# Patient Record
Sex: FEMALE | Race: WHITE | NOT HISPANIC OR LATINO
[De-identification: names, ages, dates, MRNs, and addresses within clinical notes are randomized per-mention and may not be internally consistent; named-entity substitution may affect disease eponyms.]

---

## 2016-09-08 RX ORDER — LEVETIRACETAM 250 MG/1
250 TABLET, FILM COATED ORAL
Qty: 0 | Refills: 0 | DISCHARGE
Start: 2016-09-08

## 2016-09-08 RX ORDER — LEVETIRACETAM 250 MG/1
200 TABLET, FILM COATED ORAL
Qty: 0 | Refills: 0 | COMMUNITY
Start: 2016-09-08

## 2016-09-08 RX ORDER — LEVETIRACETAM 250 MG/1
250 TABLET, FILM COATED ORAL
Qty: 0 | Refills: 0 | COMMUNITY
Start: 2016-09-08

## 2017-02-18 ENCOUNTER — APPOINTMENT (OUTPATIENT)
Dept: INTERNAL MEDICINE | Facility: CLINIC | Age: 82
End: 2017-02-18

## 2017-02-18 ENCOUNTER — NON-APPOINTMENT (OUTPATIENT)
Age: 82
End: 2017-02-18

## 2017-02-18 VITALS
WEIGHT: 146.31 LBS | SYSTOLIC BLOOD PRESSURE: 130 MMHG | RESPIRATION RATE: 14 BRPM | DIASTOLIC BLOOD PRESSURE: 60 MMHG | BODY MASS INDEX: 26.93 KG/M2 | OXYGEN SATURATION: 95 % | HEIGHT: 62 IN | HEART RATE: 65 BPM

## 2017-02-19 LAB
25(OH)D3 SERPL-MCNC: 37.1 NG/ML
ALBUMIN SERPL ELPH-MCNC: 3.8 G/DL
ALP BLD-CCNC: 54 U/L
ALT SERPL-CCNC: 15 U/L
ANION GAP SERPL CALC-SCNC: 14 MMOL/L
AST SERPL-CCNC: 18 U/L
BASOPHILS # BLD AUTO: 0.03 K/UL
BASOPHILS NFR BLD AUTO: 0.7 %
BILIRUB SERPL-MCNC: 0.2 MG/DL
BUN SERPL-MCNC: 17 MG/DL
CALCIUM SERPL-MCNC: 9.2 MG/DL
CHLORIDE SERPL-SCNC: 104 MMOL/L
CHOLEST SERPL-MCNC: 193 MG/DL
CHOLEST/HDLC SERPL: 2.6 RATIO
CK SERPL-CCNC: 225 U/L
CO2 SERPL-SCNC: 25 MMOL/L
CREAT SERPL-MCNC: 0.86 MG/DL
EOSINOPHIL # BLD AUTO: 0.07 K/UL
EOSINOPHIL NFR BLD AUTO: 1.7 %
GLUCOSE SERPL-MCNC: 103 MG/DL
HBA1C MFR BLD HPLC: 6.5 %
HCT VFR BLD CALC: 37.7 %
HDLC SERPL-MCNC: 74 MG/DL
HGB BLD-MCNC: 11.9 G/DL
IMM GRANULOCYTES NFR BLD AUTO: 0.5 %
LDLC SERPL CALC-MCNC: 105 MG/DL
LYMPHOCYTES # BLD AUTO: 1.15 K/UL
LYMPHOCYTES NFR BLD AUTO: 27.6 %
MAN DIFF?: NORMAL
MCHC RBC-ENTMCNC: 28.5 PG
MCHC RBC-ENTMCNC: 31.6 GM/DL
MCV RBC AUTO: 90.4 FL
MONOCYTES # BLD AUTO: 0.38 K/UL
MONOCYTES NFR BLD AUTO: 9.1 %
NEUTROPHILS # BLD AUTO: 2.52 K/UL
NEUTROPHILS NFR BLD AUTO: 60.4 %
PLATELET # BLD AUTO: 226 K/UL
POTASSIUM SERPL-SCNC: 4.2 MMOL/L
PROT SERPL-MCNC: 6.3 G/DL
RBC # BLD: 4.17 M/UL
RBC # FLD: 14.5 %
SODIUM SERPL-SCNC: 143 MMOL/L
TRIGL SERPL-MCNC: 68 MG/DL
TSH SERPL-ACNC: 3.91 UIU/ML
WBC # FLD AUTO: 4.17 K/UL

## 2017-03-01 ENCOUNTER — APPOINTMENT (OUTPATIENT)
Dept: NEUROLOGY | Facility: CLINIC | Age: 82
End: 2017-03-01

## 2017-03-20 ENCOUNTER — MEDICATION RENEWAL (OUTPATIENT)
Age: 82
End: 2017-03-20

## 2017-03-21 ENCOUNTER — MEDICATION RENEWAL (OUTPATIENT)
Age: 82
End: 2017-03-21

## 2017-03-27 ENCOUNTER — RECORD ABSTRACTING (OUTPATIENT)
Age: 82
End: 2017-03-27

## 2017-04-04 ENCOUNTER — APPOINTMENT (OUTPATIENT)
Dept: NEUROLOGY | Facility: CLINIC | Age: 82
End: 2017-04-04

## 2017-04-04 ENCOUNTER — INPATIENT (INPATIENT)
Facility: HOSPITAL | Age: 82
LOS: 1 days | Discharge: HOME | DRG: 100 | End: 2017-04-06
Attending: FAMILY MEDICINE | Admitting: HOSPITALIST
Payer: MEDICARE

## 2017-04-04 VITALS
DIASTOLIC BLOOD PRESSURE: 70 MMHG | HEART RATE: 72 BPM | SYSTOLIC BLOOD PRESSURE: 116 MMHG | WEIGHT: 146 LBS | BODY MASS INDEX: 26.87 KG/M2 | HEIGHT: 62 IN

## 2017-04-04 VITALS
WEIGHT: 146 LBS | HEIGHT: 62 IN | BODY MASS INDEX: 26.87 KG/M2 | DIASTOLIC BLOOD PRESSURE: 70 MMHG | SYSTOLIC BLOOD PRESSURE: 116 MMHG | HEART RATE: 72 BPM

## 2017-04-04 VITALS
HEART RATE: 92 BPM | DIASTOLIC BLOOD PRESSURE: 96 MMHG | SYSTOLIC BLOOD PRESSURE: 189 MMHG | OXYGEN SATURATION: 100 % | HEIGHT: 65.5 IN | TEMPERATURE: 100 F | RESPIRATION RATE: 12 BRPM | WEIGHT: 154.98 LBS

## 2017-04-04 DIAGNOSIS — R53.1 WEAKNESS: ICD-10-CM

## 2017-04-04 DIAGNOSIS — Z86.69 PERSONAL HISTORY OF OTHER DISEASES OF THE NERVOUS SYSTEM AND SENSE ORGANS: Chronic | ICD-10-CM

## 2017-04-04 LAB
ALBUMIN SERPL ELPH-MCNC: 3.2 G/DL — LOW (ref 3.3–5)
ALP SERPL-CCNC: 96 U/L — SIGNIFICANT CHANGE UP (ref 40–120)
ALT FLD-CCNC: 22 U/L — SIGNIFICANT CHANGE UP (ref 12–78)
ANION GAP SERPL CALC-SCNC: 9 MMOL/L — SIGNIFICANT CHANGE UP (ref 5–17)
APPEARANCE UR: CLEAR — SIGNIFICANT CHANGE UP
APTT BLD: 30.1 SEC — SIGNIFICANT CHANGE UP (ref 27.5–37.4)
AST SERPL-CCNC: 27 U/L — SIGNIFICANT CHANGE UP (ref 15–37)
BACTERIA # UR AUTO: ABNORMAL
BASOPHILS # BLD AUTO: 0.1 K/UL — SIGNIFICANT CHANGE UP (ref 0–0.2)
BASOPHILS NFR BLD AUTO: 1.2 % — SIGNIFICANT CHANGE UP (ref 0–2)
BILIRUB SERPL-MCNC: 0.1 MG/DL — LOW (ref 0.2–1.2)
BILIRUB UR-MCNC: NEGATIVE — SIGNIFICANT CHANGE UP
BUN SERPL-MCNC: 20 MG/DL — SIGNIFICANT CHANGE UP (ref 7–23)
CALCIUM SERPL-MCNC: 8.6 MG/DL — SIGNIFICANT CHANGE UP (ref 8.5–10.1)
CHLORIDE SERPL-SCNC: 108 MMOL/L — SIGNIFICANT CHANGE UP (ref 96–108)
CO2 SERPL-SCNC: 26 MMOL/L — SIGNIFICANT CHANGE UP (ref 22–31)
COLOR SPEC: YELLOW — SIGNIFICANT CHANGE UP
CREAT SERPL-MCNC: 0.94 MG/DL — SIGNIFICANT CHANGE UP (ref 0.5–1.3)
DIFF PNL FLD: NEGATIVE — SIGNIFICANT CHANGE UP
EOSINOPHIL # BLD AUTO: 0.1 K/UL — SIGNIFICANT CHANGE UP (ref 0–0.5)
EOSINOPHIL NFR BLD AUTO: 1.9 % — SIGNIFICANT CHANGE UP (ref 0–6)
EPI CELLS # UR: SIGNIFICANT CHANGE UP
GLUCOSE SERPL-MCNC: 148 MG/DL — HIGH (ref 70–99)
GLUCOSE UR QL: 100 MG/DL
HCT VFR BLD CALC: 41.3 % — SIGNIFICANT CHANGE UP (ref 34.5–45)
HGB BLD-MCNC: 13.2 G/DL — SIGNIFICANT CHANGE UP (ref 11.5–15.5)
INR BLD: 0.96 RATIO — SIGNIFICANT CHANGE UP (ref 0.88–1.16)
KETONES UR-MCNC: NEGATIVE — SIGNIFICANT CHANGE UP
LACTATE SERPL-SCNC: 2.2 MMOL/L — HIGH (ref 0.7–2)
LEUKOCYTE ESTERASE UR-ACNC: ABNORMAL
LYMPHOCYTES # BLD AUTO: 1.1 K/UL — SIGNIFICANT CHANGE UP (ref 1–3.3)
LYMPHOCYTES # BLD AUTO: 19.1 % — SIGNIFICANT CHANGE UP (ref 13–44)
MCHC RBC-ENTMCNC: 29.7 PG — SIGNIFICANT CHANGE UP (ref 27–34)
MCHC RBC-ENTMCNC: 31.9 GM/DL — LOW (ref 32–36)
MCV RBC AUTO: 93 FL — SIGNIFICANT CHANGE UP (ref 80–100)
MONOCYTES # BLD AUTO: 0.5 K/UL — SIGNIFICANT CHANGE UP (ref 0–0.9)
MONOCYTES NFR BLD AUTO: 8.4 % — SIGNIFICANT CHANGE UP (ref 1–9)
NEUTROPHILS # BLD AUTO: 4.2 K/UL — SIGNIFICANT CHANGE UP (ref 1.8–7.4)
NEUTROPHILS NFR BLD AUTO: 69.4 % — SIGNIFICANT CHANGE UP (ref 43–77)
NITRITE UR-MCNC: NEGATIVE — SIGNIFICANT CHANGE UP
PH UR: 5 — SIGNIFICANT CHANGE UP (ref 4.8–8)
PLATELET # BLD AUTO: 186 K/UL — SIGNIFICANT CHANGE UP (ref 150–400)
POTASSIUM SERPL-MCNC: 4 MMOL/L — SIGNIFICANT CHANGE UP (ref 3.5–5.3)
POTASSIUM SERPL-SCNC: 4 MMOL/L — SIGNIFICANT CHANGE UP (ref 3.5–5.3)
PROT SERPL-MCNC: 6.9 G/DL — SIGNIFICANT CHANGE UP (ref 6–8.3)
PROT UR-MCNC: 25 MG/DL
PROTHROM AB SERPL-ACNC: 10.5 SEC — SIGNIFICANT CHANGE UP (ref 9.8–12.7)
RBC # BLD: 4.44 M/UL — SIGNIFICANT CHANGE UP (ref 3.8–5.2)
RBC # FLD: 13.9 % — SIGNIFICANT CHANGE UP (ref 10.3–14.5)
RBC CASTS # UR COMP ASSIST: SIGNIFICANT CHANGE UP /HPF (ref 0–4)
SODIUM SERPL-SCNC: 143 MMOL/L — SIGNIFICANT CHANGE UP (ref 135–145)
SP GR SPEC: 1.02 — SIGNIFICANT CHANGE UP (ref 1.01–1.02)
UROBILINOGEN FLD QL: NEGATIVE — SIGNIFICANT CHANGE UP
WBC # BLD: 6 K/UL — SIGNIFICANT CHANGE UP (ref 3.8–10.5)
WBC # FLD AUTO: 6 K/UL — SIGNIFICANT CHANGE UP (ref 3.8–10.5)
WBC UR QL: SIGNIFICANT CHANGE UP

## 2017-04-04 PROCEDURE — 99223 1ST HOSP IP/OBS HIGH 75: CPT | Mod: AI,GC

## 2017-04-04 PROCEDURE — 93010 ELECTROCARDIOGRAM REPORT: CPT

## 2017-04-04 PROCEDURE — 71010: CPT | Mod: 26

## 2017-04-04 PROCEDURE — 99285 EMERGENCY DEPT VISIT HI MDM: CPT

## 2017-04-04 PROCEDURE — 70450 CT HEAD/BRAIN W/O DYE: CPT | Mod: 26

## 2017-04-04 RX ORDER — DEXTROSE 50 % IN WATER 50 %
25 SYRINGE (ML) INTRAVENOUS ONCE
Qty: 0 | Refills: 0 | Status: DISCONTINUED | OUTPATIENT
Start: 2017-04-04 | End: 2017-04-06

## 2017-04-04 RX ORDER — ATORVASTATIN CALCIUM 80 MG/1
20 TABLET, FILM COATED ORAL AT BEDTIME
Qty: 0 | Refills: 0 | Status: DISCONTINUED | OUTPATIENT
Start: 2017-04-04 | End: 2017-04-06

## 2017-04-04 RX ORDER — ACETAMINOPHEN 500 MG
650 TABLET ORAL ONCE
Qty: 0 | Refills: 0 | Status: COMPLETED | OUTPATIENT
Start: 2017-04-04 | End: 2017-04-04

## 2017-04-04 RX ORDER — LEVETIRACETAM 250 MG/1
400 TABLET, FILM COATED ORAL
Qty: 0 | Refills: 0 | COMMUNITY

## 2017-04-04 RX ORDER — LEVETIRACETAM 250 MG/1
250 TABLET, FILM COATED ORAL DAILY
Qty: 0 | Refills: 0 | Status: DISCONTINUED | OUTPATIENT
Start: 2017-04-05 | End: 2017-04-06

## 2017-04-04 RX ORDER — SODIUM CHLORIDE 9 MG/ML
1000 INJECTION, SOLUTION INTRAVENOUS
Qty: 0 | Refills: 0 | Status: DISCONTINUED | OUTPATIENT
Start: 2017-04-04 | End: 2017-04-06

## 2017-04-04 RX ORDER — MULTIVIT-MIN/FERROUS GLUCONATE 9 MG/15 ML
1 LIQUID (ML) ORAL DAILY
Qty: 0 | Refills: 0 | Status: DISCONTINUED | OUTPATIENT
Start: 2017-04-04 | End: 2017-04-06

## 2017-04-04 RX ORDER — INSULIN LISPRO 100/ML
VIAL (ML) SUBCUTANEOUS
Qty: 0 | Refills: 0 | Status: DISCONTINUED | OUTPATIENT
Start: 2017-04-04 | End: 2017-04-06

## 2017-04-04 RX ORDER — DEXTROSE 50 % IN WATER 50 %
12.5 SYRINGE (ML) INTRAVENOUS ONCE
Qty: 0 | Refills: 0 | Status: DISCONTINUED | OUTPATIENT
Start: 2017-04-04 | End: 2017-04-06

## 2017-04-04 RX ORDER — SODIUM CHLORIDE 9 MG/ML
1000 INJECTION INTRAMUSCULAR; INTRAVENOUS; SUBCUTANEOUS
Qty: 0 | Refills: 0 | Status: COMPLETED | OUTPATIENT
Start: 2017-04-04 | End: 2017-04-04

## 2017-04-04 RX ORDER — ONDANSETRON 8 MG/1
4 TABLET, FILM COATED ORAL ONCE
Qty: 0 | Refills: 0 | Status: COMPLETED | OUTPATIENT
Start: 2017-04-04 | End: 2017-04-04

## 2017-04-04 RX ORDER — DEXTROSE 50 % IN WATER 50 %
1 SYRINGE (ML) INTRAVENOUS ONCE
Qty: 0 | Refills: 0 | Status: DISCONTINUED | OUTPATIENT
Start: 2017-04-04 | End: 2017-04-06

## 2017-04-04 RX ORDER — ASPIRIN/CALCIUM CARB/MAGNESIUM 324 MG
325 TABLET ORAL ONCE
Qty: 0 | Refills: 0 | Status: COMPLETED | OUTPATIENT
Start: 2017-04-04 | End: 2017-04-04

## 2017-04-04 RX ORDER — GLUCAGON INJECTION, SOLUTION 0.5 MG/.1ML
1 INJECTION, SOLUTION SUBCUTANEOUS ONCE
Qty: 0 | Refills: 0 | Status: DISCONTINUED | OUTPATIENT
Start: 2017-04-04 | End: 2017-04-06

## 2017-04-04 RX ORDER — LEVETIRACETAM 250 MG/1
500 TABLET, FILM COATED ORAL AT BEDTIME
Qty: 0 | Refills: 0 | Status: DISCONTINUED | OUTPATIENT
Start: 2017-04-04 | End: 2017-04-06

## 2017-04-04 RX ADMIN — SODIUM CHLORIDE 1000 MILLILITER(S): 9 INJECTION INTRAMUSCULAR; INTRAVENOUS; SUBCUTANEOUS at 22:40

## 2017-04-04 RX ADMIN — ONDANSETRON 4 MILLIGRAM(S): 8 TABLET, FILM COATED ORAL at 22:19

## 2017-04-04 RX ADMIN — SODIUM CHLORIDE 1000 MILLILITER(S): 9 INJECTION INTRAMUSCULAR; INTRAVENOUS; SUBCUTANEOUS at 21:35

## 2017-04-04 NOTE — PROVIDER CONTACT NOTE (CRITICAL VALUE NOTIFICATION) - ACTION/TREATMENT ORDERED:
Dr. Almendarez made aware and ordered to repeat Lactate after 2 liter bolus of Normal saline as ordered.

## 2017-04-04 NOTE — H&P ADULT - ASSESSMENT
84 yo female with pmh of hemorrhagic cva, htn, seizire, htn, hld, diabetes, chronic diastolic chf presented to the ED with sons c/o left sided weakness and right hand tremor admitted with left sided weakness r/o cva, arrythmia hr to 40s, elevated lactate 86 yo female with pmh of hemorrhagic cva, htn, seizure, htn, hld, diabetes, chronic diastolic chf presented to the ED with sons c/o left sided weakness and right hand tremor admitted with left sided weakness r/o cva, arrythmia hr to 40s, elevated lactate

## 2017-04-04 NOTE — ED PROVIDER NOTE - MEDICAL DECISION MAKING DETAILS
L sided weakness, slurred speech and ha today - not TPA candidate due to timing > 4.5 hrs. Check labs, CT, Neuro, admit

## 2017-04-04 NOTE — ED PROVIDER NOTE - OBJECTIVE STATEMENT
84 yo F p/w leaning to left side, Some weakness in L arm x past ~ 4 - 5 hrs. No cp/sob/palp. no abd pain. no vomiting or diarrhea. No neck / back pain. No fever/chills. No cp/sob/palp. No numb/ting/focal weak. no recent trauma. Pt co mild gen headache throughout day today. no agg/allev factors. No other inj or co.  Pt with hx sx, has had some similar sx in past related to sz. No known sz activity today.

## 2017-04-04 NOTE — ED PROVIDER NOTE - ENMT, MLM
Airway patent, Nasal mucosa clear. Mouth with normal mucosa. Throat has no vesicles, no oropharyngeal exudates and uvula is midline. MM Moist. Non-toxic, well appearing. Neck supple.

## 2017-04-04 NOTE — H&P ADULT - FAMILY HISTORY
Mother  Still living? Unknown  Family history of basal cell carcinoma, Age at diagnosis: Age Unknown     Father  Still living? Unknown  Family history of heart disease, Age at diagnosis: Age Unknown

## 2017-04-04 NOTE — H&P ADULT - PROBLEM SELECTOR PLAN 4
on home labetolol, held,  ekg nsr, however pt went anjali to 30s-40s for several minutes on tele monitor and then returned to 90s.   cardiology consulted   follow up electrolytes

## 2017-04-04 NOTE — H&P ADULT - ATTENDING COMMENTS
86 yo female with pmh of hemorrhagic cva, htn, seizire, htn, hld, diabetes, chronic diastolic chf admitted with left sided weakness r/o cva, arrythmia hr to 40s, elevated lactate.  Neuro aware of case, will see in the AM.  No current intervention.  No ASA, refusing it. No AC refusing it 2/2 to hx of hemorrhagic stroke.  Monitor BP.

## 2017-04-04 NOTE — ED ADULT NURSE NOTE - OBJECTIVE STATEMENT
Patient came in to ED brought in by EMS c/o left sided weakness today also c/o headache. Patient is awake, oriented x3. Noted with IV cannula g20 at right ACV. Patient states had tylenol taken 2 tabs at home.

## 2017-04-04 NOTE — ED ADULT NURSE NOTE - PMH
Chronic diastolic heart failure    CVA (cerebral infarction)    Diabetes mellitus    Hemorrhagic stroke    HTN (hypertension)    Hypercholesteremia    Seizure

## 2017-04-04 NOTE — ED PROVIDER NOTE - CHPI ED SYMPTOMS NEG
no fever/no dizziness/no loss of consciousness/no nausea/no vomiting/no blurred vision/no change in level of consciousness

## 2017-04-04 NOTE — H&P ADULT - PROBLEM SELECTOR PLAN 7
hold labetolol for bradycardic episode   if BP increase systolic above 190s consider hydralazine   keep BP systolic >140

## 2017-04-04 NOTE — H&P ADULT - PROBLEM SELECTOR PLAN 6
hold asa/plavix dvt prophy due to hx discharge note on 7/15 stated For her CVA MRI showed amyloid angiopathy for cause of bleed. Patient advised to be off statin and aspirin as she has a high risk of rebleeding.  - hold asa/plavix dvt prophy due to hx

## 2017-04-04 NOTE — ED PROVIDER NOTE - CARE PLAN
Principal Discharge DX:	Weakness of left arm  Secondary Diagnosis:	Acute nonintractable headache, unspecified headache type

## 2017-04-04 NOTE — H&P ADULT - PSH
Basal cell cancer  s/p Mohs surgery  History of arthroscopy of knee  Right  History of hysterectomy    History of retinal tear  surgical repair  S/P cataract surgery  bilateral

## 2017-04-04 NOTE — H&P ADULT - PROBLEM SELECTOR PLAN 1
Admit to tele for r/o cva  Ct- negative for acute infarct   follow up MRi/mra head  follow up echo  follow up carotid doppler  follow up lipid panel   continue statin   hold Aspirin and anti- coag secondary to hx of hemorrhagic stroke per pt

## 2017-04-04 NOTE — H&P ADULT - HISTORY OF PRESENT ILLNESS
86 yo female with pmh of hemorrhagic cva, htn, seizire, htn, hld, diabetes, chronic diastolic chf presented to the ED with sons c/o left sided weakness and right hand tremor. Son admits mother was ok on the phone at around 4pm, however when he got home at around 7pm he found mother lethargic laying on the couch. Admits to mother right hand shaking and her c/o right arm pain and tongue heaviness. When son tried to get mother up from the couch he noticed she wasnt able to because she wasnt moving her left sided extremities. Ambulance was then called and pt arrived in the ED, where she refused aspirin due to her hx of hemorrhagic stroke. Pt also admits to a frontal headache rated 8/10,  and non-radiating. Denies blurry vision, dizziness, numbness, tingling , facial droop, slurred speech. Admits to epigastric pain radiating to her throat. Denies chest pain, sob, n/v/d/c.   In the Ed pt had ct- head that was negative for acute infarct or bleed. Lactate 2.2. BP elevated to 189/96. Also found to have periods of sinus anjali on tele monitor HR 40s and then returns back to 90s. 84 yo female with pmh of hemorrhagic cva, htn, seizure, htn, hld, diabetes, chronic diastolic chf presented to the ED with sons c/o left sided weakness and right hand tremor. Son admits mother was ok on the phone at around 4pm, however when he got home at around 7pm he found mother lethargic laying on the couch. Admits to mother right hand shaking and her c/o right arm pain and tongue heaviness. When son tried to get mother up from the couch he noticed she wasnt able to because she wasnt moving her left sided extremities. Ambulance was then called and pt arrived in the ED, where she refused aspirin due to her hx of hemorrhagic stroke. Pt also admits to a frontal headache rated 8/10,  and non-radiating. Denies blurry vision, dizziness, numbness, tingling , facial droop, slurred speech. Admits to epigastric pain radiating to her throat. Denies chest pain, sob, n/v/d/c.   In the Ed pt had ct- head that was negative for acute infarct or bleed. Lactate 2.2. BP elevated to 189/96. Also found to have periods of sinus anjali on tele monitor HR 40s and then returns back to 90s.

## 2017-04-05 ENCOUNTER — TRANSCRIPTION ENCOUNTER (OUTPATIENT)
Age: 82
End: 2017-04-05

## 2017-04-05 DIAGNOSIS — Z29.9 ENCOUNTER FOR PROPHYLACTIC MEASURES, UNSPECIFIED: ICD-10-CM

## 2017-04-05 DIAGNOSIS — E11.9 TYPE 2 DIABETES MELLITUS WITHOUT COMPLICATIONS: ICD-10-CM

## 2017-04-05 DIAGNOSIS — I61.9 NONTRAUMATIC INTRACEREBRAL HEMORRHAGE, UNSPECIFIED: ICD-10-CM

## 2017-04-05 DIAGNOSIS — R79.89 OTHER SPECIFIED ABNORMAL FINDINGS OF BLOOD CHEMISTRY: ICD-10-CM

## 2017-04-05 DIAGNOSIS — R51 HEADACHE: ICD-10-CM

## 2017-04-05 DIAGNOSIS — I10 ESSENTIAL (PRIMARY) HYPERTENSION: ICD-10-CM

## 2017-04-05 DIAGNOSIS — R00.1 BRADYCARDIA, UNSPECIFIED: ICD-10-CM

## 2017-04-05 DIAGNOSIS — E78.00 PURE HYPERCHOLESTEROLEMIA, UNSPECIFIED: ICD-10-CM

## 2017-04-05 DIAGNOSIS — R56.9 UNSPECIFIED CONVULSIONS: ICD-10-CM

## 2017-04-05 DIAGNOSIS — R29.898 OTHER SYMPTOMS AND SIGNS INVOLVING THE MUSCULOSKELETAL SYSTEM: ICD-10-CM

## 2017-04-05 LAB
ANION GAP SERPL CALC-SCNC: 10 MMOL/L — SIGNIFICANT CHANGE UP (ref 5–17)
BASOPHILS # BLD AUTO: 0.1 K/UL — SIGNIFICANT CHANGE UP (ref 0–0.2)
BASOPHILS NFR BLD AUTO: 0.7 % — SIGNIFICANT CHANGE UP (ref 0–2)
BUN SERPL-MCNC: 15 MG/DL — SIGNIFICANT CHANGE UP (ref 7–23)
CALCIUM SERPL-MCNC: 7.6 MG/DL — LOW (ref 8.5–10.1)
CHLORIDE SERPL-SCNC: 105 MMOL/L — SIGNIFICANT CHANGE UP (ref 96–108)
CHOLEST SERPL-MCNC: 167 MG/DL — SIGNIFICANT CHANGE UP (ref 10–199)
CO2 SERPL-SCNC: 26 MMOL/L — SIGNIFICANT CHANGE UP (ref 22–31)
CREAT SERPL-MCNC: 0.78 MG/DL — SIGNIFICANT CHANGE UP (ref 0.5–1.3)
CULTURE RESULTS: SIGNIFICANT CHANGE UP
EOSINOPHIL # BLD AUTO: 0 K/UL — SIGNIFICANT CHANGE UP (ref 0–0.5)
EOSINOPHIL NFR BLD AUTO: 0.2 % — SIGNIFICANT CHANGE UP (ref 0–6)
GLUCOSE SERPL-MCNC: 118 MG/DL — HIGH (ref 70–99)
HBA1C BLD-MCNC: 6.6 % — HIGH (ref 4–5.6)
HCT VFR BLD CALC: 36.3 % — SIGNIFICANT CHANGE UP (ref 34.5–45)
HDLC SERPL-MCNC: 73 MG/DL — SIGNIFICANT CHANGE UP (ref 40–125)
HGB BLD-MCNC: 11.6 G/DL — SIGNIFICANT CHANGE UP (ref 11.5–15.5)
LACTATE SERPL-SCNC: 1.5 MMOL/L — SIGNIFICANT CHANGE UP (ref 0.7–2)
LIPID PNL WITH DIRECT LDL SERPL: 83 MG/DL — SIGNIFICANT CHANGE UP
LYMPHOCYTES # BLD AUTO: 0.9 K/UL — LOW (ref 1–3.3)
LYMPHOCYTES # BLD AUTO: 11.2 % — LOW (ref 13–44)
MAGNESIUM SERPL-MCNC: 1.7 MG/DL — LOW (ref 1.8–2.4)
MCHC RBC-ENTMCNC: 29.6 PG — SIGNIFICANT CHANGE UP (ref 27–34)
MCHC RBC-ENTMCNC: 32 GM/DL — SIGNIFICANT CHANGE UP (ref 32–36)
MCV RBC AUTO: 92.5 FL — SIGNIFICANT CHANGE UP (ref 80–100)
MONOCYTES # BLD AUTO: 0.5 K/UL — SIGNIFICANT CHANGE UP (ref 0–0.9)
MONOCYTES NFR BLD AUTO: 6.7 % — SIGNIFICANT CHANGE UP (ref 1–9)
NEUTROPHILS # BLD AUTO: 6.2 K/UL — SIGNIFICANT CHANGE UP (ref 1.8–7.4)
NEUTROPHILS NFR BLD AUTO: 81.1 % — HIGH (ref 43–77)
PHOSPHATE SERPL-MCNC: 2.8 MG/DL — SIGNIFICANT CHANGE UP (ref 2.5–4.5)
PLATELET # BLD AUTO: 177 K/UL — SIGNIFICANT CHANGE UP (ref 150–400)
POTASSIUM SERPL-MCNC: 3.9 MMOL/L — SIGNIFICANT CHANGE UP (ref 3.5–5.3)
POTASSIUM SERPL-SCNC: 3.9 MMOL/L — SIGNIFICANT CHANGE UP (ref 3.5–5.3)
RBC # BLD: 3.93 M/UL — SIGNIFICANT CHANGE UP (ref 3.8–5.2)
RBC # FLD: 13.7 % — SIGNIFICANT CHANGE UP (ref 10.3–14.5)
SODIUM SERPL-SCNC: 141 MMOL/L — SIGNIFICANT CHANGE UP (ref 135–145)
SPECIMEN SOURCE: SIGNIFICANT CHANGE UP
T3 SERPL-MCNC: 103 NG/DL — SIGNIFICANT CHANGE UP (ref 80–200)
T4 AB SER-ACNC: 7.5 UG/DL — SIGNIFICANT CHANGE UP (ref 4.6–12)
TOTAL CHOLESTEROL/HDL RATIO MEASUREMENT: 2.3 RATIO — LOW (ref 3.3–7.1)
TRIGL SERPL-MCNC: 55 MG/DL — SIGNIFICANT CHANGE UP (ref 10–149)
TSH SERPL-MCNC: 1.17 UIU/ML — SIGNIFICANT CHANGE UP (ref 0.36–3.74)
WBC # BLD: 7.6 K/UL — SIGNIFICANT CHANGE UP (ref 3.8–10.5)
WBC # FLD AUTO: 7.6 K/UL — SIGNIFICANT CHANGE UP (ref 3.8–10.5)

## 2017-04-05 PROCEDURE — 70548 MR ANGIOGRAPHY NECK W/DYE: CPT | Mod: 26

## 2017-04-05 PROCEDURE — 93880 EXTRACRANIAL BILAT STUDY: CPT | Mod: 26

## 2017-04-05 PROCEDURE — 99223 1ST HOSP IP/OBS HIGH 75: CPT

## 2017-04-05 PROCEDURE — 99233 SBSQ HOSP IP/OBS HIGH 50: CPT | Mod: GC

## 2017-04-05 PROCEDURE — 70551 MRI BRAIN STEM W/O DYE: CPT | Mod: 26

## 2017-04-05 PROCEDURE — 93010 ELECTROCARDIOGRAM REPORT: CPT

## 2017-04-05 RX ORDER — ONDANSETRON 8 MG/1
4 TABLET, FILM COATED ORAL ONCE
Qty: 0 | Refills: 0 | Status: COMPLETED | OUTPATIENT
Start: 2017-04-05 | End: 2017-04-05

## 2017-04-05 RX ORDER — MAGNESIUM SULFATE 500 MG/ML
2 VIAL (ML) INJECTION ONCE
Qty: 0 | Refills: 0 | Status: COMPLETED | OUTPATIENT
Start: 2017-04-05 | End: 2017-04-05

## 2017-04-05 RX ORDER — LABETALOL HCL 100 MG
200 TABLET ORAL
Qty: 0 | Refills: 0 | Status: DISCONTINUED | OUTPATIENT
Start: 2017-04-05 | End: 2017-04-06

## 2017-04-05 RX ORDER — ACETAMINOPHEN 500 MG
1000 TABLET ORAL ONCE
Qty: 0 | Refills: 0 | Status: COMPLETED | OUTPATIENT
Start: 2017-04-05 | End: 2017-04-05

## 2017-04-05 RX ADMIN — LEVETIRACETAM 250 MILLIGRAM(S): 250 TABLET, FILM COATED ORAL at 13:07

## 2017-04-05 RX ADMIN — Medication 50 GRAM(S): at 10:30

## 2017-04-05 RX ADMIN — Medication 200 MILLIGRAM(S): at 18:57

## 2017-04-05 RX ADMIN — LEVETIRACETAM 500 MILLIGRAM(S): 250 TABLET, FILM COATED ORAL at 00:14

## 2017-04-05 RX ADMIN — Medication 1000 MILLIGRAM(S): at 17:08

## 2017-04-05 RX ADMIN — LEVETIRACETAM 500 MILLIGRAM(S): 250 TABLET, FILM COATED ORAL at 22:04

## 2017-04-05 RX ADMIN — Medication 1: at 18:05

## 2017-04-05 RX ADMIN — Medication 1 TABLET(S): at 13:07

## 2017-04-05 RX ADMIN — ONDANSETRON 4 MILLIGRAM(S): 8 TABLET, FILM COATED ORAL at 17:09

## 2017-04-05 NOTE — ED ADULT NURSE REASSESSMENT NOTE - COMFORT CARE
side rails up/po fluids offered/meal provided/plan of care explained/warm blanket provided
assisted to bathroom

## 2017-04-05 NOTE — DISCHARGE NOTE ADULT - PLAN OF CARE
resolution - continue keppra  - followup with neurologist within 1-2 weeks - continue crestor 5mg daily - continue labetolol 200mg twice daily - continue metformin f/u with outpt neurology - Please START carbamazepine (tegretol) 200mg TWICE a day  - To slowly stop keppra, please take 250mg TWICE a day for 1 week.   The following week stop morning dose and continue keppra 250mg 1 tab at night  The following week may discontinue keppra completely. Please continue taking tegratol.   - It is important to followup with your PMD, Dr. Pedrito Rosenthal, in one week for BMP to check sodium levels  - Please followup with your outpatient neurologist Dr. Libman in one month - continue crestor 5mg daily  - F.u with PMD for lipid panel - continue labetolol 200mg twice daily  - Please followup with [facility] in [place] within 2-3 weeks for evaluation and outpatient workup. - Resolved, likely due to TIA vs seizure  - Please see Dr. Rosenthal in one week  - Seizure plan below

## 2017-04-05 NOTE — ED ADULT NURSE REASSESSMENT NOTE - NS ED NURSE REASSESS COMMENT FT1
Patient back from Cat scan awaiting for report.
Patient transported to cat scan per stretcher.
BP rechecked @ 184/99, Dr. Etienne made aware, he said will consult his admitting Resident. No further order made.
BP= 185/87, Dr. Etienne made aware, no further order made.
Back from ultrasound awaiting for report. Patient placed on hospital bed. Still awaiting for room availability on Telemetry.
Dr Sierra at bedside evaluating patient, patient reports feeling slightly improved. patient with BM on bed pan, perineal care provided, patient made clean and linen straightened
Transported to ultrasound per stretcher.
care of patient transferred to this nurse, while patient in MRA/MRI. Will assess patient on return to ED
pt reports feeling weak, MD admitting resident made aware.
patient from previous shift with complaints of weakness, unsteady gait, patient to go for MRI

## 2017-04-05 NOTE — PROGRESS NOTE ADULT - SUBJECTIVE AND OBJECTIVE BOX
86 yo female with pmh of hemorrhagic cva, htn, seizure, htn, hld, diabetes, chronic diastolic chf presented to the ED with sons c/o left sided weakness and right hand tremor admitted with r/o CVA      INTERVAL HPI/OVERNIGHT EVENTS: Pt admitted last night. No acute events, remains stable    MEDICATIONS  (STANDING):  atorvastatin 20milliGRAM(s) Oral at bedtime  multivitamin/minerals 1Tablet(s) Oral daily  levETIRAcetam 500milliGRAM(s) Oral at bedtime  levETIRAcetam 250milliGRAM(s) Oral daily  insulin lispro (HumaLOG) corrective regimen sliding scale  SubCutaneous Before meals and at bedtime  dextrose 5%. 1000milliLiter(s) IV Continuous <Continuous>  dextrose 50% Injectable 12.5Gram(s) IV Push once  dextrose 50% Injectable 25Gram(s) IV Push once  dextrose 50% Injectable 25Gram(s) IV Push once  labetalol 200milliGRAM(s) Oral two times a day    MEDICATIONS  (PRN):  dextrose Gel 1Dose(s) Oral once PRN Blood Glucose LESS THAN 70 milliGRAM(s)/deciLiter  glucagon  Injectable 1milliGRAM(s) IntraMuscular once PRN Glucose <70 milliGRAM(s)/deciLiter      Allergies    sulfa drugs (Unknown)    Intolerances        REVIEW OF SYSTEMS:  CONSTITUTIONAL: No fever, No chills,No fatigue,No myalgia,No Body ache  EYES: No eye pain, visual disturbances, or discharge  ENMT:  No ear pain, No nose bleed, No vertigo; No sinus or throat pain  NECK: No pain, No stiffness  RESPIRATORY: No cough, wheezing, No  hemoptysis; No shortness of breath  CARDIOVASCULAR: No chest pain, palpitations, leg swelling  GASTROINTESTINAL: No abdominal or epigastric pain. No nausea, No vomiting; No diarrhea or constipation. [ ] BM  GENITOURINARY: No dysuria, No frequency, No urgency, No hematuria, or incontinence  NEUROLOGICAL: alert and oriented x 3,  No headaches, No dizziness, No numbness,  SKIN:   No itching, burning, rashes, or lesions   MUSCULOSKELETAL: No joint pain or swelling; No muscle pain, No back pain, No extremity pain  PSYCHIATRIC: No depression, anxiety, mood swings, or difficulty sleeping  ROS Unable to obtain due to - [ ] Dementia  [ ] Lethargy  REST OF REVIEW Of SYSTEM - [ ] Normal     Height (cm): 166.4 ( @ 21:07)  Weight (kg): 70.3 ( @ 21:07)  BMI (kg/m2): 25.4 ( @ 21:07)  BSA (m2): 1.79 ( @ 21:07)  Vital Signs Last 24 Hrs  T(C): 36.6, Max: 37.6 ( @ 21:07)  T(F): 97.8, Max: 99.6 ( @ 21:07)  HR: 70 (70 - 95)  BP: 138/76 (137/52 - 189/96)  BP(mean): --  RR: 18 (12 - 18)  SpO2: 99% (98% - 100%)  [ ] room air   [ ] 02    PHYSICAL EXAM:  General: Well developed, well nourished, NAD  HEENT: NCAT, PERRLA, EOMI bl, moist mucous membranes   Neck: Supple, nontender, no mass  Neurology: A&Ox3, nonfocal, CN II-XII grossly intact, sensation intact, no gait abnormalities   Respiratory: CTA B/L, No W/R/R  CV: RRR, +S1/S2, no murmurs, rubs or gallops  Abdominal: Soft, NT, ND +BSx4  Extremities: No C/C/E, + peripheral pulses  MSK: Normal ROM, no joint erythema or warmth, no joint swelling   Skin: warm, dry, normal color, no rash or abnormal lesions    LABS:                        11.6   7.6   )-----------( 177      ( 2017 06:18 )             36.3     2017 06:18    141    |  105    |  15     ----------------------------<  118    3.9     |  26     |  0.78     Ca    7.6        2017 06:18  Phos  2.8       2017 06:18  Mg     1.7       2017 06:18    TPro  6.9    /  Alb  3.2    /  TBili  0.1    /  DBili  x      /  AST  27     /  ALT  22     /  AlkPhos  96     2017 21:28    PT/INR - ( 2017 21:28 )   PT: 10.5 sec;   INR: 0.96 ratio         PTT - ( 2017 21:28 )  PTT:30.1 sec    Urinalysis Basic - ( 2017 21:41 )    Color: Yellow / Appearance: Clear / S.020 / pH: x  Gluc: x / Ketone: Negative  / Bili: Negative / Urobili: Negative   Blood: x / Protein: 25 mg/dL / Nitrite: Negative   Leuk Esterase: Trace / RBC: 0-2 /HPF / WBC 3-5   Sq Epi: x / Non Sq Epi: Few / Bacteria: Few      CAPILLARY BLOOD GLUCOSE  164 (2017 21:07) 86 yo female with pmh of hemorrhagic cva, htn, seizure, htn, hld, diabetes, chronic diastolic chf presented to the ED with sons c/o left sided weakness and right hand tremor admitted with r/o CVA/TIA vs seizure      INTERVAL HPI/OVERNIGHT EVENTS: Pt admitted last night. No acute events, remains stable      MEDICATIONS  (STANDING):  atorvastatin 20milliGRAM(s) Oral at bedtime  multivitamin/minerals 1Tablet(s) Oral daily  levETIRAcetam 500milliGRAM(s) Oral at bedtime  levETIRAcetam 250milliGRAM(s) Oral daily  insulin lispro (HumaLOG) corrective regimen sliding scale  SubCutaneous Before meals and at bedtime  dextrose 5%. 1000milliLiter(s) IV Continuous <Continuous>  dextrose 50% Injectable 12.5Gram(s) IV Push once  dextrose 50% Injectable 25Gram(s) IV Push once  dextrose 50% Injectable 25Gram(s) IV Push once  labetalol 200milliGRAM(s) Oral two times a day    MEDICATIONS  (PRN):  dextrose Gel 1Dose(s) Oral once PRN Blood Glucose LESS THAN 70 milliGRAM(s)/deciLiter  glucagon  Injectable 1milliGRAM(s) IntraMuscular once PRN Glucose <70 milliGRAM(s)/deciLiter      Allergies    sulfa drugs (Unknown)    Intolerances        REVIEW OF SYSTEMS:  CONSTITUTIONAL: + fatigue. No fever, No chills,No myalgia,No Body ache  EYES: No eye pain, visual disturbances, or discharge  ENMT:  No ear pain, No nose bleed, No vertigo; No sinus or throat pain  NECK: No pain, No stiffness  RESPIRATORY: No cough, wheezing, No  hemoptysis; No shortness of breath  CARDIOVASCULAR: No chest pain, palpitations, leg swelling  GASTROINTESTINAL: No abdominal or epigastric pain. No nausea, No vomiting; No diarrhea or constipation. [ ] BM  GENITOURINARY: No dysuria, No frequency, No urgency, No hematuria, or incontinence  NEUROLOGICAL: alert and oriented x 3,  No headaches, No dizziness, No numbness,  SKIN:   No itching, burning, rashes, or lesions   MUSCULOSKELETAL: No joint pain or swelling; No muscle pain, No back pain, No extremity pain  PSYCHIATRIC: No depression, anxiety, mood swings, or difficulty sleeping  ROS Unable to obtain due to - [ ] Dementia  [ ] Lethargy  REST OF REVIEW Of SYSTEM - [ ] Normal     Height (cm): 166.4 ( @ 21:07)  Weight (kg): 70.3 ( @ 21:07)  BMI (kg/m2): 25.4 ( @ 21:07)  BSA (m2): 1.79 ( @ 21:07)  Vital Signs Last 24 Hrs  T(C): 36.6, Max: 37.6 ( @ 21:07)  T(F): 97.8, Max: 99.6 ( @ 21:07)  HR: 70 (70 - 95)  BP: 138/76 (137/52 - 189/96)  BP(mean): --  RR: 18 (12 - 18)  SpO2: 99% (98% - 100%)  [ ] room air   [ ] 02    PHYSICAL EXAM:  General: Well developed, well nourished, NAD  HEENT: NCAT, PERRLA, EOMI bl, moist mucous membranes   Neck: Supple, nontender, no mass  Neurology: A&Ox3, nonfocal, CN II-XII grossly intact, sensation intact, no gait abnormalities   Respiratory: CTA B/L, No W/R/R  CV: RRR, +S1/S2, no murmurs, rubs or gallops  Abdominal: Soft, NT, ND +BSx4  Extremities: No C/C/E, + peripheral pulses  MSK: Normal ROM, no joint erythema or warmth, no joint swelling   Skin: warm, dry, normal color, no rash or abnormal lesions    LABS:                        11.6   7.6   )-----------( 177      ( 2017 06:18 )             36.3     2017 06:18    141    |  105    |  15     ----------------------------<  118    3.9     |  26     |  0.78     Ca    7.6        2017 06:18  Phos  2.8       2017 06:18  Mg     1.7       2017 06:18    TPro  6.9    /  Alb  3.2    /  TBili  0.1    /  DBili  x      /  AST  27     /  ALT  22     /  AlkPhos  96     2017 21:28    PT/INR - ( 2017 21:28 )   PT: 10.5 sec;   INR: 0.96 ratio         PTT - ( 2017 21:28 )  PTT:30.1 sec    Urinalysis Basic - ( 2017 21:41 )    Color: Yellow / Appearance: Clear / S.020 / pH: x  Gluc: x / Ketone: Negative  / Bili: Negative / Urobili: Negative   Blood: x / Protein: 25 mg/dL / Nitrite: Negative   Leuk Esterase: Trace / RBC: 0-2 /HPF / WBC 3-5   Sq Epi: x / Non Sq Epi: Few / Bacteria: Few      CAPILLARY BLOOD GLUCOSE  164 (2017 21:07) 86 yo female with pmh of hemorrhagic cva, htn, seizure, htn, hld, diabetes, chronic diastolic chf presented to the ED with sons c/o left sided weakness and right hand tremor admitted with r/o CVA/TIA vs seizure      INTERVAL HPI/OVERNIGHT EVENTS: Pt admitted last night. No acute events, remains stable      MEDICATIONS  (STANDING):  atorvastatin 20milliGRAM(s) Oral at bedtime  multivitamin/minerals 1Tablet(s) Oral daily  levETIRAcetam 500milliGRAM(s) Oral at bedtime  levETIRAcetam 250milliGRAM(s) Oral daily  insulin lispro (HumaLOG) corrective regimen sliding scale  SubCutaneous Before meals and at bedtime  dextrose 5%. 1000milliLiter(s) IV Continuous <Continuous>  dextrose 50% Injectable 12.5Gram(s) IV Push once  dextrose 50% Injectable 25Gram(s) IV Push once  dextrose 50% Injectable 25Gram(s) IV Push once  labetalol 200milliGRAM(s) Oral two times a day    MEDICATIONS  (PRN):  dextrose Gel 1Dose(s) Oral once PRN Blood Glucose LESS THAN 70 milliGRAM(s)/deciLiter  glucagon  Injectable 1milliGRAM(s) IntraMuscular once PRN Glucose <70 milliGRAM(s)/deciLiter      Allergies    sulfa drugs (Unknown)    Intolerances        REVIEW OF SYSTEMS:  CONSTITUTIONAL: + fatigue. No fever, No chills,No myalgia,No Body ache  EYES: No eye pain, visual disturbances, or discharge  ENMT:  No ear pain, No nose bleed, No vertigo; No sinus or throat pain  NECK: No pain, No stiffness  RESPIRATORY: No cough, wheezing, No  hemoptysis; No shortness of breath  CARDIOVASCULAR: No chest pain, palpitations, leg swelling  GASTROINTESTINAL: No abdominal or epigastric pain. No nausea, No vomiting; No diarrhea or constipation.  GENITOURINARY: No dysuria, No frequency, No urgency, No hematuria, or incontinence  NEUROLOGICAL: + frontal headache. alert and oriented x 3, No dizziness, No numbness,  SKIN:   No itching, burning, rashes, or lesions   MUSCULOSKELETAL: No joint pain or swelling; No muscle pain, No back pain, No extremity pain  PSYCHIATRIC: No depression, anxiety, mood swings, or difficulty sleeping  ROS Unable to obtain due to - [ ] Dementia  [ ] Lethargy  REST OF REVIEW Of SYSTEM - [ ] Normal     Height (cm): 166.4 ( @ 21:07)  Weight (kg): 70.3 ( @ 21:07)  BMI (kg/m2): 25.4 ( @ 21:07)  BSA (m2): 1.79 ( @ 21:07)  Vital Signs Last 24 Hrs  T(C): 36.6, Max: 37.6 ( @ 21:07)  T(F): 97.8, Max: 99.6 ( @ 21:07)  HR: 70 (70 - 95)  BP: 138/76 (137/52 - 189/96)  BP(mean): --  RR: 18 (12 - 18)  SpO2: 99% (98% - 100%)    PHYSICAL EXAM:  General: Well developed, well nourished, NAD  HEENT: NCAT, PERRLA, EOMI bl, moist mucous membranes   Neck: Supple, nontender, no mass  Neurology: A&Ox3, nonfocal, CN II-XII grossly intact, sensation intact, no gait abnormalities   Respiratory: CTA B/L, No W/R/R  CV: RRR, +S1/S2, no murmurs, rubs or gallops  Abdominal: Soft, NT, ND +BSx4  Extremities: No C/C/E, + peripheral pulses  MSK: Normal ROM, no joint erythema or warmth, no joint swelling   Skin: warm, dry, normal color, no rash or abnormal lesions    LABS:                        11.6   7.6   )-----------( 177      ( 2017 06:18 )             36.3     2017 06:18    141    |  105    |  15     ----------------------------<  118    3.9     |  26     |  0.78     Ca    7.6        2017 06:18  Phos  2.8       2017 06:18  Mg     1.7       2017 06:18    TPro  6.9    /  Alb  3.2    /  TBili  0.1    /  DBili  x      /  AST  27     /  ALT  22     /  AlkPhos  96     2017 21:28    PT/INR - ( 2017 21:28 )   PT: 10.5 sec;   INR: 0.96 ratio         PTT - ( 2017 21:28 )  PTT:30.1 sec    Urinalysis Basic - ( 2017 21:41 )    Color: Yellow / Appearance: Clear / S.020 / pH: x  Gluc: x / Ketone: Negative  / Bili: Negative / Urobili: Negative   Blood: x / Protein: 25 mg/dL / Nitrite: Negative   Leuk Esterase: Trace / RBC: 0-2 /HPF / WBC 3-5   Sq Epi: x / Non Sq Epi: Few / Bacteria: Few      CAPILLARY BLOOD GLUCOSE  164 (2017 21:07)

## 2017-04-05 NOTE — CONSULT NOTE ADULT - SUBJECTIVE AND OBJECTIVE BOX
[facility]         Bhavik Son, January Coles, Ankit Snyder        [phone] (office)    CHIEF COMPLAINT: Patient is a 85y old  Female who presents with a chief complaint of left (04 Apr 2017 23:54)      HPI:  84 yo female with pmh of hemorrhagic cva, htn, seizure, htn, hld, diabetes, chronic diastolic chf presented to the ED with sons c/o left sided weakness and right hand tremor. According to the son who is at the bedside, he came to the house, and spoke to his mother who was lying on the couch. She had been experiencing a sense of a rapid heartbeat, which was of uncertain etiology at that time. At that time, she was complaining that her right hand was shaking, and that her tongue was heavy. When he tried to get her off of the couch, she was very weak and unable to stand and ambulate. He called the annulus, and brought her to the hospital.    Here in the emergency department, she was not felt to have an acute CVA. Her palpitations were of uncertain etiology at that time. There has been concern about the possibility of bradycardia on telemetry monitoring. Cardiology consultation is now being obtained to make further recommendations regarding her ongoing evaluation and management.    The patient reports no a slight symptoms that would be suggestive of angina, congestive heart hemodynamically significant arrhythmia. She had a hemorrhagic stroke in the past. She is not known to have any underlying structural heart disease. An echocardiogram was obtained in August of 2016, which was consistent with a normal ejection fraction, and elevated left heart filling pressure at that time.    PAST MEDICAL & SURGICAL HISTORY:  Seizure  Hemorrhagic stroke  CVA (cerebral infarction)  HTN (hypertension)  Hypercholesteremia  Diabetes mellitus  History of retinal tear: surgical repair  Basal cell cancer: s/p Mohs surgery  History of hysterectomy  S/P cataract surgery: bilateral  History of arthroscopy of knee: Right      SOCIAL HISTORY: No active tobacco, alcohol or illicit drug use    FAMILY HISTORY:  Family history of heart disease (Father)  Family history of basal cell carcinoma (Mother)  Family history of cerebrovascular accident (CVA)      Outpatient medications:    MEDICATIONS  (STANDING):  atorvastatin 20milliGRAM(s) Oral at bedtime  multivitamin/minerals 1Tablet(s) Oral daily  levETIRAcetam 500milliGRAM(s) Oral at bedtime  levETIRAcetam 250milliGRAM(s) Oral daily  insulin lispro (HumaLOG) corrective regimen sliding scale  SubCutaneous Before meals and at bedtime  dextrose 5%. 1000milliLiter(s) IV Continuous <Continuous>  dextrose 50% Injectable 12.5Gram(s) IV Push once  dextrose 50% Injectable 25Gram(s) IV Push once  dextrose 50% Injectable 25Gram(s) IV Push once    MEDICATIONS  (PRN):  dextrose Gel 1Dose(s) Oral once PRN Blood Glucose LESS THAN 70 milliGRAM(s)/deciLiter  glucagon  Injectable 1milliGRAM(s) IntraMuscular once PRN Glucose <70 milliGRAM(s)/deciLiter      Allergies    sulfa drugs (Unknown)    Intolerances        REVIEW OF SYSTEMS: Is negative for eye, ENT, GI, , allergic, dermatologic, musculoskeletal and neurologic, except as described above.    VITAL SIGNS:   Vital Signs Last 24 Hrs  T(C): 37.1, Max: 37.6 (04-04 @ 21:07)  T(F): 98.7, Max: 99.6 (04-04 @ 21:07)  HR: 77 (77 - 95)  BP: 137/52 (137/52 - 189/96)  BP(mean): --  RR: 14 (12 - 14)  SpO2: 98% (98% - 100%)    I&O's Summary      PHYSICAL EXAM:    Constitutional: NAD, awake and alert, well-developed  Eyes:  EOMI,  Pupils round, No oral cyanosis, conjunctivae clear, anicteric.  Pulmonary: Non-labored, breath sounds are clear bilaterally, No wheezing, rales or rhonchi  Cardiovascular: PMI not palpable non-displaced, regular S1 and S2, no murmurs, rubs, gallops or clicks  Gastrointestinal: Bowel Sounds present, soft, nontender.   Lymph: No peripheral edema. No cervical lymphadenopathy.  Neurological: Alert, strength and sensitivity are grossly intact  Skin: No obvious lesions/rashes.   Psych:  Mood & affect appropriate .    LABS: All Labs Reviewed:                        11.6   7.6   )-----------( 177      ( 05 Apr 2017 06:18 )             36.3                         13.2   6.0   )-----------( 186      ( 04 Apr 2017 21:28 )             41.3     05 Apr 2017 06:18    141    |  105    |  15     ----------------------------<  118    3.9     |  26     |  0.78   04 Apr 2017 21:28    143    |  108    |  20     ----------------------------<  148    4.0     |  26     |  0.94     Ca    7.6        05 Apr 2017 06:18  Ca    8.6        04 Apr 2017 21:28  Phos  2.8       05 Apr 2017 06:18  Mg     1.7       05 Apr 2017 06:18    TPro  6.9    /  Alb  3.2    /  TBili  0.1    /  DBili  x      /  AST  27     /  ALT  22     /  AlkPhos  96     04 Apr 2017 21:28    PT/INR - ( 04 Apr 2017 21:28 )   PT: 10.5 sec;   INR: 0.96 ratio         PTT - ( 04 Apr 2017 21:28 )  PTT:30.1 sec      Blood Culture:     04-05 @ 06:18  TSH: 1.17      RADIOLOGY:    EKG:Sinus rhythm, no significant abnormalities.    Telemetry monitoring has revealed sinus rhythm with occasional PVCs. There is significant artifact. Computer interpretation of bradycardia is actually a sinus rhythm with low voltage.    Echocardiogram:       EXAM:  ECHO TTE W CON DOPPLER           PROCEDURE DATE:  08/02/2016      INTERPRETATION:  INDICATION: CVA    Blood Pressure 163/78    Height 63     Weight 165       BSA 1.7    Dimensions:    LA 2.8       Normal Values: 2.0 - 4.0 cm    Ao 3.1 Normal Values: 2.0 - 3.8 cm  SEPTUM 1.2       Normal Values: 0.6 - 1.2 cm  PWT 1.1       Normal Values: 0.6 - 1.1 cm  LVIDd 4.2         Normal Values: 3.0 - 5.6 cm  LVIDs 2.9         Normal Values: 1.8 - 4.0 cm    Derived Variables:  LVMI     g/m2  RWT      Fractional Short      Ejection Fraction 65    Doppler Peak v. AoV=   (m/sec)    OBSERVATIONS:    Mitral Valve: normal, trace physiologic MR.  Aortic Valve/Aorta: Calcified trileaflet aortic valve, without stenosis   or insufficiency.  Tricuspid Valve: normal with trace TR.  Pulmonic Valve: normal  Left Atrium: normal  Right Atrium: normal  Left Ventricle: normal LV size and systolic function, estimated LVEF of   65%. There is a prominent basal septum, which is a normal variant.  Right Ventricle: normal size and systolic function.  Pericardium/Pleura: normal, no significant pericardial effusion.  Pulmonary/RV Pressure: estimated PA systolic pressure of 38 mmHg assuming   an RA pressure of 10 mmHg.  LV Diastolic Function: E:E' is consistent with elevated left heart   filling pressure.    Normal left ventricular size and systolic function, estimated LVEF of   65%. There is a prominent basal septum, which is a normal variant. Normal   right ventricular size and systolic function. E:E'is consistent with   elevated left heart filling pressure. Normal biatrial size. The aortic   root is normal in size. The mitral valve is structurally normal, trace   physiologic MR. The aortic valve is calcified without stenosis or   insufficiency. Trace physiologic TR. Estimated PA systolic pressure is   38mm Hg, assuming an RA pressure of 10 mmHg. No significant pericardial   effusion.                   NATAN DELGADO M.D., ATTENDING CARDIOLOGIST  This document has been electronically signed.Aug  3 2016  4:11P

## 2017-04-05 NOTE — PROGRESS NOTE ADULT - ATTENDING COMMENTS
apprec cardio and neuro recs, seizure vs tia likely, f/u mri and eeg, monitor clinical course and neuro checks, if improving likely dc home tomorrow or rehab pending PT eval

## 2017-04-05 NOTE — DISCHARGE NOTE ADULT - CARE PROVIDER_API CALL
Pedrito Rosenthal), Internal Medicine  [address] [place] [address]  Phone: [phone]  Fax: [phone]

## 2017-04-05 NOTE — DISCHARGE NOTE ADULT - CARE PLAN
Principal Discharge DX:	Weakness of left arm  Goal:	resolution  Secondary Diagnosis:	Seizure  Instructions for follow-up, activity and diet:	- continue keppra  - followup with neurologist within 1-2 weeks  Secondary Diagnosis:	Hypercholesteremia  Instructions for follow-up, activity and diet:	- continue crestor 5mg daily  Secondary Diagnosis:	HTN (hypertension)  Instructions for follow-up, activity and diet:	- continue labetolol 200mg twice daily  Secondary Diagnosis:	Diabetes mellitus  Instructions for follow-up, activity and diet:	- continue metformin Principal Discharge DX:	Weakness of left arm  Goal:	resolution  Instructions for follow-up, activity and diet:	f/u with outpt neurology  Secondary Diagnosis:	Seizure  Instructions for follow-up, activity and diet:	- continue keppra  - followup with neurologist within 1-2 weeks  Secondary Diagnosis:	Hypercholesteremia  Instructions for follow-up, activity and diet:	- continue crestor 5mg daily  Secondary Diagnosis:	HTN (hypertension)  Instructions for follow-up, activity and diet:	- continue labetolol 200mg twice daily  Secondary Diagnosis:	Diabetes mellitus  Instructions for follow-up, activity and diet:	- continue metformin Principal Discharge DX:	Weakness of left arm  Goal:	resolution  Instructions for follow-up, activity and diet:	- Resolved, likely due to TIA vs seizure  - Please see Dr. Rosenthal in one week  - Seizure plan below  Secondary Diagnosis:	Seizure  Instructions for follow-up, activity and diet:	- Please START carbamazepine (tegretol) 200mg TWICE a day  - To slowly stop keppra, please take 250mg TWICE a day for 1 week.   The following week stop morning dose and continue keppra 250mg 1 tab at night  The following week may discontinue keppra completely. Please continue taking tegratol.   - It is important to followup with your PMD, Dr. Pedrito Rosenthal, in one week for BMP to check sodium levels  - Please followup with your outpatient neurologist Dr. Libman in one month  Secondary Diagnosis:	Hypercholesteremia  Instructions for follow-up, activity and diet:	- continue crestor 5mg daily  - F.u with PMD for lipid panel  Secondary Diagnosis:	HTN (hypertension)  Instructions for follow-up, activity and diet:	- continue labetolol 200mg twice daily  - Please followup with cardiology consultants in [place] within 2-3 weeks for evaluation and outpatient workup.  Secondary Diagnosis:	Diabetes mellitus  Instructions for follow-up, activity and diet:	- continue metformin Principal Discharge DX:	Weakness of left arm  Goal:	resolution  Instructions for follow-up, activity and diet:	- Resolved, likely due to TIA vs seizure  - Please see Dr. Rosenthal in one week  - Seizure plan below  Secondary Diagnosis:	Seizure  Instructions for follow-up, activity and diet:	- Please START carbamazepine (tegretol) 200mg TWICE a day  - To slowly stop keppra, please take 250mg TWICE a day for 1 week.   The following week stop morning dose and continue keppra 250mg 1 tab at night  The following week may discontinue keppra completely. Please continue taking tegratol.   - It is important to followup with your PMD, Dr. Pedrito Rosenthal, in one week for BMP to check sodium levels  - Please followup with your outpatient neurologist Dr. Libman in one month  Secondary Diagnosis:	Hypercholesteremia  Instructions for follow-up, activity and diet:	- continue crestor 5mg daily  - F.u with PMD for lipid panel  Secondary Diagnosis:	HTN (hypertension)  Instructions for follow-up, activity and diet:	- continue labetolol 200mg twice daily  - Please followup with cardiology consultants in [place] within 2-3 weeks for evaluation and outpatient workup.  Secondary Diagnosis:	Diabetes mellitus  Instructions for follow-up, activity and diet:	- continue metformin

## 2017-04-05 NOTE — PROGRESS NOTE ADULT - PROBLEM SELECTOR PLAN 7
- stable  - continue accuchecks, NADER and hypoglycemic protocol   - f/u Hgb A1c - has hx of hemorrhagic stroke in 2013  - as bleed not due to trauma, unable to give asa and plavix due to bleed risk per neuro  - current CT neg for intracranial hemorrhage and acute pathology

## 2017-04-05 NOTE — PROGRESS NOTE ADULT - ASSESSMENT
86 yo female with pmh of hemorrhagic cva, htn, seizure, htn, hld, diabetes, chronic diastolic chf presented to the ED with sons c/o left sided weakness and right hand tremor admitted with r/o CVA 86 yo female with pmh of hemorrhagic cva, htn, seizure, htn, hld, diabetes, chronic diastolic chf presented to the ED with sons c/o left sided weakness and right hand tremor admitted with r/o CVA vs seizure

## 2017-04-05 NOTE — DISCHARGE NOTE ADULT - MEDICATION SUMMARY - MEDICATIONS TO TAKE
I will START or STAY ON the medications listed below when I get home from the hospital:    levETIRAcetam  -- 250 milligram(s) by mouth twice daily for one week, then change to once daily for the next week , then discontinue   -- Indication: For Seizure    carBAMazepine 200 mg oral tablet  -- 1 tab(s) by mouth 2 times a day  -- Indication: For Seizure    metFORMIN 500 mg oral tablet, extended release  -- 2 tab(s) by mouth once a day (at bedtime)  -- Indication: For Diabetes mellitus    Crestor 5 mg oral tablet  -- 1 tab(s) by mouth once a day (at bedtime)  -- Indication: For Hypercholesteremia    labetalol 200 mg oral tablet  -- 1 tab(s) by mouth 2 times a day  -- Indication: For HTN (hypertension)    CoQ10  -- 100 milligram(s) by mouth once a day  -- Indication: For Supplementation    Centrum Silver oral tablet  -- 1 tab(s) by mouth once a day  -- Indication: For Supplementation

## 2017-04-05 NOTE — DISCHARGE NOTE ADULT - CARE PROVIDERS DIRECT ADDRESSES
[email or web address]

## 2017-04-05 NOTE — PROGRESS NOTE ADULT - PROBLEM SELECTOR PLAN 8
- SCDs, pharm prophylaxis not indicated as hx of hemorrhagic stroke - stable  - continue accuchecks, NADER and hypoglycemic protocol   - f/u Hgb A1c

## 2017-04-05 NOTE — DISCHARGE NOTE ADULT - MEDICATION SUMMARY - MEDICATIONS TO CHANGE
I will SWITCH the dose or number of times a day I take the medications listed below when I get home from the hospital:    levETIRAcetam  -- 500 milligram(s) by mouth once a day (at bedtime)

## 2017-04-05 NOTE — DISCHARGE NOTE ADULT - PATIENT PORTAL LINK FT
“You can access the Follow[employer] Patient Portal, offered by [facility], by registering with the following website: [email or web address]”

## 2017-04-05 NOTE — DISCHARGE NOTE ADULT - ADDITIONAL INSTRUCTIONS
Please followup with PCP within 2-3 days of discharge and followup with neurologist within 1-2 weeks Please followup with PCP within 2-3 days of discharge and followup with neurologist within 1-2 weeks, pt to arrange f/u appt; PMD Dr. Rosenthal made aware in house of discharge plan Arrange f/u appt with PMD in 1 week. Dr. Rosenthal made aware of in house of discharge plan. Please followup with Dr. Libman in 1 month. Please call PMD or return to ED if you experience fever, lightheadedness, dizziness, chest pain, shortness of breath or any other concerning symptoms

## 2017-04-05 NOTE — DISCHARGE NOTE ADULT - HOSPITAL COURSE
86 yo female with pmhx of hemorrhagic cva, htn, seizure, htn, hld, diabetes, chronic diastolic chf presented to the ED with sons c/o left sided weakness and right hand tremor. Son admitted mother was ok on the phone at around 4pm, however when he got home at around 7pm he found mother lethargic laying on the couch. Admitted to mother right hand shaking and her c/o right arm pain and tongue heaviness. When son tried to get mother up from the couch he noticed she wasnt able to because she wasnt moving her left sided extremities. Ambulance was then called and pt arrived in the ED, where she refused aspirin due to her hx of hemorrhagic stroke. Pt also admits to a frontal headache rated 8/10,  and non-radiating. Denies blurry vision, dizziness, numbness, tingling , facial droop, slurred speech. Admits to epigastric pain radiating to her throat. Denies chest pain, sob, n/v/d/c.   In the Ed pt had ct- head that was negative for acute infarct or bleed. Lactate 2.2. BP elevated to 189/96. Also found to have periods of sinus anjali on tele monitor HR 40s and then returns back to 90s. Asa and plavix were held due to history of nontraumatic intracranial hemorrhage.   Patient was admitted to tele for monitoring, r/o CVA/TIA vx seizure where she was evaluated by neurology.  MRI of head showed advanced chronic small vessel ischemic changes but no diffusion restriction to suggest acute infarct. Diffuse cerebral atrophy likely secondary to prior chronic hemorrhagic infarct right posterior temporal region. MRA of head showed no evidence of intracranial aneurysm or hemodynamically significant stenosis. Carotid dopplers performed showed significant for 40-69% stenosis bl. Followup MRA of neck performed showing no hemodynamically significant stenosis at the origin of the internal carotid arteries.with atheromatous irregularity of the proximal cervical internal carotid arteries bilaterally. With acute CVA unlikely, EEG was ordered to workup potential seizure. Results _____________ Patient on keppra at home for seizure hx. Cardiology was consulted to evaluate for episodic bradycardia seen on tele monitor with labile blood pressures. It was noted bradycardia likely artifact and not true bradycardia per cardio. She was continued on labetolol 200mg BID for BP control and remained stable. 86 yo female with pmhx of hemorrhagic cva, htn, seizure, htn, hld, diabetes, chronic diastolic chf presented to the ED with sons c/o left sided weakness and right hand tremor. Son admitted mother was ok on the phone at around 4pm, however when he got home at around 7pm he found mother lethargic laying on the couch. Admitted to mother right hand shaking and her c/o right arm pain and tongue heaviness. When son tried to get mother up from the couch he noticed she wasnt able to because she wasnt moving her left sided extremities. Ambulance was then called and pt arrived in the ED, where she refused aspirin due to her hx of hemorrhagic stroke. Pt also admits to a frontal headache rated 8/10,  and non-radiating. Denies blurry vision, dizziness, numbness, tingling , facial droop, slurred speech. Admits to epigastric pain radiating to her throat. Denies chest pain, sob, n/v/d/c.   In the Ed pt had ct- head that was negative for acute infarct or bleed. Lactate 2.2. BP elevated to 189/96. Also found to have periods of sinus anjali on tele monitor HR 40s and then returns back to 90s. Asa and plavix were held due to history of nontraumatic intracranial hemorrhage.   Patient was admitted to tele for monitoring, r/o CVA/TIA vx seizure where she was evaluated by neurology.  MRI of head showed advanced chronic small vessel ischemic changes but no diffusion restriction to suggest acute infarct. Diffuse cerebral atrophy likely secondary to prior chronic hemorrhagic infarct right posterior temporal region. MRA of head showed no evidence of intracranial aneurysm or hemodynamically significant stenosis. Carotid dopplers performed showed significant for 40-69% stenosis bl. Followup MRA of neck performed showing no hemodynamically significant stenosis at the origin of the internal carotid arteries.with atheromatous irregularity of the proximal cervical internal carotid arteries bilaterally. With acute CVA unlikely, EEG was ordered to workup potential seizure. Results _____________ Patient on keppra at home for seizure hx. Cardiology was consulted to evaluate for episodic bradycardia seen on tele monitor with labile blood pressures. It was noted bradycardia likely artifact and not true bradycardia per cardio. She was continued on labetolol 200mg BID for BP control and remained stable.     Time spent: 60 minutes 86 yo female with pmhx of hemorrhagic cva, htn, seizure, htn, hld, diabetes, chronic diastolic chf presented to the ED with sons c/o left sided weakness and right hand tremor. Son admitted mother was ok on the phone at around 4pm, however when he got home at around 7pm he found mother lethargic laying on the couch. Admitted to mother right hand shaking and her c/o right arm pain and tongue heaviness. When son tried to get mother up from the couch he noticed she wasnt able to because she wasnt moving her left sided extremities. Ambulance was then called and pt arrived in the ED, where she refused aspirin due to her hx of hemorrhagic stroke. Pt also admits to a frontal headache rated 8/10,  and non-radiating. Denies blurry vision, dizziness, numbness, tingling , facial droop, slurred speech. Admits to epigastric pain radiating to her throat. Denies chest pain, sob, n/v/d/c.   In the Ed pt had ct- head that was negative for acute infarct or bleed. Lactate 2.2. BP elevated to 189/96. Also found to have periods of sinus anjali on tele monitor HR 40s and then returns back to 90s. Asa and plavix were held due to history of nontraumatic intracranial hemorrhage.   Patient was admitted to tele for monitoring, r/o CVA/TIA vx seizure where she was evaluated by neurology.  MRI of head showed advanced chronic small vessel ischemic changes but no diffusion restriction to suggest acute infarct. Diffuse cerebral atrophy likely secondary to prior chronic hemorrhagic infarct right posterior temporal region. MRA of head showed no evidence of intracranial aneurysm or hemodynamically significant stenosis. Carotid dopplers performed showed significant for 40-69% stenosis bl. Followup MRA of neck performed showing no hemodynamically significant stenosis at the origin of the internal carotid arteries.with atheromatous irregularity of the proximal cervical internal carotid arteries bilaterally. With acute CVA unlikely, EEG was ordered to workup potential seizure. Dr. Leblanc added tegretol 200mg twice daily and slowly tapered the keppra.  Cardiology was consulted to evaluate for episodic bradycardia seen on tele monitor with labile blood pressures. It was noted bradycardia likely artifact and not true bradycardia per cardio. She was continued on labetolol 200mg BID for BP control and remained stable. TTE performed, results pending. Patient encouraged for outpatient cardiology followup. She was optimized for discharge from medicine, neurology and cardiology with close outpatient followup.     Time spent: 60 minutes

## 2017-04-05 NOTE — PROGRESS NOTE ADULT - PROBLEM SELECTOR PLAN 6
- has hx of hemorrhagic stroke in 2013  - as bleed not due to trauma, unable to give asa and plavix due to bleed risk per neuro  - current CT neg for intracranial hemorrhage and acute pathology - labile BP  - cardio consult appreciated  - will continue labetolol 200 mg BID

## 2017-04-05 NOTE — PROGRESS NOTE ADULT - PROBLEM SELECTOR PLAN 1
- r/o CVA vs TIA. CT head neg for hemorrhage   - f/u MRI/MRA  - carotid dopplers show 50-69% stenosis bl  - f/u lipid panel  - f/u neurology consult (Dr. Leblanc)  - unable to take asa and plavix due to history of intracranial hemorrhage in past - r/o CVA/TIA vs seizure. CT head neg for hemorrhage   - f/u MRI/MRA  - carotid dopplers show 50-69% stenosis bl  - f/u lipid panel  - f/u neurology consult (Dr. Leblanc)  - unable to take asa and plavix due to history of intracranial hemorrhage in past - r/o CVA/TIA vs seizure. CT head neg for hemorrhage   - f/u MRI/MRA  - carotid dopplers show 50-69% stenosis bl  - f/u lipid panel  - f/u neurology consult (Dr. Leblanc)  - unable to take asa and plavix due to history of intracranial hemorrhage in past  - Addendum (14:00) Called by RN to evaluate for sudden onset weakness that occurred after returning from MRI. Pt seen and evaluated. Patient currently feels tired but denies weakness, vision changes. Still c/o of mild headache that has improved since admission. States she had some weakness that resolved after eating lunch. RN states BS at that time was 82. Exam normal with no focal deficits. CN II-XII grossly intact.

## 2017-04-05 NOTE — CONSULT NOTE ADULT - ASSESSMENT
The patient is an 85-year-old woman with a history of a normal ejection fraction, a prior hemorrhagic stroke, diabetes And hypertension.  She presents to the hospital with the sense of palpitations and weakness.    The cardiovascular perspective, she seems without signs or symptoms of acute ischemia or volume overload.      Her blood pressure has been somewhat labile, and labetalol should be resumed at a dose of 200 mg twice daily.    I do not believe that any time the patient was actually bradycardic. Upon review, her telemetry strips reveal artifact and low voltage, and not bradycardia. Telemetry monitoring will be continued both to exclude bradycardia, as well as to further evaluate her palpitations from yesterday.    An echocardiogram was obtained about 6 months ago, and I don't think one needs to be repeated at this time.    There is no evidence for volume overload.    Neurologic evaluation should be performed. MRI of the brain has been performed. Carotid duplex  has been ordered.    Thank you for this referral and we will continue to follow her while she remains hospitalized.

## 2017-04-05 NOTE — CONSULT NOTE ADULT - SUBJECTIVE AND OBJECTIVE BOX
ANALYSIS AND PLAN:  This is an 85-year-old with right hand tremor, pain gen weakness racing heart    1.	? Sz , ? TIA like symptoms secondary to cerebral hypoperfusion ? changes in pulse  2.	For history of underlying epilepsy, I would recommend continue the patient on her Keppra.  3.	For history of intracerebral hemorrhage, the patient was advised never to take any antiplatelets and the patient refusing to take any type of blood thinning medication.   IF MRI of the brain is negative for acute cerebrovascular accident.  For now, we will continue to hold and refrain from any antiplatelets. check MRA neck   4.	I spoke with son, Simone, at bedside.  Upon discharge, they will follow up with their outside neurologist, Dr. Landaverde. left message for him ANALYSIS AND PLAN:  This is an 85-year-old with right hand tremor, pain gen weakness racing heart  [id number]    1.	? Sz , ? TIA like symptoms secondary to cerebral hypoperfusion ? changes in pulse  2.	For history of underlying epilepsy, I would recommend continue the patient on her Keppra.  3.	For history of intracerebral hemorrhage, the patient was advised never to take any antiplatelets and the patient refusing to take any type of blood thinning medication.   IF MRI of the brain is negative for acute cerebrovascular accident.  For now, we will continue to hold and refrain from any antiplatelets. check MRA neck   4.	I spoke with son, Simone, at bedside.  Upon discharge, they will follow up with their outside neurologist, Dr. Landaverde. left message for him

## 2017-04-05 NOTE — DISCHARGE NOTE ADULT - NS AS DC STROKE ED MATERIALS
Advancing age is a risk factor for Strokes/Risk Factors for Stroke/Stroke Education Booklet/Call 911 for Stroke/Prescribed Medications/Need for Followup After Discharge/Stroke Warning Signs and Symptoms Advancing age is a risk factor for Strokes

## 2017-04-06 VITALS
HEART RATE: 75 BPM | SYSTOLIC BLOOD PRESSURE: 159 MMHG | TEMPERATURE: 98 F | RESPIRATION RATE: 16 BRPM | OXYGEN SATURATION: 95 % | DIASTOLIC BLOOD PRESSURE: 76 MMHG

## 2017-04-06 LAB — LEVETIRACETAM SERPL-MCNC: 4.9 MCG/ML — LOW (ref 12–46)

## 2017-04-06 PROCEDURE — 87086 URINE CULTURE/COLONY COUNT: CPT

## 2017-04-06 PROCEDURE — 93306 TTE W/DOPPLER COMPLETE: CPT | Mod: 26

## 2017-04-06 PROCEDURE — 84436 ASSAY OF TOTAL THYROXINE: CPT

## 2017-04-06 PROCEDURE — 96376 TX/PRO/DX INJ SAME DRUG ADON: CPT

## 2017-04-06 PROCEDURE — A9579: CPT

## 2017-04-06 PROCEDURE — 85730 THROMBOPLASTIN TIME PARTIAL: CPT

## 2017-04-06 PROCEDURE — 93005 ELECTROCARDIOGRAM TRACING: CPT

## 2017-04-06 PROCEDURE — 99239 HOSP IP/OBS DSCHRG MGMT >30: CPT

## 2017-04-06 PROCEDURE — 85610 PROTHROMBIN TIME: CPT

## 2017-04-06 PROCEDURE — 96365 THER/PROPH/DIAG IV INF INIT: CPT

## 2017-04-06 PROCEDURE — 81001 URINALYSIS AUTO W/SCOPE: CPT

## 2017-04-06 PROCEDURE — 99232 SBSQ HOSP IP/OBS MODERATE 35: CPT

## 2017-04-06 PROCEDURE — 84443 ASSAY THYROID STIM HORMONE: CPT

## 2017-04-06 PROCEDURE — 84100 ASSAY OF PHOSPHORUS: CPT

## 2017-04-06 PROCEDURE — 80053 COMPREHEN METABOLIC PANEL: CPT

## 2017-04-06 PROCEDURE — 80061 LIPID PANEL: CPT

## 2017-04-06 PROCEDURE — 80177 DRUG SCRN QUAN LEVETIRACETAM: CPT

## 2017-04-06 PROCEDURE — 80048 BASIC METABOLIC PNL TOTAL CA: CPT

## 2017-04-06 PROCEDURE — 99285 EMERGENCY DEPT VISIT HI MDM: CPT | Mod: 25

## 2017-04-06 PROCEDURE — 84480 ASSAY TRIIODOTHYRONINE (T3): CPT

## 2017-04-06 PROCEDURE — 70450 CT HEAD/BRAIN W/O DYE: CPT

## 2017-04-06 PROCEDURE — 93306 TTE W/DOPPLER COMPLETE: CPT

## 2017-04-06 PROCEDURE — 93880 EXTRACRANIAL BILAT STUDY: CPT

## 2017-04-06 PROCEDURE — 70544 MR ANGIOGRAPHY HEAD W/O DYE: CPT

## 2017-04-06 PROCEDURE — 83036 HEMOGLOBIN GLYCOSYLATED A1C: CPT

## 2017-04-06 PROCEDURE — 96372 THER/PROPH/DIAG INJ SC/IM: CPT | Mod: 59

## 2017-04-06 PROCEDURE — 83735 ASSAY OF MAGNESIUM: CPT

## 2017-04-06 PROCEDURE — 70551 MRI BRAIN STEM W/O DYE: CPT

## 2017-04-06 PROCEDURE — 85027 COMPLETE CBC AUTOMATED: CPT

## 2017-04-06 PROCEDURE — 95816 EEG AWAKE AND DROWSY: CPT

## 2017-04-06 PROCEDURE — 71045 X-RAY EXAM CHEST 1 VIEW: CPT

## 2017-04-06 PROCEDURE — 70548 MR ANGIOGRAPHY NECK W/DYE: CPT

## 2017-04-06 PROCEDURE — 83605 ASSAY OF LACTIC ACID: CPT

## 2017-04-06 PROCEDURE — 96375 TX/PRO/DX INJ NEW DRUG ADDON: CPT

## 2017-04-06 PROCEDURE — 87040 BLOOD CULTURE FOR BACTERIA: CPT

## 2017-04-06 PROCEDURE — 97161 PT EVAL LOW COMPLEX 20 MIN: CPT

## 2017-04-06 RX ORDER — CARBAMAZEPINE 200 MG
1 TABLET ORAL
Qty: 60 | Refills: 0 | OUTPATIENT
Start: 2017-04-06 | End: 2017-05-06

## 2017-04-06 RX ORDER — LEVETIRACETAM 250 MG/1
500 TABLET, FILM COATED ORAL
Qty: 0 | Refills: 0 | COMMUNITY

## 2017-04-06 RX ORDER — CARBAMAZEPINE 200 MG
200 TABLET ORAL ONCE
Qty: 0 | Refills: 0 | Status: COMPLETED | OUTPATIENT
Start: 2017-04-06 | End: 2017-04-06

## 2017-04-06 RX ORDER — CARBAMAZEPINE 200 MG
1 TABLET ORAL
Qty: 60 | Refills: 0 | OUTPATIENT
Start: 2017-04-06 | End: 2017-04-07

## 2017-04-06 RX ADMIN — LEVETIRACETAM 250 MILLIGRAM(S): 250 TABLET, FILM COATED ORAL at 12:52

## 2017-04-06 RX ADMIN — Medication 1 TABLET(S): at 12:52

## 2017-04-06 RX ADMIN — Medication 200 MILLIGRAM(S): at 16:34

## 2017-04-06 RX ADMIN — Medication 200 MILLIGRAM(S): at 05:45

## 2017-04-06 NOTE — PHYSICAL THERAPY INITIAL EVALUATION ADULT - PERTINENT HX OF CURRENT PROBLEM, REHAB EVAL
86 y/o female adm 4/4 with LUE weakness. CT head: negative. MRI head: no acute infarct, probable chronic infarct R posterior temporal region. Carotid dopplers: 50-69% stenosis.

## 2017-04-06 NOTE — PROGRESS NOTE ADULT - SUBJECTIVE AND OBJECTIVE BOX
[facility] -- Bhavik Son Grossman, Wachsman, Pannella, Patel      Follow Up:  palpitations    Subjective/Observations: Patient seen and examined. Events noted. No chest pain, dyspnea, palpitations.     PAST MEDICAL & SURGICAL HISTORY:  Chronic diastolic heart failure  Seizure  Hemorrhagic stroke  CVA (cerebral infarction)  HTN (hypertension)  Hypercholesteremia  Diabetes mellitus  History of retinal tear: surgical repair  Basal cell cancer: s/p Mohs surgery  History of hysterectomy  S/P cataract surgery: bilateral  History of arthroscopy of knee: Right      MEDICATIONS  (STANDING):  atorvastatin 20milliGRAM(s) Oral at bedtime  multivitamin/minerals 1Tablet(s) Oral daily  levETIRAcetam 500milliGRAM(s) Oral at bedtime  levETIRAcetam 250milliGRAM(s) Oral daily  insulin lispro (HumaLOG) corrective regimen sliding scale  SubCutaneous Before meals and at bedtime  dextrose 5%. 1000milliLiter(s) IV Continuous <Continuous>  dextrose 50% Injectable 12.5Gram(s) IV Push once  dextrose 50% Injectable 25Gram(s) IV Push once  dextrose 50% Injectable 25Gram(s) IV Push once  labetalol 200milliGRAM(s) Oral two times a day    MEDICATIONS  (PRN):  dextrose Gel 1Dose(s) Oral once PRN Blood Glucose LESS THAN 70 milliGRAM(s)/deciLiter  glucagon  Injectable 1milliGRAM(s) IntraMuscular once PRN Glucose <70 milliGRAM(s)/deciLiter      Allergies    sulfa drugs (Unknown)    Intolerances        REVIEW OF SYSTEMS: All other review of systems is negative unless indicated above    Vital Signs Last 24 Hrs  T(C): 36.7, Max: 37.8 (04-05 @ 23:56)  T(F): 98, Max: 100 (04-05 @ 23:56)  HR: 74 (68 - 86)  BP: 157/72 (124/63 - 163/69)  BP(mean): --  RR: 17 (16 - 18)  SpO2: 96% (94% - 100%)    I&O's Summary    I & Os for current day (as of 06 Apr 2017 14:54)  =============================================  IN: 200 ml / OUT: 700 ml / NET: -500 ml        PHYSICAL EXAM:  TELE: SR 90s  Constitutional: NAD, awake and alert, well-developed  HEENT: Moist Mucous Membranes, Anicteric  Pulmonary: Non-labored, breath sounds are clear bilaterally, No wheezing, rales or rhonchi  Cardiovascular: Regular, S1 and S2, No murmurs, rubs, gallops or clicks  Gastrointestinal: Bowel Sounds present, soft, nontender.   Lymph: No peripheral edema. No lymphadenopathy.  Skin: No visible rashes or ulcers.  Psych:  Mood & affect appropriate    LABS: All Labs Reviewed:                        11.6   7.6   )-----------( 177      ( 05 Apr 2017 06:18 )             36.3                         13.2   6.0   )-----------( 186      ( 04 Apr 2017 21:28 )             41.3     05 Apr 2017 06:18    141    |  105    |  15     ----------------------------<  118    3.9     |  26     |  0.78   04 Apr 2017 21:28    143    |  108    |  20     ----------------------------<  148    4.0     |  26     |  0.94     Ca    7.6        05 Apr 2017 06:18  Ca    8.6        04 Apr 2017 21:28  Phos  2.8       05 Apr 2017 06:18  Mg     1.7       05 Apr 2017 06:18    TPro  6.9    /  Alb  3.2    /  TBili  0.1    /  DBili  x      /  AST  27     /  ALT  22     /  AlkPhos  96     04 Apr 2017 21:28    PT/INR - ( 04 Apr 2017 21:28 EXAM:  ECHO TTE W CON DOPPLER           effusion.  )   PT: 10.5 sec;   INR: 0.96 ratio         PTT - ( 04 Apr 2017 21:28 )  PTT:30.1 sec      Blood Culture: Organism --  Gram Stain Blood -- Gram Stain --  Specimen Source .Urine Clean Catch (Midstream)  Culture-Blood --    Organism --  Gram Stain Blood -- Gram Stain --  Specimen Source .Blood Blood-Peripheral  Culture-Blood --        04-05 @ 06:18  TSH: 1.17      RADIOLOGY/EKG:    echo : PROCEDURE DATE:  08/02/2016Normal left ventricular size and systolic function, estimated LVEF of   65%. There is a prominent basal septum, which is a normal variant. Normal   right ventricular size and systolic function. E:E'is consistent with   elevated left heart filling pressure. Normal biatrial size. The aortic   root is normal in size. The mitral valve is structurally normal, trace   physiologic MR. The aortic valve is calcified without stenosis or   insufficiency. Trace physiologic TR. Estimated PA systolic pressure is   38mm Hg, assuming an RA pressure of 10 mmHg. No significant pericardial

## 2017-04-06 NOTE — PHYSICAL THERAPY INITIAL EVALUATION ADULT - ADDITIONAL COMMENTS
pt lives with her son in a house, no steps. Pt ambulates independently without device (has RW to use as needed) and is independent with ADLs.

## 2017-04-06 NOTE — PROGRESS NOTE ADULT - ASSESSMENT
85-year-old woman with a history as listed above alpitations and weakness.    There have been no tele events. No signs of bradycardia. Her blood pressure is relatively controlled. Continue labetelol as written.   No signs of volume overload or ischemia.   An echocardiogram was obtained about 6 months ago, and I don't think one needs to be repeated at this time.  Followup neuro workup.   There is no evidence for volume overload.  Has moderate carotid disease. medical management w statin. ASA if ok with neuro.   Monitor and replete electrolytes. Keep K>4.0 and Mg>2.0.   All other workup per primary team.  Will followup

## 2017-04-06 NOTE — PROGRESS NOTE ADULT - PROBLEM SELECTOR PROBLEM 2
Acute nonintractable headache, unspecified headache type
Acute nonintractable headache, unspecified headache type

## 2017-04-06 NOTE — PROGRESS NOTE ADULT - PROBLEM SELECTOR PLAN 1
- Acute CVA unlikely based on negative workup. Likely TIA  vs seizure. CT head neg for hemorrhage   - MRI head unremarkable for acute infarct, chronic changes  - MRA head showed chronic hemorrhagi infarct, no acute pathology  - carotid dopplers show 50-69% stenosis bl. Followup MRA neck showed no sig stenosis  - lipid panel unremarkable, well controlled   - Neurology consult appreciated. F/u EEG to workup seizure  - unable to take asa and plavix due to history of intracranial hemorrhage in past

## 2017-04-06 NOTE — PROGRESS NOTE ADULT - PROBLEM SELECTOR PLAN 3
- cont keppra  - f/u EEG
- evaluated by cardiology  - according to strip readings likely artifact and not true bradycardia  - will continue to monitor on tele

## 2017-04-06 NOTE — PROGRESS NOTE ADULT - SUBJECTIVE AND OBJECTIVE BOX
Neurology follow up note    MAJOR [id number]      Interval History:    Patient feels ok no new complaints. seen with son     MEDICATIONS    atorvastatin 20milliGRAM(s) Oral at bedtime  multivitamin/minerals 1Tablet(s) Oral daily  levETIRAcetam 500milliGRAM(s) Oral at bedtime  levETIRAcetam 250milliGRAM(s) Oral daily  insulin lispro (HumaLOG) corrective regimen sliding scale  SubCutaneous Before meals and at bedtime  dextrose 5%. 1000milliLiter(s) IV Continuous <Continuous>  dextrose Gel 1Dose(s) Oral once PRN  dextrose 50% Injectable 12.5Gram(s) IV Push once  dextrose 50% Injectable 25Gram(s) IV Push once  dextrose 50% Injectable 25Gram(s) IV Push once  glucagon  Injectable 1milliGRAM(s) IntraMuscular once PRN  labetalol 200milliGRAM(s) Oral two times a day  carBAMazepine 200milliGRAM(s) Oral once      Allergies    sulfa drugs (Unknown)    Intolerances            Vital Signs Last 24 Hrs  T(C): 36.7, Max: 37.8 (04-05 @ 23:56)  T(F): 98, Max: 100 (04-05 @ 23:56)  HR: 74 (68 - 86)  BP: 157/72 (124/63 - 163/69)  BP(mean): --  RR: 17 (16 - 18)  SpO2: 96% (94% - 100%)      REVIEW OF SYSTEMS:     Constitutional: No fever, chills, fatigue, weakness  Eyes: no eye pain, visual disturbances, or discharge  ENT:  No difficulty hearing, tinnitus, vertigo; No sinus or throat pain  Neck: No pain or stiffness  Respiratory: No cough, dyspnea, wheezing   Cardiovascular: No chest pain, palpitations,   Gastrointestinal: No abdominal or epigastric pain. No nausea, vomiting  No diarrhea or constipation.   Genitourinary: No dysuria, frequency, hematuria or incontinence  Neurological: No headaches, lightheadedness, vertigo, numbness or tremors  Psychiatric: No depression, anxiety, mood swings or difficulty sleeping  Musculoskeletal: No joint pain or swelling; No muscle, back or extremity pain  Skin: No itching, burning, rashes or lesions   Lymph Nodes: No enlarged glands  Endocrine: No heat or cold intolerance; No hair loss   Allergy and Immunologic: No hives or eczema    On Neurological Examination:    Mental Status - Patient is alert, awake, .       Follow simple commands  Follow complex commands  D    Speech -   Fluent                        Cranial Nerves - Pupils 3 mm equal and reactive to light,   extraocular eye movements intact.   smile symmetric  intact bilateral NLF    Motor Exam -   Right upper 5/5  Left upper 4/5  Right lower 5/5  Left lower  4/5      Muscle tone - is normal all over.  No asymmetry is seen.      Sensory    Bilateral intact to light touch      GENERAL Exam: Nontoxic , No Acute Distress   	  HEENT:  normocephalic, atraumatic  		  LUNGS: Clear bilaterally   	  HEART: Normal S1S2   No murmur RRR        	  GI/ ABDOMEN:  Soft  Non tender    EXTREMITIES:   No Edema  No Clubbing  No Cyanosis No Edema    MUSCULOSKELETAL: Normal Range of Motion  	   SKIN: Normal  No Ecchymosis               LABS:  CBC Full  -  ( 2017 06:18 )  WBC Count : 7.6 K/uL  Hemoglobin : 11.6 g/dL  Hematocrit : 36.3 %  Platelet Count - Automated : 177 K/uL  Mean Cell Volume : 92.5 fl  Mean Cell Hemoglobin : 29.6 pg  Mean Cell Hemoglobin Concentration : 32.0 gm/dL  Auto Neutrophil # : 6.2 K/uL  Auto Lymphocyte # : 0.9 K/uL  Auto Monocyte # : 0.5 K/uL  Auto Eosinophil # : 0.0 K/uL  Auto Basophil # : 0.1 K/uL  Auto Neutrophil % : 81.1 %  Auto Lymphocyte % : 11.2 %  Auto Monocyte % : 6.7 %  Auto Eosinophil % : 0.2 %  Auto Basophil % : 0.7 %    Urinalysis Basic - ( 2017 21:41 )    Color: Yellow / Appearance: Clear / S.020 / pH: x  Gluc: x / Ketone: Negative  / Bili: Negative / Urobili: Negative   Blood: x / Protein: 25 mg/dL / Nitrite: Negative   Leuk Esterase: Trace / RBC: 0-2 /HPF / WBC 3-5   Sq Epi: x / Non Sq Epi: Few / Bacteria: Few      04-    141  |  105  |  15  ----------------------------<  118<H>  3.9   |  26  |  0.78    Ca    7.6<L>      2017 06:18  Phos  2.8     04-05  Mg     1.7     04-05    TPro  6.9  /  Alb  3.2<L>  /  TBili  0.1<L>  /  DBili  x   /  AST  27  /  ALT  22  /  AlkPhos  96  -    Hemoglobin A1C:     LIVER FUNCTIONS - ( 2017 21:28 )  Alb: 3.2 g/dL / Pro: 6.9 g/dL / ALK PHOS: 96 U/L / ALT: 22 U/L / AST: 27 U/L / GGT: x           Vitamin B12   PT/INR - ( 2017 21:28 )   PT: 10.5 sec;   INR: 0.96 ratio         PTT - ( 2017 21:28 )  PTT:30.1 sec      RADIOLOGY      ANALYSIS AND PLAN:  This is an 85-year-old with a history of epilepsy, history of intracerebral hemorrhage, episode of racing heart, right arm shaking, right hand pain and possible left arm weakness now resolved.  1.	Clinical impression could be a subclinical seizure.  2.	Recommend telemetry evaluation to evaluate for underlying arrhythmias.  3.	We will plan for an MRI and MRA of the brain was normal   4.	The patient does have a history of spontaneous intracerebral hemorrhage.  The patient at present does not wish to take any blood thinning medication, which is reasonable.  Since there are other possible causes of the patient's presentation with a global type of symptoms causing generalized weakness,  5.	For history of underlying epilepsy, continue the patient on her Keppra.  EEG positive right temp discharges rec tegretol 200 bid with a wean off keppra  250 bid one week then 250 qd once week then stop   6.	For her diabetes, monitor the patient's blood sugars and continue on her diabetic medication.  7.	For history of high cholesterol, continue the patient on her cholesterol medication.  I spoke with son at bedside. Another son name is Quintin who will be the .  His cell phone number is [phone].   explain to the plan     spoke to Dr Youssef outside neurologist he agrees with my plan     Needs to have her NA check in 1 week           32 minutes spent on total encounter; more than 50% of the visit was spent counseling and/or coordinating care by the attending physician.

## 2017-04-06 NOTE — PROGRESS NOTE ADULT - ASSESSMENT
84 yo female with pmh of hemorrhagic cva, htn, seizure, htn, hld, diabetes, chronic diastolic chf presented to the ED with sons c/o left sided weakness and right hand tremor admitted with r/o CVA, TIA vs seizure

## 2017-04-06 NOTE — PROGRESS NOTE ADULT - PROBLEM SELECTOR PLAN 6
- has hx of hemorrhagic stroke in 2013  - as bleed not due to trauma, unable to give asa and plavix due to bleed risk per neuro  - current CT neg for intracranial hemorrhage and acute pathology, however pt not eligible for antiplatelet therapy

## 2017-04-06 NOTE — PROGRESS NOTE ADULT - PROBLEM SELECTOR PLAN 2
- Improving  - continue pain control  - workup plan as above
- continue pain control  - f/u MRI/MRA  - plan as above

## 2017-04-06 NOTE — PROGRESS NOTE ADULT - SUBJECTIVE AND OBJECTIVE BOX
84 yo female with pmh of hemorrhagic cva, htn, seizure, htn, hld, diabetes, chronic diastolic chf presented to the ED with sons c/o left sided weakness and right hand tremor admitted with r/o CVA/TIA vs seizure      INTERVAL HPI/OVERNIGHT EVENTS: Patient remains asymptomatic overnight, no acute events      MEDICATIONS  (STANDING):  atorvastatin 20milliGRAM(s) Oral at bedtime  multivitamin/minerals 1Tablet(s) Oral daily  levETIRAcetam 500milliGRAM(s) Oral at bedtime  levETIRAcetam 250milliGRAM(s) Oral daily  insulin lispro (HumaLOG) corrective regimen sliding scale  SubCutaneous Before meals and at bedtime  dextrose 5%. 1000milliLiter(s) IV Continuous <Continuous>  dextrose 50% Injectable 12.5Gram(s) IV Push once  dextrose 50% Injectable 25Gram(s) IV Push once  dextrose 50% Injectable 25Gram(s) IV Push once  labetalol 200milliGRAM(s) Oral two times a day    MEDICATIONS  (PRN):  dextrose Gel 1Dose(s) Oral once PRN Blood Glucose LESS THAN 70 milliGRAM(s)/deciLiter  glucagon  Injectable 1milliGRAM(s) IntraMuscular once PRN Glucose <70 milliGRAM(s)/deciLiter      Allergies    sulfa drugs (Unknown)    Intolerances        REVIEW OF SYSTEMS:  CONSTITUTIONAL: + fatigue. No fever, No chills,No myalgia,No Body ache  EYES: No eye pain, visual disturbances, or discharge  ENMT:  No ear pain, No nose bleed, No vertigo; No sinus or throat pain  NECK: No pain, No stiffness  RESPIRATORY: No cough, wheezing, No  hemoptysis; No shortness of breath  CARDIOVASCULAR: No chest pain, palpitations, leg swelling  GASTROINTESTINAL: No abdominal or epigastric pain. No nausea, No vomiting; No diarrhea or constipation.  GENITOURINARY: No dysuria, No frequency, No urgency, No hematuria, or incontinence  NEUROLOGICAL: headache improving. alert and oriented x 3, No dizziness, No numbness,  SKIN:   No itching, burning, rashes, or lesions   MUSCULOSKELETAL: No joint pain or swelling; No muscle pain, No back pain, No extremity pain  PSYCHIATRIC: No depression, anxiety, mood swings, or difficulty sleeping    REST OF REVIEW Of SYSTEM - Normal     Height (cm): 166.4 ( @ 21:07)  Weight (kg): 70.3 ( @ 21:07)  BMI (kg/m2): 25.4 ( @ 21:07)  BSA (m2): 1.79 ( @ 21:07)  Vital Signs Last 24 Hrs  T(C): 36.6, Max: 37.6 ( @ 21:07)  T(F): 97.8, Max: 99.6 ( @ 21:07)  HR: 70 (70 - 95)  BP: 138/76 (137/52 - 189/96)  BP(mean): --  RR: 18 (12 - 18)  SpO2: 99% (98% - 100%)  [ ] room air   [ ] 02    PHYSICAL EXAM:  General: Well developed, well nourished, NAD  HEENT: NCAT, PERRLA, EOMI bl, moist mucous membranes   Neck: Supple, nontender, no mass  Neurology: A&Ox3, nonfocal, CN II-XII grossly intact, sensation intact, no gait abnormalities   Respiratory: CTA B/L, No W/R/R  CV: RRR, +S1/S2, no murmurs, rubs or gallops  Abdominal: Soft, NT, ND +BSx4  Extremities: No C/C/E, + peripheral pulses  MSK: Normal ROM, no joint erythema or warmth, no joint swelling   Skin: warm, dry, normal color, no rash or abnormal lesions    LABS:                        11.6   7.6   )-----------( 177      ( 2017 06:18 )             36.3     2017 06:18    141    |  105    |  15     ----------------------------<  118    3.9     |  26     |  0.78     Ca    7.6        2017 06:18  Phos  2.8       2017 06:18  Mg     1.7       2017 06:18    TPro  6.9    /  Alb  3.2    /  TBili  0.1    /  DBili  x      /  AST  27     /  ALT  22     /  AlkPhos  96     2017 21:28    PT/INR - ( 2017 21:28 )   PT: 10.5 sec;   INR: 0.96 ratio         PTT - ( 2017 21:28 )  PTT:30.1 sec    Urinalysis Basic - ( 2017 21:41 )    Color: Yellow / Appearance: Clear / S.020 / pH: x  Gluc: x / Ketone: Negative  / Bili: Negative / Urobili: Negative   Blood: x / Protein: 25 mg/dL / Nitrite: Negative   Leuk Esterase: Trace / RBC: 0-2 /HPF / WBC 3-5   Sq Epi: x / Non Sq Epi: Few / Bacteria: Few      CAPILLARY BLOOD GLUCOSE  164 (2017 21:07)

## 2017-04-10 LAB
CULTURE RESULTS: SIGNIFICANT CHANGE UP
CULTURE RESULTS: SIGNIFICANT CHANGE UP
SPECIMEN SOURCE: SIGNIFICANT CHANGE UP
SPECIMEN SOURCE: SIGNIFICANT CHANGE UP

## 2017-04-11 DIAGNOSIS — H26.9 UNSPECIFIED CATARACT: ICD-10-CM

## 2017-04-11 DIAGNOSIS — I49.5 SICK SINUS SYNDROME: ICD-10-CM

## 2017-04-11 DIAGNOSIS — R47.81 SLURRED SPEECH: ICD-10-CM

## 2017-04-11 DIAGNOSIS — H54.7 UNSPECIFIED VISUAL LOSS: ICD-10-CM

## 2017-04-11 DIAGNOSIS — I61.9 NONTRAUMATIC INTRACEREBRAL HEMORRHAGE, UNSPECIFIED: ICD-10-CM

## 2017-04-11 DIAGNOSIS — G81.94 HEMIPLEGIA, UNSPECIFIED AFFECTING LEFT NONDOMINANT SIDE: ICD-10-CM

## 2017-04-11 DIAGNOSIS — E78.5 HYPERLIPIDEMIA, UNSPECIFIED: ICD-10-CM

## 2017-04-11 DIAGNOSIS — I11.0 HYPERTENSIVE HEART DISEASE WITH HEART FAILURE: ICD-10-CM

## 2017-04-11 DIAGNOSIS — G31.9 DEGENERATIVE DISEASE OF NERVOUS SYSTEM, UNSPECIFIED: ICD-10-CM

## 2017-04-11 DIAGNOSIS — Z88.2 ALLERGY STATUS TO SULFONAMIDES: ICD-10-CM

## 2017-04-11 DIAGNOSIS — R51 HEADACHE: ICD-10-CM

## 2017-04-11 DIAGNOSIS — G40.909 EPILEPSY, UNSPECIFIED, NOT INTRACTABLE, WITHOUT STATUS EPILEPTICUS: ICD-10-CM

## 2017-04-11 DIAGNOSIS — E11.9 TYPE 2 DIABETES MELLITUS WITHOUT COMPLICATIONS: ICD-10-CM

## 2017-04-11 DIAGNOSIS — I50.32 CHRONIC DIASTOLIC (CONGESTIVE) HEART FAILURE: ICD-10-CM

## 2017-04-12 ENCOUNTER — INPATIENT (INPATIENT)
Facility: HOSPITAL | Age: 82
LOS: 12 days | Discharge: HOME | DRG: 949 | End: 2017-04-25
Payer: MEDICARE

## 2017-04-12 DIAGNOSIS — R50.9 FEVER, UNSPECIFIED: ICD-10-CM

## 2017-04-12 DIAGNOSIS — Z51.89 ENCOUNTER FOR OTHER SPECIFIED AFTERCARE: ICD-10-CM

## 2017-04-12 DIAGNOSIS — R53.81 OTHER MALAISE: ICD-10-CM

## 2017-04-12 DIAGNOSIS — E78.00 PURE HYPERCHOLESTEROLEMIA, UNSPECIFIED: ICD-10-CM

## 2017-04-12 DIAGNOSIS — Z79.84 LONG TERM (CURRENT) USE OF ORAL HYPOGLYCEMIC DRUGS: ICD-10-CM

## 2017-04-12 DIAGNOSIS — E11.9 TYPE 2 DIABETES MELLITUS WITHOUT COMPLICATIONS: ICD-10-CM

## 2017-04-12 DIAGNOSIS — Z86.69 PERSONAL HISTORY OF OTHER DISEASES OF THE NERVOUS SYSTEM AND SENSE ORGANS: Chronic | ICD-10-CM

## 2017-04-12 DIAGNOSIS — D69.6 THROMBOCYTOPENIA, UNSPECIFIED: ICD-10-CM

## 2017-04-12 DIAGNOSIS — E78.5 HYPERLIPIDEMIA, UNSPECIFIED: ICD-10-CM

## 2017-04-12 DIAGNOSIS — N39.0 URINARY TRACT INFECTION, SITE NOT SPECIFIED: ICD-10-CM

## 2017-04-12 DIAGNOSIS — I10 ESSENTIAL (PRIMARY) HYPERTENSION: ICD-10-CM

## 2017-04-12 DIAGNOSIS — R35.0 FREQUENCY OF MICTURITION: ICD-10-CM

## 2017-04-12 DIAGNOSIS — Z91.81 HISTORY OF FALLING: ICD-10-CM

## 2017-04-12 DIAGNOSIS — R56.9 UNSPECIFIED CONVULSIONS: ICD-10-CM

## 2017-04-12 DIAGNOSIS — D64.9 ANEMIA, UNSPECIFIED: ICD-10-CM

## 2017-04-12 PROCEDURE — 93010 ELECTROCARDIOGRAM REPORT: CPT

## 2017-04-12 PROCEDURE — 71010: CPT | Mod: 26

## 2017-04-13 PROCEDURE — 99223 1ST HOSP IP/OBS HIGH 75: CPT

## 2017-04-13 PROCEDURE — 93970 EXTREMITY STUDY: CPT | Mod: 26

## 2017-04-14 PROCEDURE — 99232 SBSQ HOSP IP/OBS MODERATE 35: CPT

## 2017-04-14 PROCEDURE — 99233 SBSQ HOSP IP/OBS HIGH 50: CPT

## 2017-04-15 ENCOUNTER — APPOINTMENT (OUTPATIENT)
Dept: INTERNAL MEDICINE | Facility: CLINIC | Age: 82
End: 2017-04-15

## 2017-04-15 PROCEDURE — 99232 SBSQ HOSP IP/OBS MODERATE 35: CPT

## 2017-04-16 PROCEDURE — 99232 SBSQ HOSP IP/OBS MODERATE 35: CPT

## 2017-04-17 PROCEDURE — 99233 SBSQ HOSP IP/OBS HIGH 50: CPT

## 2017-04-17 PROCEDURE — 99232 SBSQ HOSP IP/OBS MODERATE 35: CPT

## 2017-04-18 PROCEDURE — 99232 SBSQ HOSP IP/OBS MODERATE 35: CPT

## 2017-04-18 PROCEDURE — 99233 SBSQ HOSP IP/OBS HIGH 50: CPT

## 2017-04-19 PROCEDURE — 99233 SBSQ HOSP IP/OBS HIGH 50: CPT

## 2017-04-19 PROCEDURE — 99232 SBSQ HOSP IP/OBS MODERATE 35: CPT

## 2017-04-20 PROCEDURE — 99233 SBSQ HOSP IP/OBS HIGH 50: CPT

## 2017-04-20 PROCEDURE — 99232 SBSQ HOSP IP/OBS MODERATE 35: CPT

## 2017-04-21 PROCEDURE — 99232 SBSQ HOSP IP/OBS MODERATE 35: CPT

## 2017-04-22 PROCEDURE — 99232 SBSQ HOSP IP/OBS MODERATE 35: CPT

## 2017-04-23 PROCEDURE — 99233 SBSQ HOSP IP/OBS HIGH 50: CPT

## 2017-04-24 PROCEDURE — 99233 SBSQ HOSP IP/OBS HIGH 50: CPT

## 2017-04-24 PROCEDURE — 93971 EXTREMITY STUDY: CPT | Mod: 26,RT

## 2017-04-24 PROCEDURE — 99232 SBSQ HOSP IP/OBS MODERATE 35: CPT

## 2017-04-25 PROCEDURE — 99232 SBSQ HOSP IP/OBS MODERATE 35: CPT

## 2017-05-04 PROCEDURE — 71045 X-RAY EXAM CHEST 1 VIEW: CPT

## 2017-05-04 PROCEDURE — 97530 THERAPEUTIC ACTIVITIES: CPT

## 2017-05-04 PROCEDURE — 85027 COMPLETE CBC AUTOMATED: CPT

## 2017-05-04 PROCEDURE — 87086 URINE CULTURE/COLONY COUNT: CPT

## 2017-05-04 PROCEDURE — 86022 PLATELET ANTIBODIES: CPT

## 2017-05-04 PROCEDURE — 80156 ASSAY CARBAMAZEPINE TOTAL: CPT

## 2017-05-04 PROCEDURE — 80053 COMPREHEN METABOLIC PANEL: CPT

## 2017-05-04 PROCEDURE — 97110 THERAPEUTIC EXERCISES: CPT

## 2017-05-04 PROCEDURE — 85025 COMPLETE CBC W/AUTO DIFF WBC: CPT

## 2017-05-04 PROCEDURE — 97167 OT EVAL HIGH COMPLEX 60 MIN: CPT

## 2017-05-04 PROCEDURE — 93970 EXTREMITY STUDY: CPT

## 2017-05-04 PROCEDURE — 92523 SPEECH SOUND LANG COMPREHEN: CPT

## 2017-05-04 PROCEDURE — 97116 GAIT TRAINING THERAPY: CPT

## 2017-05-04 PROCEDURE — 87040 BLOOD CULTURE FOR BACTERIA: CPT

## 2017-05-04 PROCEDURE — 93005 ELECTROCARDIOGRAM TRACING: CPT

## 2017-05-04 PROCEDURE — 97163 PT EVAL HIGH COMPLEX 45 MIN: CPT

## 2017-05-04 PROCEDURE — 80048 BASIC METABOLIC PNL TOTAL CA: CPT

## 2017-05-04 PROCEDURE — 93971 EXTREMITY STUDY: CPT

## 2017-05-04 PROCEDURE — 81003 URINALYSIS AUTO W/O SCOPE: CPT

## 2017-05-04 PROCEDURE — 97535 SELF CARE MNGMENT TRAINING: CPT

## 2017-05-04 PROCEDURE — 83036 HEMOGLOBIN GLYCOSYLATED A1C: CPT

## 2017-05-12 ENCOUNTER — APPOINTMENT (OUTPATIENT)
Dept: NEUROLOGY | Facility: CLINIC | Age: 82
End: 2017-05-12

## 2017-05-12 ENCOUNTER — MEDICATION RENEWAL (OUTPATIENT)
Age: 82
End: 2017-05-12

## 2017-05-12 VITALS
SYSTOLIC BLOOD PRESSURE: 149 MMHG | WEIGHT: 146 LBS | HEIGHT: 62 IN | HEART RATE: 71 BPM | BODY MASS INDEX: 26.87 KG/M2 | DIASTOLIC BLOOD PRESSURE: 79 MMHG

## 2017-05-19 ENCOUNTER — APPOINTMENT (OUTPATIENT)
Dept: INTERNAL MEDICINE | Facility: CLINIC | Age: 82
End: 2017-05-19

## 2017-05-19 ENCOUNTER — MEDICATION RENEWAL (OUTPATIENT)
Age: 82
End: 2017-05-19

## 2017-05-19 VITALS
HEIGHT: 62 IN | TEMPERATURE: 97.5 F | SYSTOLIC BLOOD PRESSURE: 140 MMHG | RESPIRATION RATE: 14 BRPM | OXYGEN SATURATION: 95 % | DIASTOLIC BLOOD PRESSURE: 72 MMHG | HEART RATE: 104 BPM

## 2017-05-19 VITALS — HEART RATE: 80 BPM | SYSTOLIC BLOOD PRESSURE: 134 MMHG | DIASTOLIC BLOOD PRESSURE: 70 MMHG

## 2017-05-19 LAB
ALBUMIN SERPL ELPH-MCNC: 3.8 G/DL
ALP BLD-CCNC: 67 U/L
ALT SERPL-CCNC: 10 U/L
ANION GAP SERPL CALC-SCNC: 12 MMOL/L
AST SERPL-CCNC: 15 U/L
BASOPHILS # BLD AUTO: 0.02 K/UL
BASOPHILS NFR BLD AUTO: 0.5 %
BILIRUB SERPL-MCNC: <0.2 MG/DL
BUN SERPL-MCNC: 18 MG/DL
CALCIUM SERPL-MCNC: 9.5 MG/DL
CHLORIDE SERPL-SCNC: 105 MMOL/L
CO2 SERPL-SCNC: 27 MMOL/L
CREAT SERPL-MCNC: 0.88 MG/DL
EOSINOPHIL # BLD AUTO: 0 K/UL
EOSINOPHIL NFR BLD AUTO: 0 %
GLUCOSE SERPL-MCNC: 108 MG/DL
HCT VFR BLD CALC: 37.1 %
HGB BLD-MCNC: 11.7 G/DL
IMM GRANULOCYTES NFR BLD AUTO: 0.2 %
LYMPHOCYTES # BLD AUTO: 1.01 K/UL
LYMPHOCYTES NFR BLD AUTO: 23.3 %
MAN DIFF?: NORMAL
MCHC RBC-ENTMCNC: 28.1 PG
MCHC RBC-ENTMCNC: 31.5 GM/DL
MCV RBC AUTO: 89 FL
MONOCYTES # BLD AUTO: 0.5 K/UL
MONOCYTES NFR BLD AUTO: 11.5 %
NEUTROPHILS # BLD AUTO: 2.79 K/UL
NEUTROPHILS NFR BLD AUTO: 64.5 %
PLATELET # BLD AUTO: 289 K/UL
POTASSIUM SERPL-SCNC: 4.6 MMOL/L
PROT SERPL-MCNC: 6.8 G/DL
RBC # BLD: 4.17 M/UL
RBC # FLD: 14.9 %
SODIUM SERPL-SCNC: 144 MMOL/L
WBC # FLD AUTO: 4.33 K/UL

## 2017-05-22 LAB
25(OH)D3 SERPL-MCNC: 34.4 NG/ML
ALBUMIN SERPL ELPH-MCNC: 3.5 G/DL
ALP BLD-CCNC: 62 U/L
ALT SERPL-CCNC: 11 U/L
ANION GAP SERPL CALC-SCNC: 14 MMOL/L
AST SERPL-CCNC: 15 U/L
BILIRUB SERPL-MCNC: 0.2 MG/DL
BUN SERPL-MCNC: 17 MG/DL
CALCIUM SERPL-MCNC: 9 MG/DL
CARBAMAZEPINE SERPL-MCNC: 5.5 UG/ML
CHLORIDE SERPL-SCNC: 104 MMOL/L
CHOLEST SERPL-MCNC: 224 MG/DL
CHOLEST/HDLC SERPL: 3.1 RATIO
CK SERPL-CCNC: 83 U/L
CO2 SERPL-SCNC: 25 MMOL/L
CREAT SERPL-MCNC: 0.74 MG/DL
GLUCOSE SERPL-MCNC: 90 MG/DL
HBA1C MFR BLD HPLC: 6.2 %
HDLC SERPL-MCNC: 72 MG/DL
LDLC SERPL CALC-MCNC: 133 MG/DL
POTASSIUM SERPL-SCNC: 4.5 MMOL/L
PROT SERPL-MCNC: 6.3 G/DL
SODIUM SERPL-SCNC: 143 MMOL/L
TRIGL SERPL-MCNC: 95 MG/DL

## 2017-06-16 ENCOUNTER — APPOINTMENT (OUTPATIENT)
Dept: NEUROLOGY | Facility: CLINIC | Age: 82
End: 2017-06-16

## 2017-06-16 ENCOUNTER — RECORD ABSTRACTING (OUTPATIENT)
Age: 82
End: 2017-06-16

## 2017-07-15 ENCOUNTER — OTHER (OUTPATIENT)
Age: 82
End: 2017-07-15

## 2017-07-24 LAB
ALBUMIN SERPL ELPH-MCNC: 3.6 G/DL
ALP BLD-CCNC: 59 U/L
ALT SERPL-CCNC: 12 U/L
ANION GAP SERPL CALC-SCNC: 11 MMOL/L
AST SERPL-CCNC: 17 U/L
BILIRUB SERPL-MCNC: <0.2 MG/DL
BUN SERPL-MCNC: 17 MG/DL
CALCIUM SERPL-MCNC: 9.1 MG/DL
CHLORIDE SERPL-SCNC: 104 MMOL/L
CHOLEST SERPL-MCNC: 228 MG/DL
CHOLEST/HDLC SERPL: 3 RATIO
CK SERPL-CCNC: 125 U/L
CO2 SERPL-SCNC: 29 MMOL/L
CREAT SERPL-MCNC: 0.78 MG/DL
GLUCOSE SERPL-MCNC: 111 MG/DL
HBA1C MFR BLD HPLC: 6.3 %
HDLC SERPL-MCNC: 76 MG/DL
LDLC SERPL CALC-MCNC: 133 MG/DL
POTASSIUM SERPL-SCNC: 4.7 MMOL/L
PROT SERPL-MCNC: 6 G/DL
SODIUM SERPL-SCNC: 144 MMOL/L
TRIGL SERPL-MCNC: 96 MG/DL

## 2017-07-25 ENCOUNTER — MEDICATION RENEWAL (OUTPATIENT)
Age: 82
End: 2017-07-25

## 2017-07-27 ENCOUNTER — APPOINTMENT (OUTPATIENT)
Dept: NEUROLOGY | Facility: CLINIC | Age: 82
End: 2017-07-27

## 2017-09-22 ENCOUNTER — APPOINTMENT (OUTPATIENT)
Dept: NEUROLOGY | Facility: CLINIC | Age: 82
End: 2017-09-22

## 2017-10-03 ENCOUNTER — MEDICATION RENEWAL (OUTPATIENT)
Age: 82
End: 2017-10-03

## 2017-10-14 ENCOUNTER — LABORATORY RESULT (OUTPATIENT)
Age: 82
End: 2017-10-14

## 2017-10-14 ENCOUNTER — APPOINTMENT (OUTPATIENT)
Dept: INTERNAL MEDICINE | Facility: CLINIC | Age: 82
End: 2017-10-14
Payer: MEDICARE

## 2017-10-14 VITALS
RESPIRATION RATE: 16 BRPM | TEMPERATURE: 98.3 F | HEART RATE: 71 BPM | DIASTOLIC BLOOD PRESSURE: 70 MMHG | HEIGHT: 62 IN | SYSTOLIC BLOOD PRESSURE: 122 MMHG | OXYGEN SATURATION: 97 %

## 2017-10-14 PROCEDURE — 90686 IIV4 VACC NO PRSV 0.5 ML IM: CPT

## 2017-10-14 PROCEDURE — 36415 COLL VENOUS BLD VENIPUNCTURE: CPT

## 2017-10-14 PROCEDURE — G0008: CPT

## 2017-10-14 PROCEDURE — 99214 OFFICE O/P EST MOD 30 MIN: CPT | Mod: 25

## 2017-10-14 RX ORDER — CEPHALEXIN 500 MG/1
500 CAPSULE ORAL
Qty: 15 | Refills: 0 | Status: COMPLETED | COMMUNITY
Start: 2017-07-18

## 2017-10-15 ENCOUNTER — INPATIENT (INPATIENT)
Facility: HOSPITAL | Age: 82
LOS: 0 days | Discharge: HOME | DRG: 103 | End: 2017-10-16
Attending: INTERNAL MEDICINE | Admitting: INTERNAL MEDICINE
Payer: COMMERCIAL

## 2017-10-15 VITALS
WEIGHT: 160.06 LBS | OXYGEN SATURATION: 95 % | DIASTOLIC BLOOD PRESSURE: 106 MMHG | HEART RATE: 90 BPM | SYSTOLIC BLOOD PRESSURE: 191 MMHG | RESPIRATION RATE: 15 BRPM | TEMPERATURE: 100 F

## 2017-10-15 DIAGNOSIS — R56.9 UNSPECIFIED CONVULSIONS: ICD-10-CM

## 2017-10-15 DIAGNOSIS — I63.9 CEREBRAL INFARCTION, UNSPECIFIED: ICD-10-CM

## 2017-10-15 DIAGNOSIS — E11.9 TYPE 2 DIABETES MELLITUS WITHOUT COMPLICATIONS: ICD-10-CM

## 2017-10-15 DIAGNOSIS — E78.00 PURE HYPERCHOLESTEROLEMIA, UNSPECIFIED: ICD-10-CM

## 2017-10-15 DIAGNOSIS — I61.9 NONTRAUMATIC INTRACEREBRAL HEMORRHAGE, UNSPECIFIED: ICD-10-CM

## 2017-10-15 DIAGNOSIS — Z29.9 ENCOUNTER FOR PROPHYLACTIC MEASURES, UNSPECIFIED: ICD-10-CM

## 2017-10-15 DIAGNOSIS — I10 ESSENTIAL (PRIMARY) HYPERTENSION: ICD-10-CM

## 2017-10-15 DIAGNOSIS — Z86.69 PERSONAL HISTORY OF OTHER DISEASES OF THE NERVOUS SYSTEM AND SENSE ORGANS: Chronic | ICD-10-CM

## 2017-10-15 LAB
ALBUMIN SERPL ELPH-MCNC: 2.9 G/DL — LOW (ref 3.3–5)
ALP SERPL-CCNC: 72 U/L — SIGNIFICANT CHANGE UP (ref 40–120)
ALT FLD-CCNC: 17 U/L — SIGNIFICANT CHANGE UP (ref 12–78)
ANION GAP SERPL CALC-SCNC: 6 MMOL/L — SIGNIFICANT CHANGE UP (ref 5–17)
APPEARANCE UR: CLEAR — SIGNIFICANT CHANGE UP
AST SERPL-CCNC: 14 U/L — LOW (ref 15–37)
BACTERIA # UR AUTO: NEGATIVE — SIGNIFICANT CHANGE UP
BASOPHILS # BLD AUTO: 0.1 K/UL — SIGNIFICANT CHANGE UP (ref 0–0.2)
BASOPHILS NFR BLD AUTO: 1.4 % — SIGNIFICANT CHANGE UP (ref 0–2)
BILIRUB SERPL-MCNC: 0.2 MG/DL — SIGNIFICANT CHANGE UP (ref 0.2–1.2)
BILIRUB UR-MCNC: NEGATIVE — SIGNIFICANT CHANGE UP
BUN SERPL-MCNC: 20 MG/DL — SIGNIFICANT CHANGE UP (ref 7–23)
CALCIUM SERPL-MCNC: 8.1 MG/DL — LOW (ref 8.5–10.1)
CARBAMAZEPINE SERPL-MCNC: 8 UG/ML — SIGNIFICANT CHANGE UP (ref 4–12)
CHLORIDE SERPL-SCNC: 104 MMOL/L — SIGNIFICANT CHANGE UP (ref 96–108)
CO2 SERPL-SCNC: 29 MMOL/L — SIGNIFICANT CHANGE UP (ref 22–31)
COLOR SPEC: YELLOW — SIGNIFICANT CHANGE UP
CREAT SERPL-MCNC: 0.69 MG/DL — SIGNIFICANT CHANGE UP (ref 0.5–1.3)
DIFF PNL FLD: NEGATIVE — SIGNIFICANT CHANGE UP
EOSINOPHIL # BLD AUTO: 0.1 K/UL — SIGNIFICANT CHANGE UP (ref 0–0.5)
EOSINOPHIL NFR BLD AUTO: 1.2 % — SIGNIFICANT CHANGE UP (ref 0–6)
EPI CELLS # UR: SIGNIFICANT CHANGE UP
GLUCOSE SERPL-MCNC: 127 MG/DL — HIGH (ref 70–99)
GLUCOSE UR QL: NEGATIVE — SIGNIFICANT CHANGE UP
HCT VFR BLD CALC: 40.6 % — SIGNIFICANT CHANGE UP (ref 34.5–45)
HGB BLD-MCNC: 12.9 G/DL — SIGNIFICANT CHANGE UP (ref 11.5–15.5)
KETONES UR-MCNC: NEGATIVE — SIGNIFICANT CHANGE UP
LEUKOCYTE ESTERASE UR-ACNC: NEGATIVE — SIGNIFICANT CHANGE UP
LYMPHOCYTES # BLD AUTO: 0.9 K/UL — LOW (ref 1–3.3)
LYMPHOCYTES # BLD AUTO: 15.3 % — SIGNIFICANT CHANGE UP (ref 13–44)
MCHC RBC-ENTMCNC: 30.2 PG — SIGNIFICANT CHANGE UP (ref 27–34)
MCHC RBC-ENTMCNC: 31.7 GM/DL — LOW (ref 32–36)
MCV RBC AUTO: 95.2 FL — SIGNIFICANT CHANGE UP (ref 80–100)
MONOCYTES # BLD AUTO: 0.4 K/UL — SIGNIFICANT CHANGE UP (ref 0–0.9)
MONOCYTES NFR BLD AUTO: 6.2 % — SIGNIFICANT CHANGE UP (ref 1–9)
NEUTROPHILS # BLD AUTO: 4.6 K/UL — SIGNIFICANT CHANGE UP (ref 1.8–7.4)
NEUTROPHILS NFR BLD AUTO: 75.8 % — SIGNIFICANT CHANGE UP (ref 43–77)
NITRITE UR-MCNC: NEGATIVE — SIGNIFICANT CHANGE UP
PH UR: 8 — SIGNIFICANT CHANGE UP (ref 5–8)
PLATELET # BLD AUTO: 197 K/UL — SIGNIFICANT CHANGE UP (ref 150–400)
POTASSIUM SERPL-MCNC: 4.1 MMOL/L — SIGNIFICANT CHANGE UP (ref 3.5–5.3)
POTASSIUM SERPL-SCNC: 4.1 MMOL/L — SIGNIFICANT CHANGE UP (ref 3.5–5.3)
PROT SERPL-MCNC: 6.6 G/DL — SIGNIFICANT CHANGE UP (ref 6–8.3)
PROT UR-MCNC: 25 MG/DL
RBC # BLD: 4.26 M/UL — SIGNIFICANT CHANGE UP (ref 3.8–5.2)
RBC # FLD: 13.3 % — SIGNIFICANT CHANGE UP (ref 10.3–14.5)
RBC CASTS # UR COMP ASSIST: SIGNIFICANT CHANGE UP /HPF (ref 0–4)
SODIUM SERPL-SCNC: 139 MMOL/L — SIGNIFICANT CHANGE UP (ref 135–145)
SP GR SPEC: 1.01 — SIGNIFICANT CHANGE UP (ref 1.01–1.02)
UROBILINOGEN FLD QL: NEGATIVE — SIGNIFICANT CHANGE UP
WBC # BLD: 6 K/UL — SIGNIFICANT CHANGE UP (ref 3.8–10.5)
WBC # FLD AUTO: 6 K/UL — SIGNIFICANT CHANGE UP (ref 3.8–10.5)
WBC UR QL: SIGNIFICANT CHANGE UP

## 2017-10-15 PROCEDURE — 70450 CT HEAD/BRAIN W/O DYE: CPT | Mod: 26

## 2017-10-15 PROCEDURE — 99223 1ST HOSP IP/OBS HIGH 75: CPT | Mod: AI,GC

## 2017-10-15 PROCEDURE — 99291 CRITICAL CARE FIRST HOUR: CPT

## 2017-10-15 PROCEDURE — 71010: CPT | Mod: 26

## 2017-10-15 PROCEDURE — 93010 ELECTROCARDIOGRAM REPORT: CPT

## 2017-10-15 RX ORDER — DEXTROSE 50 % IN WATER 50 %
25 SYRINGE (ML) INTRAVENOUS ONCE
Qty: 0 | Refills: 0 | Status: DISCONTINUED | OUTPATIENT
Start: 2017-10-15 | End: 2017-10-16

## 2017-10-15 RX ORDER — ROSUVASTATIN CALCIUM 5 MG/1
5 TABLET ORAL AT BEDTIME
Qty: 0 | Refills: 0 | Status: DISCONTINUED | OUTPATIENT
Start: 2017-10-15 | End: 2017-10-16

## 2017-10-15 RX ORDER — GLUCAGON INJECTION, SOLUTION 0.5 MG/.1ML
1 INJECTION, SOLUTION SUBCUTANEOUS ONCE
Qty: 0 | Refills: 0 | Status: DISCONTINUED | OUTPATIENT
Start: 2017-10-15 | End: 2017-10-16

## 2017-10-15 RX ORDER — MORPHINE SULFATE 50 MG/1
2 CAPSULE, EXTENDED RELEASE ORAL ONCE
Qty: 0 | Refills: 0 | Status: DISCONTINUED | OUTPATIENT
Start: 2017-10-15 | End: 2017-10-15

## 2017-10-15 RX ORDER — SODIUM CHLORIDE 9 MG/ML
3 INJECTION INTRAMUSCULAR; INTRAVENOUS; SUBCUTANEOUS ONCE
Qty: 0 | Refills: 0 | Status: COMPLETED | OUTPATIENT
Start: 2017-10-15 | End: 2017-10-15

## 2017-10-15 RX ORDER — UBIDECARENONE 100 MG
100 CAPSULE ORAL AT BEDTIME
Qty: 0 | Refills: 0 | Status: DISCONTINUED | OUTPATIENT
Start: 2017-10-15 | End: 2017-10-16

## 2017-10-15 RX ORDER — ATORVASTATIN CALCIUM 80 MG/1
20 TABLET, FILM COATED ORAL AT BEDTIME
Qty: 0 | Refills: 0 | Status: DISCONTINUED | OUTPATIENT
Start: 2017-10-15 | End: 2017-10-15

## 2017-10-15 RX ORDER — ACETAMINOPHEN 500 MG
650 TABLET ORAL ONCE
Qty: 0 | Refills: 0 | Status: COMPLETED | OUTPATIENT
Start: 2017-10-15 | End: 2017-10-15

## 2017-10-15 RX ORDER — LOSARTAN POTASSIUM 100 MG/1
25 TABLET, FILM COATED ORAL DAILY
Qty: 0 | Refills: 0 | Status: DISCONTINUED | OUTPATIENT
Start: 2017-10-15 | End: 2017-10-16

## 2017-10-15 RX ORDER — ONDANSETRON 8 MG/1
4 TABLET, FILM COATED ORAL ONCE
Qty: 0 | Refills: 0 | Status: COMPLETED | OUTPATIENT
Start: 2017-10-15 | End: 2017-10-15

## 2017-10-15 RX ORDER — ATORVASTATIN CALCIUM 80 MG/1
40 TABLET, FILM COATED ORAL AT BEDTIME
Qty: 0 | Refills: 0 | Status: DISCONTINUED | OUTPATIENT
Start: 2017-10-15 | End: 2017-10-15

## 2017-10-15 RX ORDER — LABETALOL HCL 100 MG
10 TABLET ORAL ONCE
Qty: 0 | Refills: 0 | Status: COMPLETED | OUTPATIENT
Start: 2017-10-15 | End: 2017-10-15

## 2017-10-15 RX ORDER — DEXTROSE 50 % IN WATER 50 %
1 SYRINGE (ML) INTRAVENOUS ONCE
Qty: 0 | Refills: 0 | Status: DISCONTINUED | OUTPATIENT
Start: 2017-10-15 | End: 2017-10-16

## 2017-10-15 RX ORDER — CARBAMAZEPINE 200 MG
200 TABLET ORAL
Qty: 0 | Refills: 0 | Status: DISCONTINUED | OUTPATIENT
Start: 2017-10-15 | End: 2017-10-16

## 2017-10-15 RX ORDER — INSULIN LISPRO 100/ML
VIAL (ML) SUBCUTANEOUS
Qty: 0 | Refills: 0 | Status: DISCONTINUED | OUTPATIENT
Start: 2017-10-15 | End: 2017-10-16

## 2017-10-15 RX ORDER — DEXTROSE 50 % IN WATER 50 %
12.5 SYRINGE (ML) INTRAVENOUS ONCE
Qty: 0 | Refills: 0 | Status: DISCONTINUED | OUTPATIENT
Start: 2017-10-15 | End: 2017-10-16

## 2017-10-15 RX ORDER — LABETALOL HCL 100 MG
100 TABLET ORAL
Qty: 0 | Refills: 0 | Status: DISCONTINUED | OUTPATIENT
Start: 2017-10-15 | End: 2017-10-15

## 2017-10-15 RX ORDER — MULTIVIT-MIN/FERROUS GLUCONATE 9 MG/15 ML
1 LIQUID (ML) ORAL DAILY
Qty: 0 | Refills: 0 | Status: DISCONTINUED | OUTPATIENT
Start: 2017-10-15 | End: 2017-10-16

## 2017-10-15 RX ORDER — LABETALOL HCL 100 MG
100 TABLET ORAL ONCE
Qty: 0 | Refills: 0 | Status: COMPLETED | OUTPATIENT
Start: 2017-10-15 | End: 2017-10-15

## 2017-10-15 RX ORDER — LABETALOL HCL 100 MG
100 TABLET ORAL
Qty: 0 | Refills: 0 | Status: DISCONTINUED | OUTPATIENT
Start: 2017-10-15 | End: 2017-10-16

## 2017-10-15 RX ORDER — SODIUM CHLORIDE 9 MG/ML
1000 INJECTION, SOLUTION INTRAVENOUS
Qty: 0 | Refills: 0 | Status: DISCONTINUED | OUTPATIENT
Start: 2017-10-15 | End: 2017-10-16

## 2017-10-15 RX ADMIN — MORPHINE SULFATE 2 MILLIGRAM(S): 50 CAPSULE, EXTENDED RELEASE ORAL at 12:00

## 2017-10-15 RX ADMIN — Medication 1: at 17:40

## 2017-10-15 RX ADMIN — ONDANSETRON 4 MILLIGRAM(S): 8 TABLET, FILM COATED ORAL at 11:16

## 2017-10-15 RX ADMIN — SODIUM CHLORIDE 3 MILLILITER(S): 9 INJECTION INTRAMUSCULAR; INTRAVENOUS; SUBCUTANEOUS at 11:15

## 2017-10-15 RX ADMIN — Medication 200 MILLIGRAM(S): at 21:06

## 2017-10-15 RX ADMIN — Medication 100 MILLIGRAM(S): at 21:06

## 2017-10-15 RX ADMIN — Medication 100 MILLIGRAM(S): at 12:02

## 2017-10-15 RX ADMIN — Medication 650 MILLIGRAM(S): at 13:56

## 2017-10-15 RX ADMIN — Medication 650 MILLIGRAM(S): at 14:50

## 2017-10-15 RX ADMIN — MORPHINE SULFATE 2 MILLIGRAM(S): 50 CAPSULE, EXTENDED RELEASE ORAL at 11:15

## 2017-10-15 RX ADMIN — Medication 10 MILLIGRAM(S): at 11:34

## 2017-10-15 RX ADMIN — ROSUVASTATIN CALCIUM 5 MILLIGRAM(S): 5 TABLET ORAL at 21:06

## 2017-10-15 NOTE — H&P ADULT - PROBLEM SELECTOR PLAN 7
DVT prophylaxis - venodynes  GI prophylaxis - protonix  out of bed to chair with assist  fall precautions DVT prophylaxis - venodynes  out of bed to chair with assist  fall precautions

## 2017-10-15 NOTE — H&P ADULT - PROBLEM SELECTOR PLAN 1
aspirin contraindicated d/t history of brain bleed  f/u MRI  neuro consult (Sanjuana)  Q4 neuro checks  admit to telemetry aspirin contraindicated d/t history of brain bleed  f/u MRI  neuro consult (Dr Wei) called  Q4 neuro checks  admit to telemetry aspirin contraindicated d/t history of brain bleed  f/u MRI  neuro consult (Dr Wei) called  Q4 neuro checks  admit to telemetry  will hold anticoagulation pending cardio and neuro recs aspirin contraindicated d/t history of brain bleed  f/u MRI  neuro consult (Dr Wei) called  Q4 neuro checks  admit to telemetry  will hold anticoagulation pending cardio and neuro recs  no significant change in imaging from previous admission aspirin contraindicated d/t history of brain bleed  f/u MRI  neuro consult (Dr Wei) called  Q4 neuro checks  admit to telemetry  will hold anticoagulation pending cardio and neuro recs  no significant change in imaging from previous admission  f/u MRI

## 2017-10-15 NOTE — H&P ADULT - PROBLEM SELECTOR PLAN 3
labetalol 100mg BID, hold for SBP <140  cardio consult (Dr. Pham) labetalol 100mg BID, hold for SBP <140 as still ruling out CVA  will follow up neuro and cardio recs in regard to permissive HTN  cardio consult (Dr. Pham) labetalol 100mg BID  as per neuro, no need for permissive HTN at this time   cardio consult (Dr. Pham)

## 2017-10-15 NOTE — ED PROVIDER NOTE - PROGRESS NOTE DETAILS
All results were explained to patient and/or family and a copy of all available results given.  dysphagia screen passed.   case vincenzo Covington and Sanjuana

## 2017-10-15 NOTE — H&P ADULT - ASSESSMENT
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 HLD, and DM presenting with headache and weakness.

## 2017-10-15 NOTE — ED ADULT NURSE REASSESSMENT NOTE - NS ED NURSE REASSESS COMMENT FT1
Assumed care of pt at 1120 upon return from CT. Pt A&Ox4, states she has headache.  Pt has left arm drift and inability to follow commands with left arm, left hand squeeze weaker than right.  Pt unable to follow finger with eyes to left side.  Slight left facial droop with more evidence of droop when patient asked to grimace.  Vitals recorded, pt helped onto bedpan.
neuro check remains the same - left arm drift, left hand grasp weakness, left gaze abnormal

## 2017-10-15 NOTE — H&P ADULT - RS GEN PE MLT RESP DETAILS PC
clear to auscultation bilaterally/airway patent/good air movement/no rales/breath sounds equal/respirations non-labored/no rhonchi/no wheezes/normal

## 2017-10-15 NOTE — H&P ADULT - NEGATIVE OPHTHALMOLOGIC SYMPTOMS
no diplopia/no photophobia/no blurred vision R/no lacrimation L/no lacrimation R/no blurred vision L

## 2017-10-15 NOTE — H&P ADULT - PROBLEM SELECTOR PLAN 2
continue carbamazepine  doubt seizure presently as no post ictal state described by family  neuro consult (Sanjuana)  will monitor

## 2017-10-15 NOTE — ED ADULT NURSE NOTE - CHPI ED SYMPTOMS NEG
no confusion/no change in level of consciousness/no loss of consciousness/no nausea/no blurred vision/no numbness/no fever/no dizziness/no vomiting

## 2017-10-15 NOTE — ED PROVIDER NOTE - ENMT, MLM
Airway patent, Nasal mucosa clear. Mouth with normal mucosa. Throat has no vesicles, no oropharyngeal exudates and uvula is midline. none

## 2017-10-15 NOTE — ED PROVIDER NOTE - OBJECTIVE STATEMENT
BRODERICK from her son, Simone, stating that she was not feeling and didn't take her seizure med last night around 8:30 am. pt forgot to take her med.  similar episode this past April and was admitted.   pt is confused c left-sided weakness, difficulty ambulation BRODERICK from her son, Simone, stating that she was not feeling and didn't take her seizure med last night around 8:30 am. pt forgot to take her med.  similar episode this past April and was admitted.   pt is confused c left-sided weakness, difficulty ambulation.  pmd- KATHERYN Rosenthal.  ASA is contraindicated as per her neurologist, Dr. Libman

## 2017-10-15 NOTE — ED ADULT NURSE NOTE - OBJECTIVE STATEMENT
Assessment completed at 1120 upon pt's return from CT.  Pt A&Ox4, to ED via ambulance with c/o possible seizure.  Pt states she has headache.  Pt has left arm drift and is unable to follow fingers with eyes to left side; has hand grasp weaker left than right.  Per son, last known well was last night. 85yo female presents to ED via ambulance alert and oriented X3. as per EMS and charge RN Leona Brooks patient had a seizure at home and son called 911. patient presents with left arm and left leg drift. patient is unable to gaze to the left. decrease hand grasp strength to left upper extremity. as per son at bedside last known normal was last night before patient went to bed. son found patient to be this way this morning. clear speech. negative facial droop. ekg performed by tech and reviewed by Dr Frost. IV #20 placed in LAC. immediate CT at this time with RN and transport.   11:00- report given to Odalis Ramirez RN.   Assessment completed at 1120 upon pt's return from CT.  Pt A&Ox4, to ED via ambulance with c/o possible seizure.  Pt states she has headache.  Pt has left arm drift and is unable to follow fingers with eyes to left side; has hand grasp weaker left than right.  Per son, last known well was last night.

## 2017-10-15 NOTE — H&P ADULT - ATTENDING COMMENTS
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 HLD, and DM presenting with headache and weakness. Neurology evaluation from Dr Wei - appreciated. MRI tomorrow. Monitor BP closely. No ASA since pt had hemorrhage in the past.  DM- Monitor FSG  closely.

## 2017-10-15 NOTE — H&P ADULT - PROBLEM SELECTOR PLAN 5
history of hemorrhagic stroke  anticoagulation contraindicated history of hemorrhagic stroke  family states anticoagulation contraindicated as per pt's neurologist  will follow up anticoagulation recs of cardio and neuro

## 2017-10-15 NOTE — ED PROVIDER NOTE - CRITICAL CARE PROVIDED
additional history taking/consultation with other physicians/direct patient care (not related to procedure)/documentation/consult w/ pt's family directly relating to pts condition

## 2017-10-15 NOTE — H&P ADULT - HISTORY OF PRESENT ILLNESS
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 HLD, and DM presenting with headache and weakness. Pt forgot to take her seizure medication last night as per the son. Last night, she complained of a headache, accompanied by a feeling of tongue heaviness and heaviness in her fingers bilaterally. As per son, she was too weak to walk herself to the bathroom during this time. The feelings of tongue and finger heaviness lasted for about 2 hours before subsiding without any intervention. Pt's son called the PMD who instructed them to come to the ER. She denies any confusion, loss of consciousness, or dysarthria during this episode. She had a similiar presentaion in April 2017, where she was admitted to [facility] and a CVA was ruled out. At present, Pt. complains only of a 8/10 headache. She denies any chest pain, SOB, fevers, dysuria, or numbness/tingling at this time.     In the ED, T(F): 99.8, HR: 82, BP: 141/64, RR: 15, SpO2: 100%. CT head showed Moderate ischemic small vessel white matter disease. Pt. received labetalol 10mg IV x 2.

## 2017-10-15 NOTE — H&P ADULT - NEUROLOGICAL DETAILS
deep reflexes hypoactive/responds to verbal commands/responds to pain/alert and oriented x 3/sensation intact/deep reflexes intact/cranial nerves intact/no spontaneous movement

## 2017-10-15 NOTE — PATIENT PROFILE ADULT. - FALL HARM RISK
"              After Visit Summary   3/15/2017    Nathalie Younger    MRN: [id number]           Patient Information     Date Of Birth          1982        Visit Information        Provider Department      3/15/2017 10:45 AM Gemini Arredondo MD [facility]        Today's Diagnoses     Subclinical hypothyroidism    -  1    Lightheadedness        Paroxysmal supraventricular tachycardia (H)           Follow-ups after your visit        Who to contact     If you have questions or need follow up information about today's clinic visit or your schedule please contact [facility] directly at [phone].  Normal or non-critical lab and imaging results will be communicated to you by Pi[employer], letter or phone within 4 business days after the clinic has received the results. If you do not hear from us within 7 days, please contact the clinic through [employer] or phone. If you have a critical or abnormal lab result, we will notify you by phone as soon as possible.  Submit refill requests through [employer] or call your pharmacy and they will forward the refill request to us. Please allow 3 business days for your refill to be completed.          Additional Information About Your Visit        MyChart Information     [employer] lets you send messages to your doctor, view your test results, renew your prescriptions, schedule appointments and more. To sign up, go to [email or web address] . Click on \"Log in\" on the left side of the screen, which will take you to the Welcome page. Then click on \"Sign up Now\" on the right side of the page.     You will be asked to enter the access code listed below, as well as some personal information. Please follow the directions to create your username and password.     Your access code is: [email or web address]  Expires: 2017 12:53 PM     Your access code will  in 90 days. If you need help or a new code, please call your [facility] or [phone].      " "  Care EveryWhere ID     This is your Care EveryWhere ID. This could be used by other organizations to access your [place] medical records  [id number]        Your Vitals Were     Pulse Temperature Height Pulse Oximetry BMI (Body Mass Index)       80 97.7  F (36.5  C) (Temporal) 5' 3\" (1.6 m) 99% 28.79 kg/m2        Blood Pressure from Last 3 Encounters:   03/15/17 118/64   10/31/16 102/78   06/15/16 116/88    Weight from Last 3 Encounters:   03/15/17 162 lb 8 oz (73.7 kg)   10/31/16 164 lb 12.8 oz (74.8 kg)   07/09/14 143 lb (64.9 kg)              We Performed the Following     TSH with free T4 reflex          Today's Medication Changes          These changes are accurate as of: 3/15/17 12:53 PM.  If you have any questions, ask your nurse or doctor.               These medicines have changed or have updated prescriptions.        Dose/Directions    atenolol 25 MG tablet   Commonly known as:  TENORMIN   This may have changed:    - when to take this  - reasons to take this   Used for:  Lightheadedness, Paroxysmal supraventricular tachycardia (H)   Changed by:  Gemini Arredondo MD        Dose:  25 mg   Take 1 tablet (25 mg) by mouth daily as needed   Quantity:  30 tablet   Refills:  5            Where to get your medicines      These medications were sent to [facility] [address] [place] [address] - [address]  [address] [facility] [address]     Phone:  [phone]     atenolol 25 MG tablet                Primary Care Provider Office Phone # Fax #    Gemini Arredondo [phone]       [facility] [address] [place] [address]        Thank you!     Thank you for choosing [facility]  for your care. Our goal is always to provide you with excellent care. Hearing back from our patients is one way we can continue to improve our services. Please take a few minutes to complete the written survey that you may receive in the mail after your visit " with us. Thank you!             Your Updated Medication List - Protect others around you: Learn how to safely use, store and throw away your medicines at www.disposemymeds.org.          This list is accurate as of: 3/15/17 12:53 PM.  Always use your most recent med list.                   Brand Name Dispense Instructions for use    atenolol 25 MG tablet    TENORMIN    30 tablet    Take 1 tablet (25 mg) by mouth daily as needed       fluticasone 50 MCG/ACT spray    FLONASE    16 g    Spray 1-2 sprays into both nostrils daily       levothyroxine 50 MCG tablet    SYNTHROID/LEVOTHROID    90 tablet    TAKE ONE TABLET BY MOUTH ONCE DAILY       norethindrone-ethinyl estradiol 1-20 MG-MCG per tablet    MICROGESTIN    84 tablet    Take 1 tablet by mouth daily          other/weakness

## 2017-10-16 ENCOUNTER — TRANSCRIPTION ENCOUNTER (OUTPATIENT)
Age: 82
End: 2017-10-16

## 2017-10-16 VITALS
TEMPERATURE: 98 F | DIASTOLIC BLOOD PRESSURE: 71 MMHG | SYSTOLIC BLOOD PRESSURE: 132 MMHG | RESPIRATION RATE: 15 BRPM | HEART RATE: 79 BPM | OXYGEN SATURATION: 97 %

## 2017-10-16 LAB
ALBUMIN SERPL ELPH-MCNC: 3.8 G/DL
ALP BLD-CCNC: 67 U/L
ALT SERPL-CCNC: 12 U/L
ANION GAP SERPL CALC-SCNC: 27 MMOL/L
ANION GAP SERPL CALC-SCNC: 8 MMOL/L — SIGNIFICANT CHANGE UP (ref 5–17)
AST SERPL-CCNC: 16 U/L
BILIRUB SERPL-MCNC: 0.2 MG/DL
BUN SERPL-MCNC: 20 MG/DL
BUN SERPL-MCNC: 21 MG/DL — SIGNIFICANT CHANGE UP (ref 7–23)
CALCIUM SERPL-MCNC: 8.3 MG/DL — LOW (ref 8.5–10.1)
CALCIUM SERPL-MCNC: 9.6 MG/DL
CHLORIDE SERPL-SCNC: 105 MMOL/L — SIGNIFICANT CHANGE UP (ref 96–108)
CHLORIDE SERPL-SCNC: 98 MMOL/L
CHOLEST SERPL-MCNC: 217 MG/DL
CHOLEST/HDLC SERPL: 2.6 RATIO
CK SERPL-CCNC: 100 U/L
CO2 SERPL-SCNC: 23 MMOL/L
CO2 SERPL-SCNC: 29 MMOL/L — SIGNIFICANT CHANGE UP (ref 22–31)
CREAT SERPL-MCNC: 0.7 MG/DL — SIGNIFICANT CHANGE UP (ref 0.5–1.3)
CREAT SERPL-MCNC: 1.14 MG/DL
GLUCOSE SERPL-MCNC: 17 MG/DL
GLUCOSE SERPL-MCNC: 95 MG/DL — SIGNIFICANT CHANGE UP (ref 70–99)
HBA1C BLD-MCNC: 6.6 % — HIGH (ref 4–5.6)
HBA1C MFR BLD HPLC: 6.2 %
HCT VFR BLD CALC: 34.9 % — SIGNIFICANT CHANGE UP (ref 34.5–45)
HCT VFR BLD CALC: NORMAL %
HDLC SERPL-MCNC: 83 MG/DL
HGB BLD-MCNC: 11.2 G/DL — LOW (ref 11.5–15.5)
HGB BLD-MCNC: 12.2 G/DL
INR BLD: 1.07 RATIO — SIGNIFICANT CHANGE UP (ref 0.88–1.16)
LDLC SERPL CALC-MCNC: 115 MG/DL
MAN DIFF?: NORMAL
MCHC RBC-ENTMCNC: 30.7 PG — SIGNIFICANT CHANGE UP (ref 27–34)
MCHC RBC-ENTMCNC: 32.1 GM/DL — SIGNIFICANT CHANGE UP (ref 32–36)
MCHC RBC-ENTMCNC: NORMAL GM/DL
MCHC RBC-ENTMCNC: NORMAL PG
MCV RBC AUTO: 95.8 FL — SIGNIFICANT CHANGE UP (ref 80–100)
MCV RBC AUTO: NORMAL FL
NEUTROPHILS NFR BLD AUTO: NORMAL %
PLATELET # BLD AUTO: 187 K/UL — SIGNIFICANT CHANGE UP (ref 150–400)
PLATELET # BLD AUTO: 243 K/UL
POTASSIUM SERPL-MCNC: 4 MMOL/L — SIGNIFICANT CHANGE UP (ref 3.5–5.3)
POTASSIUM SERPL-SCNC: 4 MMOL/L — SIGNIFICANT CHANGE UP (ref 3.5–5.3)
POTASSIUM SERPL-SCNC: 6.8 MMOL/L
PROT SERPL-MCNC: 6.5 G/DL
PROTHROM AB SERPL-ACNC: 11.7 SEC — SIGNIFICANT CHANGE UP (ref 9.8–12.7)
RBC # BLD: 3.64 M/UL — LOW (ref 3.8–5.2)
RBC # BLD: 4.13 M/UL
RBC # FLD: 13.9 % — SIGNIFICANT CHANGE UP (ref 10.3–14.5)
RBC # FLD: NORMAL %
SODIUM SERPL-SCNC: 142 MMOL/L — SIGNIFICANT CHANGE UP (ref 135–145)
SODIUM SERPL-SCNC: 148 MMOL/L
TRIGL SERPL-MCNC: 95 MG/DL
WBC # BLD: 5.2 K/UL — SIGNIFICANT CHANGE UP (ref 3.8–10.5)
WBC # FLD AUTO: 4.58 K/UL
WBC # FLD AUTO: 5.2 K/UL — SIGNIFICANT CHANGE UP (ref 3.8–10.5)

## 2017-10-16 PROCEDURE — 82962 GLUCOSE BLOOD TEST: CPT

## 2017-10-16 PROCEDURE — 85610 PROTHROMBIN TIME: CPT

## 2017-10-16 PROCEDURE — G8979: CPT | Mod: CI

## 2017-10-16 PROCEDURE — 36415 COLL VENOUS BLD VENIPUNCTURE: CPT

## 2017-10-16 PROCEDURE — 96374 THER/PROPH/DIAG INJ IV PUSH: CPT

## 2017-10-16 PROCEDURE — 81001 URINALYSIS AUTO W/SCOPE: CPT

## 2017-10-16 PROCEDURE — G8978: CPT | Mod: CI

## 2017-10-16 PROCEDURE — 97162 PT EVAL MOD COMPLEX 30 MIN: CPT

## 2017-10-16 PROCEDURE — 70551 MRI BRAIN STEM W/O DYE: CPT

## 2017-10-16 PROCEDURE — 80053 COMPREHEN METABOLIC PANEL: CPT

## 2017-10-16 PROCEDURE — 93005 ELECTROCARDIOGRAM TRACING: CPT

## 2017-10-16 PROCEDURE — 96372 THER/PROPH/DIAG INJ SC/IM: CPT | Mod: 59

## 2017-10-16 PROCEDURE — 80156 ASSAY CARBAMAZEPINE TOTAL: CPT

## 2017-10-16 PROCEDURE — 83036 HEMOGLOBIN GLYCOSYLATED A1C: CPT

## 2017-10-16 PROCEDURE — 99239 HOSP IP/OBS DSCHRG MGMT >30: CPT

## 2017-10-16 PROCEDURE — 80048 BASIC METABOLIC PNL TOTAL CA: CPT

## 2017-10-16 PROCEDURE — 99223 1ST HOSP IP/OBS HIGH 75: CPT

## 2017-10-16 PROCEDURE — 70551 MRI BRAIN STEM W/O DYE: CPT | Mod: 26

## 2017-10-16 PROCEDURE — G8980: CPT | Mod: CI

## 2017-10-16 PROCEDURE — 71045 X-RAY EXAM CHEST 1 VIEW: CPT

## 2017-10-16 PROCEDURE — 85027 COMPLETE CBC AUTOMATED: CPT

## 2017-10-16 PROCEDURE — 96375 TX/PRO/DX INJ NEW DRUG ADDON: CPT

## 2017-10-16 PROCEDURE — 70450 CT HEAD/BRAIN W/O DYE: CPT

## 2017-10-16 PROCEDURE — 99291 CRITICAL CARE FIRST HOUR: CPT | Mod: 25

## 2017-10-16 RX ORDER — ACETAMINOPHEN 500 MG
650 TABLET ORAL ONCE
Qty: 0 | Refills: 0 | Status: COMPLETED | OUTPATIENT
Start: 2017-10-16 | End: 2017-10-16

## 2017-10-16 RX ADMIN — Medication 650 MILLIGRAM(S): at 10:52

## 2017-10-16 RX ADMIN — LOSARTAN POTASSIUM 25 MILLIGRAM(S): 100 TABLET, FILM COATED ORAL at 05:55

## 2017-10-16 RX ADMIN — Medication 1: at 08:12

## 2017-10-16 RX ADMIN — Medication 1 TABLET(S): at 12:43

## 2017-10-16 RX ADMIN — Medication 100 MILLIGRAM(S): at 05:55

## 2017-10-16 RX ADMIN — Medication 200 MILLIGRAM(S): at 09:07

## 2017-10-16 NOTE — DISCHARGE NOTE ADULT - PLAN OF CARE
ruled out follow up with Primary care doctor with in 3-4 days  follow up with neurologist within 1 week continue with home medications

## 2017-10-16 NOTE — DISCHARGE NOTE ADULT - HOSPITAL COURSE
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 HLD, and DM presenting with headache and weakness. Pt forgot to take her seizure medication last night as per the son. Last night, she complained of a headache, accompanied by a feeling of tongue heaviness and heaviness in her fingers bilaterally. As per son, she was too weak to walk herself to the bathroom during this time. The feelings of tongue and finger heaviness lasted for about 2 hours before subsiding without any intervention. Pt's son called the PMD who instructed them to come to the ER. She denies any confusion, loss of consciousness, or dysarthria during this episode. She had a similiar presentaion in April 2017, where she was admitted to [place] and a CVA was ruled out. At present, Pt. complains only of a 8/10 headache. She denies any chest pain, SOB, fevers, dysuria, or numbness/tingling at this time. In the ED, T(F): 99.8, HR: 82, BP: 141/64, RR: 15, SpO2: 100%. CT head showed Moderate ischemic small vessel white matter disease. Pt. received labetalol 10mg IV x 2. Pt was later admitted to [facility] for left sided weakness, and headache and r/o CVA. Pt was also evaluated by neurologist and cardiologist and was closely monitored on tele with Q4 hr neuro check. Pt later had MRI brain which showed______________________________. Pt's hospital stay was also complicated by pt's other comorbidities including seizure, HTN, hypercholesteremia, past history of hemorraghic stroke, and Diabetes. Pt's diabetes was managed by holding home medications as started on ISS and diabetic diet. Pt's other comorbidities was controlled by continuing home medications.     On discharge day, pt is medically stable to be discharge home, The details of pt's vitals and physical exams on discharge day please referral to pt's discharge day progress note. Pt was advised to follow up with her outpt PCP, neurologist, and cardiologist 86 year old female with PMHx of Hemorrhagic stroke (February 2015), seizures (July 2015), HTN. HLD, and DM presenting with headache and weakness. Pt forgot to take her seizure medication last night as per the son. Last night, she complained of a headache, accompanied by a feeling of tongue heaviness and heaviness in her fingers bilaterally. As per son, she was too weak to walk herself to the bathroom during this time. The feelings of tongue and finger heaviness lasted for about 2 hours before subsiding without any intervention. Pt's son called the PMD who instructed them to come to the ER. She denies any confusion, loss of consciousness, or dysarthria during this episode. She had a similiar presentaion in April 2017, where she was admitted to [place] and a CVA was ruled out. At present, Pt. complains only of a 8/10 headache. She denies any chest pain, SOB, fevers, dysuria, or numbness/tingling at this time. In the ED, T(F): 99.8, HR: 82, BP: 141/64, RR: 15, SpO2: 100%. CT head showed Moderate ischemic small vessel white matter disease. Pt. received labetalol 10mg IV x 2. Pt was later admitted to \[facility]\"" Tele for left sided weakness, and headache and r/o CVA. Pt was also evaluated by neurologist and cardiologist and was closely monitored on tele with Q4 hr neuro check. Pt later had MRI brain which showed No acute intracranial pathology.Involutional chronic microangiopathic changes and chronic right posterior parietal infarct.Pt's hospital stay was also complicated by pt's other comorbidities including seizure, HTN, hypercholesteremia, past history of hemorraghic stroke, and Diabetes. Pt's diabetes was managed by holding home medications as started on ISS and diabetic diet. Pt's other comorbidities was controlled by continuing home medications.     On discharge day, pt is medically stable to be discharge home, The details of pt's vitals and physical exams on discharge day please referral to pt's discharge day progress note. Pt was advised to follow up with her outpt PCP, neurologist, and cardiologist 86 year old female with PMHx of Hemorrhagic stroke (February 2015), seizures (July 2015), HTN. HLD, and DM presenting with headache and weakness. Pt forgot to take her seizure medication last night as per the son. Last night, she complained of a headache, accompanied by a feeling of tongue heaviness and heaviness in her fingers bilaterally. As per son, she was too weak to walk herself to the bathroom during this time. The feelings of tongue and finger heaviness lasted for about 2 hours before subsiding without any intervention. Pt's son called the PMD who instructed them to come to the ER. She denies any confusion, loss of consciousness, or dysarthria during this episode. She had a similiar presentaion in April 2017, where she was admitted to [place] and a CVA was ruled out. At present, Pt. complains only of a 8/10 headache. She denies any chest pain, SOB, fevers, dysuria, or numbness/tingling at this time. In the ED, T(F): 99.8, HR: 82, BP: 141/64, RR: 15, SpO2: 100%. CT head showed Moderate ischemic small vessel white matter disease. Pt. received labetalol 10mg IV x 2. Pt was later admitted to [facility] for left sided weakness, and headache and r/o CVA. Pt was also evaluated by neurologist and cardiologist and was closely monitored on tele with Q4 hr neuro check. Pt later had MRI brain which showed No acute intracranial pathology.Involutional chronic microangiopathic changes and chronic right posterior parietal infarct.Pt's hospital stay was also complicated by pt's other comorbidities including seizure, HTN, hypercholesteremia, past history of hemorraghic stroke, and Diabetes. Pt's diabetes was managed by holding home medications as started on ISS and diabetic diet. Pt's other comorbidities was controlled by continuing home medications.     On discharge day, pt was evaluated by Physical therapy and was medically stable to be discharge home with assist, The details of pt's vitals and physical exams on discharge day please referral to pt's discharge day progress note. Pt was advised to follow up with her outpt PCP, neurologist, and cardiologist

## 2017-10-16 NOTE — PROGRESS NOTE ADULT - SUBJECTIVE AND OBJECTIVE BOX
Patient is a 86y old  Female who presents with a chief complaint of couldn't walk, left sided weakness, and headache (15 Oct 2017 17:55)   SOB    INTERVAL HPI/OVERNIGHT EVENTS: Patient seen and examined at bedside. Patient will denies weakness. Patient complains of mild headache from loud noise (construction) outside. Denies blurry vision, chest pain, sob, abdominal pain.     MEDICATIONS  (STANDING):  carBAMazepine 200 milliGRAM(s) Oral two times a day  dextrose 5%. 1000 milliLiter(s) (50 mL/Hr) IV Continuous <Continuous>  dextrose 50% Injectable 12.5 Gram(s) IV Push once  dextrose 50% Injectable 25 Gram(s) IV Push once  dextrose 50% Injectable 25 Gram(s) IV Push once  insulin lispro (HumaLOG) corrective regimen sliding scale   SubCutaneous Before meals and at bedtime  labetalol 100 milliGRAM(s) Oral two times a day  losartan 25 milliGRAM(s) Oral daily  multivitamin/minerals 1 Tablet(s) Oral daily  rosuvastatin 5 milliGRAM(s) Oral at bedtime  ubiquinone 100 milliGRAM(s) Oral at bedtime    MEDICATIONS  (PRN):  dextrose Gel 1 Dose(s) Oral once PRN Blood Glucose LESS THAN 70 milliGRAM(s)/deciliter  glucagon  Injectable 1 milliGRAM(s) IntraMuscular once PRN Glucose LESS THAN 70 milligrams/deciliter    Allergies    sulfa drugs (Unknown)    Intolerances    Lipitor (Other; Muscle Pain)    REVIEW OF SYSTEMS:  CONSTITUTIONAL: No fever, weight loss, or fatigue  EYES: No eye pain, visual disturbances, or discharge  ENMT:  No difficulty hearing, tinnitus, vertigo; No sinus or throat pain  RESPIRATORY: No cough, wheezing, chills or hemoptysis; No shortness of breath  CARDIOVASCULAR: No chest pain, palpitations, dizziness, or leg swelling  GASTROINTESTINAL: No abdominal or epigastric pain. No nausea, vomiting, or hematemesis; No diarrhea or constipation. No melena or hematochezia.  GENITOURINARY: No dysuria, frequency, hematuria, or incontinence  NEUROLOGICAL: + headaches, no memory loss, loss of strength, numbness, or tremors  SKIN: No itching, burning, rashes, or lesions   MUSCULOSKELETAL: No joint pain or swelling; No muscle, back, or extremity pain  PSYCHIATRIC: No depression, anxiety, mood swings, or difficulty sleeping    Vital Signs Last 24 Hrs  T(C): 37.3 (10-16-17 @ 08:06), Max: 37.3 (10-16-17 @ 08:06)  T(F): 99.2 (10-16-17 @ 08:06), Max: 99.2 (10-16-17 @ 08:06)  HR: 76 (10-16-17 @ 08:06) (76 - 78)  BP: 135/71 (10-16-17 @ 08:06) (135/71 - 150/72)  RR: 15 (10-16-17 @ 08:06) (15 - 16)  SpO2: 95% (10-16-17 @ 08:06) (95% - 95%)    PHYSICAL EXAM:  GENERAL: No acute distress, well-groomed, well-developed  HEAD:  Atraumatic, Normocephalic  EYES: EOMI, PERRL, conjunctiva and sclera clear  ENMT: No tonsillar erythema, exudates, or enlargement; Moist mucous membranes, Good dentition, No lesions  NERVOUS SYSTEM:  Alert & Oriented X3, Good concentration; Motor Strength 5/5 B/L upper and lower extremities, CN II-XII grossly intact  CHEST/LUNG: Clear to auscultation bilaterally; No rales, rhonchi, wheezing, or rubs  HEART: Regular rate and rhythm; No murmurs, rubs, or gallops  ABDOMEN: Soft, Nontender, Nondistended; Bowel sounds present, no guarding, no rebound  EXTREMITIES:  2+ Peripheral Pulses, No clubbing, cyanosis, or edema  SKIN: No rashes or lesions    LABS:                        11.2   5.2   )-----------( 187      ( 16 Oct 2017 06:53 )             34.9     16 Oct 2017 06:53    142    |  105    |  21     ----------------------------<  95     4.0     |  29     |  0.70     Ca    8.3        16 Oct 2017 06:53      PT/INR - ( 16 Oct 2017 06:53 )   PT: 11.7 sec;   INR: 1.07 ratio           CAPILLARY BLOOD GLUCOSE  106 (15 Oct 2017 22:37)  164 (15 Oct 2017 17:40)      POCT Blood Glucose.: 167 mg/dL (16 Oct 2017 07:18)  POCT Blood Glucose.: 106 mg/dL (15 Oct 2017 22:35)  POCT Blood Glucose.: 164 mg/dL (15 Oct 2017 17:38)    BLOOD CULTURE    RADIOLOGY & ADDITIONAL TESTS:  CT Head:  No acute intracranial hemorrhage or acute territorial infarct. Moderate ischemic small vessel white matter disease.    Imaging Personally Reviewed:  [ ] YES     Consultant(s) Notes Reviewed:      Care Discussed with Consultants/Other Providers:

## 2017-10-16 NOTE — CONSULT NOTE ADULT - SUBJECTIVE AND OBJECTIVE BOX
[facility] Consultation    CHIEF COMPLAINT: Patient is a 86y old  Female who presents with a chief complaint of couldn't walk, left sided weakness, and headache (15 Oct 2017 17:55)      HPI:  86 year old female with PMHx of Hemorrhagic stroke (February 2015), seizures (July 2015), HTN. HLD, and DM presenting with headache and weakness. Pt forgot to take her seizure medication last night as per the son. Last night, she complained of a headache, accompanied by a feeling of tongue heaviness and heaviness in her fingers bilaterally. As per son, she was too weak to walk herself to the bathroom during this time. The feelings of tongue and finger heaviness lasted for about 2 hours before subsiding without any intervention. Pt's son called the PMD who instructed them to come to the ER. She denies any confusion, loss of consciousness, or dysarthria during this episode. She had a similiar presentaion in April 2017, where she was admitted to [facility] and a CVA was ruled out. At present, Pt. complains only of a 8/10 headache. She denies any chest pain, SOB, fevers, dysuria, or numbness/tingling at this time.     She is currently awake and alert without specific complaints. She reports no chest pain, dyspnea, or palpitations. She reports no significant cardiac issues in the past.      PAST MEDICAL & SURGICAL HISTORY:  Seizure  Hemorrhagic stroke  CVA (cerebral infarction)  HTN (hypertension)  Hypercholesteremia  Diabetes mellitus  History of retinal tear: surgical repair  Basal cell cancer: s/p Mohs surgery  History of hysterectomy  S/P cataract surgery: bilateral  History of arthroscopy of knee: Right      SOCIAL HISTORY: no tob/etoh    FAMILY HISTORY:   Family history of heart disease (Mother)  Family history of basal cell carcinoma (Father)      MEDICATIONS  (STANDING):  carBAMazepine 200 milliGRAM(s) Oral two times a day  dextrose 5%. 1000 milliLiter(s) (50 mL/Hr) IV Continuous <Continuous>  dextrose 50% Injectable 12.5 Gram(s) IV Push once  dextrose 50% Injectable 25 Gram(s) IV Push once  dextrose 50% Injectable 25 Gram(s) IV Push once  insulin lispro (HumaLOG) corrective regimen sliding scale   SubCutaneous Before meals and at bedtime  labetalol 100 milliGRAM(s) Oral two times a day  losartan 25 milliGRAM(s) Oral daily  multivitamin/minerals 1 Tablet(s) Oral daily  rosuvastatin 5 milliGRAM(s) Oral at bedtime  ubiquinone 100 milliGRAM(s) Oral at bedtime    MEDICATIONS  (PRN):  dextrose Gel 1 Dose(s) Oral once PRN Blood Glucose LESS THAN 70 milliGRAM(s)/deciliter  glucagon  Injectable 1 milliGRAM(s) IntraMuscular once PRN Glucose LESS THAN 70 milligrams/deciliter      Allergies    sulfa drugs (Unknown)    Intolerances    Lipitor (Other; Muscle Pain)      REVIEW OF SYSTEMS:    CONSTITUTIONAL: No weakness, fevers or chills  EYES: No visual changes, No diplopia  ENMT: No throat pain , No exudate  NECK: No pain or stiffness  RESPIRATORY: No cough, wheezing, hemoptysis; No shortness of breath  CARDIOVASCULAR: No chest pain or palpitations  GASTROINTESTINAL: No abdominal pain. No nausea, vomiting, or hematemesis; No diarrhea or constipation. No melena or hematochezia.  GENITOURINARY: No dysuria, frequency or hematuria  NEUROLOGICAL: No numbness or weakness  SKIN: No itching or rash  All other review of systems is negative unless indicated above    VITAL SIGNS:   Vital Signs Last 24 Hrs  T(C): 37.3 (16 Oct 2017 08:06), Max: 37.7 (15 Oct 2017 10:27)  T(F): 99.2 (16 Oct 2017 08:06), Max: 99.8 (15 Oct 2017 10:27)  HR: 76 (16 Oct 2017 08:06) (74 - 90)  BP: 135/71 (16 Oct 2017 08:06) (125/70 - 191/106)  BP(mean): --  RR: 15 (16 Oct 2017 08:06) (15 - 18)  SpO2: 95% (16 Oct 2017 08:06) (93% - 100%)    I&O's Summary    15 Oct 2017 07:01  -  16 Oct 2017 07:00  --------------------------------------------------------  IN: 120 mL / OUT: 400 mL / NET: -280 mL        PHYSICAL EXAM:    Constitutional: NAD, awake and alert, well-developed  Eyes:  EOMI,  Pupils round, no lesions  ENMT: no exudate or erythema  Pulmonary: Non-labored, breath sounds are clear bilaterally, No wheezing, rales or rhonchi  Cardiovascular: PMI not palpable non-displaced Regular S1 and S2, no murmurs, rubs, gallops or clicks  Gastrointestinal: Bowel Sounds present, soft, nontender.   Lymph: No peripheral edema. No cervical lymphadenopathy.  Neurological: Alert, no focal deficits  Skin: No rashes. Changes of chronic venous stasis. No cyanosis.  Psych:  Mood & affect appropriate    LABS: All Labs Reviewed:                        12.9   6.0   )-----------( 197      ( 15 Oct 2017 11:00 )             40.6     16 Oct 2017 06:53    142    |  105    |  21     ----------------------------<  95     4.0     |  29     |  0.70   15 Oct 2017 11:00    139    |  104    |  20     ----------------------------<  127    4.1     |  29     |  0.69     Ca    8.3        16 Oct 2017 06:53  Ca    8.1        15 Oct 2017 11:00    TPro  6.6    /  Alb  2.9    /  TBili  0.2    /  DBili  x      /  AST  14     /  ALT  17     /  AlkPhos  72     15 Oct 2017 11:00    PT/INR - ( 16 Oct 2017 06:53 )   PT: 11.7 sec;   INR: 1.07 ratio      < from: 12 Lead ECG (10.15.17 @ 10:51) >    Ventricular Rate 88 BPM    Atrial Rate 88 BPM    P-R Interval 186 ms    QRS Duration 90 ms    Q-T Interval 364 ms    QTC Calculation(Bezet) 440 ms    P Axis 68 degrees    R Axis 21 degrees    T Axis 54 degrees    Diagnosis Line Normal sinus rhythm    Confirmed by JACKELIN JUDGE ([id number]) on 10/15/2017 6:00:34 PM    < end of copied text >  < from: Xray Chest 1 View AP/PA (10.15.17 @ 12:04) >    EXAM:  CHEST 1 VIEW                            PROCEDURE DATE:  10/15/2017          INTERPRETATION:  History: Admission, seizure    Portable AP radiograph of the chest is performed. comparison is made to   4/12/2017.    The cardiomediastinal silhouette is normal. the trachea is midline. There   is no focal infiltrate or pleural effusion. There is osteopenia of the   bony structures.    Impression: No active disease. No change.                INGA BOWIE   This document has been electronicallysigned. Oct 15 2017 12:06PM                < end of copied text >  < from: TTE Echo Doppler w/o Cont (04.06.17 @ 15:24) >     EXAM:  ECHO TTE W/O CON COMP W/DOPPLR         PROCEDURE DATE:  04/06/2017        INTERPRETATION:  Ordering Physician: ANJEL ARREOLA [id number]    Indication: TIA/CVA    Technician: PH    Study Quality: Fair   A complete echocardiographic studywas performed utilizing standard   protocol including spectral and color Doppler in all echocardiographic   windows.    Height: 166 cm  Weight: 70 kg  BSA: 1.7 sq m  Blood Pressure:        MEASUREMENTS  IVS: 0.9  PWT: 0.9  LA: 3.3  AO: 3.0  LVIDd: 4.4  LVIDs: 2.9  LVOT:        LVEF: 65%  RVSP: 44 mmHg  RA Pressure: 8 mmHg  IVC: Normal IVC    FINDINGS  Left Ventricle: Normal sized with satisfactory contractility  Right Ventricle: Normal sized with satisfactory contractility  Left Atrium: Normal size  Right Atrium: Normal size  Mitral Valve: Thickened leaflets with minimal MR  Aortic Valve:Calcified leaflets with satisfactory opening  Tricuspid Valve: Mild TR  Pulmonic Valve: No significant PI  Diastolic Function: E/A=0.8  Pericardium/Pleura: No significant pericardial or pleural effusion      CONCLUSIONS:  Bedside study. Normal chamber sizes with satisfactory contractility of   the left ventricle. Stage 1 diastolic dysfunction. Aortic sclerosis. Mild   TR with mild pulmonary hypertension                 SCHUYLER WILLS M.D., ATTENDING CARDIOLOGIST  This document has been electronically signed. Apr 7 2017  3:10PM                < end of copied text >

## 2017-10-16 NOTE — DISCHARGE NOTE ADULT - PROVIDER TOKENS
TOKEN:'[id number]',TOKEN:'[id number]',FREE:[LAST:[Tanika],FIRST:[Mihai],PHONE:[phone]],FAX:[(   )    -],ADDRESS:[[address] [place] [address]]]

## 2017-10-16 NOTE — DISCHARGE NOTE ADULT - CARE PLAN
Principal Discharge DX:	CVA (cerebral vascular accident)  Goal:	ruled out  Instructions for follow-up, activity and diet:	follow up with Primary care doctor with in 3-4 days  follow up with neurologist within 1 week  Secondary Diagnosis:	Seizure  Instructions for follow-up, activity and diet:	continue with home medications  Secondary Diagnosis:	HTN (hypertension)  Instructions for follow-up, activity and diet:	continue with home medications  Secondary Diagnosis:	Hypercholesteremia  Instructions for follow-up, activity and diet:	continue with home medications  Secondary Diagnosis:	Hemorrhagic stroke  Instructions for follow-up, activity and diet:	follow up with Primary care doctor with in 3-4 days  follow up with neurologist within 1 week  Secondary Diagnosis:	Diabetes mellitus  Instructions for follow-up, activity and diet:	continue with home medications

## 2017-10-16 NOTE — DISCHARGE NOTE ADULT - MEDICATION SUMMARY - MEDICATIONS TO TAKE
I will START or STAY ON the medications listed below when I get home from the hospital:    losartan 25 mg oral tablet  -- 1 tab(s) by mouth once a day  -- Indication: For HTN (hypertension)    carBAMazepine 200 mg oral tablet  -- 1 tab(s) by mouth 2 times a day  -- Indication: For Seizure    metFORMIN 500 mg oral tablet, extended release  -- 2 tab(s) by mouth once a day (at bedtime)  -- Indication: For Diabetes mellitus    Crestor 5 mg oral tablet  -- 1 tab(s) by mouth once a day (at bedtime)  -- Indication: For CVA (cerebral vascular accident)    labetalol 200 mg oral tablet  -- 1 tab(s) by mouth 2 times a day  -- Indication: For HTN (hypertension)    CoQ10  -- 100 milligram(s) by mouth once a day  -- Indication: For Supplement     Centrum Silver oral tablet  -- 1 tab(s) by mouth once a day  -- Indication: For Supplement

## 2017-10-16 NOTE — PROGRESS NOTE ADULT - PROBLEM SELECTOR PLAN 1
aspirin contraindicated d/t history of brain bleed  f/u MRI  - Neuro, Dr. Mayo- cleared for discharge pending MRI and PT eval  - will hold anticoagulation pending cardio and neuro recs  - no significant change in imaging from previous admission  - f/u MRI

## 2017-10-16 NOTE — PHYSICAL THERAPY INITIAL EVALUATION ADULT - ADDITIONAL COMMENTS
pt lives with her son in a house, 1 step to enter, holds on to pole. Pt ambulates independently without device (has RW to use as needed) and is independent with ADLs.

## 2017-10-16 NOTE — DISCHARGE NOTE ADULT - PATIENT PORTAL LINK FT
“You can access the FollowHealth Patient Portal, offered by [facility], by registering with the following website: [email or web address]”

## 2017-10-16 NOTE — PROGRESS NOTE ADULT - PROBLEM SELECTOR PLAN 2
continue carbamazepine  -doubt seizure presently as no post ictal state described by family  - neuro consult (Sanjuana)  - will monitor

## 2017-10-16 NOTE — PROGRESS NOTE ADULT - ASSESSMENT
86y old  Female who presents with a chief complaint of couldn't walk, left sided weakness, and headache. R/o CVA

## 2017-10-16 NOTE — CONSULT NOTE ADULT - ASSESSMENT
Mrs. Easley was admitted for neurologic complaints, possibly having not taken her seizure medication. Blood pressure is elevated, now improved. She has no manifestations of other acute cardiac issues and echocardiography in April of 217 revealed normal left ventricular function with aortic sclerosis there is no evidence for an acute coronary syndrome  Tele reveals all SR    Recommend    Neurology evaluation with Dr. Wei  Continue telemetry  Continue seizure medication  Continue labetalol  Continue losartan  Continue rosuvastatin  Further management can be dependent upon her clinical course

## 2017-10-16 NOTE — PROGRESS NOTE ADULT - SUBJECTIVE AND OBJECTIVE BOX
Neurology follow up note    MAJOR [id number]      Interval History:    Patient feels ok no new complaints.    MEDICATIONS    carBAMazepine 200 milliGRAM(s) Oral two times a day  dextrose 5%. 1000 milliLiter(s) IV Continuous <Continuous>  dextrose 50% Injectable 12.5 Gram(s) IV Push once  dextrose 50% Injectable 25 Gram(s) IV Push once  dextrose 50% Injectable 25 Gram(s) IV Push once  dextrose Gel 1 Dose(s) Oral once PRN  glucagon  Injectable 1 milliGRAM(s) IntraMuscular once PRN  insulin lispro (HumaLOG) corrective regimen sliding scale   SubCutaneous Before meals and at bedtime  labetalol 100 milliGRAM(s) Oral two times a day  losartan 25 milliGRAM(s) Oral daily  multivitamin/minerals 1 Tablet(s) Oral daily  rosuvastatin 5 milliGRAM(s) Oral at bedtime  ubiquinone 100 milliGRAM(s) Oral at bedtime      Allergies    sulfa drugs (Unknown)    Intolerances    Lipitor (Other; Muscle Pain)        Weight (kg): 67.5 (10-15 @ 18:10)    Vital Signs Last 24 Hrs  T(C): 37.3 (16 Oct 2017 08:06), Max: 37.4 (15 Oct 2017 23:59)  T(F): 99.2 (16 Oct 2017 08:06), Max: 99.3 (15 Oct 2017 23:59)  HR: 76 (16 Oct 2017 08:06) (74 - 84)  BP: 135/71 (16 Oct 2017 08:06) (125/70 - 168/79)  BP(mean): --  RR: 15 (16 Oct 2017 08:06) (15 - 18)  SpO2: 95% (16 Oct 2017 08:06) (93% - 100%)      REVIEW OF SYSTEMS:   Constitutional: No fever, chills, fatigue, weakness  Eyes: no eye pain, visual disturbances, or discharge  ENT:  No difficulty hearing, tinnitus, vertigo; No sinus or throat pain  Neck: No pain or stiffness  Respiratory: No cough, dyspnea, wheezing   Cardiovascular: No chest pain, palpitations,   Gastrointestinal: No abdominal or epigastric pain. No nausea, vomiting  No diarrhea or constipation.   Genitourinary: No dysuria, frequency, hematuria or incontinence  Neurological: No headaches, lightheadedness, vertigo, numbness or tremors  Psychiatric: No depression, anxiety, mood swings or difficulty sleeping  Musculoskeletal: No joint pain or swelling; No muscle, back or extremity pain  Skin: No itching, burning, rashes or lesions   Lymph Nodes: No enlarged glands  Endocrine: No heat or cold intolerance; No hair loss   Allergy and Immunologic: No hives or eczema    On Neurological Examination:    Mental Status - Patient is alert, awake, oriented X3.       Follow simple commands  Follow complex commands      Speech -   Fluent                          Cranial Nerves - Pupils 3 mm equal and reactive to light,   extraocular eye movements intact.   smile symmetric  intact bilateral NLF    Motor Exam -   Right upper 5/5  Left upper 5/5  Right lower5 /5  Left lower 5/5      Muscle tone - is normal all over.  No asymmetry is seen.      Sensory    Bilateral intact to light touch      GENERAL Exam: Nontoxic , No Acute Distress   	  HEENT:  normocephalic, atraumatic  		  LUNGS: Clear bilaterally   	  HEART: Normal S1S2   No murmur RRR        	  GI/ ABDOMEN:  Soft  Non tender    EXTREMITIES:   No Edema  No Clubbing  No Cyanosis No Edema    MUSCULOSKELETAL: Normal Range of Motion   	   SKIN: Normal  No Ecchymosis               LABS:  CBC Full  -  ( 16 Oct 2017 06:53 )  WBC Count : 5.2 K/uL  Hemoglobin : 11.2 g/dL  Hematocrit : 34.9 %  Platelet Count - Automated : 187 K/uL  Mean Cell Volume : 95.8 fl  Mean Cell Hemoglobin : 30.7 pg  Mean Cell Hemoglobin Concentration : 32.1 gm/dL  Auto Neutrophil # : x  Auto Lymphocyte # : x  Auto Monocyte # : x  Auto Eosinophil # : x  Auto Basophil # : x  Auto Neutrophil % : x  Auto Lymphocyte % : x  Auto Monocyte % : x  Auto Eosinophil % : x  Auto Basophil % : x    Urinalysis Basic - ( 15 Oct 2017 11:34 )    Color: Yellow / Appearance: Clear / S.010 / pH: x  Gluc: x / Ketone: Negative  / Bili: Negative / Urobili: Negative   Blood: x / Protein: 25 mg/dL / Nitrite: Negative   Leuk Esterase: Negative / RBC: 0-2 /HPF / WBC 0-2   Sq Epi: x / Non Sq Epi: Occasional / Bacteria: Negative      10-16    142  |  105  |  21  ----------------------------<  95  4.0   |  29  |  0.70    Ca    8.3<L>      16 Oct 2017 06:53    TPro  6.6  /  Alb  2.9<L>  /  TBili  0.2  /  DBili  x   /  AST  14<L>  /  ALT  17  /  AlkPhos  72  10-15    Hemoglobin A1C: Hemoglobin A1C, Whole Blood: 6.6 % (10-16 @ 08:43)      LIVER FUNCTIONS - ( 15 Oct 2017 11:00 )  Alb: 2.9 g/dL / Pro: 6.6 g/dL / ALK PHOS: 72 U/L / ALT: 17 U/L / AST: 14 U/L / GGT: x           Vitamin B12   PT/INR - ( 16 Oct 2017 06:53 )   PT: 11.7 sec;   INR: 1.07 ratio               RADIOLOGY    ASSESSMENT AND PLAN    This is an 86-year-old female who presented to emergency room because of headache, paresthesia involving left face and arms associated with increased difficulty in ambulating.  She was hypertensive in the emergency room.  I am not sure about etiology of her presentation, however, her possibilities includes transient ischemic attack, seizures versus accelerated hypertension.  follow up mri      H/o seizures continue AED    Monitor SBP    IF MRI normal cleared only form neurology     Physical therapy evaluation.  OOB to chair/ambulation with assistance only.    30 minutes spent on total encounter; more than 50% of the visit was spent counseling and/or coordinating care by the attending physician.

## 2017-10-16 NOTE — PROGRESS NOTE ADULT - ATTENDING COMMENTS
86y old  Female who presents with a chief complaint of couldn't walk, left sided weakness, and headache. R/o CVA    ·  Problem: CVA (cerebral vascular accident).  Plan: aspirin contraindicated d/t history of brain bleed  f/u MRI negative for acute pathology   -neuro cleared for d/c planning      Seizure.  Plan: continue carbamazepine  -doubt seizure presently as no post ictal state described by family      HTN (hypertension).  Plan: Labetalol 100mg BID  - as per neuro, no need for permissive HTN at this time.      Problem/Plan - 4:  ·  Problem: Hypercholesteremia.  Plan: Continue atorvastatin 40mg QD.      Problem/Plan - 5:  ·  Problem: Hemorrhagic stroke.  Plan: history of hemorrhagic stroke  -family states anticoagulation contraindicated as per pt's neurologist  d/c planning      Problem/Plan - 6:  Problem: Diabetes mellitus. Plan: Hold metformin  low dose sliding scale  accu-checks.

## 2017-10-16 NOTE — PROGRESS NOTE ADULT - PROBLEM SELECTOR PLAN 5
history of hemorrhagic stroke  -family states anticoagulation contraindicated as per pt's neurologist  - will follow up anticoagulation recs of cardio and neuro

## 2017-10-16 NOTE — DISCHARGE NOTE ADULT - CARE PROVIDERS DIRECT ADDRESSES
[email or web address]

## 2017-10-16 NOTE — DISCHARGE NOTE ADULT - CARE PROVIDER_API CALL
Pedrito Rosenthal), Internal Medicine  [address] [place] [address]  Phone: [phone]  Fax: [phone]    Libman, Richard B (MD), Neurology; Vascular Neurology  [address] [address]  Phone: [phone]  Fax: [phone]    Mihai Sagastume  [address] [place] [address]  Phone: [phone]  Fax: (   )    -

## 2017-10-18 DIAGNOSIS — I10 ESSENTIAL (PRIMARY) HYPERTENSION: ICD-10-CM

## 2017-10-18 DIAGNOSIS — G40.909 EPILEPSY, UNSPECIFIED, NOT INTRACTABLE, WITHOUT STATUS EPILEPTICUS: ICD-10-CM

## 2017-10-18 DIAGNOSIS — R51 HEADACHE: ICD-10-CM

## 2017-10-18 DIAGNOSIS — E11.9 TYPE 2 DIABETES MELLITUS WITHOUT COMPLICATIONS: ICD-10-CM

## 2017-10-18 DIAGNOSIS — E78.5 HYPERLIPIDEMIA, UNSPECIFIED: ICD-10-CM

## 2017-10-18 DIAGNOSIS — Z86.73 PERSONAL HISTORY OF TRANSIENT ISCHEMIC ATTACK (TIA), AND CEREBRAL INFARCTION WITHOUT RESIDUAL DEFICITS: ICD-10-CM

## 2017-10-27 ENCOUNTER — APPOINTMENT (OUTPATIENT)
Dept: NEUROLOGY | Facility: CLINIC | Age: 82
End: 2017-10-27
Payer: MEDICARE

## 2017-10-27 VITALS
WEIGHT: 145 LBS | DIASTOLIC BLOOD PRESSURE: 79 MMHG | HEIGHT: 62 IN | SYSTOLIC BLOOD PRESSURE: 180 MMHG | HEART RATE: 72 BPM | BODY MASS INDEX: 26.68 KG/M2

## 2017-10-27 VITALS — SYSTOLIC BLOOD PRESSURE: 175 MMHG | DIASTOLIC BLOOD PRESSURE: 81 MMHG | HEART RATE: 69 BPM

## 2017-10-27 PROCEDURE — 99214 OFFICE O/P EST MOD 30 MIN: CPT

## 2017-10-27 RX ORDER — CARBAMAZEPINE 200 MG/1
200 TABLET ORAL
Qty: 60 | Refills: 5 | Status: DISCONTINUED | COMMUNITY
End: 2017-10-27

## 2017-10-27 RX ORDER — LAMOTRIGINE 25 MG/1
25 TABLET ORAL
Qty: 360 | Refills: 2 | Status: DISCONTINUED | COMMUNITY
Start: 2017-05-12 | End: 2017-10-27

## 2017-10-27 RX ORDER — LAMOTRIGINE 25 MG/1
25 TABLET ORAL
Qty: 70 | Refills: 0 | Status: DISCONTINUED | COMMUNITY
Start: 2017-05-12 | End: 2017-10-27

## 2017-12-13 ENCOUNTER — APPOINTMENT (OUTPATIENT)
Dept: NEUROLOGY | Facility: CLINIC | Age: 82
End: 2017-12-13
Payer: MEDICARE

## 2017-12-13 VITALS
DIASTOLIC BLOOD PRESSURE: 75 MMHG | HEIGHT: 62 IN | BODY MASS INDEX: 26.13 KG/M2 | WEIGHT: 142 LBS | HEART RATE: 65 BPM | SYSTOLIC BLOOD PRESSURE: 160 MMHG

## 2017-12-13 VITALS
DIASTOLIC BLOOD PRESSURE: 76 MMHG | BODY MASS INDEX: 26.13 KG/M2 | SYSTOLIC BLOOD PRESSURE: 158 MMHG | HEART RATE: 65 BPM | HEIGHT: 62 IN | WEIGHT: 142 LBS

## 2017-12-13 PROCEDURE — 99215 OFFICE O/P EST HI 40 MIN: CPT

## 2017-12-13 RX ORDER — CARBAMAZEPINE 200 MG/1
200 TABLET ORAL
Refills: 0 | Status: DISCONTINUED | COMMUNITY
End: 2017-12-13

## 2017-12-18 ENCOUNTER — APPOINTMENT (OUTPATIENT)
Dept: NEUROLOGY | Facility: CLINIC | Age: 82
End: 2017-12-18
Payer: MEDICARE

## 2017-12-18 VITALS
HEIGHT: 62 IN | BODY MASS INDEX: 26.13 KG/M2 | SYSTOLIC BLOOD PRESSURE: 146 MMHG | WEIGHT: 142 LBS | HEART RATE: 74 BPM | DIASTOLIC BLOOD PRESSURE: 74 MMHG

## 2017-12-18 PROCEDURE — 99214 OFFICE O/P EST MOD 30 MIN: CPT

## 2017-12-18 RX ORDER — CARBAMAZEPINE 100 MG/1
100 TABLET, CHEWABLE ORAL
Refills: 0 | Status: DISCONTINUED | COMMUNITY
Start: 2017-12-13 | End: 2017-12-18

## 2017-12-18 RX ORDER — CARBAMAZEPINE 200 MG/1
200 TABLET ORAL
Refills: 0 | Status: DISCONTINUED | COMMUNITY
Start: 2017-12-13 | End: 2017-12-18

## 2018-01-02 ENCOUNTER — MEDICATION RENEWAL (OUTPATIENT)
Age: 83
End: 2018-01-02

## 2018-01-05 ENCOUNTER — RX RENEWAL (OUTPATIENT)
Age: 83
End: 2018-01-05

## 2018-01-22 ENCOUNTER — APPOINTMENT (OUTPATIENT)
Dept: INTERNAL MEDICINE | Facility: CLINIC | Age: 83
End: 2018-01-22
Payer: MEDICARE

## 2018-01-22 VITALS
HEIGHT: 62 IN | HEART RATE: 98 BPM | BODY MASS INDEX: 25.76 KG/M2 | DIASTOLIC BLOOD PRESSURE: 80 MMHG | WEIGHT: 140 LBS | OXYGEN SATURATION: 96 % | SYSTOLIC BLOOD PRESSURE: 126 MMHG | TEMPERATURE: 100.3 F | RESPIRATION RATE: 14 BRPM

## 2018-01-22 DIAGNOSIS — Z87.09 PERSONAL HISTORY OF OTHER DISEASES OF THE RESPIRATORY SYSTEM: ICD-10-CM

## 2018-01-22 LAB
FLUAV SPEC QL CULT: NORMAL
FLUBV AG SPEC QL IA: NORMAL

## 2018-01-22 PROCEDURE — 87804 INFLUENZA ASSAY W/OPTIC: CPT | Mod: QW

## 2018-01-22 PROCEDURE — 99214 OFFICE O/P EST MOD 30 MIN: CPT | Mod: 25

## 2018-01-22 RX ORDER — CARVEDILOL 12.5 MG/1
12.5 TABLET, FILM COATED ORAL
Refills: 0 | Status: ACTIVE | COMMUNITY
Start: 2018-01-12

## 2018-01-22 RX ORDER — LOSARTAN POTASSIUM 25 MG/1
25 TABLET, FILM COATED ORAL
Qty: 90 | Refills: 0 | Status: DISCONTINUED | COMMUNITY
Start: 2017-06-23 | End: 2018-01-22

## 2018-01-24 ENCOUNTER — CLINICAL ADVICE (OUTPATIENT)
Age: 83
End: 2018-01-24

## 2018-01-24 ENCOUNTER — MEDICATION RENEWAL (OUTPATIENT)
Age: 83
End: 2018-01-24

## 2018-02-05 ENCOUNTER — APPOINTMENT (OUTPATIENT)
Dept: NEUROLOGY | Facility: CLINIC | Age: 83
End: 2018-02-05

## 2018-02-10 ENCOUNTER — RX RENEWAL (OUTPATIENT)
Age: 83
End: 2018-02-10

## 2018-02-16 ENCOUNTER — RX RENEWAL (OUTPATIENT)
Age: 83
End: 2018-02-16

## 2018-02-23 ENCOUNTER — APPOINTMENT (OUTPATIENT)
Dept: NEUROLOGY | Facility: CLINIC | Age: 83
End: 2018-02-23
Payer: MEDICARE

## 2018-02-23 VITALS
SYSTOLIC BLOOD PRESSURE: 173 MMHG | WEIGHT: 140 LBS | HEART RATE: 66 BPM | HEIGHT: 62 IN | DIASTOLIC BLOOD PRESSURE: 94 MMHG | BODY MASS INDEX: 25.76 KG/M2

## 2018-02-23 DIAGNOSIS — S22.22XS: ICD-10-CM

## 2018-02-23 DIAGNOSIS — Z86.79 PERSONAL HISTORY OF OTHER DISEASES OF THE CIRCULATORY SYSTEM: ICD-10-CM

## 2018-02-23 DIAGNOSIS — Z87.09 PERSONAL HISTORY OF OTHER DISEASES OF THE RESPIRATORY SYSTEM: ICD-10-CM

## 2018-02-23 DIAGNOSIS — L03.115 CELLULITIS OF RIGHT LOWER LIMB: ICD-10-CM

## 2018-02-23 DIAGNOSIS — Z86.69 PERSONAL HISTORY OF OTHER DISEASES OF THE NERVOUS SYSTEM AND SENSE ORGANS: ICD-10-CM

## 2018-02-23 PROCEDURE — 99214 OFFICE O/P EST MOD 30 MIN: CPT

## 2018-02-23 RX ORDER — DOXYCYCLINE 100 MG/1
100 TABLET, FILM COATED ORAL
Qty: 14 | Refills: 0 | Status: COMPLETED | COMMUNITY
Start: 2018-01-22 | End: 2018-02-23

## 2018-02-23 RX ORDER — OSELTAMIVIR PHOSPHATE 75 MG/1
75 CAPSULE ORAL TWICE DAILY
Qty: 10 | Refills: 0 | Status: COMPLETED | COMMUNITY
Start: 2018-01-22 | End: 2018-02-23

## 2018-02-23 RX ORDER — CEFUROXIME AXETIL 250 MG/1
250 TABLET ORAL
Qty: 14 | Refills: 0 | Status: COMPLETED | COMMUNITY
Start: 2018-01-24 | End: 2018-02-23

## 2018-04-03 ENCOUNTER — OTHER (OUTPATIENT)
Age: 83
End: 2018-04-03

## 2018-04-03 ENCOUNTER — APPOINTMENT (OUTPATIENT)
Dept: NEUROLOGY | Facility: CLINIC | Age: 83
End: 2018-04-03

## 2018-04-05 ENCOUNTER — RX RENEWAL (OUTPATIENT)
Age: 83
End: 2018-04-05

## 2018-04-14 ENCOUNTER — RX RENEWAL (OUTPATIENT)
Age: 83
End: 2018-04-14

## 2018-05-19 ENCOUNTER — APPOINTMENT (OUTPATIENT)
Dept: INTERNAL MEDICINE | Facility: CLINIC | Age: 83
End: 2018-05-19
Payer: MEDICARE

## 2018-05-19 ENCOUNTER — LABORATORY RESULT (OUTPATIENT)
Age: 83
End: 2018-05-19

## 2018-05-19 VITALS
WEIGHT: 140 LBS | RESPIRATION RATE: 14 BRPM | HEART RATE: 76 BPM | BODY MASS INDEX: 25.76 KG/M2 | DIASTOLIC BLOOD PRESSURE: 76 MMHG | HEIGHT: 62 IN | TEMPERATURE: 98.6 F | OXYGEN SATURATION: 96 % | SYSTOLIC BLOOD PRESSURE: 130 MMHG

## 2018-05-19 DIAGNOSIS — J30.1 ALLERGIC RHINITIS DUE TO POLLEN: ICD-10-CM

## 2018-05-19 PROCEDURE — 99214 OFFICE O/P EST MOD 30 MIN: CPT

## 2018-05-19 NOTE — PHYSICAL EXAM

## 2018-05-19 NOTE — HISTORY OF PRESENT ILLNESS
[FreeTextEntry8] : Pt c/o hoarseness for 1-2 days. Had runny nose which is improving. Occasional sneezing. \par Pt fasting for labwork.

## 2018-05-21 LAB
25(OH)D3 SERPL-MCNC: 30.3 NG/ML
ALBUMIN SERPL ELPH-MCNC: 3.8 G/DL
ALP BLD-CCNC: 68 U/L
ALT SERPL-CCNC: 13 U/L
ANION GAP SERPL CALC-SCNC: 13 MMOL/L
APPEARANCE: CLEAR
AST SERPL-CCNC: 15 U/L
BASOPHILS # BLD AUTO: 0.05 K/UL
BASOPHILS NFR BLD AUTO: 1.2 %
BILIRUB SERPL-MCNC: <0.2 MG/DL
BILIRUBIN URINE: NEGATIVE
BLOOD URINE: NEGATIVE
BUN SERPL-MCNC: 13 MG/DL
CALCIUM SERPL-MCNC: 9 MG/DL
CHLORIDE SERPL-SCNC: 104 MMOL/L
CHOLEST SERPL-MCNC: 226 MG/DL
CHOLEST/HDLC SERPL: 3.1 RATIO
CO2 SERPL-SCNC: 27 MMOL/L
COLOR: YELLOW
CREAT SERPL-MCNC: 0.75 MG/DL
EOSINOPHIL # BLD AUTO: 0.15 K/UL
EOSINOPHIL NFR BLD AUTO: 3.7 %
ESTIMATED AVERAGE GLUCOSE: 137 MG/DL
FOLATE SERPL-MCNC: >20 NG/ML
GLUCOSE QUALITATIVE U: NEGATIVE MG/DL
GLUCOSE SERPL-MCNC: 119 MG/DL
HBA1C MFR BLD HPLC: 6.4 %
HCT VFR BLD CALC: 39.7 %
HDLC SERPL-MCNC: 72 MG/DL
HGB BLD-MCNC: 12.9 G/DL
IMM GRANULOCYTES NFR BLD AUTO: 0.2 %
KETONES URINE: NEGATIVE
LDLC SERPL CALC-MCNC: 128 MG/DL
LEUKOCYTE ESTERASE URINE: NEGATIVE
LYMPHOCYTES # BLD AUTO: 0.81 K/UL
LYMPHOCYTES NFR BLD AUTO: 20.2 %
MAN DIFF?: NORMAL
MCHC RBC-ENTMCNC: 29.9 PG
MCHC RBC-ENTMCNC: 32.5 GM/DL
MCV RBC AUTO: 92.1 FL
MONOCYTES # BLD AUTO: 0.38 K/UL
MONOCYTES NFR BLD AUTO: 9.5 %
NEUTROPHILS # BLD AUTO: 2.61 K/UL
NEUTROPHILS NFR BLD AUTO: 65.2 %
NITRITE URINE: NEGATIVE
PH URINE: 6.5
PLATELET # BLD AUTO: 245 K/UL
POTASSIUM SERPL-SCNC: 4.6 MMOL/L
PROT SERPL-MCNC: 6.5 G/DL
PROTEIN URINE: ABNORMAL MG/DL
RBC # BLD: 4.31 M/UL
RBC # FLD: 14.3 %
SODIUM SERPL-SCNC: 144 MMOL/L
SPECIFIC GRAVITY URINE: 1.02
TRIGL SERPL-MCNC: 130 MG/DL
TSH SERPL-ACNC: 4.06 UIU/ML
UROBILINOGEN URINE: NEGATIVE MG/DL
VIT B12 SERPL-MCNC: 554 PG/ML
WBC # FLD AUTO: 4.01 K/UL

## 2018-05-31 ENCOUNTER — OTHER (OUTPATIENT)
Age: 83
End: 2018-05-31

## 2018-05-31 DIAGNOSIS — R92.8 OTHER ABNORMAL AND INCONCLUSIVE FINDINGS ON DIAGNOSTIC IMAGING OF BREAST: ICD-10-CM

## 2018-06-22 ENCOUNTER — APPOINTMENT (OUTPATIENT)
Dept: NEUROLOGY | Facility: CLINIC | Age: 83
End: 2018-06-22
Payer: MEDICARE

## 2018-06-22 VITALS
SYSTOLIC BLOOD PRESSURE: 146 MMHG | HEIGHT: 62 IN | DIASTOLIC BLOOD PRESSURE: 77 MMHG | WEIGHT: 141 LBS | BODY MASS INDEX: 25.95 KG/M2 | HEART RATE: 63 BPM

## 2018-06-22 PROCEDURE — 99214 OFFICE O/P EST MOD 30 MIN: CPT

## 2018-06-22 RX ORDER — CARBAMAZEPINE 100 MG/1
100 TABLET, EXTENDED RELEASE ORAL
Qty: 60 | Refills: 11 | Status: DISCONTINUED | COMMUNITY
Start: 2018-04-05 | End: 2018-06-22

## 2018-06-22 RX ORDER — FLUTICASONE PROPIONATE 50 UG/1
50 SPRAY, METERED NASAL DAILY
Qty: 1 | Refills: 3 | Status: COMPLETED | COMMUNITY
Start: 2018-05-19 | End: 2018-06-22

## 2018-06-22 RX ORDER — TOBRAMYCIN AND DEXAMETHASONE 3; 1 MG/ML; MG/ML
0.3-0.1 SUSPENSION/ DROPS OPHTHALMIC
Qty: 5 | Refills: 0 | Status: COMPLETED | COMMUNITY
Start: 2018-02-26

## 2018-06-22 RX ORDER — DOXYCYCLINE 100 MG/1
100 CAPSULE ORAL
Qty: 14 | Refills: 0 | Status: COMPLETED | COMMUNITY
Start: 2018-01-22

## 2018-06-22 RX ORDER — OLMESARTAN MEDOXOMIL 20 MG/1
20 TABLET, FILM COATED ORAL
Refills: 0 | Status: COMPLETED | COMMUNITY
Start: 2018-01-12 | End: 2018-06-22

## 2018-06-22 RX ORDER — CARBAMAZEPINE 100 MG/1
100 TABLET, EXTENDED RELEASE ORAL
Qty: 30 | Refills: 0 | Status: DISCONTINUED | COMMUNITY
Start: 2018-01-20 | End: 2018-06-22

## 2018-07-03 ENCOUNTER — EMERGENCY (EMERGENCY)
Facility: HOSPITAL | Age: 83
LOS: 1 days | Discharge: HOME | End: 2018-07-03
Attending: EMERGENCY MEDICINE
Payer: MEDICARE

## 2018-07-03 VITALS
RESPIRATION RATE: 16 BRPM | TEMPERATURE: 98 F | SYSTOLIC BLOOD PRESSURE: 180 MMHG | OXYGEN SATURATION: 96 % | HEART RATE: 68 BPM | WEIGHT: 141.1 LBS | HEIGHT: 65 IN | DIASTOLIC BLOOD PRESSURE: 86 MMHG

## 2018-07-03 VITALS
OXYGEN SATURATION: 99 % | RESPIRATION RATE: 14 BRPM | SYSTOLIC BLOOD PRESSURE: 166 MMHG | TEMPERATURE: 98 F | HEART RATE: 68 BPM | DIASTOLIC BLOOD PRESSURE: 88 MMHG

## 2018-07-03 DIAGNOSIS — Z86.69 PERSONAL HISTORY OF OTHER DISEASES OF THE NERVOUS SYSTEM AND SENSE ORGANS: Chronic | ICD-10-CM

## 2018-07-03 PROCEDURE — 70450 CT HEAD/BRAIN W/O DYE: CPT

## 2018-07-03 PROCEDURE — 72125 CT NECK SPINE W/O DYE: CPT | Mod: 26

## 2018-07-03 PROCEDURE — 71250 CT THORAX DX C-: CPT

## 2018-07-03 PROCEDURE — 71250 CT THORAX DX C-: CPT | Mod: 26

## 2018-07-03 PROCEDURE — 70486 CT MAXILLOFACIAL W/O DYE: CPT | Mod: 26

## 2018-07-03 PROCEDURE — 70486 CT MAXILLOFACIAL W/O DYE: CPT

## 2018-07-03 PROCEDURE — 70450 CT HEAD/BRAIN W/O DYE: CPT | Mod: 26

## 2018-07-03 PROCEDURE — 99284 EMERGENCY DEPT VISIT MOD MDM: CPT | Mod: 25

## 2018-07-03 PROCEDURE — 72125 CT NECK SPINE W/O DYE: CPT

## 2018-07-03 PROCEDURE — 99284 EMERGENCY DEPT VISIT MOD MDM: CPT

## 2018-07-03 RX ORDER — LIDOCAINE 4 G/100G
1 CREAM TOPICAL ONCE
Qty: 0 | Refills: 0 | Status: COMPLETED | OUTPATIENT
Start: 2018-07-03 | End: 2018-07-03

## 2018-07-03 RX ORDER — TRAMADOL HYDROCHLORIDE 50 MG/1
1 TABLET ORAL
Qty: 12 | Refills: 0 | OUTPATIENT
Start: 2018-07-03 | End: 2018-07-05

## 2018-07-03 RX ORDER — ACETAMINOPHEN 500 MG
650 TABLET ORAL ONCE
Qty: 0 | Refills: 0 | Status: COMPLETED | OUTPATIENT
Start: 2018-07-03 | End: 2018-07-03

## 2018-07-03 RX ADMIN — Medication 650 MILLIGRAM(S): at 14:13

## 2018-07-03 RX ADMIN — LIDOCAINE 1 PATCH: 4 CREAM TOPICAL at 14:14

## 2018-07-03 NOTE — ED PROVIDER NOTE - CHPI ED SYMPTOMS NEG
no loss of consciousness/no seizure/no vomiting/no nausea/no weakness/no change in level of consciousness/no dizziness/no confusion/no blurred vision

## 2018-07-03 NOTE — ED ADULT TRIAGE NOTE - CHIEF COMPLAINT QUOTE
Pt had mechanical fall, hit face and chipped her front teeth, denies loc, does not take blood thinning medications

## 2018-07-03 NOTE — ED PROVIDER NOTE - ATTENDING CONTRIBUTION TO CARE
87 y female presents s/p slip and fall today in her kitchen , states she fell forward, hit her face on tile floor,  sustained two broken front teeth, denies loc  vss, neuro intact, fractured front two incisors, lungs cta, ttp to chest wall,  imaging shows multiple rib fx, pt comfortable, offered admission but declines, pt to f/u w/ pcp for further eval and mgmt

## 2018-07-03 NOTE — ED PROVIDER NOTE - CARE PLAN
Principal Discharge DX:	Rib fractures  Secondary Diagnosis:	Injury of head, initial encounter  Secondary Diagnosis:	Whiplash injury to neck, initial encounter

## 2018-07-03 NOTE — ED ADULT NURSE NOTE - CHPI ED SYMPTOMS NEG
no confusion/no seizure/no loss of consciousness/no dizziness/no nausea/no weakness/no vomiting/no blurred vision/no change in level of consciousness/no syncope

## 2018-07-03 NOTE — ED PROVIDER NOTE - PROGRESS NOTE DETAILS
results discussed, right anterior rib fx's 3-5th ribs, pain medication, incentive spirometer, recommended otc lidocaine patches as directed, refused admission,  son at bedside, advised, if any concerns, return to ed, follow up with pmd , given information for thoracic follow up

## 2018-07-03 NOTE — ED ADULT NURSE NOTE - OBJECTIVE STATEMENT
pt tripped and fell at home in the kitchen stuck front of face on the kitchen floor.  pt states she had a lot of bvlood in her mouth and is not able to determine if she has any misaligned teeth because it hurts her saw to much to bite down.  pt's upper middle teeth are broken/chipped no active bleeditng at this tome pt also c/o headache denies any LOC

## 2018-07-03 NOTE — ED ADULT NURSE NOTE - PMH
CVA (cerebral infarction)    Diabetes mellitus    Hemorrhagic stroke    HTN (hypertension)    Hypercholesteremia    Seizure

## 2018-07-03 NOTE — ED PROVIDER NOTE - MEDICAL DECISION MAKING DETAILS
mechanical fall, head injury, tooth fracture, neck pain, right anterior rib pain, ct scans, pain med, found to have rib fractures, refused admission, rx pain medication, follow up with pmd, and thoracic doctor

## 2018-07-03 NOTE — ED PROVIDER NOTE - OBJECTIVE STATEMENT
87 y female presents s/p slip and fall today in her kitchen , states she fell forward, hit her face on tile floor,  sustained two broken front teeth, denies loc,  states she went to the dentist today to address her teeth, dentist suggested that she come to ed for evaluation,  son at bedside states patient has hx of cva, and bleed 3 years ago.  patient is not on anticoagulants.  states she has neck pain, cathi placed in ED,  has right lateral jaw pain, and right rib pain with palpation,  denies chest pain, no sob, no abdominal pain, no back pain, was able to amubulate after fall.  PMD Dr Pedrito gonzales 87 y female presents s/p slip and fall today in her kitchen , states she fell forward, hit her face on tile floor,  sustained two broken front teeth, denies loc,  states she went to the dentist today to address her teeth, dentist suggested that she come to ed for evaluation,  son at bedside states patient has hx of cva, and bleed 3 years ago.  patient is not on anticoagulants.  states she has neck pain, cathi placed in ED,  has right lateral jaw pain, and right rib pain with palpation,  denies chest pain, no sob, no abdominal pain, no back pain, was able to amubulate after fall.  PMD Dr Pedrito gonzales  PMH: CVA (cerebral infarction)    Diabetes mellitus    Hemorrhagic stroke    HTN (hypertension)    Hypercholesteremia    Seizure

## 2018-07-10 ENCOUNTER — RX RENEWAL (OUTPATIENT)
Age: 83
End: 2018-07-10

## 2018-07-11 NOTE — ED ADULT NURSE NOTE - NURSING MUSC JOINTS
Patient was seen today. Patient states he went to get the ear drop, he was laying on his side, got up and his ear was bleeding.  He doesn't know if the drops did this this or the procedure or if this is normal.    Please call patient back.   no pain, swelling or deformity of joints

## 2018-09-06 ENCOUNTER — RX RENEWAL (OUTPATIENT)
Age: 83
End: 2018-09-06

## 2018-09-13 ENCOUNTER — RX RENEWAL (OUTPATIENT)
Age: 83
End: 2018-09-13

## 2018-09-22 ENCOUNTER — EMERGENCY (EMERGENCY)
Facility: HOSPITAL | Age: 83
LOS: 1 days | Discharge: HOME | End: 2018-09-22
Attending: EMERGENCY MEDICINE
Payer: MEDICARE

## 2018-09-22 VITALS — DIASTOLIC BLOOD PRESSURE: 72 MMHG | TEMPERATURE: 98 F | SYSTOLIC BLOOD PRESSURE: 168 MMHG | HEART RATE: 82 BPM

## 2018-09-22 VITALS
OXYGEN SATURATION: 90 % | HEART RATE: 86 BPM | DIASTOLIC BLOOD PRESSURE: 94 MMHG | TEMPERATURE: 99 F | HEIGHT: 64 IN | RESPIRATION RATE: 16 BRPM | SYSTOLIC BLOOD PRESSURE: 189 MMHG | WEIGHT: 139.99 LBS

## 2018-09-22 DIAGNOSIS — Z86.69 PERSONAL HISTORY OF OTHER DISEASES OF THE NERVOUS SYSTEM AND SENSE ORGANS: Chronic | ICD-10-CM

## 2018-09-22 LAB
APPEARANCE UR: CLEAR — SIGNIFICANT CHANGE UP
APTT BLD: 28.4 SEC — SIGNIFICANT CHANGE UP (ref 27.5–37.4)
BACTERIA # UR AUTO: ABNORMAL
BASOPHILS # BLD AUTO: 0.04 K/UL — SIGNIFICANT CHANGE UP (ref 0–0.2)
BASOPHILS NFR BLD AUTO: 0.9 % — SIGNIFICANT CHANGE UP (ref 0–2)
BILIRUB UR-MCNC: NEGATIVE — SIGNIFICANT CHANGE UP
CARBAMAZEPINE SERPL-MCNC: 8.2 UG/ML — SIGNIFICANT CHANGE UP (ref 4–12)
CK MB BLD-MCNC: 2.1 % — SIGNIFICANT CHANGE UP (ref 0–3.5)
CK MB CFR SERPL CALC: 1.4 NG/ML — SIGNIFICANT CHANGE UP (ref 0–3.6)
CK SERPL-CCNC: 67 U/L — SIGNIFICANT CHANGE UP (ref 26–192)
COLOR SPEC: YELLOW — SIGNIFICANT CHANGE UP
DIFF PNL FLD: NEGATIVE — SIGNIFICANT CHANGE UP
EOSINOPHIL # BLD AUTO: 0.06 K/UL — SIGNIFICANT CHANGE UP (ref 0–0.5)
EOSINOPHIL NFR BLD AUTO: 1.4 % — SIGNIFICANT CHANGE UP (ref 0–6)
EPI CELLS # UR: SIGNIFICANT CHANGE UP
GLUCOSE UR QL: 100 MG/DL
HCT VFR BLD CALC: 37.6 % — SIGNIFICANT CHANGE UP (ref 34.5–45)
HGB BLD-MCNC: 12.3 G/DL — SIGNIFICANT CHANGE UP (ref 11.5–15.5)
IMM GRANULOCYTES NFR BLD AUTO: 0.7 % — SIGNIFICANT CHANGE UP (ref 0–1.5)
INR BLD: 1.02 RATIO — SIGNIFICANT CHANGE UP (ref 0.88–1.16)
KETONES UR-MCNC: NEGATIVE — SIGNIFICANT CHANGE UP
LACTATE SERPL-SCNC: 1.9 MMOL/L — SIGNIFICANT CHANGE UP (ref 0.7–2)
LEUKOCYTE ESTERASE UR-ACNC: NEGATIVE — SIGNIFICANT CHANGE UP
LYMPHOCYTES # BLD AUTO: 0.78 K/UL — LOW (ref 1–3.3)
LYMPHOCYTES # BLD AUTO: 17.8 % — SIGNIFICANT CHANGE UP (ref 13–44)
MCHC RBC-ENTMCNC: 29.6 PG — SIGNIFICANT CHANGE UP (ref 27–34)
MCHC RBC-ENTMCNC: 32.7 GM/DL — SIGNIFICANT CHANGE UP (ref 32–36)
MCV RBC AUTO: 90.4 FL — SIGNIFICANT CHANGE UP (ref 80–100)
MONOCYTES # BLD AUTO: 0.3 K/UL — SIGNIFICANT CHANGE UP (ref 0–0.9)
MONOCYTES NFR BLD AUTO: 6.9 % — SIGNIFICANT CHANGE UP (ref 2–14)
NEUTROPHILS # BLD AUTO: 3.16 K/UL — SIGNIFICANT CHANGE UP (ref 1.8–7.4)
NEUTROPHILS NFR BLD AUTO: 72.3 % — SIGNIFICANT CHANGE UP (ref 43–77)
NITRITE UR-MCNC: NEGATIVE — SIGNIFICANT CHANGE UP
NRBC # BLD: 0 /100 WBCS — SIGNIFICANT CHANGE UP (ref 0–0)
PH UR: 7 — SIGNIFICANT CHANGE UP (ref 5–8)
PLATELET # BLD AUTO: 174 K/UL — SIGNIFICANT CHANGE UP (ref 150–400)
PROT UR-MCNC: 150 MG/DL
PROTHROM AB SERPL-ACNC: 11.1 SEC — SIGNIFICANT CHANGE UP (ref 9.8–12.7)
RBC # BLD: 4.16 M/UL — SIGNIFICANT CHANGE UP (ref 3.8–5.2)
RBC # FLD: 14.1 % — SIGNIFICANT CHANGE UP (ref 10.3–14.5)
RBC CASTS # UR COMP ASSIST: SIGNIFICANT CHANGE UP /HPF (ref 0–4)
SP GR SPEC: 1.01 — SIGNIFICANT CHANGE UP (ref 1.01–1.02)
TROPONIN I SERPL-MCNC: <.015 NG/ML — SIGNIFICANT CHANGE UP (ref 0.01–0.04)
UROBILINOGEN FLD QL: NEGATIVE — SIGNIFICANT CHANGE UP
WBC # BLD: 4.37 K/UL — SIGNIFICANT CHANGE UP (ref 3.8–10.5)
WBC # FLD AUTO: 4.37 K/UL — SIGNIFICANT CHANGE UP (ref 3.8–10.5)
WBC UR QL: SIGNIFICANT CHANGE UP

## 2018-09-22 PROCEDURE — 70450 CT HEAD/BRAIN W/O DYE: CPT | Mod: 26

## 2018-09-22 PROCEDURE — 83605 ASSAY OF LACTIC ACID: CPT

## 2018-09-22 PROCEDURE — 82553 CREATINE MB FRACTION: CPT

## 2018-09-22 PROCEDURE — 99285 EMERGENCY DEPT VISIT HI MDM: CPT

## 2018-09-22 PROCEDURE — 99284 EMERGENCY DEPT VISIT MOD MDM: CPT | Mod: 25

## 2018-09-22 PROCEDURE — 81001 URINALYSIS AUTO W/SCOPE: CPT

## 2018-09-22 PROCEDURE — 82550 ASSAY OF CK (CPK): CPT

## 2018-09-22 PROCEDURE — 80307 DRUG TEST PRSMV CHEM ANLYZR: CPT

## 2018-09-22 PROCEDURE — 85610 PROTHROMBIN TIME: CPT

## 2018-09-22 PROCEDURE — 70551 MRI BRAIN STEM W/O DYE: CPT

## 2018-09-22 PROCEDURE — 72125 CT NECK SPINE W/O DYE: CPT

## 2018-09-22 PROCEDURE — 70450 CT HEAD/BRAIN W/O DYE: CPT

## 2018-09-22 PROCEDURE — 70551 MRI BRAIN STEM W/O DYE: CPT | Mod: 26

## 2018-09-22 PROCEDURE — 71045 X-RAY EXAM CHEST 1 VIEW: CPT | Mod: 26

## 2018-09-22 PROCEDURE — 85027 COMPLETE CBC AUTOMATED: CPT

## 2018-09-22 PROCEDURE — 36415 COLL VENOUS BLD VENIPUNCTURE: CPT

## 2018-09-22 PROCEDURE — 87086 URINE CULTURE/COLONY COUNT: CPT

## 2018-09-22 PROCEDURE — 80156 ASSAY CARBAMAZEPINE TOTAL: CPT

## 2018-09-22 PROCEDURE — 71045 X-RAY EXAM CHEST 1 VIEW: CPT

## 2018-09-22 PROCEDURE — 80053 COMPREHEN METABOLIC PANEL: CPT

## 2018-09-22 PROCEDURE — 72125 CT NECK SPINE W/O DYE: CPT | Mod: 26

## 2018-09-22 PROCEDURE — 84484 ASSAY OF TROPONIN QUANT: CPT

## 2018-09-22 PROCEDURE — 85730 THROMBOPLASTIN TIME PARTIAL: CPT

## 2018-09-22 RX ORDER — LOSARTAN POTASSIUM 100 MG/1
1 TABLET, FILM COATED ORAL
Qty: 0 | Refills: 0 | COMMUNITY

## 2018-09-22 NOTE — ED ADULT TRIAGE NOTE - CHIEF COMPLAINT QUOTE
unwitnessed seizure, andn then got up and fell- unwitnessed fall ,c/o right shoulder and head pain. fsbs- 153

## 2018-09-22 NOTE — ED PROVIDER NOTE - OBJECTIVE STATEMENT
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  he found her on floor next to bed. on her side  pt co pain to back of head unk seizure vs syncope unk incontinence (pt found here to have been incontinent of urine but she was unaware)  PMD dr alonso gonzales, neuro dr Gonsalez at [facility]. 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  he found her on floor next to bed. on her side  pt co pain to back of head unk seizure vs syncope unk incontinence (pt found here to have been incontinent of urine but she was unaware)  PMD dr alonso gonzales, neuro dr Gonsalez at [facility]. this is her third "seizure" on carbamazepine  unk what time she collapsed.

## 2018-09-22 NOTE — ED PROVIDER NOTE - NEUROLOGICAL, MLM
Alert and oriented, no focal deficits, no motor or sensory deficits does not look to left? ignoring left side. Alert and oriented,  no motor or sensory deficits does not look to left? ignoring left side. and r eye does not cross midline but left eye does. reports able to see fully.

## 2018-09-22 NOTE — ED PROVIDER NOTE - CONSTITUTIONAL, MLM
normal... Well appearing, well nourished, awake, alert, oriented to person, place, time/situation and in no apparent distress. has flat affect (post ictal?) poor eye contact

## 2018-09-22 NOTE — ED PROVIDER NOTE - MUSCULOSKELETAL, MLM
Spine appears normal, range of motion is not limited, no muscle or joint tenderness there is mild left arm motor weakness, and did not know that I was holding her hand until I told her then she grabbed my hand fully and was ble to squeeze and move lue.

## 2018-09-22 NOTE — ED PROVIDER NOTE - ENMT, MLM
Airway patent, Nasal mucosa clear. Mouth with normal mucosa. Throat has no vesicles, no oropharyngeal exudates and uvula is midline. there is a small area of contusion left temporal area of head

## 2018-09-22 NOTE — ED ADULT NURSE NOTE - OBJECTIVE STATEMENT
patient comes to ED via ambulance with son in attendance states he heard his mom calling and found her on the floor in her bedroom pt has hx of hemorrhagic stroke 3 years ago which left her with seizures pt presents to ED incontinent of urine awake alert slow to respond states she had a seizure states before seizure her "tongue felt heavy" pt has left sided weakness moves all extremities well

## 2018-09-23 LAB
CULTURE RESULTS: SIGNIFICANT CHANGE UP
SPECIMEN SOURCE: SIGNIFICANT CHANGE UP

## 2018-10-13 ENCOUNTER — APPOINTMENT (OUTPATIENT)
Dept: INTERNAL MEDICINE | Facility: CLINIC | Age: 83
End: 2018-10-13
Payer: MEDICARE

## 2018-10-13 VITALS
TEMPERATURE: 97.9 F | RESPIRATION RATE: 14 BRPM | BODY MASS INDEX: 24.8 KG/M2 | SYSTOLIC BLOOD PRESSURE: 130 MMHG | DIASTOLIC BLOOD PRESSURE: 70 MMHG | HEIGHT: 63 IN | OXYGEN SATURATION: 92 % | WEIGHT: 140 LBS | HEART RATE: 60 BPM

## 2018-10-13 DIAGNOSIS — R26.89 OTHER ABNORMALITIES OF GAIT AND MOBILITY: ICD-10-CM

## 2018-10-13 PROCEDURE — 36415 COLL VENOUS BLD VENIPUNCTURE: CPT

## 2018-10-13 PROCEDURE — G0439: CPT

## 2018-10-13 PROCEDURE — G0008: CPT

## 2018-10-13 PROCEDURE — 90686 IIV4 VACC NO PRSV 0.5 ML IM: CPT

## 2018-10-13 RX ORDER — AMOXICILLIN 875 MG/1
875 TABLET, FILM COATED ORAL
Qty: 14 | Refills: 0 | Status: DISCONTINUED | COMMUNITY
Start: 2018-07-13

## 2018-10-13 RX ORDER — TRAMADOL HYDROCHLORIDE 50 MG/1
50 TABLET, COATED ORAL
Qty: 12 | Refills: 0 | Status: DISCONTINUED | COMMUNITY
Start: 2018-07-03

## 2018-10-13 RX ORDER — VALSARTAN 80 MG/1
80 TABLET, COATED ORAL
Qty: 90 | Refills: 0 | Status: DISCONTINUED | COMMUNITY
Start: 2018-04-09 | End: 2018-10-13

## 2018-10-13 NOTE — PHYSICAL EXAM
[No Acute Distress] : no acute distress [Well Nourished] : well nourished [Well Developed] : well developed [Well-Appearing] : well-appearing [Normal Voice/Communication] : normal voice/communication [Normal Sclera/Conjunctiva] : normal sclera/conjunctiva [PERRL] : pupils equal round and reactive to light [EOMI] : extraocular movements intact [Normal Outer Ear/Nose] : the outer ears and nose were normal in appearance [Normal Oropharynx] : the oropharynx was normal [Normal TMs] : both tympanic membranes were normal [No JVD] : no jugular venous distention [Supple] : supple [No Lymphadenopathy] : no lymphadenopathy [Thyroid Normal, No Nodules] : the thyroid was normal and there were no nodules present [No Respiratory Distress] : no respiratory distress  [Clear to Auscultation] : lungs were clear to auscultation bilaterally [No Accessory Muscle Use] : no accessory muscle use [Normal Rate] : normal rate  [Regular Rhythm] : with a regular rhythm [Normal S1, S2] : normal S1 and S2 [No Murmur] : no murmur heard [No Carotid Bruits] : no carotid bruits [No Abdominal Bruit] : a ~M bruit was not heard ~T in the abdomen [No Varicosities] : no varicosities [Pedal Pulses Present] : the pedal pulses are present [No Edema] : there was no peripheral edema [No Extremity Clubbing/Cyanosis] : no extremity clubbing/cyanosis [No Palpable Aorta] : no palpable aorta [Soft] : abdomen soft [Non Tender] : non-tender [Non-distended] : non-distended [No Masses] : no abdominal mass palpated [No HSM] : no HSM [Normal Bowel Sounds] : normal bowel sounds [Normal Posterior Cervical Nodes] : no posterior cervical lymphadenopathy [Normal Anterior Cervical Nodes] : no anterior cervical lymphadenopathy [No CVA Tenderness] : no CVA  tenderness [No Spinal Tenderness] : no spinal tenderness [No Joint Swelling] : no joint swelling [Grossly Normal Strength/Tone] : grossly normal strength/tone [No Rash] : no rash [Normal Gait] : normal gait [Coordination Grossly Intact] : coordination grossly intact [No Focal Deficits] : no focal deficits [Deep Tendon Reflexes (DTR)] : deep tendon reflexes were 2+ and symmetric [Normal Affect] : the affect was normal [Normal Insight/Judgement] : insight and judgment were intact

## 2018-10-13 NOTE — HEALTH RISK ASSESSMENT
[Good] : ~his/her~  mood as  good [No falls in past year] : Patient reported no falls in the past year [0] : 2) Feeling down, depressed, or hopeless: Not at all (0) [With Family] : lives with family [] :  [Feels Safe at Home] : Feels safe at home [Independent] : managing finances [Some assistance needed] : preparing meals [Full assistance needed] : using transportation [Smoke Detector] : smoke detector [Carbon Monoxide Detector] : carbon monoxide detector [Seat Belt] :  uses seat belt [] : No [Reports changes in hearing] : Reports no changes in hearing [Reports changes in vision] : Reports no changes in vision [Guns at Home] : no guns at home

## 2018-10-14 ENCOUNTER — MEDICATION RENEWAL (OUTPATIENT)
Age: 83
End: 2018-10-14

## 2018-10-14 LAB
25(OH)D3 SERPL-MCNC: 30 NG/ML
ALBUMIN SERPL ELPH-MCNC: 3.8 G/DL
ALP BLD-CCNC: 69 U/L
ALT SERPL-CCNC: 14 U/L
ANION GAP SERPL CALC-SCNC: 14 MMOL/L
APPEARANCE: CLEAR
AST SERPL-CCNC: 19 U/L
BASOPHILS # BLD AUTO: 0.05 K/UL
BASOPHILS NFR BLD AUTO: 1.4 %
BILIRUB SERPL-MCNC: 0.2 MG/DL
BILIRUBIN URINE: NEGATIVE
BLOOD URINE: NEGATIVE
BUN SERPL-MCNC: 14 MG/DL
CALCIUM SERPL-MCNC: 9.2 MG/DL
CHLORIDE SERPL-SCNC: 103 MMOL/L
CHOLEST SERPL-MCNC: 224 MG/DL
CHOLEST/HDLC SERPL: 3 RATIO
CK SERPL-CCNC: 61 U/L
CO2 SERPL-SCNC: 27 MMOL/L
COLOR: YELLOW
CREAT SERPL-MCNC: 0.79 MG/DL
EOSINOPHIL # BLD AUTO: 0.06 K/UL
EOSINOPHIL NFR BLD AUTO: 1.6 %
GLUCOSE QUALITATIVE U: NEGATIVE MG/DL
GLUCOSE SERPL-MCNC: 78 MG/DL
HBA1C MFR BLD HPLC: 6.4 %
HCT VFR BLD CALC: 37.4 %
HDLC SERPL-MCNC: 74 MG/DL
HGB BLD-MCNC: 12.6 G/DL
IMM GRANULOCYTES NFR BLD AUTO: 0.5 %
KETONES URINE: NEGATIVE
LDLC SERPL CALC-MCNC: 133 MG/DL
LEUKOCYTE ESTERASE URINE: NEGATIVE
LYMPHOCYTES # BLD AUTO: 0.96 K/UL
LYMPHOCYTES NFR BLD AUTO: 25.9 %
MAN DIFF?: NORMAL
MCHC RBC-ENTMCNC: 30.5 PG
MCHC RBC-ENTMCNC: 33.7 GM/DL
MCV RBC AUTO: 90.6 FL
MONOCYTES # BLD AUTO: 0.44 K/UL
MONOCYTES NFR BLD AUTO: 11.9 %
NEUTROPHILS # BLD AUTO: 2.17 K/UL
NEUTROPHILS NFR BLD AUTO: 58.7 %
NITRITE URINE: NEGATIVE
PH URINE: 6.5
PLATELET # BLD AUTO: 206 K/UL
POTASSIUM SERPL-SCNC: 4.5 MMOL/L
PROT SERPL-MCNC: 6.7 G/DL
PROTEIN URINE: NEGATIVE MG/DL
RBC # BLD: 4.13 M/UL
RBC # FLD: 14.6 %
SODIUM SERPL-SCNC: 144 MMOL/L
SPECIFIC GRAVITY URINE: 1.01
TRIGL SERPL-MCNC: 83 MG/DL
TSH SERPL-ACNC: 4.45 UIU/ML
UROBILINOGEN URINE: NEGATIVE MG/DL
WBC # FLD AUTO: 3.7 K/UL

## 2018-10-22 ENCOUNTER — APPOINTMENT (OUTPATIENT)
Dept: NEUROLOGY | Facility: CLINIC | Age: 83
End: 2018-10-22
Payer: MEDICARE

## 2018-10-22 VITALS
SYSTOLIC BLOOD PRESSURE: 176 MMHG | HEIGHT: 63 IN | HEART RATE: 71 BPM | WEIGHT: 140 LBS | DIASTOLIC BLOOD PRESSURE: 83 MMHG | BODY MASS INDEX: 24.8 KG/M2

## 2018-10-22 PROCEDURE — 99214 OFFICE O/P EST MOD 30 MIN: CPT

## 2018-11-06 ENCOUNTER — APPOINTMENT (OUTPATIENT)
Dept: NEUROLOGY | Facility: CLINIC | Age: 83
End: 2018-11-06
Payer: MEDICARE

## 2018-11-06 PROCEDURE — 95819 EEG AWAKE AND ASLEEP: CPT

## 2018-11-07 PROCEDURE — 95953: CPT

## 2018-11-12 ENCOUNTER — OTHER (OUTPATIENT)
Age: 83
End: 2018-11-12

## 2018-11-14 ENCOUNTER — OTHER (OUTPATIENT)
Age: 83
End: 2018-11-14

## 2018-12-17 ENCOUNTER — RX RENEWAL (OUTPATIENT)
Age: 83
End: 2018-12-17

## 2018-12-18 ENCOUNTER — APPOINTMENT (OUTPATIENT)
Dept: NEUROLOGY | Facility: CLINIC | Age: 83
End: 2018-12-18
Payer: MEDICARE

## 2018-12-18 VITALS
HEART RATE: 70 BPM | WEIGHT: 140 LBS | HEIGHT: 63 IN | DIASTOLIC BLOOD PRESSURE: 72 MMHG | SYSTOLIC BLOOD PRESSURE: 170 MMHG | BODY MASS INDEX: 24.8 KG/M2

## 2018-12-18 PROCEDURE — 99214 OFFICE O/P EST MOD 30 MIN: CPT

## 2019-01-06 LAB
ALBUMIN SERPL ELPH-MCNC: 3.1 G/DL
ALP BLD-CCNC: 59 U/L
ALT SERPL-CCNC: 12 U/L
ANION GAP SERPL CALC-SCNC: 9 MMOL/L
AST SERPL-CCNC: 16 U/L
BILIRUB SERPL-MCNC: 0.2 MG/DL
BUN SERPL-MCNC: 16 MG/DL
CALCIUM SERPL-MCNC: 8.4 MG/DL
CHLORIDE SERPL-SCNC: 106 MMOL/L
CHOLEST SERPL-MCNC: 197 MG/DL
CHOLEST/HDLC SERPL: 2.9 RATIO
CK SERPL-CCNC: 71 U/L
CO2 SERPL-SCNC: 28 MMOL/L
CREAT SERPL-MCNC: 0.77 MG/DL
ESTIMATED AVERAGE GLUCOSE: 134 MG/DL
GLUCOSE SERPL-MCNC: 107 MG/DL
HBA1C MFR BLD HPLC: 6.3 %
HDLC SERPL-MCNC: 67 MG/DL
LDLC SERPL CALC-MCNC: 112 MG/DL
POTASSIUM SERPL-SCNC: 4 MMOL/L
PROT SERPL-MCNC: 6.1 G/DL
SODIUM SERPL-SCNC: 143 MMOL/L
TRIGL SERPL-MCNC: 89 MG/DL

## 2019-01-29 ENCOUNTER — MEDICATION RENEWAL (OUTPATIENT)
Age: 84
End: 2019-01-29

## 2019-01-30 ENCOUNTER — MEDICATION RENEWAL (OUTPATIENT)
Age: 84
End: 2019-01-30

## 2019-02-18 ENCOUNTER — RX RENEWAL (OUTPATIENT)
Age: 84
End: 2019-02-18

## 2019-03-02 ENCOUNTER — INPATIENT (INPATIENT)
Facility: HOSPITAL | Age: 84
LOS: 2 days | Discharge: NURSING, REHAB OR LONG-TERM CARE | DRG: 57 | End: 2019-03-05
Attending: FAMILY MEDICINE | Admitting: FAMILY MEDICINE
Payer: MEDICARE

## 2019-03-02 VITALS
HEART RATE: 83 BPM | WEIGHT: 143.96 LBS | SYSTOLIC BLOOD PRESSURE: 188 MMHG | OXYGEN SATURATION: 100 % | TEMPERATURE: 99 F | DIASTOLIC BLOOD PRESSURE: 127 MMHG

## 2019-03-02 DIAGNOSIS — I50.30 UNSPECIFIED DIASTOLIC (CONGESTIVE) HEART FAILURE: ICD-10-CM

## 2019-03-02 DIAGNOSIS — G45.9 TRANSIENT CEREBRAL ISCHEMIC ATTACK, UNSPECIFIED: ICD-10-CM

## 2019-03-02 DIAGNOSIS — R56.9 UNSPECIFIED CONVULSIONS: ICD-10-CM

## 2019-03-02 DIAGNOSIS — Z29.9 ENCOUNTER FOR PROPHYLACTIC MEASURES, UNSPECIFIED: ICD-10-CM

## 2019-03-02 DIAGNOSIS — E78.00 PURE HYPERCHOLESTEROLEMIA, UNSPECIFIED: ICD-10-CM

## 2019-03-02 DIAGNOSIS — I10 ESSENTIAL (PRIMARY) HYPERTENSION: ICD-10-CM

## 2019-03-02 DIAGNOSIS — Z86.69 PERSONAL HISTORY OF OTHER DISEASES OF THE NERVOUS SYSTEM AND SENSE ORGANS: Chronic | ICD-10-CM

## 2019-03-02 DIAGNOSIS — E11.9 TYPE 2 DIABETES MELLITUS WITHOUT COMPLICATIONS: ICD-10-CM

## 2019-03-02 DIAGNOSIS — I16.0 HYPERTENSIVE URGENCY: ICD-10-CM

## 2019-03-02 LAB
ALBUMIN SERPL ELPH-MCNC: 3.2 G/DL — LOW (ref 3.3–5)
ALP SERPL-CCNC: 78 U/L — SIGNIFICANT CHANGE UP (ref 40–120)
ALT FLD-CCNC: 19 U/L — SIGNIFICANT CHANGE UP (ref 12–78)
ANION GAP SERPL CALC-SCNC: 5 MMOL/L — SIGNIFICANT CHANGE UP (ref 5–17)
APPEARANCE UR: CLEAR — SIGNIFICANT CHANGE UP
APTT BLD: 30.7 SEC — SIGNIFICANT CHANGE UP (ref 27.5–36.3)
AST SERPL-CCNC: 17 U/L — SIGNIFICANT CHANGE UP (ref 15–37)
BACTERIA # UR AUTO: NEGATIVE — SIGNIFICANT CHANGE UP
BASOPHILS # BLD AUTO: 0.06 K/UL — SIGNIFICANT CHANGE UP (ref 0–0.2)
BASOPHILS NFR BLD AUTO: 1.2 % — SIGNIFICANT CHANGE UP (ref 0–2)
BILIRUB SERPL-MCNC: 0.3 MG/DL — SIGNIFICANT CHANGE UP (ref 0.2–1.2)
BILIRUB UR-MCNC: NEGATIVE — SIGNIFICANT CHANGE UP
BUN SERPL-MCNC: 14 MG/DL — SIGNIFICANT CHANGE UP (ref 7–23)
CALCIUM SERPL-MCNC: 8.3 MG/DL — LOW (ref 8.5–10.1)
CARBAMAZEPINE SERPL-MCNC: 6.2 UG/ML — SIGNIFICANT CHANGE UP (ref 4–12)
CARBAMAZEPINE SERPL-MCNC: 8.2 UG/ML — SIGNIFICANT CHANGE UP (ref 4–12)
CHLORIDE SERPL-SCNC: 104 MMOL/L — SIGNIFICANT CHANGE UP (ref 96–108)
CK MB CFR SERPL CALC: 1.6 NG/ML — SIGNIFICANT CHANGE UP (ref 0–3.6)
CO2 SERPL-SCNC: 31 MMOL/L — SIGNIFICANT CHANGE UP (ref 22–31)
COLOR SPEC: YELLOW — SIGNIFICANT CHANGE UP
CREAT SERPL-MCNC: 0.69 MG/DL — SIGNIFICANT CHANGE UP (ref 0.5–1.3)
DIFF PNL FLD: NEGATIVE — SIGNIFICANT CHANGE UP
EOSINOPHIL # BLD AUTO: 0.06 K/UL — SIGNIFICANT CHANGE UP (ref 0–0.5)
EOSINOPHIL NFR BLD AUTO: 1.2 % — SIGNIFICANT CHANGE UP (ref 0–6)
EPI CELLS # UR: NEGATIVE — SIGNIFICANT CHANGE UP
GLUCOSE SERPL-MCNC: 126 MG/DL — HIGH (ref 70–99)
GLUCOSE UR QL: 50 MG/DL
HCT VFR BLD CALC: 40.5 % — SIGNIFICANT CHANGE UP (ref 34.5–45)
HGB BLD-MCNC: 13 G/DL — SIGNIFICANT CHANGE UP (ref 11.5–15.5)
IMM GRANULOCYTES NFR BLD AUTO: 0.8 % — SIGNIFICANT CHANGE UP (ref 0–1.5)
INR BLD: 1.05 RATIO — SIGNIFICANT CHANGE UP (ref 0.88–1.16)
KETONES UR-MCNC: NEGATIVE — SIGNIFICANT CHANGE UP
LEUKOCYTE ESTERASE UR-ACNC: NEGATIVE — SIGNIFICANT CHANGE UP
LYMPHOCYTES # BLD AUTO: 0.85 K/UL — LOW (ref 1–3.3)
LYMPHOCYTES # BLD AUTO: 16.4 % — SIGNIFICANT CHANGE UP (ref 13–44)
MCHC RBC-ENTMCNC: 29.3 PG — SIGNIFICANT CHANGE UP (ref 27–34)
MCHC RBC-ENTMCNC: 32.1 GM/DL — SIGNIFICANT CHANGE UP (ref 32–36)
MCV RBC AUTO: 91.4 FL — SIGNIFICANT CHANGE UP (ref 80–100)
MONOCYTES # BLD AUTO: 0.35 K/UL — SIGNIFICANT CHANGE UP (ref 0–0.9)
MONOCYTES NFR BLD AUTO: 6.8 % — SIGNIFICANT CHANGE UP (ref 2–14)
NEUTROPHILS # BLD AUTO: 3.81 K/UL — SIGNIFICANT CHANGE UP (ref 1.8–7.4)
NEUTROPHILS NFR BLD AUTO: 73.6 % — SIGNIFICANT CHANGE UP (ref 43–77)
NITRITE UR-MCNC: NEGATIVE — SIGNIFICANT CHANGE UP
NRBC # BLD: 0 /100 WBCS — SIGNIFICANT CHANGE UP (ref 0–0)
NT-PROBNP SERPL-SCNC: 232 PG/ML — SIGNIFICANT CHANGE UP (ref 0–450)
PH UR: 8 — SIGNIFICANT CHANGE UP (ref 5–8)
PLATELET # BLD AUTO: 195 K/UL — SIGNIFICANT CHANGE UP (ref 150–400)
POTASSIUM SERPL-MCNC: 4.1 MMOL/L — SIGNIFICANT CHANGE UP (ref 3.5–5.3)
POTASSIUM SERPL-SCNC: 4.1 MMOL/L — SIGNIFICANT CHANGE UP (ref 3.5–5.3)
PROT SERPL-MCNC: 6.8 G/DL — SIGNIFICANT CHANGE UP (ref 6–8.3)
PROT UR-MCNC: 75 MG/DL
PROTHROM AB SERPL-ACNC: 11.9 SEC — SIGNIFICANT CHANGE UP (ref 10–12.9)
RBC # BLD: 4.43 M/UL — SIGNIFICANT CHANGE UP (ref 3.8–5.2)
RBC # FLD: 13.2 % — SIGNIFICANT CHANGE UP (ref 10.3–14.5)
RBC CASTS # UR COMP ASSIST: SIGNIFICANT CHANGE UP /HPF (ref 0–4)
SODIUM SERPL-SCNC: 140 MMOL/L — SIGNIFICANT CHANGE UP (ref 135–145)
SP GR SPEC: 1.01 — SIGNIFICANT CHANGE UP (ref 1.01–1.02)
TROPONIN I SERPL-MCNC: <.015 NG/ML — SIGNIFICANT CHANGE UP (ref 0.01–0.04)
UROBILINOGEN FLD QL: NEGATIVE — SIGNIFICANT CHANGE UP
WBC # BLD: 5.17 K/UL — SIGNIFICANT CHANGE UP (ref 3.8–10.5)
WBC # FLD AUTO: 5.17 K/UL — SIGNIFICANT CHANGE UP (ref 3.8–10.5)
WBC UR QL: SIGNIFICANT CHANGE UP

## 2019-03-02 PROCEDURE — 99285 EMERGENCY DEPT VISIT HI MDM: CPT

## 2019-03-02 PROCEDURE — 99223 1ST HOSP IP/OBS HIGH 75: CPT | Mod: GC,AI

## 2019-03-02 PROCEDURE — 70496 CT ANGIOGRAPHY HEAD: CPT | Mod: 26

## 2019-03-02 PROCEDURE — 70450 CT HEAD/BRAIN W/O DYE: CPT | Mod: 26,59

## 2019-03-02 PROCEDURE — 70498 CT ANGIOGRAPHY NECK: CPT | Mod: 26

## 2019-03-02 PROCEDURE — 71045 X-RAY EXAM CHEST 1 VIEW: CPT | Mod: 26

## 2019-03-02 PROCEDURE — 93010 ELECTROCARDIOGRAM REPORT: CPT

## 2019-03-02 RX ORDER — ACETAMINOPHEN 500 MG
650 TABLET ORAL ONCE
Qty: 0 | Refills: 0 | Status: COMPLETED | OUTPATIENT
Start: 2019-03-02 | End: 2019-03-02

## 2019-03-02 RX ORDER — INSULIN LISPRO 100/ML
VIAL (ML) SUBCUTANEOUS
Qty: 0 | Refills: 0 | Status: DISCONTINUED | OUTPATIENT
Start: 2019-03-02 | End: 2019-03-05

## 2019-03-02 RX ORDER — ENOXAPARIN SODIUM 100 MG/ML
40 INJECTION SUBCUTANEOUS DAILY
Qty: 0 | Refills: 0 | Status: DISCONTINUED | OUTPATIENT
Start: 2019-03-02 | End: 2019-03-05

## 2019-03-02 RX ORDER — CARBAMAZEPINE 200 MG
200 TABLET ORAL
Qty: 0 | Refills: 0 | Status: DISCONTINUED | OUTPATIENT
Start: 2019-03-02 | End: 2019-03-02

## 2019-03-02 RX ORDER — LOSARTAN POTASSIUM 100 MG/1
50 TABLET, FILM COATED ORAL DAILY
Qty: 0 | Refills: 0 | Status: DISCONTINUED | OUTPATIENT
Start: 2019-03-02 | End: 2019-03-02

## 2019-03-02 RX ORDER — ATORVASTATIN CALCIUM 80 MG/1
40 TABLET, FILM COATED ORAL AT BEDTIME
Qty: 0 | Refills: 0 | Status: DISCONTINUED | OUTPATIENT
Start: 2019-03-02 | End: 2019-03-02

## 2019-03-02 RX ORDER — ROSUVASTATIN CALCIUM 5 MG/1
1 TABLET ORAL
Qty: 0 | Refills: 0 | COMMUNITY

## 2019-03-02 RX ORDER — CARVEDILOL PHOSPHATE 80 MG/1
12.5 CAPSULE, EXTENDED RELEASE ORAL EVERY 12 HOURS
Qty: 0 | Refills: 0 | Status: DISCONTINUED | OUTPATIENT
Start: 2019-03-02 | End: 2019-03-05

## 2019-03-02 RX ORDER — DEXTROSE 50 % IN WATER 50 %
15 SYRINGE (ML) INTRAVENOUS ONCE
Qty: 0 | Refills: 0 | Status: DISCONTINUED | OUTPATIENT
Start: 2019-03-02 | End: 2019-03-05

## 2019-03-02 RX ORDER — IRBESARTAN 75 MG/1
1 TABLET ORAL
Qty: 0 | Refills: 0 | COMMUNITY

## 2019-03-02 RX ORDER — GLUCAGON INJECTION, SOLUTION 0.5 MG/.1ML
1 INJECTION, SOLUTION SUBCUTANEOUS ONCE
Qty: 0 | Refills: 0 | Status: DISCONTINUED | OUTPATIENT
Start: 2019-03-02 | End: 2019-03-05

## 2019-03-02 RX ORDER — DEXTROSE 50 % IN WATER 50 %
25 SYRINGE (ML) INTRAVENOUS ONCE
Qty: 0 | Refills: 0 | Status: DISCONTINUED | OUTPATIENT
Start: 2019-03-02 | End: 2019-03-05

## 2019-03-02 RX ORDER — CARVEDILOL PHOSPHATE 80 MG/1
12.5 CAPSULE, EXTENDED RELEASE ORAL EVERY 12 HOURS
Qty: 0 | Refills: 0 | Status: DISCONTINUED | OUTPATIENT
Start: 2019-03-02 | End: 2019-03-02

## 2019-03-02 RX ORDER — CARBAMAZEPINE 200 MG
100 TABLET ORAL
Qty: 0 | Refills: 0 | Status: DISCONTINUED | OUTPATIENT
Start: 2019-03-02 | End: 2019-03-05

## 2019-03-02 RX ORDER — LOSARTAN POTASSIUM 100 MG/1
50 TABLET, FILM COATED ORAL DAILY
Qty: 0 | Refills: 0 | Status: DISCONTINUED | OUTPATIENT
Start: 2019-03-02 | End: 2019-03-03

## 2019-03-02 RX ORDER — CARBAMAZEPINE 200 MG
100 TABLET ORAL
Qty: 0 | Refills: 0 | Status: DISCONTINUED | OUTPATIENT
Start: 2019-03-02 | End: 2019-03-02

## 2019-03-02 RX ORDER — CARVEDILOL PHOSPHATE 80 MG/1
12.5 CAPSULE, EXTENDED RELEASE ORAL ONCE
Qty: 0 | Refills: 0 | Status: COMPLETED | OUTPATIENT
Start: 2019-03-02 | End: 2019-03-02

## 2019-03-02 RX ORDER — ROSUVASTATIN CALCIUM 5 MG/1
10 TABLET ORAL AT BEDTIME
Qty: 0 | Refills: 0 | Status: DISCONTINUED | OUTPATIENT
Start: 2019-03-02 | End: 2019-03-05

## 2019-03-02 RX ORDER — ONDANSETRON 8 MG/1
4 TABLET, FILM COATED ORAL ONCE
Qty: 0 | Refills: 0 | Status: COMPLETED | OUTPATIENT
Start: 2019-03-02 | End: 2019-03-02

## 2019-03-02 RX ORDER — CARBAMAZEPINE 200 MG
1 TABLET ORAL
Qty: 0 | Refills: 0 | COMMUNITY

## 2019-03-02 RX ORDER — DEXTROSE 50 % IN WATER 50 %
12.5 SYRINGE (ML) INTRAVENOUS ONCE
Qty: 0 | Refills: 0 | Status: DISCONTINUED | OUTPATIENT
Start: 2019-03-02 | End: 2019-03-05

## 2019-03-02 RX ORDER — CARBAMAZEPINE 200 MG
200 TABLET ORAL
Qty: 0 | Refills: 0 | Status: DISCONTINUED | OUTPATIENT
Start: 2019-03-02 | End: 2019-03-04

## 2019-03-02 RX ORDER — SODIUM CHLORIDE 9 MG/ML
1000 INJECTION, SOLUTION INTRAVENOUS
Qty: 0 | Refills: 0 | Status: DISCONTINUED | OUTPATIENT
Start: 2019-03-02 | End: 2019-03-05

## 2019-03-02 RX ADMIN — ROSUVASTATIN CALCIUM 10 MILLIGRAM(S): 5 TABLET ORAL at 21:13

## 2019-03-02 RX ADMIN — Medication 100 MILLIGRAM(S): at 22:35

## 2019-03-02 RX ADMIN — Medication 100 MILLIGRAM(S): at 17:19

## 2019-03-02 RX ADMIN — CARVEDILOL PHOSPHATE 12.5 MILLIGRAM(S): 80 CAPSULE, EXTENDED RELEASE ORAL at 17:19

## 2019-03-02 RX ADMIN — Medication 650 MILLIGRAM(S): at 12:08

## 2019-03-02 RX ADMIN — ONDANSETRON 4 MILLIGRAM(S): 8 TABLET, FILM COATED ORAL at 14:00

## 2019-03-02 RX ADMIN — Medication 200 MILLIGRAM(S): at 22:35

## 2019-03-02 RX ADMIN — Medication 650 MILLIGRAM(S): at 13:05

## 2019-03-02 NOTE — H&P ADULT - ASSESSMENT
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 HLD, and DM presenting with headache, nausea and left sided weakness. Patient with hx of seizure disorder, reports similar presentation of previous seizures. BP found to be elevated 188/127 on admission, imaging of head negative for CVA/ICH. Admit to telemetry, r/o CVA vs hypertensive urgency

## 2019-03-02 NOTE — H&P ADULT - PROBLEM SELECTOR PLAN 8
IMPROVE VTE Individual Risk Assessment          RISK                                                          Points  [  ] Previous VTE                                                3  [  ] Thrombophilia                                            2  [  ] Lower limb paralysis                                  2        (unable to hold up >15 seconds)    [  ] Current Cancer                                            2         (within 6 months)  [  ] Immobilization > 24 hrs                             1  [  ] ICU/CCU stay > 24 hours                           1  [ x ] Age > 60                                                        1    IMPROVE VTE Score: 1  VTE ppx with lovenox

## 2019-03-02 NOTE — H&P ADULT - PROBLEM SELECTOR PLAN 7
Chronic, stable  HbA1c Jan 2019 - 6.3, check HbA1c  Hypoglycemia protocol, accuchecks, low dose sliding scale coverage type2 Chronic, stable   HbA1c Jan 2019 - 6.3, check HbA1c  Hypoglycemia protocol, accuchecks, low dose sliding scale coverage

## 2019-03-02 NOTE — H&P ADULT - NSHPREVIEWOFSYSTEMS_GEN_ALL_CORE
REVIEW OF SYSTEMS  Constitutional: Fatigue; No fever, chills   Neuro: No numbness, paresthesias, vision changes Admits to headache  Resp:  No Cough, dyspnea on exertion   CVS: No chest pain, palpitations  GI: No abdominal pain, vomiting, diarrhea   : No dysuria, frequency, incontinence  Skin: No itching, burning, rashes  Msk: No joint pain, admits to weakness (transient)

## 2019-03-02 NOTE — H&P ADULT - PROBLEM SELECTOR PLAN 4
Chronic, elevated at present  Presents with elevated pressure, headache, and nausea, improved s/p losartan, coreg, zofran in ED  Maintain -170 per neuro recs until MRI can be performed  Routine hemodynamic monitoring Chronic, elevated at present  Presents with elevated pressure, headache, and nausea, improved s/p losartan, coreg, zofran in ED  BP OK to control per neuro recs  Routine hemodynamic monitoring

## 2019-03-02 NOTE — H&P ADULT - ATTENDING COMMENTS
pt seen and examine see above plan-86 year old female with PMHx of Hemorrhagic stroke (February 2015), seizures (July 2015), HTN. HLD, and DM  type2 presenting with headache, nausea and left sided weakness. Patient with hx of seizure disorder, reports similar presentation of previous seizures. BP found to be elevated 188/127 on admission, imaging of head negative for CVA/ICH. Admit to telemetry, r/o tia  vs hypertensive urgency - started  statin , neuro check q6 hr , dvt prophy Lovenox , mri of brain  , [facility] , ok to continue bp meds as per parameter , pt evaluation . pt seen and examine see above plan-86 year old female with PMHx of Hemorrhagic stroke (February 2015), seizures (July 2015), HTN. HLD, and DM  type2 presenting with headache, nausea and left sided weakness. Patient with hx of seizure disorder, reports similar presentation of previous seizures. BP found to be elevated 188/127 on admission, imaging of head negative for CVA/ICH. Admit to telemetry, r/o tia  vs hypertensive urgency  vs seizure - started  statin , neuro check q6 hr , dvt prophy Lovenox , mri of brain  , neuro [facility] , ok to continue bp meds as per parameter , pt evaluation .

## 2019-03-02 NOTE — H&P ADULT - PROBLEM SELECTOR PLAN 2
Longstanding hx of HTN  -Presents on admission with elevated /127, associated posterior headache, nausea.   -S/p carvedilol 12.5mg, losartan 50mg, PO, zofran 4mg IV in ED  -Continue routine hemodynamic monitoring  -Maintain -170 per neuro recs vs permissive htn sec  to tia   -Presents on admission with elevated /127, associated posterior headache, nausea.   -S/p carvedilol 12.5mg, losartan 50mg, PO, zofran 4mg IV in ED  -Continue routine hemodynamic monitoring  -Maintain -170 per neuro recs vs permissive htn sec  to tia   -Presents on admission with elevated /127, associated posterior headache, nausea.   -S/p carvedilol 12.5mg, losartan 50mg, PO, zofran 4mg IV in ED  -Continue home medications carvedilol and irbestartan  -Continue routine hemodynamic monitoring

## 2019-03-02 NOTE — H&P ADULT - PROBLEM SELECTOR PLAN 1
Patient presenting with left sided weakness, posterior headache, and nausea, similar presentation to previous seizure per patient.  - Hx of hemorrhagic stroke (2015)  - CT angio head/CT stroke protocol negative for acute process  - Weakness resolves at the time of evaluation, 5/5 strength in 4 of 4 extremities.   - Neuro (Dr Leblanc) aware, to see patient. Rec maintain -170 until MRI head performed.  -PT evaluation pending, f/u pending Patient presenting with left sided weakness, posterior headache, and nausea, similar presentation to previous seizure per patient.  - Hx of hemorrhagic stroke (2015)  - CT angio head/CT stroke protocol negative for acute process  - Weakness resolves at the time of evaluation, 5/5 strength in 4 of 4 extremities.   - Neuro (Dr Leblanc) aware, to see patient. Plan for MRI brain monday 3/5. Low suspicion of TIA, more likely seizure activity, ASA 81 not needed at present.  -PT  and S/S evaluation pending, f/u pending

## 2019-03-02 NOTE — H&P ADULT - PROBLEM SELECTOR PLAN 3
Hx of seizure disorder since hemorrhagic stroke (2015 july)  Home medications - carbamazepine ER 100mg TID, and 200mg ER qhs  Will continue home medication  Neuro (Dr Leblanc) aware, will see patient

## 2019-03-02 NOTE — ED PROVIDER NOTE - CARE PLAN
Principal Discharge DX:	HTN (hypertension) with goal to be determined  Secondary Diagnosis:	Weakness of left arm  Secondary Diagnosis:	Weakness of left lower extremity

## 2019-03-02 NOTE — H&P ADULT - NSHPSOCIALHISTORY_GEN_ALL_CORE
Tobacco: Denies ever smoking  EtOH: Denies  Recreational drugs: Denies  Lives: with son  Ambulates: independently, without assistive devices  ADLs: performs independently   Vaccinations: Received influenza vaccine (2018-19 season)

## 2019-03-02 NOTE — ED ADULT NURSE NOTE - OBJECTIVE STATEMENT
Presents to ER S/P seizure. Hx of seizures. Son gave a dose of Carbamazepine ER at home. Pt is A.Ox4 here in ER, c/o headache.

## 2019-03-02 NOTE — ED ADULT NURSE NOTE - NSIMPLEMENTINTERV_GEN_ALL_ED
Implemented All Fall with Harm Risk Interventions:  [place] to call system. Call bell, personal items and telephone within reach. Instruct patient to call for assistance. Room bathroom lighting operational. Non-slip footwear when patient is off stretcher. Physically safe environment: no spills, clutter or unnecessary equipment. Stretcher in lowest position, wheels locked, appropriate side rails in place. Provide visual cue, wrist band, yellow gown, etc. Monitor gait and stability. Monitor for mental status changes and reorient to person, place, and time. Review medications for side effects contributing to fall risk. Reinforce activity limits and safety measures with patient and family. Provide visual clues: red socks.

## 2019-03-02 NOTE — H&P ADULT - PROBLEM SELECTOR PLAN 5
Chronic, stable  Continue home rosuvastatin, family aware to bring from medication  Check AM lipid profile

## 2019-03-02 NOTE — ED ADULT NURSE NOTE - FAMILY HISTORY
Father  Still living? Unknown  Family history of basal cell carcinoma, Age at diagnosis: Age Unknown     Mother  Still living? Unknown  Family history of heart disease, Age at diagnosis: Age Unknown

## 2019-03-02 NOTE — H&P ADULT - HISTORY OF PRESENT ILLNESS
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 HLD, and DM bib EMS for evaluation of left sided UE/LE weakness. Per son, he saw her well when he left for work at 4:30am this morning, but did not  his phone call at 9:30am to remind her to take her seizure medication. He became concerned and asked his brother to check on their mother, who found her sitting on the toilet, unable to get up due to left sided weakness. She also complained of "heaviness" in her tongue, nausea, and a posterior headache, which are symptoms she associates with her previous seizures. Patient reports having been admitted to hospital for similar presentation in the past. Denies any chest pain, SOB, v/d, fever, chills.    ED vitals T 98.9F, HR 83 /127 SpO2 100% room air, RR 16  Labs showed CBC and CMP WNL, carbamazepine level therapeutic, UA with 75 rpotein and 50 glucose. CXR showing hyperaerated lungs with chronic changes, CT angio head and neck with IV contrast showing atheromatous changes but no hemodynamically significant stenosis, aneurysm or AV malformation. CT head stroke protocol shows atrophy and chronic small vessel disease.  EKG:  NSR at 91bpm     patient seen and evaluated at the beside, seen resting comfortably, NAD. Reports continued mild headache at the time of exam, but denies any chest pain, SOB, v/d, vision changes, or weakness.

## 2019-03-02 NOTE — ED PROVIDER NOTE - OBJECTIVE STATEMENT
87 female presents to ER by ambulance with report of weakness. Son at the bedside states he left this morning 4:30am to go to work, states patient was well, ambulated to the bathroom, then son called her at 9am and noted that something was wrong, patient not answering the phone, states found the patient sitting on toilet with left sided weakness, unable to get up, no signs of trauma, patient was not confused. Patient c/o headache and left arm numbness.

## 2019-03-02 NOTE — H&P ADULT - PROBLEM SELECTOR PLAN 6
Chronic, longstanding  Last known Echo (4/2017) showing EF 65% with grade I diastolic dysfunction, aortic sclerosis, TR, Mild Pulm HTN.   Will check echo

## 2019-03-03 LAB
ANION GAP SERPL CALC-SCNC: 3 MMOL/L — LOW (ref 5–17)
BASOPHILS # BLD AUTO: 0.05 K/UL — SIGNIFICANT CHANGE UP (ref 0–0.2)
BASOPHILS NFR BLD AUTO: 1.2 % — SIGNIFICANT CHANGE UP (ref 0–2)
BUN SERPL-MCNC: 14 MG/DL — SIGNIFICANT CHANGE UP (ref 7–23)
CALCIUM SERPL-MCNC: 8.1 MG/DL — LOW (ref 8.5–10.1)
CHLORIDE SERPL-SCNC: 105 MMOL/L — SIGNIFICANT CHANGE UP (ref 96–108)
CHOLEST SERPL-MCNC: 178 MG/DL — SIGNIFICANT CHANGE UP (ref 10–199)
CO2 SERPL-SCNC: 34 MMOL/L — HIGH (ref 22–31)
CREAT SERPL-MCNC: 0.78 MG/DL — SIGNIFICANT CHANGE UP (ref 0.5–1.3)
EOSINOPHIL # BLD AUTO: 0.05 K/UL — SIGNIFICANT CHANGE UP (ref 0–0.5)
EOSINOPHIL NFR BLD AUTO: 1.2 % — SIGNIFICANT CHANGE UP (ref 0–6)
GLUCOSE SERPL-MCNC: 98 MG/DL — SIGNIFICANT CHANGE UP (ref 70–99)
HCT VFR BLD CALC: 35.3 % — SIGNIFICANT CHANGE UP (ref 34.5–45)
HDLC SERPL-MCNC: 54 MG/DL — SIGNIFICANT CHANGE UP
HGB BLD-MCNC: 11.6 G/DL — SIGNIFICANT CHANGE UP (ref 11.5–15.5)
IMM GRANULOCYTES NFR BLD AUTO: 0.5 % — SIGNIFICANT CHANGE UP (ref 0–1.5)
LIPID PNL WITH DIRECT LDL SERPL: 102 MG/DL — HIGH
LYMPHOCYTES # BLD AUTO: 1.09 K/UL — SIGNIFICANT CHANGE UP (ref 1–3.3)
LYMPHOCYTES # BLD AUTO: 25.1 % — SIGNIFICANT CHANGE UP (ref 13–44)
MCHC RBC-ENTMCNC: 29.8 PG — SIGNIFICANT CHANGE UP (ref 27–34)
MCHC RBC-ENTMCNC: 32.9 GM/DL — SIGNIFICANT CHANGE UP (ref 32–36)
MCV RBC AUTO: 90.7 FL — SIGNIFICANT CHANGE UP (ref 80–100)
MONOCYTES # BLD AUTO: 0.48 K/UL — SIGNIFICANT CHANGE UP (ref 0–0.9)
MONOCYTES NFR BLD AUTO: 11.1 % — SIGNIFICANT CHANGE UP (ref 2–14)
NEUTROPHILS # BLD AUTO: 2.65 K/UL — SIGNIFICANT CHANGE UP (ref 1.8–7.4)
NEUTROPHILS NFR BLD AUTO: 60.9 % — SIGNIFICANT CHANGE UP (ref 43–77)
NRBC # BLD: 0 /100 WBCS — SIGNIFICANT CHANGE UP (ref 0–0)
PLATELET # BLD AUTO: 176 K/UL — SIGNIFICANT CHANGE UP (ref 150–400)
POTASSIUM SERPL-MCNC: 3.9 MMOL/L — SIGNIFICANT CHANGE UP (ref 3.5–5.3)
POTASSIUM SERPL-SCNC: 3.9 MMOL/L — SIGNIFICANT CHANGE UP (ref 3.5–5.3)
RBC # BLD: 3.89 M/UL — SIGNIFICANT CHANGE UP (ref 3.8–5.2)
RBC # FLD: 13.5 % — SIGNIFICANT CHANGE UP (ref 10.3–14.5)
SODIUM SERPL-SCNC: 142 MMOL/L — SIGNIFICANT CHANGE UP (ref 135–145)
TOTAL CHOLESTEROL/HDL RATIO MEASUREMENT: 3.3 RATIO — SIGNIFICANT CHANGE UP (ref 3.3–7.1)
TRIGL SERPL-MCNC: 108 MG/DL — SIGNIFICANT CHANGE UP (ref 10–149)
WBC # BLD: 4.34 K/UL — SIGNIFICANT CHANGE UP (ref 3.8–10.5)
WBC # FLD AUTO: 4.34 K/UL — SIGNIFICANT CHANGE UP (ref 3.8–10.5)

## 2019-03-03 PROCEDURE — 93306 TTE W/DOPPLER COMPLETE: CPT | Mod: 26

## 2019-03-03 PROCEDURE — 99233 SBSQ HOSP IP/OBS HIGH 50: CPT

## 2019-03-03 RX ORDER — IRBESARTAN 75 MG/1
150 TABLET ORAL AT BEDTIME
Qty: 0 | Refills: 0 | Status: DISCONTINUED | OUTPATIENT
Start: 2019-03-03 | End: 2019-03-05

## 2019-03-03 RX ADMIN — IRBESARTAN 150 MILLIGRAM(S): 75 TABLET ORAL at 21:46

## 2019-03-03 RX ADMIN — Medication 200 MILLIGRAM(S): at 22:30

## 2019-03-03 RX ADMIN — ENOXAPARIN SODIUM 40 MILLIGRAM(S): 100 INJECTION SUBCUTANEOUS at 12:18

## 2019-03-03 RX ADMIN — CARVEDILOL PHOSPHATE 12.5 MILLIGRAM(S): 80 CAPSULE, EXTENDED RELEASE ORAL at 18:27

## 2019-03-03 RX ADMIN — ROSUVASTATIN CALCIUM 10 MILLIGRAM(S): 5 TABLET ORAL at 21:46

## 2019-03-03 RX ADMIN — CARVEDILOL PHOSPHATE 12.5 MILLIGRAM(S): 80 CAPSULE, EXTENDED RELEASE ORAL at 09:13

## 2019-03-03 RX ADMIN — Medication 100 MILLIGRAM(S): at 16:06

## 2019-03-03 RX ADMIN — Medication 100 MILLIGRAM(S): at 22:30

## 2019-03-03 RX ADMIN — Medication 100 MILLIGRAM(S): at 09:13

## 2019-03-03 RX ADMIN — Medication 1: at 12:17

## 2019-03-03 NOTE — PROGRESS NOTE ADULT - SUBJECTIVE AND OBJECTIVE BOX
Neurology follow up note    MAJOR [id number]      Interval History:    Patient feels ok no new complaints.    MEDICATIONS    carBAMazepine ER Capsule 100 milliGRAM(s) Oral <User Schedule>  carBAMazepine XR Tablet 200 milliGRAM(s) Oral <User Schedule>  carvedilol 12.5 milliGRAM(s) Oral every 12 hours  dextrose 40% Gel 15 Gram(s) Oral once PRN  dextrose 5%. 1000 milliLiter(s) IV Continuous <Continuous>  dextrose 50% Injectable 12.5 Gram(s) IV Push once  dextrose 50% Injectable 25 Gram(s) IV Push once  dextrose 50% Injectable 25 Gram(s) IV Push once  enoxaparin Injectable 40 milliGRAM(s) SubCutaneous daily  glucagon  Injectable 1 milliGRAM(s) IntraMuscular once PRN  insulin lispro (HumaLOG) corrective regimen sliding scale   SubCutaneous three times a day before meals  losartan 50 milliGRAM(s) Oral daily  rosuvastatin 10 milliGRAM(s) Oral at bedtime      Allergies    aspirin (Unknown)  sulfa drugs (Unknown)    Intolerances    Lipitor (Other; Muscle Pain)          Vital Signs Last 24 Hrs  T(C): 37.3 (03 Mar 2019 11:26), Max: 37.3 (03 Mar 2019 11:26)  T(F): 99.1 (03 Mar 2019 11:26), Max: 99.1 (03 Mar 2019 11:26)  HR: 74 (03 Mar 2019 11:26) (66 - 77)  BP: 103/64 (03 Mar 2019 11:26) (103/64 - 158/74)  BP(mean): --  RR: 16 (03 Mar 2019 11:26) (15 - 17)  SpO2: 93% (03 Mar 2019 11:26) (93% - 99%)      REVIEW OF SYSTEMS:     Constitutional: No fever, chills, fatigue, weakness  Eyes: no eye pain, visual disturbances, or discharge  ENT:  No difficulty hearing, tinnitus, vertigo; No sinus or throat pain  Neck: No pain or stiffness  Respiratory: No cough, dyspnea, wheezing   Cardiovascular: No chest pain, palpitations,   Gastrointestinal: No abdominal or epigastric pain. No nausea, vomiting  No diarrhea or constipation.   Genitourinary: No dysuria, frequency, hematuria or incontinence  Neurological: No headaches, lightheadedness, vertigo, numbness or tremors  Psychiatric: No depression, anxiety, mood swings or difficulty sleeping  Musculoskeletal: No joint pain or swelling; No muscle, back or extremity pain  Skin: No itching, burning, rashes or lesions   Lymph Nodes: No enlarged glands  Endocrine: No heat or cold intolerance; No hair loss   Allergy and Immunologic: No hives or eczema    On Neurological Examination:      The patient is awake and alert.    Extraocular movements were intact.    Pupils were equal, round, and reactive bilaterally, 3 mm to 2 mm.    The patient appeared to have poor vision out of both left and right eyes, had difficulty naming number of fingers.    The patient had subtle decrease of the left nasolabial fold.    Motor -Right upper is 4+/5, left upper was 4/5.  Bilateral lower extremities were 4-/5, would say age-appropriate.   Sensory:  Bilateral upper and lower intact to light touch.    Follow simple commands    GENERAL Exam: Nontoxic , No Acute Distress   	  HEENT:  normocephalic, atraumatic  		  LUNGS: Clear bilaterally    	  HEART: Normal S1S2   No murmur RRR        	  GI/ ABDOMEN:  Soft  Non tender    EXTREMITIES:   No Edema  No Clubbing  No Cyanosis     MUSCULOSKELETAL: decreased Range of Motion all 4 extremities   	   SKIN: Normal  No Ecchymosis               LABS:  CBC Full  -  ( 03 Mar 2019 07:00 )  WBC Count : 4.34 K/uL  Hemoglobin : 11.6 g/dL  Hematocrit : 35.3 %  Platelet Count - Automated : 176 K/uL  Mean Cell Volume : 90.7 fl  Mean Cell Hemoglobin : 29.8 pg  Mean Cell Hemoglobin Concentration : 32.9 gm/dL  Auto Neutrophil # : 2.65 K/uL  Auto Lymphocyte # : 1.09 K/uL  Auto Monocyte # : 0.48 K/uL  Auto Eosinophil # : 0.05 K/uL  Auto Basophil # : 0.05 K/uL  Auto Neutrophil % : 60.9 %  Auto Lymphocyte % : 25.1 %  Auto Monocyte % : 11.1 %  Auto Eosinophil % : 1.2 %  Auto Basophil % : 1.2 %    Urinalysis Basic - ( 02 Mar 2019 11:41 )    Color: Yellow / Appearance: Clear / S.010 / pH: x  Gluc: x / Ketone: Negative  / Bili: Negative / Urobili: Negative   Blood: x / Protein: 75 mg/dL / Nitrite: Negative   Leuk Esterase: Negative / RBC: 0-2 /HPF / WBC 0-2   Sq Epi: x / Non Sq Epi: Negative / Bacteria: Negative          142  |  105  |  14  ----------------------------<  98  3.9   |  34<H>  |  0.78    Ca    8.1<L>      03 Mar 2019 07:00    TPro  6.8  /  Alb  3.2<L>  /  TBili  0.3  /  DBili  x   /  AST  17  /  ALT  19  /  AlkPhos  78      Hemoglobin A1C: Hemoglobin A1C, Whole Blood: 6.5 % ( @ 19:05)      LIVER FUNCTIONS - ( 02 Mar 2019 11:41 )  Alb: 3.2 g/dL / Pro: 6.8 g/dL / ALK PHOS: 78 U/L / ALT: 19 U/L / AST: 17 U/L / GGT: x           Vitamin B12   PT/INR - ( 02 Mar 2019 11:41 )   PT: 11.9 sec;   INR: 1.05 ratio         PTT - ( 02 Mar 2019 11:41 )  PTT:30.7 sec      RADIOLOGY    ANALYSIS AND PLAN:  This is an 87-year-old with an episode of being found on the ground and history of intercerebral hemorrhage and seizures.  1.	For episode of being found on the ground, questionable this could have been a seizure event with Mckinley's paralysis versus possibly a TIA, rule out cerebrovascular accident.  2.	We will plan for an MRI imaging of the brain.  I would recommend to rule out any new cerebrovascular accident.  3.	For history of seizures, which could be the possibility, the patient's carbamazepine levels now appear to be within the therapeutic range at 8.2.  We do have room to adjust this.  The patient does feel way too sleepy with 200 in the morning, so the patient takes the first dose at 9 a.m. and her night time dose at 11, it is all interspersed 100 mg around 4 p.m.  4.	Spoke with two sons and the patient in great detail, they understand the plan with adjustment of her carbamazepine.  They will follow with her outside neurologist.  If MRI is normal, we will plan on discharging the patient.  5.	[phone] cell hime [phone] spoke to son shannon  6.	patient refuses EEG   7.	if MRI normal will plan on dc     Physical therapy evaluation as tolerated  OOB to chair/ambulation with assistance only if possible.    Greater than 45 minutes spent in direct patient care reviewing  the notes, lab data/ imaging , discussion with multidisciplinary team.

## 2019-03-03 NOTE — PROGRESS NOTE ADULT - PROBLEM SELECTOR PLAN 2
continue carbamazepine  -doubt seizure presently as no post ictal state described by family  - neuro consult (Dr Parr)  - will monitor

## 2019-03-03 NOTE — PHYSICAL THERAPY INITIAL EVALUATION ADULT - ADDITIONAL COMMENTS
Pt states she lives with son in [facility] with 2 steps to enter with side post, was not using AD for ambulation although has RW, has walk in shower with shower chair and high rised toilet seat. Pt states she was independent with transfers and ambulation, is at home alone majority of day while son is at work.

## 2019-03-03 NOTE — PROGRESS NOTE ADULT - PROBLEM SELECTOR PLAN 1
aspirin contraindicated d/t history of brain bleed  f/u MRI  - Neuro recs apprec  - will hold anticoagulation pending cardio and neuro recs   no significant change in imaging from previous admission  - f/u MRI, echo

## 2019-03-04 ENCOUNTER — TRANSCRIPTION ENCOUNTER (OUTPATIENT)
Age: 84
End: 2019-03-04

## 2019-03-04 DIAGNOSIS — R29.898 OTHER SYMPTOMS AND SIGNS INVOLVING THE MUSCULOSKELETAL SYSTEM: ICD-10-CM

## 2019-03-04 DIAGNOSIS — I10 ESSENTIAL (PRIMARY) HYPERTENSION: ICD-10-CM

## 2019-03-04 LAB
ANION GAP SERPL CALC-SCNC: 8 MMOL/L — SIGNIFICANT CHANGE UP (ref 5–17)
BUN SERPL-MCNC: 17 MG/DL — SIGNIFICANT CHANGE UP (ref 7–23)
CALCIUM SERPL-MCNC: 8.1 MG/DL — LOW (ref 8.5–10.1)
CHLORIDE SERPL-SCNC: 109 MMOL/L — HIGH (ref 96–108)
CO2 SERPL-SCNC: 24 MMOL/L — SIGNIFICANT CHANGE UP (ref 22–31)
CREAT SERPL-MCNC: 0.71 MG/DL — SIGNIFICANT CHANGE UP (ref 0.5–1.3)
GLUCOSE SERPL-MCNC: 85 MG/DL — SIGNIFICANT CHANGE UP (ref 70–99)
POTASSIUM SERPL-MCNC: 3.9 MMOL/L — SIGNIFICANT CHANGE UP (ref 3.5–5.3)
POTASSIUM SERPL-SCNC: 3.9 MMOL/L — SIGNIFICANT CHANGE UP (ref 3.5–5.3)
SODIUM SERPL-SCNC: 141 MMOL/L — SIGNIFICANT CHANGE UP (ref 135–145)

## 2019-03-04 PROCEDURE — 70551 MRI BRAIN STEM W/O DYE: CPT | Mod: 26

## 2019-03-04 PROCEDURE — 99233 SBSQ HOSP IP/OBS HIGH 50: CPT | Mod: GC

## 2019-03-04 RX ORDER — IRBESARTAN 75 MG/1
1 TABLET ORAL
Qty: 0 | Refills: 0 | DISCHARGE
Start: 2019-03-04

## 2019-03-04 RX ORDER — CARBAMAZEPINE 200 MG
300 TABLET ORAL
Qty: 0 | Refills: 0 | Status: DISCONTINUED | OUTPATIENT
Start: 2019-03-04 | End: 2019-03-04

## 2019-03-04 RX ORDER — CARBAMAZEPINE 200 MG
100 TABLET ORAL
Qty: 0 | Refills: 0 | Status: DISCONTINUED | OUTPATIENT
Start: 2019-03-04 | End: 2019-03-04

## 2019-03-04 RX ORDER — ROSUVASTATIN CALCIUM 5 MG/1
1 TABLET ORAL
Qty: 0 | Refills: 0 | COMMUNITY

## 2019-03-04 RX ORDER — CARBAMAZEPINE 200 MG
1 TABLET ORAL
Qty: 0 | Refills: 0 | COMMUNITY

## 2019-03-04 RX ORDER — CARBAMAZEPINE 200 MG
3 TABLET ORAL
Qty: 0 | Refills: 0 | COMMUNITY
Start: 2019-03-04

## 2019-03-04 RX ORDER — CARBAMAZEPINE 200 MG
3 TABLET ORAL
Qty: 90 | Refills: 0 | OUTPATIENT
Start: 2019-03-04 | End: 2019-04-02

## 2019-03-04 RX ORDER — ENOXAPARIN SODIUM 100 MG/ML
40 INJECTION SUBCUTANEOUS
Qty: 0 | Refills: 0 | DISCHARGE
Start: 2019-03-04

## 2019-03-04 RX ORDER — CARVEDILOL PHOSPHATE 80 MG/1
1 CAPSULE, EXTENDED RELEASE ORAL
Qty: 0 | Refills: 0 | COMMUNITY

## 2019-03-04 RX ORDER — CARBAMAZEPINE 200 MG
1 TABLET ORAL
Qty: 0 | Refills: 0 | COMMUNITY
Start: 2019-03-04

## 2019-03-04 RX ORDER — UBIDECARENONE 100 MG
1 CAPSULE ORAL
Qty: 0 | Refills: 0 | COMMUNITY

## 2019-03-04 RX ORDER — METFORMIN HYDROCHLORIDE 850 MG/1
2 TABLET ORAL
Qty: 0 | Refills: 0 | COMMUNITY

## 2019-03-04 RX ORDER — CARVEDILOL PHOSPHATE 80 MG/1
1 CAPSULE, EXTENDED RELEASE ORAL
Qty: 0 | Refills: 0 | DISCHARGE
Start: 2019-03-04

## 2019-03-04 RX ORDER — CARBAMAZEPINE 200 MG
300 TABLET ORAL
Qty: 0 | Refills: 0 | Status: DISCONTINUED | OUTPATIENT
Start: 2019-03-04 | End: 2019-03-05

## 2019-03-04 RX ORDER — IRBESARTAN 75 MG/1
1 TABLET ORAL
Qty: 0 | Refills: 0 | COMMUNITY

## 2019-03-04 RX ADMIN — Medication 100 MILLIGRAM(S): at 16:06

## 2019-03-04 RX ADMIN — ROSUVASTATIN CALCIUM 10 MILLIGRAM(S): 5 TABLET ORAL at 22:26

## 2019-03-04 RX ADMIN — CARVEDILOL PHOSPHATE 12.5 MILLIGRAM(S): 80 CAPSULE, EXTENDED RELEASE ORAL at 08:33

## 2019-03-04 RX ADMIN — Medication 300 MILLIGRAM(S): at 22:25

## 2019-03-04 RX ADMIN — CARVEDILOL PHOSPHATE 12.5 MILLIGRAM(S): 80 CAPSULE, EXTENDED RELEASE ORAL at 20:59

## 2019-03-04 RX ADMIN — Medication 1 DROP(S): at 20:59

## 2019-03-04 RX ADMIN — ENOXAPARIN SODIUM 40 MILLIGRAM(S): 100 INJECTION SUBCUTANEOUS at 11:59

## 2019-03-04 RX ADMIN — Medication 100 MILLIGRAM(S): at 22:25

## 2019-03-04 RX ADMIN — IRBESARTAN 150 MILLIGRAM(S): 75 TABLET ORAL at 22:25

## 2019-03-04 RX ADMIN — Medication 100 MILLIGRAM(S): at 09:02

## 2019-03-04 NOTE — DISCHARGE NOTE PROVIDER - HOSPITAL COURSE
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 HLD, and DM type 2 admitted with L UE/LE weakness, rule out CVA vs seizure with elevated blood pressure.  Neurology (Dr. Leblanc), Speech and Swallow and Physical therapy consulted. CTA Head and Neck negative for acute stroke, showing atrophy and chronic small vessel disease, atheromatous changes, negative for significant stenosis, aneurysm or AV malformation.    CXR with hyperaerated lungs, no acute findings.     Echo showing normal LV function and grade 1 diastolic dysfunction.     Carbamazepine level therapeutic, MRI brain......    Patient declined EEG, to follow-up outpatient    Blood pressure controlled.         Speech and Swallow deemed no deficits     Physical therapy recommended subacute rehab, patient discharged to [facility] with outpatient follow-up.        Patient seen and examined on day of discharge, stable for discharge to subacute rehab.        Vital Signs Last 24 Hrs    T(C): 36.7 (04 Mar 2019 08:00), Max: 37.4 (03 Mar 2019 15:23)    T(F): 98 (04 Mar 2019 08:00), Max: 99.3 (03 Mar 2019 15:23)    HR: 80 (04 Mar 2019 08:21) (67 - 80)    BP: 149/74 (04 Mar 2019 08:21) (103/64 - 180/76)    RR: 16 (04 Mar 2019 08:00) (16 - 18)    SpO2: 98% (04 Mar 2019 08:00) (93% - 98%) 86F PMHx Hemorrhagic stroke (February 2015), seizures (July 2015), HTN. HLD, and DM type 2 admitted with L UE/LE weakness, rule out CVA vs seizure with elevated blood pressure.  Neurology (Dr. Leblanc), Speech and Swallow and Physical therapy consulted. CTA Head and Neck negative for acute stroke, showing atrophy and chronic small vessel disease, atheromatous changes, negative for significant stenosis, aneurysm or AV malformation.    CXR with hyperaerated lungs, no acute findings.     Echo showing normal LV function and grade 1 diastolic dysfunction.     Carbamazepine level therapeutic, MRI brain negative for acute infarct, showing severe periventricular chronic microvascular ischemic changes.         Patient declined EEG, to follow-up outpatient    Blood pressure controlled.         Speech and Swallow deemed no deficits     Physical therapy recommended subacute rehab, patient discharged to [facility] with outpatient follow-up.        Patient seen and examined on day of discharge, stable for discharge to subacute rehab. 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 HLD, and DM type 2 admitted with L UE/LE weakness, rule out CVA vs seizure with elevated blood pressure.  Neurology (Dr. Leblanc), Speech and Swallow and Physical therapy consulted. CTA Head and Neck negative for acute stroke, showing atrophy and chronic small vessel disease, atheromatous changes, negative for significant stenosis, aneurysm or AV malformation.    CXR with hyperaerated lungs, no acute findings.     Echo showing normal LV function and grade 1 diastolic dysfunction.     Carbamazepine level therapeutic, MRI brain negative for acute infarct, showing severe periventricular chronic microvascular ischemic changes.         Patient declined EEG, to follow-up outpatient    Blood pressure controlled.         Speech and Swallow deemed no deficits     Physical therapy recommended subacute rehab, patient discharged to [facility] with outpatient follow-up.        Patient seen and examined on day of discharge, stable for discharge to subacute rehab.        Vital Signs Last 24 Hrs    T(C): 36.7 (05 Mar 2019 07:24), Max: 36.8 (04 Mar 2019 20:07)    T(F): 98.1 (05 Mar 2019 07:24), Max: 98.2 (04 Mar 2019 20:07)    HR: 64 (05 Mar 2019 07:24) (62 - 77)    BP: 118/69 (05 Mar 2019 07:24) (118/69 - 166/97)    RR: 17 (05 Mar 2019 07:24) (16 - 17)    SpO2: 96% (05 Mar 2019 07:24) (96% - 99%)        Physical Exam:    General: Well developed, well nourished, No Acute Distress    HEENT: Normocephallic Atraumatic, EOMI bl, moist mucous membranes     Neurology: AA&Ox3    Respiratory: Clear To Auscultation B/L, No Wheezes, rhonchi or rales    CV: Regular Rate and Rhythm, +S1/S2, no murmurs, rubs or gallops    Abdominal: Soft, Non-Tender, Non-Distended +Bowel Soundsx4    Extremities: No Clubbing, cyanosis or edema, + peripheral pulses    Skin: warm, dry, normal color, no rash or abnormal lesions 86F PMHx Hemorrhagic stroke (February 2015), seizures (July 2015), HTN. HLD, and DM type 2 admitted with L UE/LE weakness, rule out CVA vs seizure with elevated blood pressure.  Neurology (Dr. Leblanc), Speech and Swallow and Physical therapy consulted. CTA Head and Neck negative for acute stroke, showing atrophy and chronic small vessel disease, atheromatous changes, negative for significant stenosis, aneurysm or AV malformation.    CXR with hyperaerated lungs, no acute findings.     Echo showing normal LV function and grade 1 diastolic dysfunction.     Carbamazepine level therapeutic, MRI brain negative for acute infarct, showing severe periventricular chronic microvascular ischemic changes.         Patient declined EEG, to follow-up outpatient    Blood pressure controlled.         Speech and Swallow deemed no deficits     Physical therapy recommended subacute rehab, patient discharged to [facility] with outpatient follow-up.        Patient seen and examined on day of discharge, stable for discharge to subacute rehab.        Vital Signs Last 24 Hrs    T(C): 36.7 (05 Mar 2019 07:24), Max: 36.8 (04 Mar 2019 20:07)    T(F): 98.1 (05 Mar 2019 07:24), Max: 98.2 (04 Mar 2019 20:07)    HR: 64 (05 Mar 2019 07:24) (62 - 77)    BP: 118/69 (05 Mar 2019 07:24) (118/69 - 166/97)    RR: 17 (05 Mar 2019 07:24) (16 - 17)    SpO2: 96% (05 Mar 2019 07:24) (96% - 99%)        Physical Exam:    General: Well developed, well nourished, No Acute Distress    HEENT: Normocephallic Atraumatic, EOMI bl, moist mucous membranes     Neurology: AA&Ox3    Respiratory: Clear To Auscultation B/L, No Wheezes, rhonchi or rales    CV: Regular Rate and Rhythm, +S1/S2, no murmurs, rubs or gallops    Abdominal: Soft, Non-Tender, Non-Distended +Bowel Soundsx4    Extremities: No Clubbing, cyanosis or edema, + peripheral pulses    Skin: warm, dry, normal color, no rash or abnormal lesions        Time spent: 70 minutes

## 2019-03-04 NOTE — PROGRESS NOTE ADULT - PROBLEM SELECTOR PLAN 1
aspirin contraindicated d/t history of brain bleed  - f/u MRI, echo  -Neuro Dr. Parr following   -d/c planning pending MRI results aspirin contraindicated d/t history of brain bleed  - f/u MRI, echo  -Neuro Dr. Parr following   -history of hemorrhagic stroke  -anticoagulation/antiplatelet contraindicated as per pt's neurologist  -d/c planning pending MRI results

## 2019-03-04 NOTE — PROGRESS NOTE ADULT - ATTENDING COMMENTS
86y old  Female who presents with a chief complaint of couldn't walk, left sided weakness, and headache.a/w r/o CVA Plan: MR head wnl, apprec neuro recs, dc for tomorrow to rehab as per PT recs, monitor clinical course, likely seizure final dc diagnosis

## 2019-03-04 NOTE — PROGRESS NOTE ADULT - PROBLEM SELECTOR PLAN 5
history of hemorrhagic stroke  -family states anticoagulation contraindicated as per pt's neurologist history of hemorrhagic stroke  -anticoagulation/antiplatelet contraindicated as per pt's neurologist

## 2019-03-04 NOTE — PROGRESS NOTE ADULT - SUBJECTIVE AND OBJECTIVE BOX
Patient is a 87y old  Female who presents with a chief complaint of left sided weakness (04 Mar 2019 10:55)      ----  INTERVAL HPI/OVERNIGHT EVENTS: Pt seen and evaluated at the bedside. No acute overnight events occurred. Patient expresses desire to go home/to rehab.     ----  PAST MEDICAL & SURGICAL HISTORY:  Seizure  Hemorrhagic stroke  CVA (cerebral infarction)  HTN (hypertension)  Hypercholesteremia  Diabetes mellitus  History of retinal tear: surgical repair  Basal cell cancer: s/p Mohs surgery  History of hysterectomy  S/P cataract surgery: bilateral  History of arthroscopy of knee: Right      FAMILY HISTORY:  Family history of heart disease (Mother)  Family history of basal cell carcinoma (Father)      ----  MEDICATIONS  (STANDING):  carBAMazepine ER Capsule 100 milliGRAM(s) Oral <User Schedule>  carvedilol 12.5 milliGRAM(s) Oral every 12 hours  dextrose 5%. 1000 milliLiter(s) (50 mL/Hr) IV Continuous <Continuous>  dextrose 50% Injectable 12.5 Gram(s) IV Push once  dextrose 50% Injectable 25 Gram(s) IV Push once  dextrose 50% Injectable 25 Gram(s) IV Push once  enoxaparin Injectable 40 milliGRAM(s) SubCutaneous daily  insulin lispro (HumaLOG) corrective regimen sliding scale   SubCutaneous three times a day before meals  irbesartan 150 milliGRAM(s) Oral at bedtime  rosuvastatin 10 milliGRAM(s) Oral at bedtime    MEDICATIONS  (PRN):  dextrose 40% Gel 15 Gram(s) Oral once PRN Blood Glucose LESS THAN 70 milliGRAM(s)/deciLiter  glucagon  Injectable 1 milliGRAM(s) IntraMuscular once PRN Glucose <70 milliGRAM(s)/deciLiter      ----  REVIEW OF SYSTEMS:  CONSTITUTIONAL: denies fever, chills, fatigue, weakness  HEENT: denies blurred vision, sore throat  CARDIOVASCULAR: denies chest pain, chest pressure, palpitations  RESPIRATORY: denies shortness of breath, sputum production  GASTROINTESTINAL: denies nausea, vomiting, diarrhea, abdominal pain  NEUROLOGICAL: denies numbness, headache, focal weakness  MUSCULOSKELETAL: denies new joint pain, muscle aches  HEMATOLOGIC: denies gross bleeding, bruising  LYMPHATICS: denies enlarged lymph nodes, extremity swelling    ----  PHYSICAL EXAM:  GENERAL: patient appears well, no acute distress, appropriate, pleasant  EYES: sclera clear, no exudates  ENMT: oropharynx clear without erythema, no exudates, moist mucous membranes  LUNGS: good air entry bilaterally, clear to auscultation, symmetric breath sounds, no wheezing or rhonchi appreciated  HEART: soft S1/S2, regular rate and rhythm, no murmurs noted, no lower extremity edema  GASTROINTESTINAL: abdomen is soft, nontender, nondistended, normoactive bowel sounds, no palpable masses  MUSCULOSKELETAL: no clubbing or cyanosis, no obvious deformity  NEUROLOGIC: awake, alert, oriented x3, good muscle tone in 4 extremities  PSYCHIATRIC: mood is good, affect is congruent, linear and logical thought process    T(C): 36.7 (03-04-19 @ 08:00), Max: 37.4 (03-03-19 @ 15:23)  HR: 80 (03-04-19 @ 08:21) (67 - 80)  BP: 149/74 (03-04-19 @ 08:21) (103/64 - 180/76)  RR: 16 (03-04-19 @ 08:00) (16 - 18)  SpO2: 98% (03-04-19 @ 08:00) (93% - 98%)  Wt(kg): --    ----  I&O's Summary    03 Mar 2019 07:01  -  04 Mar 2019 07:00  --------------------------------------------------------  IN: 220 mL / OUT: 0 mL / NET: 220 mL        LABS:                        12.9   5.31  )-----------( 196      ( 04 Mar 2019 08:51 )             39.7     03-04    141  |  109<H>  |  17  ----------------------------<  85  3.9   |  24  |  0.71    Ca    8.1<L>      04 Mar 2019 07:43          CAPILLARY BLOOD GLUCOSE      POCT Blood Glucose.: 91 mg/dL (04 Mar 2019 08:16)  POCT Blood Glucose.: 126 mg/dL (03 Mar 2019 21:51)  POCT Blood Glucose.: 112 mg/dL (03 Mar 2019 16:05)  POCT Blood Glucose.: 151 mg/dL (03 Mar 2019 12:04)                ----  Personally reviewed:  Vital sign trends: x] yes    [  ] no     [  ] n/a  Laboratory results: [x  ] yes    [  ] no     [  ] n/a  Radiology results: [ x ] yes    [  ] no     [  ] n/a  Culture results: [ x ] yes    [  ] no     [  ] n/a  Consultant recommendations: [ x ] yes    [  ] no     [  ] n/a

## 2019-03-04 NOTE — PROGRESS NOTE ADULT - SUBJECTIVE AND OBJECTIVE BOX
Neurology follow up note    MAJOR [id number]      Interval History:    Patient feels ok no new complaints.    MEDICATIONS    carBAMazepine ER Capsule 100 milliGRAM(s) Oral <User Schedule>  carBAMazepine XR Tablet 200 milliGRAM(s) Oral <User Schedule>  carvedilol 12.5 milliGRAM(s) Oral every 12 hours  dextrose 40% Gel 15 Gram(s) Oral once PRN  dextrose 5%. 1000 milliLiter(s) IV Continuous <Continuous>  dextrose 50% Injectable 12.5 Gram(s) IV Push once  dextrose 50% Injectable 25 Gram(s) IV Push once  dextrose 50% Injectable 25 Gram(s) IV Push once  enoxaparin Injectable 40 milliGRAM(s) SubCutaneous daily  glucagon  Injectable 1 milliGRAM(s) IntraMuscular once PRN  insulin lispro (HumaLOG) corrective regimen sliding scale   SubCutaneous three times a day before meals  irbesartan 150 milliGRAM(s) Oral at bedtime  rosuvastatin 10 milliGRAM(s) Oral at bedtime      Allergies    aspirin (Unknown)  sulfa drugs (Unknown)    Intolerances    Lipitor (Other; Muscle Pain)          Vital Signs Last 24 Hrs  T(C): 36.7 (04 Mar 2019 08:00), Max: 37.4 (03 Mar 2019 15:23)  T(F): 98 (04 Mar 2019 08:00), Max: 99.3 (03 Mar 2019 15:23)  HR: 80 (04 Mar 2019 08:21) (67 - 80)  BP: 149/74 (04 Mar 2019 08:21) (103/64 - 180/76)  BP(mean): --  RR: 16 (04 Mar 2019 08:00) (16 - 18)  SpO2: 98% (04 Mar 2019 08:00) (93% - 98%)    REVIEW OF SYSTEMS:     Constitutional: No fever, chills, fatigue, weakness  Eyes: no eye pain, visual disturbances, or discharge  ENT:  No difficulty hearing, tinnitus, vertigo; No sinus or throat pain  Neck: No pain or stiffness  Respiratory: No cough, dyspnea, wheezing   Cardiovascular: No chest pain, palpitations,   Gastrointestinal: No abdominal or epigastric pain. No nausea, vomiting  No diarrhea or constipation.   Genitourinary: No dysuria, frequency, hematuria or incontinence  Neurological: No headaches, lightheadedness, vertigo, numbness or tremors  Psychiatric: No depression, anxiety, mood swings or difficulty sleeping  Musculoskeletal: No joint pain or swelling; No muscle, back or extremity pain  Skin: No itching, burning, rashes or lesions   Lymph Nodes: No enlarged glands  Endocrine: No heat or cold intolerance; No hair loss   Allergy and Immunologic: No hives or eczema    On Neurological Examination:      The patient is awake and alert.    Extraocular movements were intact.    Pupils were equal, round, and reactive bilaterally, 3 mm to 2 mm.    The patient appeared to have poor vision out of both left and right eyes, had difficulty naming number of fingers.    The patient had subtle decrease of the left nasolabial fold.    Motor -Right upper is 4+/5, left upper was 4/5.  Bilateral lower extremities were 4-/5, would say age-appropriate.   Sensory:  Bilateral upper and lower intact to light touch.    Follow simple commands    GENERAL Exam: Nontoxic , No Acute Distress   	  HEENT:  normocephalic, atraumatic  		  LUNGS: Clear bilaterally    	  HEART: Normal S1S2   No murmur RRR        	  GI/ ABDOMEN:  Soft  Non tender    EXTREMITIES:   No Edema  No Clubbing  No Cyanosis     MUSCULOSKELETAL: decreased Range of Motion all 4 extremities   	   SKIN: Normal  No Ecchymosis               LABS:  CBC Full  -  ( 04 Mar 2019 08:51 )  WBC Count : 5.31 K/uL  Hemoglobin : 12.9 g/dL  Hematocrit : 39.7 %  Platelet Count - Automated : 196 K/uL  Mean Cell Volume : 90.6 fl  Mean Cell Hemoglobin : 29.5 pg  Mean Cell Hemoglobin Concentration : 32.5 gm/dL  Auto Neutrophil # : x  Auto Lymphocyte # : x  Auto Monocyte # : x  Auto Eosinophil # : x  Auto Basophil # : x  Auto Neutrophil % : x  Auto Lymphocyte % : x  Auto Monocyte % : x  Auto Eosinophil % : x  Auto Basophil % : x    Urinalysis Basic - ( 02 Mar 2019 11:41 )    Color: Yellow / Appearance: Clear / S.010 / pH: x  Gluc: x / Ketone: Negative  / Bili: Negative / Urobili: Negative   Blood: x / Protein: 75 mg/dL / Nitrite: Negative   Leuk Esterase: Negative / RBC: 0-2 /HPF / WBC 0-2   Sq Epi: x / Non Sq Epi: Negative / Bacteria: Negative          141  |  109<H>  |  17  ----------------------------<  85  3.9   |  24  |  0.71    Ca    8.1<L>      04 Mar 2019 07:43    TPro  6.8  /  Alb  3.2<L>  /  TBili  0.3  /  DBili  x   /  AST  17  /  ALT  19  /  AlkPhos  78  03-    Hemoglobin A1C:   Lipid Panel  @ 11:18  Total Cholesterol, Serum 178    Triglycerides 108    LIVER FUNCTIONS - ( 02 Mar 2019 11:41 )  Alb: 3.2 g/dL / Pro: 6.8 g/dL / ALK PHOS: 78 U/L / ALT: 19 U/L / AST: 17 U/L / GGT: x           Vitamin B12   PT/INR - ( 02 Mar 2019 11:41 )   PT: 11.9 sec;   INR: 1.05 ratio         PTT - ( 02 Mar 2019 11:41 )  PTT:30.7 sec      RADIOLOGY    ANALYSIS AND PLAN:  This is an 87-year-old with an episode of being found on the ground and history of intercerebral hemorrhage and seizures.  1.	For episode of being found on the ground, questionable this could have been a seizure event with Mckinley's paralysis versus possibly a TIA, rule out cerebrovascular accident.  2.	We will plan for an MRI imaging of the brain.  I would recommend to rule out any new cerebrovascular accident.  3.	For history of seizures, which could be the possibility, the patient's carbamazepine levels now appear to be within the therapeutic range at 8.2.  We do have room to adjust this.  The patient does feel way too sleepy with 200 in the morning, so the patient takes the first dose at 9 a.m. and her night time dose at 11, it is all interspersed 100 mg around 4 p.m.  4.	Spoke with two sons and the patient in great detail, they understand the plan with adjustment of her carbamazepine.  They will follow with her outside neurologist.    5.	[phone] [phone] spoke to son shannon I will contact the patient's outside neurologist, Dr. Gonsalez.  Telephone number is [phone].  6.	patient refuses EEG   7.	if MRI normal will plan on dc     Physical therapy evaluation as tolerated  OOB to chair/ambulation with assistance only if possible.    Greater than 40 minutes spent in direct patient care reviewing  the notes, lab data/ imaging , discussion with multidisciplinary team. Neurology follow up note    MAJOR [id number]      Interval History:    Patient feels ok no new complaints.    MEDICATIONS    carBAMazepine ER Capsule 100 milliGRAM(s) Oral <User Schedule>  carBAMazepine XR Tablet 200 milliGRAM(s) Oral <User Schedule>  carvedilol 12.5 milliGRAM(s) Oral every 12 hours  dextrose 40% Gel 15 Gram(s) Oral once PRN  dextrose 5%. 1000 milliLiter(s) IV Continuous <Continuous>  dextrose 50% Injectable 12.5 Gram(s) IV Push once  dextrose 50% Injectable 25 Gram(s) IV Push once  dextrose 50% Injectable 25 Gram(s) IV Push once  enoxaparin Injectable 40 milliGRAM(s) SubCutaneous daily  glucagon  Injectable 1 milliGRAM(s) IntraMuscular once PRN  insulin lispro (HumaLOG) corrective regimen sliding scale   SubCutaneous three times a day before meals  irbesartan 150 milliGRAM(s) Oral at bedtime  rosuvastatin 10 milliGRAM(s) Oral at bedtime      Allergies    aspirin (Unknown)  sulfa drugs (Unknown)    Intolerances    Lipitor (Other; Muscle Pain)          Vital Signs Last 24 Hrs  T(C): 36.7 (04 Mar 2019 08:00), Max: 37.4 (03 Mar 2019 15:23)  T(F): 98 (04 Mar 2019 08:00), Max: 99.3 (03 Mar 2019 15:23)  HR: 80 (04 Mar 2019 08:21) (67 - 80)  BP: 149/74 (04 Mar 2019 08:21) (103/64 - 180/76)  BP(mean): --  RR: 16 (04 Mar 2019 08:00) (16 - 18)  SpO2: 98% (04 Mar 2019 08:00) (93% - 98%)    REVIEW OF SYSTEMS:     Constitutional: No fever, chills, fatigue, weakness  Eyes: no eye pain, visual disturbances, or discharge  ENT:  No difficulty hearing, tinnitus, vertigo; No sinus or throat pain  Neck: No pain or stiffness  Respiratory: No cough, dyspnea, wheezing   Cardiovascular: No chest pain, palpitations,   Gastrointestinal: No abdominal or epigastric pain. No nausea, vomiting  No diarrhea or constipation.   Genitourinary: No dysuria, frequency, hematuria or incontinence  Neurological: No headaches, lightheadedness, vertigo, numbness or tremors  Psychiatric: No depression, anxiety, mood swings or difficulty sleeping  Musculoskeletal: No joint pain or swelling; No muscle, back or extremity pain  Skin: No itching, burning, rashes or lesions   Lymph Nodes: No enlarged glands  Endocrine: No heat or cold intolerance; No hair loss   Allergy and Immunologic: No hives or eczema    On Neurological Examination:      The patient is awake and alert.    Extraocular movements were intact.    Pupils were equal, round, and reactive bilaterally, 3 mm to 2 mm.    The patient appeared to have poor vision out of both left and right eyes, had difficulty naming number of fingers.    The patient had subtle decrease of the left nasolabial fold.    Motor -Right upper is 4+/5, left upper was 4/5.  Bilateral lower extremities were 4-/5, would say age-appropriate.   Sensory:  Bilateral upper and lower intact to light touch.    Follow simple commands    GENERAL Exam: Nontoxic , No Acute Distress   	  HEENT:  normocephalic, atraumatic  		  LUNGS: Clear bilaterally    	  HEART: Normal S1S2   No murmur RRR        	  GI/ ABDOMEN:  Soft  Non tender    EXTREMITIES:   No Edema  No Clubbing  No Cyanosis     MUSCULOSKELETAL: decreased Range of Motion all 4 extremities   	   SKIN: Normal  No Ecchymosis               LABS:  CBC Full  -  ( 04 Mar 2019 08:51 )  WBC Count : 5.31 K/uL  Hemoglobin : 12.9 g/dL  Hematocrit : 39.7 %  Platelet Count - Automated : 196 K/uL  Mean Cell Volume : 90.6 fl  Mean Cell Hemoglobin : 29.5 pg  Mean Cell Hemoglobin Concentration : 32.5 gm/dL  Auto Neutrophil # : x  Auto Lymphocyte # : x  Auto Monocyte # : x  Auto Eosinophil # : x  Auto Basophil # : x  Auto Neutrophil % : x  Auto Lymphocyte % : x  Auto Monocyte % : x  Auto Eosinophil % : x  Auto Basophil % : x    Urinalysis Basic - ( 02 Mar 2019 11:41 )    Color: Yellow / Appearance: Clear / S.010 / pH: x  Gluc: x / Ketone: Negative  / Bili: Negative / Urobili: Negative   Blood: x / Protein: 75 mg/dL / Nitrite: Negative   Leuk Esterase: Negative / RBC: 0-2 /HPF / WBC 0-2   Sq Epi: x / Non Sq Epi: Negative / Bacteria: Negative          141  |  109<H>  |  17  ----------------------------<  85  3.9   |  24  |  0.71    Ca    8.1<L>      04 Mar 2019 07:43    TPro  6.8  /  Alb  3.2<L>  /  TBili  0.3  /  DBili  x   /  AST  17  /  ALT  19  /  AlkPhos  78  03-    Hemoglobin A1C:   Lipid Panel  @ 11:18  Total Cholesterol, Serum 178    Triglycerides 108    LIVER FUNCTIONS - ( 02 Mar 2019 11:41 )  Alb: 3.2 g/dL / Pro: 6.8 g/dL / ALK PHOS: 78 U/L / ALT: 19 U/L / AST: 17 U/L / GGT: x           Vitamin B12   PT/INR - ( 02 Mar 2019 11:41 )   PT: 11.9 sec;   INR: 1.05 ratio         PTT - ( 02 Mar 2019 11:41 )  PTT:30.7 sec      RADIOLOGY    ANALYSIS AND PLAN:  This is an 87-year-old with an episode of being found on the ground and history of intercerebral hemorrhage and seizures.  1.	For episode of being found on the ground, questionable this could have been a seizure event with Mckinley's paralysis versus possibly a TIA, rule out cerebrovascular accident.  2.	We will plan for an MRI imaging of the brain.  I would recommend to rule out any new cerebrovascular accident.  3.	For history of seizures, which could be the possibility, the patient's carbamazepine levels now appear to be within the therapeutic range at 8.2.  We do have room to adjust this.  The patient does feel way too sleepy with 200 in the morning, so the patient takes the first dose at 9 a.m. and her night time dose at 11, it is all interspersed 100 mg around 4 p.m.  4.	Spoke with two sons and the patient in great detail, they understand the plan with adjustment of her carbamazepine.  They will follow with her outside neurologist.    5.	[phone] cell him [phone] spoke to son shannon 3/4/19 I will contact the patient's outside neurologist, Dr. Gonsalez.  Telephone number is [phone]. left message for him today 3/4/19  6.	patient refuses EEG   7.	if MRI normal will plan on dc     Physical therapy evaluation as tolerated  OOB to chair/ambulation with assistance only if possible.    Greater than 40 minutes spent in direct patient care reviewing  the notes, lab data/ imaging , discussion with multidisciplinary team. Neurology follow up note    MAJOR [id number]      Interval History:    Patient feels ok no new complaints.    MEDICATIONS    carBAMazepine ER Capsule 100 milliGRAM(s) Oral <User Schedule>  carBAMazepine XR Tablet 200 milliGRAM(s) Oral <User Schedule>  carvedilol 12.5 milliGRAM(s) Oral every 12 hours  dextrose 40% Gel 15 Gram(s) Oral once PRN  dextrose 5%. 1000 milliLiter(s) IV Continuous <Continuous>  dextrose 50% Injectable 12.5 Gram(s) IV Push once  dextrose 50% Injectable 25 Gram(s) IV Push once  dextrose 50% Injectable 25 Gram(s) IV Push once  enoxaparin Injectable 40 milliGRAM(s) SubCutaneous daily  glucagon  Injectable 1 milliGRAM(s) IntraMuscular once PRN  insulin lispro (HumaLOG) corrective regimen sliding scale   SubCutaneous three times a day before meals  irbesartan 150 milliGRAM(s) Oral at bedtime  rosuvastatin 10 milliGRAM(s) Oral at bedtime      Allergies    aspirin (Unknown)  sulfa drugs (Unknown)    Intolerances    Lipitor (Other; Muscle Pain)          Vital Signs Last 24 Hrs  T(C): 36.7 (04 Mar 2019 08:00), Max: 37.4 (03 Mar 2019 15:23)  T(F): 98 (04 Mar 2019 08:00), Max: 99.3 (03 Mar 2019 15:23)  HR: 80 (04 Mar 2019 08:21) (67 - 80)  BP: 149/74 (04 Mar 2019 08:21) (103/64 - 180/76)  BP(mean): --  RR: 16 (04 Mar 2019 08:00) (16 - 18)  SpO2: 98% (04 Mar 2019 08:00) (93% - 98%)    REVIEW OF SYSTEMS:     Constitutional: No fever, chills, fatigue, weakness  Eyes: no eye pain, visual disturbances, or discharge  ENT:  No difficulty hearing, tinnitus, vertigo; No sinus or throat pain  Neck: No pain or stiffness  Respiratory: No cough, dyspnea, wheezing   Cardiovascular: No chest pain, palpitations,   Gastrointestinal: No abdominal or epigastric pain. No nausea, vomiting  No diarrhea or constipation.   Genitourinary: No dysuria, frequency, hematuria or incontinence  Neurological: No headaches, lightheadedness, vertigo, numbness or tremors  Psychiatric: No depression, anxiety, mood swings or difficulty sleeping  Musculoskeletal: No joint pain or swelling; No muscle, back or extremity pain  Skin: No itching, burning, rashes or lesions   Lymph Nodes: No enlarged glands  Endocrine: No heat or cold intolerance; No hair loss   Allergy and Immunologic: No hives or eczema    On Neurological Examination:      The patient is awake and alert.    Extraocular movements were intact.    Pupils were equal, round, and reactive bilaterally, 3 mm to 2 mm.    The patient appeared to have poor vision out of both left and right eyes, had difficulty naming number of fingers.    The patient had subtle decrease of the left nasolabial fold.    Motor -Right upper is 4+/5, left upper was 4/5.  Bilateral lower extremities were 4-/5, would say age-appropriate.   Sensory:  Bilateral upper and lower intact to light touch.    Follow simple commands    GENERAL Exam: Nontoxic , No Acute Distress   	  HEENT:  normocephalic, atraumatic  		  LUNGS: Clear bilaterally    	  HEART: Normal S1S2   No murmur RRR        	  GI/ ABDOMEN:  Soft  Non tender    EXTREMITIES:   No Edema  No Clubbing  No Cyanosis     MUSCULOSKELETAL: decreased Range of Motion all 4 extremities   	   SKIN: Normal  No Ecchymosis               LABS:  CBC Full  -  ( 04 Mar 2019 08:51 )  WBC Count : 5.31 K/uL  Hemoglobin : 12.9 g/dL  Hematocrit : 39.7 %  Platelet Count - Automated : 196 K/uL  Mean Cell Volume : 90.6 fl  Mean Cell Hemoglobin : 29.5 pg  Mean Cell Hemoglobin Concentration : 32.5 gm/dL  Auto Neutrophil # : x  Auto Lymphocyte # : x  Auto Monocyte # : x  Auto Eosinophil # : x  Auto Basophil # : x  Auto Neutrophil % : x  Auto Lymphocyte % : x  Auto Monocyte % : x  Auto Eosinophil % : x  Auto Basophil % : x    Urinalysis Basic - ( 02 Mar 2019 11:41 )    Color: Yellow / Appearance: Clear / S.010 / pH: x  Gluc: x / Ketone: Negative  / Bili: Negative / Urobili: Negative   Blood: x / Protein: 75 mg/dL / Nitrite: Negative   Leuk Esterase: Negative / RBC: 0-2 /HPF / WBC 0-2   Sq Epi: x / Non Sq Epi: Negative / Bacteria: Negative          141  |  109<H>  |  17  ----------------------------<  85  3.9   |  24  |  0.71    Ca    8.1<L>      04 Mar 2019 07:43    TPro  6.8  /  Alb  3.2<L>  /  TBili  0.3  /  DBili  x   /  AST  17  /  ALT  19  /  AlkPhos  78  03-02    Hemoglobin A1C:   Lipid Panel  @ 11:18  Total Cholesterol, Serum 178    Triglycerides 108    LIVER FUNCTIONS - ( 02 Mar 2019 11:41 )  Alb: 3.2 g/dL / Pro: 6.8 g/dL / ALK PHOS: 78 U/L / ALT: 19 U/L / AST: 17 U/L / GGT: x           Vitamin B12   PT/INR - ( 02 Mar 2019 11:41 )   PT: 11.9 sec;   INR: 1.05 ratio         PTT - ( 02 Mar 2019 11:41 )  PTT:30.7 sec      RADIOLOGY    ANALYSIS AND PLAN:  This is an 87-year-old with an episode of being found on the ground and history of intercerebral hemorrhage and seizures.  1.	For episode of being found on the ground, questionable this could have been a seizure event with Mckinley's paralysis versus possibly a TIA, rule out cerebrovascular accident.  2.	We will plan for an MRI imaging of the brain.  I would recommend to rule out any new cerebrovascular accident.  3.	For history of seizures, which could be the possibility, the patient's carbamazepine levels now appear to be within the therapeutic range at 8.2.  We do have room to adjust this.  The patient does feel way too sleepy with 200 in the morning, so the patient takes the first dose at 9 a.m. and her night time dose at 11, it is all interspersed 100 mg around 4 p.m will increase nighttime dose to 400.  4.	Spoke with two sons and the patient in great detail, they understand the plan with adjustment of her carbamazepine.  They will follow with her outside neurologist.    5.	[phone] cell him [phone] spoke to son shannon 3/4/19 I will contact the patient's outside neurologist, Dr. Gonsalez.  Telephone number is [phone].him today 3/4/19  6.	patient refuses EEG   7.	if MRI normal will plan on dc     Physical therapy evaluation as tolerated  OOB to chair/ambulation with assistance only if possible.    Greater than 40 minutes spent in direct patient care reviewing  the notes, lab data/ imaging , discussion with multidisciplinary team.

## 2019-03-04 NOTE — PROGRESS NOTE ADULT - ASSESSMENT
86y old  Female who presents with a chief complaint of couldn't walk, left sided weakness, and headache. R/o CVA 86y old  Female who presents with a chief complaint of couldn't walk, left sided weakness, and headache.a/w r/o CVA

## 2019-03-04 NOTE — SWALLOW BEDSIDE ASSESSMENT ADULT - COMMENTS
86 year old c left sided UE/LE weakness with PMHx of Hemorrhagic stroke (February 2015), seizures (July 2015), HTN. HLD, and DM bib EMS for evaluation of left sided UE/LE weakness.   Pt A and 0 3, speech clear, communicates wants/ needs. Pt c adequate phases of swallowing c no overt si/sx of aspiration. results discussed c RN and pt

## 2019-03-04 NOTE — DISCHARGE NOTE PROVIDER - NSDCCPCAREPLAN_GEN_ALL_CORE_FT
PRINCIPAL DISCHARGE DIAGNOSIS  Problem: Weakness of left lower extremity  Assessment and Plan of Treatment:       SECONDARY DISCHARGE DIAGNOSES  Problem: Diastolic heart failure  Assessment and Plan of Treatment: Continue Carvedilol    Problem: Hypercholesteremia  Assessment and Plan of Treatment: Continue Crestor    Problem: HTN (hypertension)  Assessment and Plan of Treatment: Continue Carvedilol and Irbesartan    Problem: Seizure  Assessment and Plan of Treatment: Continue Carbamazepime.  It is recommended you have an EEG, follow-up with your Neurologist upon discharge.    Problem: Diabetes mellitus  Assessment and Plan of Treatment: Your HbA1c was 6.5, continue your metformin and a low carb diet.

## 2019-03-04 NOTE — DISCHARGE NOTE PROVIDER - CARE PROVIDER_API CALL
Bonifacio Gonsalez (MD)  Clinical Neurophysiology; EEGEpilepsy; Neurology  [address]  [place] [address]  Phone: [phone]  Fax: [phone]  Follow Up Time:

## 2019-03-05 ENCOUNTER — TRANSCRIPTION ENCOUNTER (OUTPATIENT)
Age: 84
End: 2019-03-05

## 2019-03-05 VITALS
RESPIRATION RATE: 18 BRPM | DIASTOLIC BLOOD PRESSURE: 81 MMHG | OXYGEN SATURATION: 98 % | HEART RATE: 66 BPM | TEMPERATURE: 99 F | SYSTOLIC BLOOD PRESSURE: 139 MMHG

## 2019-03-05 PROCEDURE — 85730 THROMBOPLASTIN TIME PARTIAL: CPT

## 2019-03-05 PROCEDURE — 71045 X-RAY EXAM CHEST 1 VIEW: CPT

## 2019-03-05 PROCEDURE — 83880 ASSAY OF NATRIURETIC PEPTIDE: CPT

## 2019-03-05 PROCEDURE — 82553 CREATINE MB FRACTION: CPT

## 2019-03-05 PROCEDURE — 70498 CT ANGIOGRAPHY NECK: CPT

## 2019-03-05 PROCEDURE — 99285 EMERGENCY DEPT VISIT HI MDM: CPT | Mod: 25

## 2019-03-05 PROCEDURE — 36415 COLL VENOUS BLD VENIPUNCTURE: CPT

## 2019-03-05 PROCEDURE — 93306 TTE W/DOPPLER COMPLETE: CPT

## 2019-03-05 PROCEDURE — 80156 ASSAY CARBAMAZEPINE TOTAL: CPT

## 2019-03-05 PROCEDURE — 97530 THERAPEUTIC ACTIVITIES: CPT

## 2019-03-05 PROCEDURE — 81001 URINALYSIS AUTO W/SCOPE: CPT

## 2019-03-05 PROCEDURE — 97162 PT EVAL MOD COMPLEX 30 MIN: CPT

## 2019-03-05 PROCEDURE — 80053 COMPREHEN METABOLIC PANEL: CPT

## 2019-03-05 PROCEDURE — 83036 HEMOGLOBIN GLYCOSYLATED A1C: CPT

## 2019-03-05 PROCEDURE — 82962 GLUCOSE BLOOD TEST: CPT

## 2019-03-05 PROCEDURE — 70551 MRI BRAIN STEM W/O DYE: CPT

## 2019-03-05 PROCEDURE — 70450 CT HEAD/BRAIN W/O DYE: CPT

## 2019-03-05 PROCEDURE — 93005 ELECTROCARDIOGRAM TRACING: CPT

## 2019-03-05 PROCEDURE — 70496 CT ANGIOGRAPHY HEAD: CPT

## 2019-03-05 PROCEDURE — 80048 BASIC METABOLIC PNL TOTAL CA: CPT

## 2019-03-05 PROCEDURE — 80061 LIPID PANEL: CPT

## 2019-03-05 PROCEDURE — 85610 PROTHROMBIN TIME: CPT

## 2019-03-05 PROCEDURE — 84484 ASSAY OF TROPONIN QUANT: CPT

## 2019-03-05 PROCEDURE — 97116 GAIT TRAINING THERAPY: CPT

## 2019-03-05 PROCEDURE — 99239 HOSP IP/OBS DSCHRG MGMT >30: CPT

## 2019-03-05 PROCEDURE — 85027 COMPLETE CBC AUTOMATED: CPT

## 2019-03-05 RX ORDER — CARBAMAZEPINE 200 MG
1 TABLET ORAL
Qty: 0 | Refills: 0 | DISCHARGE
Start: 2019-03-05

## 2019-03-05 RX ADMIN — Medication 100 MILLIGRAM(S): at 09:18

## 2019-03-05 RX ADMIN — CARVEDILOL PHOSPHATE 12.5 MILLIGRAM(S): 80 CAPSULE, EXTENDED RELEASE ORAL at 05:40

## 2019-03-05 RX ADMIN — Medication 1 DROP(S): at 09:19

## 2019-03-05 RX ADMIN — ENOXAPARIN SODIUM 40 MILLIGRAM(S): 100 INJECTION SUBCUTANEOUS at 11:47

## 2019-03-05 NOTE — DISCHARGE NOTE NURSING/CASE MANAGEMENT/SOCIAL WORK - NSDCPEPT PROEDHF_GEN_ALL_CORE
Low salt diet/Monitor weight daily/Report signs and symptoms to primary care provider/Call primary care provider for follow up after discharge/Activities as tolerated

## 2019-03-05 NOTE — DISCHARGE NOTE NURSING/CASE MANAGEMENT/SOCIAL WORK - NSDCDPATPORTLINK_GEN_ALL_CORE
You can access the [facility], offered by [facility], by registering with the following website: [email or web address]

## 2019-03-05 NOTE — PROGRESS NOTE ADULT - SUBJECTIVE AND OBJECTIVE BOX
Neurology follow up note    MAJOR [id number]      Interval History:    Patient feels ok no new complaints.    MEDICATIONS    artificial tears (preservative free) Ophthalmic Solution 1 Drop(s) Both EYES two times a day PRN  carBAMazepine ER Capsule 300 milliGRAM(s) Oral <User Schedule>  carBAMazepine ER Capsule 100 milliGRAM(s) Oral <User Schedule>  carvedilol 12.5 milliGRAM(s) Oral every 12 hours  dextrose 40% Gel 15 Gram(s) Oral once PRN  dextrose 5%. 1000 milliLiter(s) IV Continuous <Continuous>  dextrose 50% Injectable 12.5 Gram(s) IV Push once  dextrose 50% Injectable 25 Gram(s) IV Push once  dextrose 50% Injectable 25 Gram(s) IV Push once  enoxaparin Injectable 40 milliGRAM(s) SubCutaneous daily  glucagon  Injectable 1 milliGRAM(s) IntraMuscular once PRN  insulin lispro (HumaLOG) corrective regimen sliding scale   SubCutaneous three times a day before meals  irbesartan 150 milliGRAM(s) Oral at bedtime  rosuvastatin 10 milliGRAM(s) Oral at bedtime      Allergies    aspirin (Unknown)  sulfa drugs (Unknown)    Intolerances    Lipitor (Other; Muscle Pain)          Vital Signs Last 24 Hrs  T(C): 36.7 (05 Mar 2019 07:24), Max: 36.8 (04 Mar 2019 20:07)  T(F): 98.1 (05 Mar 2019 07:24), Max: 98.2 (04 Mar 2019 20:07)  HR: 64 (05 Mar 2019 07:24) (62 - 77)  BP: 118/69 (05 Mar 2019 07:24) (118/69 - 166/97)  BP(mean): --  RR: 17 (05 Mar 2019 07:24) (16 - 17)  SpO2: 96% (05 Mar 2019 07:24) (96% - 99%)           REVIEW OF SYSTEMS:     Constitutional: No fever, chills, fatigue, weakness  Eyes: no eye pain, visual disturbances, or discharge  ENT:  No difficulty hearing, tinnitus, vertigo; No sinus or throat pain  Neck: No pain or stiffness  Respiratory: No cough, dyspnea, wheezing   Cardiovascular: No chest pain, palpitations,   Gastrointestinal: No abdominal or epigastric pain. No nausea, vomiting  No diarrhea or constipation.   Genitourinary: No dysuria, frequency, hematuria or incontinence  Neurological: No headaches, lightheadedness, vertigo, numbness or tremors  Psychiatric: No depression, anxiety, mood swings or difficulty sleeping  Musculoskeletal: No joint pain or swelling; No muscle, back or extremity pain  Skin: No itching, burning, rashes or lesions   Lymph Nodes: No enlarged glands  Endocrine: No heat or cold intolerance; No hair loss   Allergy and Immunologic: No hives or eczema    On Neurological Examination:      The patient is awake and alert.    Extraocular movements were intact.    Pupils were equal, round, and reactive bilaterally, 3 mm to 2 mm.    The patient appeared to have poor vision out of both left and right eyes, had difficulty naming number of fingers.    The patient had subtle decrease of the left nasolabial fold.    Motor -Right upper is 4+/5, left upper was 4/5.  Bilateral lower extremities were 4-/5, would say age-appropriate.   Sensory:  Bilateral upper and lower intact to light touch.    Follow simple commands    GENERAL Exam: Nontoxic , No Acute Distress   	  HEENT:  normocephalic, atraumatic  		  LUNGS: Clear bilaterally    	  HEART: Normal S1S2   No murmur RRR        	  GI/ ABDOMEN:  Soft  Non tender    EXTREMITIES:   No Edema  No Clubbing  No Cyanosis     MUSCULOSKELETAL: decreased Range of Motion all 4 extremities   	   SKIN: Normal  No Ecchymosis          LABS:  CBC Full  -  ( 04 Mar 2019 08:51 )  WBC Count : 5.31 K/uL  Hemoglobin : 12.9 g/dL  Hematocrit : 39.7 %  Platelet Count - Automated : 196 K/uL  Mean Cell Volume : 90.6 fl  Mean Cell Hemoglobin : 29.5 pg  Mean Cell Hemoglobin Concentration : 32.5 gm/dL  Auto Neutrophil # : x  Auto Lymphocyte # : x  Auto Monocyte # : x  Auto Eosinophil # : x  Auto Basophil # : x  Auto Neutrophil % : x  Auto Lymphocyte % : x  Auto Monocyte % : x  Auto Eosinophil % : x  Auto Basophil % : x      03-04    141  |  109<H>  |  17  ----------------------------<  85  3.9   |  24  |  0.71    Ca    8.1<L>      04 Mar 2019 07:43      Hemoglobin A1C:       Vitamin B12         RADIOLOGY      ANALYSIS AND PLAN:  This is an 87-year-old with an episode of being found on the ground and history of intercerebral hemorrhage and seizures.  1.	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	We will plan for an MRI imaging of the brain was negative for new cerebrovascular accident.  3.	For history of seizures, which could be the possibility, the patient's carbamazepine levels now appear to be within the therapeutic range at 8.2.  We do have room to adjust this.  The patient does feel way too sleepy with 200 in the morning, so the patient takes the first dose at 9 a.m. and her night time dose at 11, it is all interspersed 100 mg around 4 p.m will increase nighttime dose to 400.  4.	Spoke with two sons and the patient in great detail, they understand the plan with adjustment of her carbamazepine.  They will follow with her outside neurologist.    5.	[phone] cell him [phone] spoke to sharonda ortega 3/5/19 I will contact the patient's outside neurologist, Dr. Gonsalez.  Telephone number is [phone].him today 3/4/19  6.	patient refuses EEG     Neurologic standpoint only cleared for discharge planning     Physical therapy evaluation as tolerated  OOB to chair/ambulation with assistance only if possible.    Greater than 40 minutes spent in direct patient care reviewing  the notes, lab data/ imaging , discussion with multidisciplinary team.

## 2019-04-16 ENCOUNTER — APPOINTMENT (OUTPATIENT)
Dept: NEUROLOGY | Facility: CLINIC | Age: 84
End: 2019-04-16
Payer: MEDICARE

## 2019-04-16 VITALS
HEART RATE: 66 BPM | SYSTOLIC BLOOD PRESSURE: 150 MMHG | HEIGHT: 63 IN | WEIGHT: 140 LBS | BODY MASS INDEX: 24.8 KG/M2 | DIASTOLIC BLOOD PRESSURE: 80 MMHG

## 2019-04-16 PROCEDURE — 99214 OFFICE O/P EST MOD 30 MIN: CPT

## 2019-04-17 NOTE — PHYSICAL EXAM
[FreeTextEntry1] : alert and oriented x 3, speech fluent, names easily, follows requests, good recall for recent and remote events.\par EOM VFF, full without sustained nystagmus, PERRL, face symmetrical, no dysarthria\par Motor - full strength in all extremities. normal rapid-alternating movements.\par Sensory - intact LT bilaterally\par Coord - no tremor, ataxia\par Gait - stands without difficulty, normal gait.\par

## 2019-04-17 NOTE — ASSESSMENT
[FreeTextEntry1] : Ms Easley is an 87 year old doing well currently, but had breakthrough seizures last month. Now tolerating higher dose of carbamazepine.\par \par Plan:\par -Continue current AED regimen for now CBZ /100/200\par -obtain CBZ, Na+ levels\par - reviewed seizure triggers with patient and son\par - proscribed driving [place] per [place] law\par - follow up in 4-6  months\par \par

## 2019-04-17 NOTE — DATA REVIEWED
[de-identified] : 4/5/2017 - IMPRESSION:\par Advanced  chronic  small  vessel  ischemic  changes.  No  diffusion  restriction  to \par suggest  acute  infarct.  Diffuse  cerebral  atrophy.  Probable  prior  chronic\par hemorrhagic  infarct  right  posterior  temporal  region. [de-identified] : 9/6/2016 - 48h ambulatory EEG\par EEG Summary/Classification: Abnormal prolonged video EEG due to:\par 1) Intermittent right temporal maximal rhythmic slowing\par 2) Occasional right temporal sharp wave discharges\par \par EEG Impression/Clinical Correlate:\par -Findings indicate potential for seizures originating from the right temporal area. \par -Right temporal maximal hemispheric cerebral dysfunction. \par -No seizures were captured\par  [de-identified] : CT Head - July 2015 - There  is  acute  hemorrhage  in  the  right  periatrial  region,  with  two  hematomas\par measuring  2.2  x  1  x  1.6  cm  in  conglomerate.  There  is  mild  vasogenic  edema\par and  mass  effect  on  the  adjacent  occipital  horn  of  the  right  lateral\par ventricle  without  evidence  of  a  shift.  There  is  no  large  acute  cortical\par infarct.  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  There  are\par mild  patchy  subcortical  and  periventricular  white  matter  lucencies  likely\par representing  microvascular  ischemic  disease.\par \par The  mastoid  air  cells  and  visualized  paranasal  sinuses  are  well  aerated.\par \par IMPRESSION:  Acute  hemorrhage  in  the  right  periatrial  region  with  mild\par surrounding  vasogenic  edema.  There  is  mild  mass  effect  on  the  adjacent\par occipital  horn  of  the  right  lateral  ventricle  without  shift.    The  findings\par were  discussed  with  Dr. Landa  at  5:25  am  on  7/6/15  with  RBV.    A  follow  up\par contrast  enhanced  MRI  after  resolution  of  acute  hemorrhage  may  be  performed\par to  evaluate  for  an  underlying  lesion.\par Mild  white  matter  disease.\par

## 2019-04-17 NOTE — HISTORY OF PRESENT ILLNESS
[FreeTextEntry1] : PCP: Dr. Pedrito Rosenthal;  [address] [place] [address] [place] [address];  [phone]\par \par *** 04/16/2019  ***\par Patient stated in March 2 she had and episode described as electrical current through chest ,left hand felt funny and her mouth was twitching and tongue was heavy. Her son found her on the bathroom floor with the phone in her hand. She was trying to call someone for help. Unclear how long the episode lasted.\par She was evaluated at [facility] . BP very elevated. CBZ increased to 100/100/200 and she was transferred to [facility] until 3/21 for monitoring of blood pressure and physical therapy strengthening. \par She is otherwise doing well. Her mood is good and she has no further complaints of fatigue.\par \par Current AED regimen:\par /100/200\par \par ***12/18/18***\par patients describes no reported seizures since last visit\par She complains of  mild fatigue and unsteady gait at times\par \par Carbamazepine is taken 9am  100mg , 4pm 100mg and 11pm  300mg\par \par Patient describes aura of tongue feeling heavy in past but none since last office visit\par \par *** 10/22/2018 ***\par Ms. PEREZ was found by her son in am 9/22 on floor of her bedroom. She called out to him. EMT found her to have L weakness, L visual field cut. She was taken to [place] ED, and seen by neurology there who thought she had seizure and postictal Todds. Ms. PEREZ improved and returned to baseline.  CBZ level was therapeutic, but dose was increased slightly from 100/300 to 100/100/300, and Ms. PEREZ was discharged home. \par \par *** 06/22/2018 ***\par Ms. PEREZ is complaining of feeling very tired, sleepy.  She had one fall when she bent over and lost her balance, required 4 stiches to forehead. She had one episode of L hand tremulousness but could control the hand, no impairment of awareness, no visual distortion.\par \par *** 02/23/2018 *** \par Pt doing relatively well, still feels tired, but functioning OK.  Feels a little unsteady, but has not fallen and is better than before. \par \par *** 12/18/2017 ***\par No interval seizure or symptoms. However, pt c/o problems with balance for more than a month - does not recall if she had similar problems last summer. Otherwise not complaining of side effects. No complaint of fatigue. \par \par *** 10/27/2017 ***\par Ms Perez had an episode on 10/15/17 where her left hand became "heavy". She was evaluated in [place] ED, and had no recurrent stroke (Prior R parietal hemorrhage). Then on 10/17/17, patient describes that the television picture was distorted.  She went her ophthalmologist who recommended an MRA (presumably for a TIA? - n.b. pt has had extensive vascular imaging in spring 2017). She continues to take carbamazepine 200 q12, and seemed unaware of plan to start lamotrigine, son unaware.  She does complain of increased sleepiness, but does not seem to be major complaint, only endorsed after I inquired. No other problems. Last sodium was normal. \par \par Patient denies missing a dose of carbamazepine, but she does not use a pill box and has a complex (self-imposed) schedule for her medications. \par \par *** 5/12/2017 ***\par Ms. 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 She is a poor historian, and most of the history is taken from the notes. Later that year, she was found to have an active ambulatory EEG with sharp waves noted in the right frontal region. She was treated with levetiracetam, but complained of extreme fatigue interfering with her quality of life.in February 2017, she had an episode of left hand shaking lasting approximately 30 seconds, and resulting in her fall backwards and a head laceration. The event was felt to be a seizure. Her anticonvulsant was changed to carbamazepine, currently 200 mg twice a day, but she continues to complain of fatigue.

## 2019-04-27 ENCOUNTER — RX RENEWAL (OUTPATIENT)
Age: 84
End: 2019-04-27

## 2019-05-02 NOTE — PROGRESS NOTE ADULT - SUBJECTIVE AND OBJECTIVE BOX
Patient is a 87y old  Female who presents with a chief complaint of left sided weakness (02 Mar 2019 13:49)      INTERVAL HPI: Pt seen and examined. States she feels better, thinks she may have had a seizure, would like to go home soon. Denies any acute complaints at this time.     OVERNIGHT EVENTS: none noted  T(F): 98.1 (19 @ 07:13), Max: 98.9 (19 @ 10:33)  HR: 68 (19 @ 07:13) (66 - 83)  BP: 132/78 (19 @ 07:13) (124/73 - 188/127)  RR: 16 (19 @ 07:13) (15 - 17)  SpO2: 97% (19 @ 07:13) (95% - 100%)  I&O's Summary    02 Mar 2019 07:01  -  03 Mar 2019 07:00  --------------------------------------------------------  IN: 240 mL / OUT: 200 mL / NET: 40 mL        REVIEW OF SYSTEMS:  CONSTITUTIONAL: No fever, weight loss, or fatigue  RESPIRATORY: No cough, wheezing, chills or hemoptysis; No shortness of breath  CARDIOVASCULAR: No chest pain, palpitations, dizziness, or leg swelling  GASTROINTESTINAL: No abdominal or epigastric pain. No nausea, vomiting, or hematemesis; No diarrhea or constipation. No melena or hematochezia.  GENITOURINARY: No dysuria, frequency, hematuria, or incontinence  NEUROLOGICAL: No headaches, memory loss, loss of strength, numbness, or tremors  SKIN: No itching, burning, rashes, or lesions   MUSCULOSKELETAL: No joint pain or swelling; No muscle, back, or extremity pain  PSYCHIATRIC: No depression, anxiety, mood swings, or difficulty sleeping      PHYSICAL EXAM:  GENERAL: NAD, well-groomed, well-developed, elder  NERVOUS SYSTEM:  Alert & Oriented X3, Good concentration; Motor Strength 5/5 B/L upper and lower extremities  CHEST/LUNG: Clear to percussion bilaterally; No rales, rhonchi, wheezing, or rubs  HEART: Regular rate and rhythm; No murmurs, rubs, or gallops  ABDOMEN: Soft, Nontender, Nondistended; Bowel sounds present  EXTREMITIES:  2+ Peripheral Pulses, No clubbing, cyanosis, or edema  SKIN: No rashes or lesions    LABS:                        11.6   4.34  )-----------( 176      ( 03 Mar 2019 07:00 )             35.3     03    142  |  105  |  14  ----------------------------<  98  3.9   |  34<H>  |  0.78    Ca    8.1<L>      03 Mar 2019 07:00    TPro  6.8  /  Alb  3.2<L>  /  TBili  0.3  /  DBili  x   /  AST  17  /  ALT  19  /  AlkPhos  78  03-02    PT/INR - ( 02 Mar 2019 11:41 )   PT: 11.9 sec;   INR: 1.05 ratio         PTT - ( 02 Mar 2019 11:41 )  PTT:30.7 sec  Urinalysis Basic - ( 02 Mar 2019 11:41 )    Color: Yellow / Appearance: Clear / S.010 / pH: x  Gluc: x / Ketone: Negative  / Bili: Negative / Urobili: Negative   Blood: x / Protein: 75 mg/dL / Nitrite: Negative   Leuk Esterase: Negative / RBC: 0-2 /HPF / WBC 0-2   Sq Epi: x / Non Sq Epi: Negative / Bacteria: Negative      CAPILLARY BLOOD GLUCOSE      POCT Blood Glucose.: 107 mg/dL (03 Mar 2019 08:11)  POCT Blood Glucose.: 117 mg/dL (02 Mar 2019 21:26)  POCT Blood Glucose.: 106 mg/dL (02 Mar 2019 16:57)              MEDICATIONS  (STANDING):  carBAMazepine ER Capsule 100 milliGRAM(s) Oral <User Schedule>  carBAMazepine XR Tablet 200 milliGRAM(s) Oral <User Schedule>  carvedilol 12.5 milliGRAM(s) Oral every 12 hours  dextrose 5%. 1000 milliLiter(s) (50 mL/Hr) IV Continuous <Continuous>  dextrose 50% Injectable 12.5 Gram(s) IV Push once  dextrose 50% Injectable 25 Gram(s) IV Push once  dextrose 50% Injectable 25 Gram(s) IV Push once  enoxaparin Injectable 40 milliGRAM(s) SubCutaneous daily  insulin lispro (HumaLOG) corrective regimen sliding scale   SubCutaneous three times a day before meals  losartan 50 milliGRAM(s) Oral daily  rosuvastatin 10 milliGRAM(s) Oral at bedtime    MEDICATIONS  (PRN):  dextrose 40% Gel 15 Gram(s) Oral once PRN Blood Glucose LESS THAN 70 milliGRAM(s)/deciLiter  glucagon  Injectable 1 milliGRAM(s) IntraMuscular once PRN Glucose <70 milliGRAM(s)/deciLiter No - the patient is unable to be screened due to medical condition

## 2019-05-22 NOTE — ED PROVIDER NOTE - PROGRESS NOTE DETAILS
8
spoke with neurology Dr. Parr, agree with no TPA, recommend admit for further work up, will consult

## 2019-05-29 ENCOUNTER — RX RENEWAL (OUTPATIENT)
Age: 84
End: 2019-05-29

## 2019-07-16 ENCOUNTER — MEDICATION RENEWAL (OUTPATIENT)
Age: 84
End: 2019-07-16

## 2019-07-19 ENCOUNTER — RX RENEWAL (OUTPATIENT)
Age: 84
End: 2019-07-19

## 2019-09-12 ENCOUNTER — INPATIENT (INPATIENT)
Facility: HOSPITAL | Age: 84
LOS: 0 days | Discharge: HOME | DRG: 101 | End: 2019-09-13
Attending: HOSPITALIST | Admitting: HOSPITALIST
Payer: COMMERCIAL

## 2019-09-12 VITALS
HEIGHT: 63 IN | HEART RATE: 90 BPM | RESPIRATION RATE: 16 BRPM | DIASTOLIC BLOOD PRESSURE: 113 MMHG | SYSTOLIC BLOOD PRESSURE: 232 MMHG | OXYGEN SATURATION: 95 % | WEIGHT: 149.91 LBS

## 2019-09-12 DIAGNOSIS — I10 ESSENTIAL (PRIMARY) HYPERTENSION: ICD-10-CM

## 2019-09-12 DIAGNOSIS — E78.00 PURE HYPERCHOLESTEROLEMIA, UNSPECIFIED: ICD-10-CM

## 2019-09-12 DIAGNOSIS — R29.90 UNSPECIFIED SYMPTOMS AND SIGNS INVOLVING THE NERVOUS SYSTEM: ICD-10-CM

## 2019-09-12 DIAGNOSIS — Z86.69 PERSONAL HISTORY OF OTHER DISEASES OF THE NERVOUS SYSTEM AND SENSE ORGANS: Chronic | ICD-10-CM

## 2019-09-12 DIAGNOSIS — R56.9 UNSPECIFIED CONVULSIONS: ICD-10-CM

## 2019-09-12 DIAGNOSIS — E11.9 TYPE 2 DIABETES MELLITUS WITHOUT COMPLICATIONS: ICD-10-CM

## 2019-09-12 DIAGNOSIS — Z29.9 ENCOUNTER FOR PROPHYLACTIC MEASURES, UNSPECIFIED: ICD-10-CM

## 2019-09-12 DIAGNOSIS — I61.9 NONTRAUMATIC INTRACEREBRAL HEMORRHAGE, UNSPECIFIED: ICD-10-CM

## 2019-09-12 LAB
ALBUMIN SERPL ELPH-MCNC: 3.3 G/DL — SIGNIFICANT CHANGE UP (ref 3.3–5)
ALP SERPL-CCNC: 72 U/L — SIGNIFICANT CHANGE UP (ref 40–120)
ALT FLD-CCNC: 23 U/L — SIGNIFICANT CHANGE UP (ref 12–78)
ANION GAP SERPL CALC-SCNC: 4 MMOL/L — LOW (ref 5–17)
APPEARANCE UR: CLEAR — SIGNIFICANT CHANGE UP
APTT BLD: 29.6 SEC — SIGNIFICANT CHANGE UP (ref 28.5–37)
AST SERPL-CCNC: 18 U/L — SIGNIFICANT CHANGE UP (ref 15–37)
BILIRUB SERPL-MCNC: 0.5 MG/DL — SIGNIFICANT CHANGE UP (ref 0.2–1.2)
BILIRUB UR-MCNC: NEGATIVE — SIGNIFICANT CHANGE UP
BLD GP AB SCN SERPL QL: SIGNIFICANT CHANGE UP
BUN SERPL-MCNC: 19 MG/DL — SIGNIFICANT CHANGE UP (ref 7–23)
CALCIUM SERPL-MCNC: 8.5 MG/DL — SIGNIFICANT CHANGE UP (ref 8.5–10.1)
CARBAMAZEPINE SERPL-MCNC: 9.5 UG/ML — SIGNIFICANT CHANGE UP (ref 4–12)
CHLORIDE SERPL-SCNC: 106 MMOL/L — SIGNIFICANT CHANGE UP (ref 96–108)
CHOLEST SERPL-MCNC: 231 MG/DL — HIGH (ref 10–199)
CO2 SERPL-SCNC: 32 MMOL/L — HIGH (ref 22–31)
COLOR SPEC: YELLOW — SIGNIFICANT CHANGE UP
CREAT SERPL-MCNC: 0.72 MG/DL — SIGNIFICANT CHANGE UP (ref 0.5–1.3)
DIFF PNL FLD: NEGATIVE — SIGNIFICANT CHANGE UP
GLUCOSE SERPL-MCNC: 120 MG/DL — HIGH (ref 70–99)
GLUCOSE UR QL: NEGATIVE — SIGNIFICANT CHANGE UP
HCT VFR BLD CALC: 41.3 % — SIGNIFICANT CHANGE UP (ref 34.5–45)
HDLC SERPL-MCNC: 75 MG/DL — SIGNIFICANT CHANGE UP
HGB BLD-MCNC: 13.2 G/DL — SIGNIFICANT CHANGE UP (ref 11.5–15.5)
INR BLD: 0.94 RATIO — SIGNIFICANT CHANGE UP (ref 0.88–1.16)
KETONES UR-MCNC: NEGATIVE — SIGNIFICANT CHANGE UP
LEUKOCYTE ESTERASE UR-ACNC: NEGATIVE — SIGNIFICANT CHANGE UP
LIPID PNL WITH DIRECT LDL SERPL: 133 MG/DL — HIGH
MCHC RBC-ENTMCNC: 29.5 PG — SIGNIFICANT CHANGE UP (ref 27–34)
MCHC RBC-ENTMCNC: 32 GM/DL — SIGNIFICANT CHANGE UP (ref 32–36)
MCV RBC AUTO: 92.2 FL — SIGNIFICANT CHANGE UP (ref 80–100)
NITRITE UR-MCNC: NEGATIVE — SIGNIFICANT CHANGE UP
NRBC # BLD: 0 /100 WBCS — SIGNIFICANT CHANGE UP (ref 0–0)
PH UR: 8 — SIGNIFICANT CHANGE UP (ref 5–8)
PLATELET # BLD AUTO: 210 K/UL — SIGNIFICANT CHANGE UP (ref 150–400)
POTASSIUM SERPL-MCNC: 3.8 MMOL/L — SIGNIFICANT CHANGE UP (ref 3.5–5.3)
POTASSIUM SERPL-SCNC: 3.8 MMOL/L — SIGNIFICANT CHANGE UP (ref 3.5–5.3)
PROT SERPL-MCNC: 7.2 G/DL — SIGNIFICANT CHANGE UP (ref 6–8.3)
PROT UR-MCNC: 75 MG/DL
PROTHROM AB SERPL-ACNC: 10.7 SEC — SIGNIFICANT CHANGE UP (ref 10–12.9)
RBC # BLD: 4.48 M/UL — SIGNIFICANT CHANGE UP (ref 3.8–5.2)
RBC # FLD: 13.8 % — SIGNIFICANT CHANGE UP (ref 10.3–14.5)
SODIUM SERPL-SCNC: 142 MMOL/L — SIGNIFICANT CHANGE UP (ref 135–145)
SP GR SPEC: 1.01 — SIGNIFICANT CHANGE UP (ref 1.01–1.02)
TOTAL CHOLESTEROL/HDL RATIO MEASUREMENT: 3.1 RATIO — LOW (ref 3.3–7.1)
TRIGL SERPL-MCNC: 113 MG/DL — SIGNIFICANT CHANGE UP (ref 10–149)
UROBILINOGEN FLD QL: NEGATIVE — SIGNIFICANT CHANGE UP
WBC # BLD: 4.85 K/UL — SIGNIFICANT CHANGE UP (ref 3.8–10.5)
WBC # FLD AUTO: 4.85 K/UL — SIGNIFICANT CHANGE UP (ref 3.8–10.5)

## 2019-09-12 PROCEDURE — 99223 1ST HOSP IP/OBS HIGH 75: CPT | Mod: AI

## 2019-09-12 PROCEDURE — 71045 X-RAY EXAM CHEST 1 VIEW: CPT | Mod: 26

## 2019-09-12 PROCEDURE — 70551 MRI BRAIN STEM W/O DYE: CPT | Mod: 26

## 2019-09-12 PROCEDURE — 70450 CT HEAD/BRAIN W/O DYE: CPT | Mod: 26

## 2019-09-12 PROCEDURE — 93010 ELECTROCARDIOGRAM REPORT: CPT

## 2019-09-12 PROCEDURE — 99285 EMERGENCY DEPT VISIT HI MDM: CPT

## 2019-09-12 PROCEDURE — 70544 MR ANGIOGRAPHY HEAD W/O DYE: CPT | Mod: 26,59

## 2019-09-12 RX ORDER — DEXTROSE 50 % IN WATER 50 %
12.5 SYRINGE (ML) INTRAVENOUS ONCE
Refills: 0 | Status: DISCONTINUED | OUTPATIENT
Start: 2019-09-12 | End: 2019-09-13

## 2019-09-12 RX ORDER — SODIUM CHLORIDE 9 MG/ML
1000 INJECTION, SOLUTION INTRAVENOUS
Refills: 0 | Status: DISCONTINUED | OUTPATIENT
Start: 2019-09-12 | End: 2019-09-13

## 2019-09-12 RX ORDER — ACETAMINOPHEN 500 MG
650 TABLET ORAL EVERY 6 HOURS
Refills: 0 | Status: DISCONTINUED | OUTPATIENT
Start: 2019-09-12 | End: 2019-09-13

## 2019-09-12 RX ORDER — ONDANSETRON 8 MG/1
4 TABLET, FILM COATED ORAL ONCE
Refills: 0 | Status: COMPLETED | OUTPATIENT
Start: 2019-09-12 | End: 2019-09-12

## 2019-09-12 RX ORDER — LABETALOL HCL 100 MG
10 TABLET ORAL ONCE
Refills: 0 | Status: COMPLETED | OUTPATIENT
Start: 2019-09-12 | End: 2019-09-12

## 2019-09-12 RX ORDER — SODIUM CHLORIDE 9 MG/ML
3 INJECTION INTRAMUSCULAR; INTRAVENOUS; SUBCUTANEOUS ONCE
Refills: 0 | Status: COMPLETED | OUTPATIENT
Start: 2019-09-12 | End: 2019-09-12

## 2019-09-12 RX ORDER — DEXTROSE 50 % IN WATER 50 %
25 SYRINGE (ML) INTRAVENOUS ONCE
Refills: 0 | Status: DISCONTINUED | OUTPATIENT
Start: 2019-09-12 | End: 2019-09-13

## 2019-09-12 RX ORDER — CARBAMAZEPINE 200 MG
200 TABLET ORAL AT BEDTIME
Refills: 0 | Status: DISCONTINUED | OUTPATIENT
Start: 2019-09-12 | End: 2019-09-13

## 2019-09-12 RX ORDER — DEXTROSE 50 % IN WATER 50 %
15 SYRINGE (ML) INTRAVENOUS ONCE
Refills: 0 | Status: DISCONTINUED | OUTPATIENT
Start: 2019-09-12 | End: 2019-09-13

## 2019-09-12 RX ORDER — GLUCAGON INJECTION, SOLUTION 0.5 MG/.1ML
1 INJECTION, SOLUTION SUBCUTANEOUS ONCE
Refills: 0 | Status: DISCONTINUED | OUTPATIENT
Start: 2019-09-12 | End: 2019-09-13

## 2019-09-12 RX ORDER — HYDRALAZINE HCL 50 MG
10 TABLET ORAL EVERY 6 HOURS
Refills: 0 | Status: DISCONTINUED | OUTPATIENT
Start: 2019-09-12 | End: 2019-09-13

## 2019-09-12 RX ORDER — ATORVASTATIN CALCIUM 80 MG/1
40 TABLET, FILM COATED ORAL AT BEDTIME
Refills: 0 | Status: DISCONTINUED | OUTPATIENT
Start: 2019-09-12 | End: 2019-09-12

## 2019-09-12 RX ORDER — CARBAMAZEPINE 200 MG
100 TABLET ORAL
Refills: 0 | Status: DISCONTINUED | OUTPATIENT
Start: 2019-09-12 | End: 2019-09-13

## 2019-09-12 RX ORDER — INSULIN LISPRO 100/ML
VIAL (ML) SUBCUTANEOUS
Refills: 0 | Status: DISCONTINUED | OUTPATIENT
Start: 2019-09-12 | End: 2019-09-13

## 2019-09-12 RX ORDER — ENOXAPARIN SODIUM 100 MG/ML
40 INJECTION SUBCUTANEOUS DAILY
Refills: 0 | Status: DISCONTINUED | OUTPATIENT
Start: 2019-09-12 | End: 2019-09-13

## 2019-09-12 RX ORDER — ROSUVASTATIN CALCIUM 5 MG/1
10 TABLET ORAL AT BEDTIME
Refills: 0 | Status: DISCONTINUED | OUTPATIENT
Start: 2019-09-12 | End: 2019-09-13

## 2019-09-12 RX ADMIN — Medication 200 MILLIGRAM(S): at 22:46

## 2019-09-12 RX ADMIN — ONDANSETRON 4 MILLIGRAM(S): 8 TABLET, FILM COATED ORAL at 08:10

## 2019-09-12 RX ADMIN — Medication 650 MILLIGRAM(S): at 13:33

## 2019-09-12 RX ADMIN — Medication 100 MILLIGRAM(S): at 20:52

## 2019-09-12 RX ADMIN — SODIUM CHLORIDE 3 MILLILITER(S): 9 INJECTION INTRAMUSCULAR; INTRAVENOUS; SUBCUTANEOUS at 07:34

## 2019-09-12 RX ADMIN — Medication 10 MILLIGRAM(S): at 07:36

## 2019-09-12 RX ADMIN — ONDANSETRON 4 MILLIGRAM(S): 8 TABLET, FILM COATED ORAL at 07:36

## 2019-09-12 RX ADMIN — ENOXAPARIN SODIUM 40 MILLIGRAM(S): 100 INJECTION SUBCUTANEOUS at 12:30

## 2019-09-12 RX ADMIN — ROSUVASTATIN CALCIUM 10 MILLIGRAM(S): 5 TABLET ORAL at 20:52

## 2019-09-12 RX ADMIN — Medication 650 MILLIGRAM(S): at 14:34

## 2019-09-12 RX ADMIN — Medication 100 MILLIGRAM(S): at 16:00

## 2019-09-12 NOTE — SWALLOW BEDSIDE ASSESSMENT ADULT - SWALLOW EVAL: RECOMMENDED FEEDING/EATING TECHNIQUES
allow for swallow between intakes/crush medication (when feasible)/position upright (90 degrees)/small sips/bites/maintain upright posture during/after eating for 30 mins/oral hygiene

## 2019-09-12 NOTE — ED PROVIDER NOTE - OBJECTIVE STATEMENT
Pt is a 89 yo female who presents to the ED with a cc of left sided weakness.  PMHx of hemorrhagic CVA 2015, DM, HTN, HLD, h/o seizures s/p hemorrhagic CVA.  Pt reports that she awoke in her normal state of health today and she took her normal am medications.  She then reports that shortly after waking up she felt a heaviness in her left arm and a strange sensation in her tongue.  She further reports that she had generalized body tremors.  She then called her son but cannot recall what time this was.  He reports that it was prior to 6 am.  He helped her to the restroom and reports that he noted left sided weakness.  He then states that EMS was called and she was taken to the ED for further work up.  Per family s/p seizures pt generally has left sided weakness.  Was last admitted to the hospital this past March with similar compliant and was eventually diagnosed with Todds paralysis.  On exam pt is awake at bedside, mild confusion to present events although alert to person, place, and year.  Reports HA, and nausea.  Denies CP, SOB, abd pain.    Hemorrhagic stroke    HTN (hypertension)    Hypercholesteremia    Seizure

## 2019-09-12 NOTE — SWALLOW BEDSIDE ASSESSMENT ADULT - COMMENTS
The patient was seen this afternoon for an initial assessment of swallow function, at which time she was alert and cooperative. Patient's two sons present at bedside throughout this evaluation. Patient is denying any speech/language/swallowing difficulties at this time. Per charting, the patient is an "89 yo female who presents to the ED with a cc of left sided weakness.  PMHx of hemorrhagic CVA 2015, DM, HTN, HLD, h/o seizures s/p hemorrhagic CVA.  Pt reports that she awoke in her normal state of health today and she took her normal am medications.  She then reports that shortly after waking up she felt a heaviness in her left arm and a strange sensation in her tongue.  She further reports that she had generalized body tremors.  She then called her son but cannot recall what time this was.  He reports that it was prior to 6 am.  He helped her to the restroom and reports that he noted left sided weakness.  He then states that EMS was called and she was taken to the ED for further work up.  Per family s/p seizures pt generally has left sided weakness.  Was last admitted to the hospital this past March with similar compliant and was eventually diagnosed with Todds paralysis." CXR revealed, "No acute findings." Recent CT of the brain revealed, "No acute intracranial bleeding, mass effect, or shift. Volume loss and advanced chronic microvascular ischemic changes." Discussed results and recommendations from this evaluation with the patient, patient's sons, RN, and call out to MD.

## 2019-09-12 NOTE — H&P ADULT - PROBLEM SELECTOR PLAN 1
Admit to Tele  CT Head is negative  [facility]  [facility]w Neuro Dr. Sutton will order MRI/ MRA of the brain.  Continue resuvastatin  Not on aspirin secondary to h/o hemorrahgic CVA   TTE  PT evaluation  Speech and swallow evaluation  Check TSH, HbA1C, Lipid Panel.  Neuro to see patietn Admit to Tele  CT Head is negative  Neuro checks. NIHSS  D/w Neuro Dr. Sutton will order MRI/ MRA of the brain.  Continue resuvastatin  Not on aspirin secondary to h/o hemorrahgic CVA   TTE  PT evaluation  Speech and swallow evaluation  Check TSH, HbA1C, Lipid Panel.  Neuro to see patient

## 2019-09-12 NOTE — ED ADULT NURSE NOTE - NSIMPLEMENTINTERV_GEN_ALL_ED
Implemented All Fall with Harm Risk Interventions:  Mora to call system. Call bell, personal items and telephone within reach. Instruct patient to call for assistance. Room bathroom lighting operational. Non-slip footwear when patient is off stretcher. Physically safe environment: no spills, clutter or unnecessary equipment. Stretcher in lowest position, wheels locked, appropriate side rails in place. Provide visual cue, wrist band, yellow gown, etc. Monitor gait and stability. Monitor for mental status changes and reorient to person, place, and time. Review medications for side effects contributing to fall risk. Reinforce activity limits and safety measures with patient and family. Provide visual clues: red socks.

## 2019-09-12 NOTE — SWALLOW BEDSIDE ASSESSMENT ADULT - ASR SWALLOW ASPIRATION MONITOR
gurgly voice/change of breathing pattern/position upright (90Y)/upper respiratory infection/oral hygiene/fever/pneumonia/throat clearing/cough

## 2019-09-12 NOTE — ED ADULT NURSE NOTE - OBJECTIVE STATEMENT
87 y/o F patient PMH head bleed in 2015 presents to ED from home via EMS c/o weakness. As per patient she woke up this morning feeling numbness in left hand and fingers. Patient reports she asked her son for help to ambulate to bathroom and began to feel "heaviness" in left arm. Patient's son reports patient normally is ambulatory without assistance and was weak when ambulating this morning. Patient A&Ox4. lungs CTA. abdomen soft, non tender. Patient denies SOB, chest pain, abdominal pain, vomiting, bowel/bladder changes, fevers. Safety and comfort measures provided and maintained. 2 RNs bedside. Patient placed on cardiac monitor. 89 y/o F patient PMH head bleed in 2015 presents to ED from home via EMS c/o weakness. As per patient she woke up this morning feeling numbness in left hand and fingers. Patient reports she asked her son for help to ambulate to bathroom and began to feel "heaviness" in left arm. Patient's son reports patient normally is ambulatory without assistance and was weak when ambulating this morning. Patient A&Ox4. lungs CTA. abdomen soft, non tender. skin warm and intact. Patient denies SOB, chest pain, abdominal pain, vomiting, bowel/bladder changes, fevers. Safety and comfort measures provided and maintained. 2 RNs bedside. Patient placed on cardiac monitor.

## 2019-09-12 NOTE — ED ADULT NURSE REASSESSMENT NOTE - NS ED NURSE REASSESS COMMENT FT1
Upon vital sign reassessment, pt reports she feels like she is having a seizure. Pt reports "feeling tremoring inside." Pt reports she feels tremoring in her chest. Pt breathing easy, unlabored, no signs of distress, able to talk to this RN, no body movement noted. MD Cavazos made aware. Will continue to monitor.

## 2019-09-12 NOTE — ED PROVIDER NOTE - CARE PLAN
Principal Discharge DX:	Stroke-like symptom  Secondary Diagnosis:	Diabetes mellitus  Secondary Diagnosis:	Seizure

## 2019-09-12 NOTE — H&P ADULT - ATTENDING COMMENTS
Plan d/w Neuro. Also d/w Both [facility] ar bedside. Plan d/w Neuro. Also d/w Both sons at bedside.

## 2019-09-12 NOTE — ED ADULT TRIAGE NOTE - CHIEF COMPLAINT QUOTE
patient states' woke up from sleep feeling fine, themn felt fingers get heavy, and tongue felt funny, called son, and son helped her to the bathroom, patient normally able to walk on her own. c/o nausea, headache

## 2019-09-12 NOTE — H&P ADULT - HISTORY OF PRESENT ILLNESS
87 yo female who presents to the ED with a cc of left sided weakness.  PMHx of hemorrhagic CVA 2015, DM, HTN, HLD, h/o seizures s/p hemorrhagic CVA.  Pt reports that she awoke in her normal state of health today and she took her normal am medications.  She then reports that shortly after waking up she felt a heaviness in her left arm and a strange sensation in her tongue.  She further reports that she had generalized body tremors.  She then called her son but cannot recall what time this was.  He reports that it was prior to 6 am.  He helped her to the restroom and reports that he noted left sided weakness.  He then states that EMS was called and she was taken to the ED for further work up.  Per family s/p seizures pt generally has left sided weakness.  Was last admitted to the hospital this past March with similar compliant and was eventually diagnosed with Todds paralysis.  On exam pt is awake at bedside, mild confusion to present events although alert to person, place, and year.  Reports HA, and nausea.  Denies CP, SOB, abd pain.  Per son she similar episodes in  the past and has been admitted to the hospital for that.

## 2019-09-12 NOTE — H&P ADULT - ASSESSMENT
89 yo female who presents to the ED with a cc of left sided weakness.  PMHx of hemorrhagic CVA 2015, DM, HTN, HLD, h/o seizures s/p hemorrhagic CVA.  Pt reports that she awoke in her normal state of health today and she took her normal am medications.  She then reports that shortly after waking up she felt a heaviness in her left arm and a strange sensation in her tongue and  generalized body tremors admitted for r/o CVA and suspected breakthrough seizure episode.

## 2019-09-12 NOTE — ED PROVIDER NOTE - CLINICAL SUMMARY MEDICAL DECISION MAKING FREE TEXT BOX
Pt is a 87 yo female who presents to the ED with a cc of left sided weakness.  PMHx of hemorrhagic CVA 2015, DM, HTN, HLD, h/o seizures s/p hemorrhagic CVA. Pt is a 87 yo female who presents to the ED with a cc of left sided weakness.  PMHx of hemorrhagic CVA 2015, DM, HTN, HLD, h/o seizures s/p hemorrhagic CVA.  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  Will control BP.  Pt is not a TPA candidate

## 2019-09-12 NOTE — H&P ADULT - NSICDXPASTMEDICALHX_GEN_ALL_CORE_FT
PAST MEDICAL HISTORY:  CVA (cerebral infarction)     Diabetes mellitus     Hemorrhagic stroke     HTN (hypertension)     Hypercholesteremia     Seizure

## 2019-09-12 NOTE — H&P ADULT - PROBLEM SELECTOR PLAN 4
D/w Neuro  Allow Permissive hypertension  for now  IV Hydralazine 10mg Q6 hours PRN for SBP >190  Hold oral meds for now

## 2019-09-12 NOTE — H&P ADULT - PROBLEM SELECTOR PLAN 2
Continue Carbamazepine home dose and frequency  Seizure precautions  Neur Consult Continue Carbamazepine home dose and frequency  Carbamazepine Level   Seizure precautions  Neur Consult

## 2019-09-12 NOTE — H&P ADULT - NSHPPHYSICALEXAM_GEN_ALL_CORE
GEN: NAD, Awake, Alert  HEENT: PERRLA, EOMI  Heart: S1S2+, Regular  Lungs: Clear bilat, No wheezing  Abdomen: Soft, +BS  Neuro: Awake, Alert, 3/5 left UE and LLE, 5/5 RUE/ RLE, Sensations intact  Extremities: + 2+ Pulses, No edema  Skin: Warm, dry

## 2019-09-12 NOTE — H&P ADULT - PROBLEM SELECTOR PLAN 7
IMPROVE VTE Individual Risk Assessment          RISK                                                          Points  [  ] Previous VTE                                                3  [  ] Thrombophilia                                             2  [  ] Lower limb paralysis                                   2        (unable to hold up >15 seconds)    [  ] Current Cancer                                             2         (within 6 months)  [  ] Immobilization > 24 hrs                              1  [  ] ICU/CCU stay > 24 hours                             1  [X  ] Age > 60                                                         1    IMPROVE VTE Score: 1    SCD.

## 2019-09-12 NOTE — SWALLOW BEDSIDE ASSESSMENT ADULT - SWALLOW EVAL: DIAGNOSIS
1. The patient demonstrated functional oral management of puree and thin liquid textures marked by adequate bolus collection, transfer, and posterior transport. 2. The patient demonstrated a mild oral dysphagia for solids marked by delayed bolus collection, transfer, and posterior transport. 3. The patient demonstrated a mild pharyngeal dysphagia for puree, solid, and thin liquid textures marked by a delayed pharygneal swallow trigger with reduced hyolaryngeal elevation upon digital palpation w/o evidence of airway penetration.

## 2019-09-12 NOTE — ED PROVIDER NOTE - PROGRESS NOTE DETAILS
pt now regaining some movement in left arm, reports HA improving.  Likely related to Todds paralysis.  Spoke with neurology, will admit.  Family refusing ASA at this time.  BP controlled after 1 dose of medication

## 2019-09-12 NOTE — ED ADULT NURSE REASSESSMENT NOTE - NS ED NURSE REASSESS COMMENT FT1
Assumed care from previous RN. Pt back from CT scan, placed back on monitor. Pt's oxygen saturation noted to be 89% on RA, placed on 2L NC, improved to 97%. Pt reports that she asked her son to walk her to the bathroom between 5:30 and 6 this morning and she "did not feel right." Pt reports "feeling tremors inside." Pt has hx of seizures, sons do not report seeing movement. On assessment, pt able to move right arm, unable to lift left arm. Pt reports sensation is intact, when asked to lift leg that is being touched, pt lifts opposite leg. Pt able to lift both legs but fall back to the bed.

## 2019-09-12 NOTE — PATIENT PROFILE ADULT - NSPROPOAPRESSUREINJURY_GEN_A_NUR
Spine appears normal, lateral rt chest wall ttp w/o gross deformity. rt shoulder ttp with mild swelling, proximal RUE ttp, pelvis is NT, stable. DNVI RUE. Cap refill less than 2s. Good AROM b/l hips and knees. no

## 2019-09-13 ENCOUNTER — TRANSCRIPTION ENCOUNTER (OUTPATIENT)
Age: 84
End: 2019-09-13

## 2019-09-13 VITALS
DIASTOLIC BLOOD PRESSURE: 85 MMHG | OXYGEN SATURATION: 96 % | RESPIRATION RATE: 19 BRPM | HEART RATE: 70 BPM | SYSTOLIC BLOOD PRESSURE: 147 MMHG | TEMPERATURE: 98 F

## 2019-09-13 LAB
ANION GAP SERPL CALC-SCNC: 8 MMOL/L — SIGNIFICANT CHANGE UP (ref 5–17)
BUN SERPL-MCNC: 18 MG/DL — SIGNIFICANT CHANGE UP (ref 7–23)
CALCIUM SERPL-MCNC: 8 MG/DL — LOW (ref 8.5–10.1)
CHLORIDE SERPL-SCNC: 107 MMOL/L — SIGNIFICANT CHANGE UP (ref 96–108)
CO2 SERPL-SCNC: 29 MMOL/L — SIGNIFICANT CHANGE UP (ref 22–31)
CREAT SERPL-MCNC: 0.83 MG/DL — SIGNIFICANT CHANGE UP (ref 0.5–1.3)
CULTURE RESULTS: SIGNIFICANT CHANGE UP
GLUCOSE SERPL-MCNC: 96 MG/DL — SIGNIFICANT CHANGE UP (ref 70–99)
HBA1C BLD-MCNC: 6.3 % — HIGH (ref 4–5.6)
HCT VFR BLD CALC: 35.2 % — SIGNIFICANT CHANGE UP (ref 34.5–45)
HGB BLD-MCNC: 11.4 G/DL — LOW (ref 11.5–15.5)
MCHC RBC-ENTMCNC: 29.6 PG — SIGNIFICANT CHANGE UP (ref 27–34)
MCHC RBC-ENTMCNC: 32.4 GM/DL — SIGNIFICANT CHANGE UP (ref 32–36)
MCV RBC AUTO: 91.4 FL — SIGNIFICANT CHANGE UP (ref 80–100)
NRBC # BLD: 0 /100 WBCS — SIGNIFICANT CHANGE UP (ref 0–0)
PLATELET # BLD AUTO: 172 K/UL — SIGNIFICANT CHANGE UP (ref 150–400)
POTASSIUM SERPL-MCNC: 3.9 MMOL/L — SIGNIFICANT CHANGE UP (ref 3.5–5.3)
POTASSIUM SERPL-SCNC: 3.9 MMOL/L — SIGNIFICANT CHANGE UP (ref 3.5–5.3)
RBC # BLD: 3.85 M/UL — SIGNIFICANT CHANGE UP (ref 3.8–5.2)
RBC # FLD: 13.8 % — SIGNIFICANT CHANGE UP (ref 10.3–14.5)
SODIUM SERPL-SCNC: 144 MMOL/L — SIGNIFICANT CHANGE UP (ref 135–145)
SPECIMEN SOURCE: SIGNIFICANT CHANGE UP
TSH SERPL-MCNC: 1.62 UIU/ML — SIGNIFICANT CHANGE UP (ref 0.36–3.74)
WBC # BLD: 4.6 K/UL — SIGNIFICANT CHANGE UP (ref 3.8–10.5)
WBC # FLD AUTO: 4.6 K/UL — SIGNIFICANT CHANGE UP (ref 3.8–10.5)

## 2019-09-13 PROCEDURE — 99239 HOSP IP/OBS DSCHRG MGMT >30: CPT

## 2019-09-13 RX ORDER — CARBAMAZEPINE 200 MG
1 TABLET ORAL
Qty: 0 | Refills: 0 | DISCHARGE

## 2019-09-13 RX ORDER — CARBAMAZEPINE 200 MG
300 TABLET ORAL AT BEDTIME
Refills: 0 | Status: DISCONTINUED | OUTPATIENT
Start: 2019-09-13 | End: 2019-09-13

## 2019-09-13 RX ORDER — CARBAMAZEPINE 200 MG
1 TABLET ORAL
Qty: 30 | Refills: 0
Start: 2019-09-13 | End: 2019-10-12

## 2019-09-13 RX ORDER — AMLODIPINE BESYLATE 2.5 MG/1
5 TABLET ORAL DAILY
Refills: 0 | Status: DISCONTINUED | OUTPATIENT
Start: 2019-09-13 | End: 2019-09-13

## 2019-09-13 RX ORDER — LOSARTAN POTASSIUM 100 MG/1
50 TABLET, FILM COATED ORAL DAILY
Refills: 0 | Status: DISCONTINUED | OUTPATIENT
Start: 2019-09-13 | End: 2019-09-13

## 2019-09-13 RX ORDER — CARBAMAZEPINE 200 MG
1 TABLET ORAL
Qty: 0 | Refills: 0 | DISCHARGE
Start: 2019-09-13

## 2019-09-13 RX ORDER — CARVEDILOL PHOSPHATE 80 MG/1
12.5 CAPSULE, EXTENDED RELEASE ORAL EVERY 12 HOURS
Refills: 0 | Status: DISCONTINUED | OUTPATIENT
Start: 2019-09-13 | End: 2019-09-13

## 2019-09-13 RX ADMIN — ENOXAPARIN SODIUM 40 MILLIGRAM(S): 100 INJECTION SUBCUTANEOUS at 11:09

## 2019-09-13 RX ADMIN — Medication 100 MILLIGRAM(S): at 11:09

## 2019-09-13 NOTE — PROGRESS NOTE ADULT - SUBJECTIVE AND OBJECTIVE BOX
Neurology follow up note    MAJOR [id number]      Interval History:    Patient feels ok no new complaints.    MEDICATIONS    acetaminophen   Tablet .. 650 milliGRAM(s) Oral every 6 hours PRN  amLODIPine   Tablet 5 milliGRAM(s) Oral daily  carBAMazepine ER Capsule 100 milliGRAM(s) Oral <User Schedule>  carBAMazepine ER Capsule 300 milliGRAM(s) Oral at bedtime  carvedilol 12.5 milliGRAM(s) Oral every 12 hours  dextrose 40% Gel 15 Gram(s) Oral once PRN  dextrose 5%. 1000 milliLiter(s) IV Continuous <Continuous>  dextrose 50% Injectable 12.5 Gram(s) IV Push once  dextrose 50% Injectable 25 Gram(s) IV Push once  dextrose 50% Injectable 25 Gram(s) IV Push once  enoxaparin Injectable 40 milliGRAM(s) SubCutaneous daily  glucagon  Injectable 1 milliGRAM(s) IntraMuscular once PRN  hydrALAZINE Injectable 10 milliGRAM(s) IV Push every 6 hours PRN  insulin lispro (HumaLOG) corrective regimen sliding scale   SubCutaneous three times a day before meals  losartan 50 milliGRAM(s) Oral daily  rosuvastatin 10 milliGRAM(s) Oral at bedtime      Allergies    aspirin (Unknown)  sulfa drugs (Unknown)    Intolerances    Lipitor (Other; Muscle Pain)          Vital Signs Last 24 Hrs  T(C): 36.7 (13 Sep 2019 07:48), Max: 37.1 (13 Sep 2019 05:06)  T(F): 98 (13 Sep 2019 07:48), Max: 98.7 (13 Sep 2019 05:06)  HR: 82 (13 Sep 2019 10:58) (56 - 82)  BP: 103/67 (13 Sep 2019 07:48) (103/67 - 168/82)  BP(mean): --  RR: 18 (13 Sep 2019 07:48) (17 - 18)  SpO2: 95% (13 Sep 2019 10:58) (92% - 100%)      REVIEW OF SYSTEMS:     Constitutional: No fever, chills, fatigue, weakness  Eyes: no eye pain, visual disturbances, or discharge  ENT:  No difficulty hearing, tinnitus, vertigo; No sinus or throat pain  Neck: No pain or stiffness  Respiratory: No cough, dyspnea, wheezing   Cardiovascular: No chest pain, palpitations,   Gastrointestinal: No abdominal or epigastric pain. No nausea, vomiting  No diarrhea or constipation.   Genitourinary: No dysuria, frequency, hematuria or incontinence  Neurological: No headaches, lightheadedness, vertigo, numbness or tremors  Psychiatric: No depression, anxiety, mood swings or difficulty sleeping  Musculoskeletal: No joint pain or swelling; No muscle, back or extremity pain  Skin: No itching, burning, rashes or lesions   Lymph Nodes: No enlarged glands  Endocrine: No heat or cold intolerance; No hair loss   Allergy and Immunologic: No hives or eczema    On Neurological Examination:      The patient is awake and alert.    Extraocular movements were intact.    Pupils were equal, round, and reactive bilaterally, 3 mm to 2 mm.    The patient appeared to have poor vision out of both left and right eyes, had difficulty naming number of fingers.    The patient had subtle decrease of the left nasolabial fold.    Motor -Right upper is 4+/5, left upper was 4/5.  Bilateral lower extremities were 4-/5, would say age-appropriate.   Sensory:  Bilateral upper and lower intact to light touch.    Follow simple commands    GENERAL Exam: Nontoxic , No Acute Distress   	  HEENT:  normocephalic, atraumatic  		  LUNGS: Clear bilaterally    	  HEART: Normal S1S2   No murmur RRR        	  GI/ ABDOMEN:  Soft  Non tender    EXTREMITIES:   No Edema  No Clubbing  No Cyanosis     MUSCULOSKELETAL: decreased Range of Motion all 4 extremities   	   SKIN: Normal  No Ecchymosis                  LABS:  CBC Full  -  ( 13 Sep 2019 07:20 )  WBC Count : 4.60 K/uL  RBC Count : 3.85 M/uL  Hemoglobin : 11.4 g/dL  Hematocrit : 35.2 %  Platelet Count - Automated : 172 K/uL  Mean Cell Volume : 91.4 fl  Mean Cell Hemoglobin : 29.6 pg  Mean Cell Hemoglobin Concentration : 32.4 gm/dL  Auto Neutrophil # : x  Auto Lymphocyte # : x  Auto Monocyte # : x  Auto Eosinophil # : x  Auto Basophil # : x  Auto Neutrophil % : x  Auto Lymphocyte % : x  Auto Monocyte % : x  Auto Eosinophil % : x  Auto Basophil % : x    Urinalysis Basic - ( 12 Sep 2019 08:48 )    Color: Yellow / Appearance: Clear / S.010 / pH: x  Gluc: x / Ketone: Negative  / Bili: Negative / Urobili: Negative   Blood: x / Protein: 75 mg/dL / Nitrite: Negative   Leuk Esterase: Negative / RBC: Negative /HPF / WBC 3-5   Sq Epi: x / Non Sq Epi: Occasional / Bacteria: Few      09-13    144  |  107  |  18  ----------------------------<  96  3.9   |  29  |  0.83    Ca    8.0<L>      13 Sep 2019 07:20    TPro  7.2  /  Alb  3.3  /  TBili  0.5  /  DBili  x   /  AST  18  /  ALT  23  /  AlkPhos  72      Hemoglobin A1C: Hemoglobin A1C, Whole Blood: 6.3 % ( @ 10:39)    Lipid Panel  @ 15:59  Total Cholesterol, Serum 231    Triglycerides 113    LIVER FUNCTIONS - ( 12 Sep 2019 07:14 )  Alb: 3.3 g/dL / Pro: 7.2 g/dL / ALK PHOS: 72 U/L / ALT: 23 U/L / AST: 18 U/L / GGT: x           Vitamin B12   PT/INR - ( 12 Sep 2019 07:14 )   PT: 10.7 sec;   INR: 0.94 ratio         PTT - ( 12 Sep 2019 07:14 )  PTT:29.6 sec      RADIOLOGY    ANALYSIS AND PLAN:  This is an 87-year-old with an episode of being found on the ground and history of intercerebral hemorrhage and seizures.  1.	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	We will plan for an MRI imaging of the brain was negative for new cerebrovascular accident.  3.	For history of seizures, which could be the possibility, the patient's carbamazepine levels now appear to be within the therapeutic range at 8.2.  We do have room to adjust this.  The patient does feel way too sleepy with 200 in the morning, so will increase nighttime dose to 400. ------100 100 400   4.	Spoke with two sons and the patient in great detail, they understand the plan with adjustment of her carbamazepine.  They will follow with her outside neurologist.    5.	[phone] cell him [phone] spoke to sharonda ortega 19 I will contact the patient's outside neurologist, Dr. Gonsalez.  Telephone number is [phone].him today 19  6.	patient refuses EEG     Neurologic standpoint only cleared for discharge planning     Physical therapy evaluation as tolerated  OOB to chair/ambulation with assistance only if possible.    Greater than 40 minutes spent in direct patient care reviewing  the notes, lab data/ imaging , discussion with multidisciplinary team.

## 2019-09-13 NOTE — PHYSICAL THERAPY INITIAL EVALUATION ADULT - ORIENTATION, REHAB EVAL
oriented to person, place, time and situation however some slight forgetfulness & delayed processing time noted/oriented to person, place, time and situation

## 2019-09-13 NOTE — PHYSICAL THERAPY INITIAL EVALUATION ADULT - GENERAL OBSERVATIONS, REHAB EVAL
Pt rec'd in bathroom /c Nsg assistant, standing & eager t be seen by PT due to wanting to go home. Pt was agreeable /c PT evaluation. Pt rec'd in bathroom /c Nsg assistant, standing & eager to be seen by PT due to wanting to go home. Pt was agreeable /c PT evaluation.

## 2019-09-13 NOTE — PHYSICAL THERAPY INITIAL EVALUATION ADULT - MANUAL MUSCLE TESTING RESULTS, REHAB EVAL
4-/5 throughout grossly 5/5 throughout grossly except left elbow flex 4/5 and  strength 4-/5./grossly assessed due to

## 2019-09-13 NOTE — DISCHARGE NOTE PROVIDER - PROVIDER TOKENS
PROVIDER:[TOKEN:[[id number]]] PROVIDER:[TOKEN:[[id number]]],PROVIDER:[TOKEN:[[id number]]]

## 2019-09-13 NOTE — PHYSICAL THERAPY INITIAL EVALUATION ADULT - DID THE PATIENT HAVE SURGERY?
MRI Angio: (-) stenosis; MRI head: (-) acute infarct; CT brain: (-) acute intracranial bleeding; Chest X-ray: (-) acute findings;/n/a

## 2019-09-13 NOTE — PHYSICAL THERAPY INITIAL EVALUATION ADULT - IMPAIRMENTS FOUND, PT EVAL
muscle strength/gait, locomotion, and balance/aerobic capacity/endurance/poor safety awareness aerobic capacity/endurance/arousal, attention, and cognition/gait, locomotion, and balance/muscle strength/poor safety awareness/posture

## 2019-09-13 NOTE — PHYSICAL THERAPY INITIAL EVALUATION ADULT - ADDITIONAL COMMENTS
Pt states she lives with son in [facility] with 2 steps to enter with side post, was not using AD for ambulation although has RW, has walk in shower with shower chair and high rised toilet seat. Pt states she was independent with transfers and ambulation, is at home alone majority of day while son is at work. Pt states she lives with son in a private home with 1-2 steps to enter with side post, was not using AD for ambulation although has RW, has walk in shower with shower chair and high raised toilet seat & grab bars. Pt does not negotiate stairs indoors. Pt states she was independent with transfers and ambulation. Pt son takes her food shopping and mostly orders food in.

## 2019-09-13 NOTE — DISCHARGE NOTE PROVIDER - CARE PROVIDERS DIRECT ADDRESSES
,[email or web address]

## 2019-09-13 NOTE — PHYSICAL THERAPY INITIAL EVALUATION ADULT - DIAGNOSIS, PT EVAL
Generalized weakness left sided weakness to LUE (elbow flexion, hand ), altered gait and balance due to seizure

## 2019-09-13 NOTE — PHYSICAL THERAPY INITIAL EVALUATION ADULT - CRITERIA FOR SKILLED THERAPEUTIC INTERVENTIONS
rehab potential/functional limitations in following categories/predicted duration of therapy intervention/anticipated discharge recommendation/impairments found/risk reduction/prevention/therapy frequency

## 2019-09-13 NOTE — PHYSICAL THERAPY INITIAL EVALUATION ADULT - GAIT DEVIATIONS NOTED, PT EVAL
decreased rajeev/decreased stride length/decreased weight-shifting ability/decreased step length decreased weight-shifting ability/decreased velocity of limb motion/decreased step length/decreased stride length/decreased rajeev/decreased trunk rotation, decreased arm swing, shuffles, NBOS

## 2019-09-13 NOTE — DISCHARGE NOTE PROVIDER - CARE PROVIDER_API CALL
Progress Notes by Lisbeth Reagan MD at 08/29/17 06:01 PM     Author:  Lisbeth Reagan MD Service:  (none) Author Type:  Physician     Filed:  09/01/17 06:15 PM Encounter Date:  8/29/2017 Status:  Signed     :  Lisbeth Reagan MD (Physician)              12+ Year Well Child Visit      Roomed by: Lisa Vargas MA 6:01 PM     Accompanied by:[EB1.1T] Mother Aliyah [phone][EB1.1M] Ok to leave a detailed message    SPORTS HISTORY:  On going medical problems?    Patient Active Problem List    Diagnosis    • Routine infant or child health check   • Ovarian cyst   • Baker's cyst of knee   • Mild intermittent allergic asthma without complication   • Allergic rhinoconjunctivitis   • Angioedema   • Acne vulgaris     Significant illnesses in the past?[EB1.1T] No[EB1.1M]  Any serious injuries related to sports?[EB1.1T]   None[EB1.1M]  Any known deformities (scoliosis, heart, absent paired organ)?[EB1.1T]   No[EB1.1M]  Family Hx of diabetes, bleeding, heart disease before age 50, etc.)?[EB1.1T]  No[EB1.1M]  Any fainting or dizziness while exercising?[EB1.1T]  No[EB1.1M]  Any loss of consciousness, concussion or head injury?[EB1.1T]  No[EB1.1M]    TB History[EB1.1T]  No[EB1.1M] -- Is there a family history of TB?[EB1.1T]   No[EB1.1M] -- Has the child been exposed to anyone who was in a correctional facility or works in one in the past 5 years?[EB1.1T]    No[EB1.1M] -- Is the child an immigrant or adopted from an endemic country - ([place], [place] or [place][EB1.1T]    No[EB1.1M] -- Is there a travel history to an endemic country ([place], [place], [place] or [place] with exposure to the local population?[EB1.1T]  No[EB1.1M] -- Is there a patient history of HIV or family member with HIV?[EB1.1T]  No[EB1.1M] -- Has the child been exposed to a  resident of a nursing home, institutionalized adolescent, foster home or homeless shelter?   TB skin test indicated:[EB1.1T] Not at  this time[IH1.1M]    Diabetic Screening[EB1.1T]  No[EB1.1M] -- Diabetes screening: family history[EB1.1T]  [EB1.1M] -- Ethnic background[EB1.1T]  No[EB1.1M] -- Signs of insulin resistance (hypertension, dyslipidemia, polycystic ovarian syndrome).    SOCIAL DEVELOPMENT:  Home:[EB1.1T] No problems[EB1.1M]  Safety concerns (home, relationships):[EB1.1T] No[IH1.2M]  School: presently is[EB1.1T] in the 10th grade[IH1.2M].  School Progress:[EB1.1T] average grades[IH1.2M]  Activities:[EB1.1T] active and sports[IH1.2M]  Habits:   Smoking:[EB1.1T] denies[IH1.2M]  Alcohol:[EB1.1T] denies[IH1.2M]  Drugs:[EB1.1T] denies[IH1.2M]  Sexuality:[EB1.1T] not dating, not had sex yet[IH1.2M]    SUBJECTIVE:[EB1.1T]   Khushi Lopez is a 15 year old female[IH1.3T] here for well child visit and sports PE form[IH1.3M]  Present health:[EB1.1T] Good[IH1.3M]  Hearing & vision:[EB1.1T] No Problems[IH1.3M]  Dental:[EB1.1T] No Problems[IH1.3M]    Diet:[EB1.1T] appropriate diet[IH1.3M]  Elimination:   BM s:[EB1.1T] No problems[IH1.3M]  Urine:[EB1.1T] No problems[IH1.3M]  Sleep:[EB1.1T] No problems[IH1.3M]  Menstrual hx:[EB1.1T] normal  Menarche at age[IH1.3M]  Anemia Screening - cbc ordered?[EB1.1T] Not indicated at this time[IH1.3M]  Mental Health:[EB1.1T] No problems[IH1.3M]    PHQ-2 Depression Screening  Over the past 2 weeks, has patient felt down, depressed or hopeless?[EB1.1T] No[EB1.1M]  Over the past 2 weeks, has patient felt little interest or pleasure in doing things?[EB1.1T] NO[EB1.1M]    On the basis of the above screen, the following is initiated:[EB1.1T]  Normal Screening will recheck the patient at the next well visit or sooner if indicated.[IH1.2T]    ROS  All other systems reviewed were negative     Allergies:  Review of patient's allergies indicates no known allergies.     Current Outpatient Prescriptions     Medication  Sig   • albuterol (PROAIR HFA) 108 (90 BASE) MCG/ACT inhaler Inhale 1-2 Puffs by mouth every 4 (four)  hours as needed for Wheezing.   • clindamycin-benzoyl peroxide (BENZACLIN) gel Apply to affected skin twice daily as needed.   • montelukast (SINGULAIR) 5 MG chewable tablet Take 1 Tab by mouth nightly.   • albuterol (PROAIR HFA) 108 (90 BASE) MCG/ACT inhaler Inhale 2 Puffs by mouth every 4 (four) hours as needed for Wheezing.   • albuterol (PROVENTIL) (2.5 MG/3ML) 0.083% nebulizer solution Use 3 mL via nebulizer every 4 (four) hours as needed for Wheezing.   • Loratadine (CLARITIN OR) Take  by mouth.       Past Medical history:[EB1.1T] No change in past medical history[IH1.2M]     Family history:[EB1.1T] No change in family history[IH1.2M]    Social history:[EB1.1T] Attends school[IH1.2M]     OBJECTIVE:   Physical exam[EB1.1T]  /72  Pulse 84  Temp 96.6 °F (35.9 °C) (Temporal)   Resp 16  Ht 5' 4.5\" (1.638 m)  Wt 148 lb (67.1 kg)  BMI 25.01 kg/m2[EB1.2T]      GENERAL: Evaluation of appearance.  Findings:[EB1.1T] Normal[IH1.2T]    HEAD & SCALP: Evaluation for lesions, swelling, tenderness and abnormalities.  Findings:[EB1.1T] Normal[IH1.2T]    EYES: Evaluation for redness, swelling, drainage and abnormalities.  Findings:[EB1.1T] Both eyes normal[IH1.2T]    EARS: Evaluation of  external ears, canals, and tm's  Findings:[EB1.1T] Normal[IH1.2T]    NOSE: Evaluation for swelling and drainage  Findings:[EB1.1T] Normal[IH1.2T]    MOUTH: Evaluation of tongue and mucosal membranes  Findings:[EB1.1T] Normal[IH1.2T]    THROAT: Evaluation for redness and lesions  Findings:[EB1.1T] Normal[IH1.2T]    NECK: Evaluation for masses, swelling and soreness  Findings:[EB1.1T] Normal[IH1.2T]    CHEST: Evaluation - auscultation and observation  Findings:[EB1.1T] Normal[IH1.2T].[EB1.1T] CTAB[IH1.2M]     CARDIO: Evaluation by auscultation   Findings:[EB1.1T] Normal[IH1.2T], RRR, no murmurs heard[IH1.2M]    ABDOMEN: Evaluation for bowel sounds, organomegaly, masses and tenderness  Findings:[EB1.1T] Normal - soft, no tenderness, no  masses[IH1.2T]    MUS-SKEL: Evaluation for swelling, edema, range of motion or other abnormalities.  Findings:[EB1.1T] Normal[IH1.2T].      SKIN: Evaluation for rashes, acne and lesions  Findings:[EB1.1T] Normal[IH1.2T]    NEURO: Evaluation by observation.  Findings:[EB1.1T] Normal[IH1.2T]       ASSESSMENT:[EB1.1T]   Khushi Lopez is a 15 year old female[IH1.3T] here for well child visit and sports PE form[IH1.3M]  Normal Health Maintenance Visit[IH1.2T]    PLAN:  Medication changes:[EB1.1T] No[IH1.2M]  Immunizations given today?[EB1.1T] Yes.  Vaccine counseling including benefits, risks and adverse reactions were provided by myself during the visit.  HPV given[IH1.2M]  See Orders  Discussed anticipatory guidance for screen time activity   Discussed the following  Topics:[EB1.1T] healthy eating habits, physical activity,[IH1.2T] Diet: fast food, fluoride, Discipline: chores, limits, Prevention: drugs, alcohol, smoking, Safety: auto safety, seat belt, bike helmet, firearms, sunscreen, School: study time, limit TV, Sexual activity, Sleep: regular bed time, Social: peer pressure, dating and Sports form signed and returned to parent/patient.[IH1.2M]    Nutrition Assessment and Counseling  Diet is[EB1.1T] appropriate for age[IH1.2T].  Discussed healthy eating habits:[EB1.1T] Yes[IH1.2T]    Physical Activity Assessment and Counseling  Patient[EB1.1T] participates in physical activity[IH1.2T].  Discussed physical activity:[EB1.1T] Yes[IH1.2T]    Screen Time Assessment and Counseling  Number of hours of screen time per day:[EB1.1T] 2-4 hours[IH1.2M].  Discussed anticipatory guidance for screen time activity:[EB1.1T] Yes[IH1.2T]    Blood Pressure  Blood pressures is[EB1.1T] appropriate for age[IH1.2T].       Next suggested Well Child Checkup in 1 year  Call if questions or problems.  School form signed and returned to patient.[EB1.1T]    Electronically Signed by:    Lsibeth Reagan MD , 9/1/2017[IH1.4T]        Revision  History        User Key Date/Time User Provider Type Action    > IH1.4 09/01/17 06:15 PM Lisbeth Reagan MD Physician Sign     IH1.2 09/01/17 06:13 PM Lisbeth Reagan MD Physician      IH1.1 08/29/17 06:19 PM Lisbeth Reagan MD Physician      IH1.3 08/29/17 06:09 PM Lisbeth Reagan MD Physician      EB1.2 08/29/17 06:04 PM Lisa Vargas MA Medical Assistant Sign at close encounter     EB1.1 08/29/17 06:01 PM Lisa Vargas MA Medical Assistant     M - Manual, T - Template             Pedrito Rosenthal)  Internal Medicine  [address] [place] [address]  Phone: [phone]  Fax: [phone]  Follow Up Time: Pedrito Rosenthal)  Internal Medicine  [address] [place] [address]  Phone: [phone]  Fax: [phone]  Follow Up Time:     Danial Leblanc)  Neurology   [address]  [place] [address]  Phone: [phone]  Follow Up Time:

## 2019-09-13 NOTE — PHYSICAL THERAPY INITIAL EVALUATION ADULT - TRANSFER TRAINING, PT EVAL
All functional transfers with CG/CS in 3-5 sessions. All functional transfers (sit<->stand) with independence in 3 sessions

## 2019-09-13 NOTE — DISCHARGE NOTE PROVIDER - NSDCFUADDINST_GEN_ALL_CORE_FT
Follow up with your PMD and Neurologist in 1 week.    No driving or operation of heavy machinery.  No swimming unattended.  Avoid elevated heights.

## 2019-09-13 NOTE — DISCHARGE NOTE PROVIDER - NSDCFUSCHEDAPPT_GEN_ALL_CORE_FT
MAJOR PEREZ ; 10/16/2019 ; [facility] [address] [facility] MAJOR PEREZ ; 10/16/2019 ; [facility] [address] [facility] MAJOR PEREZ ; 10/16/2019 ; [facility] [address] [facility] MAJOR PEREZ ; 10/16/2019 ; [facility] [address] [facility] MAJOR PEREZ ; 10/16/2019 ; [facility] MAJOR PEREZ ; 10/16/2019 ; [facility] [address] [facility] MAJOR PEREZ ; 10/16/2019 ; [facility] [address] [facility] MAJOR PEREZ ; 10/16/2019 ; [facility] [address] [facility] MAJOR PEREZ ; 10/16/2019 ; [facility]

## 2019-09-13 NOTE — PHYSICAL THERAPY INITIAL EVALUATION ADULT - ANKLE STRATEGY ASSESSMENT, REHAB EVAL
Patient educated on benefits of exercises, diet, skin inspection, lifestyle modifications, over-all health and wellness to better manage diabetes /c fair to good understanding

## 2019-09-13 NOTE — PHYSICAL THERAPY INITIAL EVALUATION ADULT - PLANNED THERAPY INTERVENTIONS, PT EVAL
stair training/balance training/bed mobility training/gait training/transfer training gait training/balance training/bed mobility training/strengthening/transfer training

## 2019-09-13 NOTE — DISCHARGE NOTE PROVIDER - HOSPITAL COURSE
FROM ADMISSION H+P:     HPI:    87 yo female who presents to the ED with a cc of left sided weakness.  PMHx of hemorrhagic CVA 2015, DM, HTN, HLD, h/o seizures s/p hemorrhagic CVA.  Pt reports that she awoke in her normal state of health today and she took her normal am medications.  She then reports that shortly after waking up she felt a heaviness in her left arm and a strange sensation in her tongue.  She further reports that she had generalized body tremors.  She then called her son but cannot recall what time this was.  He reports that it was prior to 6 am.  He helped her to the restroom and reports that he noted left sided weakness.  He then states that EMS was called and she was taken to the ED for further work up.  Per family s/p seizures pt generally has left sided weakness.  Was last admitted to the hospital this past March with similar compliant and was eventually diagnosed with Todds paralysis.  On exam pt is awake at bedside, mild confusion to present events although alert to person, place, and year.  Reports HA, and nausea.  Denies CP, SOB, abd pain.  Per son she similar episodes in  the past and has been admitted to the hospital for that. (12 Sep 2019 09:54)            ---    HOSPITAL COURSE: 87 yo female who presents to the ED with a cc of left sided weakness.  PMHx of hemorrhagic CVA 2015, DM, HTN, HLD, h/o seizures s/p hemorrhagic CVA.  Pt reports that she awoke in her normal state of health today and she took her normal am medications.  She then reports that shortly after waking up she felt a heaviness in her left arm and a strange sensation in her tongue and  generalized body tremors admitted for r/o CVA and suspected breakthrough seizure episode. Patient was evaluated by neurology and her nightly tegratol dose was increased to 400mg as it is believed her symptoms were secondary to seizure. CVA was ruled out as CT head and MRI were negative for acute infarcts. No events were noted on telemetry monitoring.         ---    CONSULTANTS:         ---    TIME SPENT:    The total amount of time spent reviewing the hospital notes, laboratory values, imaging findings, assessing/counseling the patient, discussing with consultant physicians, social work, nursing staff took -- minutes        ---    FINAL DISCHARGE DIAGNOSIS LIST:    Please see last daily progress note for final discharge diagnoses        ---    Primary care provider was made aware of plan for discharge:      [  ] NO     [  ] YES FROM ADMISSION H+P:     HPI:    87 yo female who presents to the ED with a cc of left sided weakness.  PMHx of hemorrhagic CVA 2015, DM, HTN, HLD, h/o seizures s/p hemorrhagic CVA.  Pt reports that she awoke in her normal state of health today and she took her normal am medications.  She then reports that shortly after waking up she felt a heaviness in her left arm and a strange sensation in her tongue.  She further reports that she had generalized body tremors.  She then called her son but cannot recall what time this was.  He reports that it was prior to 6 am.  He helped her to the restroom and reports that he noted left sided weakness.  He then states that EMS was called and she was taken to the ED for further work up.  Per family s/p seizures pt generally has left sided weakness.  Was last admitted to the hospital this past March with similar compliant and was eventually diagnosed with Todds paralysis.  On exam pt is awake at bedside, mild confusion to present events although alert to person, place, and year.  Reports HA, and nausea.  Denies CP, SOB, abd pain.  Per son she similar episodes in  the past and has been admitted to the hospital for that. (12 Sep 2019 09:54)            ---    HOSPITAL COURSE: 87 yo female who presents to the ED with a cc of left sided weakness.  PMHx of hemorrhagic CVA 2015, DM, HTN, HLD, h/o seizures s/p hemorrhagic CVA.  Pt reports that she awoke in her normal state of health today and she took her normal am medications.  She then reports that shortly after waking up she felt a heaviness in her left arm and a strange sensation in her tongue and  generalized body tremors admitted for r/o CVA and suspected breakthrough seizure episode. Patient was evaluated by neurology and her nightly tegratol dose was increased to 400mg as it is believed her symptoms were secondary to seizure. CVA was ruled out as CT head and MRI were negative for acute infarcts. No events were noted on telemetry monitoring. Patient was seen by speech and swallow and passed her swallow evaluation, she was placed back on a DASH/TLC consistent carb diet. Patient was evaluated by PT who recommeded she be discharged with outpatient PT. Patient was cleared by neurology. She was seen and evaluated at day of discharge and was eager to go home. Patient is medically stable for discharge.         ---    CONSULTANTS:     Neuro: Dr. Leblanc         ---    TIME SPENT:    The total amount of time spent reviewing the hospital notes, laboratory values, imaging findings, assessing/counseling the patient, discussing with consultant physicians, social work, nursing staff took -- minutes        ---    FINAL DISCHARGE DIAGNOSIS LIST:    Please see last daily progress note for final discharge diagnoses        ---    Primary care provider was made aware of plan for discharge:      [  ] NO     [  ] YES FROM ADMISSION H+P:     HPI:    89 yo female who presents to the ED with a cc of left sided weakness.  PMHx of hemorrhagic CVA 2015, DM, HTN, HLD, h/o seizures s/p hemorrhagic CVA.  Pt reports that she awoke in her normal state of health today and she took her normal am medications.  She then reports that shortly after waking up she felt a heaviness in her left arm and a strange sensation in her tongue.  She further reports that she had generalized body tremors.  She then called her son but cannot recall what time this was.  He reports that it was prior to 6 am.  He helped her to the restroom and reports that he noted left sided weakness.  He then states that EMS was called and she was taken to the ED for further work up.  Per family s/p seizures pt generally has left sided weakness.  Was last admitted to the hospital this past March with similar compliant and was eventually diagnosed with Todds paralysis.  On exam pt is awake at bedside, mild confusion to present events although alert to person, place, and year.  Reports HA, and nausea.  Denies CP, SOB, abd pain.  Per son she similar episodes in  the past and has been admitted to the hospital for that. (12 Sep 2019 09:54)            ---    HOSPITAL COURSE: 89 yo female who presents to the ED with a cc of left sided weakness.  PMHx of hemorrhagic CVA 2015, DM, HTN, HLD, h/o seizures s/p hemorrhagic CVA.  Pt reports that she awoke in her normal state of health today and she took her normal am medications.  She then reports that shortly after waking up she felt a heaviness in her left arm and a strange sensation in her tongue and  generalized body tremors admitted for r/o CVA and suspected breakthrough seizure episode. Patient was evaluated by neurology and her nightly tegratol dose was increased to 400mg as it is believed her symptoms were secondary to seizure. CVA was ruled out as CT head and MRI were negative for acute infarcts. No events were noted on telemetry monitoring. Patient was seen by speech and swallow and passed her swallow evaluation, she was placed back on a DASH/TLC consistent carb diet. Patient was evaluated by PT who recommeded she be discharged with outpatient PT. Patient was cleared by neurology. She was seen and evaluated at day of discharge and was eager to go home. Patient is medically stable for discharge.         CITLALI): 36.7 (09-13-19 @ 07:48), Max: 37.1 (09-13-19 @ 05:06)    HR: 82 (09-13-19 @ 10:58) (56 - 82)    BP: 103/67 (09-13-19 @ 07:48) (103/67 - 168/82)    RR: 18 (09-13-19 @ 07:48) (17 - 18)    SpO2: 95% (09-13-19 @ 10:58) (92% - 100%)        GENERAL: patient appears well, no acute distress, appropriate, pleasant    EYES: sclera clear, no exudates    ENMT: oropharynx clear without erythema, no exudates, moist mucous membranes    NECK: supple, soft, no thyromegaly noted    LUNGS: good air entry bilaterally, clear to auscultation, symmetric breath sounds, no wheezing or rhonchi appreciated    HEART: soft S1/S2, regular rate and rhythm, no murmurs noted, no lower extremity edema    GASTROINTESTINAL: abdomen is soft, nontender, nondistended, normoactive bowel sounds, no palpable masses    INTEGUMENT: good skin turgor, no lesions noted    MUSCULOSKELETAL: no clubbing or cyanosis, no obvious deformity    NEUROLOGIC: awake, alert, oriented x3, good muscle tone in 4 extremities, no obvious sensory deficits    PSYCHIATRIC: mood is good, affect is congruent, linear and logical thought process    HEME/LYMPH: no palpable supraclavicular nodules, no obvious ecchymosis or petechiae         ---    CONSULTANTS:     Neuro: Dr. Leblanc         ---    TIME SPENT:    The total amount of time spent reviewing the hospital notes, laboratory values, imaging findings, assessing/counseling the patient, discussing with consultant physicians, social work, nursing staff took -- minutes        ---    FINAL DISCHARGE DIAGNOSIS LIST:    Please see last daily progress note for final discharge diagnoses        ---    Primary care provider was made aware of plan for discharge:      [  ] NO     [  ] YES FROM ADMISSION H+P:     HPI:    89 yo female who presents to the ED with a cc of left sided weakness.  PMHx of hemorrhagic CVA 2015, DM, HTN, HLD, h/o seizures s/p hemorrhagic CVA.  Pt reports that she awoke in her normal state of health today and she took her normal am medications.  She then reports that shortly after waking up she felt a heaviness in her left arm and a strange sensation in her tongue.  She further reports that she had generalized body tremors.  She then called her son but cannot recall what time this was.  He reports that it was prior to 6 am.  He helped her to the restroom and reports that he noted left sided weakness.  He then states that EMS was called and she was taken to the ED for further work up.  Per family s/p seizures pt generally has left sided weakness.  Was last admitted to the hospital this past March with similar compliant and was eventually diagnosed with Todds paralysis.  On exam pt is awake at bedside, mild confusion to present events although alert to person, place, and year.  Reports HA, and nausea.  Denies CP, SOB, abd pain.  Per son she similar episodes in  the past and has been admitted to the hospital for that. (12 Sep 2019 09:54)            ---    HOSPITAL COURSE: 89 yo female who presents to the ED with a cc of left sided weakness.  PMHx of hemorrhagic CVA 2015, DM, HTN, HLD, h/o seizures s/p hemorrhagic CVA.  Pt reports that she awoke in her normal state of health today and she took her normal am medications.  She then reports that shortly after waking up she felt a heaviness in her left arm and a strange sensation in her tongue and  generalized body tremors admitted for r/o CVA and suspected breakthrough seizure episode. Patient was evaluated by neurology and her nightly tegratol dose was increased to 400mg as it is believed her symptoms were secondary to seizure. CVA was ruled out as CT head and MRI were negative for acute infarcts. No events were noted on telemetry monitoring. Patient was seen by speech and swallow and passed her swallow evaluation, she was placed back on a DASH/TLC consistent carb diet. Patient was evaluated by PT who recommeded she be discharged with outpatient PT. Patient was cleared by neurology. She was seen and evaluated at day of discharge and was eager to go home. Patient is medically stable for discharge.         CITLALI): 36.7 (09-13-19 @ 07:48), Max: 37.1 (09-13-19 @ 05:06)    HR: 82 (09-13-19 @ 10:58) (56 - 82)    BP: 103/67 (09-13-19 @ 07:48) (103/67 - 168/82)    RR: 18 (09-13-19 @ 07:48) (17 - 18)    SpO2: 95% (09-13-19 @ 10:58) (92% - 100%)        GENERAL: patient appears well, no acute distress, appropriate, pleasant    EYES: PERRL, no exudates    ENMT: oropharynx clear without erythema, no exudates, moist mucous membranes    NECK: supple, soft    LUNGS: good air entry bilaterally, clear to auscultation, symmetric breath sounds, no wheezing or rhonchi appreciated    HEART: +S1/S2, regular rate and rhythm, no murmurs noted, no lower extremity edema    GASTROINTESTINAL: abdomen is soft, nontender, nondistended, normoactive bowel sounds, no palpable masses    INTEGUMENT: good skin turgor, no lesions noted    MUSCULOSKELETAL: no clubbing, no obvious deformity    NEUROLOGIC: awake, alert, oriented x3. CNs II-XII grossly intact. 4+/5 strength LUE. 5/5 strength LLE, RUE, RLE. No obvious sensory deficits    PSYCHIATRIC: mood is good, affect is congruent, linear and logical thought process    HEME/LYMPH: no obvious ecchymosis or petechiae         ---    CONSULTANTS:     Neuro: Dr. Leblanc         ---    TIME SPENT:    The total amount of time spent reviewing the hospital notes, laboratory values, imaging findings, assessing/counseling the patient, discussing with consultant physicians, social work, nursing staff took -- minutes        ---    FINAL DISCHARGE DIAGNOSIS LIST:    Please see last daily progress note for final discharge diagnoses        ---    Primary care provider was made aware of plan for discharge:      [  ] NO     [  ] YES FROM ADMISSION H+P:     HPI:    89 yo female who presents to the ED with a cc of left sided weakness.  PMHx of hemorrhagic CVA 2015, DM, HTN, HLD, h/o seizures s/p hemorrhagic CVA.  Pt reports that she awoke in her normal state of health today and she took her normal am medications.  She then reports that shortly after waking up she felt a heaviness in her left arm and a strange sensation in her tongue.  She further reports that she had generalized body tremors.  She then called her son but cannot recall what time this was.  He reports that it was prior to 6 am.  He helped her to the restroom and reports that he noted left sided weakness.  He then states that EMS was called and she was taken to the ED for further work up.  Per family s/p seizures pt generally has left sided weakness.  Was last admitted to the hospital this past March with similar compliant and was eventually diagnosed with Todds paralysis.  On exam pt is awake at bedside, mild confusion to present events although alert to person, place, and year.  Reports HA, and nausea.  Denies CP, SOB, abd pain.  Per son she similar episodes in  the past and has been admitted to the hospital for that. (12 Sep 2019 09:54)            ---    HOSPITAL COURSE: 89 yo female who presents to the ED with a cc of left sided weakness.  PMHx of hemorrhagic CVA 2015, DM, HTN, HLD, h/o seizures s/p hemorrhagic CVA.  Pt reports that she awoke in her normal state of health today and she took her normal am medications.  She then reports that shortly after waking up she felt a heaviness in her left arm and a strange sensation in her tongue and  generalized body tremors admitted for r/o CVA and suspected breakthrough seizure episode. Patient was evaluated by neurology and her nightly tegratol dose was increased to 400mg as it is believed her symptoms were secondary to seizure. CVA was ruled out as CT head and MRI were negative for acute infarcts. No events were noted on telemetry monitoring. Patient was seen by speech and swallow and passed her swallow evaluation, she was placed back on a DASH/TLC consistent carb diet. Patient was evaluated by PT who recommeded she be discharged with outpatient PT. Patient was cleared by neurology. She was seen and evaluated at day of discharge and was eager to go home. Patient is medically stable for discharge.         CITLALI): 36.7 (09-13-19 @ 07:48), Max: 37.1 (09-13-19 @ 05:06)    HR: 82 (09-13-19 @ 10:58) (56 - 82)    BP: 103/67 (09-13-19 @ 07:48) (103/67 - 168/82)    RR: 18 (09-13-19 @ 07:48) (17 - 18)    SpO2: 95% (09-13-19 @ 10:58) (92% - 100%)        GENERAL: patient appears well, no acute distress, appropriate, pleasant    EYES: PERRL, no exudates    ENMT: oropharynx clear without erythema, no exudates, moist mucous membranes    NECK: supple, soft    LUNGS: good air entry bilaterally, clear to auscultation, symmetric breath sounds, no wheezing or rhonchi appreciated    HEART: +S1/S2, regular rate and rhythm, no murmurs noted, no lower extremity edema    GASTROINTESTINAL: abdomen is soft, nontender, nondistended, normoactive bowel sounds, no palpable masses    INTEGUMENT: good skin turgor, no lesions noted    MUSCULOSKELETAL: no clubbing, no obvious deformity    NEUROLOGIC: awake, alert, oriented x3. CNs II-XII grossly intact. 4+/5 strength LUE. 5/5 strength LLE, RUE, RLE. No obvious sensory deficits    PSYCHIATRIC: mood is good, affect is congruent, linear and logical thought process    HEME/LYMPH: no obvious ecchymosis or petechiae         ---    CONSULTANTS:     Neuro: Dr. Leblanc FROM ADMISSION H+P:     HPI:    87 yo female who presents to the ED with a cc of left sided weakness.  PMHx of hemorrhagic CVA 2015, DM, HTN, HLD, h/o seizures s/p hemorrhagic CVA.  Pt reports that she awoke in her normal state of health today and she took her normal am medications.  She then reports that shortly after waking up she felt a heaviness in her left arm and a strange sensation in her tongue.  She further reports that she had generalized body tremors.  She then called her son but cannot recall what time this was.  He reports that it was prior to 6 am.  He helped her to the restroom and reports that he noted left sided weakness.  He then states that EMS was called and she was taken to the ED for further work up.  Per family s/p seizures pt generally has left sided weakness.  Was last admitted to the hospital this past March with similar compliant and was eventually diagnosed with Todds paralysis.  On exam pt is awake at bedside, mild confusion to present events although alert to person, place, and year.  Reports HA, and nausea.  Denies CP, SOB, abd pain.  Per son she similar episodes in  the past and has been admitted to the hospital for that. (12 Sep 2019 09:54)            ---    HOSPITAL COURSE: 87 yo female who presents to the ED with a cc of left sided weakness.  PMHx of hemorrhagic CVA 2015, DM, HTN, HLD, h/o seizures s/p hemorrhagic CVA.  Pt reports that she awoke in her normal state of health today and she took her normal am medications.  She then reports that shortly after waking up she felt a heaviness in her left arm and a strange sensation in her tongue and  generalized body tremors admitted for r/o CVA and suspected breakthrough seizure episode. Patient was evaluated by neurology and her nightly tegratol dose was increased to 400mg as it is believed her symptoms were secondary to seizure. CVA was ruled out as CT head and MRI were negative for acute infarcts. No events were noted on telemetry monitoring. Patient was seen by speech and swallow and passed her swallow evaluation, she was placed back on a DASH/TLC consistent carb diet. Patient was evaluated by PT who recommeded she be discharged with outpatient PT. Patient was cleared by neurology. She was seen and evaluated at day of discharge and was eager to go home. Patient is medically stable for discharge.         CITLALI): 36.7 (09-13-19 @ 07:48), Max: 37.1 (09-13-19 @ 05:06)    HR: 82 (09-13-19 @ 10:58) (56 - 82)    BP: 103/67 (09-13-19 @ 07:48) (103/67 - 168/82)    RR: 18 (09-13-19 @ 07:48) (17 - 18)    SpO2: 95% (09-13-19 @ 10:58) (92% - 100%)        GENERAL: patient appears well, no acute distress, appropriate, pleasant    EYES: PERRL, no exudates    ENMT: oropharynx clear without erythema, no exudates, moist mucous membranes    NECK: supple, soft    LUNGS: good air entry bilaterally, clear to auscultation, symmetric breath sounds, no wheezing or rhonchi appreciated    HEART: +S1/S2, regular rate and rhythm, no murmurs noted, no lower extremity edema    GASTROINTESTINAL: abdomen is soft, nontender, nondistended, normoactive bowel sounds, no palpable masses    INTEGUMENT: good skin turgor, no lesions noted    MUSCULOSKELETAL: no clubbing, no obvious deformity    NEUROLOGIC: awake, alert, oriented x3. CNs II-XII grossly intact. 4+/5 strength LUE. 5/5 strength LLE, RUE, RLE. No obvious sensory deficits    PSYCHIATRIC: mood is good, affect is congruent, linear and logical thought process    HEME/LYMPH: no obvious ecchymosis or petechiae         ---    CONSULTANTS:     Neuro: Dr. Leblanc         Completion of discharge in 40 minutes

## 2019-09-13 NOTE — PHYSICAL THERAPY INITIAL EVALUATION ADULT - BALANCE TRAINING, PT EVAL
Standing balance F/F+ in 3-5 sessions. Improve static and dynamic balance /c or /s device to Good in 7 sessions

## 2019-09-13 NOTE — DISCHARGE NOTE PROVIDER - NSDCCPCAREPLAN_GEN_ALL_CORE_FT
PRINCIPAL DISCHARGE DIAGNOSIS  Diagnosis: Seizure  Assessment and Plan of Treatment:       SECONDARY DISCHARGE DIAGNOSES  Diagnosis: Diabetes mellitus  Assessment and Plan of Treatment:     Diagnosis: Hypercholesteremia  Assessment and Plan of Treatment:     Diagnosis: HTN (hypertension)  Assessment and Plan of Treatment: PRINCIPAL DISCHARGE DIAGNOSIS  Diagnosis: Seizure  Assessment and Plan of Treatment:       SECONDARY DISCHARGE DIAGNOSES  Diagnosis: HTN (hypertension)  Assessment and Plan of Treatment:     Diagnosis: Hypercholesteremia  Assessment and Plan of Treatment:     Diagnosis: Diabetes mellitus  Assessment and Plan of Treatment: PRINCIPAL DISCHARGE DIAGNOSIS  Diagnosis: Seizure  Assessment and Plan of Treatment: You were evaluated in the hospital for a possible seizure vs. stroke.   You had a CT head, MRI head, MRA, which showed no evidence of acute stroke.   Your Carbamazepine levels were checked during this stay and were in target range.  Please CONTINUE home Carbamazepine 100 mg three times per day.  Please STOP Carbamazepine 200 mg at bedtime.  Please START Carbamazepine 400 mg at bedtime.  Please follow up with your neurologist as an outpatient. Please seek medical care if you note any new symptoms of weakness or symptoms concerning for a stroke.      SECONDARY DISCHARGE DIAGNOSES  Diagnosis: HTN (hypertension)  Assessment and Plan of Treatment: You had an elevated blood pressure in the ER of 232/113. You were treated with Labetalol 10 mg IV at that time.   Please CONTINUE your home meds of Irbesartan 150 mg twice a day, Carvedilol 12.5 mg twice a day, Amlodipine 5 mg once a day  Please follow up with your PCP Dr Rosenthal    Diagnosis: Hypercholesteremia  Assessment and Plan of Treatment: Your cholesterol was checked during this stay. Your total cholesterol is 231, your LDL is 133.   Please CONTINUE taking your home meds of Rosuvastatin 10 mg once a day.    Diagnosis: Diabetes mellitus  Assessment and Plan of Treatment: Your blood sugars were well controlled during this admission.  Please CONTINUE taking your home meds of Metformin 500 mg two tablets once a day. PRINCIPAL DISCHARGE DIAGNOSIS  Diagnosis: Seizure  Assessment and Plan of Treatment: You were evaluated in the hospital for a possible seizure vs. stroke.   You had a CT head, MRI head, MRA, which showed no evidence of acute stroke. Stroke was ruled out as a diagnosis.   Please CONTINUE home Carbamazepine 100 mg three times per day.  During your hospital stay, you were seen by your neurologist, who adjusted your bedtime carbamazepine dose. Please adhere to the following changes:   Please STOP Carbamazepine 200 mg at bedtime.  Please START Carbamazepine 400 mg at bedtime.  Please follow up with your neurologist as an outpatient within one to two weeks of dishcarge from the hospital.      SECONDARY DISCHARGE DIAGNOSES  Diagnosis: HTN (hypertension)  Assessment and Plan of Treatment: You had an elevated blood pressure in the ER of 232/113. You were treated with Labetalol 10 mg IV at that time.   Please CONTINUE your home meds of Irbesartan 150 mg twice a day, Carvedilol 12.5 mg twice a day, Amlodipine 5 mg once a day  Please follow up with your PCP Dr Rosenthal within two weeks of discharge for further medical management.    Diagnosis: Hypercholesteremia  Assessment and Plan of Treatment: Your cholesterol was checked during this stay. Your total cholesterol is 231, your LDL is 133.   Please CONTINUE taking your home meds of Rosuvastatin 10 mg once a day.  Please follow up with your PCP outpatient one to two weeks from date of dishcarge.    Diagnosis: Diabetes mellitus  Assessment and Plan of Treatment: Your blood sugars were well controlled during this admission.  Please CONTINUE taking your home meds of Metformin 500 mg two tablets once a day. PRINCIPAL DISCHARGE DIAGNOSIS  Diagnosis: Seizure  Assessment and Plan of Treatment: You were evaluated in the hospital for a possible seizure vs. stroke.   You had a CT head, MRI head, MRA, which showed no evidence of acute stroke. Stroke was ruled out as a diagnosis.   Please CONTINUE home Carbamazepine 100 mg three times per day.  During your hospital stay, you were seen by your neurologist, who adjusted your bedtime carbamazepine dose. Please adhere to the following changes:   Please STOP Carbamazepine 200 mg at bedtime.  Please START Carbamazepine 300 mg at bedtime. With your normal 100mg dose.   Please follow up with your neurologist as an outpatient within one to two weeks of dishcarge from the hospital.      SECONDARY DISCHARGE DIAGNOSES  Diagnosis: Diabetes mellitus  Assessment and Plan of Treatment: Your blood sugars were well controlled during this admission.  Please CONTINUE taking your home meds of Metformin 500 mg two tablets once a day.    Diagnosis: Hypercholesteremia  Assessment and Plan of Treatment: Your cholesterol was checked during this stay. Your total cholesterol is 231, your LDL is 133.   Please CONTINUE taking your home meds of Rosuvastatin 10 mg once a day.  Please follow up with your PCP outpatient one to two weeks from date of dishcarge.    Diagnosis: HTN (hypertension)  Assessment and Plan of Treatment: You had an elevated blood pressure in the ER of 232/113. You were treated with Labetalol 10 mg IV at that time.   Please CONTINUE your home meds of Irbesartan 150 mg twice a day, Carvedilol 12.5 mg twice a day, Amlodipine 5 mg once a day  Please follow up with your PCP Dr Rosenthal within two weeks of discharge for further medical management. PRINCIPAL DISCHARGE DIAGNOSIS  Diagnosis: Seizure  Assessment and Plan of Treatment: You were evaluated in the hospital for a possible seizure vs. stroke.   You had a CT head, MRI head, MRA, which showed no evidence of acute stroke. Stroke was ruled out as a diagnosis.   Please follow up with your neurologist as an outpatient within one to two weeks of discharge from the hospital.      SECONDARY DISCHARGE DIAGNOSES  Diagnosis: HTN (hypertension)  Assessment and Plan of Treatment: You had an elevated blood pressure in the ER of 232/113. You were treated with Labetalol 10 mg IV at that time.   Please CONTINUE your home meds of Irbesartan 150 mg twice a day, Carvedilol 12.5 mg twice a day, Amlodipine 5 mg once a day  Please follow up with your PCP Dr Rosenthal within two weeks of discharge for further medical management.    Diagnosis: Hypercholesteremia  Assessment and Plan of Treatment: Your cholesterol was checked during this stay. Your total cholesterol is 231, your LDL is 133.   Please CONTINUE taking your home meds of Rosuvastatin 10 mg once a day.  Please follow up with your PCP outpatient one to two weeks from date of dishcarge.    Diagnosis: Diabetes mellitus  Assessment and Plan of Treatment: Your blood sugars were well controlled during this admission.  Please CONTINUE taking your home meds of Metformin 500 mg two tablets once a day.

## 2019-09-13 NOTE — PHYSICAL THERAPY INITIAL EVALUATION ADULT - RANGE OF MOTION EXAMINATION, REHAB EVAL
bilateral lower extremity ROM was WFL (within functional limits)/bilateral upper extremity ROM was WFL (within functional limits) bilateral upper extremity ROM was WFL (within functional limits)/bilateral lower extremity ROM was WFL (within functional limits)/deficits as listed below/arthritic changes noted to hands and feet, hammer toes noted. Kyphotic posture, right shoulder higher than left

## 2019-09-13 NOTE — PHYSICAL THERAPY INITIAL EVALUATION ADULT - GAIT TRAINING, PT EVAL
Ambulate 100ft with RW and supervision in 3-5 sessions. Ambulate >300ft with RW and independence in 5 sessions.

## 2019-09-13 NOTE — PHYSICAL THERAPY INITIAL EVALUATION ADULT - IMPAIRMENTS CONTRIBUTING TO GAIT DEVIATIONS, PT EVAL
decreased strength/impaired balance impaired postural control/impaired balance/decreased strength/decreased flexibility/impaired coordination/narrow base of support

## 2019-09-13 NOTE — PHYSICAL THERAPY INITIAL EVALUATION ADULT - PERTINENT HX OF CURRENT PROBLEM, REHAB EVAL
As per H&P:" As per H&P:"87 yo female who presents to the ED with a cc of left sided weakness.  PMHx of hemorrhagic CVA 2015, DM, HTN, HLD, h/o seizures s/p hemorrhagic CVA.  Pt reports that she awoke in her normal state of health today and she took her normal am medications.  She then reports that shortly after waking up she felt a heaviness in her left arm and a strange sensation in her tongue.  She further reports that she had generalized body tremors"

## 2019-09-14 ENCOUNTER — APPOINTMENT (OUTPATIENT)
Dept: INTERNAL MEDICINE | Facility: CLINIC | Age: 84
End: 2019-09-14
Payer: MEDICARE

## 2019-09-14 ENCOUNTER — APPOINTMENT (OUTPATIENT)
Dept: INTERNAL MEDICINE | Facility: CLINIC | Age: 84
End: 2019-09-14

## 2019-09-14 VITALS
WEIGHT: 141 LBS | OXYGEN SATURATION: 90 % | TEMPERATURE: 97.8 F | SYSTOLIC BLOOD PRESSURE: 112 MMHG | BODY MASS INDEX: 24.98 KG/M2 | HEIGHT: 63 IN | DIASTOLIC BLOOD PRESSURE: 70 MMHG | HEART RATE: 49 BPM

## 2019-09-14 DIAGNOSIS — Z00.00 ENCOUNTER FOR GENERAL ADULT MEDICAL EXAMINATION W/OUT ABNORMAL FINDINGS: ICD-10-CM

## 2019-09-14 PROCEDURE — 36415 COLL VENOUS BLD VENIPUNCTURE: CPT

## 2019-09-14 PROCEDURE — 99496 TRANSJ CARE MGMT HIGH F2F 7D: CPT | Mod: 25

## 2019-09-14 RX ORDER — ROSUVASTATIN CALCIUM 5 MG/1
5 TABLET, FILM COATED ORAL
Qty: 90 | Refills: 0 | Status: DISCONTINUED | COMMUNITY
Start: 2019-08-30

## 2019-09-14 NOTE — HISTORY OF PRESENT ILLNESS
[Post-hospitalization from ___ Hospital] : Post-hospitalization from [unfilled] Hospital [Admitted on: ___] : The patient was admitted on [unfilled] [Discharged on ___] : discharged on [unfilled] [Patient Contacted By: ____] : and contacted by [unfilled] [FreeTextEntry2] : Hospitalized for Seizure medication adjusted \par has diabetes and elevated cholesterol

## 2019-09-14 NOTE — PHYSICAL EXAM
[No Acute Distress] : no acute distress [Well Nourished] : well nourished [Well Developed] : well developed [Well-Appearing] : well-appearing [Normal Sclera/Conjunctiva] : normal sclera/conjunctiva [Normal Voice/Communication] : normal voice/communication [PERRL] : pupils equal round and reactive to light [EOMI] : extraocular movements intact [Normal Oropharynx] : the oropharynx was normal [Normal Outer Ear/Nose] : the outer ears and nose were normal in appearance [No JVD] : no jugular venous distention [No Lymphadenopathy] : no lymphadenopathy [Thyroid Normal, No Nodules] : the thyroid was normal and there were no nodules present [Supple] : supple [No Respiratory Distress] : no respiratory distress  [No Accessory Muscle Use] : no accessory muscle use [Clear to Auscultation] : lungs were clear to auscultation bilaterally [Normal Rate] : normal rate  [Regular Rhythm] : with a regular rhythm [Normal S1, S2] : normal S1 and S2 [No Murmur] : no murmur heard [No Carotid Bruits] : no carotid bruits [No Abdominal Bruit] : a ~M bruit was not heard ~T in the abdomen [Pedal Pulses Present] : the pedal pulses are present [No Varicosities] : no varicosities [No Edema] : there was no peripheral edema [No Extremity Clubbing/Cyanosis] : no extremity clubbing/cyanosis [Soft] : abdomen soft [No Palpable Aorta] : no palpable aorta [Non-distended] : non-distended [Non Tender] : non-tender [No Masses] : no abdominal mass palpated [No HSM] : no HSM [Normal Bowel Sounds] : normal bowel sounds [Normal Supraclavicular Nodes] : no supraclavicular lymphadenopathy [Normal Posterior Cervical Nodes] : no posterior cervical lymphadenopathy [Normal Anterior Cervical Nodes] : no anterior cervical lymphadenopathy [No CVA Tenderness] : no CVA  tenderness [No Joint Swelling] : no joint swelling [No Spinal Tenderness] : no spinal tenderness [Grossly Normal Strength/Tone] : grossly normal strength/tone [No Rash] : no rash [Coordination Grossly Intact] : coordination grossly intact [No Focal Deficits] : no focal deficits [Speech Grossly Normal] : speech grossly normal [Deep Tendon Reflexes (DTR)] : deep tendon reflexes were 2+ and symmetric [Normal Affect] : the affect was normal [Memory Grossly Normal] : memory grossly normal [Alert and Oriented x3] : oriented to person, place, and time [Normal Mood] : the mood was normal [Normal Insight/Judgement] : insight and judgment were intact [de-identified] : unsteady gait

## 2019-09-16 LAB
ALBUMIN SERPL ELPH-MCNC: 4 G/DL
ALP BLD-CCNC: 64 U/L
ALT SERPL-CCNC: 12 U/L
ANION GAP SERPL CALC-SCNC: 11 MMOL/L
AST SERPL-CCNC: 15 U/L
BILIRUB SERPL-MCNC: 0.2 MG/DL
BUN SERPL-MCNC: 20 MG/DL
CALCIUM SERPL-MCNC: 9.4 MG/DL
CHLORIDE SERPL-SCNC: 104 MMOL/L
CHOLEST SERPL-MCNC: 227 MG/DL
CHOLEST/HDLC SERPL: 3.2 RATIO
CK SERPL-CCNC: 79 U/L
CO2 SERPL-SCNC: 30 MMOL/L
CREAT SERPL-MCNC: 0.79 MG/DL
ESTIMATED AVERAGE GLUCOSE: 134 MG/DL
GLUCOSE SERPL-MCNC: 105 MG/DL
HBA1C MFR BLD HPLC: 6.3 %
HDLC SERPL-MCNC: 72 MG/DL
LDLC SERPL CALC-MCNC: 135 MG/DL
POTASSIUM SERPL-SCNC: 4.5 MMOL/L
PROT SERPL-MCNC: 6.2 G/DL
SODIUM SERPL-SCNC: 145 MMOL/L
TRIGL SERPL-MCNC: 99 MG/DL

## 2019-10-01 PROCEDURE — 99285 EMERGENCY DEPT VISIT HI MDM: CPT | Mod: 25

## 2019-10-01 PROCEDURE — 86901 BLOOD TYPING SEROLOGIC RH(D): CPT

## 2019-10-01 PROCEDURE — 80061 LIPID PANEL: CPT

## 2019-10-01 PROCEDURE — 96376 TX/PRO/DX INJ SAME DRUG ADON: CPT

## 2019-10-01 PROCEDURE — 93005 ELECTROCARDIOGRAM TRACING: CPT

## 2019-10-01 PROCEDURE — 96374 THER/PROPH/DIAG INJ IV PUSH: CPT

## 2019-10-01 PROCEDURE — 85730 THROMBOPLASTIN TIME PARTIAL: CPT

## 2019-10-01 PROCEDURE — 86900 BLOOD TYPING SEROLOGIC ABO: CPT

## 2019-10-01 PROCEDURE — 96375 TX/PRO/DX INJ NEW DRUG ADDON: CPT

## 2019-10-01 PROCEDURE — 80156 ASSAY CARBAMAZEPINE TOTAL: CPT

## 2019-10-01 PROCEDURE — 70551 MRI BRAIN STEM W/O DYE: CPT

## 2019-10-01 PROCEDURE — 97162 PT EVAL MOD COMPLEX 30 MIN: CPT

## 2019-10-01 PROCEDURE — 71045 X-RAY EXAM CHEST 1 VIEW: CPT

## 2019-10-01 PROCEDURE — 36415 COLL VENOUS BLD VENIPUNCTURE: CPT

## 2019-10-01 PROCEDURE — 83036 HEMOGLOBIN GLYCOSYLATED A1C: CPT

## 2019-10-01 PROCEDURE — 70450 CT HEAD/BRAIN W/O DYE: CPT

## 2019-10-01 PROCEDURE — 80053 COMPREHEN METABOLIC PANEL: CPT

## 2019-10-01 PROCEDURE — 85027 COMPLETE CBC AUTOMATED: CPT

## 2019-10-01 PROCEDURE — 81001 URINALYSIS AUTO W/SCOPE: CPT

## 2019-10-01 PROCEDURE — 70544 MR ANGIOGRAPHY HEAD W/O DYE: CPT

## 2019-10-01 PROCEDURE — 87086 URINE CULTURE/COLONY COUNT: CPT

## 2019-10-01 PROCEDURE — 85610 PROTHROMBIN TIME: CPT

## 2019-10-01 PROCEDURE — 84443 ASSAY THYROID STIM HORMONE: CPT

## 2019-10-01 PROCEDURE — 86850 RBC ANTIBODY SCREEN: CPT

## 2019-10-01 PROCEDURE — 80048 BASIC METABOLIC PNL TOTAL CA: CPT

## 2019-10-01 PROCEDURE — 82962 GLUCOSE BLOOD TEST: CPT

## 2019-10-16 ENCOUNTER — APPOINTMENT (OUTPATIENT)
Dept: NEUROLOGY | Facility: CLINIC | Age: 84
End: 2019-10-16
Payer: MEDICARE

## 2019-10-16 VITALS
HEIGHT: 65 IN | HEART RATE: 71 BPM | DIASTOLIC BLOOD PRESSURE: 76 MMHG | BODY MASS INDEX: 23.32 KG/M2 | WEIGHT: 140 LBS | SYSTOLIC BLOOD PRESSURE: 170 MMHG

## 2019-10-16 DIAGNOSIS — Z87.898 PERSONAL HISTORY OF OTHER SPECIFIED CONDITIONS: ICD-10-CM

## 2019-10-16 PROCEDURE — 99214 OFFICE O/P EST MOD 30 MIN: CPT

## 2019-10-16 RX ORDER — CARBAMAZEPINE 300 MG/1
300 CAPSULE, EXTENDED RELEASE ORAL
Qty: 30 | Refills: 0 | Status: DISCONTINUED | COMMUNITY
Start: 2019-09-13 | End: 2019-10-16

## 2019-10-16 RX ORDER — CARBAMAZEPINE 400 MG/1
400 TABLET, EXTENDED RELEASE ORAL
Qty: 30 | Refills: 0 | Status: DISCONTINUED | COMMUNITY
Start: 2019-09-13 | End: 2019-10-16

## 2019-10-16 NOTE — HISTORY OF PRESENT ILLNESS
[FreeTextEntry1] : PCP: Dr. Pedrito Rosenthal;  [address] [place] [address];  [phone]\par *** 10/16/2019  ***\par Ms. MAJOR PEREZ returns for scheduled follow-up appointment. Ms. PEREZ reports that in the interval since her last visit, she is doing well. No seizure since last visit. No new problems. No GI upset or double vision. Now taking carbamazepine  am, 100 noon, 100 4p, 400 bedtime. \par \par *** 04/16/2019  ***\par Patient stated in March 2 she had and episode described as electrical current through chest ,left hand felt funny and her mouth was twitching and tongue was heavy. Her son found her on the bathroom floor with the phone in her hand. She was trying to call someone for help. Unclear how long the episode lasted.\par She was evaluated at [facility] . BP very elevated. CBZ increased to 100/100/200 and [facility] monitoring of blood pressure and physical therapy strengthening. \par She is otherwise doing well. Her mood is good and she has no further complaints of fatigue.\par \par Current AED regimen:\par /100/200\par \par ***12/18/18***\par patients describes no reported seizures since last visit\par She complains of  mild fatigue and unsteady gait at times\par \par Carbamazepine is taken 9am  100mg , 4pm 100mg and 11pm  300mg\par \par Patient describes aura of tongue feeling heavy in past but none since last office visit\par \par *** 10/22/2018 ***\par Ms. PEREZ was found by her son in am 9/22 on floor of her bedroom. She called out to him. EMT found her to have L weakness, L visual field cut. She was taken to [place] ED, and seen by neurology there who thought she had seizure and postictal T[place]. Ms. PEREZ improved and returned to baseline.  CBZ level was therapeutic, but dose was increased slightly from 100/300 to 100/100/300, and Ms. PEREZ was discharged home. \par \par *** 06/22/2018 ***\par Ms. PEREZ is complaining of feeling very tired, sleepy.  She had one fall when she bent over and lost her balance, required 4 stiches to forehead. She had one episode of L hand tremulousness but could control the hand, no impairment of awareness, no visual distortion.\par \par *** 02/23/2018 *** \par Pt doing relatively well, still feels tired, but functioning OK.  Feels a little unsteady, but has not fallen and is better than before. \par \par *** 12/18/2017 ***\par No interval seizure or symptoms. However, pt c/o problems with balance for more than a month - does not recall if she had similar problems last summer. Otherwise not complaining of side effects. No complaint of fatigue. \par \par *** 10/27/2017 ***\par Ms Perez had an episode on 10/15/17 where her left hand became "heavy". She was evaluated in [place] ED, and had no recurrent stroke (Prior R parietal hemorrhage). Then on 10/17/17, patient describes that the television picture was distorted.  She went her ophthalmologist who recommended an MRA (presumably for a TIA? - n.b. pt has had extensive vascular imaging in spring 2017). She continues to take carbamazepine 200 q12, and seemed unaware of plan to start lamotrigine, son unaware.  She does complain of increased sleepiness, but does not seem to be major complaint, only endorsed after I inquired. No other problems. Last sodium was normal. \par \par Patient denies missing a dose of carbamazepine, but she does not use a pill box and has a complex (self-imposed) schedule for her medications. \par \par *** 5/12/2017 ***\par Ms. 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 She is a poor historian, and most of the history is taken from the notes. Later that year, she was found to have an active ambulatory EEG with sharp waves noted in the right frontal region. She was treated with levetiracetam, but complained of extreme fatigue interfering with her quality of life.in February 2017, she had an episode of left hand shaking lasting approximately 30 seconds, and resulting in her fall backwards and a head laceration. The event was felt to be a seizure. Her anticonvulsant was changed to carbamazepine, currently 200 mg twice a day, but she continues to complain of fatigue.

## 2019-10-16 NOTE — ASSESSMENT
[FreeTextEntry1] : Looks well currently, but had breakthrough seizures. Now tolerating higher dose of carbamazepine.  Appeasrs to have self-increased carbamazepine ER to 100/100/100/400, but she is tolerating it OK.  \par \par Plan:\par -continue current  carbamazepine regimen.\par -check labs carbamazepine, complete blood counts, chemistry and liver function tests \par - proscribed driving as per [place] law\par - follow up in 3 months\par \par Visit duration was 25 min or longer and more than 1/2 the time was spent face-to-face  reviewing the cause of seizures, educating the patient or family to recognize seizures, and discussing possible side effects of seizure medications, as well as discussing seizure safety, and ways of reducing seizure risk.\par \par

## 2019-11-06 ENCOUNTER — RX RENEWAL (OUTPATIENT)
Age: 84
End: 2019-11-06

## 2019-11-09 ENCOUNTER — APPOINTMENT (OUTPATIENT)
Dept: INTERNAL MEDICINE | Facility: CLINIC | Age: 84
End: 2019-11-09
Payer: MEDICARE

## 2019-11-09 VITALS
DIASTOLIC BLOOD PRESSURE: 80 MMHG | TEMPERATURE: 97.8 F | WEIGHT: 145 LBS | HEART RATE: 88 BPM | RESPIRATION RATE: 14 BRPM | SYSTOLIC BLOOD PRESSURE: 134 MMHG | BODY MASS INDEX: 24.13 KG/M2 | OXYGEN SATURATION: 93 %

## 2019-11-09 PROCEDURE — G0008: CPT

## 2019-11-09 PROCEDURE — 90653 IIV ADJUVANT VACCINE IM: CPT

## 2019-11-09 PROCEDURE — 99214 OFFICE O/P EST MOD 30 MIN: CPT | Mod: 25

## 2019-11-09 PROCEDURE — 36415 COLL VENOUS BLD VENIPUNCTURE: CPT

## 2019-11-09 NOTE — PHYSICAL EXAM
[Well Developed] : well developed [No Acute Distress] : no acute distress [Well Nourished] : well nourished [Normal Sclera/Conjunctiva] : normal sclera/conjunctiva [Well-Appearing] : well-appearing [EOMI] : extraocular movements intact [Normal Outer Ear/Nose] : the outer ears and nose were normal in appearance [PERRL] : pupils equal round and reactive to light [Normal Oropharynx] : the oropharynx was normal [No JVD] : no jugular venous distention [Thyroid Normal, No Nodules] : the thyroid was normal and there were no nodules present [No Lymphadenopathy] : no lymphadenopathy [Supple] : supple [No Respiratory Distress] : no respiratory distress  [Clear to Auscultation] : lungs were clear to auscultation bilaterally [No Accessory Muscle Use] : no accessory muscle use [Normal Rate] : normal rate  [Regular Rhythm] : with a regular rhythm [No Murmur] : no murmur heard [No Carotid Bruits] : no carotid bruits [Normal S1, S2] : normal S1 and S2 [No Abdominal Bruit] : a ~M bruit was not heard ~T in the abdomen [No Varicosities] : no varicosities [Pedal Pulses Present] : the pedal pulses are present [No Edema] : there was no peripheral edema [No Palpable Aorta] : no palpable aorta [Soft] : abdomen soft [No Extremity Clubbing/Cyanosis] : no extremity clubbing/cyanosis [Non-distended] : non-distended [No Masses] : no abdominal mass palpated [Non Tender] : non-tender [Normal Bowel Sounds] : normal bowel sounds [Normal Supraclavicular Nodes] : no supraclavicular lymphadenopathy [No HSM] : no HSM [Normal Posterior Cervical Nodes] : no posterior cervical lymphadenopathy [Normal Anterior Cervical Nodes] : no anterior cervical lymphadenopathy [No CVA Tenderness] : no CVA  tenderness [No Spinal Tenderness] : no spinal tenderness [No Rash] : no rash [Grossly Normal Strength/Tone] : grossly normal strength/tone [No Joint Swelling] : no joint swelling [No Focal Deficits] : no focal deficits [Coordination Grossly Intact] : coordination grossly intact [Speech Grossly Normal] : speech grossly normal [Normal Gait] : normal gait [Deep Tendon Reflexes (DTR)] : deep tendon reflexes were 2+ and symmetric [Memory Grossly Normal] : memory grossly normal [Alert and Oriented x3] : oriented to person, place, and time [Normal Affect] : the affect was normal [Normal Insight/Judgement] : insight and judgment were intact [Normal Mood] : the mood was normal

## 2019-11-12 LAB
25(OH)D3 SERPL-MCNC: 38.8 NG/ML
ALBUMIN SERPL ELPH-MCNC: 3.7 G/DL
ALP BLD-CCNC: 68 U/L
ALT SERPL-CCNC: 13 U/L
ANION GAP SERPL CALC-SCNC: 11 MMOL/L
AST SERPL-CCNC: 15 U/L
BASOPHILS # BLD AUTO: 0.07 K/UL
BASOPHILS NFR BLD AUTO: 1.8 %
BILIRUB SERPL-MCNC: <0.2 MG/DL
BUN SERPL-MCNC: 19 MG/DL
CALCIUM SERPL-MCNC: 8.7 MG/DL
CHLORIDE SERPL-SCNC: 106 MMOL/L
CHOLEST SERPL-MCNC: 211 MG/DL
CHOLEST/HDLC SERPL: 2.8 RATIO
CK SERPL-CCNC: 71 U/L
CO2 SERPL-SCNC: 27 MMOL/L
CREAT SERPL-MCNC: 0.8 MG/DL
EOSINOPHIL # BLD AUTO: 0.1 K/UL
EOSINOPHIL NFR BLD AUTO: 2.5 %
ESTIMATED AVERAGE GLUCOSE: 140 MG/DL
GLUCOSE SERPL-MCNC: 111 MG/DL
HBA1C MFR BLD HPLC: 6.5 %
HCT VFR BLD CALC: 37.7 %
HDLC SERPL-MCNC: 76 MG/DL
HGB BLD-MCNC: 11.8 G/DL
IMM GRANULOCYTES NFR BLD AUTO: 0.5 %
LDLC SERPL CALC-MCNC: 119 MG/DL
LYMPHOCYTES # BLD AUTO: 0.84 K/UL
LYMPHOCYTES NFR BLD AUTO: 21.4 %
MAN DIFF?: NORMAL
MCHC RBC-ENTMCNC: 29.6 PG
MCHC RBC-ENTMCNC: 31.3 GM/DL
MCV RBC AUTO: 94.7 FL
MONOCYTES # BLD AUTO: 0.41 K/UL
MONOCYTES NFR BLD AUTO: 10.4 %
NEUTROPHILS # BLD AUTO: 2.49 K/UL
NEUTROPHILS NFR BLD AUTO: 63.4 %
PLATELET # BLD AUTO: 206 K/UL
POTASSIUM SERPL-SCNC: 4.4 MMOL/L
PROT SERPL-MCNC: 6 G/DL
RBC # BLD: 3.98 M/UL
RBC # FLD: 13.8 %
SODIUM SERPL-SCNC: 144 MMOL/L
TRIGL SERPL-MCNC: 80 MG/DL
TSH SERPL-ACNC: 4.52 UIU/ML
WBC # FLD AUTO: 3.93 K/UL

## 2019-11-22 ENCOUNTER — APPOINTMENT (OUTPATIENT)
Dept: INTERNAL MEDICINE | Facility: CLINIC | Age: 84
End: 2019-11-22
Payer: MEDICARE

## 2019-11-22 VITALS
OXYGEN SATURATION: 88 % | TEMPERATURE: 98.1 F | HEART RATE: 80 BPM | BODY MASS INDEX: 24.16 KG/M2 | RESPIRATION RATE: 14 BRPM | DIASTOLIC BLOOD PRESSURE: 74 MMHG | HEIGHT: 65 IN | WEIGHT: 145 LBS | SYSTOLIC BLOOD PRESSURE: 128 MMHG

## 2019-11-22 DIAGNOSIS — Z87.09 PERSONAL HISTORY OF OTHER DISEASES OF THE RESPIRATORY SYSTEM: ICD-10-CM

## 2019-11-22 DIAGNOSIS — H10.30 UNSPECIFIED ACUTE CONJUNCTIVITIS, UNSPECIFIED EYE: ICD-10-CM

## 2019-11-22 LAB
FLUAV SPEC QL CULT: NORMAL
FLUBV AG SPEC QL IA: NORMAL

## 2019-11-22 PROCEDURE — 99214 OFFICE O/P EST MOD 30 MIN: CPT

## 2019-11-22 PROCEDURE — 87804 INFLUENZA ASSAY W/OPTIC: CPT | Mod: QW

## 2019-11-22 NOTE — HISTORY OF PRESENT ILLNESS
[Moderate] : moderate [___ Weeks ago] :  [unfilled] weeks ago [Episodic] : episodic  [Congestion] : congestion [Cough] : cough [Chills] : chills [Shortness Of Breath] : shortness of breath [Worsening] : worsening [Sore Throat] : no sore throat [Wheezing] : no wheezing [Anorexia] : no anorexia [Headache] : no headache [Fever] : no fever

## 2019-11-22 NOTE — PHYSICAL EXAM
[No Acute Distress] : no acute distress [Well Nourished] : well nourished [Well Developed] : well developed [Well-Appearing] : well-appearing [PERRL] : pupils equal round and reactive to light [EOMI] : extraocular movements intact [Normal Outer Ear/Nose] : the outer ears and nose were normal in appearance [Normal Oropharynx] : the oropharynx was normal [No JVD] : no jugular venous distention [No Lymphadenopathy] : no lymphadenopathy [Supple] : supple [Thyroid Normal, No Nodules] : the thyroid was normal and there were no nodules present [No Respiratory Distress] : no respiratory distress  [No Accessory Muscle Use] : no accessory muscle use [Clear to Auscultation] : lungs were clear to auscultation bilaterally [Normal Rate] : normal rate  [Regular Rhythm] : with a regular rhythm [Normal S1, S2] : normal S1 and S2 [No Murmur] : no murmur heard [No Carotid Bruits] : no carotid bruits [No Abdominal Bruit] : a ~M bruit was not heard ~T in the abdomen [No Varicosities] : no varicosities [Pedal Pulses Present] : the pedal pulses are present [No Edema] : there was no peripheral edema [No Palpable Aorta] : no palpable aorta [No Extremity Clubbing/Cyanosis] : no extremity clubbing/cyanosis [Soft] : abdomen soft [Non Tender] : non-tender [Non-distended] : non-distended [No Masses] : no abdominal mass palpated [No HSM] : no HSM [Normal Bowel Sounds] : normal bowel sounds [Normal Posterior Cervical Nodes] : no posterior cervical lymphadenopathy [Normal Anterior Cervical Nodes] : no anterior cervical lymphadenopathy [No CVA Tenderness] : no CVA  tenderness [No Spinal Tenderness] : no spinal tenderness [No Joint Swelling] : no joint swelling [Grossly Normal Strength/Tone] : grossly normal strength/tone [No Rash] : no rash [Coordination Grossly Intact] : coordination grossly intact [No Focal Deficits] : no focal deficits [Normal Gait] : normal gait [Deep Tendon Reflexes (DTR)] : deep tendon reflexes were 2+ and symmetric [Normal Affect] : the affect was normal [Normal Insight/Judgement] : insight and judgment were intact [de-identified] : redness

## 2019-12-19 ENCOUNTER — RX RENEWAL (OUTPATIENT)
Age: 84
End: 2019-12-19

## 2019-12-27 ENCOUNTER — RX RENEWAL (OUTPATIENT)
Age: 84
End: 2019-12-27

## 2019-12-27 RX ORDER — BLOOD-GLUCOSE METER
W/DEVICE EACH MISCELLANEOUS
Qty: 1 | Refills: 0 | Status: ACTIVE | COMMUNITY
Start: 2019-11-22 | End: 1900-01-01

## 2020-01-13 ENCOUNTER — RX RENEWAL (OUTPATIENT)
Age: 85
End: 2020-01-13

## 2020-01-22 ENCOUNTER — APPOINTMENT (OUTPATIENT)
Dept: NEUROLOGY | Facility: CLINIC | Age: 85
End: 2020-01-22
Payer: MEDICARE

## 2020-01-22 VITALS
HEIGHT: 65 IN | BODY MASS INDEX: 24.16 KG/M2 | WEIGHT: 145 LBS | HEART RATE: 71 BPM | SYSTOLIC BLOOD PRESSURE: 165 MMHG | DIASTOLIC BLOOD PRESSURE: 76 MMHG

## 2020-01-22 PROCEDURE — 99213 OFFICE O/P EST LOW 20 MIN: CPT

## 2020-01-22 NOTE — ASSESSMENT
[FreeTextEntry1] : Looks well currently, but had breakthrough seizures at lower doses of CBZ. Now somewhat tolerating higher dose of carbamazepine, but having some possible dose related side effects.\par Plan:\par - continue current AED regimen for now CBZ /100/300 (may consider switch to Lamictal ER in future to help with fatigue)\par - check carbamazepine level, CBC, Ch22\par - follow up in 3 months

## 2020-01-22 NOTE — HISTORY OF PRESENT ILLNESS
[FreeTextEntry1] : PCP: Dr. Pedrito Rosenthal;  [address] [place] [address] [place] [address];  [phone]\par \par *** 01/22/2020  ***\par Ms. MAJOR PEREZ returns for scheduled follow-up appointment. Ms. PEREZ reports that in the interval since her last visit, she is doing well. No interval seizures. Ms. PEREZ endorses that the carbamazepine has her feeling very tired and she gets a late start in the morning, awakening at 9a to take AM dose and going back to sleep, then usually getting up at 11a.  Ms. PEREZ also endorses that she feels unsteady in the morning. \par At lower doses of carbamazepine, Ms. PEREZ has had breakthrough seizures.\par \par *** 10/16/2019  ***\par Ms. MAJOR PEREZ returns for scheduled follow-up appointment. Ms. PEREZ reports that in the interval since her last visit, she is doing well. No seizure since last visit. No new problems. No GI upset or double vision. Now taking carbamazepine  am, 100 noon, 100 4p, 400 bedtime. \par \par *** 04/16/2019  ***\par Patient stated in March 2 she had and episode described as electrical current through chest ,left hand felt funny and her mouth was twitching and tongue was heavy. Her son found her on the bathroom floor with the phone in her hand. She was trying to call someone for help. Unclear how long the episode lasted.\par She was evaluated at [facility] . BP very elevated. CBZ increased to 100/100/200 and [facility] monitoring of blood pressure and physical therapy strengthening. \par She is otherwise doing well. Her mood is good and she has no further complaints of fatigue.\par \par Current AED regimen:\par /100/200\par \par ***12/18/18***\par patients describes no reported seizures since last visit\par She complains of  mild fatigue and unsteady gait at times\par \par Carbamazepine is taken 9am  100mg , 4pm 100mg and 11pm  300mg\par \par Patient describes aura of tongue feeling heavy in past but none since last office visit\par \par *** 10/22/2018 ***\par Ms. PEREZ was found by her son in am 9/22 on floor of her bedroom. She called out to him. EMT found her to have L weakness, L visual field cut. She was taken to [place] ED, and seen by neurology there who thought she had seizure and postictal Todds. Ms. PEREZ improved and returned to baseline.  CBZ level was therapeutic, but dose was increased slightly from 100/300 to 100/100/300, and Ms. PEREZ was discharged home. \par \par *** 06/22/2018 ***\par Ms. PEREZ is complaining of feeling very tired, sleepy.  She had one fall when she bent over and lost her balance, required 4 stiches to forehead. She had one episode of L hand tremulousness but could control the hand, no impairment of awareness, no visual distortion.\par \par *** 02/23/2018 *** \par Pt doing relatively well, still feels tired, but functioning OK.  Feels a little unsteady, but has not fallen and is better than before. \par \par *** 12/18/2017 ***\par No interval seizure or symptoms. However, pt c/o problems with balance for more than a month - does not recall if she had similar problems last summer. Otherwise not complaining of side effects. No complaint of fatigue. \par \par *** 10/27/2017 ***\par Ms Perez had an episode on 10/15/17 where her left hand became "heavy". She was evaluated in [place] ED, and had no recurrent stroke (Prior R parietal hemorrhage). Then on 10/17/17, patient describes that the television picture was distorted.  She went her ophthalmologist who recommended an MRA (presumably for a TIA? - n.b. pt has had extensive vascular imaging in spring 2017). She continues to take carbamazepine 200 q12, and seemed unaware of plan to start lamotrigine, son unaware.  She does complain of increased sleepiness, but does not seem to be major complaint, only endorsed after I inquired. No other problems. Last sodium was normal. \par \par Patient denies missing a dose of carbamazepine, but she does not use a pill box and has a complex (self-imposed) schedule for her medications. \par \par *** 5/12/2017 ***\par Ms. 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 She is a poor historian, and most of the history is taken from the notes. Later that year, she was found to have an active ambulatory EEG with sharp waves noted in the right frontal region. She was treated with levetiracetam, but complained of extreme fatigue interfering with her quality of life.in February 2017, she had an episode of left hand shaking lasting approximately 30 seconds, and resulting in her fall backwards and a head laceration. The event was felt to be a seizure. Her anticonvulsant was changed to carbamazepine, currently 200 mg twice a day, but she continues to complain of fatigue.

## 2020-02-13 ENCOUNTER — APPOINTMENT (OUTPATIENT)
Dept: INTERNAL MEDICINE | Facility: CLINIC | Age: 85
End: 2020-02-13
Payer: MEDICARE

## 2020-02-13 VITALS
SYSTOLIC BLOOD PRESSURE: 112 MMHG | HEART RATE: 69 BPM | TEMPERATURE: 97.9 F | OXYGEN SATURATION: 93 % | RESPIRATION RATE: 14 BRPM | DIASTOLIC BLOOD PRESSURE: 76 MMHG

## 2020-02-13 DIAGNOSIS — Z87.440 PERSONAL HISTORY OF URINARY (TRACT) INFECTIONS: ICD-10-CM

## 2020-02-13 PROCEDURE — 99214 OFFICE O/P EST MOD 30 MIN: CPT

## 2020-02-13 NOTE — PHYSICAL EXAM
[No Acute Distress] : no acute distress [Well Nourished] : well nourished [Well Developed] : well developed [Well-Appearing] : well-appearing [Normal Sclera/Conjunctiva] : normal sclera/conjunctiva [PERRL] : pupils equal round and reactive to light [EOMI] : extraocular movements intact [Normal Outer Ear/Nose] : the outer ears and nose were normal in appearance [Normal Oropharynx] : the oropharynx was normal [No JVD] : no jugular venous distention [No Lymphadenopathy] : no lymphadenopathy [Supple] : supple [Thyroid Normal, No Nodules] : the thyroid was normal and there were no nodules present [No Respiratory Distress] : no respiratory distress  [No Accessory Muscle Use] : no accessory muscle use [Normal Rate] : normal rate  [Clear to Auscultation] : lungs were clear to auscultation bilaterally [Regular Rhythm] : with a regular rhythm [Normal S1, S2] : normal S1 and S2 [No Murmur] : no murmur heard [No Carotid Bruits] : no carotid bruits [No Abdominal Bruit] : a ~M bruit was not heard ~T in the abdomen [No Varicosities] : no varicosities [Pedal Pulses Present] : the pedal pulses are present [No Edema] : there was no peripheral edema [No Palpable Aorta] : no palpable aorta [No Extremity Clubbing/Cyanosis] : no extremity clubbing/cyanosis [Soft] : abdomen soft [Non Tender] : non-tender [Non-distended] : non-distended [No Masses] : no abdominal mass palpated [No HSM] : no HSM [Normal Bowel Sounds] : normal bowel sounds [Normal Anterior Cervical Nodes] : no anterior cervical lymphadenopathy [Normal Posterior Cervical Nodes] : no posterior cervical lymphadenopathy [No CVA Tenderness] : no CVA  tenderness [No Spinal Tenderness] : no spinal tenderness [No Joint Swelling] : no joint swelling [Grossly Normal Strength/Tone] : grossly normal strength/tone [No Rash] : no rash [Coordination Grossly Intact] : coordination grossly intact [No Focal Deficits] : no focal deficits [Normal Gait] : normal gait [Deep Tendon Reflexes (DTR)] : deep tendon reflexes were 2+ and symmetric [Normal Affect] : the affect was normal [Normal Insight/Judgement] : insight and judgment were intact

## 2020-02-17 LAB — BACTERIA UR CULT: NORMAL

## 2020-03-23 ENCOUNTER — RESULT REVIEW (OUTPATIENT)
Age: 85
End: 2020-03-23

## 2020-04-20 ENCOUNTER — RX RENEWAL (OUTPATIENT)
Age: 85
End: 2020-04-20

## 2020-04-27 ENCOUNTER — APPOINTMENT (OUTPATIENT)
Dept: NEUROLOGY | Facility: CLINIC | Age: 85
End: 2020-04-27

## 2020-07-16 NOTE — PHYSICAL THERAPY INITIAL EVALUATION ADULT - DISCHARGE DISPOSITION, PT EVAL
no skilled PT needs/home Topical Clindamycin Counseling: Patient counseled that this medication may cause skin irritation or allergic reactions.  In the event of skin irritation, the patient was advised to reduce the amount of the drug applied or use it less frequently.   The patient verbalized understanding of the proper use and possible adverse effects of clindamycin.  All of the patient's questions and concerns were addressed.

## 2020-08-06 ENCOUNTER — EMERGENCY (EMERGENCY)
Facility: HOSPITAL | Age: 85
LOS: 1 days | Discharge: HOME | End: 2020-08-06
Attending: INTERNAL MEDICINE
Payer: MEDICARE

## 2020-08-06 VITALS
DIASTOLIC BLOOD PRESSURE: 105 MMHG | OXYGEN SATURATION: 99 % | HEIGHT: 64 IN | SYSTOLIC BLOOD PRESSURE: 172 MMHG | WEIGHT: 160.06 LBS | RESPIRATION RATE: 22 BRPM | HEART RATE: 101 BPM | TEMPERATURE: 99 F

## 2020-08-06 DIAGNOSIS — Z86.69 PERSONAL HISTORY OF OTHER DISEASES OF THE NERVOUS SYSTEM AND SENSE ORGANS: Chronic | ICD-10-CM

## 2020-08-06 LAB
ALBUMIN SERPL ELPH-MCNC: 3 G/DL — LOW (ref 3.3–5)
ALP SERPL-CCNC: 88 U/L — SIGNIFICANT CHANGE UP (ref 40–120)
ALT FLD-CCNC: 20 U/L — SIGNIFICANT CHANGE UP (ref 12–78)
ANION GAP SERPL CALC-SCNC: 5 MMOL/L — SIGNIFICANT CHANGE UP (ref 5–17)
APTT BLD: 28.1 SEC — SIGNIFICANT CHANGE UP (ref 27.5–35.5)
AST SERPL-CCNC: 15 U/L — SIGNIFICANT CHANGE UP (ref 15–37)
BASOPHILS # BLD AUTO: 0.06 K/UL — SIGNIFICANT CHANGE UP (ref 0–0.2)
BASOPHILS NFR BLD AUTO: 0.9 % — SIGNIFICANT CHANGE UP (ref 0–2)
BILIRUB SERPL-MCNC: 0.1 MG/DL — LOW (ref 0.2–1.2)
BUN SERPL-MCNC: 16 MG/DL — SIGNIFICANT CHANGE UP (ref 7–23)
CALCIUM SERPL-MCNC: 8.2 MG/DL — LOW (ref 8.5–10.1)
CARBAMAZEPINE SERPL-MCNC: 7.7 UG/ML — SIGNIFICANT CHANGE UP (ref 4–12)
CHLORIDE SERPL-SCNC: 105 MMOL/L — SIGNIFICANT CHANGE UP (ref 96–108)
CO2 SERPL-SCNC: 30 MMOL/L — SIGNIFICANT CHANGE UP (ref 22–31)
CREAT SERPL-MCNC: 0.81 MG/DL — SIGNIFICANT CHANGE UP (ref 0.5–1.3)
EOSINOPHIL # BLD AUTO: 0.06 K/UL — SIGNIFICANT CHANGE UP (ref 0–0.5)
EOSINOPHIL NFR BLD AUTO: 0.9 % — SIGNIFICANT CHANGE UP (ref 0–6)
GLUCOSE SERPL-MCNC: 183 MG/DL — HIGH (ref 70–99)
HCT VFR BLD CALC: 37.9 % — SIGNIFICANT CHANGE UP (ref 34.5–45)
HGB BLD-MCNC: 12.2 G/DL — SIGNIFICANT CHANGE UP (ref 11.5–15.5)
IMM GRANULOCYTES NFR BLD AUTO: 0.6 % — SIGNIFICANT CHANGE UP (ref 0–1.5)
INR BLD: 1.01 RATIO — SIGNIFICANT CHANGE UP (ref 0.88–1.16)
LACTATE SERPL-SCNC: 1.4 MMOL/L — SIGNIFICANT CHANGE UP (ref 0.7–2)
LYMPHOCYTES # BLD AUTO: 0.8 K/UL — LOW (ref 1–3.3)
LYMPHOCYTES # BLD AUTO: 11.5 % — LOW (ref 13–44)
MCHC RBC-ENTMCNC: 29.1 PG — SIGNIFICANT CHANGE UP (ref 27–34)
MCHC RBC-ENTMCNC: 32.2 GM/DL — SIGNIFICANT CHANGE UP (ref 32–36)
MCV RBC AUTO: 90.5 FL — SIGNIFICANT CHANGE UP (ref 80–100)
MONOCYTES # BLD AUTO: 0.31 K/UL — SIGNIFICANT CHANGE UP (ref 0–0.9)
MONOCYTES NFR BLD AUTO: 4.4 % — SIGNIFICANT CHANGE UP (ref 2–14)
NEUTROPHILS # BLD AUTO: 5.7 K/UL — SIGNIFICANT CHANGE UP (ref 1.8–7.4)
NEUTROPHILS NFR BLD AUTO: 81.7 % — HIGH (ref 43–77)
NRBC # BLD: 0 /100 WBCS — SIGNIFICANT CHANGE UP (ref 0–0)
PLATELET # BLD AUTO: 178 K/UL — SIGNIFICANT CHANGE UP (ref 150–400)
POTASSIUM SERPL-MCNC: 4.1 MMOL/L — SIGNIFICANT CHANGE UP (ref 3.5–5.3)
POTASSIUM SERPL-SCNC: 4.1 MMOL/L — SIGNIFICANT CHANGE UP (ref 3.5–5.3)
PROT SERPL-MCNC: 6.9 G/DL — SIGNIFICANT CHANGE UP (ref 6–8.3)
PROTHROM AB SERPL-ACNC: 11.8 SEC — SIGNIFICANT CHANGE UP (ref 10.6–13.6)
RBC # BLD: 4.19 M/UL — SIGNIFICANT CHANGE UP (ref 3.8–5.2)
RBC # FLD: 13.3 % — SIGNIFICANT CHANGE UP (ref 10.3–14.5)
SODIUM SERPL-SCNC: 140 MMOL/L — SIGNIFICANT CHANGE UP (ref 135–145)
TROPONIN I SERPL-MCNC: <.015 NG/ML — SIGNIFICANT CHANGE UP (ref 0.01–0.04)
WBC # BLD: 6.97 K/UL — SIGNIFICANT CHANGE UP (ref 3.8–10.5)
WBC # FLD AUTO: 6.97 K/UL — SIGNIFICANT CHANGE UP (ref 3.8–10.5)

## 2020-08-06 PROCEDURE — 71045 X-RAY EXAM CHEST 1 VIEW: CPT | Mod: 26

## 2020-08-06 PROCEDURE — 93010 ELECTROCARDIOGRAM REPORT: CPT

## 2020-08-06 PROCEDURE — 99285 EMERGENCY DEPT VISIT HI MDM: CPT

## 2020-08-06 RX ORDER — CARBAMAZEPINE 200 MG
400 TABLET ORAL ONCE
Refills: 0 | Status: COMPLETED | OUTPATIENT
Start: 2020-08-06 | End: 2020-08-06

## 2020-08-06 RX ORDER — LOSARTAN POTASSIUM 100 MG/1
50 TABLET, FILM COATED ORAL DAILY
Refills: 0 | Status: DISCONTINUED | OUTPATIENT
Start: 2020-08-06 | End: 2020-08-07

## 2020-08-06 RX ORDER — CARBAMAZEPINE 200 MG
200 TABLET ORAL ONCE
Refills: 0 | Status: DISCONTINUED | OUTPATIENT
Start: 2020-08-06 | End: 2020-08-06

## 2020-08-06 RX ORDER — SODIUM CHLORIDE 9 MG/ML
1000 INJECTION INTRAMUSCULAR; INTRAVENOUS; SUBCUTANEOUS ONCE
Refills: 0 | Status: COMPLETED | OUTPATIENT
Start: 2020-08-06 | End: 2020-08-06

## 2020-08-06 RX ADMIN — SODIUM CHLORIDE 1000 MILLILITER(S): 9 INJECTION INTRAMUSCULAR; INTRAVENOUS; SUBCUTANEOUS at 23:00

## 2020-08-06 NOTE — ED ADULT TRIAGE NOTE - CHIEF COMPLAINT QUOTE
family found patient on the bed, unable to walk, was dead weight, patient brought to the famil;y and called ems, patient feeling nauseous, and abdominal pain. [id number]- consistently moving left thumb which is new as per family

## 2020-08-06 NOTE — ED PROVIDER NOTE - OBJECTIVE STATEMENT
family found patient on the bed, unable to walk, was dead weight, patient brought to the famil;y and called ems, patient feeling nauseous, and abdominal pain. [id number]- consistently moving left thumb which is new as per family 89 y/o female h/o seizure, HTN C/C family found patient on the bed, not responding, her son notes that her left thumb was twitching, in the ED she seemed to be back at her baseline mental status and the patient  indicated that she missed her evening dose of tegretol and Losartan. 89 y/o female h/o seizure, HTN C/C family found patient on the bed, not responding, her son notes that her body was stiff and her left thumb was twitching, in the ED she seemed to be back at her baseline mental status and the patient  indicated that she missed her evening dose of tegretol and Losartan.

## 2020-08-06 NOTE — ED ADULT NURSE NOTE - OBJECTIVE STATEMENT
Patient brought in by ambulance accompanied by son who reports he saw her Mom on bed with body stiff but able to talk, and noticed on arrival to ED with left hand shaking. In the ED patient able to move all extremities.  Patient is AO x 3. IV cannula inserted and blood drawn and sent to laboratory.

## 2020-08-06 NOTE — ED PROVIDER NOTE - PROGRESS NOTE DETAILS
The patients son request that his mother be seen by the neurologist in the morning, he plans on returning at 9am seen by neuro, ok for dc

## 2020-08-06 NOTE — ED PROVIDER NOTE - PATIENT PORTAL LINK FT
You can access the FollowMyHealth Patient Portal offered by [facility] by registering at the following website: [email or web address]. By joining [facility]’s FollowMyHealth portal, you will also be able to view your health information using other applications (apps) compatible with our system.

## 2020-08-06 NOTE — ED PROVIDER NOTE - CARE PROVIDER_API CALL
Rae Wei  NEUROLOGY  [address] [place] [address]  Phone: [phone]  Fax: [phone]  Follow Up Time: 1-3 Days

## 2020-08-06 NOTE — ED PROVIDER NOTE - CLINICAL SUMMARY MEDICAL DECISION MAKING FREE TEXT BOX
the patient experienced a break through seizure. will order   labs CT Tegretol level,  Tegretol evening dose and neuro consult

## 2020-08-07 VITALS
RESPIRATION RATE: 17 BRPM | SYSTOLIC BLOOD PRESSURE: 172 MMHG | DIASTOLIC BLOOD PRESSURE: 80 MMHG | HEART RATE: 76 BPM | OXYGEN SATURATION: 98 %

## 2020-08-07 LAB
APPEARANCE UR: CLEAR — SIGNIFICANT CHANGE UP
BACTERIA # UR AUTO: ABNORMAL
BILIRUB UR-MCNC: NEGATIVE — SIGNIFICANT CHANGE UP
COLOR SPEC: YELLOW — SIGNIFICANT CHANGE UP
COMMENT - URINE: SIGNIFICANT CHANGE UP
DIFF PNL FLD: ABNORMAL
EPI CELLS # UR: SIGNIFICANT CHANGE UP
GLUCOSE UR QL: 250
KETONES UR-MCNC: NEGATIVE — SIGNIFICANT CHANGE UP
LEUKOCYTE ESTERASE UR-ACNC: ABNORMAL
NITRITE UR-MCNC: NEGATIVE — SIGNIFICANT CHANGE UP
NT-PROBNP SERPL-SCNC: 145 PG/ML — SIGNIFICANT CHANGE UP (ref 0–450)
PH UR: 6 — SIGNIFICANT CHANGE UP (ref 5–8)
PROT UR-MCNC: 30 MG/DL
RBC CASTS # UR COMP ASSIST: ABNORMAL /HPF (ref 0–4)
SP GR SPEC: 1.02 — SIGNIFICANT CHANGE UP (ref 1.01–1.02)
UROBILINOGEN FLD QL: NEGATIVE — SIGNIFICANT CHANGE UP
WBC UR QL: SIGNIFICANT CHANGE UP

## 2020-08-07 PROCEDURE — 85610 PROTHROMBIN TIME: CPT

## 2020-08-07 PROCEDURE — 80156 ASSAY CARBAMAZEPINE TOTAL: CPT

## 2020-08-07 PROCEDURE — 71045 X-RAY EXAM CHEST 1 VIEW: CPT

## 2020-08-07 PROCEDURE — 96360 HYDRATION IV INFUSION INIT: CPT

## 2020-08-07 PROCEDURE — 87086 URINE CULTURE/COLONY COUNT: CPT

## 2020-08-07 PROCEDURE — 83605 ASSAY OF LACTIC ACID: CPT

## 2020-08-07 PROCEDURE — 87150 DNA/RNA AMPLIFIED PROBE: CPT

## 2020-08-07 PROCEDURE — 36415 COLL VENOUS BLD VENIPUNCTURE: CPT

## 2020-08-07 PROCEDURE — 70450 CT HEAD/BRAIN W/O DYE: CPT | Mod: 26

## 2020-08-07 PROCEDURE — 87040 BLOOD CULTURE FOR BACTERIA: CPT

## 2020-08-07 PROCEDURE — 81001 URINALYSIS AUTO W/SCOPE: CPT

## 2020-08-07 PROCEDURE — 84484 ASSAY OF TROPONIN QUANT: CPT

## 2020-08-07 PROCEDURE — 99284 EMERGENCY DEPT VISIT MOD MDM: CPT | Mod: 25

## 2020-08-07 PROCEDURE — 85027 COMPLETE CBC AUTOMATED: CPT

## 2020-08-07 PROCEDURE — 80053 COMPREHEN METABOLIC PANEL: CPT

## 2020-08-07 PROCEDURE — 93005 ELECTROCARDIOGRAM TRACING: CPT

## 2020-08-07 PROCEDURE — 83880 ASSAY OF NATRIURETIC PEPTIDE: CPT

## 2020-08-07 PROCEDURE — 70450 CT HEAD/BRAIN W/O DYE: CPT

## 2020-08-07 PROCEDURE — 85730 THROMBOPLASTIN TIME PARTIAL: CPT

## 2020-08-07 RX ORDER — METFORMIN HYDROCHLORIDE 850 MG/1
2 TABLET ORAL
Qty: 0 | Refills: 0 | DISCHARGE

## 2020-08-07 RX ORDER — LOSARTAN POTASSIUM 100 MG/1
50 TABLET, FILM COATED ORAL DAILY
Refills: 0 | Status: DISCONTINUED | OUTPATIENT
Start: 2020-08-07 | End: 2020-08-10

## 2020-08-07 RX ORDER — CARBAMAZEPINE 200 MG
100 TABLET ORAL ONCE
Refills: 0 | Status: COMPLETED | OUTPATIENT
Start: 2020-08-07 | End: 2020-08-07

## 2020-08-07 RX ORDER — ACETAMINOPHEN 500 MG
650 TABLET ORAL ONCE
Refills: 0 | Status: COMPLETED | OUTPATIENT
Start: 2020-08-07 | End: 2020-08-07

## 2020-08-07 RX ADMIN — SODIUM CHLORIDE 1000 MILLILITER(S): 9 INJECTION INTRAMUSCULAR; INTRAVENOUS; SUBCUTANEOUS at 00:04

## 2020-08-07 RX ADMIN — LOSARTAN POTASSIUM 50 MILLIGRAM(S): 100 TABLET, FILM COATED ORAL at 06:40

## 2020-08-07 RX ADMIN — Medication 650 MILLIGRAM(S): at 01:06

## 2020-08-07 RX ADMIN — Medication 650 MILLIGRAM(S): at 02:06

## 2020-08-07 RX ADMIN — Medication 400 MILLIGRAM(S): at 00:15

## 2020-08-07 RX ADMIN — Medication 100 MILLIGRAM(S): at 06:40

## 2020-08-08 LAB
-  COAGULASE NEGATIVE STAPHYLOCOCCUS: SIGNIFICANT CHANGE UP
CULTURE RESULTS: SIGNIFICANT CHANGE UP
CULTURE RESULTS: SIGNIFICANT CHANGE UP
GRAM STN FLD: SIGNIFICANT CHANGE UP
METHOD TYPE: SIGNIFICANT CHANGE UP
ORGANISM # SPEC MICROSCOPIC CNT: SIGNIFICANT CHANGE UP
ORGANISM # SPEC MICROSCOPIC CNT: SIGNIFICANT CHANGE UP
SPECIMEN SOURCE: SIGNIFICANT CHANGE UP

## 2020-08-12 LAB
CULTURE RESULTS: SIGNIFICANT CHANGE UP
SPECIMEN SOURCE: SIGNIFICANT CHANGE UP

## 2020-08-13 NOTE — ED POST DISCHARGE NOTE - DETAILS
no answer from patient. son reports pt wont answer phone numbers she does not know. spoke to pt sharonda zarate advised on contaminiated urine and blood cx. reports pt improved no fevers no seizures. has apt for follow up. advised to get repeat urine cx.

## 2020-08-17 ENCOUNTER — LABORATORY RESULT (OUTPATIENT)
Age: 85
End: 2020-08-17

## 2020-08-17 ENCOUNTER — APPOINTMENT (OUTPATIENT)
Dept: INTERNAL MEDICINE | Facility: CLINIC | Age: 85
End: 2020-08-17
Payer: MEDICARE

## 2020-08-17 VITALS
SYSTOLIC BLOOD PRESSURE: 120 MMHG | TEMPERATURE: 97.5 F | DIASTOLIC BLOOD PRESSURE: 80 MMHG | OXYGEN SATURATION: 95 % | HEIGHT: 65 IN | RESPIRATION RATE: 14 BRPM | HEART RATE: 83 BPM

## 2020-08-17 PROCEDURE — 99495 TRANSJ CARE MGMT MOD F2F 14D: CPT

## 2020-08-17 NOTE — HISTORY OF PRESENT ILLNESS
[Admitted on: ___] : The patient was admitted on [unfilled] [Post-hospitalization from ___ Hospital] : Post-hospitalization from [unfilled] Hospital [Discharge Summary] : discharge summary [Discharged on ___] : discharged on [unfilled] [Radiology Findings] : radiology findings [Discharge Med List] : discharge medication list [Pertinent Labs] : pertinent labs [Patient Contacted By: ____] : and contacted by [unfilled] [Med Reconciliation] : medication reconciliation has been completed [FreeTextEntry2] : Pt was admitted 8/6/20 after a witnessed seizure. She had some left sided shaking and weakness.\par MRI and CT were negative for CVA\par Pt had a urine culture showing contamination with 3 types of organisms. Pt had blood culture showing contamination with Coag negative Staph.\par Pt was deemed stable for discharge on 8/7/20.\par She was asked to follow up with Neurology. Her dose of anti-seizure medication was not changed.

## 2020-08-17 NOTE — PHYSICAL EXAM
[No Acute Distress] : no acute distress [Well Nourished] : well nourished [Well Developed] : well developed [Well-Appearing] : well-appearing [Normal Sclera/Conjunctiva] : normal sclera/conjunctiva [EOMI] : extraocular movements intact [PERRL] : pupils equal round and reactive to light [Normal Outer Ear/Nose] : the outer ears and nose were normal in appearance [Normal Oropharynx] : the oropharynx was normal [No JVD] : no jugular venous distention [No Lymphadenopathy] : no lymphadenopathy [Supple] : supple [Thyroid Normal, No Nodules] : the thyroid was normal and there were no nodules present [No Respiratory Distress] : no respiratory distress  [No Accessory Muscle Use] : no accessory muscle use [Clear to Auscultation] : lungs were clear to auscultation bilaterally [Normal Rate] : normal rate  [Regular Rhythm] : with a regular rhythm [Normal S1, S2] : normal S1 and S2 [No Murmur] : no murmur heard [No Carotid Bruits] : no carotid bruits [No Varicosities] : no varicosities [No Abdominal Bruit] : a ~M bruit was not heard ~T in the abdomen [No Edema] : there was no peripheral edema [Pedal Pulses Present] : the pedal pulses are present [No Palpable Aorta] : no palpable aorta [No Extremity Clubbing/Cyanosis] : no extremity clubbing/cyanosis [Soft] : abdomen soft [Non Tender] : non-tender [Non-distended] : non-distended [No HSM] : no HSM [No Masses] : no abdominal mass palpated [Normal Bowel Sounds] : normal bowel sounds [Normal Posterior Cervical Nodes] : no posterior cervical lymphadenopathy [Normal Anterior Cervical Nodes] : no anterior cervical lymphadenopathy [No CVA Tenderness] : no CVA  tenderness [No Spinal Tenderness] : no spinal tenderness [No Joint Swelling] : no joint swelling [Grossly Normal Strength/Tone] : grossly normal strength/tone [No Rash] : no rash [Coordination Grossly Intact] : coordination grossly intact [No Focal Deficits] : no focal deficits [Normal Gait] : normal gait [Normal Affect] : the affect was normal [Normal Insight/Judgement] : insight and judgment were intact [22757 - Moderate Complexity requires multiple possible diagnoses and/or the management options, moderate complexity of the medical data (tests, etc.) to be reviewed, and moderate risk of significant complications, morbidity, and/or mortality as well as co] : Moderate Complexity

## 2020-08-20 LAB
25(OH)D3 SERPL-MCNC: 26.4 NG/ML
ALBUMIN SERPL ELPH-MCNC: 3.8 G/DL
ALP BLD-CCNC: 74 U/L
ALT SERPL-CCNC: 11 U/L
ANION GAP SERPL CALC-SCNC: 11 MMOL/L
APPEARANCE: CLEAR
AST SERPL-CCNC: 15 U/L
BACTERIA UR CULT: NORMAL
BASOPHILS # BLD AUTO: 0.07 K/UL
BASOPHILS NFR BLD AUTO: 1.5 %
BILIRUB SERPL-MCNC: 0.2 MG/DL
BILIRUBIN URINE: NEGATIVE
BLOOD URINE: NEGATIVE
BUN SERPL-MCNC: 18 MG/DL
CALCIUM SERPL-MCNC: 9 MG/DL
CHLORIDE SERPL-SCNC: 103 MMOL/L
CHOLEST SERPL-MCNC: 203 MG/DL
CHOLEST/HDLC SERPL: 3.3 RATIO
CO2 SERPL-SCNC: 27 MMOL/L
COLOR: YELLOW
CREAT SERPL-MCNC: 0.84 MG/DL
EOSINOPHIL # BLD AUTO: 0.07 K/UL
EOSINOPHIL NFR BLD AUTO: 1.5 %
ESTIMATED AVERAGE GLUCOSE: 146 MG/DL
FOLATE SERPL-MCNC: 14.1 NG/ML
GLUCOSE QUALITATIVE U: NEGATIVE
GLUCOSE SERPL-MCNC: 107 MG/DL
HBA1C MFR BLD HPLC: 6.7 %
HCT VFR BLD CALC: 39 %
HDLC SERPL-MCNC: 61 MG/DL
HGB BLD-MCNC: 12.1 G/DL
IMM GRANULOCYTES NFR BLD AUTO: 0.6 %
KETONES URINE: NORMAL
LDLC SERPL CALC-MCNC: 116 MG/DL
LEUKOCYTE ESTERASE URINE: ABNORMAL
LYMPHOCYTES # BLD AUTO: 0.75 K/UL
LYMPHOCYTES NFR BLD AUTO: 15.9 %
MAN DIFF?: NORMAL
MCHC RBC-ENTMCNC: 29.4 PG
MCHC RBC-ENTMCNC: 31 GM/DL
MCV RBC AUTO: 94.9 FL
MONOCYTES # BLD AUTO: 0.5 K/UL
MONOCYTES NFR BLD AUTO: 10.6 %
NEUTROPHILS # BLD AUTO: 3.31 K/UL
NEUTROPHILS NFR BLD AUTO: 69.9 %
NITRITE URINE: NEGATIVE
PH URINE: 5.5
PLATELET # BLD AUTO: 224 K/UL
POTASSIUM SERPL-SCNC: 4.5 MMOL/L
PROT SERPL-MCNC: 5.9 G/DL
PROTEIN URINE: NORMAL
RBC # BLD: 4.11 M/UL
RBC # FLD: 13.4 %
SODIUM SERPL-SCNC: 141 MMOL/L
SPECIFIC GRAVITY URINE: 1.03
TRIGL SERPL-MCNC: 132 MG/DL
TSH SERPL-ACNC: 3.62 UIU/ML
UROBILINOGEN URINE: NORMAL
VIT B12 SERPL-MCNC: 455 PG/ML
WBC # FLD AUTO: 4.73 K/UL

## 2020-08-21 ENCOUNTER — APPOINTMENT (OUTPATIENT)
Dept: NEUROLOGY | Facility: CLINIC | Age: 85
End: 2020-08-21
Payer: MEDICARE

## 2020-08-21 VITALS
DIASTOLIC BLOOD PRESSURE: 80 MMHG | WEIGHT: 150 LBS | SYSTOLIC BLOOD PRESSURE: 145 MMHG | BODY MASS INDEX: 24.99 KG/M2 | HEIGHT: 65 IN | HEART RATE: 83 BPM

## 2020-08-21 DIAGNOSIS — R56.9 UNSPECIFIED CONVULSIONS: ICD-10-CM

## 2020-08-21 DIAGNOSIS — Z87.891 PERSONAL HISTORY OF NICOTINE DEPENDENCE: ICD-10-CM

## 2020-08-21 PROCEDURE — 99214 OFFICE O/P EST MOD 30 MIN: CPT

## 2020-08-23 PROBLEM — Z87.891 FORMER SMOKER: Status: ACTIVE | Noted: 2017-12-18

## 2020-08-23 NOTE — ASSESSMENT
[FreeTextEntry1] : Looks well currently, but had breakthrough focal seizure, likely as a result of lowering her dose on her own. Now tolerating higher dose of carbamazepine by splitting the dose to 4 times a day.\par Plan:\par -continue current AED regimen for now CBZ /100/100/200 if fatigue compaints increase, may switch to Lamictal ER in future. However, I'm concerned that we invite new problems by changing regimen. \par - continue current correct dose of CBZ\par - follow up in 3 months\par \par Visit duration was 25 min or longer and more than 1/2 the time was spent face-to-face  reviewing the cause of seizures, educating the patient or family to recognize seizures, and discussing possible side effects of seizure medications, as well as discussing seizure safety, and ways of reducing seizure risk.\par \par

## 2020-08-23 NOTE — HISTORY OF PRESENT ILLNESS
[FreeTextEntry1] : PCP: Dr. Pedrito Rosenthal;  [address] [place] [address] [place] [address];  [phone]\par \par Ms. PEREZ had partial seizure on Aug 6.  She felt numbness and swelling in L hand and had twiching in L hand, event ended without generalization. Son reports that hand jerking recurred in hospital. Ms. PEREZ endorses that she had reduced evening dose of carbamazepine to 100mg at bedtime.  \par \par Carbamazepine  / 100 / 100 / 200\par \par *** 01/22/2020  ***\par Ms. MAJOR PEREZ returns for scheduled follow-up appointment. Ms. PEREZ reports that in the interval since her last visit, she is doing well. No interval seizures. Ms. PEREZ endorses that the carbamazepine has her feeling very tired and she gets a late start in the morning, awakening at 9a to take AM dose and going back to sleep, then usually getting up at 11a.  Ms. PEREZ also endorses that she feels unsteady in the morning. \par At lower doses of carbamazepine, Ms. PEREZ has had breakthrough seizures.\par \par *** 10/16/2019  ***\par Ms. MAJOR PEREZ returns for scheduled follow-up appointment. Ms. PEREZ reports that in the interval since her last visit, she is doing well. No seizure since last visit. No new problems. No GI upset or double vision. Now taking carbamazepine  am, 100 noon, 100 4p, 400 bedtime. \par \par *** 04/16/2019  ***\par Patient stated in March 2 she had and episode described as electrical current through chest ,left hand felt funny and her mouth was twitching and tongue was heavy. Her son found her on the bathroom floor with the phone in her hand. She was trying to call someone for help. Unclear how long the episode lasted.\par She was evaluated at [facility] . BP very elevated. CBZ increased to 100/100/200 and [facility] of blood pressure and physical therapy strengthening. \par She is otherwise doing well. Her mood is good and she has no further complaints of fatigue.\par \par Current AED regimen:\par /100/200\par \par ***12/18/18***\par patients describes no reported seizures since last visit\par She complains of  mild fatigue and unsteady gait at times\par \par Carbamazepine is taken 9am  100mg , 4pm 100mg and 11pm  300mg\par \par Patient describes aura of tongue feeling heavy in past but none since last office visit\par \par *** 10/22/2018 ***\par Ms. PEREZ was found by her son in am 9/22 on floor of her bedroom. She called out to him. EMT found her to have L weakness, L visual field cut. She was taken to [place] ED, and seen by neurology there who thought she had seizure and postictal Todds. Ms. PEREZ improved and returned to baseline.  CBZ level was therapeutic, but dose was increased slightly from 100/300 to 100/100/300, and Ms. PEREZ was discharged home. \par \par *** 06/22/2018 ***\par Ms. PEREZ is complaining of feeling very tired, sleepy.  She had one fall when she bent over and lost her balance, required 4 stiches to forehead. She had one episode of L hand tremulousness but could control the hand, no impairment of awareness, no visual distortion.\par \par *** 02/23/2018 *** \par Pt doing relatively well, still feels tired, but functioning OK.  Feels a little unsteady, but has not fallen and is better than before. \par \par *** 12/18/2017 ***\par No interval seizure or symptoms. However, pt c/o problems with balance for more than a month - does not recall if she had similar problems last summer. Otherwise not complaining of side effects. No complaint of fatigue. \par \par *** 10/27/2017 ***\par Ms Perez had an episode on 10/15/17 where her left hand became "heavy". She was evaluated in [place] ED, and had no recurrent stroke (Prior R parietal hemorrhage). Then on 10/17/17, patient describes that the television picture was distorted.  She went her ophthalmologist who recommended an MRA (presumably for a TIA? - n.b. pt has had extensive vascular imaging in spring 2017). She continues to take carbamazepine 200 q12, and seemed unaware of plan to start lamotrigine, son unaware.  She does complain of increased sleepiness, but does not seem to be major complaint, only endorsed after I inquired. No other problems. Last sodium was normal. \par \par Patient denies missing a dose of carbamazepine, but she does not use a pill box and has a complex (self-imposed) schedule for her medications. \par \par *** 5/12/2017 ***\par Ms. 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 She is a poor historian, and most of the history is taken from the notes. Later that year, she was found to have an active ambulatory EEG with sharp waves noted in the right frontal region. She was treated with levetiracetam, but complained of extreme fatigue interfering with her quality of life.in February 2017, she had an episode of left hand shaking lasting approximately 30 seconds, and resulting in her fall backwards and a head laceration. The event was felt to be a seizure. Her anticonvulsant was changed to carbamazepine, currently 200 mg twice a day, but she continues to complain of fatigue.

## 2020-09-16 ENCOUNTER — RX RENEWAL (OUTPATIENT)
Age: 85
End: 2020-09-16

## 2020-10-16 ENCOUNTER — RX RENEWAL (OUTPATIENT)
Age: 85
End: 2020-10-16

## 2020-10-21 ENCOUNTER — RX RENEWAL (OUTPATIENT)
Age: 85
End: 2020-10-21

## 2020-11-20 ENCOUNTER — APPOINTMENT (OUTPATIENT)
Dept: NEUROLOGY | Facility: CLINIC | Age: 85
End: 2020-11-20
Payer: MEDICARE

## 2020-11-20 VITALS
BODY MASS INDEX: 24.99 KG/M2 | WEIGHT: 150 LBS | HEART RATE: 72 BPM | DIASTOLIC BLOOD PRESSURE: 83 MMHG | HEIGHT: 65 IN | SYSTOLIC BLOOD PRESSURE: 160 MMHG

## 2020-11-20 VITALS — TEMPERATURE: 97.7 F

## 2020-11-20 PROCEDURE — 99214 OFFICE O/P EST MOD 30 MIN: CPT

## 2020-11-20 NOTE — ASSESSMENT
[FreeTextEntry1] : Looks well currently, no interval breakthrough seizures. Now tolerating higher dose of carbamazepine. Endorses some tiredness. \par \par Plan:\par -continue current AED regimen CBZ /100/300 - if we are unable to keep her seizure-free and side-effect free, I may switch to Lamictal ER in future to help with fatigue. \par - proscribed driving \par - follow up in 6 months\par \par Visit duration was 25 min or longer and more than 1/2 the time was spent face-to-face  reviewing the cause of seizures, educating the patient or family to recognize seizures, and discussing possible side effects of seizure medications, as well as discussing seizure safety, and ways of reducing seizure risk.\par \par

## 2020-11-20 NOTE — HISTORY OF PRESENT ILLNESS
[FreeTextEntry1] : PCP: Dr. Pedrito Rosenthal;  [address] [place] [address];  [phone]\par \par *** 11/20/2020  ***\par Ms. PEREZ is doing relatively well. She endorses that she is compliant with carbamazepine and that she has had no seizures since last visit.  She is staying home as a precaution against COVID.  She has multiple somatic complaints, however, she remains independent and able to perform her ADLs.\par \par *** 08/21/2020 ***\par Ms. PEREZ had partial seizure on Aug 6.  She felt numbness and swelling in L hand and had twiching in L hand, event ended without generalization. Son reports that hand jerking recurred in hospital. Ms. PEREZ endorses that she had reduced evening dose of carbamazepine to 100mg at bedtime.  \par \par Carbamazepine  / 100 / 100 / 200\par \par *** 01/22/2020  ***\par Ms. MAJOR PEREZ returns for scheduled follow-up appointment. Ms. PEREZ reports that in the interval since her last visit, she is doing well. No interval seizures. Ms. PEREZ endorses that the carbamazepine has her feeling very tired and she gets a late start in the morning, awakening at 9a to take AM dose and going back to sleep, then usually getting up at 11a.  Ms. PEREZ also endorses that she feels unsteady in the morning. \par At lower doses of carbamazepine, Ms. PEREZ has had breakthrough seizures.\par \par *** 10/16/2019  ***\par Ms. MAJOR PEREZ returns for scheduled follow-up appointment. Ms. PEREZ reports that in the interval since her last visit, she is doing well. No seizure since last visit. No new problems. No GI upset or double vision. Now taking carbamazepine  am, 100 noon, 100 4p, 400 bedtime. \par \par *** 04/16/2019  ***\par Patient stated in March 2 she had and episode described as electrical current through chest ,left hand felt funny and her mouth was twitching and tongue was heavy. Her son found her on the bathroom floor with the phone in her hand. She was trying to call someone for help. Unclear how long the episode lasted.\par She was evaluated at [facility] . BP very elevated. CBZ increased to 100/100/200 and [facility] of blood pressure and physical therapy strengthening. \par She is otherwise doing well. Her mood is good and she has no further complaints of fatigue.\par \par Current AED regimen:\par /100/200\par \par ***12/18/18***\par patients describes no reported seizures since last visit\par She complains of  mild fatigue and unsteady gait at times\par \par Carbamazepine is taken 9am  100mg , 4pm 100mg and 11pm  300mg\par \par Patient describes aura of tongue feeling heavy in past but none since last office visit\par \par *** 10/22/2018 ***\par Ms. PEREZ was found by her son in am 9/22 on floor of her bedroom. She called out to him. EMT found her to have L weakness, L visual field cut. She was taken to [place] ED, and seen by neurology there who thought she had seizure and postictal Todds. Ms. PEREZ improved and returned to baseline.  CBZ level was therapeutic, but dose was increased slightly from 100/300 to 100/100/300, and Ms. PEREZ was discharged home. \par \par *** 06/22/2018 ***\par Ms. PEREZ is complaining of feeling very tired, sleepy.  She had one fall when she bent over and lost her balance, required 4 stiches to forehead. She had one episode of L hand tremulousness but could control the hand, no impairment of awareness, no visual distortion.\par \par *** 02/23/2018 *** \par Pt doing relatively well, still feels tired, but functioning OK.  Feels a little unsteady, but has not fallen and is better than before. \par \par *** 12/18/2017 ***\par No interval seizure or symptoms. However, pt c/o problems with balance for more than a month - does not recall if she had similar problems last summer. Otherwise not complaining of side effects. No complaint of fatigue. \par \par *** 10/27/2017 ***\par Ms Perez had an episode on 10/15/17 where her left hand became "heavy". She was evaluated in [place] ED, and had no recurrent stroke (Prior R parietal hemorrhage). Then on 10/17/17, patient describes that the television picture was distorted.  She went her ophthalmologist who recommended an MRA (presumably for a TIA? - n.b. pt has had extensive vascular imaging in spring 2017). She continues to take carbamazepine 200 q12, and seemed unaware of plan to start lamotrigine, son unaware.  She does complain of increased sleepiness, but does not seem to be major complaint, only endorsed after I inquired. No other problems. Last sodium was normal. \par \par Patient denies missing a dose of carbamazepine, but she does not use a pill box and has a complex (self-imposed) schedule for her medications. \par \par *** 5/12/2017 ***\par Ms. 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 She is a poor historian, and most of the history is taken from the notes. Later that year, she was found to have an active ambulatory EEG with sharp waves noted in the right frontal region. She was treated with levetiracetam, but complained of extreme fatigue interfering with her quality of life.in February 2017, she had an episode of left hand shaking lasting approximately 30 seconds, and resulting in her fall backwards and a head laceration. The event was felt to be a seizure. Her anticonvulsant was changed to carbamazepine, currently 200 mg twice a day, but she continues to complain of fatigue.

## 2020-12-16 PROBLEM — Z87.09 HISTORY OF INFLUENZA: Status: RESOLVED | Noted: 2018-01-22 | Resolved: 2020-12-16

## 2020-12-23 PROBLEM — Z87.440 HISTORY OF URINARY TRACT INFECTION: Status: RESOLVED | Noted: 2020-02-13 | Resolved: 2020-12-23

## 2020-12-29 ENCOUNTER — RX RENEWAL (OUTPATIENT)
Age: 85
End: 2020-12-29

## 2021-02-26 ENCOUNTER — RX RENEWAL (OUTPATIENT)
Age: 86
End: 2021-02-26

## 2021-03-16 NOTE — ED PROVIDER NOTE - CONSTITUTIONAL NEGATIVE STATEMENT, MLM
Clinic Care Coordination Contact  [facility]/Voicemail       Clinical Data: Care Coordinator Outreach  Outreach attempted x 1.  Left message on patient's voicemail with call back information and requested return call.  Plan:  Care Coordinator will try to reach patient again in 1-2 business days.       no fever and no chills.

## 2021-03-17 ENCOUNTER — APPOINTMENT (OUTPATIENT)
Dept: NEUROLOGY | Facility: CLINIC | Age: 86
End: 2021-03-17
Payer: MEDICARE

## 2021-03-17 VITALS — DIASTOLIC BLOOD PRESSURE: 74 MMHG | SYSTOLIC BLOOD PRESSURE: 138 MMHG | TEMPERATURE: 96.8 F | HEART RATE: 70 BPM

## 2021-03-17 VITALS — TEMPERATURE: 96.8 F

## 2021-03-17 PROCEDURE — 99214 OFFICE O/P EST MOD 30 MIN: CPT

## 2021-03-17 NOTE — HISTORY OF PRESENT ILLNESS
[FreeTextEntry1] : PCP: Dr. Pedrito Rosenthal;  [address] [place] [address]  [place] [address];  [phone]\par \par *** 03/17/2021  ***\par Ms. Perez is doing relatively well.  She has not had a seizure in the interval since her last appointment.  She is taking carbamazepine  in the morning, 100 in the afternoon, and 200 at bedtime.  She has received her first COVID-19 vaccination, and will get the second injection in a couple of weeks.  She complains of some intermittent word finding problems that are new, and had not previously bothered her.  She cannot describe them in detail, but expresses concern that they may represent TIAs or strokes.  In the past, she had an intracerebral hemorrhage.\par \par \par *** 11/20/2020  ***\par Ms. PEREZ is doing relatively well. She endorses that she is compliant with carbamazepine and that she has had no seizures since last visit.  She is staying home as a precaution against COVID.  She has multiple somatic complaints, however, she remains independent and able to perform her ADLs.\par \par *** 08/21/2020 ***\par Ms. PEREZ had partial seizure on Aug 6.  She felt numbness and swelling in L hand and had twiching in L hand, event ended without generalization. Son reports that hand jerking recurred in hospital. Ms. PEREZ endorses that she had reduced evening dose of carbamazepine to 100mg at bedtime.  \par \par Carbamazepine  / 100 / 100 / 200\par \par *** 01/22/2020  ***\par Ms. MAJOR PEREZ returns for scheduled follow-up appointment. Ms. PEREZ reports that in the interval since her last visit, she is doing well. No interval seizures. Ms. PEREZ endorses that the carbamazepine has her feeling very tired and she gets a late start in the morning, awakening at 9a to take AM dose and going back to sleep, then usually getting up at 11a.  Ms. PEREZ also endorses that she feels unsteady in the morning. \par At lower doses of carbamazepine, Ms. PEREZ has had breakthrough seizures.\par \par *** 10/16/2019  ***\par Ms. MAJOR PEREZ returns for scheduled follow-up appointment. Ms. PEREZ reports that in the interval since her last visit, she is doing well. No seizure since last visit. No new problems. No GI upset or double vision. Now taking carbamazepine  am, 100 noon, 100 4p, 400 bedtime. \par \par *** 04/16/2019  ***\par Patient stated in March 2 she had and episode described as electrical current through chest ,left hand felt funny and her mouth was twitching and tongue was heavy. Her son found her on the bathroom floor with the phone in her hand. She was trying to call someone for help. Unclear how long the episode lasted.\par She was evaluated at [facility] . BP very elevated. CBZ increased to 100/100/200 and [facility] of blood pressure and physical therapy strengthening. \par She is otherwise doing well. Her mood is good and she has no further complaints of fatigue.\par \par Current AED regimen:\par /100/200\par \par ***12/18/18***\par patients describes no reported seizures since last visit\par She complains of  mild fatigue and unsteady gait at times\par \par Carbamazepine is taken 9am  100mg , 4pm 100mg and 11pm  300mg\par \par Patient describes aura of tongue feeling heavy in past but none since last office visit\par \par *** 10/22/2018 ***\par Ms. PEREZ was found by her son in am 9/22 on floor of her bedroom. She called out to him. EMT found her to have L weakness, L visual field cut. She was taken to [place] ED, and seen by neurology there who thought she had seizure and postictal Todds. Ms. PEREZ improved and returned to baseline.  CBZ level was therapeutic, but dose was increased slightly from 100/300 to 100/100/300, and Ms. PEREZ was discharged home. \par \par *** 06/22/2018 ***\par Ms. PEREZ is complaining of feeling very tired, sleepy.  She had one fall when she bent over and lost her balance, required 4 stiches to forehead. She had one episode of L hand tremulousness but could control the hand, no impairment of awareness, no visual distortion.\par \par *** 02/23/2018 *** \par Pt doing relatively well, still feels tired, but functioning OK.  Feels a little unsteady, but has not fallen and is better than before. \par \par *** 12/18/2017 ***\par No interval seizure or symptoms. However, pt c/o problems with balance for more than a month - does not recall if she had similar problems last summer. Otherwise not complaining of side effects. No complaint of fatigue. \par \par *** 10/27/2017 ***\par Ms Perez had an episode on 10/15/17 where her left hand became "heavy". She was evaluated in [place] ED, and had no recurrent stroke (Prior R parietal hemorrhage). Then on 10/17/17, patient describes that the television picture was distorted.  She went her ophthalmologist who recommended an MRA (presumably for a TIA? - n.b. pt has had extensive vascular imaging in spring 2017). She continues to take carbamazepine 200 q12, and seemed unaware of plan to start lamotrigine, son unaware.  She does complain of increased sleepiness, but does not seem to be major complaint, only endorsed after I inquired. No other problems. Last sodium was normal. \par \par Patient denies missing a dose of carbamazepine, but she does not use a pill box and has a complex (self-imposed) schedule for her medications. \par \par *** 5/12/2017 ***\par Ms. 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 She is a poor historian, and most of the history is taken from the notes. Later that year, she was found to have an active ambulatory EEG with sharp waves noted in the right frontal region. She was treated with levetiracetam, but complained of extreme fatigue interfering with her quality of life.in February 2017, she had an episode of left hand shaking lasting approximately 30 seconds, and resulting in her fall backwards and a head laceration. The event was felt to be a seizure. Her anticonvulsant was changed to carbamazepine, currently 200 mg twice a day, but she continues to complain of fatigue.

## 2021-03-17 NOTE — ASSESSMENT
[FreeTextEntry1] : Looks well currently, and tolerating carbamazepine.  She may be taking lower dose, 100/100/200. Ms. Easley had breakthrough seizures last fall at the lower dose.  I will check levels, and adjust dose as needed.  Given history of stroke, and new complaint of difficulty speaking, I recommended MRI imaging of the brain, and MRA of the arteries of neck and head.\par \par Plan:\par 1.  MRI brain, MRA head and neck\par 2.  Continue prescribed AED regimen for now CBZ /100/300 (may switch to Lamictal ER in future to help with fatigue)\par 3.  Check CBZ level, sodium\par 4. Follow up in 1 month\par \par I have spent 30 minutes or longer reviewing patient data or discussing with the patient  the cause of seizures or seizure-like events and comorbid conditions, assessing the risk of recurrence, educating the patient or family to recognize seizures, and discussing possible treatment options for seizures and comorbid conditions and possible side effects of medications. I also discussed seizure safety, and ways of reducing seizure risk. Greater than 50% of the encounter time was spent on counseling and coordination of care for reviewing records in [employer], discussion with patient regarding plan.\par \par

## 2021-04-02 ENCOUNTER — RX RENEWAL (OUTPATIENT)
Age: 86
End: 2021-04-02

## 2021-04-08 ENCOUNTER — RX RENEWAL (OUTPATIENT)
Age: 86
End: 2021-04-08

## 2021-04-12 ENCOUNTER — RX RENEWAL (OUTPATIENT)
Age: 86
End: 2021-04-12

## 2021-04-20 ENCOUNTER — RX RENEWAL (OUTPATIENT)
Age: 86
End: 2021-04-20

## 2021-04-30 ENCOUNTER — APPOINTMENT (OUTPATIENT)
Dept: NEUROLOGY | Facility: CLINIC | Age: 86
End: 2021-04-30
Payer: MEDICARE

## 2021-04-30 VITALS — SYSTOLIC BLOOD PRESSURE: 150 MMHG | HEART RATE: 70 BPM | HEIGHT: 65 IN | DIASTOLIC BLOOD PRESSURE: 75 MMHG

## 2021-04-30 VITALS — TEMPERATURE: 97.8 F

## 2021-04-30 DIAGNOSIS — G45.9 TRANSIENT CEREBRAL ISCHEMIC ATTACK, UNSPECIFIED: ICD-10-CM

## 2021-04-30 PROCEDURE — 99213 OFFICE O/P EST LOW 20 MIN: CPT

## 2021-04-30 NOTE — ASSESSMENT
[FreeTextEntry1] : Looks well currently, and tolerating carbamazepine.  She may be taking lower dose, 100/100/200. Ms. Easley had breakthrough seizures last fall at the lower dose.  I will check levels, and adjust dose as needed.  Given history of stroke, and new complaint of difficulty speaking, MRI/A reassuring in that no areas of stenosis and no recent strokes seen.  There is evidence of chronic hemorrhage. \par \par Plan:\par 1. Continue prescribed AED regimen for now CBZ /100/300 (may switch to Lamictal ER in future to help with fatigue)\par 2.  Check CBZ level, sodium - f/u labs from [facility]\par 3. Follow up in Sept 2021\par \par

## 2021-04-30 NOTE — HISTORY OF PRESENT ILLNESS
[FreeTextEntry1] : PCP: Dr. Pedrito Rosenthal;  [address] [place] [address];  [phone]\par \par *** 04/30/2021  ***\par Ms. PEREZ returns for f/u to review results of MRI and labs.  MRI of head, MRA head and neck reports and images reviewed by me.  There is evidence of old chronic strokes on MRI but no new acute or subacute strokes. No significant areas of stenosis noted on MRA neck or head.  Ms. PEREZ has not had recurrent seizures.\par labs drawn at [facility] - results not immediately available.\par No new problems. \par \par *** 03/17/2021  ***\par Ms. Perez is doing relatively well.  She has not had a seizure in the interval since her last appointment.  She is taking carbamazepine  in the morning, 100 in the afternoon, and 200 at bedtime.  She has received her first COVID-19 vaccination, and will get the second injection in a couple of weeks.  She complains of some intermittent word finding problems that are new, and had not previously bothered her.  She cannot describe them in detail, but expresses concern that they may represent TIAs or strokes.  In the past, she had an intracerebral hemorrhage.\par \par *** 11/20/2020  ***\par Ms. PEREZ is doing relatively well. She endorses that she is compliant with carbamazepine and that she has had no seizures since last visit.  She is staying home as a precaution against COVID.  She has multiple somatic complaints, however, she remains independent and able to perform her ADLs.\par \par *** 08/21/2020 ***\par Ms. PEREZ had partial seizure on Aug 6.  She felt numbness and swelling in L hand and had twiching in L hand, event ended without generalization. Son reports that hand jerking recurred in hospital. Ms. PEREZ endorses that she had reduced evening dose of carbamazepine to 100mg at bedtime.  \par \par Carbamazepine  / 100 / 100 / 200\par \par *** 01/22/2020  ***\par Ms. MAJOR PEREZ returns for scheduled follow-up appointment. Ms. PEREZ reports that in the interval since her last visit, she is doing well. No interval seizures. Ms. PEREZ endorses that the carbamazepine has her feeling very tired and she gets a late start in the morning, awakening at 9a to take AM dose and going back to sleep, then usually getting up at 11a.  Ms. PEREZ also endorses that she feels unsteady in the morning. \par At lower doses of carbamazepine, Ms. PEREZ has had breakthrough seizures.\par \par *** 10/16/2019  ***\par Ms. MAJOR PEREZ returns for scheduled follow-up appointment. Ms. PEERZ reports that in the interval since her last visit, she is doing well. No seizure since last visit. No new problems. No GI upset or double vision. Now taking carbamazepine  am, 100 noon, 100 4p, 400 bedtime. \par \par *** 04/16/2019  ***\par Patient stated in March 2 she had and episode described as electrical current through chest ,left hand felt funny and her mouth was twitching and tongue was heavy. Her son found her on the bathroom floor with the phone in her hand. She was trying to call someone for help. Unclear how long the episode lasted.\par She was evaluated at [facility] . BP very elevated. CBZ increased to 100/100/200 and [facility] monitoring of blood pressure and physical therapy strengthening. \par She is otherwise doing well. Her mood is good and she has no further complaints of fatigue.\par \par Current AED regimen:\par /100/200\par \par ***12/18/18***\par patients describes no reported seizures since last visit\par She complains of  mild fatigue and unsteady gait at times\par \par Carbamazepine is taken 9am  100mg , 4pm 100mg and 11pm  300mg\par \par Patient describes aura of tongue feeling heavy in past but none since last office visit\par \par *** 10/22/2018 ***\par Ms. PEREZ was found by her son in am 9/22 on floor of her bedroom. She called out to him. EMT found her to have L weakness, L visual field cut. She was taken to [place] ED, and seen by neurology there who thought she had seizure and postictal Todds. Ms. PEREZ improved and returned to baseline.  CBZ level was therapeutic, but dose was increased slightly from 100/300 to 100/100/300, and Ms. PEREZ was discharged home. \par \par *** 06/22/2018 ***\par Ms. PEREZ is complaining of feeling very tired, sleepy.  She had one fall when she bent over and lost her balance, required 4 stiches to forehead. She had one episode of L hand tremulousness but could control the hand, no impairment of awareness, no visual distortion.\par \par *** 02/23/2018 *** \par Pt doing relatively well, still feels tired, but functioning OK.  Feels a little unsteady, but has not fallen and is better than before. \par \par *** 12/18/2017 ***\par No interval seizure or symptoms. However, pt c/o problems with balance for more than a month - does not recall if she had similar problems last summer. Otherwise not complaining of side effects. No complaint of fatigue. \par \par *** 10/27/2017 ***\par Ms Perez had an episode on 10/15/17 where her left hand became "heavy". She was evaluated in [place] ED, and had no recurrent stroke (Prior R parietal hemorrhage). Then on 10/17/17, patient describes that the television picture was distorted.  She went her ophthalmologist who recommended an MRA (presumably for a TIA? - n.b. pt has had extensive vascular imaging in spring 2017). She continues to take carbamazepine 200 q12, and seemed unaware of plan to start lamotrigine, son unaware.  She does complain of increased sleepiness, but does not seem to be major complaint, only endorsed after I inquired. No other problems. Last sodium was normal. \par \par Patient denies missing a dose of carbamazepine, but she does not use a pill box and has a complex (self-imposed) schedule for her medications. \par \par *** 5/12/2017 ***\par Ms. 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 She is a poor historian, and most of the history is taken from the notes. Later that year, she was found to have an active ambulatory EEG with sharp waves noted in the right frontal region. She was treated with levetiracetam, but complained of extreme fatigue interfering with her quality of life.in February 2017, she had an episode of left hand shaking lasting approximately 30 seconds, and resulting in her fall backwards and a head laceration. The event was felt to be a seizure. Her anticonvulsant was changed to carbamazepine, currently 200 mg twice a day, but she continues to complain of fatigue.

## 2021-05-05 ENCOUNTER — RX RENEWAL (OUTPATIENT)
Age: 86
End: 2021-05-05

## 2021-05-31 ENCOUNTER — RX RENEWAL (OUTPATIENT)
Age: 86
End: 2021-05-31

## 2021-07-07 ENCOUNTER — APPOINTMENT (OUTPATIENT)
Dept: INTERNAL MEDICINE | Facility: CLINIC | Age: 86
End: 2021-07-07
Payer: MEDICARE

## 2021-07-07 VITALS
OXYGEN SATURATION: 90 % | BODY MASS INDEX: 24.99 KG/M2 | SYSTOLIC BLOOD PRESSURE: 118 MMHG | TEMPERATURE: 97.2 F | RESPIRATION RATE: 14 BRPM | WEIGHT: 150 LBS | DIASTOLIC BLOOD PRESSURE: 74 MMHG | HEIGHT: 65 IN | HEART RATE: 42 BPM

## 2021-07-07 PROCEDURE — 99214 OFFICE O/P EST MOD 30 MIN: CPT

## 2021-07-07 RX ORDER — DOXYCYCLINE 100 MG/1
100 TABLET, FILM COATED ORAL
Qty: 14 | Refills: 0 | Status: DISCONTINUED | COMMUNITY
Start: 2019-11-22 | End: 2021-07-07

## 2021-07-07 NOTE — HISTORY OF PRESENT ILLNESS
[FreeTextEntry1] : follow up [de-identified] : MAJOR PEREZ is a 89 year old F who presents today for follow up for HTN, DM, and Cholesterol. Patient has no new complaints\par

## 2021-07-07 NOTE — END OF VISIT
[FreeTextEntry3] : "I, Christi Cain, personally scribed the services dictated to me by Dr. Pedrito Rosenthal MD in this documentation on 07/07/2021 "\par \par "I Dr. Pedrito Rosenthal MD, personally performed the services described in this documentation on 07/07/2021 for the patient as scribed by Christi Cain in my presence. I have reviewed and verified that all the information is accurate and true."\par \par

## 2021-07-07 NOTE — HEALTH RISK ASSESSMENT
[No] : In the past 12 months have you used drugs other than those required for medical reasons? No [No falls in past year] : Patient reported no falls in the past year [0] : 2) Feeling down, depressed, or hopeless: Not at all (0) [PHQ-2 Negative - No further assessment needed] : PHQ-2 Negative - No further assessment needed [] : No [[id number]] : 0

## 2021-07-10 LAB
25(OH)D3 SERPL-MCNC: 35.2 NG/ML
ALBUMIN SERPL ELPH-MCNC: 3.9 G/DL
ALP BLD-CCNC: 66 U/L
ALT SERPL-CCNC: 13 U/L
ANION GAP SERPL CALC-SCNC: 17 MMOL/L
AST SERPL-CCNC: 16 U/L
BASOPHILS # BLD AUTO: 0.06 K/UL
BASOPHILS NFR BLD AUTO: 1.3 %
BILIRUB SERPL-MCNC: <0.2 MG/DL
BUN SERPL-MCNC: 13 MG/DL
CALCIUM SERPL-MCNC: 9.2 MG/DL
CHLORIDE SERPL-SCNC: 102 MMOL/L
CHOLEST SERPL-MCNC: 218 MG/DL
CK SERPL-CCNC: 49 U/L
CO2 SERPL-SCNC: 25 MMOL/L
CREAT SERPL-MCNC: 0.85 MG/DL
EOSINOPHIL # BLD AUTO: 0.09 K/UL
EOSINOPHIL NFR BLD AUTO: 1.9 %
ESTIMATED AVERAGE GLUCOSE: 134 MG/DL
GLUCOSE SERPL-MCNC: 102 MG/DL
HBA1C MFR BLD HPLC: 6.3 %
HCT VFR BLD CALC: 37.3 %
HDLC SERPL-MCNC: 74 MG/DL
HGB BLD-MCNC: 12.1 G/DL
IMM GRANULOCYTES NFR BLD AUTO: 0.9 %
LDLC SERPL CALC-MCNC: 127 MG/DL
LYMPHOCYTES # BLD AUTO: 0.89 K/UL
LYMPHOCYTES NFR BLD AUTO: 18.9 %
MAN DIFF?: NORMAL
MCHC RBC-ENTMCNC: 30.4 PG
MCHC RBC-ENTMCNC: 32.4 GM/DL
MCV RBC AUTO: 93.7 FL
MONOCYTES # BLD AUTO: 0.51 K/UL
MONOCYTES NFR BLD AUTO: 10.9 %
NEUTROPHILS # BLD AUTO: 3.11 K/UL
NEUTROPHILS NFR BLD AUTO: 66.1 %
NONHDLC SERPL-MCNC: 144 MG/DL
PLATELET # BLD AUTO: 209 K/UL
POTASSIUM SERPL-SCNC: 4.4 MMOL/L
PROT SERPL-MCNC: 6.2 G/DL
RBC # BLD: 3.98 M/UL
RBC # FLD: 13.7 %
SODIUM SERPL-SCNC: 144 MMOL/L
TRIGL SERPL-MCNC: 87 MG/DL
TSH SERPL-ACNC: 4.73 UIU/ML
WBC # FLD AUTO: 4.7 K/UL

## 2021-08-11 ENCOUNTER — APPOINTMENT (OUTPATIENT)
Dept: INTERNAL MEDICINE | Facility: CLINIC | Age: 86
End: 2021-08-11
Payer: MEDICARE

## 2021-08-11 VITALS
HEIGHT: 65 IN | SYSTOLIC BLOOD PRESSURE: 116 MMHG | RESPIRATION RATE: 14 BRPM | WEIGHT: 150 LBS | BODY MASS INDEX: 24.99 KG/M2 | DIASTOLIC BLOOD PRESSURE: 80 MMHG | HEART RATE: 70 BPM | TEMPERATURE: 97.8 F | OXYGEN SATURATION: 98 %

## 2021-08-11 PROCEDURE — 99214 OFFICE O/P EST MOD 30 MIN: CPT

## 2021-08-11 NOTE — END OF VISIT
[FreeTextEntry3] : "I, Christi Cain, personally scribed the services dictated to me by Dr. Pedrito Rosenthal MD in this documentation on 08/11/2021 "\par \par "I Dr. Pedrito Rosenthal MD, personally performed the services described in this documentation on 08/11/2021 for the patient as scribed by Christi Cain in my presence. I have reviewed and verified that all the information is accurate and true."\par \par

## 2021-08-11 NOTE — HISTORY OF PRESENT ILLNESS
[FreeTextEntry1] : Follow up [de-identified] : MAJOR PEREZ is a 89 year old F who presents today for follow up for HTN and thyroid. Patient has no new complaints\par \par

## 2021-08-11 NOTE — HEALTH RISK ASSESSMENT
[No] : In the past 12 months have you used drugs other than those required for medical reasons? No [No falls in past year] : Patient reported no falls in the past year [0] : 2) Feeling down, depressed, or hopeless: Not at all (0) [PHQ-2 Negative - No further assessment needed] : PHQ-2 Negative - No further assessment needed [] : No [[id number]] : 0

## 2021-08-13 LAB
ALBUMIN SERPL ELPH-MCNC: 3.7 G/DL
ALP BLD-CCNC: 65 U/L
ALT SERPL-CCNC: 12 U/L
ANION GAP SERPL CALC-SCNC: 9 MMOL/L
AST SERPL-CCNC: 14 U/L
BILIRUB SERPL-MCNC: 0.2 MG/DL
BUN SERPL-MCNC: 14 MG/DL
CALCIUM SERPL-MCNC: 8.9 MG/DL
CHLORIDE SERPL-SCNC: 104 MMOL/L
CO2 SERPL-SCNC: 29 MMOL/L
CREAT SERPL-MCNC: 0.75 MG/DL
ESTIMATED AVERAGE GLUCOSE: 140 MG/DL
GLUCOSE SERPL-MCNC: 107 MG/DL
HBA1C MFR BLD HPLC: 6.5 %
POTASSIUM SERPL-SCNC: 4.3 MMOL/L
PROT SERPL-MCNC: 6 G/DL
SODIUM SERPL-SCNC: 142 MMOL/L
TSH SERPL-ACNC: 4.34 UIU/ML

## 2021-08-14 NOTE — PATIENT PROFILE ADULT. - PAIN SCALE PREFERRED, PROFILE
Resting quietly at present, On her cell phone texting using both hands freely and without difficulty  Ambulated to BR without any difficulty        Shola Araiza RN  08/13/21 [id number]
none

## 2021-09-22 ENCOUNTER — APPOINTMENT (OUTPATIENT)
Dept: NEUROLOGY | Facility: CLINIC | Age: 86
End: 2021-09-22
Payer: MEDICARE

## 2021-09-22 VITALS
BODY MASS INDEX: 23.32 KG/M2 | WEIGHT: 140 LBS | HEIGHT: 65 IN | DIASTOLIC BLOOD PRESSURE: 74 MMHG | SYSTOLIC BLOOD PRESSURE: 150 MMHG | HEART RATE: 66 BPM

## 2021-09-22 PROCEDURE — 99214 OFFICE O/P EST MOD 30 MIN: CPT

## 2021-09-22 RX ORDER — CEFUROXIME AXETIL 250 MG/1
250 TABLET ORAL
Qty: 14 | Refills: 0 | Status: DISCONTINUED | COMMUNITY
Start: 2020-02-13 | End: 2021-09-22

## 2021-09-22 NOTE — ASSESSMENT
[FreeTextEntry1] : Looks well currently, and tolerating carbamazepine.  She may be taking lower dose, 100/100/100/400. Ms. Easley had breakthrough seizures last fall at the lower dose.  \par \par Plan:\par 1.  Continue prescribed AED regimen for now CBZ /100/100/400 (may switch to Lamictal ER in future to help with fatigue).  No ataxia on exam\par 2.  Labs show mild elevation hemoglobin A1c–patient is to follow-up with PCP, carbamazepine level 7.6\par 3.  Follow up in 6 months\par

## 2021-09-22 NOTE — PHYSICAL EXAM
[FreeTextEntry1] : Alert and oriented x 3, speech fluent, names easily, follows requests, good recall for recent and remote events.\par EOM full without sustained nystagmus, PERRL, face symmetrical, no dysarthria\par Motor - symmetric strength. normal rapid-alternating movements.\par Sensory - intact LT bilaterally\par Coord - no tremor, no ataxia\par Gait - stands with minimal difficulty, gait is narrow based, but she takes small steps, and posture is somewhat stooped.

## 2021-09-22 NOTE — HISTORY OF PRESENT ILLNESS
[FreeTextEntry1] : PCP: Dr. Pedrito Rosenthal;  [address] [place] [address]  [place] [address];  [phone]\par \par *** 09/22/2021  ***\par  Ms. PEREZ returns for scheduled follow-up.  She reports no interval seizures.  She complains chronically of poor balance that she attributes to carbamazepine.  However, she ambulates without a cane at home and outside.  Carbamazepine level at last visit was 7.6.  She takes her largest dose–400 mg–at bedtime, and then 100 mg spaced throughout the day.  She denies having worse balance in the morning.\par \par *** 04/30/2021  ***\par Ms. PEREZ returns for f/u to review results of MRI and labs.  MRI of head, MRA head and neck reports and images reviewed by me.  There is evidence of old chronic strokes on MRI but no new acute or subacute strokes. No significant areas of stenosis noted on MRA neck or head.  Ms. PEREZ has not had recurrent seizures.\par labs drawn at [facility] - results not immediately available.\par No new problems. \par \par *** 03/17/2021  ***\par Ms. Perez is doing relatively well.  She has not had a seizure in the interval since her last appointment.  She is taking carbamazepine  in the morning, 100 in the afternoon, and 200 at bedtime.  She has received her first COVID-19 vaccination, and will get the second injection in a couple of weeks.  She complains of some intermittent word finding problems that are new, and had not previously bothered her.  She cannot describe them in detail, but expresses concern that they may represent TIAs or strokes.  In the past, she had an intracerebral hemorrhage.\par \par *** 11/20/2020  ***\par Ms. PEREZ is doing relatively well. She endorses that she is compliant with carbamazepine and that she has had no seizures since last visit.  She is staying home as a precaution against COVID.  She has multiple somatic complaints, however, she remains independent and able to perform her ADLs.\par \par *** 08/21/2020 ***\par Ms. PEREZ had partial seizure on Aug 6.  She felt numbness and swelling in L hand and had twiching in L hand, event ended without generalization. Son reports that hand jerking recurred in hospital. Ms. PEREZ endorses that she had reduced evening dose of carbamazepine to 100mg at bedtime.  \par \par Carbamazepine  / 100 / 100 / 200\par \par *** 01/22/2020  ***\par Ms. MAJOR PEREZ returns for scheduled follow-up appointment. Ms. PEREZ reports that in the interval since her last visit, she is doing well. No interval seizures. Ms. PEREZ endorses that the carbamazepine has her feeling very tired and she gets a late start in the morning, awakening at 9a to take AM dose and going back to sleep, then usually getting up at 11a.  Ms. PEREZ also endorses that she feels unsteady in the morning. \par At lower doses of carbamazepine, Ms. PEREZ has had breakthrough seizures.\par \par *** 10/16/2019  ***\par Ms. MAJOR PEREZ returns for scheduled follow-up appointment. Ms. PEREZ reports that in the interval since her last visit, she is doing well. No seizure since last visit. No new problems. No GI upset or double vision. Now taking carbamazepine  am, 100 noon, 100 4p, 400 bedtime. \par \par *** 04/16/2019  ***\par Patient stated in March 2 she had and episode described as electrical current through chest ,left hand felt funny and her mouth was twitching and tongue was heavy. Her son found her on the bathroom floor with the phone in her hand. She was trying to call someone for help. Unclear how long the episode lasted.\par She was evaluated at [facility] . BP very elevated. CBZ increased to 100/100/200 and [facility] monitoring of blood pressure and physical therapy strengthening. \par She is otherwise doing well. Her mood is good and she has no further complaints of fatigue.\par \par Current AED regimen:\par /100/200\par \par ***12/18/18***\par patients describes no reported seizures since last visit\par She complains of  mild fatigue and unsteady gait at times\par \par Carbamazepine is taken 9am  100mg , 4pm 100mg and 11pm  300mg\par \par Patient describes aura of tongue feeling heavy in past but none since last office visit\par \par *** 10/22/2018 ***\par Ms. PEREZ was found by her son in am 9/22 on floor of her bedroom. She called out to him. EMT found her to have L weakness, L visual field cut. She was taken to [facility], and seen by neurology there who thought she had seizure and postictal Todds. Ms. PEREZ improved and returned to baseline.  CBZ level was therapeutic, but dose was increased slightly from 100/300 to 100/100/300, and Ms. PEREZ was discharged home. \par \par *** 06/22/2018 ***\par Ms. PEREZ is complaining of feeling very tired, sleepy.  She had one fall when she bent over and lost her balance, required 4 stiches to forehead. She had one episode of L hand tremulousness but could control the hand, no impairment of awareness, no visual distortion.\par \par *** 02/23/2018 *** \par Pt doing relatively well, still feels tired, but functioning OK.  Feels a little unsteady, but has not fallen and is better than before. \par \par *** 12/18/2017 ***\par No interval seizure or symptoms. However, pt c/o problems with balance for more than a month - does not recall if she had similar problems last summer. Otherwise not complaining of side effects. No complaint of fatigue. \par \par *** 10/27/2017 ***\par Ms Perez had an episode on 10/15/17 where her left hand became "heavy". She was evaluated in [facility], and had no recurrent stroke (Prior R parietal hemorrhage). Then on 10/17/17, patient describes that the television picture was distorted.  She went her ophthalmologist who recommended an MRA (presumably for a TIA? - n.b. pt has had extensive vascular imaging in spring 2017). She continues to take carbamazepine 200 q12, and seemed unaware of plan to start lamotrigine, son unaware.  She does complain of increased sleepiness, but does not seem to be major complaint, only endorsed after I inquired. No other problems. Last sodium was normal. \par \par Patient denies missing a dose of carbamazepine, but she does not use a pill box and has a complex (self-imposed) schedule for her medications. \par \par *** 5/12/2017 ***\par Ms. 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 She is a poor historian, and most of the history is taken from the notes. Later that year, she was found to have an active ambulatory EEG with sharp waves noted in the right frontal region. She was treated with levetiracetam, but complained of extreme fatigue interfering with her quality of life.in February 2017, she had an episode of left hand shaking lasting approximately 30 seconds, and resulting in her fall backwards and a head laceration. The event was felt to be a seizure. Her anticonvulsant was changed to carbamazepine, currently 200 mg twice a day, but she continues to complain of fatigue.

## 2021-09-24 ENCOUNTER — RX RENEWAL (OUTPATIENT)
Age: 86
End: 2021-09-24

## 2021-09-28 ENCOUNTER — RX RENEWAL (OUTPATIENT)
Age: 86
End: 2021-09-28

## 2021-10-05 NOTE — ED PROVIDER NOTE - MEDICAL DECISION MAKING DETAILS
Outreach attempt was made to schedule a Medicare Wellness Visit. This was the first attempt. Contact was not made, left message.   ro ich ro cva ro acute path

## 2021-10-26 ENCOUNTER — RX RENEWAL (OUTPATIENT)
Age: 86
End: 2021-10-26

## 2021-11-10 ENCOUNTER — APPOINTMENT (OUTPATIENT)
Dept: INTERNAL MEDICINE | Facility: CLINIC | Age: 86
End: 2021-11-10
Payer: MEDICARE

## 2021-11-10 VITALS
WEIGHT: 140 LBS | RESPIRATION RATE: 14 BRPM | SYSTOLIC BLOOD PRESSURE: 136 MMHG | HEART RATE: 66 BPM | BODY MASS INDEX: 23.32 KG/M2 | OXYGEN SATURATION: 95 % | HEIGHT: 65 IN | TEMPERATURE: 97.5 F | DIASTOLIC BLOOD PRESSURE: 64 MMHG

## 2021-11-10 PROCEDURE — 99214 OFFICE O/P EST MOD 30 MIN: CPT | Mod: 25

## 2021-11-10 NOTE — HEALTH RISK ASSESSMENT
[No] : In the past 12 months have you used drugs other than those required for medical reasons? No [No falls in past year] : Patient reported no falls in the past year [0] : 2) Feeling down, depressed, or hopeless: Not at all (0) [PHQ-2 Negative - No further assessment needed] : PHQ-2 Negative - No further assessment needed [] : No [[id number]] : 0

## 2021-11-10 NOTE — END OF VISIT
[FreeTextEntry3] : "I, Ayanna Castro, personally scribed the services dictated to me by Dr. Pedrito Rosenthal MD in this documentation on 11/10/2021 "\par \par "I Dr. Pedrito Rosenthal MD, personally performed the services described in this documentation on 11/10/2021 for the patient as scribed by Ayanna Castro in my presence. I have reviewed and verified that all the information is accurate and true."\par \par

## 2021-11-10 NOTE — HISTORY OF PRESENT ILLNESS
[FreeTextEntry1] : follow up [de-identified] : MAJOR PEREZ is a 89 year old F who presents today for follow up HTN, Diabetes and Cholesterol. Patient has no new complaints\par

## 2021-11-11 DIAGNOSIS — E55.9 VITAMIN D DEFICIENCY, UNSPECIFIED: ICD-10-CM

## 2021-11-11 LAB
25(OH)D3 SERPL-MCNC: 21.2 NG/ML
ALBUMIN SERPL ELPH-MCNC: 3.7 G/DL
ALP BLD-CCNC: 71 U/L
ALT SERPL-CCNC: 8 U/L
ANION GAP SERPL CALC-SCNC: 12 MMOL/L
AST SERPL-CCNC: 13 U/L
BASOPHILS # BLD AUTO: 0.07 K/UL
BASOPHILS NFR BLD AUTO: 1.5 %
BILIRUB SERPL-MCNC: <0.2 MG/DL
BUN SERPL-MCNC: 15 MG/DL
CALCIUM SERPL-MCNC: 9.1 MG/DL
CHLORIDE SERPL-SCNC: 104 MMOL/L
CHOLEST SERPL-MCNC: 228 MG/DL
CK SERPL-CCNC: 54 U/L
CO2 SERPL-SCNC: 27 MMOL/L
CREAT SERPL-MCNC: 0.8 MG/DL
EOSINOPHIL # BLD AUTO: 0.07 K/UL
EOSINOPHIL NFR BLD AUTO: 1.5 %
ESTIMATED AVERAGE GLUCOSE: 134 MG/DL
GLUCOSE SERPL-MCNC: 110 MG/DL
HBA1C MFR BLD HPLC: 6.3 %
HCT VFR BLD CALC: 39.9 %
HDLC SERPL-MCNC: 72 MG/DL
HGB BLD-MCNC: 12.7 G/DL
IMM GRANULOCYTES NFR BLD AUTO: 0.6 %
LDLC SERPL CALC-MCNC: 127 MG/DL
LYMPHOCYTES # BLD AUTO: 0.78 K/UL
LYMPHOCYTES NFR BLD AUTO: 16.5 %
MAN DIFF?: NORMAL
MCHC RBC-ENTMCNC: 30 PG
MCHC RBC-ENTMCNC: 31.8 GM/DL
MCV RBC AUTO: 94.3 FL
MONOCYTES # BLD AUTO: 0.38 K/UL
MONOCYTES NFR BLD AUTO: 8 %
NEUTROPHILS # BLD AUTO: 3.4 K/UL
NEUTROPHILS NFR BLD AUTO: 71.9 %
NONHDLC SERPL-MCNC: 156 MG/DL
PLATELET # BLD AUTO: 196 K/UL
POTASSIUM SERPL-SCNC: 4.1 MMOL/L
PROT SERPL-MCNC: 6 G/DL
RBC # BLD: 4.23 M/UL
RBC # FLD: 13.3 %
SODIUM SERPL-SCNC: 143 MMOL/L
TRIGL SERPL-MCNC: 148 MG/DL
TSH SERPL-ACNC: 3.53 UIU/ML
WBC # FLD AUTO: 4.73 K/UL

## 2021-11-11 RX ORDER — MULTIVIT-MIN/FOLIC/VIT K/LYCOP 400-300MCG
25 MCG TABLET ORAL
Qty: 100 | Refills: 1 | Status: ACTIVE | COMMUNITY

## 2021-11-24 NOTE — DISCHARGE NOTE NURSING/CASE MANAGEMENT/SOCIAL WORK - PATIENT PORTAL LINK FT
[FreeTextEntry1] : She denies any cardiac complaints specifically lightheadedness or dizziness.  She is now on sertraline and the dose was recently increased to 50 mg daily.  Family did not notice any significant difference.\par \par Blood pressure in the arms and legs seems to be discordant.  It is significantly lower in the arms. You can access the FollowMyHealth Patient Portal offered by [facility] by registering at the following website: [email or web address]. By joining [facility]’s FollowMyHealth portal, you will also be able to view your health information using other applications (apps) compatible with our system.

## 2021-12-21 ENCOUNTER — INPATIENT (INPATIENT)
Facility: HOSPITAL | Age: 86
LOS: 9 days | Discharge: NURSING, REHAB OR LONG-TERM CARE | DRG: 177 | End: 2021-12-31
Attending: HOSPITALIST | Admitting: INTERNAL MEDICINE
Payer: MEDICARE

## 2021-12-21 VITALS
OXYGEN SATURATION: 98 % | HEART RATE: 78 BPM | RESPIRATION RATE: 16 BRPM | DIASTOLIC BLOOD PRESSURE: 94 MMHG | SYSTOLIC BLOOD PRESSURE: 154 MMHG | HEIGHT: 64 IN | TEMPERATURE: 97 F

## 2021-12-21 DIAGNOSIS — E78.5 HYPERLIPIDEMIA, UNSPECIFIED: ICD-10-CM

## 2021-12-21 DIAGNOSIS — U07.1 COVID-19: ICD-10-CM

## 2021-12-21 DIAGNOSIS — I63.9 CEREBRAL INFARCTION, UNSPECIFIED: ICD-10-CM

## 2021-12-21 DIAGNOSIS — I21.4 NON-ST ELEVATION (NSTEMI) MYOCARDIAL INFARCTION: ICD-10-CM

## 2021-12-21 DIAGNOSIS — R56.9 UNSPECIFIED CONVULSIONS: ICD-10-CM

## 2021-12-21 DIAGNOSIS — Z29.9 ENCOUNTER FOR PROPHYLACTIC MEASURES, UNSPECIFIED: ICD-10-CM

## 2021-12-21 DIAGNOSIS — I10 ESSENTIAL (PRIMARY) HYPERTENSION: ICD-10-CM

## 2021-12-21 DIAGNOSIS — E11.9 TYPE 2 DIABETES MELLITUS WITHOUT COMPLICATIONS: ICD-10-CM

## 2021-12-21 DIAGNOSIS — Z86.69 PERSONAL HISTORY OF OTHER DISEASES OF THE NERVOUS SYSTEM AND SENSE ORGANS: Chronic | ICD-10-CM

## 2021-12-21 LAB
ALBUMIN SERPL ELPH-MCNC: 2.7 G/DL — LOW (ref 3.3–5)
ALP SERPL-CCNC: 70 U/L — SIGNIFICANT CHANGE UP (ref 40–120)
ALT FLD-CCNC: 22 U/L — SIGNIFICANT CHANGE UP (ref 12–78)
ANION GAP SERPL CALC-SCNC: 5 MMOL/L — SIGNIFICANT CHANGE UP (ref 5–17)
APTT BLD: 25.1 SEC — LOW (ref 27.5–35.5)
AST SERPL-CCNC: 29 U/L — SIGNIFICANT CHANGE UP (ref 15–37)
BASOPHILS # BLD AUTO: 0.05 K/UL — SIGNIFICANT CHANGE UP (ref 0–0.2)
BASOPHILS NFR BLD AUTO: 0.6 % — SIGNIFICANT CHANGE UP (ref 0–2)
BILIRUB SERPL-MCNC: 0.2 MG/DL — SIGNIFICANT CHANGE UP (ref 0.2–1.2)
BUN SERPL-MCNC: 17 MG/DL — SIGNIFICANT CHANGE UP (ref 7–23)
CALCIUM SERPL-MCNC: 8.1 MG/DL — LOW (ref 8.5–10.1)
CARBAMAZEPINE SERPL-MCNC: 9.1 UG/ML — SIGNIFICANT CHANGE UP (ref 4–12)
CHLORIDE SERPL-SCNC: 103 MMOL/L — SIGNIFICANT CHANGE UP (ref 96–108)
CO2 SERPL-SCNC: 30 MMOL/L — SIGNIFICANT CHANGE UP (ref 22–31)
CREAT SERPL-MCNC: 0.77 MG/DL — SIGNIFICANT CHANGE UP (ref 0.5–1.3)
EOSINOPHIL # BLD AUTO: 0.03 K/UL — SIGNIFICANT CHANGE UP (ref 0–0.5)
EOSINOPHIL NFR BLD AUTO: 0.3 % — SIGNIFICANT CHANGE UP (ref 0–6)
FLUAV AG NPH QL: SIGNIFICANT CHANGE UP
FLUBV AG NPH QL: SIGNIFICANT CHANGE UP
GLUCOSE SERPL-MCNC: 172 MG/DL — HIGH (ref 70–99)
HCT VFR BLD CALC: 35.5 % — SIGNIFICANT CHANGE UP (ref 34.5–45)
HGB BLD-MCNC: 12.1 G/DL — SIGNIFICANT CHANGE UP (ref 11.5–15.5)
IMM GRANULOCYTES NFR BLD AUTO: 1.3 % — SIGNIFICANT CHANGE UP (ref 0–1.5)
INR BLD: 1.08 RATIO — SIGNIFICANT CHANGE UP (ref 0.88–1.16)
LYMPHOCYTES # BLD AUTO: 0.7 K/UL — LOW (ref 1–3.3)
LYMPHOCYTES # BLD AUTO: 8 % — LOW (ref 13–44)
MCHC RBC-ENTMCNC: 30.7 PG — SIGNIFICANT CHANGE UP (ref 27–34)
MCHC RBC-ENTMCNC: 34.1 GM/DL — SIGNIFICANT CHANGE UP (ref 32–36)
MCV RBC AUTO: 90.1 FL — SIGNIFICANT CHANGE UP (ref 80–100)
MONOCYTES # BLD AUTO: 0.69 K/UL — SIGNIFICANT CHANGE UP (ref 0–0.9)
MONOCYTES NFR BLD AUTO: 7.9 % — SIGNIFICANT CHANGE UP (ref 2–14)
NEUTROPHILS # BLD AUTO: 7.17 K/UL — SIGNIFICANT CHANGE UP (ref 1.8–7.4)
NEUTROPHILS NFR BLD AUTO: 81.9 % — HIGH (ref 43–77)
NRBC # BLD: 0 /100 WBCS — SIGNIFICANT CHANGE UP (ref 0–0)
PLATELET # BLD AUTO: 174 K/UL — SIGNIFICANT CHANGE UP (ref 150–400)
POTASSIUM SERPL-MCNC: 3.9 MMOL/L — SIGNIFICANT CHANGE UP (ref 3.5–5.3)
POTASSIUM SERPL-SCNC: 3.9 MMOL/L — SIGNIFICANT CHANGE UP (ref 3.5–5.3)
PROT SERPL-MCNC: 6.6 G/DL — SIGNIFICANT CHANGE UP (ref 6–8.3)
PROTHROM AB SERPL-ACNC: 12.6 SEC — SIGNIFICANT CHANGE UP (ref 10.6–13.6)
RBC # BLD: 3.94 M/UL — SIGNIFICANT CHANGE UP (ref 3.8–5.2)
RBC # FLD: 13.6 % — SIGNIFICANT CHANGE UP (ref 10.3–14.5)
RSV RNA NPH QL NAA+NON-PROBE: SIGNIFICANT CHANGE UP
SARS-COV-2 RNA SPEC QL NAA+PROBE: DETECTED
SODIUM SERPL-SCNC: 138 MMOL/L — SIGNIFICANT CHANGE UP (ref 135–145)
TROPONIN I, HIGH SENSITIVITY RESULT: 457.9 NG/L — HIGH
WBC # BLD: 8.75 K/UL — SIGNIFICANT CHANGE UP (ref 3.8–10.5)
WBC # FLD AUTO: 8.75 K/UL — SIGNIFICANT CHANGE UP (ref 3.8–10.5)

## 2021-12-21 PROCEDURE — 70496 CT ANGIOGRAPHY HEAD: CPT | Mod: 26,MA

## 2021-12-21 PROCEDURE — 72125 CT NECK SPINE W/O DYE: CPT | Mod: 26,MA

## 2021-12-21 PROCEDURE — 0042T: CPT

## 2021-12-21 PROCEDURE — 99285 EMERGENCY DEPT VISIT HI MDM: CPT | Mod: CS

## 2021-12-21 PROCEDURE — 71045 X-RAY EXAM CHEST 1 VIEW: CPT | Mod: 26

## 2021-12-21 PROCEDURE — 99223 1ST HOSP IP/OBS HIGH 75: CPT | Mod: GC

## 2021-12-21 PROCEDURE — 70498 CT ANGIOGRAPHY NECK: CPT | Mod: 26,MA

## 2021-12-21 PROCEDURE — 93010 ELECTROCARDIOGRAM REPORT: CPT

## 2021-12-21 RX ORDER — LANOLIN ALCOHOL/MO/W.PET/CERES
3 CREAM (GRAM) TOPICAL AT BEDTIME
Refills: 0 | Status: DISCONTINUED | OUTPATIENT
Start: 2021-12-21 | End: 2021-12-31

## 2021-12-21 RX ORDER — DEXTROSE 50 % IN WATER 50 %
25 SYRINGE (ML) INTRAVENOUS ONCE
Refills: 0 | Status: DISCONTINUED | OUTPATIENT
Start: 2021-12-21 | End: 2021-12-31

## 2021-12-21 RX ORDER — REMDESIVIR 5 MG/ML
100 INJECTION INTRAVENOUS EVERY 24 HOURS
Refills: 0 | Status: COMPLETED | OUTPATIENT
Start: 2021-12-22 | End: 2021-12-25

## 2021-12-21 RX ORDER — ENOXAPARIN SODIUM 100 MG/ML
40 INJECTION SUBCUTANEOUS DAILY
Refills: 0 | Status: DISCONTINUED | OUTPATIENT
Start: 2021-12-22 | End: 2021-12-21

## 2021-12-21 RX ORDER — SODIUM CHLORIDE 9 MG/ML
1000 INJECTION, SOLUTION INTRAVENOUS
Refills: 0 | Status: DISCONTINUED | OUTPATIENT
Start: 2021-12-21 | End: 2021-12-31

## 2021-12-21 RX ORDER — DEXTROSE 50 % IN WATER 50 %
15 SYRINGE (ML) INTRAVENOUS ONCE
Refills: 0 | Status: DISCONTINUED | OUTPATIENT
Start: 2021-12-21 | End: 2021-12-31

## 2021-12-21 RX ORDER — DEXAMETHASONE 0.5 MG/5ML
6 ELIXIR ORAL DAILY
Refills: 0 | Status: DISCONTINUED | OUTPATIENT
Start: 2021-12-21 | End: 2021-12-22

## 2021-12-21 RX ORDER — INSULIN LISPRO 100/ML
VIAL (ML) SUBCUTANEOUS AT BEDTIME
Refills: 0 | Status: DISCONTINUED | OUTPATIENT
Start: 2021-12-21 | End: 2021-12-31

## 2021-12-21 RX ORDER — MULTIVIT-MIN/FERROUS GLUCONATE 9 MG/15 ML
1 LIQUID (ML) ORAL DAILY
Refills: 0 | Status: DISCONTINUED | OUTPATIENT
Start: 2021-12-21 | End: 2021-12-31

## 2021-12-21 RX ORDER — GLUCAGON INJECTION, SOLUTION 0.5 MG/.1ML
1 INJECTION, SOLUTION SUBCUTANEOUS ONCE
Refills: 0 | Status: DISCONTINUED | OUTPATIENT
Start: 2021-12-21 | End: 2021-12-31

## 2021-12-21 RX ORDER — CARBAMAZEPINE 200 MG
400 TABLET ORAL AT BEDTIME
Refills: 0 | Status: DISCONTINUED | OUTPATIENT
Start: 2021-12-21 | End: 2021-12-31

## 2021-12-21 RX ORDER — ENOXAPARIN SODIUM 100 MG/ML
70 INJECTION SUBCUTANEOUS ONCE
Refills: 0 | Status: COMPLETED | OUTPATIENT
Start: 2021-12-21 | End: 2021-12-21

## 2021-12-21 RX ORDER — CARBAMAZEPINE 200 MG
100 TABLET ORAL THREE TIMES A DAY
Refills: 0 | Status: DISCONTINUED | OUTPATIENT
Start: 2021-12-21 | End: 2021-12-21

## 2021-12-21 RX ORDER — REMDESIVIR 5 MG/ML
200 INJECTION INTRAVENOUS EVERY 24 HOURS
Refills: 0 | Status: COMPLETED | OUTPATIENT
Start: 2021-12-21 | End: 2021-12-21

## 2021-12-21 RX ORDER — REMDESIVIR 5 MG/ML
INJECTION INTRAVENOUS
Refills: 0 | Status: COMPLETED | OUTPATIENT
Start: 2021-12-21 | End: 2021-12-25

## 2021-12-21 RX ORDER — ACETAMINOPHEN 500 MG
650 TABLET ORAL EVERY 6 HOURS
Refills: 0 | Status: DISCONTINUED | OUTPATIENT
Start: 2021-12-21 | End: 2021-12-25

## 2021-12-21 RX ORDER — DEXTROSE 50 % IN WATER 50 %
12.5 SYRINGE (ML) INTRAVENOUS ONCE
Refills: 0 | Status: DISCONTINUED | OUTPATIENT
Start: 2021-12-21 | End: 2021-12-31

## 2021-12-21 RX ORDER — ONDANSETRON 8 MG/1
4 TABLET, FILM COATED ORAL EVERY 8 HOURS
Refills: 0 | Status: DISCONTINUED | OUTPATIENT
Start: 2021-12-21 | End: 2021-12-31

## 2021-12-21 RX ORDER — INSULIN LISPRO 100/ML
VIAL (ML) SUBCUTANEOUS
Refills: 0 | Status: DISCONTINUED | OUTPATIENT
Start: 2021-12-21 | End: 2021-12-31

## 2021-12-21 RX ORDER — CARBAMAZEPINE 200 MG
100 TABLET ORAL
Refills: 0 | Status: DISCONTINUED | OUTPATIENT
Start: 2021-12-21 | End: 2021-12-31

## 2021-12-21 RX ADMIN — Medication 650 MILLIGRAM(S): at 20:05

## 2021-12-21 RX ADMIN — Medication 650 MILLIGRAM(S): at 20:35

## 2021-12-21 RX ADMIN — REMDESIVIR 200 MILLIGRAM(S): 5 INJECTION INTRAVENOUS at 21:58

## 2021-12-21 RX ADMIN — ENOXAPARIN SODIUM 70 MILLIGRAM(S): 100 INJECTION SUBCUTANEOUS at 21:58

## 2021-12-21 RX ADMIN — Medication 400 MILLIGRAM(S): at 21:57

## 2021-12-21 NOTE — ED PROVIDER NOTE - OBJECTIVE STATEMENT
pt bib ems for left sided weakness. pt relates this afternoon at some point she felt her usual aura that she gets prior to seizures, she then woke up on floor on left side with left arm weakness. pt last seen well by family just before 1500. family couldn't reach pt on phone, so came to house, found her on floor with left sided weakness. pt denies ha, cp, sob, abd pain, arm or leg pain, neck or back pain.  pmd - gonzales  neuro - inna ([facility]) pt bib ems for left sided weakness. pt relates this afternoon at some point she felt her usual aura that she gets prior to seizures, she then woke up on floor on left side with left arm weakness. pt last seen well by family at approx 1500. family couldn't reach pt on phone, so came to house, found her on floor with left sided weakness. pt denies ha, cp, sob, abd pain, arm or leg pain, neck or back pain.  pmd - gonzales  neuro - inna ([facility])

## 2021-12-21 NOTE — ED PROVIDER NOTE - LAB INTERPRETATION
Flu With COVID-19 By MAXIMILIAN (12.21.21 @ 17:50)   SARS-CoV-2 Result: Detected: This Respiratory Panel uses polymerase chain reaction (PCR) to detect for   influenza A; influenza B; respiratory syncytial virus; and SARS-CoV-2.   Influenza A Result: NotDetec   Influenza B Result: NotDetec   Resp Syn Virus Result: NotDetec

## 2021-12-21 NOTE — H&P ADULT - HISTORY OF PRESENT ILLNESS
Patient is a 89 yo female with PMH hemorrhagic CVA (2015), DM, HTN, HLD, seizures presents to ED with left sided weakness. Pt states     in ED:  VS: /94, HR 78, RR 16, 97F, SpO2 98% on RA   Labs significant for: trops 457.9. COVID PCR pos.   CTA head: Brain CT: No acute hemorrhage, mass effect or extra-axial collections.  Neck CTA: No hemodynamically significant stenosis.  Brain CT: No large vessel occlusion or stenosis.  Perfusion maps. No evidence for core infarct or area of brain at risk.  EKG: sinus tachy, ST wave depressions    Patient is a 89 yo female with PMH hemorrhagic CVA (2015), DM, HTN, HLD, seizures presents to ED with left sided weakness. Pt states   Son Simone Easley reports he left the house for a while to run errands. Son called pt at 4pm and he received the answering machine. About 10 minutes later, same thing. So he rushed to the home and found pt on the floor on her left side laying down. Son assumed pt has seizure or stroke. Reports left side was weak, drooling but able to talk. Pt wasn't confused, able to hold urine.     in ED:  VS: /94, HR 78, RR 16, 97F, SpO2 98% on RA   Labs significant for: trops 457.9. COVID PCR pos.   CTA head: Brain CT: No acute hemorrhage, mass effect or extra-axial collections.  Neck CTA: No hemodynamically significant stenosis.  Brain CT: No large vessel occlusion or stenosis.  Perfusion maps. No evidence for core infarct or area of brain at risk.  EKG: sinus tachy, ST wave depressions    Patient is a 89 yo female with PMH hemorrhagic CVA (2015), DM, HTN, HLD, seizures presents to ED with left sided weakness. Son Simone Easley reports he left the house for a while to run errands. Son called pt at 4pm and he received the answering machine. About 10 minutes later, son tried again and when there was still no answer he assumed something was wrong. He rushed home to find pt was on the floor on her left side laying down. Son assumed pt has seizure or stroke. Reports left side was weakness, drooling but able to talk. Pt wasn't confused, no incontinence. Son called EMS for assistance and pt was brought to ED. Pt only complaining of back pain at this time.      in ED:  VS: /94, HR 78, RR 16, 97F, SpO2 98% on RA   Labs significant for: trops 457.9. COVID PCR pos.   CTA head: Brain CT: No acute hemorrhage, mass effect or extra-axial collections.  Neck CTA: No hemodynamically significant stenosis.  Brain CT: No large vessel occlusion or stenosis.  Perfusion maps. No evidence for core infarct or area of brain at risk.  EKG: sinus tachy, ST wave depressions    Patient is a 89 yo female with PMH hemorrhagic CVA (2015), Type 2 DM (not on insulin), HTN, HLD, seizures, hard of hearing presents to ED with left sided weakness. Son Simone Easley reports he left the house for a while to run errands. Son called pt at 4pm and he received the answering machine. About 10 minutes later, son tried again and when there was still no answer he assumed something was wrong. He rushed home to find pt was on the floor on her left side laying down. Son assumed pt has seizure or stroke. Reports left side was weakness, drooling but able to talk. Pt wasn't confused, no incontinence. Son called EMS for assistance and pt was brought to ED. Pt only complaining of back pain at this time.      in ED:  VS: /94, HR 78, RR 16, 97F, SpO2 98% on RA   Labs significant for: trops 457.9. COVID PCR pos.   CTA head: Brain CT: No acute hemorrhage, mass effect or extra-axial collections.  Neck CTA: No hemodynamically significant stenosis.  Brain CT: No large vessel occlusion or stenosis.  Perfusion maps. No evidence for core infarct or area of brain at risk.  EKG: sinus tachy, ST wave depressions    Patient is a 89 yo female with PMH hemorrhagic CVA (2015), Type 2 DM (not on insulin), HTN, HLD, seizures, hard of hearing presents to ED with left sided weakness. Son Simone Easley reports he left the house for a while to run errands. Son called pt at 4pm and he received the answering machine. About 10 minutes later, son tried again and when there was still no answer he assumed something was wrong. He rushed home to find pt was on the floor on her left side laying down. Son assumed pt has seizure or stroke. Reports left side was weakness, drooling but able to talk. Pt wasn't confused, no incontinence. Son called EMS for assistance and pt was brought to ED. Pt only complaining of back pain at this time.      in ED:  VS: /94, HR 78, RR 16, 97F, SpO2 98% on RA   Labs significant for: trops 457.9. COVID PCR pos.   CTA head: Brain CT: No acute hemorrhage, mass effect or extra-axial collections.  Neck CTA: No hemodynamically significant stenosis.  Brain CT: No large vessel occlusion or stenosis.  Perfusion maps. No evidence for core infarct or area of brain at risk.  EKG: sinus tachy, ST wave depressions laterally

## 2021-12-21 NOTE — H&P ADULT - PROBLEM SELECTOR PLAN 2
New onset, abnormal EKG with T wave depressions and sinus tachy.   - Trops 457  - Will give stat 1mg/kg of lovenox. No anemia, no JENNIFER.   - Trend trops x3 total   - Cardiology consulted, Huy group f/u recs New onset, abnormal EKG with T wave depressions and sinus tachy.   - Trops 457  - No active chest pain  - Will give stat 1mg/kg of lovenox. No anemia, no JENNIFER.   - Trend trops x3 total   - No aspirin given allergy   - Cardiology consulted, Huy group f/u recs New onset, abnormal EKG with T wave depressions and sinus tachy.   - Trops 457  - No active chest pain  - No aspirin given allergy   - Will give stat 1mg/kg of lovenox. No anemia, no JENNIFER.   - Trend trops x3 total   - TTE ordered   - Cardiology consulted, Huy group f/u recs

## 2021-12-21 NOTE — H&P ADULT - PROBLEM SELECTOR PLAN 3
Acute hypoxic respiratory failure 2/2 confirmed COVID-19 infection  - Patient vaccinated with:    - Supp O2 prn maintain O2 sats >88%. Prone PRN  - Decadron 6mg IV x 1 given, continue for 9 additional days.  - Will start remdesivir, GFR >30 and ALT not > than 390, symp <14 days  - Monitor volume status closely, avoid aggressive hydration  - Tylenol prn myalgias, fever  - CBC with diff and CMP QD. Ferritin, crp, d-dimer, procal at admission then will repeat If clinically worsening every 48-72 hours.  - Will give stat 1mg/kg of lovenox for NSTEMI   - Initiate VTE ppx given no absolute contraindication with Lovenox 40mg qd Acute hypoxic respiratory failure 2/2 confirmed COVID-19 infection  - Patient vaccinated with: pfizer x2, moderna booster  - Supp O2 prn maintain O2 sats >88%. Prone PRN  - Decadron 6mg IV x 1 given, continue for 9 additional days.  - Will start remdesivir, GFR >30 and ALT not > than 390, symp <14 days  - Monitor volume status closely, avoid aggressive hydration  - Tylenol prn myalgias, fever  - CBC with diff and CMP QD. Ferritin, crp, d-dimer, procal at admission then will repeat If clinically worsening every 48-72 hours.  - Will give stat 1mg/kg of lovenox for NSTEMI   - Initiate VTE ppx given no absolute contraindication with Lovenox 40mg qd Acute hypoxic respiratory failure 2/2 confirmed COVID-19 infection  - Patient vaccinated with: pfizer x2, moderna booster  - Supp O2 prn maintain O2 sats >88%. Prone PRN  - Will start Decadron 6mg IV x10 days  - Will start remdesivir, GFR >30 and ALT not > than 390, symp <14 days  - Monitor volume status closely, avoid aggressive hydration  - Tylenol prn myalgias, fever  - CBC with diff and CMP QD. Ferritin, crp, d-dimer, procal at admission then will repeat If clinically worsening every 48-72 hours.  - Will give stat 1mg/kg of lovenox for NSTEMI   - Initiate VTE ppx given no absolute contraindication with Lovenox 40mg qd Acute hypoxic respiratory failure 2/2 confirmed COVID-19 infection  - Patient vaccinated with: pfizer x2, moderna booster  - Supp O2 prn maintain O2 sats >88%. Prone PRN  - Will start Decadron 6mg IV x10 days  - Will start remdesivir, GFR >30 and ALT not > than 390, symp <14 days  - Monitor volume status closely, avoid aggressive hydration  - Tylenol prn myalgias, fever  - CBC with diff and CMP QD. Ferritin, crp, d-dimer, procal at admission then will repeat If clinically worsening every 48-72 hours.  - Will give stat 1mg/kg of lovenox for NSTEMI   - Initiate VTE ppx given no absolute contraindication with Lovenox 40mg qd  - ID Dr Barajas consulted, f/u recs Acute hypoxic respiratory failure 2/2 confirmed COVID-19 infection  - Patient vaccinated with: pfizer x2, moderna booster  - Supp O2 prn maintain O2 sats >88%. Prone PRN  - Will start Decadron 6mg IV x10 days  - Will start remdesivir, GFR >30 and ALT not > than 390, symp <14 days  - Monitor volume status closely, avoid aggressive hydration  - Tylenol prn myalgias, fever  - CBC with diff and CMP QD. Ferritin, crp, d-dimer, procal at admission then will repeat If clinically worsening every 48-72 hours.  - Will give stat 1mg/kg of lovenox for NSTEMI   - Initiate VTE ppx tomorrow pending further cardio recs regarding NSTEMI  - ID Dr Barajas consulted, f/u recs

## 2021-12-21 NOTE — ED ADULT NURSE NOTE - ED STAT RN HANDOFF DETAILS
Received the patient in the Er. Patient is alert and oriented. Cardiac monitoring continuing. Resting comfortably in the stretcher. morning care gvmicaela.

## 2021-12-21 NOTE — ED PROVIDER NOTE - PROGRESS NOTE DETAILS
d/w heriberto (neuro) requests admit pt, will see in am. d/w marlin (cards) will see pt in am d/w richmond (hospitalist) will admit pt

## 2021-12-21 NOTE — H&P ADULT - NSHPREVIEWOFSYSTEMS_GEN_ALL_CORE
General: no fever, chills, weight gain or weight loss, changes in appetite  HEENT: no nasal congestion, cough, rhinorrhea, sore throat, headache, changes in vision  Cardio: no palpitations, pallor, chest pain or discomfort  Pulm: no shortness of breath  GI: no vomiting, diarrhea, abdominal pain, constipation   /Renal: no dysuria, foul smelling urine, increased frequency, flank pain  MSK: no extremity pain, no edema, joint pain or swelling, gait changes  Endo: no temperature intolerance  Heme: no bruising or abnormal bleeding  Skin: no rash General: no fever, chills, weight gain or weight loss, changes in appetite  HEENT: no nasal congestion, cough, rhinorrhea, sore throat, headache, changes in vision  Cardio: no palpitations, pallor, chest pain or discomfort  Pulm: no shortness of breath  GI: no vomiting, diarrhea, abdominal pain, constipation   /Renal: no dysuria, foul smelling urine, increased frequency, flank pain  MSK: no extremity pain, no edema, joint pain or swelling, gait changes. + back pain   Endo: no temperature intolerance  Heme: no bruising or abnormal bleeding  Skin: no rash General: no fever, chills, weight gain or weight loss, changes in appetite  HEENT: no nasal congestion, cough, rhinorrhea, sore throat, headache, changes in vision  Cardio: no palpitations, pallor, chest pain or discomfort  Pulm: no shortness of breath or cough  GI: no vomiting, diarrhea, abdominal pain, constipation, melena, hematochezia  /Renal: no dysuria, foul smelling urine, increased frequency, flank pain  MSK: no extremity pain, no edema, joint pain or swelling, gait changes. + back pain   Endo: no temperature intolerance  Heme: no bruising or abnormal bleeding  Skin: no rash

## 2021-12-21 NOTE — H&P ADULT - PROBLEM SELECTOR PLAN 4
Chronic, insulin dependent, gluc 172 on admission  - Consistent carb diet   - Start low dose correctional insulin scale    - F/u HgbA1c  - Goal blood glucose in hospital setting 100-180, adjust insulin regimen PRN, can consider endo consult in uncontrolled   - Hypoglycemia protocol in place, monitor for signs of hypoglycemia Chronic, insulin dependent, gluc 172 on admission  - Strict NPO pending official speech and swallow. Consider restarting DASH, consistent carb diet when appropriate   - Start low dose correctional insulin scale    - F/u HgbA1c  - Goal blood glucose in hospital setting 100-180, adjust insulin regimen PRN, can consider endo consult in uncontrolled   - Hypoglycemia protocol in place, monitor for signs of hypoglycemia

## 2021-12-21 NOTE — H&P ADULT - PROBLEM SELECTOR PLAN 8
DVT ppx: stat 1mg/kg lovenox for NSTEMI     IMPROVE VTE Individual Risk Assessment  RISK                 Points  [  ] Previous VTE        3  [  ] Thrombophilia       2  [  ] Lower limb paralysis    2 (unable to hold up >15 seconds)  [  ] Current Cancer    2 (within 6 months)  [  ] Immobilization > 24 hrs     1  [  ] ICU/CCU stay > 24 hours      1  [ x ] Age > 60     1  IMPROVE VTE Score: 1 DVT ppx: stat 1mg/kg lovenox for NSTEMI   lovenox 40mg qd     IMPROVE VTE Individual Risk Assessment  RISK                 Points  [  ] Previous VTE        3  [  ] Thrombophilia       2  [  ] Lower limb paralysis    2 (unable to hold up >15 seconds)  [  ] Current Cancer    2 (within 6 months)  [  ] Immobilization > 24 hrs     1  [  ] ICU/CCU stay > 24 hours      1  [ x ] Age > 60     1  IMPROVE VTE Score: 1 DVT ppx: stat 1mg/kg lovenox for NSTEMI   will hold further chemical dvt ppx pending cardio recs. SCDs for now     IMPROVE VTE Individual Risk Assessment  RISK                 Points  [  ] Previous VTE        3  [  ] Thrombophilia       2  [  ] Lower limb paralysis    2 (unable to hold up >15 seconds)  [  ] Current Cancer    2 (within 6 months)  [  ] Immobilization > 24 hrs     1  [  ] ICU/CCU stay > 24 hours      1  [ x ] Age > 60     1  IMPROVE VTE Score: 1

## 2021-12-21 NOTE — ED PROVIDER NOTE - CLINICAL SUMMARY MEDICAL DECISION MAKING FREE TEXT BOX
pt bib ems for left sided weakness, last known well 1500, pt relates had aura like prior to her seizures prior to waking on floor with weakness - ekg/xr/ct/labs/neuro

## 2021-12-21 NOTE — H&P ADULT - PROBLEM SELECTOR PLAN 6
Chronic, stable  - Pt has intolerance to therapeutic interchange of home rosuvastatin. Will hold statin for now

## 2021-12-21 NOTE — H&P ADULT - NSHPSOCIALHISTORY_GEN_ALL_CORE
Tobacco: Denies  EtOH: Denies   Recreational drug use: Denies  Lives with:   Ambulates: unassisted   ADLs: able to cook, clean, and shop independently  Occupation: retired  Flu vaccine Tobacco: Denies  EtOH: Denies   Recreational drug use: Denies  Lives with: son Simone  Ambulates: unassisted   Can cook and bath herself   Occupation: retired  Flu vaccine  Pfizer x2, Moderna booster

## 2021-12-21 NOTE — H&P ADULT - PROBLEM SELECTOR PLAN 5
Chronic,  on admission  - Hold home BP meds to allow for permissive HTN first 48 hrs  - Consider restarting when cleared by neuro  - DASH diet  - Monitor routine hemodynamics Chronic,  on admission  - Hold home BP meds to allow for permissive HTN first 48 hrs  - Consider restarting when cleared by neuro  - Strict NPO pending official speech and swallow. Consider restarting DASH, consistent carb diet when appropriate   - Monitor routine hemodynamics

## 2021-12-21 NOTE — ED PROVIDER NOTE - EMPLOYMENT
Detail Level: Detailed Quality 110: Preventive Care And Screening: Influenza Immunization: Influenza Immunization Administered during Influenza season N/A

## 2021-12-21 NOTE — H&P ADULT - ASSESSMENT
VS: /94, HR 78, RR 16, 97F, SpO2 98% on RA   Labs significant for: trops 457.9. COVID PCR pos.   CTA head: Brain CT: No acute hemorrhage, mass effect or extra-axial collections.  Neck CTA: No hemodynamically significant stenosis.  Brain CT: No large vessel occlusion or stenosis.  Perfusion maps. No evidence for core infarct or area of brain at risk.  EKG: sinus tachy, ST wave depressions  Patient is a 89 yo female with PMH hemorrhagic CVA (2015), DM, HTN, HLD, seizures presents to ED with left sided weakness. Admitted for CVA, NSTEMI, COVID.     Patient is a 89 yo female with PMH hemorrhagic CVA (2015), Type 2 DM (not on insulin), HTN, HLD, seizures, hard of hearing presents to ED with left sided weakness. Admitted for CVA, NSTEMI, COVID.

## 2021-12-21 NOTE — ED ADULT NURSE NOTE - OBJECTIVE STATEMENT
Patient A/Ox4. Son at bedside. Patient states that she fell at home after having a seizure. Said she had an ora. Is on Carbemazepine. Denies missing any doses. Patient moving all extremities but appears to have ?left sided weakness. Does not always follow commands but has clear speech and passes dysphagia screen. Telemetry monitor on. Call light in reach. IV, labs and EKG done. O2 sat 95% on room air, improved to 97% on 4L NC. 18G IV to the right AC placed by EMS. Patient to be taken to CT STAT. Hematoma noted to left eyebrow. Patient A/Ox4. Son at bedside. Patient states that she fell at home after having a seizure. Said she had an ora. Is on Carbemazepine. Denies missing any doses. Patient moving all extremities but appears to have ?left sided weakness. Does not always follow commands but has clear speech and passes dysphagia screen. Telemetry monitor on. Call light in reach. IV, labs and EKG done. O2 sat 95% on room air, improved to 97% on 4L NC. 18G IV to the right AC placed by EMS. Patient to be taken to CT STAT. Hematoma noted to left eyebrow. Seizure pads placed. Primafit placed.

## 2021-12-21 NOTE — H&P ADULT - PROBLEM SELECTOR PLAN 7
Chronic  - Continue home carbamazepine Chronic  - Continue home carbamazepine  - Levels are wnl Chronic  - Seizure precautions   - Continue home carbamazepine  - Levels are wnl on admission Chronic  - Seizure precautions   - Continue home carbamazepine  -carbamazepine Levels are wnl on admission

## 2021-12-21 NOTE — H&P ADULT - PROBLEM SELECTOR PLAN 1
Admit to telemetry  - CT head:  - Bedside dysphagia screen - will advance diet and request formal speech /swallow eval for tomorrow  - Allow permissive HTN for first 48 hours, will not treat unless SBP>180 or DBP>110  - F/u MRI brain, TTE, Carotid dopplers  - F/u A1c, lipid panel, TSH, B12 , folate  - Aspiration precautions  - NIHSS  - Neuro checks Q4  - High dose statin. Consider starting AC and high dose asa pending neuro recs   - PT/OT eval  - Neuro consulted Dr. Leblanc, f/u recs  - Cardio consulted [facility], f/u recs Admit to telemetry  - CT head: Brain CT: No acute hemorrhage, mass effect or extra-axial collections.  Neck CTA: No hemodynamically significant stenosis.  Brain CT: No large vessel occlusion or stenosis.  Perfusion maps. No evidence for core infarct or area of brain at risk.  - Bedside dysphagia screen - will advance diet and request formal speech /swallow eval for tomorrow  - Allow permissive HTN for first 48 hours, will not treat unless SBP>180 or DBP>110  - F/u MRI brain, TTE, Carotid dopplers  - F/u A1c, lipid panel, TSH, B12 , folate  - Aspiration precautions  - NIHSS  - Neuro checks Q4  - High dose statin. Consider starting AC and high dose asa pending neuro recs   - PT/OT eval  - Neuro consulted Dr. Wei, f/u recs  - Cardio consulted [facility], f/u recs Admit to telemetry  - CT head: Brain CT: No acute hemorrhage, mass effect or extra-axial collections.  Neck CTA: No hemodynamically significant stenosis.  Brain CT: No large vessel occlusion or stenosis.  Perfusion maps. No evidence for core infarct or area of brain at risk.  - Bedside dysphagia screen ordered   - Allow permissive HTN for first 48 hours, will not treat unless SBP>180 or DBP>110  - F/u TTE, Carotid dopplers  - Will defer to neuro for MRI recommendations   - F/u A1c, lipid panel, TSH, B12 , folate  - Aspiration precautions  - NIHSS  - Neuro checks Q4  - Pt has intolerance to high dose statin  - PT/OT eval  - Neuro consulted Dr. Wei, f/u recs  - Cardio consulted [facility], f/u recs Found on the floor of her home, ?CVA vs ?seizure.   - Admit with remote tele given pt COVID positive   - CT head: Brain CT: No acute hemorrhage, mass effect or extra-axial collections.  Neck CTA: No hemodynamically significant stenosis.  Brain CT: No large vessel occlusion or stenosis.  Perfusion maps. No evidence for core infarct or area of brain at risk.  - Bedside dysphagia screen ordered   - Allow permissive HTN for first 48 hours, will not treat unless SBP>180 or DBP>110  - F/u TTE, Carotid dopplers  - Will defer to neuro for MRI recommendations   - F/u A1c, lipid panel, TSH, B12 , folate  - Aspiration precautions  - NIHSS  - Neuro checks Q4  - Pt has intolerance to high dose statin  - PT/OT eval  - Neuro consulted Dr. Wei, f/u recs  - Cardio consulted [facility], f/u recs Found on the floor of her home, ?CVA vs ?seizure. Left upper extremity   - Admit with remote tele given pt COVID positive   - CT head: Brain CT: No acute hemorrhage, mass effect or extra-axial collections.  Neck CTA: No hemodynamically significant stenosis.  Brain CT: No large vessel occlusion or stenosis.  Perfusion maps. No evidence for core infarct or area of brain at risk.  - Bedside dysphagia screen ordered   - Allow permissive HTN for first 48 hours, will not treat unless SBP>180 or DBP>110  - F/u TTE, Carotid dopplers  - MRI ordered   - F/u A1c, lipid panel, TSH, B12 , folate  - Aspiration precautions  - No aspirin given allergy   - NIHSS  - Neuro checks Q4  - Pt has intolerance to high dose statin  - PT/OT eval  - Neuro consulted Dr. Wei, f/u recs  - Cardio consulted [facility], f/u recs Found on the floor of her home, ?CVA vs ?seizure. Left upper extremity   - Admit with remote tele given pt COVID positive   - CT head: Brain CT: No acute hemorrhage, mass effect or extra-axial collections.  Neck CTA: No hemodynamically significant stenosis.  Brain CT: No large vessel occlusion or stenosis.  Perfusion maps. No evidence for core infarct or area of brain at risk.  - Bedside dysphagia screen ordered, strict NPO pending official speech and swallow. Consider restarting DASH, consistent carb diet when appropriate   - Allow permissive HTN for first 48 hours, will not treat unless SBP>180 or DBP>110  - F/u TTE, Carotid dopplers  - MRI ordered   - F/u A1c, lipid panel, TSH, B12 , folate  - Aspiration precautions  - No aspirin given allergy   - NIHSS  - Neuro checks Q4  - Pt has intolerance to high dose statin  - PT/OT eval  - Neuro consulted Dr. Wei, f/u recs  - Cardio consulted [facility], f/u recs Found on the floor of her home, ?CVA vs ?seizure. Left upper extremity weakness noted on exam  - Admit with remote tele given pt COVID positive   - CT head: Brain CT: No acute hemorrhage, mass effect or extra-axial collections.  Neck CTA: No hemodynamically significant stenosis.  Brain CT: No large vessel occlusion or stenosis.  Perfusion maps. No evidence for core infarct or area of brain at risk.  - Bedside dysphagia screen ordered, strict NPO pending official speech and swallow. Consider restarting DASH, consistent carb diet when appropriate   - Allow permissive HTN for first 48 hours, will not treat unless SBP>180 or DBP>110  - F/u TTE, Carotid dopplers  - MRI ordered   - F/u A1c, lipid panel, TSH, B12 , folate  - Aspiration precautions  - No aspirin given allergy   - NIHSS  - Neuro checks Q4  - Pt has intolerance to high dose statin  - PT/OT eval  - Neuro consulted Dr. Wei, f/u recs  - Cardio consulted [facility], f/u recs

## 2021-12-21 NOTE — H&P ADULT - NSHPOUTPATIENTPROVIDERS_GEN_ALL_CORE
PCP: Dr. Yoni Rosenthal  Neuro: Dr. Bonifacio Gonsalez, Dr. Freddy Reid PCP: Dr. Yoni Rosenthal  Neuro: Dr. Freddy Reid  Seizure specialist: Dr. Bonifacio Gonsalez  Cardiologist: Dr. Sagastume PCP: Dr. Yoni Rosenthal  Neuro: Dr. Griffith   Seizure specialist: Dr. Bonifacio Gonsalez  Cardiologist: Dr. Sagastume PCP: Dr. Pedrito Rosenthal  Neuro: Dr. Griffith   Seizure specialist: Dr. Bonifacio Gonsalez  Cardiologist: Dr. Sagastume

## 2021-12-21 NOTE — H&P ADULT - ATTENDING COMMENTS
Patient is a 89 yo female with PMH hemorrhagic CVA (2015), Type 2 DM (not on insulin), HTN, HLD, seizures, hard of hearing presents to ED with left sided weakness. Admitted for CVA, NSTEMI, COVID.      HPI as above.     Patient had an episode where she was found by her son on the ground with some confusion and LUE weakness, patient denies any associated bowel or bladder incontinence. 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 During my exam patient is A and O x3, but cannot recall events or why she was on the ground.     T(C): 36.1 (12-21-21 @ 17:23), Max: 36.1 (12-21-21 @ 17:23)  HR: 101 (12-21-21 @ 18:31) (78 - 106)  BP: 143/95 (12-21-21 @ 18:30) (143/95 - 154/94)  RR: 18 (12-21-21 @ 18:31) (16 - 18)  SpO2: 97% (12-21-21 @ 18:31) (86% - 98%)  Wt(kg): --    Physical Exam:   GENERAL: well-groomed, well-developed, NAD  HEENT: head NC/AT; EOM intact, PERRLA, conjunctiva & sclera clear; hearing grossly intact, moist mucous membranes  NECK: supple, no JVD  RESPIRATORY: CTA B/L, no wheezing, rales, rhonchi or rubs  CARDIOVASCULAR: S1&S2, RRR, no murmurs or gallops  ABDOMEN: soft, non-tender, non-distended, + Bowel sounds x4 quadrants, no guarding, rebound or rigidity  MUSCULOSKELETAL:  no clubbing, cyanosis or edema of all 4 extremities  LYMPH: no cervical lymphadenopathy  VASCULAR: Radial pulses 2+ bilaterally, no varicose veins   SKIN: warm and dry, color normal  NEUROLOGIC: AA&O X3, CN2-12 intact w/ no focal deficits, no sensory loss, motor Strength 5/5 in LE b/l & 4/5 in LUE and 5/5 in RUE. Speech fluent  Psych: Normal mood and affect, normal behavior    Plan:     LUE Weakness: patient reports chronic LUE weakness  -CVA pathway, NPO, TTE, dysphagia screening, neurologic checks.   -Dr Wei consulted by ED  -CT scans reviewed.     Acute hypoxic respiratory failure due to COVID-19: patient with hypoxia requiring O2 use.   -she is vaccinated.   -start on decadron and remdesivir  -ID consulted    NSTEMI: likely due to demand ischemia in setting of COVID-19 infection.   -trend troponin, f/u tte, remote tele  -patient without active chest pain or SOB  -ekg with sinus tachy and with ST depression laterally, no ST elevations noted.   -[facility] consulted.   -will give one dose of lovenox weight based 1mg/kg and await further cardiology rec's.     DVT ppx: lovenox dosing for one dose, repeat dose pending cardio recommendations.     remainder as above

## 2021-12-21 NOTE — ED PROVIDER NOTE - NSICDXPASTSURGICALHX_GEN_ALL_CORE_FT
PAST SURGICAL HISTORY:  Basal cell cancer s/p Mohs surgery    History of arthroscopy of knee Right    History of hysterectomy     History of retinal tear surgical repair    S/P cataract surgery bilateral

## 2021-12-21 NOTE — H&P ADULT - NSHPPHYSICALEXAM_GEN_ALL_CORE
T(C): 36.1 (12-21-21 @ 17:23), Max: 36.1 (12-21-21 @ 17:23)  HR: 101 (12-21-21 @ 18:31) (78 - 106)  BP: 143/95 (12-21-21 @ 18:30) (143/95 - 154/94)  RR: 18 (12-21-21 @ 18:31) (16 - 18)  SpO2: 97% (12-21-21 @ 18:31) (86% - 98%)    GENERAL: patient appears well, no acute distress, conversant  EYES: anicteric sclerae, no exudates  ENMT: oropharynx clear without erythema, no exudates, moist mucous membranes  LUNGS: clear to auscultation bilaterally, no rales or wheezing  HEART: S1/S2, regular rate and rhythm, no murmurs noted, no lower extremity edema  GASTROINTESTINAL: abdomen is soft, nontender, nondistended, normoactive bowel sounds, no palpable masses  INTEGUMENT: good skin turgor, warm skin, appears well perfused  MUSCULOSKELETAL: no clubbing or cyanosis, no obvious deformity  NEUROLOGIC: awake, alert, oriented x3, weak left upper extremity, good muscle tone in 3 extremities, no obvious sensory deficits  PSYCHIATRIC: mood is good, affect is congruent, linear and logical thought process  HEME/LYMPH: no obvious ecchymosis or petechiae

## 2021-12-21 NOTE — ED PROVIDER NOTE - CARE PLAN
Principal Discharge DX:	Seizure  Secondary Diagnosis:	Weakness of left side of body  Secondary Diagnosis:	2019 novel coronavirus disease (COVID-19)   1

## 2021-12-22 LAB
A1C WITH ESTIMATED AVERAGE GLUCOSE RESULT: 6.5 % — HIGH (ref 4–5.6)
ALBUMIN SERPL ELPH-MCNC: 2.9 G/DL — LOW (ref 3.3–5)
ALP SERPL-CCNC: 73 U/L — SIGNIFICANT CHANGE UP (ref 40–120)
ALT FLD-CCNC: 25 U/L — SIGNIFICANT CHANGE UP (ref 12–78)
ANION GAP SERPL CALC-SCNC: 8 MMOL/L — SIGNIFICANT CHANGE UP (ref 5–17)
AST SERPL-CCNC: 33 U/L — SIGNIFICANT CHANGE UP (ref 15–37)
BASOPHILS # BLD AUTO: 0.04 K/UL — SIGNIFICANT CHANGE UP (ref 0–0.2)
BASOPHILS NFR BLD AUTO: 0.6 % — SIGNIFICANT CHANGE UP (ref 0–2)
BILIRUB SERPL-MCNC: 0.3 MG/DL — SIGNIFICANT CHANGE UP (ref 0.2–1.2)
BUN SERPL-MCNC: 15 MG/DL — SIGNIFICANT CHANGE UP (ref 7–23)
CALCIUM SERPL-MCNC: 8.6 MG/DL — SIGNIFICANT CHANGE UP (ref 8.5–10.1)
CHLORIDE SERPL-SCNC: 101 MMOL/L — SIGNIFICANT CHANGE UP (ref 96–108)
CHOLEST SERPL-MCNC: 220 MG/DL — HIGH
CO2 SERPL-SCNC: 30 MMOL/L — SIGNIFICANT CHANGE UP (ref 22–31)
CREAT SERPL-MCNC: 0.8 MG/DL — SIGNIFICANT CHANGE UP (ref 0.5–1.3)
D DIMER BLD IA.RAPID-MCNC: 1580 NG/ML DDU — HIGH
EOSINOPHIL # BLD AUTO: 0.15 K/UL — SIGNIFICANT CHANGE UP (ref 0–0.5)
EOSINOPHIL NFR BLD AUTO: 2.4 % — SIGNIFICANT CHANGE UP (ref 0–6)
ESTIMATED AVERAGE GLUCOSE: 140 MG/DL — HIGH (ref 68–114)
FERRITIN SERPL-MCNC: 87 NG/ML — SIGNIFICANT CHANGE UP (ref 15–150)
FOLATE SERPL-MCNC: >20 NG/ML — SIGNIFICANT CHANGE UP
GLUCOSE SERPL-MCNC: 145 MG/DL — HIGH (ref 70–99)
HCT VFR BLD CALC: 40.1 % — SIGNIFICANT CHANGE UP (ref 34.5–45)
HDLC SERPL-MCNC: 76 MG/DL — SIGNIFICANT CHANGE UP
HGB BLD-MCNC: 13.5 G/DL — SIGNIFICANT CHANGE UP (ref 11.5–15.5)
IMM GRANULOCYTES NFR BLD AUTO: 0.8 % — SIGNIFICANT CHANGE UP (ref 0–1.5)
LIPID PNL WITH DIRECT LDL SERPL: 129 MG/DL — HIGH
LYMPHOCYTES # BLD AUTO: 0.73 K/UL — LOW (ref 1–3.3)
LYMPHOCYTES # BLD AUTO: 11.6 % — LOW (ref 13–44)
MAGNESIUM SERPL-MCNC: 1.7 MG/DL — SIGNIFICANT CHANGE UP (ref 1.6–2.6)
MCHC RBC-ENTMCNC: 30.1 PG — SIGNIFICANT CHANGE UP (ref 27–34)
MCHC RBC-ENTMCNC: 33.7 GM/DL — SIGNIFICANT CHANGE UP (ref 32–36)
MCV RBC AUTO: 89.3 FL — SIGNIFICANT CHANGE UP (ref 80–100)
MONOCYTES # BLD AUTO: 0.75 K/UL — SIGNIFICANT CHANGE UP (ref 0–0.9)
MONOCYTES NFR BLD AUTO: 11.9 % — SIGNIFICANT CHANGE UP (ref 2–14)
NEUTROPHILS # BLD AUTO: 4.57 K/UL — SIGNIFICANT CHANGE UP (ref 1.8–7.4)
NEUTROPHILS NFR BLD AUTO: 72.7 % — SIGNIFICANT CHANGE UP (ref 43–77)
NON HDL CHOLESTEROL: 144 MG/DL — HIGH
NRBC # BLD: 0 /100 WBCS — SIGNIFICANT CHANGE UP (ref 0–0)
PHOSPHATE SERPL-MCNC: 3.3 MG/DL — SIGNIFICANT CHANGE UP (ref 2.5–4.5)
PLATELET # BLD AUTO: 168 K/UL — SIGNIFICANT CHANGE UP (ref 150–400)
POTASSIUM SERPL-MCNC: 3.7 MMOL/L — SIGNIFICANT CHANGE UP (ref 3.5–5.3)
POTASSIUM SERPL-SCNC: 3.7 MMOL/L — SIGNIFICANT CHANGE UP (ref 3.5–5.3)
PROCALCITONIN SERPL-MCNC: 0.18 NG/ML — HIGH (ref 0–0.04)
PROT SERPL-MCNC: 7.2 G/DL — SIGNIFICANT CHANGE UP (ref 6–8.3)
RBC # BLD: 4.49 M/UL — SIGNIFICANT CHANGE UP (ref 3.8–5.2)
RBC # FLD: 13.6 % — SIGNIFICANT CHANGE UP (ref 10.3–14.5)
SODIUM SERPL-SCNC: 139 MMOL/L — SIGNIFICANT CHANGE UP (ref 135–145)
TRIGL SERPL-MCNC: 74 MG/DL — SIGNIFICANT CHANGE UP
TROPONIN I, HIGH SENSITIVITY RESULT: 2157.8 NG/L — HIGH
TROPONIN I, HIGH SENSITIVITY RESULT: 2910.6 NG/L — HIGH
VIT B12 SERPL-MCNC: 545 PG/ML — SIGNIFICANT CHANGE UP (ref 232–1245)
WBC # BLD: 6.29 K/UL — SIGNIFICANT CHANGE UP (ref 3.8–10.5)
WBC # FLD AUTO: 6.29 K/UL — SIGNIFICANT CHANGE UP (ref 3.8–10.5)

## 2021-12-22 PROCEDURE — 99223 1ST HOSP IP/OBS HIGH 75: CPT

## 2021-12-22 PROCEDURE — 99233 SBSQ HOSP IP/OBS HIGH 50: CPT | Mod: GC

## 2021-12-22 PROCEDURE — 93010 ELECTROCARDIOGRAM REPORT: CPT

## 2021-12-22 PROCEDURE — 70551 MRI BRAIN STEM W/O DYE: CPT | Mod: 26

## 2021-12-22 RX ORDER — DEXAMETHASONE 0.5 MG/5ML
6 ELIXIR ORAL DAILY
Refills: 0 | Status: COMPLETED | OUTPATIENT
Start: 2021-12-22 | End: 2021-12-31

## 2021-12-22 RX ORDER — BENZOCAINE AND MENTHOL 5; 1 G/100ML; G/100ML
1 LIQUID ORAL EVERY 6 HOURS
Refills: 0 | Status: DISCONTINUED | OUTPATIENT
Start: 2021-12-22 | End: 2021-12-31

## 2021-12-22 RX ORDER — PANTOPRAZOLE SODIUM 20 MG/1
40 TABLET, DELAYED RELEASE ORAL DAILY
Refills: 0 | Status: DISCONTINUED | OUTPATIENT
Start: 2021-12-22 | End: 2021-12-31

## 2021-12-22 RX ORDER — ENOXAPARIN SODIUM 100 MG/ML
70 INJECTION SUBCUTANEOUS ONCE
Refills: 0 | Status: COMPLETED | OUTPATIENT
Start: 2021-12-22 | End: 2021-12-22

## 2021-12-22 RX ADMIN — Medication 650 MILLIGRAM(S): at 23:00

## 2021-12-22 RX ADMIN — Medication 1: at 16:34

## 2021-12-22 RX ADMIN — Medication 100 MILLIGRAM(S): at 05:20

## 2021-12-22 RX ADMIN — Medication 6 MILLIGRAM(S): at 12:54

## 2021-12-22 RX ADMIN — ENOXAPARIN SODIUM 70 MILLIGRAM(S): 100 INJECTION SUBCUTANEOUS at 10:15

## 2021-12-22 RX ADMIN — Medication 1 TABLET(S): at 12:54

## 2021-12-22 RX ADMIN — Medication 100 MILLIGRAM(S): at 17:21

## 2021-12-22 RX ADMIN — Medication 650 MILLIGRAM(S): at 22:34

## 2021-12-22 RX ADMIN — PANTOPRAZOLE SODIUM 40 MILLIGRAM(S): 20 TABLET, DELAYED RELEASE ORAL at 10:00

## 2021-12-22 RX ADMIN — BENZOCAINE AND MENTHOL 1 LOZENGE: 5; 1 LIQUID ORAL at 04:32

## 2021-12-22 RX ADMIN — Medication 100 MILLIGRAM(S): at 13:16

## 2021-12-22 RX ADMIN — Medication 650 MILLIGRAM(S): at 04:31

## 2021-12-22 RX ADMIN — Medication 6 MILLIGRAM(S): at 05:20

## 2021-12-22 RX ADMIN — REMDESIVIR 500 MILLIGRAM(S): 5 INJECTION INTRAVENOUS at 19:38

## 2021-12-22 RX ADMIN — Medication 2: at 12:52

## 2021-12-22 RX ADMIN — Medication 400 MILLIGRAM(S): at 21:03

## 2021-12-22 NOTE — PROGRESS NOTE ADULT - PROBLEM SELECTOR PLAN 4
- chronic, 12/22/21 hbA1c 6.5%  - continue low dose correctional insulin scale    - Goal blood glucose in hospital setting 100-180, adjust insulin regimen PRN, can consider endo consult if uncontrolled   - Hypoglycemia protocol in place, monitor for signs of hypoglycemia

## 2021-12-22 NOTE — PROGRESS NOTE ADULT - PROBLEM SELECTOR PLAN 7
Chronic  - Seizure precautions   - Continue home carbamazepine  - Carbamazepine level 9.1 (WNL) on admission  - Ativan prn seizure

## 2021-12-22 NOTE — PROGRESS NOTE ADULT - PROBLEM SELECTOR PLAN 5
Chronic,  on admission  - HOLD home BP meds to allow for permissive HTN first 48 hrs  - Consider restarting after 48 hours and after discussing w neurology  - Monitor routine hemodynamics Chronic,  on admission  - HOLD home BP meds to allow for permissive HTN first 48 hrs especially if CVA confirmed on MRI  - Monitor routine hemodynamics

## 2021-12-22 NOTE — PROGRESS NOTE ADULT - SUBJECTIVE AND OBJECTIVE BOX
Patient is a 89y old  Female who presents with a chief complaint of L sided weaknesss (22 Dec 2021 12:13)      Subjective:  INTERVAL HPI/OVERNIGHT EVENTS: Patient seen and examined at bedside. No overnight events occurred. Patient endorsing a sore throat that has been ongoing few days but worsened over night. Denies fevers, chills, headache, lightheadedness, chest pain, dyspnea, abdominal pain, n/v/d/c.    MEDICATIONS  (STANDING):  carBAMazepine ER Capsule 400 milliGRAM(s) Oral at bedtime  carBAMazepine ER Tablet 100 milliGRAM(s) Oral <User Schedule>  dexAMETHasone     Tablet 6 milliGRAM(s) Oral daily  dextrose 40% Gel 15 Gram(s) Oral once  dextrose 5%. 1000 milliLiter(s) (50 mL/Hr) IV Continuous <Continuous>  dextrose 5%. 1000 milliLiter(s) (100 mL/Hr) IV Continuous <Continuous>  dextrose 50% Injectable 25 Gram(s) IV Push once  dextrose 50% Injectable 12.5 Gram(s) IV Push once  dextrose 50% Injectable 25 Gram(s) IV Push once  glucagon  Injectable 1 milliGRAM(s) IntraMuscular once  insulin lispro (ADMELOG) corrective regimen sliding scale   SubCutaneous three times a day before meals  insulin lispro (ADMELOG) corrective regimen sliding scale   SubCutaneous at bedtime  multivitamin/minerals 1 Tablet(s) Oral daily  pantoprazole  Injectable 40 milliGRAM(s) IV Push daily  remdesivir  IVPB   IV Intermittent   remdesivir  IVPB 100 milliGRAM(s) IV Intermittent every 24 hours    MEDICATIONS  (PRN):  acetaminophen     Tablet .. 650 milliGRAM(s) Oral every 6 hours PRN Temp greater or equal to 38C (100.4F), Mild Pain (1 - 3)  aluminum hydroxide/magnesium hydroxide/simethicone Suspension 30 milliLiter(s) Oral every 4 hours PRN Dyspepsia  benzocaine 15 mG/menthol 3.6 mG (Sugar-Free) Lozenge 1 Lozenge Oral every 6 hours PRN Sore Throat  guaiFENesin Oral Liquid (Sugar-Free) 100 milliGRAM(s) Oral every 6 hours PRN Cough  melatonin 3 milliGRAM(s) Oral at bedtime PRN Insomnia  ondansetron Injectable 4 milliGRAM(s) IV Push every 8 hours PRN Nausea and/or Vomiting      Allergies    aspirin (Unknown)  sulfa drugs (Unknown)    Intolerances    Lipitor (Other; Muscle Pain)      REVIEW OF SYSTEMS:  CONSTITUTIONAL: No fever or chills  HEENT:  +Sore throat, No headache  RESPIRATORY: No cough, wheezing, or shortness of breath  CARDIOVASCULAR: No chest pain, palpitations  GASTROINTESTINAL: No abd pain, nausea, vomiting, or diarrhea  GENITOURINARY: No dysuria, frequency, or hematuria  NEUROLOGICAL: no focal weakness or dizziness  MUSCULOSKELETAL: no myalgias     Objective:  Vital Signs Last 24 Hrs  T(C): 36.8 (22 Dec 2021 07:40), Max: 37.4 (22 Dec 2021 05:40)  T(F): 98.2 (22 Dec 2021 07:40), Max: 99.3 (22 Dec 2021 05:40)  HR: 74 (22 Dec 2021 12:00) (74 - 106)  BP: 134/58 (22 Dec 2021 12:00) (130/60 - 188/101)  BP(mean): --  RR: 16 (22 Dec 2021 12:00) (16 - 20)  SpO2: 96% (22 Dec 2021 12:00) (86% - 100%)    GENERAL: NAD, lying in bed comfortably  HEAD:  Atraumatic, Normocephalic  EYES: EOMI, PERRLA, conjunctiva and sclera clear  ENT: Moist mucous membranes  NECK: Supple, No JVD  CHEST/LUNG: Clear to auscultation bilaterally; No rales, rhonchi, wheezing, or rubs. Unlabored respirations  HEART: Regular rate and rhythm; No murmurs, rubs, or gallops  ABDOMEN: Bowel sounds present; Soft, Nontender, Nondistended. No hepatomegaly  EXTREMITIES:  2+ Peripheral Pulses, brisk capillary refill. No clubbing, cyanosis, or edema  NERVOUS SYSTEM:  Alert & Oriented X3, speech clear. Normal muscle strength 5/5 UE and LE b/l. Sensation grossly intact throughout.   MSK: FROM all 4 extremities, full and equal strength  SKIN: No rashes or lesions    LABS:                        13.5   6.29  )-----------( 168      ( 22 Dec 2021 04:56 )             40.1     CBC Full  -  ( 22 Dec 2021 04:56 )  WBC Count : 6.29 K/uL  Hemoglobin : 13.5 g/dL  Hematocrit : 40.1 %  Platelet Count - Automated : 168 K/uL  Mean Cell Volume : 89.3 fl  Mean Cell Hemoglobin : 30.1 pg  Mean Cell Hemoglobin Concentration : 33.7 gm/dL  Auto Neutrophil # : 4.57 K/uL  Auto Lymphocyte # : 0.73 K/uL  Auto Monocyte # : 0.75 K/uL  Auto Eosinophil # : 0.15 K/uL  Auto Basophil # : 0.04 K/uL  Auto Neutrophil % : 72.7 %  Auto Lymphocyte % : 11.6 %  Auto Monocyte % : 11.9 %  Auto Eosinophil % : 2.4 %  Auto Basophil % : 0.6 %    22 Dec 2021 04:56    139    |  101    |  15     ----------------------------<  145    3.7     |  30     |  0.80     Ca    8.6        22 Dec 2021 04:56  Phos  3.3       22 Dec 2021 04:56  Mg     1.7       22 Dec 2021 04:56    TPro  7.2    /  Alb  2.9    /  TBili  0.3    /  DBili  <0.1   /  AST  33     /  ALT  25     /  AlkPhos  73     22 Dec 2021 04:56    PT/INR - ( 21 Dec 2021 17:50 )   PT: 12.6 sec;   INR: 1.08 ratio         PTT - ( 21 Dec 2021 17:50 )  PTT:25.1 sec    CAPILLARY BLOOD GLUCOSE      POCT Blood Glucose.: 208 mg/dL (22 Dec 2021 12:30)  POCT Blood Glucose.: 139 mg/dL (22 Dec 2021 09:58)  POCT Blood Glucose.: 151 mg/dL (21 Dec 2021 21:51)          RADIOLOGY & ADDITIONAL TESTS:    Personally reviewed.     Consultant(s) Notes Reviewed:  [x] YES  [ ] NO     Patient is a 89y old  Female who presents with a chief complaint of L sided weakness.      Subjective:  INTERVAL HPI/OVERNIGHT EVENTS: Patient seen and examined at bedside. No overnight events occurred. Patient endorsing a sore throat that has been ongoing few days but worsened overnight. Denies fevers, chills, headache, lightheadedness, chest pain, dyspnea, abdominal pain, n/v/d/c.    MEDICATIONS  (STANDING):  carBAMazepine ER Capsule 400 milliGRAM(s) Oral at bedtime  carBAMazepine ER Tablet 100 milliGRAM(s) Oral <User Schedule>  dexAMETHasone     Tablet 6 milliGRAM(s) Oral daily  dextrose 40% Gel 15 Gram(s) Oral once  dextrose 5%. 1000 milliLiter(s) (50 mL/Hr) IV Continuous <Continuous>  dextrose 5%. 1000 milliLiter(s) (100 mL/Hr) IV Continuous <Continuous>  dextrose 50% Injectable 25 Gram(s) IV Push once  dextrose 50% Injectable 12.5 Gram(s) IV Push once  dextrose 50% Injectable 25 Gram(s) IV Push once  glucagon  Injectable 1 milliGRAM(s) IntraMuscular once  insulin lispro (ADMELOG) corrective regimen sliding scale   SubCutaneous three times a day before meals  insulin lispro (ADMELOG) corrective regimen sliding scale   SubCutaneous at bedtime  multivitamin/minerals 1 Tablet(s) Oral daily  pantoprazole  Injectable 40 milliGRAM(s) IV Push daily  remdesivir  IVPB   IV Intermittent   remdesivir  IVPB 100 milliGRAM(s) IV Intermittent every 24 hours    MEDICATIONS  (PRN):  acetaminophen     Tablet .. 650 milliGRAM(s) Oral every 6 hours PRN Temp greater or equal to 38C (100.4F), Mild Pain (1 - 3)  aluminum hydroxide/magnesium hydroxide/simethicone Suspension 30 milliLiter(s) Oral every 4 hours PRN Dyspepsia  benzocaine 15 mG/menthol 3.6 mG (Sugar-Free) Lozenge 1 Lozenge Oral every 6 hours PRN Sore Throat  guaiFENesin Oral Liquid (Sugar-Free) 100 milliGRAM(s) Oral every 6 hours PRN Cough  melatonin 3 milliGRAM(s) Oral at bedtime PRN Insomnia  ondansetron Injectable 4 milliGRAM(s) IV Push every 8 hours PRN Nausea and/or Vomiting      Allergies    aspirin (Unknown)  sulfa drugs (Unknown)    Intolerances    Lipitor (Other; Muscle Pain)      REVIEW OF SYSTEMS:  CONSTITUTIONAL: No fever or chills  HEENT:  +Sore throat, No headache  RESPIRATORY: No cough, wheezing, or shortness of breath  CARDIOVASCULAR: No chest pain, palpitations  GASTROINTESTINAL: No abd pain, nausea, vomiting, or diarrhea  GENITOURINARY: No dysuria, frequency, or hematuria  NEUROLOGICAL: +left-sided weakness, no dizziness  MUSCULOSKELETAL: no myalgias     Objective:  Vital Signs Last 24 Hrs  T(C): 36.8 (22 Dec 2021 07:40), Max: 37.4 (22 Dec 2021 05:40)  T(F): 98.2 (22 Dec 2021 07:40), Max: 99.3 (22 Dec 2021 05:40)  HR: 74 (22 Dec 2021 12:00) (74 - 106)  BP: 134/58 (22 Dec 2021 12:00) (130/60 - 188/101)  BP(mean): --  RR: 16 (22 Dec 2021 12:00) (16 - 20)  SpO2: 96% (22 Dec 2021 12:00) (86% - 100%)    GENERAL: NAD, lying in bed comfortably  EYES: EOMI, PERRLA, conjunctiva and sclera clear  ENT: Moist mucous membranes  CHEST/LUNG: coarse breath sounds in bases  HEART: Regular rate and rhythm; S1, S2  ABDOMEN: Bowel sounds present; Soft, Nontender, Nondistended   EXTREMITIES:  no clubbing, cyanosis, or edema  NERVOUS SYSTEM:  answering questions and following commands appropriately. Sensation grossly intact throughout. 4/5 strength in L UE, 5/5 in R UE    LABS:                        13.5   6.29  )-----------( 168      ( 22 Dec 2021 04:56 )             40.1     CBC Full  -  ( 22 Dec 2021 04:56 )  WBC Count : 6.29 K/uL  Hemoglobin : 13.5 g/dL  Hematocrit : 40.1 %  Platelet Count - Automated : 168 K/uL  Mean Cell Volume : 89.3 fl  Mean Cell Hemoglobin : 30.1 pg  Mean Cell Hemoglobin Concentration : 33.7 gm/dL  Auto Neutrophil # : 4.57 K/uL  Auto Lymphocyte # : 0.73 K/uL  Auto Monocyte # : 0.75 K/uL  Auto Eosinophil # : 0.15 K/uL  Auto Basophil # : 0.04 K/uL  Auto Neutrophil % : 72.7 %  Auto Lymphocyte % : 11.6 %  Auto Monocyte % : 11.9 %  Auto Eosinophil % : 2.4 %  Auto Basophil % : 0.6 %    22 Dec 2021 04:56    139    |  101    |  15     ----------------------------<  145    3.7     |  30     |  0.80     Ca    8.6        22 Dec 2021 04:56  Phos  3.3       22 Dec 2021 04:56  Mg     1.7       22 Dec 2021 04:56    TPro  7.2    /  Alb  2.9    /  TBili  0.3    /  DBili  <0.1   /  AST  33     /  ALT  25     /  AlkPhos  73     22 Dec 2021 04:56    PT/INR - ( 21 Dec 2021 17:50 )   PT: 12.6 sec;   INR: 1.08 ratio         PTT - ( 21 Dec 2021 17:50 )  PTT:25.1 sec    CAPILLARY BLOOD GLUCOSE      POCT Blood Glucose.: 208 mg/dL (22 Dec 2021 12:30)  POCT Blood Glucose.: 139 mg/dL (22 Dec 2021 09:58)  POCT Blood Glucose.: 151 mg/dL (21 Dec 2021 21:51)          RADIOLOGY & ADDITIONAL TESTS:    Personally reviewed.     Consultant(s) Notes Reviewed:  [x] YES  [ ] NO

## 2021-12-22 NOTE — PROGRESS NOTE ADULT - PROBLEM SELECTOR PLAN 8
SCDs, hold pharmacological ppx for now    IMPROVE VTE Individual Risk Assessment  RISK                 Points  [  ] Previous VTE        3  [  ] Thrombophilia       2  [  ] Lower limb paralysis    2 (unable to hold up >15 seconds)  [  ] Current Cancer    2 (within 6 months)  [  ] Immobilization > 24 hrs     1  [  ] ICU/CCU stay > 24 hours      1  [ x ] Age > 60     1  IMPROVE VTE Score: 1 SCDs, received lovenox 70mg today

## 2021-12-22 NOTE — PROGRESS NOTE ADULT - PROBLEM SELECTOR PLAN 6
Chronic, stable  - Pt has intolerance to therapeutic interchange of home rosuvastatin. Will hold statin for now  - lipid panel 12/22/21 cholesterol 220, non HDL cholesterol 144, , TG and HDL WNL Chronic   - Pt has intolerance to therapeutic interchange of home rosuvastatin. Will hold statin for now  - lipid panel 12/22/21 cholesterol 220, non HDL cholesterol 144, , TG and HDL WNL

## 2021-12-22 NOTE — PROGRESS NOTE ADULT - PROBLEM SELECTOR PLAN 2
New onset, abnormal EKG with T wave depressions and sinus tachy.   - Trops 457 -> 2910 -> 2100, trended to peak  - HOLD aspirin given allergy  - s/p Lovenox 70mg SQ x 2  - F/u TTE  - Cardio following [facility] group - abnormal EKG with non-specific TWI  - Trops 457 -> 2910 -> 2100, trended to peak  - HOLD aspirin given allergy  - s/p Lovenox 70mg SQ x 2  - not consistent with plaque-rupture MI, so will not continue full A/C at this time  - likely demand ischemia in setting of seizure / resp failure due to COVID  - F/u TTE  - Cardio following [facility] group, recs appreciated

## 2021-12-22 NOTE — PROGRESS NOTE ADULT - PROBLEM SELECTOR PLAN 3
Acute hypoxic respiratory failure 2/2 confirmed COVID-19 infection  - Patient vaccinated with: pfizer x2, moderna booster  - Supp O2 prn maintain O2 sats >88%. Prone PRN  - Continue Decadron 6mg IV for 10 days, last day 12/30/21  - Continue remdesivir for 5 days, last day 12/25/21  - Monitor volume status closely, avoid aggressive hydration  - Tylenol prn myalgias, fever  - F/u ferritin, crp, d-dimer, procal STAT now then will repeat If clinically worsening every 48-72 hours.  - ID Dr Barajas, following Acute hypoxic respiratory failure 2/2 confirmed COVID-19 infection  - Patient vaccinated with: pfizer x2, moderna booster  - Supp O2 prn maintain O2 sats >88%. Prone PRN  - Continue Decadron 6mg IV for 10 days, last day 12/30/21 + protonix 40mg IV qd as ppx in setting of steroid use  - Continue remdesivir for 5 days, last day 12/25/21  - Monitor volume status closely, avoid aggressive hydration  - Tylenol prn myalgias, fever  - F/u ferritin, crp, d-dimer, procal STAT now then will repeat If clinically worsening every 48-72 hours.  - ID Dr Barajas, following - Acute hypoxic respiratory failure 2/2 confirmed COVID-19 infection  - Patient vaccinated with: pfizer x2, moderna booster  - Supp O2 prn maintain O2 sats >88%. Prone PRN  - Continue Decadron 6mg daily for 10 days, last day 12/30/21 + protonix 40mg qd as ppx in setting of steroid use  - Continue remdesivir for 5 days, last day 12/25/21  - Monitor volume status closely, avoid aggressive hydration  - Tylenol prn myalgias, fever  - F/u ferritin, crp, d-dimer, procal STAT now then will repeat If clinically worsening every 48-72 hours.  - ID Dr Barajas, recs appreciated

## 2021-12-22 NOTE — PATIENT PROFILE ADULT - FALL HARM RISK - RISK INTERVENTIONS

## 2021-12-22 NOTE — PROGRESS NOTE ADULT - ASSESSMENT
Patient is a 89 yo female with PMH hemorrhagic CVA (2015), Type 2 DM (not on insulin), HTN, HLD, seizures, hard of hearing presents to ED with left sided weakness. Admitted for CVA, NSTEMI, COVID.     Patient is a 89 yo female with PMH of hemorrhagic CVA (2015), Type 2 DM (not on insulin), HTN, HLD, seizures, hard of hearing presents to ED with left sided weakness, admitted for left-sided weakness, troponin elevation and acute hypoxic respiratory failure due to COVID-19 PNA.

## 2021-12-22 NOTE — PROGRESS NOTE ADULT - PROBLEM SELECTOR PLAN 1
- likely seizure versus CVA  - CTA head, neck, CT c spine- all negative for acute processes  - will allow permissive HTN for 48 hours, treating only for BP>180/110  - S&S performed- will advance diet to soft bite size w thin liquid  - F/u TTE and MRI head  - B12 545, folate >20  - Aspiration precautions  - HOLD aspirin given allergy. HOLD high dose statin as pt intolerant  - Neuro checks Q4  - PT/OT eval  - Neuro following Dr. Wei  - Cardio following Huy ferro - likely seizure versus CVA  - CTA head, neck, CT c spine- all negative for acute processes  - will allow permissive HTN for 48 hours until able to r/out stroke, treating only for BP>180/110  - S&S performed- will advance diet to soft bite size w thin liquid  - F/u TTE and MRI head  - B12 545, folate >20  - Aspiration precautions  - HOLD aspirin given allergy. HOLD high dose statin as pt intolerant  - Neuro checks Q4h  - carbamazepine level within therapeutic limits  - PT/OT eval  - Neuro following Dr. Wei, recs appreciated

## 2021-12-22 NOTE — PROGRESS NOTE ADULT - SUBJECTIVE AND OBJECTIVE BOX
neuro cons dict  seen for unresponsiveness with left sided weakness-  consistent with seizure; new cva unlikely  covid-19 postive  hx of hemorrhagic cva  continue tegretol  ativan prn for sz  would follow up

## 2021-12-22 NOTE — SWALLOW BEDSIDE ASSESSMENT ADULT - ORAL PHASE
Within functional limits Decreased anterior-posterior movement of the bolus/Delayed oral transit time/Lingual stasis Decreased anterior-posterior movement of the bolus/Delayed oral transit time

## 2021-12-22 NOTE — CONSULT NOTE ADULT - ATTENDING COMMENTS
Chart Reviewed    Patient seen and examined    Agree with plan as outlined above    89 year old female with PMH hemorrhagic CVA (2015), Type 2 DM (not on insulin), HTN, HLD, seizure disorder, hearing impairment presents to ED s/p unwitnessed episode resulting in fall onto L side and L sided weakness. Patient incidentally found to be COVID+ with no sick contacts or URI symptoms prior to admission.   - Patient is not complaining of any cardiac symptoms at this time.  - Nonspecific elevation of trops, trend CE   - EKG shows nonspecific T-wave changes, non diagnostic  - No meaningful evidence of atherosclerotic plaque rupture, volume overload, dysrhythmia   - F/u Echo  - COVID treatment per primary team  - Other cardiovascular workup will depend on clinical course.  - All other workup per primary team.

## 2021-12-23 DIAGNOSIS — R53.1 WEAKNESS: ICD-10-CM

## 2021-12-23 LAB
ALBUMIN SERPL ELPH-MCNC: 2.7 G/DL — LOW (ref 3.3–5)
ALBUMIN SERPL ELPH-MCNC: 2.8 G/DL — LOW (ref 3.3–5)
ALP SERPL-CCNC: 61 U/L — SIGNIFICANT CHANGE UP (ref 40–120)
ALP SERPL-CCNC: 62 U/L — SIGNIFICANT CHANGE UP (ref 40–120)
ALT FLD-CCNC: 23 U/L — SIGNIFICANT CHANGE UP (ref 12–78)
ALT FLD-CCNC: 24 U/L — SIGNIFICANT CHANGE UP (ref 12–78)
ANION GAP SERPL CALC-SCNC: 6 MMOL/L — SIGNIFICANT CHANGE UP (ref 5–17)
APPEARANCE UR: ABNORMAL
AST SERPL-CCNC: 37 U/L — SIGNIFICANT CHANGE UP (ref 15–37)
AST SERPL-CCNC: 38 U/L — HIGH (ref 15–37)
BASOPHILS # BLD AUTO: 0.02 K/UL — SIGNIFICANT CHANGE UP (ref 0–0.2)
BASOPHILS NFR BLD AUTO: 0.2 % — SIGNIFICANT CHANGE UP (ref 0–2)
BILIRUB DIRECT SERPL-MCNC: <0.1 MG/DL — SIGNIFICANT CHANGE UP (ref 0–0.3)
BILIRUB INDIRECT FLD-MCNC: >0.3 MG/DL — SIGNIFICANT CHANGE UP (ref 0.2–1)
BILIRUB SERPL-MCNC: 0.3 MG/DL — SIGNIFICANT CHANGE UP (ref 0.2–1.2)
BILIRUB SERPL-MCNC: 0.4 MG/DL — SIGNIFICANT CHANGE UP (ref 0.2–1.2)
BILIRUB UR-MCNC: NEGATIVE — SIGNIFICANT CHANGE UP
BUN SERPL-MCNC: 24 MG/DL — HIGH (ref 7–23)
CALCIUM SERPL-MCNC: 8.3 MG/DL — LOW (ref 8.5–10.1)
CHLORIDE SERPL-SCNC: 101 MMOL/L — SIGNIFICANT CHANGE UP (ref 96–108)
CO2 SERPL-SCNC: 31 MMOL/L — SIGNIFICANT CHANGE UP (ref 22–31)
COLOR SPEC: YELLOW — SIGNIFICANT CHANGE UP
CREAT SERPL-MCNC: 0.78 MG/DL — SIGNIFICANT CHANGE UP (ref 0.5–1.3)
CRP SERPL-MCNC: 102 MG/L — HIGH
DIFF PNL FLD: ABNORMAL
EOSINOPHIL # BLD AUTO: 0 K/UL — SIGNIFICANT CHANGE UP (ref 0–0.5)
EOSINOPHIL NFR BLD AUTO: 0 % — SIGNIFICANT CHANGE UP (ref 0–6)
GLUCOSE SERPL-MCNC: 137 MG/DL — HIGH (ref 70–99)
GLUCOSE UR QL: 50 MG/DL
HCT VFR BLD CALC: 35.1 % — SIGNIFICANT CHANGE UP (ref 34.5–45)
HGB BLD-MCNC: 11.4 G/DL — LOW (ref 11.5–15.5)
IMM GRANULOCYTES NFR BLD AUTO: 0.4 % — SIGNIFICANT CHANGE UP (ref 0–1.5)
KETONES UR-MCNC: NEGATIVE — SIGNIFICANT CHANGE UP
LEUKOCYTE ESTERASE UR-ACNC: NEGATIVE — SIGNIFICANT CHANGE UP
LYMPHOCYTES # BLD AUTO: 1.08 K/UL — SIGNIFICANT CHANGE UP (ref 1–3.3)
LYMPHOCYTES # BLD AUTO: 11.8 % — LOW (ref 13–44)
MCHC RBC-ENTMCNC: 29.4 PG — SIGNIFICANT CHANGE UP (ref 27–34)
MCHC RBC-ENTMCNC: 32.5 GM/DL — SIGNIFICANT CHANGE UP (ref 32–36)
MCV RBC AUTO: 90.5 FL — SIGNIFICANT CHANGE UP (ref 80–100)
MONOCYTES # BLD AUTO: 1.26 K/UL — HIGH (ref 0–0.9)
MONOCYTES NFR BLD AUTO: 13.8 % — SIGNIFICANT CHANGE UP (ref 2–14)
NEUTROPHILS # BLD AUTO: 6.75 K/UL — SIGNIFICANT CHANGE UP (ref 1.8–7.4)
NEUTROPHILS NFR BLD AUTO: 73.8 % — SIGNIFICANT CHANGE UP (ref 43–77)
NITRITE UR-MCNC: NEGATIVE — SIGNIFICANT CHANGE UP
NRBC # BLD: 0 /100 WBCS — SIGNIFICANT CHANGE UP (ref 0–0)
PH UR: 5 — SIGNIFICANT CHANGE UP (ref 5–8)
PLATELET # BLD AUTO: 167 K/UL — SIGNIFICANT CHANGE UP (ref 150–400)
POTASSIUM SERPL-MCNC: 3.3 MMOL/L — LOW (ref 3.5–5.3)
POTASSIUM SERPL-SCNC: 3.3 MMOL/L — LOW (ref 3.5–5.3)
PROT SERPL-MCNC: 6.8 G/DL — SIGNIFICANT CHANGE UP (ref 6–8.3)
PROT SERPL-MCNC: 6.9 G/DL — SIGNIFICANT CHANGE UP (ref 6–8.3)
PROT UR-MCNC: 30 MG/DL
RBC # BLD: 3.88 M/UL — SIGNIFICANT CHANGE UP (ref 3.8–5.2)
RBC # FLD: 13.6 % — SIGNIFICANT CHANGE UP (ref 10.3–14.5)
SODIUM SERPL-SCNC: 138 MMOL/L — SIGNIFICANT CHANGE UP (ref 135–145)
SP GR SPEC: 1.02 — SIGNIFICANT CHANGE UP (ref 1.01–1.02)
UROBILINOGEN FLD QL: NEGATIVE — SIGNIFICANT CHANGE UP
WBC # BLD: 9.15 K/UL — SIGNIFICANT CHANGE UP (ref 3.8–10.5)
WBC # FLD AUTO: 9.15 K/UL — SIGNIFICANT CHANGE UP (ref 3.8–10.5)

## 2021-12-23 PROCEDURE — 99233 SBSQ HOSP IP/OBS HIGH 50: CPT | Mod: GC

## 2021-12-23 PROCEDURE — 93306 TTE W/DOPPLER COMPLETE: CPT | Mod: 26

## 2021-12-23 PROCEDURE — 99232 SBSQ HOSP IP/OBS MODERATE 35: CPT

## 2021-12-23 PROCEDURE — 71045 X-RAY EXAM CHEST 1 VIEW: CPT | Mod: 26

## 2021-12-23 PROCEDURE — 93970 EXTREMITY STUDY: CPT | Mod: 26

## 2021-12-23 RX ORDER — CARVEDILOL PHOSPHATE 80 MG/1
12.5 CAPSULE, EXTENDED RELEASE ORAL EVERY 12 HOURS
Refills: 0 | Status: DISCONTINUED | OUTPATIENT
Start: 2021-12-23 | End: 2021-12-31

## 2021-12-23 RX ORDER — NYSTATIN CREAM 100000 [USP'U]/G
1 CREAM TOPICAL
Refills: 0 | Status: DISCONTINUED | OUTPATIENT
Start: 2021-12-23 | End: 2021-12-31

## 2021-12-23 RX ORDER — LOSARTAN POTASSIUM 100 MG/1
50 TABLET, FILM COATED ORAL DAILY
Refills: 0 | Status: DISCONTINUED | OUTPATIENT
Start: 2021-12-23 | End: 2021-12-31

## 2021-12-23 RX ORDER — CARVEDILOL PHOSPHATE 80 MG/1
12.5 CAPSULE, EXTENDED RELEASE ORAL EVERY 12 HOURS
Refills: 0 | Status: DISCONTINUED | OUTPATIENT
Start: 2021-12-23 | End: 2021-12-23

## 2021-12-23 RX ORDER — POTASSIUM CHLORIDE 20 MEQ
40 PACKET (EA) ORAL ONCE
Refills: 0 | Status: COMPLETED | OUTPATIENT
Start: 2021-12-23 | End: 2021-12-23

## 2021-12-23 RX ORDER — ENOXAPARIN SODIUM 100 MG/ML
40 INJECTION SUBCUTANEOUS DAILY
Refills: 0 | Status: DISCONTINUED | OUTPATIENT
Start: 2021-12-23 | End: 2021-12-23

## 2021-12-23 RX ORDER — ENOXAPARIN SODIUM 100 MG/ML
40 INJECTION SUBCUTANEOUS DAILY
Refills: 0 | Status: DISCONTINUED | OUTPATIENT
Start: 2021-12-23 | End: 2021-12-31

## 2021-12-23 RX ADMIN — Medication 400 MILLIGRAM(S): at 21:55

## 2021-12-23 RX ADMIN — LOSARTAN POTASSIUM 50 MILLIGRAM(S): 100 TABLET, FILM COATED ORAL at 13:43

## 2021-12-23 RX ADMIN — Medication 100 MILLIGRAM(S): at 05:11

## 2021-12-23 RX ADMIN — ENOXAPARIN SODIUM 40 MILLIGRAM(S): 100 INJECTION SUBCUTANEOUS at 13:43

## 2021-12-23 RX ADMIN — Medication 100 MILLIGRAM(S): at 18:30

## 2021-12-23 RX ADMIN — Medication 40 MILLIEQUIVALENT(S): at 13:42

## 2021-12-23 RX ADMIN — NYSTATIN CREAM 1 APPLICATION(S): 100000 CREAM TOPICAL at 18:30

## 2021-12-23 RX ADMIN — Medication 1 TABLET(S): at 13:43

## 2021-12-23 RX ADMIN — CARVEDILOL PHOSPHATE 12.5 MILLIGRAM(S): 80 CAPSULE, EXTENDED RELEASE ORAL at 18:30

## 2021-12-23 RX ADMIN — Medication 6 MILLIGRAM(S): at 05:11

## 2021-12-23 RX ADMIN — REMDESIVIR 500 MILLIGRAM(S): 5 INJECTION INTRAVENOUS at 20:18

## 2021-12-23 RX ADMIN — PANTOPRAZOLE SODIUM 40 MILLIGRAM(S): 20 TABLET, DELAYED RELEASE ORAL at 13:43

## 2021-12-23 RX ADMIN — Medication 100 MILLIGRAM(S): at 13:42

## 2021-12-23 RX ADMIN — Medication 1: at 11:55

## 2021-12-23 NOTE — PROGRESS NOTE ADULT - ASSESSMENT
89 yo female with PMH hemorrhagic CVA (2015), Type 2 DM (not on insulin), HTN, HLD, seizures, hard of hearing presents to ED with left sided weakness. Pt reports she had a seizure. Reports left side weakness, drooling but able to talk. Pt wasn't confused, no incontinence.  COVID PCR pos. pt vaccinated with booster    RECOMMENDATIONS  12/21 86% on RA, req NC-4L, Remdesivir started with plan for 5 days so last day 12/25, Dexamethasone started with plan for 10 days so last day 12/30,  12/22-NC-4L, NLR 4.57/0.73=6.3, coordinate with neuro regarding VTE recommendations, continue oxygen, with triple vax optimistic regarding outcome but pt does have risk factors such as age, DM, HTN, change steroids to PO  12/23 -continues on NC, continue remdesivir, steroids, supportive care    We will follow along in the care of this patient. Please call us at [phone] or text me directly on my cell# at [phone] with any concerns.

## 2021-12-23 NOTE — PROGRESS NOTE ADULT - SUBJECTIVE AND OBJECTIVE BOX
show Patient is a 89y old  Female who presents with a chief complaint of L sided weaknesss (22 Dec 2021 12:13)      Subjective:  INTERVAL HPI/OVERNIGHT EVENTS: Patient seen and examined at bedside. Patient was febrile overnight to 101.2F, was given tylenol for fever. Stat blood cultures could not be obtained d/t patient refusal. Blood cultures sent early this AM around 5pm. The patient is feeling well this morning, is sleepy. Patient has no complaints at this time. Denies dysuria, fevers, chills, headache, lightheadedness, chest pain, dyspnea, abdominal pain, n/v/d/c.    MEDICATIONS  (STANDING):  carBAMazepine ER Capsule 400 milliGRAM(s) Oral at bedtime  carBAMazepine ER Tablet 100 milliGRAM(s) Oral <User Schedule>  carvedilol 12.5 milliGRAM(s) Oral every 12 hours  dexAMETHasone     Tablet 6 milliGRAM(s) Oral daily  dextrose 40% Gel 15 Gram(s) Oral once  dextrose 5%. 1000 milliLiter(s) (50 mL/Hr) IV Continuous <Continuous>  dextrose 5%. 1000 milliLiter(s) (100 mL/Hr) IV Continuous <Continuous>  dextrose 50% Injectable 25 Gram(s) IV Push once  dextrose 50% Injectable 12.5 Gram(s) IV Push once  dextrose 50% Injectable 25 Gram(s) IV Push once  enoxaparin Injectable 40 milliGRAM(s) SubCutaneous daily  glucagon  Injectable 1 milliGRAM(s) IntraMuscular once  insulin lispro (ADMELOG) corrective regimen sliding scale   SubCutaneous three times a day before meals  insulin lispro (ADMELOG) corrective regimen sliding scale   SubCutaneous at bedtime  losartan 50 milliGRAM(s) Oral daily  multivitamin/minerals 1 Tablet(s) Oral daily  nystatin Powder 1 Application(s) Topical two times a day  pantoprazole  Injectable 40 milliGRAM(s) IV Push daily  potassium chloride   Powder 40 milliEquivalent(s) Oral once  remdesivir  IVPB   IV Intermittent   remdesivir  IVPB 100 milliGRAM(s) IV Intermittent every 24 hours    MEDICATIONS  (PRN):  acetaminophen     Tablet .. 650 milliGRAM(s) Oral every 6 hours PRN Temp greater or equal to 38C (100.4F), Mild Pain (1 - 3)  aluminum hydroxide/magnesium hydroxide/simethicone Suspension 30 milliLiter(s) Oral every 4 hours PRN Dyspepsia  benzocaine 15 mG/menthol 3.6 mG (Sugar-Free) Lozenge 1 Lozenge Oral every 6 hours PRN Sore Throat  guaiFENesin Oral Liquid (Sugar-Free) 100 milliGRAM(s) Oral every 6 hours PRN Cough  melatonin 3 milliGRAM(s) Oral at bedtime PRN Insomnia  ondansetron Injectable 4 milliGRAM(s) IV Push every 8 hours PRN Nausea and/or Vomiting      Allergies    aspirin (Unknown)  sulfa drugs (Unknown)    Intolerances    Lipitor (Other; Muscle Pain)      REVIEW OF SYSTEMS:  CONSTITUTIONAL: No fever or chills  HEENT:  No headache, no sore throat  RESPIRATORY: No cough, wheezing, or shortness of breath  CARDIOVASCULAR: No chest pain, palpitations  GASTROINTESTINAL: No abd pain, nausea, vomiting, or diarrhea  GENITOURINARY: No dysuria, frequency, or hematuria  NEUROLOGICAL: no focal weakness or dizziness  MUSCULOSKELETAL: no myalgias     Objective:  Vital Signs Last 24 Hrs  T(C): 36.9 (23 Dec 2021 05:25), Max: 38.4 (22 Dec 2021 22:26)  T(F): 98.5 (23 Dec 2021 05:25), Max: 101.2 (22 Dec 2021 22:26)  HR: 82 (23 Dec 2021 05:25) (74 - 94)  BP: 151/83 (23 Dec 2021 05:25) (130/60 - 158/79)  BP(mean): --  RR: 18 (23 Dec 2021 05:25) (16 - 18)  SpO2: 98% (23 Dec 2021 05:25) (94% - 100%)    GENERAL: NAD, lying in bed comfortably  EYES: EOMI, PERRLA, conjunctiva and sclera clear  ENT: Moist mucous membranes  CHEST/LUNG: coarse breath sounds in bases  HEART: Regular rate and rhythm; S1, S2  ABDOMEN: Bowel sounds present; Soft, Nontender, Nondistended   EXTREMITIES:  no clubbing, cyanosis, or edema  NERVOUS SYSTEM:  answering questions and following commands appropriately. Sensation grossly intact throughout. 4/5 strength in L UE, 5/5 in R UE    LABS:                        11.4   9.15  )-----------( 167      ( 23 Dec 2021 06:54 )             35.1     CBC Full  -  ( 23 Dec 2021 06:54 )  WBC Count : 9.15 K/uL  Hemoglobin : 11.4 g/dL  Hematocrit : 35.1 %  Platelet Count - Automated : 167 K/uL  Mean Cell Volume : 90.5 fl  Mean Cell Hemoglobin : 29.4 pg  Mean Cell Hemoglobin Concentration : 32.5 gm/dL  Auto Neutrophil # : 6.75 K/uL  Auto Lymphocyte # : 1.08 K/uL  Auto Monocyte # : 1.26 K/uL  Auto Eosinophil # : 0.00 K/uL  Auto Basophil # : 0.02 K/uL  Auto Neutrophil % : 73.8 %  Auto Lymphocyte % : 11.8 %  Auto Monocyte % : 13.8 %  Auto Eosinophil % : 0.0 %  Auto Basophil % : 0.2 %    23 Dec 2021 06:54    138    |  101    |  24     ----------------------------<  137    3.3     |  31     |  0.78     Ca    8.3        23 Dec 2021 06:54    TPro  6.8    /  Alb  2.8    /  TBili  0.3    /  DBili  <0.1   /  AST  38     /  ALT  23     /  AlkPhos  61     23 Dec 2021 06:54    PT/INR - ( 21 Dec 2021 17:50 )   PT: 12.6 sec;   INR: 1.08 ratio         PTT - ( 21 Dec 2021 17:50 )  PTT:25.1 sec  Urinalysis Basic - ( 23 Dec 2021 01:30 )    Color: Yellow / Appearance: Slightly Turbid / S.025 / pH: x  Gluc: x / Ketone: Negative  / Bili: Negative / Urobili: Negative   Blood: x / Protein: 30 mg/dL / Nitrite: Negative   Leuk Esterase: Negative / RBC: 3-5 /HPF / WBC 0-2   Sq Epi: x / Non Sq Epi: Occasional / Bacteria: Moderate      CAPILLARY BLOOD GLUCOSE      POCT Blood Glucose.: 134 mg/dL (23 Dec 2021 07:40)  POCT Blood Glucose.: 168 mg/dL (22 Dec 2021 21:34)  POCT Blood Glucose.: 171 mg/dL (22 Dec 2021 16:16)  POCT Blood Glucose.: 208 mg/dL (22 Dec 2021 12:30)          RADIOLOGY & ADDITIONAL TESTS:    Personally reviewed.     Consultant(s) Notes Reviewed:  [x] YES  [ ] NO     Patient is a 89y old  Female who presents with a chief complaint of L sided weakness.      Subjective:  INTERVAL HPI/OVERNIGHT EVENTS: Patient seen and examined at bedside. Patient was febrile overnight to 101.2F, was given tylenol for fever. Stat blood cultures could not be obtained d/t patient refusal. Blood cultures sent early this AM around 5pm. The patient is feeling well this morning, aside from feeling sleepy. Patient has no complaints at this time. Denies dysuria, fevers, chills, headache, lightheadedness, chest pain, dyspnea, abdominal pain, n/v/d/c.    MEDICATIONS  (STANDING):  carBAMazepine ER Capsule 400 milliGRAM(s) Oral at bedtime  carBAMazepine ER Tablet 100 milliGRAM(s) Oral <User Schedule>  carvedilol 12.5 milliGRAM(s) Oral every 12 hours  dexAMETHasone     Tablet 6 milliGRAM(s) Oral daily  dextrose 40% Gel 15 Gram(s) Oral once  dextrose 5%. 1000 milliLiter(s) (50 mL/Hr) IV Continuous <Continuous>  dextrose 5%. 1000 milliLiter(s) (100 mL/Hr) IV Continuous <Continuous>  dextrose 50% Injectable 25 Gram(s) IV Push once  dextrose 50% Injectable 12.5 Gram(s) IV Push once  dextrose 50% Injectable 25 Gram(s) IV Push once  enoxaparin Injectable 40 milliGRAM(s) SubCutaneous daily  glucagon  Injectable 1 milliGRAM(s) IntraMuscular once  insulin lispro (ADMELOG) corrective regimen sliding scale   SubCutaneous three times a day before meals  insulin lispro (ADMELOG) corrective regimen sliding scale   SubCutaneous at bedtime  losartan 50 milliGRAM(s) Oral daily  multivitamin/minerals 1 Tablet(s) Oral daily  nystatin Powder 1 Application(s) Topical two times a day  pantoprazole  Injectable 40 milliGRAM(s) IV Push daily  potassium chloride   Powder 40 milliEquivalent(s) Oral once  remdesivir  IVPB   IV Intermittent   remdesivir  IVPB 100 milliGRAM(s) IV Intermittent every 24 hours    MEDICATIONS  (PRN):  acetaminophen     Tablet .. 650 milliGRAM(s) Oral every 6 hours PRN Temp greater or equal to 38C (100.4F), Mild Pain (1 - 3)  aluminum hydroxide/magnesium hydroxide/simethicone Suspension 30 milliLiter(s) Oral every 4 hours PRN Dyspepsia  benzocaine 15 mG/menthol 3.6 mG (Sugar-Free) Lozenge 1 Lozenge Oral every 6 hours PRN Sore Throat  guaiFENesin Oral Liquid (Sugar-Free) 100 milliGRAM(s) Oral every 6 hours PRN Cough  melatonin 3 milliGRAM(s) Oral at bedtime PRN Insomnia  ondansetron Injectable 4 milliGRAM(s) IV Push every 8 hours PRN Nausea and/or Vomiting      Allergies    aspirin (Unknown)  sulfa drugs (Unknown)    Intolerances    Lipitor (Other; Muscle Pain)      REVIEW OF SYSTEMS:  CONSTITUTIONAL: No fever or chills  HEENT:  No headache, no sore throat  RESPIRATORY: No cough, wheezing, or shortness of breath  CARDIOVASCULAR: No chest pain, palpitations  GASTROINTESTINAL: No abd pain, nausea, vomiting, or diarrhea  GENITOURINARY: No dysuria, frequency, or hematuria  NEUROLOGICAL: no focal weakness or dizziness  MUSCULOSKELETAL: no myalgias     Objective:  Vital Signs Last 24 Hrs  T(C): 36.9 (23 Dec 2021 05:25), Max: 38.4 (22 Dec 2021 22:26)  T(F): 98.5 (23 Dec 2021 05:25), Max: 101.2 (22 Dec 2021 22:26)  HR: 82 (23 Dec 2021 05:25) (74 - 94)  BP: 151/83 (23 Dec 2021 05:25) (130/60 - 158/79)  BP(mean): --  RR: 18 (23 Dec 2021 05:25) (16 - 18)  SpO2: 98% (23 Dec 2021 05:25) (94% - 100%)    GENERAL: NAD, lying in bed comfortably  EYES: EOMI, PERRLA, conjunctiva and sclera clear  ENT: Moist mucous membranes  CHEST/LUNG: coarse breath sounds in bases  HEART: Regular rate and rhythm; S1, S2  ABDOMEN: Bowel sounds present; Soft, Nontender, Nondistended   EXTREMITIES:  no clubbing, cyanosis, or edema  NERVOUS SYSTEM:  answering questions and following commands appropriately. Sensation grossly intact throughout. 4/5 strength in L UE, 5/5 in R UE    LABS:                        11.4   9.15  )-----------( 167      ( 23 Dec 2021 06:54 )             35.1     CBC Full  -  ( 23 Dec 2021 06:54 )  WBC Count : 9.15 K/uL  Hemoglobin : 11.4 g/dL  Hematocrit : 35.1 %  Platelet Count - Automated : 167 K/uL  Mean Cell Volume : 90.5 fl  Mean Cell Hemoglobin : 29.4 pg  Mean Cell Hemoglobin Concentration : 32.5 gm/dL  Auto Neutrophil # : 6.75 K/uL  Auto Lymphocyte # : 1.08 K/uL  Auto Monocyte # : 1.26 K/uL  Auto Eosinophil # : 0.00 K/uL  Auto Basophil # : 0.02 K/uL  Auto Neutrophil % : 73.8 %  Auto Lymphocyte % : 11.8 %  Auto Monocyte % : 13.8 %  Auto Eosinophil % : 0.0 %  Auto Basophil % : 0.2 %    23 Dec 2021 06:54    138    |  101    |  24     ----------------------------<  137    3.3     |  31     |  0.78     Ca    8.3        23 Dec 2021 06:54    TPro  6.8    /  Alb  2.8    /  TBili  0.3    /  DBili  <0.1   /  AST  38     /  ALT  23     /  AlkPhos  61     23 Dec 2021 06:54    PT/INR - ( 21 Dec 2021 17:50 )   PT: 12.6 sec;   INR: 1.08 ratio         PTT - ( 21 Dec 2021 17:50 )  PTT:25.1 sec  Urinalysis Basic - ( 23 Dec 2021 01:30 )    Color: Yellow / Appearance: Slightly Turbid / S.025 / pH: x  Gluc: x / Ketone: Negative  / Bili: Negative / Urobili: Negative   Blood: x / Protein: 30 mg/dL / Nitrite: Negative   Leuk Esterase: Negative / RBC: 3-5 /HPF / WBC 0-2   Sq Epi: x / Non Sq Epi: Occasional / Bacteria: Moderate      CAPILLARY BLOOD GLUCOSE      POCT Blood Glucose.: 134 mg/dL (23 Dec 2021 07:40)  POCT Blood Glucose.: 168 mg/dL (22 Dec 2021 21:34)  POCT Blood Glucose.: 171 mg/dL (22 Dec 2021 16:16)  POCT Blood Glucose.: 208 mg/dL (22 Dec 2021 12:30)          RADIOLOGY & ADDITIONAL TESTS:    Personally reviewed.     Consultant(s) Notes Reviewed:  [x] YES  [ ] NO

## 2021-12-23 NOTE — PROGRESS NOTE ADULT - SUBJECTIVE AND OBJECTIVE BOX
[facility] -- Bhavik Son Grossman, [place], Ankit Snyder Savella, Goodger: Office # [phone]    Follow Up:  Elevated troponin     Subjective/Observations: Patient seen and examined, awake, alert, resting comfortably in bed, denies chest pain, dyspnea, palpitations or dizziness, orthopnea and PND. Tolerating O2 via NC room air.    REVIEW OF SYSTEMS: All review of systems is negative for eye, ENT, GI, , allergic, dermatologic, musculoskeletal and neurologic except as described above    PAST MEDICAL & SURGICAL HISTORY:  Diabetes mellitus    Hypercholesteremia    HTN (hypertension)    CVA (cerebral infarction)    Hemorrhagic stroke    Seizure    History of arthroscopy of knee  Right    S/P cataract surgery  bilateral    History of hysterectomy    Basal cell cancer  s/p Mohs surgery    History of retinal tear  surgical repair        MEDICATIONS  (STANDING):  carBAMazepine ER Capsule 400 milliGRAM(s) Oral at bedtime  carBAMazepine ER Tablet 100 milliGRAM(s) Oral <User Schedule>  carvedilol 12.5 milliGRAM(s) Oral every 12 hours  dexAMETHasone     Tablet 6 milliGRAM(s) Oral daily  dextrose 40% Gel 15 Gram(s) Oral once  dextrose 5%. 1000 milliLiter(s) (50 mL/Hr) IV Continuous <Continuous>  dextrose 5%. 1000 milliLiter(s) (100 mL/Hr) IV Continuous <Continuous>  dextrose 50% Injectable 25 Gram(s) IV Push once  dextrose 50% Injectable 12.5 Gram(s) IV Push once  dextrose 50% Injectable 25 Gram(s) IV Push once  enoxaparin Injectable 40 milliGRAM(s) SubCutaneous daily  glucagon  Injectable 1 milliGRAM(s) IntraMuscular once  insulin lispro (ADMELOG) corrective regimen sliding scale   SubCutaneous three times a day before meals  insulin lispro (ADMELOG) corrective regimen sliding scale   SubCutaneous at bedtime  losartan 50 milliGRAM(s) Oral daily  multivitamin/minerals 1 Tablet(s) Oral daily  nystatin Powder 1 Application(s) Topical two times a day  pantoprazole  Injectable 40 milliGRAM(s) IV Push daily  potassium chloride   Powder 40 milliEquivalent(s) Oral once  remdesivir  IVPB   IV Intermittent   remdesivir  IVPB 100 milliGRAM(s) IV Intermittent every 24 hours    MEDICATIONS  (PRN):  acetaminophen     Tablet .. 650 milliGRAM(s) Oral every 6 hours PRN Temp greater or equal to 38C (100.4F), Mild Pain (1 - 3)  aluminum hydroxide/magnesium hydroxide/simethicone Suspension 30 milliLiter(s) Oral every 4 hours PRN Dyspepsia  benzocaine 15 mG/menthol 3.6 mG (Sugar-Free) Lozenge 1 Lozenge Oral every 6 hours PRN Sore Throat  guaiFENesin Oral Liquid (Sugar-Free) 100 milliGRAM(s) Oral every 6 hours PRN Cough  melatonin 3 milliGRAM(s) Oral at bedtime PRN Insomnia  ondansetron Injectable 4 milliGRAM(s) IV Push every 8 hours PRN Nausea and/or Vomiting    Allergies    aspirin (Unknown)  sulfa drugs (Unknown)    Intolerances    Lipitor (Other; Muscle Pain)    Vital Signs Last 24 Hrs  T(C): 37.7 (23 Dec 2021 11:55), Max: 38.4 (22 Dec 2021 22:26)  T(F): 99.9 (23 Dec 2021 11:55), Max: 101.2 (22 Dec 2021 22:26)  HR: 81 (23 Dec 2021 11:55) (74 - 94)  BP: 152/84 (23 Dec 2021 11:55) (130/60 - 158/79)  BP(mean): --  RR: 18 (23 Dec 2021 11:55) (16 - 18)  SpO2: 94% (23 Dec 2021 11:55) (94% - 100%)  I&O's Summary    22 Dec 2021 07:01  -  23 Dec 2021 07:00  --------------------------------------------------------  IN: 0 mL / OUT: 300 mL / NET: -300 mL          TELE: SR 70's   PHYSICAL EXAM:  Appearance: NAD, no distress, alert,  HEENT: Moist Mucous Membranes, Anicteric  Cardiovascular: Regular rate and rhythm, Normal S1 S2, No JVD, No murmurs, No rubs, gallops or clicks  Respiratory: Non-labored, diminished on auscultation,  No rhonchi, No wheezing.   Gastrointestinal:  Soft, Non-tender, + BS  Neurologic: Non-focal  Skin: Warm and dry, No visible rashes or ulcers, No ecchymosis, No cyanosis  Musculoskeletal: No clubbing, No cyanosis, No joint swelling/tenderness  Psychiatry: Mood & affect appropriate  Lymph: No peripheral edema.     LABS: All Labs Reviewed:                        11.4   9.15  )-----------( 167      ( 23 Dec 2021 06:54 )             35.1                         13.5   6.29  )-----------( 168      ( 22 Dec 2021 04:56 )             40.1                         12.1   8.75  )-----------( 174      ( 21 Dec 2021 17:50 )             35.5     23 Dec 2021 06:54    138    |  101    |  24     ----------------------------<  137    3.3     |  31     |  0.78   22 Dec 2021 04:56    139    |  101    |  15     ----------------------------<  145    3.7     |  30     |  0.80   21 Dec 2021 17:50    138    |  103    |  17     ----------------------------<  172    3.9     |  30     |  0.77     Ca    8.3        23 Dec 2021 06:54  Ca    8.6        22 Dec 2021 04:56  Ca    8.1        21 Dec 2021 17:50  Phos  3.3       22 Dec 2021 04:56  Mg     1.7       22 Dec 2021 04:56    TPro  6.8    /  Alb  2.8    /  TBili  0.3    /  DBili  <0.1   /  AST  38     /  ALT  23     /  AlkPhos  61     23 Dec 2021 06:54  TPro  7.2    /  Alb  2.9    /  TBili  0.3    /  DBili  <0.1   /  AST  33     /  ALT  25     /  AlkPhos  73     22 Dec 2021 04:56  TPro  6.6    /  Alb  2.7    /  TBili  0.2    /  DBili  <0.1   /  AST  29     /  ALT  22     /  AlkPhos  70     21 Dec 2021 17:50    PT/INR - ( 21 Dec 2021 17:50 )   PT: 12.6 sec;   INR: 1.08 ratio         PTT - ( 21 Dec 2021 17:50 )  PTT:25.1 sec  Troponin I, High Sensitivity Result: 2157.8 ng/L (12-22-21 @ 09:53)  Troponin I, High Sensitivity Result: 2910.6 ng/L (12-22-21 @ 04:56)  Creatine Kinase, Serum: 205 U/L (12-22-21 @ 04:56)  Troponin I, High Sensitivity Result: 457.9 ng/L (12-21-21 @ 17:50)      D-Dimer Assay, Quantitative: 1580 ng/mL DDU (12-22-21 @ 17:03)        Cholesterol, Serum: 220 mg/dL (12-22-21 @ 09:32)  HDL Cholesterol, Serum: 76 mg/dL (12-22-21 @ 09:32)  Triglycerides, Serum: 74 mg/dL (12-22-21 @ 09:32)      12 Lead ECG:   Ventricular Rate 80 BPM    Atrial Rate 80 BPM    P-R Interval 138 ms    QRS Duration 90 ms    Q-T Interval 400 ms    QTC Calculation(Bazett) 461 ms    P Axis 60 degrees    R Axis -1 degrees    T Axis 20 degrees    Diagnosis Line Normal sinus rhythm  Inferior infarct , age undetermined  Possible Anterior infarct , age undetermined  Abnormal ECG  When compared with ECG of 21-DEC-2021 17:53, (Unconfirmed)  No significant change was found  Confirmed by Sanket LYNCH, Mihai ([id number]) on 12/22/2021 11:21:17AM (12-22-21 @ 05:48)    < from: Xray Chest 1 View- PORTABLE-Urgent (12.21.21 @ 19:08) >    ACC: [id number] EXAM:  XR CHEST PORTABLE URGENT 1V                          PROCEDURE DATE:  12/21/2021          INTERPRETATION:  AP semierect chest on December 21, 2021 at 6:52 PM. This   is a stroke code.    Heart magnified by technique. Lungs remain clear.    Chest is similar to August 6, 2020.    IMPRESSION: No acute finding or change.    --- End of Report ---            HANNAH MARISCAL MD; Attending Radiologist  This document has been electronically signed. Dec 22 2021  9:48AM    < end of copied text >  < from: TTE Echo Doppler w/o Cont (03.03.19 @ 11:34) >     EXAM:  ECHO TTE WO CON COMP W DOPPLR         PROCEDURE DATE:  03/03/2019        INTERPRETATION:  Ordering Physician: ANJEL ARREOLA [id number]    Indication: CHF  Technologist Initials: AS  Study Quality: Technically difficult   A complete echocardiographic study was performed utilizing standard   protocol including spectral and color Doppler in all echocardiographic   windows.    Height: 162 cm  Weight: 65 kg  BSA: 1.69 sq m  Blood Pressure: 146/84    MEASUREMENTS  IVS: 1.3cm  PWT: 1.1cm  LA: 2.3cm  AO: 3.4cm  LVIDd: 3.8cm  LVIDs: 2.9cm    LVEF: 65%  RVSP: 39mmHg    FINDINGS  Left Ventricle: The endocardium is not well visualized. Grossly, normal   left ventricular systolic function.  Aortic Valve: Calcified trileaflet aortic valve with normal opening. No   significant aortic insufficiency.  Mitral Valve: Mitral annular calcification and calcified mitral valve   leaflet. Mild mitral insufficiency.  Tricuspid Valve: Normal tricuspid valve. Mild tricuspid insufficiency.  Pulmonic Valve: Not well visualized  Left Atrium: Normal  Right Ventricle: Normal right ventricular size and systolic function.  Right Atrium: Normal  Diastolic Function: Grade 1 diastolic dysfunction.  Pericardium/Pleura: Normal pericardium with no pericardial effusion.      CONCLUSIONS:  Technically difficult and limited study.  1. The endocardium is not well visualized. Grossly, normal left   ventricular systolic function.  2. Calcified trileaflet aortic valve with normal systolic opening. No   significantaortic insufficiency.  3. Mitral annular calcification and calcified mitral valve leaflets with   mild mitral insufficiency.  4. Normal left atrium.  5. Normal right ventricular size and systolic function.  6. Grade 1 diastolic dysfunction.                    NICOLE POTTS M.D., ATTENDING CARDIOLOGIST  This document has been electronically signed. Mar  3 2019  1:28PM                < end of copied text >

## 2021-12-23 NOTE — PROGRESS NOTE ADULT - ASSESSMENT
Patient is a 89 yo female with PMH of hemorrhagic CVA (2015), Type 2 DM (not on insulin), HTN, HLD, seizures, hard of hearing presents to ED with left sided weakness, admitted for left-sided weakness, troponin elevation and acute hypoxic respiratory failure due to COVID-19 PNA.

## 2021-12-23 NOTE — PROGRESS NOTE ADULT - PROBLEM SELECTOR PLAN 5
Chronic,  on admission  - HOLD home BP meds to allow for permissive HTN first 48 hrs especially if CVA confirmed on MRI  - Monitor routine hemodynamics Chronic,  on admission  - will restart home coreg 12.5mg po bid, losartan 50mg qd  - Monitor routine hemodynamics

## 2021-12-23 NOTE — PROGRESS NOTE ADULT - ASSESSMENT
89 year old female with PMH hemorrhagic CVA (2015), Type 2 DM (not on insulin), HTN, HLD, seizure disorder, hearing impairment presents to ED s/p unwitnessed episode resulting in fall onto L side and L sided weakness. Patient incidentally found to be COVID+ with no sick contacts or URI symptoms prior to admission. Cardiology consulted for elevated troponin.     Cardiac summary  TTE: (3/3/2019): normal LVSF, calcified trileaflet AV with normal systolic opening    HTN/ HLD/ elevated trop   - high sensitivity Trop I: elevated, 457-> 2910->2157, continue to trend for now   - Patient is not complaining of any cardiac symptoms at this time.  - EKG shows nonspecific T-wave changes, non diagnostic  - No meaningful evidence of atherosclerotic plaque rupture, volume overload, dysrhythmia   - f/u TTE  - COVID treatment per primary team    - Monitor and replete Lytes. Keep K > 4 and Mg > 2  - All other medical needs as per primary team.  - Other cardiovascular workup will depend on clinical course.  - Will continue to follow.    [facility]  Nurse Practitioner - Cardiology   Spectra #[id number]/ [phone]   89 year old female with PMH hemorrhagic CVA (2015), Type 2 DM (not on insulin), HTN, HLD, seizure disorder, hearing impairment presents to ED s/p unwitnessed episode resulting in fall onto L side and L sided weakness. Patient incidentally found to be COVID+ with no sick contacts or URI symptoms prior to admission. Cardiology consulted for elevated troponin.     Cardiac summary  TTE: (3/3/2019): normal LVSF, calcified trileaflet AV with normal systolic opening    HTN/ HLD/ elevated trop   - high sensitivity Trop I: elevated, 457-> 2910->2157, and has now peaked  - Patient is not complaining of any cardiac symptoms at this time.  - EKG shows nonspecific T-wave changes, non diagnostic  - No meaningful evidence of atherosclerotic plaque rupture, volume overload, dysrhythmia   - f/u TTE  - COVID treatment per primary team    - Monitor and replete Lytes. Keep K > 4 and Mg > 2  - All other medical needs as per primary team.  - Other cardiovascular workup will depend on clinical course.  - Will continue to follow.    [facility] Tony [facility]  Nurse Practitioner - Cardiology   Spectra #[id number]/ [phone]

## 2021-12-23 NOTE — PROGRESS NOTE ADULT - SUBJECTIVE AND OBJECTIVE BOX
Neurology Follow up note    MAJOR [id number]    HPI:  Patient is a 89 yo female with PMH hemorrhagic CVA (2015), Type 2 DM (not on insulin), HTN, HLD, seizures, hard of hearing presents to ED with left sided weakness. Son Simone Perez reports he left the house for a while to run errands. Son called pt at 4pm and he received the answering machine. About 10 minutes later, son tried again and when there was still no answer he assumed something was wrong. He rushed home to find pt was on the floor on her left side laying down. Son assumed pt has seizure or stroke. Reports left side was weakness, drooling but able to talk. Pt wasn't confused, no incontinence. Son called EMS for assistance and pt was brought to ED. Pt only complaining of back pain at this time.      in ED:  VS: /94, HR 78, RR 16, 97F, SpO2 98% on RA   Labs significant for: trops 457.9. COVID PCR pos.   CTA head: Brain CT: No acute hemorrhage, mass effect or extra-axial collections.  Neck CTA: No hemodynamically significant stenosis.  Brain CT: No large vessel occlusion or stenosis.  Perfusion maps. No evidence for core infarct or area of brain at risk.  EKG: sinus tachy, ST wave depressions laterally   (21 Dec 2021 19:43)      Interval History -c/o left sided chest pain worse with breathing    Patient is seen, chart was reviewed and case was discussed with the treatment team.  Pt is not in any distress.   Lying on bed comfortably.   No events reported overnight.       Vital Signs Last 24 Hrs  T(C): 37.7 (23 Dec 2021 11:55), Max: 38.4 (22 Dec 2021 22:26)  T(F): 99.9 (23 Dec 2021 11:55), Max: 101.2 (22 Dec 2021 22:26)  HR: 81 (23 Dec 2021 11:55) (74 - 94)  BP: 152/84 (23 Dec 2021 11:55) (130/60 - 158/79)  BP(mean): --  RR: 18 (23 Dec 2021 11:55) (16 - 18)  SpO2: 94% (23 Dec 2021 11:55) (94% - 100%)        REVIEW OF SYSTEMS:    Constitutional: No fever, w  Eyes: No eye pain, visual disturbances, or discharge  ENT:  No difficulty hearing, tinnitus, vertigo; No sinus or throat pain  Neck: No pain or stiffness  Respiratory: No wheezing, chills or hemoptysis  Cardiovascular: No , palpitations, shortness of breath, dizzines  Gastrointestinal: No abdominal or epigastric pain. No nausea, vomiting or hematemesis;  Genitourinary: No dysuria, frequency, hematuria or incontinence  Neurological: No headaches,, loss of strength, numbness   Psychiatric: No d mood swings or difficulty sleepin  Skin: No itching, burning, rashes or lesions   Lymph Nodes: No enlarged glands  Endocrine: No heat or cold intolerance; No hair loss,   Allergy and Immunologic: No hives or eczema    On Neurological Examination:    Mental Status - Pt is alert, awake, oriented X3.. Follows commands well and able to answer questions appropriately.Mood and affect  normal    Speech -  Normal.     Cranial Nerves - Pupils 3 mm equal and reactive to light, extraocular eye movements intact. Pt has no visual field deficit.  Pt has no  facial asymmetry. Facial sensation is intact.Tongue - is in midline.    Muscle tone - is normal    Motor Exam - left hemiparesis old    Sensory Exam -. Pt withdraws all extremities equally on stimulation. No asymmetry seen. No complaints of tingling, numbness.      coordination:    Finger to nose: normal on right      Deep tendon Reflexes - 2 plus all over.    Neck Supple -  Yes.     MEDICATIONS    acetaminophen     Tablet .. 650 milliGRAM(s) Oral every 6 hours PRN  aluminum hydroxide/magnesium hydroxide/simethicone Suspension 30 milliLiter(s) Oral every 4 hours PRN  benzocaine 15 mG/menthol 3.6 mG (Sugar-Free) Lozenge 1 Lozenge Oral every 6 hours PRN  carBAMazepine ER Capsule 400 milliGRAM(s) Oral at bedtime  carBAMazepine ER Tablet 100 milliGRAM(s) Oral <User Schedule>  carvedilol 12.5 milliGRAM(s) Oral every 12 hours  dexAMETHasone     Tablet 6 milliGRAM(s) Oral daily  dextrose 40% Gel 15 Gram(s) Oral once  dextrose 5%. 1000 milliLiter(s) IV Continuous <Continuous>  dextrose 5%. 1000 milliLiter(s) IV Continuous <Continuous>  dextrose 50% Injectable 25 Gram(s) IV Push once  dextrose 50% Injectable 12.5 Gram(s) IV Push once  dextrose 50% Injectable 25 Gram(s) IV Push once  enoxaparin Injectable 40 milliGRAM(s) SubCutaneous daily  glucagon  Injectable 1 milliGRAM(s) IntraMuscular once  guaiFENesin Oral Liquid (Sugar-Free) 100 milliGRAM(s) Oral every 6 hours PRN  insulin lispro (ADMELOG) corrective regimen sliding scale   SubCutaneous three times a day before meals  insulin lispro (ADMELOG) corrective regimen sliding scale   SubCutaneous at bedtime  losartan 50 milliGRAM(s) Oral daily  melatonin 3 milliGRAM(s) Oral at bedtime PRN  multivitamin/minerals 1 Tablet(s) Oral daily  nystatin Powder 1 Application(s) Topical two times a day  ondansetron Injectable 4 milliGRAM(s) IV Push every 8 hours PRN  pantoprazole  Injectable 40 milliGRAM(s) IV Push daily  remdesivir  IVPB   IV Intermittent   remdesivir  IVPB 100 milliGRAM(s) IV Intermittent every 24 hours      Allergies    aspirin (Unknown)  sulfa drugs (Unknown)    Intolerances    Lipitor (Other; Muscle Pain)      LABS:  CBC Full  -  ( 23 Dec 2021 06:54 )  WBC Count : 9.15 K/uL  RBC Count : 3.88 M/uL  Hemoglobin : 11.4 g/dL  Hematocrit : 35.1 %  Platelet Count - Automated : 167 K/uL  Mean Cell Volume : 90.5 fl  Mean Cell Hemoglobin : 29.4 pg  Mean Cell Hemoglobin Concentration : 32.5 gm/dL  Auto Neutrophil # : 6.75 K/uL  %    Urinalysis Basic - ( 23 Dec 2021 01:30 )    Color: Yellow / Appearance: Slightly Turbid / S.025 / pH: x  Gluc: x / Ketone: Negative  / Bili: Negative / Urobili: Negative   Blood: x / Protein: 30 mg/dL / Nitrite: Negative   Leuk Esterase: Negative / RBC: 3-5 /HPF / WBC 0-2   Sq Epi: x / Non Sq Epi: Occasional / Bacteria: Moderate          138  |  101  |  24<H>  ----------------------------<  137<H>  3.3<L>   |  31  |  0.78    Ca    8.3<L>      23 Dec 2021 06:54  Phos  3.3     12-  Mg     1.7     -    TPro  6.8  /  Alb  2.8<L>  /  TBili  0.3  /  DBili  <0.1  /  AST  38<H>  /  ALT  23  /  AlkPhos  61  12-23    Hemoglobin A1C:     Vitamin B12     RADIOLOGY    ASSESSMENT AND PLAN:      found on floor/ams/left sided weakness-  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  OOB to chair/ambulation with assistance only.  Pain is accessed and addressed.  Would continue to follow.

## 2021-12-23 NOTE — PHARMACOTHERAPY INTERVENTION NOTE - COMMENTS
Patient is a 89 year old female with COVID-19. Patient has a creatinine clearance is >15ml/min with a d-dimer level of 1580. As per [facility] guidelines, patients with Dimers >2x ULN and CrCl >15 should be ordered for full dose Lovenox at 1mg/kg BID [70mg BID] for DVT prophylaxis. Discussed therapy with primary team, given patient’s history of hemorrhagic CVA, it was decided to proceed with the prophylactic dose of Lovenox 40mg Daily.

## 2021-12-23 NOTE — PROGRESS NOTE ADULT - PROBLEM SELECTOR PLAN 2
- abnormal EKG with non-specific TWI  - Trops 457 -> 2910 -> 2100, trended to peak  - HOLD aspirin given allergy  - s/p Lovenox 70mg SQ x 2  - in setting of __  - not consistent with plaque-rupture MI, so will not continue full A/C at this time  - likely demand ischemia in setting of seizure / resp failure due to COVID  - F/u TTE  - Cardio following [facility] group, recs appreciated - likely demand ischemia in setting of seizure / resp failure due to COVID- F/u TTE  - abnormal EKG with non-specific TWI  - Trops 457 -> 2910 -> 2100, trended to peak  - HOLD aspirin given allergy  - s/p Lovenox 70mg SQ x 2  - given hx of hemorrhagic CVA, will start lovenox 40mg qd  - Cardio following [facility] group, recs appreciated

## 2021-12-23 NOTE — PROGRESS NOTE ADULT - SUBJECTIVE AND OBJECTIVE BOX
[facility] DIVISION of INFECTIOUS DISEASE  Amanuel Barajas MD PhD, Anni Sutton MD, Ruby Nunez MD, Claudette Pham MD, Petros Martin MD  and providing coverage with Marilyn Singleton MD and Rocco Lake MD  Providing Infectious Disease Consultations at [facility], [facility]      Office# [phone] to schedule follow up appointments  Answering Service for urgent calls or New Consults [phone]  Cell# to text for urgent issues Amanuel Barajas [phone]     Infectious diseases progress note:    MAJOR PEREZ is a 89y y. o. Female patient    COVID Patient    Allergies    aspirin (Unknown)  sulfa drugs (Unknown)    Intolerances    Lipitor (Other; Muscle Pain)      ANTIBIOTICS/RELEVANT:  antimicrobials  remdesivir  IVPB   IV Intermittent   remdesivir  IVPB 100 milliGRAM(s) IV Intermittent every 24 hours    immunologic:    OTHER:  acetaminophen     Tablet .. 650 milliGRAM(s) Oral every 6 hours PRN  aluminum hydroxide/magnesium hydroxide/simethicone Suspension 30 milliLiter(s) Oral every 4 hours PRN  benzocaine 15 mG/menthol 3.6 mG (Sugar-Free) Lozenge 1 Lozenge Oral every 6 hours PRN  carBAMazepine ER Capsule 400 milliGRAM(s) Oral at bedtime  carBAMazepine ER Tablet 100 milliGRAM(s) Oral <User Schedule>  carvedilol 12.5 milliGRAM(s) Oral every 12 hours  dexAMETHasone     Tablet 6 milliGRAM(s) Oral daily  dextrose 40% Gel 15 Gram(s) Oral once  dextrose 5%. 1000 milliLiter(s) IV Continuous <Continuous>  dextrose 5%. 1000 milliLiter(s) IV Continuous <Continuous>  dextrose 50% Injectable 25 Gram(s) IV Push once  dextrose 50% Injectable 12.5 Gram(s) IV Push once  dextrose 50% Injectable 25 Gram(s) IV Push once  enoxaparin Injectable 40 milliGRAM(s) SubCutaneous daily  glucagon  Injectable 1 milliGRAM(s) IntraMuscular once  guaiFENesin Oral Liquid (Sugar-Free) 100 milliGRAM(s) Oral every 6 hours PRN  insulin lispro (ADMELOG) corrective regimen sliding scale   SubCutaneous three times a day before meals  insulin lispro (ADMELOG) corrective regimen sliding scale   SubCutaneous at bedtime  losartan 50 milliGRAM(s) Oral daily  melatonin 3 milliGRAM(s) Oral at bedtime PRN  multivitamin/minerals 1 Tablet(s) Oral daily  nystatin Powder 1 Application(s) Topical two times a day  ondansetron Injectable 4 milliGRAM(s) IV Push every 8 hours PRN  pantoprazole  Injectable 40 milliGRAM(s) IV Push daily  potassium chloride   Powder 40 milliEquivalent(s) Oral once      Objective:  Vital Signs Last 24 Hrs  T(C): 37.7 (23 Dec 2021 11:55), Max: 38.4 (22 Dec 2021 22:26)  T(F): 99.9 (23 Dec 2021 11:55), Max: 101.2 (22 Dec 2021 22:26)  HR: 81 (23 Dec 2021 11:55) (74 - 94)  BP: 152/84 (23 Dec 2021 11:55) (130/60 - 158/79)  BP(mean): --  RR: 18 (23 Dec 2021 11:55) (16 - 18)  SpO2: 94% (23 Dec 2021 11:55) (94% - 100%)    T(C): 37.7 (21 @ 11:55), Max: 38.4 (21 @ 22:26)  T(C): 37.7 (21 @ 11:55), Max: 38.4 (21 @ 22:26)  T(C): 37.7 (21 @ 11:55), Max: 38.4 (21 @ 22:26)    PHYSICAL EXAM:  HEENT: NC atraumatic  Neck: supple  Respiratory: no accessory muscle use, breathing comfortably  Cardiovascular: distant  Gastrointestinal: normal appearing, nondistended  Extremities: no clubbing, no cyanosis,        LABS:                          11.4   9.15  )-----------( 167      ( 23 Dec 2021 06:54 )             35.1       WBC  9.15  @ 06:54  6.29  @ 04:56  8.75  @ 17:50          138  |  101  |  24<H>  ----------------------------<  137<H>  3.3<L>   |  31  |  0.78    Ca    8.3<L>      23 Dec 2021 06:54  Phos  3.3       Mg     1.7         TPro  6.8  /  Alb  2.8<L>  /  TBili  0.3  /  DBili  <0.1  /  AST  38<H>  /  ALT  23  /  AlkPhos  61        Creatinine, Serum: 0.78 mg/dL (21 @ 06:54)  Creatinine, Serum: 0.80 mg/dL (21 @ 04:56)  Creatinine, Serum: 0.77 mg/dL (21 @ 17:50)      PT/INR - ( 21 Dec 2021 17:50 )   PT: 12.6 sec;   INR: 1.08 ratio         PTT - ( 21 Dec 2021 17:50 )  PTT:25.1 sec  Urinalysis Basic - ( 23 Dec 2021 01:30 )    Color: Yellow / Appearance: Slightly Turbid / S.025 / pH: x  Gluc: x / Ketone: Negative  / Bili: Negative / Urobili: Negative   Blood: x / Protein: 30 mg/dL / Nitrite: Negative   Leuk Esterase: Negative / RBC: 3-5 /HPF / WBC 0-2   Sq Epi: x / Non Sq Epi: Occasional / Bacteria: Moderate            COVID RISK SCORE  Auto Neutrophil #: 6.75 K/uL (21 @ 06:54)  Auto Lymphocyte #: 1.08 K/uL (21 @ 06:54)  Auto Neutrophil #: 4.57 K/uL (21 @ 04:56)  Auto Lymphocyte #: 0.73 K/uL (21 @ 04:56)  Auto Neutrophil #: 7.17 K/uL (21 @ 17:50)  Auto Lymphocyte #: 0.70 K/uL (21 @ 17:50)      Auto Eosinophil #: 0.00 K/uL (21 @ 06:54)  Auto Eosinophil #: 0.15 K/uL (21 @ 04:56)  Auto Eosinophil #: 0.03 K/uL (21 @ 17:50)        Procalcitonin, Serum: 0.18 ng/mL (21 @ 17:03)      Creatine Kinase, Serum: 205 U/L (21 @ 04:56)        Ferritin, Serum: 87 ng/mL (21 @ 22:49)        Activated Partial Thromboplastin Time: 25.1 sec (21 @ 17:50)  INR: 1.08 ratio (21 @ 17:50)    D-Dimer Assay, Quantitative: 1580 ng/mL DDU (21 @ 17:03)        MICROBIOLOGY:                  RADIOLOGY & ADDITIONAL STUDIES:

## 2021-12-23 NOTE — PROGRESS NOTE ADULT - PROBLEM SELECTOR PLAN 3
- Acute hypoxic respiratory failure 2/2 confirmed COVID-19 infection  - Patient vaccinated with: pfizer x2, moderna booster  - Supp O2 prn maintain O2 sats >88%. Prone PRN  - Continue Decadron 6mg daily for 10 days, last day 12/30/21 + protonix 40mg qd as ppx in setting of steroid use  - Continue remdesivir for 5 days, last day 12/25/21  - Monitor volume status closely, avoid aggressive hydration  - Tylenol prn myalgias, fever  - F/u ferritin, crp, d-dimer, procal STAT now then will repeat If clinically worsening every 48-72 hours.  - ID Dr Barajas, recs appreciated - Acute hypoxic respiratory failure 2/2 confirmed COVID-19 infection  - Patient vaccinated with: pfizer x2, moderna booster  - Supp O2 prn maintain O2 sats >88%. Prone PRN  - Continue Decadron 6mg daily for 10 days, last day 12/30/21 + protonix 40mg qd as ppx in setting of steroid use  - Continue remdesivir for 5 days, last day 12/25/21  - Monitor volume status closely, avoid aggressive hydration  - Tylenol prn myalgias, fever  - ferritin 87, crp 102, d-dimer 1580, procal 0.18, will repeat if clinically worsening every 48-72 hours  - febrile overnight- UA unimpressive for UTI, will f/u blood cx, continue to monitor for fevers, leukocytosis  - ID Dr Barajas, recs appreciated

## 2021-12-23 NOTE — PROGRESS NOTE ADULT - PROBLEM SELECTOR PLAN 7
Chronic  - Seizure precautions   - Continue home carbamazepine  - Carbamazepine level 9.1 (WNL) on admission  - Ativan prn seizure Chronic  - Seizure precautions   - Continue home carbamazepine  - Carbamazepine level 9.1 (WNL) on admission  - Ativan prn seizure  - will repeat carbamazepine level in AM prior to 6a dose

## 2021-12-24 DIAGNOSIS — M25.512 PAIN IN LEFT SHOULDER: ICD-10-CM

## 2021-12-24 DIAGNOSIS — M67.912 UNSPECIFIED DISORDER OF SYNOVIUM AND TENDON, LEFT SHOULDER: ICD-10-CM

## 2021-12-24 LAB
ALBUMIN SERPL ELPH-MCNC: 2.2 G/DL — LOW (ref 3.3–5)
ALBUMIN SERPL ELPH-MCNC: 2.3 G/DL — LOW (ref 3.3–5)
ALP SERPL-CCNC: 49 U/L — SIGNIFICANT CHANGE UP (ref 40–120)
ALP SERPL-CCNC: 49 U/L — SIGNIFICANT CHANGE UP (ref 40–120)
ALT FLD-CCNC: 19 U/L — SIGNIFICANT CHANGE UP (ref 12–78)
ALT FLD-CCNC: 19 U/L — SIGNIFICANT CHANGE UP (ref 12–78)
ANION GAP SERPL CALC-SCNC: 7 MMOL/L — SIGNIFICANT CHANGE UP (ref 5–17)
AST SERPL-CCNC: 22 U/L — SIGNIFICANT CHANGE UP (ref 15–37)
AST SERPL-CCNC: 22 U/L — SIGNIFICANT CHANGE UP (ref 15–37)
BASOPHILS # BLD AUTO: 0.03 K/UL — SIGNIFICANT CHANGE UP (ref 0–0.2)
BASOPHILS NFR BLD AUTO: 0.5 % — SIGNIFICANT CHANGE UP (ref 0–2)
BILIRUB DIRECT SERPL-MCNC: <0.1 MG/DL — SIGNIFICANT CHANGE UP (ref 0–0.3)
BILIRUB INDIRECT FLD-MCNC: >0.2 MG/DL — SIGNIFICANT CHANGE UP (ref 0.2–1)
BILIRUB SERPL-MCNC: 0.2 MG/DL — SIGNIFICANT CHANGE UP (ref 0.2–1.2)
BILIRUB SERPL-MCNC: 0.3 MG/DL — SIGNIFICANT CHANGE UP (ref 0.2–1.2)
BUN SERPL-MCNC: 29 MG/DL — HIGH (ref 7–23)
CALCIUM SERPL-MCNC: 7.8 MG/DL — LOW (ref 8.5–10.1)
CHLORIDE SERPL-SCNC: 105 MMOL/L — SIGNIFICANT CHANGE UP (ref 96–108)
CO2 SERPL-SCNC: 30 MMOL/L — SIGNIFICANT CHANGE UP (ref 22–31)
CREAT SERPL-MCNC: 0.85 MG/DL — SIGNIFICANT CHANGE UP (ref 0.5–1.3)
EOSINOPHIL # BLD AUTO: 0.05 K/UL — SIGNIFICANT CHANGE UP (ref 0–0.5)
EOSINOPHIL NFR BLD AUTO: 0.9 % — SIGNIFICANT CHANGE UP (ref 0–6)
GLUCOSE SERPL-MCNC: 108 MG/DL — HIGH (ref 70–99)
HCT VFR BLD CALC: 32.3 % — LOW (ref 34.5–45)
HGB BLD-MCNC: 10.7 G/DL — LOW (ref 11.5–15.5)
IMM GRANULOCYTES NFR BLD AUTO: 0.7 % — SIGNIFICANT CHANGE UP (ref 0–1.5)
LYMPHOCYTES # BLD AUTO: 1.04 K/UL — SIGNIFICANT CHANGE UP (ref 1–3.3)
LYMPHOCYTES # BLD AUTO: 18.6 % — SIGNIFICANT CHANGE UP (ref 13–44)
MAGNESIUM SERPL-MCNC: 2.6 MG/DL — SIGNIFICANT CHANGE UP (ref 1.6–2.6)
MCHC RBC-ENTMCNC: 30.1 PG — SIGNIFICANT CHANGE UP (ref 27–34)
MCHC RBC-ENTMCNC: 33.1 GM/DL — SIGNIFICANT CHANGE UP (ref 32–36)
MCV RBC AUTO: 91 FL — SIGNIFICANT CHANGE UP (ref 80–100)
MONOCYTES # BLD AUTO: 0.79 K/UL — SIGNIFICANT CHANGE UP (ref 0–0.9)
MONOCYTES NFR BLD AUTO: 14.2 % — HIGH (ref 2–14)
NEUTROPHILS # BLD AUTO: 3.63 K/UL — SIGNIFICANT CHANGE UP (ref 1.8–7.4)
NEUTROPHILS NFR BLD AUTO: 65.1 % — SIGNIFICANT CHANGE UP (ref 43–77)
NRBC # BLD: 0 /100 WBCS — SIGNIFICANT CHANGE UP (ref 0–0)
PHOSPHATE SERPL-MCNC: 2.4 MG/DL — LOW (ref 2.5–4.5)
PLATELET # BLD AUTO: 137 K/UL — LOW (ref 150–400)
POTASSIUM SERPL-MCNC: 3.6 MMOL/L — SIGNIFICANT CHANGE UP (ref 3.5–5.3)
POTASSIUM SERPL-SCNC: 3.6 MMOL/L — SIGNIFICANT CHANGE UP (ref 3.5–5.3)
PROT SERPL-MCNC: 5.6 G/DL — LOW (ref 6–8.3)
PROT SERPL-MCNC: 5.6 G/DL — LOW (ref 6–8.3)
RBC # BLD: 3.55 M/UL — LOW (ref 3.8–5.2)
RBC # FLD: 13.9 % — SIGNIFICANT CHANGE UP (ref 10.3–14.5)
SODIUM SERPL-SCNC: 142 MMOL/L — SIGNIFICANT CHANGE UP (ref 135–145)
WBC # BLD: 5.58 K/UL — SIGNIFICANT CHANGE UP (ref 3.8–10.5)
WBC # FLD AUTO: 5.58 K/UL — SIGNIFICANT CHANGE UP (ref 3.8–10.5)

## 2021-12-24 PROCEDURE — 73020 X-RAY EXAM OF SHOULDER: CPT | Mod: 26,LT

## 2021-12-24 PROCEDURE — 99232 SBSQ HOSP IP/OBS MODERATE 35: CPT

## 2021-12-24 PROCEDURE — 99232 SBSQ HOSP IP/OBS MODERATE 35: CPT | Mod: GC

## 2021-12-24 RX ADMIN — Medication 1 TABLET(S): at 11:48

## 2021-12-24 RX ADMIN — Medication 400 MILLIGRAM(S): at 22:17

## 2021-12-24 RX ADMIN — NYSTATIN CREAM 1 APPLICATION(S): 100000 CREAM TOPICAL at 06:10

## 2021-12-24 RX ADMIN — NYSTATIN CREAM 1 APPLICATION(S): 100000 CREAM TOPICAL at 17:26

## 2021-12-24 RX ADMIN — Medication 100 MILLIGRAM(S): at 11:48

## 2021-12-24 RX ADMIN — Medication 650 MILLIGRAM(S): at 22:45

## 2021-12-24 RX ADMIN — LOSARTAN POTASSIUM 50 MILLIGRAM(S): 100 TABLET, FILM COATED ORAL at 06:08

## 2021-12-24 RX ADMIN — REMDESIVIR 500 MILLIGRAM(S): 5 INJECTION INTRAVENOUS at 20:55

## 2021-12-24 RX ADMIN — CARVEDILOL PHOSPHATE 12.5 MILLIGRAM(S): 80 CAPSULE, EXTENDED RELEASE ORAL at 17:27

## 2021-12-24 RX ADMIN — Medication 6 MILLIGRAM(S): at 06:08

## 2021-12-24 RX ADMIN — Medication 100 MILLIGRAM(S): at 06:13

## 2021-12-24 RX ADMIN — Medication 650 MILLIGRAM(S): at 22:17

## 2021-12-24 RX ADMIN — PANTOPRAZOLE SODIUM 40 MILLIGRAM(S): 20 TABLET, DELAYED RELEASE ORAL at 11:49

## 2021-12-24 RX ADMIN — Medication 1: at 11:46

## 2021-12-24 RX ADMIN — CARVEDILOL PHOSPHATE 12.5 MILLIGRAM(S): 80 CAPSULE, EXTENDED RELEASE ORAL at 06:08

## 2021-12-24 RX ADMIN — Medication 100 MILLIGRAM(S): at 17:27

## 2021-12-24 RX ADMIN — ENOXAPARIN SODIUM 40 MILLIGRAM(S): 100 INJECTION SUBCUTANEOUS at 11:48

## 2021-12-24 NOTE — PROGRESS NOTE ADULT - PROBLEM SELECTOR PLAN 7
Chronic  - Seizure precautions   - Continue home carbamazepine  - Carbamazepine level 9.1 (WNL) on admission  - Ativan prn seizure  - will repeat carbamazepine level in AM prior to 6a dose

## 2021-12-24 NOTE — PROGRESS NOTE ADULT - PROBLEM SELECTOR PLAN 2
- likely demand ischemia in setting of seizure / resp failure due to COVID- F/u TTE  - abnormal EKG with non-specific TWI  - Trops 457 -> 2910 -> 2100, trended to peak  - HOLD aspirin given allergy  - s/p Lovenox 70mg SQ x 2  - given hx of hemorrhagic CVA, will start lovenox 40mg qd  - Cardio following [facility] group, recs appreciated

## 2021-12-24 NOTE — PROGRESS NOTE ADULT - SUBJECTIVE AND OBJECTIVE BOX
Patient is a 89y old  Female who presents with a chief complaint of L sided weaknesss (23 Dec 2021 13:58)        HPI:  Patient is a 89 yo female with PMH hemorrhagic CVA (), Type 2 DM (not on insulin), HTN, HLD, seizures, hard of hearing presents to ED with left sided weakness. Son Simone Easley reports he left the house for a while to run errands. Son called pt at 4pm and he received the answering machine. About 10 minutes later, son tried again and when there was still no answer he assumed something was wrong. He rushed home to find pt was on the floor on her left side laying down. Son assumed pt has seizure or stroke. Reports left side was weakness, drooling but able to talk. Pt wasn't confused, no incontinence. Son called EMS for assistance and pt was brought to ED. Pt only complaining of back pain at this time.      in ED:  VS: /94, HR 78, RR 16, 97F, SpO2 98% on RA   Labs significant for: trops 457.9. COVID PCR pos.   CTA head: Brain CT: No acute hemorrhage, mass effect or extra-axial collections.  Neck CTA: No hemodynamically significant stenosis.  Brain CT: No large vessel occlusion or stenosis.  Perfusion maps. No evidence for core infarct or area of brain at risk.  EKG: sinus tachy, ST wave depressions laterally   (21 Dec 2021 19:43)      SUBJECTIVE & OBJECTIVE: Pt seen and examined at bedside. complainnig of left shoulder pain     PHYSICAL EXAM:  T(C): 36.8 (21 @ 06:08), Max: 37.7 (21 @ 11:55)  HR: 72 (21 @ 06:08) (72 - 81)  BP: 132/77 (21 @ 06:08) (130/69 - 152/84)  RR: 18 (21 @ 06:08) (18 - 18)  SpO2: 99% (21 @ 06:08) (94% - 99%)  Wt(kg): --   GENERAL: left shoulder pain   NECK: Supple, No JVD  NERVOUS SYSTEM:  Alert & Oriented X3,   CHEST/LUNG: Clear to auscultation bilaterally; No rales, rhonchi, wheezing, or rubs  HEART: Regular rate and rhythm; No murmurs, rubs, or gallops  ABDOMEN: Soft, Nontender, Nondistended; Bowel sounds present  EXTREMITIES:  2+ Peripheral Pulses, No clubbing, cyanosis, or edema        MEDICATIONS  (STANDING):  carBAMazepine ER Capsule 400 milliGRAM(s) Oral at bedtime  carBAMazepine ER Tablet 100 milliGRAM(s) Oral <User Schedule>  carvedilol 12.5 milliGRAM(s) Oral every 12 hours  dexAMETHasone     Tablet 6 milliGRAM(s) Oral daily  dextrose 40% Gel 15 Gram(s) Oral once  dextrose 5%. 1000 milliLiter(s) (50 mL/Hr) IV Continuous <Continuous>  dextrose 5%. 1000 milliLiter(s) (100 mL/Hr) IV Continuous <Continuous>  dextrose 50% Injectable 25 Gram(s) IV Push once  dextrose 50% Injectable 12.5 Gram(s) IV Push once  dextrose 50% Injectable 25 Gram(s) IV Push once  enoxaparin Injectable 40 milliGRAM(s) SubCutaneous daily  glucagon  Injectable 1 milliGRAM(s) IntraMuscular once  insulin lispro (ADMELOG) corrective regimen sliding scale   SubCutaneous three times a day before meals  insulin lispro (ADMELOG) corrective regimen sliding scale   SubCutaneous at bedtime  losartan 50 milliGRAM(s) Oral daily  multivitamin/minerals 1 Tablet(s) Oral daily  nystatin Powder 1 Application(s) Topical two times a day  pantoprazole  Injectable 40 milliGRAM(s) IV Push daily  remdesivir  IVPB   IV Intermittent   remdesivir  IVPB 100 milliGRAM(s) IV Intermittent every 24 hours    MEDICATIONS  (PRN):  acetaminophen     Tablet .. 650 milliGRAM(s) Oral every 6 hours PRN Temp greater or equal to 38C (100.4F), Mild Pain (1 - 3)  aluminum hydroxide/magnesium hydroxide/simethicone Suspension 30 milliLiter(s) Oral every 4 hours PRN Dyspepsia  benzocaine 15 mG/menthol 3.6 mG (Sugar-Free) Lozenge 1 Lozenge Oral every 6 hours PRN Sore Throat  guaiFENesin Oral Liquid (Sugar-Free) 100 milliGRAM(s) Oral every 6 hours PRN Cough  melatonin 3 milliGRAM(s) Oral at bedtime PRN Insomnia  ondansetron Injectable 4 milliGRAM(s) IV Push every 8 hours PRN Nausea and/or Vomiting      LABS:                        10.7   5.58  )-----------( 137      ( 24 Dec 2021 07:58 )             32.3     -    142  |  105  |  29<H>  ----------------------------<  108<H>  3.6   |  30  |  0.85    Ca    7.8<L>      24 Dec 2021 07:58  Phos  2.4       Mg     2.6     24    TPro  5.6<L>  /  Alb  2.2<L>  /  TBili  0.2  /  DBili  <0.1  /  AST  22  /  ALT  19  /  AlkPhos  49  24      Urinalysis Basic - ( 23 Dec 2021 01:30 )    Color: Yellow / Appearance: Slightly Turbid / S.025 / pH: x  Gluc: x / Ketone: Negative  / Bili: Negative / Urobili: Negative   Blood: x / Protein: 30 mg/dL / Nitrite: Negative   Leuk Esterase: Negative / RBC: 3-5 /HPF / WBC 0-2   Sq Epi: x / Non Sq Epi: Occasional / Bacteria: Moderate      Magnesium, Serum: 2.6 mg/dL ( @ 07:58)    CAPILLARY BLOOD GLUCOSE      POCT Blood Glucose.: 132 mg/dL (24 Dec 2021 07:36)  POCT Blood Glucose.: 133 mg/dL (23 Dec 2021 21:44)  POCT Blood Glucose.: 146 mg/dL (23 Dec 2021 16:58)  POCT Blood Glucose.: 153 mg/dL (23 Dec 2021 11:21)      CAPILLARY BLOOD GLUCOSE      POCT Blood Glucose.: 132 mg/dL (24 Dec 2021 07:36)  POCT Blood Glucose.: 133 mg/dL (23 Dec 2021 21:44)  POCT Blood Glucose.: 146 mg/dL (23 Dec 2021 16:58)  POCT Blood Glucose.: 153 mg/dL (23 Dec 2021 11:21)    CAPILLARY BLOOD GLUCOSE      POCT Blood Glucose.: 132 mg/dL (24 Dec 2021 07:36)            RECENT CULTURES:      RADIOLOGY & ADDITIONAL TESTS:                        DVT/GI ppx  Discussed with pt @ bedside

## 2021-12-24 NOTE — PROGRESS NOTE ADULT - PROBLEM SELECTOR PLAN 5
Chronic,  on admission  - will restart home coreg 12.5mg po bid, losartan 50mg qd  - Monitor routine hemodynamics

## 2021-12-24 NOTE — PROGRESS NOTE ADULT - PROBLEM SELECTOR PLAN 1
- likely due to seizure  - CTA head, neck, CT c spine- all negative for acute processes  complaining of left shoulder pain, not able to alleviate, pressure and trigger point on subclavicular  will order shoulder xray   - MRI head negative for acute process, possible normal pressure hydrocephalus w/ ventricular dilation  - given 48 hrs s/p symptom onset and in setting of CT, MRI head scans (-) for acute stroke, will restart home coreg 12.5mg po bid, losartan 50mg qd. will hold off on norvasc until tomorrow  - continue diet, soft bite size w thin liquid  - F/u TTE    - Aspiration precautions  - HOLD aspirin given allergy. HOLD high dose statin as pt intolerant  - Neuro checks Q4h  - carbamazepine level within therapeutic limits, will recheck at 5a prior to AM dose  - PT/OT eval  - Neuro following Dr. Wei, recs appreciated

## 2021-12-24 NOTE — PROGRESS NOTE ADULT - SUBJECTIVE AND OBJECTIVE BOX
Neurology Follow up note    MAJOR [id number]    HPI:  Patient is a 89 yo female with PMH hemorrhagic CVA (2015), Type 2 DM (not on insulin), HTN, HLD, seizures, hard of hearing presents to ED with left sided weakness. Son Simone Perez reports he left the house for a while to run errands. Son called pt at 4pm and he received the answering machine. About 10 minutes later, son tried again and when there was still no answer he assumed something was wrong. He rushed home to find pt was on the floor on her left side laying down. Son assumed pt has seizure or stroke. Reports left side was weakness, drooling but able to talk. Pt wasn't confused, no incontinence. Son called EMS for assistance and pt was brought to ED. Pt only complaining of back pain at this time.      in ED:  VS: /94, HR 78, RR 16, 97F, SpO2 98% on RA   Labs significant for: trops 457.9. COVID PCR pos.   CTA head: Brain CT: No acute hemorrhage, mass effect or extra-axial collections.  Neck CTA: No hemodynamically significant stenosis.  Brain CT: No large vessel occlusion or stenosis.  Perfusion maps. No evidence for core infarct or area of brain at risk.  EKG: sinus tachy, ST wave depressions laterally   (21 Dec 2021 19:43)      Interval History -no sz reported    Patient is seen, chart was reviewed and case was discussed with the treatment team.  Pt is not in any distress.   Lying on bed comfortably.   No events reported overnight.       Vital Signs Last 24 Hrs  T(C): 36.8 (24 Dec 2021 06:08), Max: 37.7 (23 Dec 2021 11:55)  T(F): 98.2 (24 Dec 2021 06:08), Max: 99.9 (23 Dec 2021 11:55)  HR: 72 (24 Dec 2021 06:08) (72 - 81)  BP: 132/77 (24 Dec 2021 06:08) (130/69 - 152/84)  BP(mean): --  RR: 18 (24 Dec 2021 06:08) (18 - 18)  SpO2: 99% (24 Dec 2021 06:08) (94% - 99%)        REVIEW OF SYSTEMS:    Constitutional: No fever,   Eyes: No eye pain, visual disturbances, or discharge  ENT:  No difficulty hearing, tinnitus, vertigo; No sinus or throat pain  Neck: No pain or stiffness  Respiratory: No wheezing, chills or hemoptysis  Cardiovascular: No , palpitations, shortness of breath, dizzines  Gastrointestinal: No abdominal or epigastric pain. No nausea, vomiting or hematemesis;  Genitourinary: No dysuria, frequency, hematuria or incontinence  Neurological: No headaches,, loss of strength, numbness   Psychiatric: No d mood swings or difficulty sleepin  Skin: No itching, burning, rashes or lesions   Lymph Nodes: No enlarged glands  Endocrine: No heat or cold intolerance; No hair loss,   Allergy and Immunologic: No hives or eczema    On Neurological Examination:    Mental Status - Pt is alert, awake, oriented X3.. Follows commands well and able to answer questions appropriately.Mood and affect  normal    Speech -  Normal.     Cranial Nerves - Pupils 3 mm equal and reactive to light, extraocular eye movements intact. Pt has no visual field deficit.  Pt has no  facial asymmetry. Facial sensation is intact.Tongue - is in midline.    Muscle tone - is normal    Motor Exam - left hemiparesis old    Sensory Exam -. Pt withdraws all extremities equally on stimulation. No asymmetry seen. No complaints of tingling, numbness.      coordination:    Finger to nose: normal on right      Deep tendon Reflexes - 2 plus all over.    Neck Supple -  Yes.     MEDICATIONS    acetaminophen     Tablet .. 650 milliGRAM(s) Oral every 6 hours PRN  aluminum hydroxide/magnesium hydroxide/simethicone Suspension 30 milliLiter(s) Oral every 4 hours PRN  benzocaine 15 mG/menthol 3.6 mG (Sugar-Free) Lozenge 1 Lozenge Oral every 6 hours PRN  carBAMazepine ER Capsule 400 milliGRAM(s) Oral at bedtime  carBAMazepine ER Tablet 100 milliGRAM(s) Oral <User Schedule>  carvedilol 12.5 milliGRAM(s) Oral every 12 hours  dexAMETHasone     Tablet 6 milliGRAM(s) Oral daily  dextrose 40% Gel 15 Gram(s) Oral once  dextrose 5%. 1000 milliLiter(s) IV Continuous <Continuous>  dextrose 5%. 1000 milliLiter(s) IV Continuous <Continuous>  dextrose 50% Injectable 25 Gram(s) IV Push once  dextrose 50% Injectable 12.5 Gram(s) IV Push once  dextrose 50% Injectable 25 Gram(s) IV Push once  enoxaparin Injectable 40 milliGRAM(s) SubCutaneous daily  glucagon  Injectable 1 milliGRAM(s) IntraMuscular once  guaiFENesin Oral Liquid (Sugar-Free) 100 milliGRAM(s) Oral every 6 hours PRN  insulin lispro (ADMELOG) corrective regimen sliding scale   SubCutaneous three times a day before meals  insulin lispro (ADMELOG) corrective regimen sliding scale   SubCutaneous at bedtime  losartan 50 milliGRAM(s) Oral daily  melatonin 3 milliGRAM(s) Oral at bedtime PRN  multivitamin/minerals 1 Tablet(s) Oral daily  nystatin Powder 1 Application(s) Topical two times a day  ondansetron Injectable 4 milliGRAM(s) IV Push every 8 hours PRN  pantoprazole  Injectable 40 milliGRAM(s) IV Push daily  remdesivir  IVPB   IV Intermittent   remdesivir  IVPB 100 milliGRAM(s) IV Intermittent every 24 hours      Allergies    aspirin (Unknown)  sulfa drugs (Unknown)    Intolerances    Lipitor (Other; Muscle Pain)                            10.7   5.58  )-----------( 137      ( 24 Dec 2021 07:58 )             32.3   12-24    142  |  105  |  29<H>  ----------------------------<  108<H>  3.6   |  30  |  0.85    Ca    7.8<L>      24 Dec 2021 07:58  Phos  2.4     12-24  Mg     2.6     12-24    TPro  5.6<L>  /  Alb  2.2<L>  /  TBili  0.2  /  DBili  <0.1  /  AST  22  /  ALT  19  /  AlkPhos  49  12-24      Hemoglobin A1C:     Vitamin B12     RADIOLOGY    ASSESSMENT AND PLAN:      found on floor/ams/left sided weakness-  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  OOB to chair/ambulation with assistance only.  Pain is accessed and addressed.  Would continue to follow.

## 2021-12-24 NOTE — PROGRESS NOTE ADULT - SUBJECTIVE AND OBJECTIVE BOX
[facility] -- Bhavik Son, January Coles, Ankit Snyder Savella, [place]  Office # [phone]    Follow Up:  Elevated Troponin, s/p Fall 2/2 seizure?    Subjective/Observations: Asleep but easily awoken.  NC in use, c/o dry cough with pleuritic chest pain.  Expressed desire to go home.    REVIEW OF SYSTEMS: All other review of systems is negative unless indicated above  PAST MEDICAL & SURGICAL HISTORY:  Diabetes mellitus    Hypercholesteremia    HTN (hypertension)    CVA (cerebral infarction)    Hemorrhagic stroke    Seizure    History of arthroscopy of knee  Right    S/P cataract surgery  bilateral    History of hysterectomy    Basal cell cancer  s/p Mohs surgery    History of retinal tear  surgical repair    MEDICATIONS  (STANDING):  carBAMazepine ER Capsule 400 milliGRAM(s) Oral at bedtime  carBAMazepine ER Tablet 100 milliGRAM(s) Oral <User Schedule>  carvedilol 12.5 milliGRAM(s) Oral every 12 hours  dexAMETHasone     Tablet 6 milliGRAM(s) Oral daily  dextrose 40% Gel 15 Gram(s) Oral once  dextrose 5%. 1000 milliLiter(s) (50 mL/Hr) IV Continuous <Continuous>  dextrose 5%. 1000 milliLiter(s) (100 mL/Hr) IV Continuous <Continuous>  dextrose 50% Injectable 25 Gram(s) IV Push once  dextrose 50% Injectable 12.5 Gram(s) IV Push once  dextrose 50% Injectable 25 Gram(s) IV Push once  enoxaparin Injectable 40 milliGRAM(s) SubCutaneous daily  glucagon  Injectable 1 milliGRAM(s) IntraMuscular once  insulin lispro (ADMELOG) corrective regimen sliding scale   SubCutaneous three times a day before meals  insulin lispro (ADMELOG) corrective regimen sliding scale   SubCutaneous at bedtime  losartan 50 milliGRAM(s) Oral daily  multivitamin/minerals 1 Tablet(s) Oral daily  nystatin Powder 1 Application(s) Topical two times a day  pantoprazole  Injectable 40 milliGRAM(s) IV Push daily  remdesivir  IVPB   IV Intermittent   remdesivir  IVPB 100 milliGRAM(s) IV Intermittent every 24 hours    MEDICATIONS  (PRN):  acetaminophen     Tablet .. 650 milliGRAM(s) Oral every 6 hours PRN Temp greater or equal to 38C (100.4F), Mild Pain (1 - 3)  aluminum hydroxide/magnesium hydroxide/simethicone Suspension 30 milliLiter(s) Oral every 4 hours PRN Dyspepsia  benzocaine 15 mG/menthol 3.6 mG (Sugar-Free) Lozenge 1 Lozenge Oral every 6 hours PRN Sore Throat  guaiFENesin Oral Liquid (Sugar-Free) 100 milliGRAM(s) Oral every 6 hours PRN Cough  melatonin 3 milliGRAM(s) Oral at bedtime PRN Insomnia  ondansetron Injectable 4 milliGRAM(s) IV Push every 8 hours PRN Nausea and/or Vomiting    Allergies    aspirin (Unknown)  sulfa drugs (Unknown)    Intolerances    Lipitor (Other; Muscle Pain)    Vital Signs Last 24 Hrs  T(C): 36.8 (24 Dec 2021 12:46), Max: 37.7 (23 Dec 2021 20:44)  T(F): 98.3 (24 Dec 2021 12:46), Max: 99.9 (23 Dec 2021 20:44)  HR: 81 (24 Dec 2021 12:46) (71 - 81)  BP: 121/68 (24 Dec 2021 12:46) (119/65 - 132/77)  BP(mean): --  RR: 18 (24 Dec 2021 12:46) (18 - 18)  SpO2: 97% (24 Dec 2021 12:46) (97% - 99%)  I&O's Summary    23 Dec 2021 07:01  -  24 Dec 2021 07:00  --------------------------------------------------------  IN: 0 mL / OUT: 200 mL / NET: -200 mL    24 Dec 2021 07:01  -  24 Dec 2021 15:21  --------------------------------------------------------  IN: 240 mL / OUT: 300 mL / NET: -60 mL     PHYSICAL EXAM:  TELE: NSR  Constitutional: NAD, awake and alert, obese  HEENT: Moist Mucous Membranes, Anicteric  Pulmonary: Non-labored, breath sounds are diminished bilaterally, No wheezing, rales or rhonchi  Cardiovascular: Regular, S1 and S2, No murmurs, rubs, gallops or clicks  Gastrointestinal: Bowel Sounds present, soft, nontender.   Lymph: No peripheral edema. No lymphadenopathy.  Skin: No visible rashes or ulcers.  Psych:  Mood & affect: Flat  LABS: All Labs Reviewed:                        10.7   5.58  )-----------( 137      ( 24 Dec 2021 07:58 )             32.3                         11.4   9.15  )-----------( 167      ( 23 Dec 2021 06:54 )             35.1                         13.5   6.29  )-----------( 168      ( 22 Dec 2021 04:56 )             40.1     24 Dec 2021 07:58    142    |  105    |  29     ----------------------------<  108    3.6     |  30     |  0.85   23 Dec 2021 06:54    138    |  101    |  24     ----------------------------<  137    3.3     |  31     |  0.78   22 Dec 2021 04:56    139    |  101    |  15     ----------------------------<  145    3.7     |  30     |  0.80     Ca    7.8        24 Dec 2021 07:58  Ca    8.3        23 Dec 2021 06:54  Ca    8.6        22 Dec 2021 04:56  Phos  2.4       24 Dec 2021 07:58  Phos  3.3       22 Dec 2021 04:56  Mg     2.6       24 Dec 2021 07:58  Mg     1.7       22 Dec 2021 04:56    TPro  5.6    /  Alb  2.2    /  TBili  0.2    /  DBili  <0.1   /  AST  22     /  ALT  19     /  AlkPhos  49     24 Dec 2021 07:58  TPro  6.8    /  Alb  2.8    /  TBili  0.3    /  DBili  <0.1   /  AST  38     /  ALT  23     /  AlkPhos  61     23 Dec 2021 06:54  TPro  7.2    /  Alb  2.9    /  TBili  0.3    /  DBili  <0.1   /  AST  33     /  ALT  25     /  AlkPhos  73     22 Dec 2021 04:56       EXAM:  ECHO TTE WO CON COMP W DOPP         PROCEDURE DATE:  12/23/2021        INTERPRETATION:  INDICATION: Abnormal EKG    Blood Pressure 152/84    Height 165     Weight 68       BSA 1.75    Dimensions:  LA 2.3       Normal Values: 2.0 - 4.0 cm  Ao 3.3        Normal Values: 2.0 - 3.8 cm  SEPTUM 1.1       Normal Values: 0.6 - 1.2 cm  PWT 1.1       Normal Values: 0.6 - 1.1 cm  LVIDd 3.8         Normal Values: 3.0 - 5.6 cm  LVIDs 2.2         Normal Values: 1.8 - 4.0 cm    Derived Variables:  LVMIg/m2  RWT  Fractional Short  Ejection Fraction    Doppler Peak v. AoV= (m/sec)    OBSERVATIONS:    Mitral Valve: normal, trace physiologic MR.  Aortic Valve/Aorta: Calcified trileaflet aortic valve, without stenosis.   Mild AI.  Tricuspid Valve: normal with trace TR.  Pulmonic Valve: normal  Left Atrium: normal  Right Atrium: normal  Left Ventricle: normal LV size was likely with preserved systolic   function, estimated LVEF of 65%. The distal anterior wall and apex are   not well seen in all views, and appear hypokinetic in some views.  Right Ventricle: normal size and systolic function.  Pericardium/Pleura: no significant pleural effusion, no significant   pericardial effusion.  Pulmonary/RV Pressure: estimated PA systolic pressure of 34mmHg assuming   an RA pressure of 10 mmHg.  LV Diastolic Function: Mild diastolic dysfunction.    Impression: Normal left ventricular size and systolic function, estimated   LVEF of 65%. The distal anterior wall and apex are not well seen in all   views, and appear hypokinetic in some views. Normal right ventricular size   and systolic function. Mild diastolic dysfunction.  Normal biatrial size.   The aortic root is normal in size. The mitral valve is structurally   normal, trace physiologic MR. The aortic valve is trileaflet without   stenosis. Mild AI. Trace physiologic TR. Estimated PA systolic pressure   is 34 mm Hg. No significant pericardial effusion.    --- End of Report ---      NATAN DELGADO MD; Attending Cardiologist  This document has been electronically signed. Dec 24 2021  8:15AM    ACC: [id number] EXAM:  XR SHOULDER TRNS SCAPULA 1V LT                        ACC: [id number] EXAM:  XR CHEST PORTABLE ROUTINE 1V                          PROCEDURE DATE:  12/23/2021      INTERPRETATION:  Chest and left shoulder. Patient had a fall.Patient had   a seizure and also has bilateral rib pain and possible Covid.    Heart normal for projection.    The lung fields and pleural surfaces are unremarkable.    No visible fracture.    Chest is similar to December 21.    Left shoulder. Single view shows normal position and no fracture.    IMPRESSION: No acute finding.    --- End of Report ---    HANNAH MARISCAL MD; Attending Radiologist  This document has been electronically signed. Dec 24 2021 11:09AM       Ventricular Rate 80 BPM    Atrial Rate 80 BPM    P-R Interval 138 ms    QRS Duration 90 ms    Q-T Interval 400 ms    QTC Calculation(Bazett) 461 ms    P Axis 60 degrees    R Axis -1 degrees    T Axis 20 degrees    Diagnosis Line Normal sinus rhythm  Inferior infarct , age undetermined  Possible Anterior infarct , age undetermined  Abnormal ECG  When compared with ECG of 21-DEC-2021 17:53, (Unconfirmed)  No significant change was found  Confirmed by Sanket LYNCH, Mihai ([id number]) on 12/22/2021 11:21:17AM

## 2021-12-24 NOTE — PHYSICAL THERAPY INITIAL EVALUATION ADULT - PERTINENT HX OF CURRENT PROBLEM, REHAB EVAL
89 yo F presents to ED with left sided weakness. Pt was found by son on the floor. Brain CT: No acute hemorrhage, mass effect or extra-axial collections. Consistent with recurrence of seizure. MRI head scans (-) for acute stroke, L Shoulder x-ray: No acute finding.

## 2021-12-24 NOTE — PROGRESS NOTE ADULT - SUBJECTIVE AND OBJECTIVE BOX
[facility] DIVISION of INFECTIOUS DISEASE  Amanuel Barajas MD PhD, Anni Sutton MD, Ruby Nunez MD, Claudette Pham MD, Petros Martin MD  and providing coverage with Marilyn Singleton MD and Rocco Lake MD  Providing Infectious Disease Consultations at [facility], [facility]      Office# [phone] to schedule follow up appointments  Answering Service for urgent calls or New Consults [phone]  Cell# to text for urgent issues Amanuel Barajas [phone]     Infectious diseases progress note:    MAJOR PEREZ is a 89y y. o. Female patient    COVID Patient    Allergies    aspirin (Unknown)  sulfa drugs (Unknown)    Intolerances    Lipitor (Other; Muscle Pain)      ANTIBIOTICS/RELEVANT:  antimicrobials  remdesivir  IVPB   IV Intermittent   remdesivir  IVPB 100 milliGRAM(s) IV Intermittent every 24 hours    immunologic:    OTHER:  acetaminophen     Tablet .. 650 milliGRAM(s) Oral every 6 hours PRN  aluminum hydroxide/magnesium hydroxide/simethicone Suspension 30 milliLiter(s) Oral every 4 hours PRN  benzocaine 15 mG/menthol 3.6 mG (Sugar-Free) Lozenge 1 Lozenge Oral every 6 hours PRN  carBAMazepine ER Capsule 400 milliGRAM(s) Oral at bedtime  carBAMazepine ER Tablet 100 milliGRAM(s) Oral <User Schedule>  carvedilol 12.5 milliGRAM(s) Oral every 12 hours  dexAMETHasone     Tablet 6 milliGRAM(s) Oral daily  dextrose 40% Gel 15 Gram(s) Oral once  dextrose 5%. 1000 milliLiter(s) IV Continuous <Continuous>  dextrose 5%. 1000 milliLiter(s) IV Continuous <Continuous>  dextrose 50% Injectable 25 Gram(s) IV Push once  dextrose 50% Injectable 12.5 Gram(s) IV Push once  dextrose 50% Injectable 25 Gram(s) IV Push once  enoxaparin Injectable 40 milliGRAM(s) SubCutaneous daily  glucagon  Injectable 1 milliGRAM(s) IntraMuscular once  guaiFENesin Oral Liquid (Sugar-Free) 100 milliGRAM(s) Oral every 6 hours PRN  insulin lispro (ADMELOG) corrective regimen sliding scale   SubCutaneous three times a day before meals  insulin lispro (ADMELOG) corrective regimen sliding scale   SubCutaneous at bedtime  losartan 50 milliGRAM(s) Oral daily  melatonin 3 milliGRAM(s) Oral at bedtime PRN  multivitamin/minerals 1 Tablet(s) Oral daily  nystatin Powder 1 Application(s) Topical two times a day  ondansetron Injectable 4 milliGRAM(s) IV Push every 8 hours PRN  pantoprazole  Injectable 40 milliGRAM(s) IV Push daily      Objective:  Vital Signs Last 24 Hrs  T(C): 36.8 (24 Dec 2021 06:08), Max: 37.7 (23 Dec 2021 20:44)  T(F): 98.2 (24 Dec 2021 06:08), Max: 99.9 (23 Dec 2021 20:44)  HR: 71 (24 Dec 2021 11:31) (71 - 75)  BP: 119/65 (24 Dec 2021 11:31) (119/65 - 132/77)  BP(mean): --  RR: 18 (24 Dec 2021 06:08) (18 - 18)  SpO2: 98% (24 Dec 2021 11:31) (97% - 99%)    T(C): 36.8 (21 @ 06:08), Max: 38.4 (21 @ 22:26)  T(C): 36.8 (21 @ 06:08), Max: 38.4 (21 @ 22:26)  T(C): 36.8 (21 @ 06:08), Max: 38.4 (21 @ 22:26)    PHYSICAL EXAM:  HEENT: NC atraumatic  Neck: supple  Respiratory: no accessory muscle use, breathing comfortably  Cardiovascular: distant  Gastrointestinal: normal appearing, nondistended  Extremities: no clubbing, no cyanosis,        LABS:                          10.7   5.58  )-----------( 137      ( 24 Dec 2021 07:58 )             32.3       WBC  5.58  @ 07:58  9.15  @ 06:54  6.29  @ 04:56  8.75  @ 17:50          142  |  105  |  29<H>  ----------------------------<  108<H>  3.6   |  30  |  0.85    Ca    7.8<L>      24 Dec 2021 07:58  Phos  2.4       Mg     2.6     -    TPro  5.6<L>  /  Alb  2.2<L>  /  TBili  0.2  /  DBili  <0.1  /  AST  22  /  ALT  19  /  AlkPhos  49        Creatinine, Serum: 0.85 mg/dL (21 @ 07:58)  Creatinine, Serum: 0.78 mg/dL (21 @ 06:54)  Creatinine, Serum: 0.80 mg/dL (21 @ 04:56)  Creatinine, Serum: 0.77 mg/dL (21 @ 17:50)        Urinalysis Basic - ( 23 Dec 2021 01:30 )    Color: Yellow / Appearance: Slightly Turbid / S.025 / pH: x  Gluc: x / Ketone: Negative  / Bili: Negative / Urobili: Negative   Blood: x / Protein: 30 mg/dL / Nitrite: Negative   Leuk Esterase: Negative / RBC: 3-5 /HPF / WBC 0-2   Sq Epi: x / Non Sq Epi: Occasional / Bacteria: Moderate            COVID RISK SCORE  Auto Neutrophil #: 3.63 K/uL (21 @ 07:58)  Auto Lymphocyte #: 1.04 K/uL (21 @ 07:58)  Auto Neutrophil #: 6.75 K/uL (21 @ 06:54)  Auto Lymphocyte #: 1.08 K/uL (21 @ 06:54)  Auto Neutrophil #: 4.57 K/uL (21 @ 04:56)  Auto Lymphocyte #: 0.73 K/uL (21 @ 04:56)  Auto Neutrophil #: 7.17 K/uL (21 @ 17:50)  Auto Lymphocyte #: 0.70 K/uL (21 @ 17:50)      Auto Eosinophil #: 0.05 K/uL (21 @ 07:58)  Auto Eosinophil #: 0.00 K/uL (21 @ 06:54)  Auto Eosinophil #: 0.15 K/uL (21 @ 04:56)  Auto Eosinophil #: 0.03 K/uL (21 @ 17:50)        Procalcitonin, Serum: 0.18 ng/mL (21 @ 17:03)      Creatine Kinase, Serum: 205 U/L (21 @ 04:56)        Ferritin, Serum: 87 ng/mL (21 @ 22:49)        Activated Partial Thromboplastin Time: 25.1 sec (21 @ 17:50)  INR: 1.08 ratio (21 @ 17:50)    D-Dimer Assay, Quantitative: 1580 ng/mL DDU (21 @ 17:03)        MICROBIOLOGY:                  RADIOLOGY & ADDITIONAL STUDIES:

## 2021-12-24 NOTE — CONSULT NOTE ADULT - ASSESSMENT
[facility]  Chart Reviewed-Full Consult to follow for any immediate concerns please fell free to contact us directly at  [phone] and have us paged or text my cell # [phone]  Amanuel Barajas MD PhD  
89 year old female with PMH hemorrhagic CVA (2015), Type 2 DM (not on insulin), HTN, HLD, seizure disorder, hearing impairment presents to ED s/p unwitnessed episode resulting in fall onto L side and L sided weakness. Patient incidentally found to be COVID+ with no sick contacts or URI symptoms prior to admission.   - Patient is not complaining of any cardiac symptoms at this time.  - Nonspecific elevation of trops, trend CE   - EKG shows nonspecific T-wave changes, non diagnostic  - No meaningful evidence of atherosclerotic plaque rupture, volume overload, dysrhythmia   - F/u Echo  - COVID treatment per primary team  - Other cardiovascular workup will depend on clinical course.  - All other workup per primary team.            
89 yo female with PMH hemorrhagic CVA (2015), Type 2 DM (not on insulin), HTN, HLD, seizures, hard of hearing presents to ED with left sided weakness. Pt reports she had a seizure. Reports left side weakness, drooling but able to talk. Pt wasn't confused, no incontinence.  COVID PCR pos. pt vaccinated with booster    RECOMMENDATIONS  12/21 86% on RA, req NC-4L, Remdesivir started with plan for 5 days so last day 12/25, Dexamethasone started with plan for 10 days so last day 12/30,  12/22-NC-4L, NLR 4.57/0.73=6.3, coordinate with neuro regarding VTE recommendations, continue oxygen, with triple vax optimistic regarding outcome but pt does have risk factors such as age, DM, HTN, change steroids to PO    COVID-19-high risk for decompensation EARLY INFLAMMATORY PHASE (7-14 days post symptom onset),    REMDESIVIR-5 day course consider within 10 days of symptom onset, sats<94% on RA and prior to need for high flow oxygen or mech ventilation, Criteria for use include:  • ALT and AST < 10X ULN   STEROIDs recommended AFTER 1st week of symptom onset for any patients that are hypoxic  and  with dexamethasone 6mg  Q-day x 10 days (equivalent to solumedrol 32mg IV or Prednisone 40mg)-higher doses to be considered in more severe disease,   ANTICOAGULATION- most current data and guidelines suggesting prophylactic dosing w LMWH 40mg SQ Qday or higher if adjusted based on BMI but full dose to be considered in patients with increased risk for thromboembolic complications if bleeding risks are considered acceptable or prophylactic if elevated bleeding risks -heparin for hemodialysis patients, current SIA guidelines suggest to only consider discharge on oral anticoagulation with rivaroxaban (Xarelto) 10mg PO QD or Eliquis (apixaban) 2.5mg PO BID if meeting criteria for use based on non-COVID issues   TOCILIZUMAB may be considered for patients during the early inflammatory phase (day7-14-and less than 48hrs at ICU level care) progressing due to COVID w increasing oxygen support after start of steroids, would not use without steroids or past the early inflammatory period (use less than 14 days since symptom onset or initial +PCR), caution regarding use with active infection, other immunosuppressants, GI perforation, ANC<1000, Plts<50k, AST/ALT>5xULN, dosing <40kg-8mg/kg, [id number]-543qw, >75-20ue-431bd, >90kg-800mg, in studies if fails to respond can give second dose within next 12-24 hours  CONVALESCENT PLASMA-no clear benefit in COVID-19 despite extensive investigations  MONOCLONAL ANTIBODY THERAPY- critical role if give within first 7-10 days post symptom onset prior to onset of early inflammatory phase, mortality benefit in RECOVERY Trial only in hospitalized patients still serology negative but may be harmful if given late in hospitalized patients-EUA limits in hospital use  -daily, BMP, CBC w diff to follow NLR and in some contexts daily or periodic D-dimer levels, -other labs to risk stratify or with any decompensation  Procalcitonin,  Ferritin,  CRP, ESR, PT/PTT but limit excessive testing -antibiotics only if there is concern for a bacterial process (noted to be an issue in only a minority on presentation, rec full eval with ferritin procalcitonin ratio (FPR) <1000 suggesting bacterial process  (consider proning and tolerating lower oxygen saturation to avoid intubation).  All guidelines qualified with need to treat each patient based on their individual profile, phase of disease and most current data available    Thank you for consulting us and involving us in the management of this most interesting and challenging case.  We will follow along in the care of this patient. Please call us at [phone] or text me directly on my cell# at [phone] with any concerns.  
89F with L shoulder pain likely secondary to rotator cuff tendinitis following L sided trauma during presumed fall secondary to seizure    Discussed with patient variety of options for pain management including topical, oral, and interventional approach with intra-articular injection. Given her ongoing treatment for covid19, patient is already on anti-inflammatory steroids and on anticoagulation so will defer NSAIDs. She is strongly against trying even low dose opioids including oxy/percocet/tramadol. She is agreeable to lidocaine patch to the left shoulder and with standing ES tylenol TID. Literature not clear regarding risk/benefit profile of intra-articular injection during acute covid19 infection however when I discussed it with patient she was also not very thrilled with the idea. We have agreed to trial this conservative approach and as her condition improves regarding covid19, we will consider injection if her pain is ongoing. Can also utilize warm compresses to the left shoulder. Will continue to monitor.

## 2021-12-24 NOTE — PROGRESS NOTE ADULT - ASSESSMENT
89 year old female with PMH hemorrhagic CVA (2015), Type 2 DM (not on insulin), HTN, HLD, seizure disorder, hearing impairment presents to ED s/p unwitnessed episode resulting in fall onto L side and L sided weakness. Patient incidentally found to be COVID+ with no sick contacts or URI symptoms prior to admission. Cardiology consulted for elevated troponin.     Cardiac summary  TTE: (3/3/2019): normal LVSF, calcified trileaflet AV with normal systolic opening    Elevated trop   - High sensitivity Trop I: elevated, peaked and trended down  - Her chest pain is more pleuritic than cardiac  - EKG shows nonspecific T-wave changes, non-diagnostic.  Her TT showed normal LV/RVF with SWMA, and mild DD, no significant valvular disease  - Unable to start ASA in the setting of allergy.    - No meaningful evidence of volume overload  - BP stable and controlled.  Continue ARB and BB  - No arrhythmias on tele  - Monitor and replete Lytes. Keep K > 4 and Mg > 2    - Will continue to follow.  Will need ischemic eval as outpatient if in line with her wishes.      Marce Sanchez DNP, NP-C  Cardiology   Spectra #[phone]

## 2021-12-24 NOTE — PROGRESS NOTE ADULT - PROBLEM SELECTOR PLAN 6
Chronic   - Pt has intolerance to therapeutic interchange of home rosuvastatin. Will hold statin for now  - lipid panel 12/22/21 cholesterol 220, non HDL cholesterol 144, , TG and HDL WNL

## 2021-12-24 NOTE — PROGRESS NOTE ADULT - PROBLEM SELECTOR PLAN 3
- Acute hypoxic respiratory failure 2/2 confirmed COVID-19 infection  - Patient vaccinated with: pfizer x2, moderna booster  - Supp O2 prn maintain O2 sats >88%. Prone PRN  - Continue Decadron 6mg daily for 10 days, last day 12/30/21 + protonix 40mg qd as ppx in setting of steroid use  - Continue remdesivir for 5 days, last day 12/25/21  - Monitor volume status closely, avoid aggressive hydration  - Tylenol prn myalgias, fever  - ferritin 87, crp 102, d-dimer 1580, procal 0.18, will repeat if clinically worsening every 48-72 hours  - febrile overnight- UA unimpressive for UTI, will f/u blood cx, continue to monitor for fevers, leukocytosis  - ID Dr Barajas, recs appreciated

## 2021-12-24 NOTE — CONSULT NOTE ADULT - SUBJECTIVE AND OBJECTIVE BOX
[facility]         Bhavik Son, Sanket, January, Claudio, Travon Pham        [phone] (office)    Reason for Consult: Elevated Trops    HPI:  89 year old female with PMH hemorrhagic CVA (2015), Type 2 DM (not on insulin), HTN, HLD, seizure disorder, hearing impairment presents to ED w left sided weakness. Patient's son reports patient was alone from 2-4pm while he was out of the house. Prior to 2pm, she was in good health. When patient's son returned at 4pm, he found her laying on her L side on the kitchen floor. He reports she was conscious, drooling, able to speak without slurring her speech and she had one episode of urinary incontinence. She denied preceding dizziness, headache, chest pain, shortness of breath. Patient follows with outpatient Cardio (Dr. Sagastume) and last saw him in Sep/Oct of this year.       in ED:  VS: /94, HR 78, RR 16, 97F, SpO2 98% on RA   Labs significant for: trops 457.9. COVID PCR pos.   CTA head: Brain CT: No acute hemorrhage, mass effect or extra-axial collections.  Neck CTA: No hemodynamically significant stenosis.  Brain CT: No large vessel occlusion or stenosis.  Perfusion maps. No evidence for core infarct or area of brain at risk.  EKG: sinus tachy, ST wave depressions laterally   (21 Dec 2021 19:43)      PAST MEDICAL & SURGICAL HISTORY:  Diabetes mellitus    Hypercholesteremia    HTN (hypertension)    CVA (cerebral infarction)    Hemorrhagic stroke    Seizure    History of arthroscopy of knee  Right    S/P cataract surgery  bilateral    History of hysterectomy    Basal cell cancer  s/p Mohs surgery    History of retinal tear  surgical repair        SOCIAL HISTORY: No active tobacco, alcohol or illicit drug use    FAMILY HISTORY:  FHx: heart disease    Family history of basal cell carcinoma        Home Medications:  amLODIPine 5 mg oral tablet: 1 tab(s) orally once a day (21 Dec 2021 18:27)  carBAMazepine 100 mg oral capsule, extended release: 1 cap(s) orally 3 times a day (21 Dec 2021 18:27)  carBAMazepine 200 mg oral capsule, extended release: 2 cap(s) orally once a day (at bedtime) (21 Dec 2021 18:27)  carvedilol 12.5 mg oral tablet: 1 tab(s) orally every 12 hours (21 Dec 2021 18:27)  Centrum Silver oral tablet: 1 tab(s) orally once a day (21 Dec 2021 18:27)  Co Q-10 100 mg oral capsule: 1 cap(s) orally once a day (21 Dec 2021 18:27)  irbesartan 150 mg oral tablet: 1 tab(s) orally 2 times a day (21 Dec 2021 18:27)  metFORMIN 500 mg oral tablet, extended release: 2 tab(s) orally once a day (after a meal) (21 Dec 2021 18:27)  rosuvastatin 10 mg oral tablet: 1 tab(s) orally once a day (at bedtime) (21 Dec 2021 18:27)      MEDICATIONS  (STANDING):  carBAMazepine ER Capsule 400 milliGRAM(s) Oral at bedtime  carBAMazepine ER Tablet 100 milliGRAM(s) Oral <User Schedule>  dexAMETHasone     Tablet 6 milliGRAM(s) Oral daily  dextrose 40% Gel 15 Gram(s) Oral once  dextrose 5%. 1000 milliLiter(s) (50 mL/Hr) IV Continuous <Continuous>  dextrose 5%. 1000 milliLiter(s) (100 mL/Hr) IV Continuous <Continuous>  dextrose 50% Injectable 25 Gram(s) IV Push once  dextrose 50% Injectable 12.5 Gram(s) IV Push once  dextrose 50% Injectable 25 Gram(s) IV Push once  glucagon  Injectable 1 milliGRAM(s) IntraMuscular once  insulin lispro (ADMELOG) corrective regimen sliding scale   SubCutaneous three times a day before meals  insulin lispro (ADMELOG) corrective regimen sliding scale   SubCutaneous at bedtime  multivitamin/minerals 1 Tablet(s) Oral daily  pantoprazole  Injectable 40 milliGRAM(s) IV Push daily  remdesivir  IVPB   IV Intermittent   remdesivir  IVPB 100 milliGRAM(s) IV Intermittent every 24 hours    MEDICATIONS  (PRN):  acetaminophen     Tablet .. 650 milliGRAM(s) Oral every 6 hours PRN Temp greater or equal to 38C (100.4F), Mild Pain (1 - 3)  aluminum hydroxide/magnesium hydroxide/simethicone Suspension 30 milliLiter(s) Oral every 4 hours PRN Dyspepsia  benzocaine 15 mG/menthol 3.6 mG (Sugar-Free) Lozenge 1 Lozenge Oral every 6 hours PRN Sore Throat  guaiFENesin Oral Liquid (Sugar-Free) 100 milliGRAM(s) Oral every 6 hours PRN Cough  melatonin 3 milliGRAM(s) Oral at bedtime PRN Insomnia  ondansetron Injectable 4 milliGRAM(s) IV Push every 8 hours PRN Nausea and/or Vomiting      Allergies    aspirin (Unknown)  sulfa drugs (Unknown)    Intolerances    Lipitor (Other; Muscle Pain)      REVIEW OF SYSTEMS: Negative except as per HPI.    VITAL SIGNS:   Vital Signs Last 24 Hrs  T(C): 36.8 (22 Dec 2021 07:40), Max: 37.4 (22 Dec 2021 05:40)  T(F): 98.2 (22 Dec 2021 07:40), Max: 99.3 (22 Dec 2021 05:40)  HR: 74 (22 Dec 2021 07:40) (74 - 106)  BP: 130/60 (22 Dec 2021 07:40) (130/60 - 188/101)  BP(mean): --  RR: 18 (22 Dec 2021 07:40) (16 - 20)  SpO2: 96% (22 Dec 2021 07:40) (86% - 100%)    I&O's Summary      PHYSICAL EXAM:  Constitutional: NAD, elderly  HEENT: NC/AT, EOMI  Pulmonary: Non-labored, no wheezing, rales or rhonchi  Cardiovascular: +S1, S2, RRR  Gastrointestinal: Soft, nontender, nondistended  Extremities: No peripheral edema   Neurological: Awake, alert, conversant  Psych: Mood & affect appropriate    LABS: All Labs Reviewed:                        13.5   6.29  )-----------( 168      ( 22 Dec 2021 04:56 )             40.1                         12.1   8.75  )-----------( 174      ( 21 Dec 2021 17:50 )             35.5     22 Dec 2021 04:56    139    |  101    |  15     ----------------------------<  145    3.7     |  30     |  0.80   21 Dec 2021 17:50    138    |  103    |  17     ----------------------------<  172    3.9     |  30     |  0.77     Ca    8.6        22 Dec 2021 04:56  Ca    8.1        21 Dec 2021 17:50  Phos  3.3       22 Dec 2021 04:56  Mg     1.7       22 Dec 2021 04:56    TPro  7.2    /  Alb  2.9    /  TBili  0.3    /  DBili  <0.1   /  AST  33     /  ALT  25     /  AlkPhos  73     22 Dec 2021 04:56  TPro  6.6    /  Alb  2.7    /  TBili  0.2    /  DBili  <0.1   /  AST  29     /  ALT  22     /  AlkPhos  70     21 Dec 2021 17:50    PT/INR - ( 21 Dec 2021 17:50 )   PT: 12.6 sec;   INR: 1.08 ratio         PTT - ( 21 Dec 2021 17:50 )  PTT:25.1 sec  CARDIAC MARKERS ( 22 Dec 2021 04:56 )  x     / x     / 205 U/L / x     / 9.5 ng/mL      Blood Culture:         EKG:    RADIOLOGY:    CXR:  
[facility] DIVISION of  INFECTIOUS DISEASE  Amanuel Barajas MD PhD, Anni Sutton MD, Ruby Nunez MD, Claudette Pham MD, Petros Martin MD  and providing coverage with Marilyn Singleton MD and Rocco Lake MD  Providing Infectious Disease Consultations at [facility], [facility]'s    Office# [phone] to schedule follow up appointments  Answering Service for urgent calls or New Consults [phone]  Cell# to text for urgent issues Amanuel Barajas [phone]     HPI:  Patient is a 89 yo female with PMH hemorrhagic CVA (2015), Type 2 DM (not on insulin), HTN, HLD, seizures, hard of hearing presents to ED with left sided weakness. Son Simone Easley reports he left the house for a while to run errands. Son called pt at 4pm and he received the answering machine. About 10 minutes later, son tried again and when there was still no answer he assumed something was wrong. He rushed home to find pt was on the floor on her left side laying down. Son assumed pt has seizure or stroke. Reports left side was weakness, drooling but able to talk. Pt wasn't confused, no incontinence. Son called EMS for assistance and pt was brought to ED. Pt only complaining of back pain at this time.      in ED:  VS: /94, HR 78, RR 16, 97F, SpO2 98% on RA   Labs significant for: trops 457.9. COVID PCR pos.   CTA head: Brain CT: No acute hemorrhage, mass effect or extra-axial collections.  Neck CTA: No hemodynamically significant stenosis.  Brain CT: No large vessel occlusion or stenosis.  Perfusion maps. No evidence for core infarct or area of brain at risk.  EKG: sinus tachy, ST wave depressions laterally   (21 Dec 2021 19:43)      PAST MEDICAL & SURGICAL HISTORY:  Diabetes mellitus  Hypercholesteremia  HTN (hypertension)  CVA (cerebral infarction)  Hemorrhagic stroke  Seizure    History of arthroscopy of knee  Right    S/P cataract surgery  bilateral    History of hysterectomy    Basal cell cancer  s/p Mohs surgery    History of retinal tear  surgical repair    Antimicrobials  remdesivir  IVPB   IV Intermittent   remdesivir  IVPB 100 milliGRAM(s) IV Intermittent every 24 hours      Immunological      Other  acetaminophen     Tablet .. 650 milliGRAM(s) Oral every 6 hours PRN  aluminum hydroxide/magnesium hydroxide/simethicone Suspension 30 milliLiter(s) Oral every 4 hours PRN  benzocaine 15 mG/menthol 3.6 mG (Sugar-Free) Lozenge 1 Lozenge Oral every 6 hours PRN  carBAMazepine ER Capsule 400 milliGRAM(s) Oral at bedtime  carBAMazepine ER Tablet 100 milliGRAM(s) Oral <User Schedule>  dexAMETHasone  Injectable 6 milliGRAM(s) IV Push daily  dextrose 40% Gel 15 Gram(s) Oral once  dextrose 5%. 1000 milliLiter(s) IV Continuous <Continuous>  dextrose 5%. 1000 milliLiter(s) IV Continuous <Continuous>  dextrose 50% Injectable 25 Gram(s) IV Push once  dextrose 50% Injectable 12.5 Gram(s) IV Push once  dextrose 50% Injectable 25 Gram(s) IV Push once  glucagon  Injectable 1 milliGRAM(s) IntraMuscular once  guaiFENesin Oral Liquid (Sugar-Free) 100 milliGRAM(s) Oral every 6 hours PRN  insulin lispro (ADMELOG) corrective regimen sliding scale   SubCutaneous three times a day before meals  insulin lispro (ADMELOG) corrective regimen sliding scale   SubCutaneous at bedtime  melatonin 3 milliGRAM(s) Oral at bedtime PRN  multivitamin/minerals 1 Tablet(s) Oral daily  ondansetron Injectable 4 milliGRAM(s) IV Push every 8 hours PRN      Allergies    aspirin (Unknown)  sulfa drugs (Unknown)    Intolerances    Lipitor (Other; Muscle Pain)      SOCIAL HISTORY:  Social History:  Tobacco: Denies  EtOH: Denies   Recreational drug use: Denies  Lives with: son Simone  Ambulates: unassisted   Can cook and bath herself   Occupation: retired  Flu vaccine  Pfizer x2, Moderna booster (21 Dec 2021 19:43)      FAMILY HISTORY:  FHx: heart disease    Family history of basal cell carcinoma        ROS:    limited    Vital Signs Last 24 Hrs  T(C): 36.8 (22 Dec 2021 07:40), Max: 37.4 (22 Dec 2021 05:40)  T(F): 98.2 (22 Dec 2021 07:40), Max: 99.3 (22 Dec 2021 05:40)  HR: 74 (22 Dec 2021 07:40) (74 - 106)  BP: 130/60 (22 Dec 2021 07:40) (130/60 - 188/101)  BP(mean): --  RR: 18 (22 Dec 2021 07:40) (16 - 20)  SpO2: 96% (22 Dec 2021 07:40) (86% - 100%)    PE:    HEENT:  NC, atraumatic  Lungs:  No accessory muscle use, breathing comfortably  Cor:  distant  Abd:  Symmetric, appears normal,   Extrem:  No cyanosis        LABS:                        13.5   6.29  )-----------( 168      ( 22 Dec 2021 04:56 )             40.1       WBC Count: 6.29 K/uL (12-22-21 @ 04:56)  WBC Count: 8.75 K/uL (12-21-21 @ 17:50)      12-22    139  |  101  |  15  ----------------------------<  145<H>  3.7   |  30  |  0.80    Ca    8.6      22 Dec 2021 04:56  Phos  3.3     12-22  Mg     1.7     12-22    TPro  7.2  /  Alb  2.9<L>  /  TBili  0.3  /  DBili  x   /  AST  33  /  ALT  25  /  AlkPhos  73  12-22      Creatinine, Serum: 0.80 mg/dL (12-22-21 @ 04:56)  Creatinine, Serum: 0.77 mg/dL (12-21-21 @ 17:50)              COVID RISK SCORE:  Auto Neutrophil #: 4.57 K/uL (12-22-21 @ 04:56)  Auto Lymphocyte #: 0.73 K/uL (12-22-21 @ 04:56)  Auto Neutrophil #: 7.17 K/uL (12-21-21 @ 17:50)  Auto Lymphocyte #: 0.70 K/uL (12-21-21 @ 17:50)      Auto Eosinophil #: 0.15 K/uL (12-22-21 @ 04:56)  Auto Eosinophil #: 0.03 K/uL (12-21-21 @ 17:50)            Creatine Kinase, Serum: 205 U/L (12-22-21 @ 04:56)              Activated Partial Thromboplastin Time: 25.1 sec (12-21-21 @ 17:50)  INR: 1.08 ratio (12-21-21 @ 17:50)          MICROBIOLOGY:    Flu With COVID-19 By MAXIMILIAN (12.21.21 @ 17:50)    SARS-CoV-2 Result: Detected:     Influenza A Result: NotDete    Influenza B Result: NotDetec    Resp Syn Virus Result: NotDetec        RADIOLOGY & ADDITIONAL STUDIES:    --< from: Xray Chest 1 View- PORTABLE-Urgent (12.21.21 @ 19:08) >  ACC: [id number] EXAM:  XR CHEST PORTABLE URGENT 1V                          PROCEDURE DATE:  12/21/2021          INTERPRETATION:  AP semierect chest on December 21, 2021 at 6:52 PM. This   is a stroke code.    Heart magnified by technique. Lungs remain clear.    Chest is similar to August 6, 2020.    IMPRESSION: No acute finding or change.    
Physical Medicine and Rehabilitation Initial Evaluation    Patients acute care records reviewed and are summarized as follows:     Patient is a 89F with past medical history including hemorrhagic CVA, DM2, HTN, HLD, seizure disorder who is admitted to acute care for left sided weakness in setting of history of seizure disorder, patients workup negative for stroke so weakness was felt to be related to seizures. She is continued on her anti seizure medications and is recommended ativan for breakthrough seizures. She also is confirmed positive for covid19 for which she is on remdesivir, dexamethasone, anticoagulation, supplemental oxygen. Of note, patient was found on the floor at home by her son with subsequent left sided weakness, presumably patient fell and sustained some left sided trauma particularly in left upper extremity at the shoulder. She is being referred for L shoulder pain. Her HTN is stable on oral antihypertensives, HLD is treated with statin but is being held at this time due to intolerance, DM2 medically managed.    Radiological studies reviewed, including XR of the L shoulder negative for any fx.    Medical studies/laboratory studies reviewed, including:                          10.7   5.58  )-----------( 137      ( 24 Dec 2021 07:58 )             32.3   12-24    142  |  105  |  29<H>  ----------------------------<  108<H>  3.6   |  30  |  0.85    Ca    7.8<L>      24 Dec 2021 07:58  Phos  2.4     12-24  Mg     2.6     12-24    TPro  5.6<L>  /  Alb  2.2<L>  /  TBili  0.2  /  DBili  <0.1  /  AST  22  /  ALT  19  /  AlkPhos  49  12-24      The patient was seen and examined at bedside. Complains of left shoulder pain, localized and nonradiating, worse with range of motion particularly towards 90 degrees and overhead motion both actively and passively although actively the range is limited to about 60 degrees or so of flexion/abduction. External rotation is largely preserved. Patient with no associated symptoms, no numbness, no tingling. She states she fell on her left side and has had the pain since. There is no pain anywhere else in the LUE. Severity is 6/10 at this time.    Functionally patient previously independent with ADL's and mobility, lives with her son.    ROS:  Constitutional: Denies fevers or chills  Eyes: Denies blurry vision or double vision  Ears/Nose/Mouth/Throat: Denies any pain on swallowing or dry mouth or runny nose  CV: Denies chest pain or palpitations  Respiratory: Denies cough or dyspnea  GI: Denies nausea, vomiting, abdominal pain  : Denies urinary frequency or dysuria  MSK: L shoulder pain  Neuro: Denies any headache or dizziness  Psychiatric: Denies depression or anxiety    PM, Social, Family Hx: as above in HPI, Family history reviewed and found to be non pertinent to patients current disposition, denies history of alcohol, illicit drug use, tobacco use.    Physical Exam:   Vitals: Vital Signs Last 24 Hrs  T(C): 36.8 (24 Dec 2021 12:46), Max: 37.7 (23 Dec 2021 20:44)  T(F): 98.3 (24 Dec 2021 12:46), Max: 99.9 (23 Dec 2021 20:44)  HR: 79 (24 Dec 2021 17:28) (71 - 81)  BP: 122/61 (24 Dec 2021 17:28) (119/65 - 132/77)  BP(mean): --  RR: 18 (24 Dec 2021 12:46) (18 - 18)  SpO2: 97% (24 Dec 2021 12:46) (97% - 99%)    Constitutional: Gen: In no acute distress, cooperative with exam and questioning   Eyes: PERRL, no erythema of conjunctivae  Ears/Nose/Mouth/Throat: Mucous membranes moist, no thrush, no rhinorrhea  CV: Regular rate and rhythm, S1 S2, bilateral lower extremity pitting peripheral edema, pedal pulses intact  Resp: Good respiratory effort, supplemental NC O2 in place, coarse breath sounds diffusely particularly bilateral upper airways, positive rhonchi bilateral upper airways  GI: Nontender, normoactive bowel sounds  Neuro: Cranial Nerves II-XII intact, sensation intact to light touch in peripheral upper and reduced in peripheral lower extremities  MSK: No cyanosis or clubbing of nails, strength exam 4- to 4/5 in RUE and RLE, 3 to 4-/5 in left upper and lower extremities with exception of L shoulder limited due to pain, ROM full in all extremities passively with exception of L shoulder above about 60 degrees there is sharp pain. Patient with difficulty holding her arm in abduction, and even less so when force is applied to it. External rotation is largely preserved, there is no pain on passive ROM in any other planes except flexion and abduction above about 60 degrees, no point tenderness of scapula, acromion process.   Neck: No midline tenderness to palpation, supple  Skin: No rashes on limbs, normal temperature on palpation  Psychiatric: Awake alert fully oriented

## 2021-12-24 NOTE — PHYSICAL THERAPY INITIAL EVALUATION ADULT - RANGE OF MOTION EXAMINATION, REHAB EVAL
LUE decrease 50-75%/Right UE ROM was WFL (within functional limits)/bilateral lower extremity ROM was WFL (within functional limits)

## 2021-12-25 LAB
ALBUMIN SERPL ELPH-MCNC: 2.1 G/DL — LOW (ref 3.3–5)
ALBUMIN SERPL ELPH-MCNC: 2.2 G/DL — LOW (ref 3.3–5)
ALP SERPL-CCNC: 48 U/L — SIGNIFICANT CHANGE UP (ref 40–120)
ALP SERPL-CCNC: 49 U/L — SIGNIFICANT CHANGE UP (ref 40–120)
ALT FLD-CCNC: 19 U/L — SIGNIFICANT CHANGE UP (ref 12–78)
ALT FLD-CCNC: 19 U/L — SIGNIFICANT CHANGE UP (ref 12–78)
ANION GAP SERPL CALC-SCNC: 6 MMOL/L — SIGNIFICANT CHANGE UP (ref 5–17)
AST SERPL-CCNC: 14 U/L — LOW (ref 15–37)
AST SERPL-CCNC: 17 U/L — SIGNIFICANT CHANGE UP (ref 15–37)
BILIRUB DIRECT SERPL-MCNC: <0.1 MG/DL — SIGNIFICANT CHANGE UP (ref 0–0.3)
BILIRUB INDIRECT FLD-MCNC: >0.2 MG/DL — SIGNIFICANT CHANGE UP (ref 0.2–1)
BILIRUB SERPL-MCNC: 0.2 MG/DL — SIGNIFICANT CHANGE UP (ref 0.2–1.2)
BILIRUB SERPL-MCNC: 0.3 MG/DL — SIGNIFICANT CHANGE UP (ref 0.2–1.2)
BUN SERPL-MCNC: 28 MG/DL — HIGH (ref 7–23)
CALCIUM SERPL-MCNC: 7.9 MG/DL — LOW (ref 8.5–10.1)
CHLORIDE SERPL-SCNC: 107 MMOL/L — SIGNIFICANT CHANGE UP (ref 96–108)
CO2 SERPL-SCNC: 30 MMOL/L — SIGNIFICANT CHANGE UP (ref 22–31)
CREAT SERPL-MCNC: 0.75 MG/DL — SIGNIFICANT CHANGE UP (ref 0.5–1.3)
GLUCOSE SERPL-MCNC: 167 MG/DL — HIGH (ref 70–99)
HCT VFR BLD CALC: 31.3 % — LOW (ref 34.5–45)
HGB BLD-MCNC: 10.3 G/DL — LOW (ref 11.5–15.5)
MCHC RBC-ENTMCNC: 29.9 PG — SIGNIFICANT CHANGE UP (ref 27–34)
MCHC RBC-ENTMCNC: 32.9 GM/DL — SIGNIFICANT CHANGE UP (ref 32–36)
MCV RBC AUTO: 91 FL — SIGNIFICANT CHANGE UP (ref 80–100)
NRBC # BLD: 0 /100 WBCS — SIGNIFICANT CHANGE UP (ref 0–0)
PLATELET # BLD AUTO: 141 K/UL — LOW (ref 150–400)
POTASSIUM SERPL-MCNC: 3.7 MMOL/L — SIGNIFICANT CHANGE UP (ref 3.5–5.3)
POTASSIUM SERPL-SCNC: 3.7 MMOL/L — SIGNIFICANT CHANGE UP (ref 3.5–5.3)
PROT SERPL-MCNC: 5.4 G/DL — LOW (ref 6–8.3)
PROT SERPL-MCNC: 5.5 G/DL — LOW (ref 6–8.3)
RBC # BLD: 3.44 M/UL — LOW (ref 3.8–5.2)
RBC # FLD: 13.7 % — SIGNIFICANT CHANGE UP (ref 10.3–14.5)
SODIUM SERPL-SCNC: 143 MMOL/L — SIGNIFICANT CHANGE UP (ref 135–145)
WBC # BLD: 6.45 K/UL — SIGNIFICANT CHANGE UP (ref 3.8–10.5)
WBC # FLD AUTO: 6.45 K/UL — SIGNIFICANT CHANGE UP (ref 3.8–10.5)

## 2021-12-25 PROCEDURE — 99232 SBSQ HOSP IP/OBS MODERATE 35: CPT

## 2021-12-25 RX ORDER — ACETAMINOPHEN 500 MG
650 TABLET ORAL EVERY 8 HOURS
Refills: 0 | Status: DISCONTINUED | OUTPATIENT
Start: 2021-12-25 | End: 2021-12-31

## 2021-12-25 RX ORDER — LIDOCAINE 4 G/100G
1 CREAM TOPICAL DAILY
Refills: 0 | Status: DISCONTINUED | OUTPATIENT
Start: 2021-12-25 | End: 2021-12-26

## 2021-12-25 RX ADMIN — REMDESIVIR 500 MILLIGRAM(S): 5 INJECTION INTRAVENOUS at 21:09

## 2021-12-25 RX ADMIN — PANTOPRAZOLE SODIUM 40 MILLIGRAM(S): 20 TABLET, DELAYED RELEASE ORAL at 13:20

## 2021-12-25 RX ADMIN — Medication 650 MILLIGRAM(S): at 06:09

## 2021-12-25 RX ADMIN — Medication 650 MILLIGRAM(S): at 06:42

## 2021-12-25 RX ADMIN — Medication 1 TABLET(S): at 13:20

## 2021-12-25 RX ADMIN — Medication 650 MILLIGRAM(S): at 22:24

## 2021-12-25 RX ADMIN — Medication 100 MILLIGRAM(S): at 18:29

## 2021-12-25 RX ADMIN — ENOXAPARIN SODIUM 40 MILLIGRAM(S): 100 INJECTION SUBCUTANEOUS at 13:20

## 2021-12-25 RX ADMIN — LOSARTAN POTASSIUM 50 MILLIGRAM(S): 100 TABLET, FILM COATED ORAL at 05:58

## 2021-12-25 RX ADMIN — Medication 6 MILLIGRAM(S): at 05:59

## 2021-12-25 RX ADMIN — CARVEDILOL PHOSPHATE 12.5 MILLIGRAM(S): 80 CAPSULE, EXTENDED RELEASE ORAL at 18:29

## 2021-12-25 RX ADMIN — NYSTATIN CREAM 1 APPLICATION(S): 100000 CREAM TOPICAL at 05:59

## 2021-12-25 RX ADMIN — Medication 1: at 16:59

## 2021-12-25 RX ADMIN — Medication 1: at 12:09

## 2021-12-25 RX ADMIN — Medication 650 MILLIGRAM(S): at 23:00

## 2021-12-25 RX ADMIN — Medication 100 MILLIGRAM(S): at 06:00

## 2021-12-25 RX ADMIN — NYSTATIN CREAM 1 APPLICATION(S): 100000 CREAM TOPICAL at 18:29

## 2021-12-25 RX ADMIN — Medication 400 MILLIGRAM(S): at 22:24

## 2021-12-25 RX ADMIN — Medication 100 MILLIGRAM(S): at 13:20

## 2021-12-25 RX ADMIN — CARVEDILOL PHOSPHATE 12.5 MILLIGRAM(S): 80 CAPSULE, EXTENDED RELEASE ORAL at 05:58

## 2021-12-25 NOTE — PROGRESS NOTE ADULT - ASSESSMENT
89F with L shoulder pain    Pain improved today, will continue to monitor for now with current regimen. She still declines escalation to any tramadol/oxycodone even at low doses. She also continues to decline shoulder injection at this time.

## 2021-12-25 NOTE — PROGRESS NOTE ADULT - PROBLEM SELECTOR PLAN 4
- Acute hypoxic respiratory failure 2/2 confirmed COVID-19 infection  - Patient vaccinated with: Pfizer x2, Moderna booster  - Supp O2 prn maintain O2 sats >88%. Prone PRN  - Decadron 6mg daily for 10 days, last day 12/30/21 + Protonix 40mg qd as ppx in setting of steroid use  - Remdesivir for 5 days, last day 12/25/21  - Monitor volume status closely, avoid aggressive hydration  - Tylenol prn myalgias, fever  - ferritin 87, crp 102, d-dimer 1580, procal 0.18, will repeat if clinically worsening every 48-72 hours  - UA unimpressive for UTI, blood cx with NGTD, continue to monitor for fevers, leukocytosis  - ID Dr Barajas, recs appreciated

## 2021-12-25 NOTE — PROGRESS NOTE ADULT - ASSESSMENT
Patient is a 89 yo female with PMH of hemorrhagic CVA (2015), Type 2 DM (not on insulin), HTN, HLD, seizures, hard of hearing presents to ED with left sided weakness, admitted for left-sided weakness, troponin elevation and acute hypoxic respiratory failure due to COVID-19 PNA.

## 2021-12-25 NOTE — PROGRESS NOTE ADULT - SUBJECTIVE AND OBJECTIVE BOX
[facility], Division of Infectious Diseases  MIGEL Escudero Y. Patel, S. Shah, G. Mario  [phone]  after hours and weekends [phone]    Name: MAJOR PEREZ  Age: 89y  Gender: Female  MRN: [id number]    Interval History--  Notes reviewed  no events  says her shoulder hurts if i touch it       Allergies  aspirin (Unknown)  sulfa drugs (Unknown)    Intolerances    Lipitor (Other; Muscle Pain)      Medications--  Antibiotics:  remdesivir  IVPB   IV Intermittent   remdesivir  IVPB 100 milliGRAM(s) IV Intermittent every 24 hours    Immunologic:    Other:  acetaminophen     Tablet .. PRN  aluminum hydroxide/magnesium hydroxide/simethicone Suspension PRN  benzocaine 15 mG/menthol 3.6 mG (Sugar-Free) Lozenge PRN  carBAMazepine ER Capsule  carBAMazepine ER Tablet  carvedilol  dexAMETHasone     Tablet  dextrose 40% Gel  dextrose 5%.  dextrose 5%.  dextrose 50% Injectable  dextrose 50% Injectable  dextrose 50% Injectable  enoxaparin Injectable  glucagon  Injectable  guaiFENesin Oral Liquid (Sugar-Free) PRN  insulin lispro (ADMELOG) corrective regimen sliding scale  insulin lispro (ADMELOG) corrective regimen sliding scale  losartan  melatonin PRN  multivitamin/minerals  nystatin Powder  ondansetron Injectable PRN  pantoprazole  Injectable      Review of Systems--  A 10-point review of systems was obtained.     limited    Review of systems otherwise negative except as previously noted.    Physical Examination--  Vital Signs: T(F): 98 (12-25-21 @ 09:34), Max: 98.2 (12-25-21 @ 05:51)  HR: 69 (12-25-21 @ 09:34)  BP: 106/61 (12-25-21 @ 09:34)  RR: 18 (12-25-21 @ 09:34)  SpO2: 98% (12-25-21 @ 09:34)  Wt(kg): --  General: Nontoxic-appearing Female in no acute distress.  HEENT: AT/NC.  Neck: Not rigid. No sense of mass.  Nodes: None palpable.  Lungs: Clear bilaterally without rales, wheezing or rhonchi  Heart: Regular rate and rhythm. No Murmur.   Abdomen: Bowel sounds present and normoactive. Soft. Nondistended. Nontender.    Extremities: No cyanosis or clubbing. trace edema.   Skin: Warm. Dry. Good turgor. No rash. No vasculitic stigmata.           Laboratory Studies--  CBC                        10.3   6.45  )-----------( 141      ( 25 Dec 2021 08:46 )             31.3       Chemistries  12-25    143  |  107  |  28<H>  ----------------------------<  167<H>  3.7   |  30  |  0.75    Ca    7.9<L>      25 Dec 2021 08:46  Phos  2.7     12-25  Mg     2.6     12-24    TPro  5.4<L>  /  Alb  2.1<L>  /  TBili  0.2  /  DBili  <0.1  /  AST  17  /  ALT  19  /  AlkPhos  49  12-25      Culture Data    Culture - Blood (collected 23 Dec 2021 11:45)  Source: .Blood Blood-Peripheral  Preliminary Report (24 Dec 2021 12:02):    No growth to date.    Culture - Blood (collected 23 Dec 2021 11:45)  Source: .Blood Blood-Peripheral  Preliminary Report (24 Dec 2021 12:02):    No growth to date.

## 2021-12-25 NOTE — PROGRESS NOTE ADULT - PROBLEM SELECTOR PLAN 8
Chronic  - Seizure precautions   - Continue home carbamazepine  - Carbamazepine levels WNL  - May use Ativan PRN for seizure

## 2021-12-25 NOTE — PROGRESS NOTE ADULT - ASSESSMENT
89 yo female with PMH hemorrhagic CVA (2015), Type 2 DM (not on insulin), HTN, HLD, seizures, hard of hearing presents to ED with left sided weakness. Pt reports she had a seizure. Reports left side weakness, drooling but able to talk. Pt wasn't confused, no incontinence.  COVID PCR pos. pt vaccinated with booster    RECOMMENDATIONS  12/21 86% on RA, req NC-4L, Remdesivir started with plan for 5 days so last day 12/25, Dexamethasone started with plan for 10 days so last day 12/30,  12/22-NC-4L, NLR 4.57/0.73=6.3, coordinate with neuro regarding VTE recommendations, continue oxygen, with triple vax optimistic regarding outcome but pt does have risk factors such as age, DM, HTN, change steroids to PO  12/23 -continues on NC, continue remdesivir, steroids, supportive care  12/24-NC,NLR<3,  Remdesivir started with plan for 5 days so last day 12/25, Dexamethasone started with plan for 10 days so last day 12/30 12/25 doing well on nc, cont remdesivir day 5 today , decadron for 10 days , titrate oxygen

## 2021-12-25 NOTE — PROGRESS NOTE ADULT - PROBLEM SELECTOR PLAN 1
- likely due to seizure  - CTA head, neck, CT C-spine all negative for acute processes  - MRI head negative for acute process, possible normal pressure hydrocephalus w/ ventricular dilation  - given 48 hrs s/p symptom onset and in setting of CT, MRI head scans (-) for acute stroke, restarted home Coreg 12.5mg po bid and Losartan 50mg qd. Holding off on Norvasc as BP range is acceptable for now.  - TTE: normal LV/RVF with SWMA, and mild DD, no significant valvular disease  - Seizure and Aspiration precautions  - continue diet, soft bite size w thin liquid  - HOLD aspirin given allergy. HOLD high dose statin as pt intolerant  - Neuro checks Q4h  - carbamazepine levels within therapeutic limits  - PT/OT eval  - Neuro following Dr. Wei, recs appreciated

## 2021-12-25 NOTE — PROGRESS NOTE ADULT - PROBLEM SELECTOR PLAN 3
Likely demand ischemia in setting of seizure / resp failure due to COVID-19  - abnormal EKG with non-specific TWI  - Trops 457 -> 2910 -> 2100, trended to peak  - TTE: normal LV/RVF with SWMA, and mild DD, no significant valvular disease  - HOLD aspirin given allergy  - s/p Lovenox 70mg SQ x 2  - given hx of hemorrhagic CVA, started lovenox 40mg qd  - Cardio following [facility] group, recs appreciated

## 2021-12-25 NOTE — PROGRESS NOTE ADULT - SUBJECTIVE AND OBJECTIVE BOX
[facility] -- Bhavik Son, January Coles, Ankit Snyder [place], [place]  Office # [phone]    Follow Up:   Elevated Troponin, s/p Fall 2/2 seizure?    Subjective/Observations: Seen and evaluated, remains on NC but not in respiratory discomfort.  Denies chest pain or palpitations.    REVIEW OF SYSTEMS: All other review of systems is negative unless indicated above  PAST MEDICAL & SURGICAL HISTORY:  Diabetes mellitus    Hypercholesteremia    HTN (hypertension)    CVA (cerebral infarction)    Hemorrhagic stroke    Seizure    History of arthroscopy of knee  Right    S/P cataract surgery  bilateral    History of hysterectomy    Basal cell cancer  s/p Mohs surgery    History of retinal tear  surgical repair    MEDICATIONS  (STANDING):  carBAMazepine ER Capsule 400 milliGRAM(s) Oral at bedtime  carBAMazepine ER Tablet 100 milliGRAM(s) Oral <User Schedule>  carvedilol 12.5 milliGRAM(s) Oral every 12 hours  dexAMETHasone     Tablet 6 milliGRAM(s) Oral daily  dextrose 40% Gel 15 Gram(s) Oral once  dextrose 5%. 1000 milliLiter(s) (50 mL/Hr) IV Continuous <Continuous>  dextrose 5%. 1000 milliLiter(s) (100 mL/Hr) IV Continuous <Continuous>  dextrose 50% Injectable 25 Gram(s) IV Push once  dextrose 50% Injectable 12.5 Gram(s) IV Push once  dextrose 50% Injectable 25 Gram(s) IV Push once  enoxaparin Injectable 40 milliGRAM(s) SubCutaneous daily  glucagon  Injectable 1 milliGRAM(s) IntraMuscular once  insulin lispro (ADMELOG) corrective regimen sliding scale   SubCutaneous three times a day before meals  insulin lispro (ADMELOG) corrective regimen sliding scale   SubCutaneous at bedtime  losartan 50 milliGRAM(s) Oral daily  multivitamin/minerals 1 Tablet(s) Oral daily  nystatin Powder 1 Application(s) Topical two times a day  pantoprazole  Injectable 40 milliGRAM(s) IV Push daily  remdesivir  IVPB   IV Intermittent   remdesivir  IVPB 100 milliGRAM(s) IV Intermittent every 24 hours    MEDICATIONS  (PRN):  acetaminophen     Tablet .. 650 milliGRAM(s) Oral every 6 hours PRN Temp greater or equal to 38C (100.4F), Mild Pain (1 - 3)  aluminum hydroxide/magnesium hydroxide/simethicone Suspension 30 milliLiter(s) Oral every 4 hours PRN Dyspepsia  benzocaine 15 mG/menthol 3.6 mG (Sugar-Free) Lozenge 1 Lozenge Oral every 6 hours PRN Sore Throat  guaiFENesin Oral Liquid (Sugar-Free) 100 milliGRAM(s) Oral every 6 hours PRN Cough  melatonin 3 milliGRAM(s) Oral at bedtime PRN Insomnia  ondansetron Injectable 4 milliGRAM(s) IV Push every 8 hours PRN Nausea and/or Vomiting    Allergies    aspirin (Unknown)  sulfa drugs (Unknown)    Intolerances    Lipitor (Other; Muscle Pain)    Vital Signs Last 24 Hrs  T(C): 36.7 (25 Dec 2021 09:34), Max: 36.8 (25 Dec 2021 05:51)  T(F): 98 (25 Dec 2021 09:34), Max: 98.2 (25 Dec 2021 05:51)  HR: 69 (25 Dec 2021 09:34) (69 - 79)  BP: 106/61 (25 Dec 2021 09:34) (106/61 - 136/74)  BP(mean): --  RR: 18 (25 Dec 2021 09:34) (18 - 18)  SpO2: 98% (25 Dec 2021 09:34) (95% - 99%)  I&O's Summary    24 Dec 2021 07:01  -  25 Dec 2021 07:00  --------------------------------------------------------  IN: 240 mL / OUT: 800 mL / NET: -560 mL    PHYSICAL EXAM:  TELE: NSR  Constitutional: NAD, awake and alert, obese  HEENT: Moist Mucous Membranes, Anicteric  Pulmonary: Non-labored, breath sounds are diminished bilaterally, No wheezing, rales or rhonchi  Cardiovascular: Regular, S1 and S2, No murmurs, rubs, gallops or clicks  Gastrointestinal: Bowel Sounds present, soft, nontender.   Lymph: No peripheral edema. No lymphadenopathy.  Skin: No visible rashes or ulcers.  Psych:  Mood & affect: Flat      LABS: All Labs Reviewed:                        10.3   6.45  )-----------( 141      ( 25 Dec 2021 08:46 )             31.3                         10.7   5.58  )-----------( 137      ( 24 Dec 2021 07:58 )             32.3                         11.4   9.15  )-----------( 167      ( 23 Dec 2021 06:54 )             35.1     25 Dec 2021 08:46    143    |  107    |  28     ----------------------------<  167    3.7     |  30     |  0.75   24 Dec 2021 07:58    142    |  105    |  29     ----------------------------<  108    3.6     |  30     |  0.85   23 Dec 2021 06:54    138    |  101    |  24     ----------------------------<  137    3.3     |  31     |  0.78     Ca    7.9        25 Dec 2021 08:46  Ca    7.8        24 Dec 2021 07:58  Ca    8.3        23 Dec 2021 06:54  Phos  2.7       25 Dec 2021 08:46  Phos  2.4       24 Dec 2021 07:58  Mg     2.6       24 Dec 2021 07:58    TPro  5.4    /  Alb  2.1    /  TBili  0.2    /  DBili  <0.1   /  AST  17     /  ALT  19     /  AlkPhos  49     25 Dec 2021 08:46  TPro  5.6    /  Alb  2.2    /  TBili  0.2    /  DBili  <0.1   /  AST  22     /  ALT  19     /  AlkPhos  49     24 Dec 2021 07:58  TPro  6.8    /  Alb  2.8    /  TBili  0.3    /  DBili  <0.1   /  AST  38     /  ALT  23     /  AlkPhos  61     23 Dec 2021 06:54     EXAM:  ECHO TTE WO CON COMP W DOPP         PROCEDURE DATE:  12/23/2021        INTERPRETATION:  INDICATION: Abnormal EKG    Blood Pressure 152/84    Height 165     Weight 68       BSA 1.75    Dimensions:  LA 2.3       Normal Values: 2.0 - 4.0 cm  Ao 3.3        Normal Values: 2.0 - 3.8 cm  SEPTUM 1.1       Normal Values: 0.6 - 1.2 cm  PWT 1.1       Normal Values: 0.6 - 1.1 cm  LVIDd 3.8         Normal Values: 3.0 - 5.6 cm  LVIDs 2.2         Normal Values: 1.8 - 4.0 cm    Derived Variables:  LVMIg/m2  RWT  Fractional Short  Ejection Fraction    Doppler Peak v. AoV= (m/sec)    OBSERVATIONS:    Mitral Valve: normal, trace physiologic MR.  Aortic Valve/Aorta: Calcified trileaflet aortic valve, without stenosis.   Mild AI.  Tricuspid Valve: normal with trace TR.  Pulmonic Valve: normal  Left Atrium: normal  Right Atrium: normal  Left Ventricle: normal LV size was likely with preserved systolic   function, estimated LVEF of 65%. The distal anterior wall and apex are   not well seen in all views, and appear hypokinetic in some views.  Right Ventricle: normal size and systolic function.  Pericardium/Pleura: no significant pleural effusion, no significant   pericardial effusion.  Pulmonary/RV Pressure: estimated PA systolic pressure of 34mmHg assuming   an RA pressure of 10 mmHg.  LV Diastolic Function: Mild diastolic dysfunction.    Impression: Normal left ventricular size and systolic function, estimated   LVEF of 65%. The distal anterior wall and apex are not well seen in all   views, and appear hypokinetic in some views. Normal right ventricular size   and systolic function. Mild diastolic dysfunction.  Normal biatrial size.   The aortic root is normal in size. The mitral valve is structurally   normal, trace physiologic MR. The aortic valve is trileaflet without   stenosis. Mild AI. Trace physiologic TR. Estimated PA systolic pressure   is 34 mm Hg. No significant pericardial effusion.    --- End of Report ---      NATAN DELGADO MD; Attending Cardiologist  This document has been electronically signed. Dec 24 2021  8:15AM    ACC: [id number] EXAM:  XR SHOULDER TRNS SCAPULA 1V LT                        ACC: [id number] EXAM:  XR CHEST PORTABLE ROUTINE 1V                          PROCEDURE DATE:  12/23/2021      INTERPRETATION:  Chest and left shoulder. Patient had a fall.Patient had   a seizure and also has bilateral rib pain and possible Covid.    Heart normal for projection.    The lung fields and pleural surfaces are unremarkable.    No visible fracture.    Chest is similar to December 21.    Left shoulder. Single view shows normal position and no fracture.    IMPRESSION: No acute finding.    --- End of Report ---    HANNAH MARISCAL MD; Attending Radiologist  This document has been electronically signed. Dec 24 2021 11:09AM     Ventricular Rate 80 BPM    Atrial Rate 80 BPM    P-R Interval 138 ms    QRS Duration 90 ms    Q-T Interval 400 ms    QTC Calculation(Bazett) 461 ms    P Axis 60 degrees    R Axis -1 degrees    T Axis 20 degrees    Diagnosis Line Normal sinus rhythm  Inferior infarct , age undetermined  Possible Anterior infarct , age undetermined  Abnormal ECG  When compared with ECG of 21-DEC-2021 17:53, (Unconfirmed)  No significant change was found  Confirmed by Mihai Coles MD ([id number]) on 12/22/2021 11:21:17AM

## 2021-12-25 NOTE — PROGRESS NOTE ADULT - ASSESSMENT
89 year old female with PMH hemorrhagic CVA (2015), Type 2 DM (not on insulin), HTN, HLD, seizure disorder, hearing impairment presents to ED s/p unwitnessed episode resulting in fall onto L side and L sided weakness. Patient incidentally found to be COVID+ with no sick contacts or URI symptoms prior to admission. Cardiology consulted for elevated troponin.     Cardiac summary  TTE: (3/3/2019): normal LVSF, calcified trileaflet AV with normal systolic opening    Elevated trop   - High sensitivity Trop I: elevated, peaked and trended down.  Not consistent with true plaque rupture  - Her chest pain is more pleuritic than cardiac  - EKG shows nonspecific T-wave changes, non-diagnostic.  Her TTE showed normal LV/RVF with SWMA, and mild DD, no significant valvular disease  - Unable to start ASA in the setting of allergy.    - No meaningful evidence of volume overload  - BP remains stable and controlled.  Continue ARB and BB  - No arrhythmias on tele.  Can D/C if not needed for Sat monitoring  - Monitor and replete Lytes. Keep K > 4 and Mg > 2    - Will continue to follow.  Will need ischemic eval as outpatient if in line with her wishes.    - Remains stable from cardiac standpoint    Marce Sanchez DNP, NP-C  Cardiology   Spectra #[phone]

## 2021-12-25 NOTE — PROGRESS NOTE ADULT - PROBLEM SELECTOR PLAN 6
Chronic, stable  - continue home coreg 12.5mg po bid, losartan 50mg qd; holding norvasc  - Monitor routine hemodynamics

## 2021-12-25 NOTE — PROGRESS NOTE ADULT - PROBLEM SELECTOR PLAN 2
Left shoulder pain, likely 2/2 rotator cuff tendinitis following L sided trauma during presumed fall secondary to seizure  - Left shoulder x-ray with no acute findings  - Physiatry consult, recs appreciated - continue Tylenol, warm compresses Left shoulder pain, likely 2/2 rotator cuff tendinitis following L sided trauma during presumed fall secondary to seizure  - Left shoulder x-ray with no acute findings  - Physiatry consult, recs appreciated - continue Tylenol, Lidocaine patch, warm compresses

## 2021-12-25 NOTE — PROGRESS NOTE ADULT - SUBJECTIVE AND OBJECTIVE BOX
Patient is a 89y old  Female who presents with a chief complaint of L sided weakness (25 Dec 2021 07:36)    INTERVAL HPI/OVERNIGHT EVENTS:  Patient was seen and examined at bedside. No acute events overnight.     MEDICATIONS  (STANDING):  carBAMazepine ER Capsule 400 milliGRAM(s) Oral at bedtime  carBAMazepine ER Tablet 100 milliGRAM(s) Oral <User Schedule>  carvedilol 12.5 milliGRAM(s) Oral every 12 hours  dexAMETHasone     Tablet 6 milliGRAM(s) Oral daily  dextrose 40% Gel 15 Gram(s) Oral once  dextrose 5%. 1000 milliLiter(s) (50 mL/Hr) IV Continuous <Continuous>  dextrose 5%. 1000 milliLiter(s) (100 mL/Hr) IV Continuous <Continuous>  dextrose 50% Injectable 25 Gram(s) IV Push once  dextrose 50% Injectable 12.5 Gram(s) IV Push once  dextrose 50% Injectable 25 Gram(s) IV Push once  enoxaparin Injectable 40 milliGRAM(s) SubCutaneous daily  glucagon  Injectable 1 milliGRAM(s) IntraMuscular once  insulin lispro (ADMELOG) corrective regimen sliding scale   SubCutaneous three times a day before meals  insulin lispro (ADMELOG) corrective regimen sliding scale   SubCutaneous at bedtime  losartan 50 milliGRAM(s) Oral daily  multivitamin/minerals 1 Tablet(s) Oral daily  nystatin Powder 1 Application(s) Topical two times a day  pantoprazole  Injectable 40 milliGRAM(s) IV Push daily  remdesivir  IVPB   IV Intermittent   remdesivir  IVPB 100 milliGRAM(s) IV Intermittent every 24 hours    MEDICATIONS  (PRN):  acetaminophen     Tablet .. 650 milliGRAM(s) Oral every 6 hours PRN Temp greater or equal to 38C (100.4F), Mild Pain (1 - 3)  aluminum hydroxide/magnesium hydroxide/simethicone Suspension 30 milliLiter(s) Oral every 4 hours PRN Dyspepsia  benzocaine 15 mG/menthol 3.6 mG (Sugar-Free) Lozenge 1 Lozenge Oral every 6 hours PRN Sore Throat  guaiFENesin Oral Liquid (Sugar-Free) 100 milliGRAM(s) Oral every 6 hours PRN Cough  melatonin 3 milliGRAM(s) Oral at bedtime PRN Insomnia  ondansetron Injectable 4 milliGRAM(s) IV Push every 8 hours PRN Nausea and/or Vomiting      Allergies    aspirin (Unknown)  sulfa drugs (Unknown)    Intolerances    Lipitor (Other; Muscle Pain)    REVIEW OF SYSTEMS:  CONSTITUTIONAL: No fever or chills  HEENT: No headache, no sore throat  RESPIRATORY: No cough, wheezing, or shortness of breath  CARDIOVASCULAR: No chest pain, palpitations  GASTROINTESTINAL: No abd pain, nausea, vomiting, or diarrhea  GENITOURINARY: No dysuria, frequency, or hematuria  NEUROLOGICAL: No focal weakness or dizziness  MUSCULOSKELETAL: Admits L shoulder pain    PHYSICAL EXAM:  GENERAL: NAD  HEENT: Anicteric, moist mucous membranes  CHEST/LUNG: CTA b/l, no rales, wheezes, or rhonchi  HEART: RRR, S1, S2  ABDOMEN: BS+, soft, nontender, nondistended  EXTREMITIES: No edema, cyanosis, or calf tenderness  NERVOUS SYSTEM: Answers questions and follows commands appropriately    Vital Signs Last 24 Hrs  T(C): 36.7 (25 Dec 2021 09:34), Max: 36.8 (25 Dec 2021 05:51)  T(F): 98 (25 Dec 2021 09:34), Max: 98.2 (25 Dec 2021 05:51)  HR: 69 (25 Dec 2021 09:34) (69 - 79)  BP: 106/61 (25 Dec 2021 09:34) (106/61 - 136/74)  BP(mean): --  RR: 18 (25 Dec 2021 09:34) (18 - 18)  SpO2: 98% (25 Dec 2021 09:34) (95% - 99%)    LABS:                        10.3   6.45  )-----------( 141      ( 25 Dec 2021 08:46 )             31.3     CBC Full  -  ( 25 Dec 2021 08:46 )  WBC Count : 6.45 K/uL  Hemoglobin : 10.3 g/dL  Hematocrit : 31.3 %  Platelet Count - Automated : 141 K/uL  Mean Cell Volume : 91.0 fl  Mean Cell Hemoglobin : 29.9 pg  Mean Cell Hemoglobin Concentration : 32.9 gm/dL  Auto Neutrophil # : x  Auto Lymphocyte # : x  Auto Monocyte # : x  Auto Eosinophil # : x  Auto Basophil # : x  Auto Neutrophil % : x  Auto Lymphocyte % : x  Auto Monocyte % : x  Auto Eosinophil % : x  Auto Basophil % : x    25 Dec 2021 08:46    143    |  107    |  28     ----------------------------<  167    3.7     |  30     |  0.75     Ca    7.9        25 Dec 2021 08:46  Phos  2.7       25 Dec 2021 08:46    TPro  5.4    /  Alb  2.1    /  TBili  0.2    /  DBili  <0.1   /  AST  17     /  ALT  19     /  AlkPhos  49     25 Dec 2021 08:46      CAPILLARY BLOOD GLUCOSE  POCT Blood Glucose.: 163 mg/dL (25 Dec 2021 12:02)  POCT Blood Glucose.: 123 mg/dL (25 Dec 2021 07:42)  POCT Blood Glucose.: 168 mg/dL (24 Dec 2021 21:26)  POCT Blood Glucose.: 136 mg/dL (24 Dec 2021 16:53)      Culture - Blood (collected 12-23-21 @ 11:45)  Source: .Blood Blood-Peripheral  Preliminary Report (12-24-21 @ 12:02):    No growth to date.    Culture - Blood (collected 12-23-21 @ 11:45)  Source: .Blood Blood-Peripheral  Preliminary Report (12-24-21 @ 12:02):    No growth to date.        RADIOLOGY & ADDITIONAL TESTS:    Personally reviewed.     Consultant(s) Notes Reviewed:  [x] YES  [ ] NO

## 2021-12-26 LAB
ALBUMIN SERPL ELPH-MCNC: 1.9 G/DL — LOW (ref 3.3–5)
ALBUMIN SERPL ELPH-MCNC: 1.9 G/DL — LOW (ref 3.3–5)
ALP SERPL-CCNC: 47 U/L — SIGNIFICANT CHANGE UP (ref 40–120)
ALP SERPL-CCNC: 48 U/L — SIGNIFICANT CHANGE UP (ref 40–120)
ALT FLD-CCNC: 17 U/L — SIGNIFICANT CHANGE UP (ref 12–78)
ALT FLD-CCNC: 19 U/L — SIGNIFICANT CHANGE UP (ref 12–78)
ANION GAP SERPL CALC-SCNC: 6 MMOL/L — SIGNIFICANT CHANGE UP (ref 5–17)
AST SERPL-CCNC: 14 U/L — LOW (ref 15–37)
AST SERPL-CCNC: 15 U/L — SIGNIFICANT CHANGE UP (ref 15–37)
BILIRUB DIRECT SERPL-MCNC: <0.1 MG/DL — SIGNIFICANT CHANGE UP (ref 0–0.3)
BILIRUB INDIRECT FLD-MCNC: >0.2 MG/DL — SIGNIFICANT CHANGE UP (ref 0.2–1)
BILIRUB SERPL-MCNC: 0.2 MG/DL — SIGNIFICANT CHANGE UP (ref 0.2–1.2)
BILIRUB SERPL-MCNC: 0.3 MG/DL — SIGNIFICANT CHANGE UP (ref 0.2–1.2)
BUN SERPL-MCNC: 23 MG/DL — SIGNIFICANT CHANGE UP (ref 7–23)
CALCIUM SERPL-MCNC: 7.9 MG/DL — LOW (ref 8.5–10.1)
CHLORIDE SERPL-SCNC: 110 MMOL/L — HIGH (ref 96–108)
CO2 SERPL-SCNC: 28 MMOL/L — SIGNIFICANT CHANGE UP (ref 22–31)
CREAT SERPL-MCNC: 0.67 MG/DL — SIGNIFICANT CHANGE UP (ref 0.5–1.3)
GLUCOSE SERPL-MCNC: 138 MG/DL — HIGH (ref 70–99)
HCT VFR BLD CALC: 29.7 % — LOW (ref 34.5–45)
HGB BLD-MCNC: 9.9 G/DL — LOW (ref 11.5–15.5)
MCHC RBC-ENTMCNC: 30 PG — SIGNIFICANT CHANGE UP (ref 27–34)
MCHC RBC-ENTMCNC: 33.3 GM/DL — SIGNIFICANT CHANGE UP (ref 32–36)
MCV RBC AUTO: 90 FL — SIGNIFICANT CHANGE UP (ref 80–100)
NRBC # BLD: 0 /100 WBCS — SIGNIFICANT CHANGE UP (ref 0–0)
PLATELET # BLD AUTO: 142 K/UL — LOW (ref 150–400)
POTASSIUM SERPL-MCNC: 3.6 MMOL/L — SIGNIFICANT CHANGE UP (ref 3.5–5.3)
POTASSIUM SERPL-SCNC: 3.6 MMOL/L — SIGNIFICANT CHANGE UP (ref 3.5–5.3)
PROT SERPL-MCNC: 5.1 G/DL — LOW (ref 6–8.3)
PROT SERPL-MCNC: 5.1 G/DL — LOW (ref 6–8.3)
RBC # BLD: 3.3 M/UL — LOW (ref 3.8–5.2)
RBC # FLD: 13.5 % — SIGNIFICANT CHANGE UP (ref 10.3–14.5)
SODIUM SERPL-SCNC: 144 MMOL/L — SIGNIFICANT CHANGE UP (ref 135–145)
WBC # BLD: 3.83 K/UL — SIGNIFICANT CHANGE UP (ref 3.8–10.5)
WBC # FLD AUTO: 3.83 K/UL — SIGNIFICANT CHANGE UP (ref 3.8–10.5)

## 2021-12-26 PROCEDURE — 99232 SBSQ HOSP IP/OBS MODERATE 35: CPT

## 2021-12-26 RX ORDER — LIDOCAINE 4 G/100G
1 CREAM TOPICAL DAILY
Refills: 0 | Status: DISCONTINUED | OUTPATIENT
Start: 2021-12-26 | End: 2021-12-31

## 2021-12-26 RX ADMIN — Medication 100 MILLIGRAM(S): at 17:12

## 2021-12-26 RX ADMIN — CARVEDILOL PHOSPHATE 12.5 MILLIGRAM(S): 80 CAPSULE, EXTENDED RELEASE ORAL at 17:13

## 2021-12-26 RX ADMIN — Medication 1 TABLET(S): at 12:47

## 2021-12-26 RX ADMIN — ENOXAPARIN SODIUM 40 MILLIGRAM(S): 100 INJECTION SUBCUTANEOUS at 12:47

## 2021-12-26 RX ADMIN — CARVEDILOL PHOSPHATE 12.5 MILLIGRAM(S): 80 CAPSULE, EXTENDED RELEASE ORAL at 05:31

## 2021-12-26 RX ADMIN — PANTOPRAZOLE SODIUM 40 MILLIGRAM(S): 20 TABLET, DELAYED RELEASE ORAL at 12:47

## 2021-12-26 RX ADMIN — Medication 1: at 17:09

## 2021-12-26 RX ADMIN — Medication 100 MILLIGRAM(S): at 12:47

## 2021-12-26 RX ADMIN — Medication 650 MILLIGRAM(S): at 22:06

## 2021-12-26 RX ADMIN — Medication 100 MILLIGRAM(S): at 05:33

## 2021-12-26 RX ADMIN — LIDOCAINE 1 PATCH: 4 CREAM TOPICAL at 12:47

## 2021-12-26 RX ADMIN — Medication 6 MILLIGRAM(S): at 05:31

## 2021-12-26 RX ADMIN — LOSARTAN POTASSIUM 50 MILLIGRAM(S): 100 TABLET, FILM COATED ORAL at 05:31

## 2021-12-26 RX ADMIN — Medication 650 MILLIGRAM(S): at 05:30

## 2021-12-26 RX ADMIN — NYSTATIN CREAM 1 APPLICATION(S): 100000 CREAM TOPICAL at 17:11

## 2021-12-26 RX ADMIN — Medication 400 MILLIGRAM(S): at 22:09

## 2021-12-26 RX ADMIN — Medication 650 MILLIGRAM(S): at 13:52

## 2021-12-26 RX ADMIN — NYSTATIN CREAM 1 APPLICATION(S): 100000 CREAM TOPICAL at 05:34

## 2021-12-26 RX ADMIN — Medication 1: at 12:43

## 2021-12-26 NOTE — PROGRESS NOTE ADULT - PROBLEM SELECTOR PLAN 5
- chronic, 12/22/21 hbA1c 6.5%  - continue low dose correctional insulin scale    - Goal blood glucose in hospital setting 100-180, adjust insulin regimen PRN, can consider endo consult if uncontrolled   - Hypoglycemia protocol in place, monitor for signs of hypoglycemia - chronic, 12/22/21 hbA1c 6.5% - BG at goal at this time  - continue low dose correctional insulin scale    - Goal blood glucose in hospital setting 100-180, adjust insulin regimen PRN  - Hypoglycemia protocol in place, monitor for signs of hypoglycemia

## 2021-12-26 NOTE — PROGRESS NOTE ADULT - PROBLEM SELECTOR PLAN 4
- Acute hypoxic respiratory failure 2/2 confirmed COVID-19 infection  - Patient vaccinated with: Pfizer x2, Moderna booster  - Supp O2 prn maintain O2 sats >88%. Prone PRN  - Decadron 6mg daily for 10 days, last day 12/30/21 + Protonix 40mg qd as ppx in setting of steroid use  - Remdesivir for 5 days, last day 12/25/21  - Monitor volume status closely, avoid aggressive hydration  - Tylenol prn myalgias, fever  - ferritin 87, crp 102, d-dimer 1580, procal 0.18, will repeat if clinically worsening every 48-72 hours  - UA unimpressive for UTI, blood cx with NGTD, continue to monitor for fevers, leukocytosis  - ID Dr Barajas, recs appreciated - Acute hypoxic respiratory failure 2/2 confirmed COVID-19 infection  - Patient vaccinated with: Pfizer x2, Moderna booster  - Supp O2 prn maintain O2 sats >88%. Prone PRN  - Decadron 6mg daily for 10 days, last day 12/30/21 + Protonix 40mg qd as ppx in setting of steroid use  - Remdesivir for 5 days, complete  - Monitor volume status closely, avoid aggressive hydration  - Tylenol prn myalgias, fever  - ferritin 87, crp 102, d-dimer 1580, procal 0.18, will repeat if clinically worsening every 48-72 hours  - UA unimpressive for UTI, blood cx with NGTD, continue to monitor for fevers, leukocytosis  - given hx of hemorrhagic CVA, continue lovenox 40mg qd instead of higher dose AC  - ID Dr Barajas, recs appreciated

## 2021-12-26 NOTE — PROGRESS NOTE ADULT - SUBJECTIVE AND OBJECTIVE BOX
Patient is a 89y old  Female who presents with a chief complaint of L sided weaknesss (25 Dec 2021 23:27)      FROM ADMISSION H+P:   HPI:  Patient is a 89 yo female with PMH hemorrhagic CVA (2015), Type 2 DM (not on insulin), HTN, HLD, seizures, hard of hearing presents to ED with left sided weakness. Son Simone Easley reports he left the house for a while to run errands. Son called pt at 4pm and he received the answering machine. About 10 minutes later, son tried again and when there was still no answer he assumed something was wrong. He rushed home to find pt was on the floor on her left side laying down. Son assumed pt has seizure or stroke. Reports left side was weakness, drooling but able to talk. Pt wasn't confused, no incontinence. Son called EMS for assistance and pt was brought to ED. Pt only complaining of back pain at this time.      in ED:  VS: /94, HR 78, RR 16, 97F, SpO2 98% on RA   Labs significant for: trops 457.9. COVID PCR pos.   CTA head: Brain CT: No acute hemorrhage, mass effect or extra-axial collections.  Neck CTA: No hemodynamically significant stenosis.  Brain CT: No large vessel occlusion or stenosis.  Perfusion maps. No evidence for core infarct or area of brain at risk.  EKG: sinus tachy, ST wave depressions laterally   (21 Dec 2021 19:43)      ----  INTERVAL HPI/OVERNIGHT EVENTS: Pt seen and evaluated at the bedside. No acute overnight events occurred.     ----  PAST MEDICAL & SURGICAL HISTORY:  Diabetes mellitus    Hypercholesteremia    HTN (hypertension)    CVA (cerebral infarction)    Hemorrhagic stroke    Seizure    History of arthroscopy of knee  Right    S/P cataract surgery  bilateral    History of hysterectomy    Basal cell cancer  s/p Mohs surgery    History of retinal tear  surgical repair        FAMILY HISTORY:  FHx: heart disease    Family history of basal cell carcinoma        Home Medications:  amLODIPine 5 mg oral tablet: 1 tab(s) orally once a day (21 Dec 2021 18:27)  carBAMazepine 100 mg oral capsule, extended release: 1 cap(s) orally 3 times a day (21 Dec 2021 18:27)  carBAMazepine 200 mg oral capsule, extended release: 2 cap(s) orally once a day (at bedtime) (21 Dec 2021 18:27)  carvedilol 12.5 mg oral tablet: 1 tab(s) orally every 12 hours (21 Dec 2021 18:27)  Centrum Silver oral tablet: 1 tab(s) orally once a day (21 Dec 2021 18:27)  Co Q-10 100 mg oral capsule: 1 cap(s) orally once a day (21 Dec 2021 18:27)  irbesartan 150 mg oral tablet: 1 tab(s) orally 2 times a day (21 Dec 2021 18:27)  metFORMIN 500 mg oral tablet, extended release: 2 tab(s) orally once a day (after a meal) (21 Dec 2021 18:27)  rosuvastatin 10 mg oral tablet: 1 tab(s) orally once a day (at bedtime) (21 Dec 2021 18:27)      Allergies    aspirin (Unknown)  sulfa drugs (Unknown)    Intolerances    Lipitor (Other; Muscle Pain)      ----  MEDICATIONS  (STANDING):  acetaminophen     Tablet .. 650 milliGRAM(s) Oral every 8 hours  carBAMazepine ER Capsule 400 milliGRAM(s) Oral at bedtime  carBAMazepine ER Tablet 100 milliGRAM(s) Oral <User Schedule>  carvedilol 12.5 milliGRAM(s) Oral every 12 hours  dexAMETHasone     Tablet 6 milliGRAM(s) Oral daily  dextrose 40% Gel 15 Gram(s) Oral once  dextrose 5%. 1000 milliLiter(s) (50 mL/Hr) IV Continuous <Continuous>  dextrose 5%. 1000 milliLiter(s) (100 mL/Hr) IV Continuous <Continuous>  dextrose 50% Injectable 25 Gram(s) IV Push once  dextrose 50% Injectable 12.5 Gram(s) IV Push once  dextrose 50% Injectable 25 Gram(s) IV Push once  enoxaparin Injectable 40 milliGRAM(s) SubCutaneous daily  glucagon  Injectable 1 milliGRAM(s) IntraMuscular once  insulin lispro (ADMELOG) corrective regimen sliding scale   SubCutaneous three times a day before meals  insulin lispro (ADMELOG) corrective regimen sliding scale   SubCutaneous at bedtime  lidocaine   4% Patch 1 Patch Transdermal daily  losartan 50 milliGRAM(s) Oral daily  multivitamin/minerals 1 Tablet(s) Oral daily  nystatin Powder 1 Application(s) Topical two times a day  pantoprazole  Injectable 40 milliGRAM(s) IV Push daily    MEDICATIONS  (PRN):  aluminum hydroxide/magnesium hydroxide/simethicone Suspension 30 milliLiter(s) Oral every 4 hours PRN Dyspepsia  benzocaine 15 mG/menthol 3.6 mG (Sugar-Free) Lozenge 1 Lozenge Oral every 6 hours PRN Sore Throat  guaiFENesin Oral Liquid (Sugar-Free) 100 milliGRAM(s) Oral every 6 hours PRN Cough  melatonin 3 milliGRAM(s) Oral at bedtime PRN Insomnia  ondansetron Injectable 4 milliGRAM(s) IV Push every 8 hours PRN Nausea and/or Vomiting      ----  REVIEW OF SYSTEMS:  CONSTITUTIONAL: No fever or chills  HEENT: No headache, no sore throat  RESPIRATORY: No cough, wheezing, or shortness of breath  CARDIOVASCULAR: No chest pain, palpitations  GASTROINTESTINAL: No abd pain, nausea, vomiting, or diarrhea  GENITOURINARY: No dysuria, frequency, or hematuria  NEUROLOGICAL: No focal weakness or dizziness  MUSCULOSKELETAL: Admits L shoulder pain    PHYSICAL EXAM:  GENERAL: NAD  HEENT: Anicteric, moist mucous membranes  CHEST/LUNG: CTA b/l, no rales, wheezes, or rhonchi  HEART: RRR, S1, S2  ABDOMEN: BS+, soft, nontender, nondistended  EXTREMITIES: No edema, cyanosis, or calf tenderness  NERVOUS SYSTEM: Answers questions and follows commands appropriately      T(C): 36.9 (12-26-21 @ 05:22), Max: 37.1 (12-25-21 @ 20:16)  HR: 74 (12-26-21 @ 05:22) (70 - 79)  BP: 146/85 (12-26-21 @ 05:22) (142/82 - 148/72)  RR: 18 (12-26-21 @ 06:00) (18 - 19)  SpO2: 92% (12-26-21 @ 06:00) (92% - 99%)  Wt(kg): --    ----  I&O's Summary    25 Dec 2021 07:01  -  26 Dec 2021 07:00  --------------------------------------------------------  IN: 0 mL / OUT: 600 mL / NET: -600 mL        LABS:                        9.9    3.83  )-----------( 142      ( 26 Dec 2021 07:21 )             29.7     12-26    144  |  110<H>  |  23  ----------------------------<  138<H>  3.6   |  28  |  0.67    Ca    7.9<L>      26 Dec 2021 07:21  Phos  2.7     12-25    TPro  5.1<L>  /  Alb  1.9<L>  /  TBili  0.2  /  DBili  <0.1  /  AST  14<L>  /  ALT  17  /  AlkPhos  47  12-26        CAPILLARY BLOOD GLUCOSE      POCT Blood Glucose.: 149 mg/dL (26 Dec 2021 08:02)  POCT Blood Glucose.: 140 mg/dL (25 Dec 2021 21:58)  POCT Blood Glucose.: 153 mg/dL (25 Dec 2021 16:39)  POCT Blood Glucose.: 163 mg/dL (25 Dec 2021 12:02)      12-23 @ 11:45   No growth to date.  --  --            ----           Patient is a 89y old  Female who presents with a chief complaint of L sided weaknesss (25 Dec 2021 23:27)      FROM ADMISSION H+P:   HPI:  Patient is a 89 yo female with PMH hemorrhagic CVA (2015), Type 2 DM (not on insulin), HTN, HLD, seizures, hard of hearing presents to ED with left sided weakness. Son Simone Easley reports he left the house for a while to run errands. Son called pt at 4pm and he received the answering machine. About 10 minutes later, son tried again and when there was still no answer he assumed something was wrong. He rushed home to find pt was on the floor on her left side laying down. Son assumed pt has seizure or stroke. Reports left side was weakness, drooling but able to talk. Pt wasn't confused, no incontinence. Son called EMS for assistance and pt was brought to ED. Pt only complaining of back pain at this time.      in ED:  VS: /94, HR 78, RR 16, 97F, SpO2 98% on RA   Labs significant for: trops 457.9. COVID PCR pos.   CTA head: Brain CT: No acute hemorrhage, mass effect or extra-axial collections.  Neck CTA: No hemodynamically significant stenosis.  Brain CT: No large vessel occlusion or stenosis.  Perfusion maps. No evidence for core infarct or area of brain at risk.  EKG: sinus tachy, ST wave depressions laterally   (21 Dec 2021 19:43)      ----  INTERVAL HPI/OVERNIGHT EVENTS: Pt seen and evaluated at the bedside. No acute overnight events occurred.     ----  PAST MEDICAL & SURGICAL HISTORY:  Diabetes mellitus    Hypercholesteremia    HTN (hypertension)    CVA (cerebral infarction)    Hemorrhagic stroke    Seizure    History of arthroscopy of knee  Right    S/P cataract surgery  bilateral    History of hysterectomy    Basal cell cancer  s/p Mohs surgery    History of retinal tear  surgical repair        FAMILY HISTORY:  FHx: heart disease    Family history of basal cell carcinoma        Home Medications:  amLODIPine 5 mg oral tablet: 1 tab(s) orally once a day (21 Dec 2021 18:27)  carBAMazepine 100 mg oral capsule, extended release: 1 cap(s) orally 3 times a day (21 Dec 2021 18:27)  carBAMazepine 200 mg oral capsule, extended release: 2 cap(s) orally once a day (at bedtime) (21 Dec 2021 18:27)  carvedilol 12.5 mg oral tablet: 1 tab(s) orally every 12 hours (21 Dec 2021 18:27)  Centrum Silver oral tablet: 1 tab(s) orally once a day (21 Dec 2021 18:27)  Co Q-10 100 mg oral capsule: 1 cap(s) orally once a day (21 Dec 2021 18:27)  irbesartan 150 mg oral tablet: 1 tab(s) orally 2 times a day (21 Dec 2021 18:27)  metFORMIN 500 mg oral tablet, extended release: 2 tab(s) orally once a day (after a meal) (21 Dec 2021 18:27)  rosuvastatin 10 mg oral tablet: 1 tab(s) orally once a day (at bedtime) (21 Dec 2021 18:27)      Allergies    aspirin (Unknown)  sulfa drugs (Unknown)    Intolerances    Lipitor (Other; Muscle Pain)      ----  MEDICATIONS  (STANDING):  acetaminophen     Tablet .. 650 milliGRAM(s) Oral every 8 hours  carBAMazepine ER Capsule 400 milliGRAM(s) Oral at bedtime  carBAMazepine ER Tablet 100 milliGRAM(s) Oral <User Schedule>  carvedilol 12.5 milliGRAM(s) Oral every 12 hours  dexAMETHasone     Tablet 6 milliGRAM(s) Oral daily  dextrose 40% Gel 15 Gram(s) Oral once  dextrose 5%. 1000 milliLiter(s) (50 mL/Hr) IV Continuous <Continuous>  dextrose 5%. 1000 milliLiter(s) (100 mL/Hr) IV Continuous <Continuous>  dextrose 50% Injectable 25 Gram(s) IV Push once  dextrose 50% Injectable 12.5 Gram(s) IV Push once  dextrose 50% Injectable 25 Gram(s) IV Push once  enoxaparin Injectable 40 milliGRAM(s) SubCutaneous daily  glucagon  Injectable 1 milliGRAM(s) IntraMuscular once  insulin lispro (ADMELOG) corrective regimen sliding scale   SubCutaneous three times a day before meals  insulin lispro (ADMELOG) corrective regimen sliding scale   SubCutaneous at bedtime  lidocaine   4% Patch 1 Patch Transdermal daily  losartan 50 milliGRAM(s) Oral daily  multivitamin/minerals 1 Tablet(s) Oral daily  nystatin Powder 1 Application(s) Topical two times a day  pantoprazole  Injectable 40 milliGRAM(s) IV Push daily    MEDICATIONS  (PRN):  aluminum hydroxide/magnesium hydroxide/simethicone Suspension 30 milliLiter(s) Oral every 4 hours PRN Dyspepsia  benzocaine 15 mG/menthol 3.6 mG (Sugar-Free) Lozenge 1 Lozenge Oral every 6 hours PRN Sore Throat  guaiFENesin Oral Liquid (Sugar-Free) 100 milliGRAM(s) Oral every 6 hours PRN Cough  melatonin 3 milliGRAM(s) Oral at bedtime PRN Insomnia  ondansetron Injectable 4 milliGRAM(s) IV Push every 8 hours PRN Nausea and/or Vomiting      ----  REVIEW OF SYSTEMS:  CONSTITUTIONAL: No fever or chills  HEENT: No headache, no sore throat  RESPIRATORY: No cough, wheezing, or shortness of breath  CARDIOVASCULAR: No chest pain, palpitations  GASTROINTESTINAL: No abd pain, nausea, vomiting, or diarrhea  GENITOURINARY: No dysuria, frequency, or hematuria  NEUROLOGICAL: No focal weakness or dizziness  MUSCULOSKELETAL: Admits L shoulder pain    PHYSICAL EXAM:  GENERAL: NAD  HEENT: Anicteric, moist mucous membranes  CHEST/LUNG: CTA b/l, no rales, wheezes, or rhonchi  HEART: RRR, S1, S2  ABDOMEN: BS+, soft, nontender, nondistended  EXTREMITIES: No edema, cyanosis, or calf tenderness; mild ecchymosis by left shoulder but nontender to palpation  NERVOUS SYSTEM: Answers questions and follows commands appropriately      T(C): 36.9 (12-26-21 @ 05:22), Max: 37.1 (12-25-21 @ 20:16)  HR: 74 (12-26-21 @ 05:22) (70 - 79)  BP: 146/85 (12-26-21 @ 05:22) (142/82 - 148/72)  RR: 18 (12-26-21 @ 06:00) (18 - 19)  SpO2: 92% (12-26-21 @ 06:00) (92% - 99%)  Wt(kg): --    ----  I&O's Summary    25 Dec 2021 07:01  -  26 Dec 2021 07:00  --------------------------------------------------------  IN: 0 mL / OUT: 600 mL / NET: -600 mL        LABS:                        9.9    3.83  )-----------( 142      ( 26 Dec 2021 07:21 )             29.7     12-26    144  |  110<H>  |  23  ----------------------------<  138<H>  3.6   |  28  |  0.67    Ca    7.9<L>      26 Dec 2021 07:21  Phos  2.7     12-25    TPro  5.1<L>  /  Alb  1.9<L>  /  TBili  0.2  /  DBili  <0.1  /  AST  14<L>  /  ALT  17  /  AlkPhos  47  12-26        CAPILLARY BLOOD GLUCOSE      POCT Blood Glucose.: 149 mg/dL (26 Dec 2021 08:02)  POCT Blood Glucose.: 140 mg/dL (25 Dec 2021 21:58)  POCT Blood Glucose.: 153 mg/dL (25 Dec 2021 16:39)  POCT Blood Glucose.: 163 mg/dL (25 Dec 2021 12:02)      12-23 @ 11:45   No growth to date.  --  --            ----

## 2021-12-26 NOTE — PROGRESS NOTE ADULT - SUBJECTIVE AND OBJECTIVE BOX
[facility]    Bhavik Son, Sanket, January, Claudio, Ankit, Travon      [phone]    CHIEF COMPLAINT: Patient is a 89y old  Female who presents with a chief complaint of L sided weaknesss (26 Dec 2021 10:54)      Follow Up: covid pna, htn, hx of cva    Interim history: The patient reports no new symptoms.  Denies chest discomfort and shortness of breath.  No abdominal pain.  No new neurologic symptoms.      MEDICATIONS  (STANDING):  acetaminophen     Tablet .. 650 milliGRAM(s) Oral every 8 hours  carBAMazepine ER Capsule 400 milliGRAM(s) Oral at bedtime  carBAMazepine ER Tablet 100 milliGRAM(s) Oral <User Schedule>  carvedilol 12.5 milliGRAM(s) Oral every 12 hours  dexAMETHasone     Tablet 6 milliGRAM(s) Oral daily  dextrose 40% Gel 15 Gram(s) Oral once  dextrose 5%. 1000 milliLiter(s) (50 mL/Hr) IV Continuous <Continuous>  dextrose 5%. 1000 milliLiter(s) (100 mL/Hr) IV Continuous <Continuous>  dextrose 50% Injectable 25 Gram(s) IV Push once  dextrose 50% Injectable 12.5 Gram(s) IV Push once  dextrose 50% Injectable 25 Gram(s) IV Push once  enoxaparin Injectable 40 milliGRAM(s) SubCutaneous daily  glucagon  Injectable 1 milliGRAM(s) IntraMuscular once  insulin lispro (ADMELOG) corrective regimen sliding scale   SubCutaneous three times a day before meals  insulin lispro (ADMELOG) corrective regimen sliding scale   SubCutaneous at bedtime  lidocaine   4% Patch 1 Patch Transdermal daily  losartan 50 milliGRAM(s) Oral daily  multivitamin/minerals 1 Tablet(s) Oral daily  nystatin Powder 1 Application(s) Topical two times a day  pantoprazole  Injectable 40 milliGRAM(s) IV Push daily    MEDICATIONS  (PRN):  aluminum hydroxide/magnesium hydroxide/simethicone Suspension 30 milliLiter(s) Oral every 4 hours PRN Dyspepsia  benzocaine 15 mG/menthol 3.6 mG (Sugar-Free) Lozenge 1 Lozenge Oral every 6 hours PRN Sore Throat  guaiFENesin Oral Liquid (Sugar-Free) 100 milliGRAM(s) Oral every 6 hours PRN Cough  melatonin 3 milliGRAM(s) Oral at bedtime PRN Insomnia  ondansetron Injectable 4 milliGRAM(s) IV Push every 8 hours PRN Nausea and/or Vomiting      REVIEW OF SYSTEMS:  eye, ent, GI, , allergic, dermatologic, musculoskeletal and neurologic are negative except as described above    Vital Signs Last 24 Hrs  T(C): 36.9 (26 Dec 2021 05:22), Max: 37.1 (25 Dec 2021 20:16)  T(F): 98.4 (26 Dec 2021 05:22), Max: 98.7 (25 Dec 2021 20:16)  HR: 74 (26 Dec 2021 05:22) (70 - 79)  BP: 146/85 (26 Dec 2021 05:22) (142/82 - 148/72)  BP(mean): --  RR: 18 (26 Dec 2021 06:00) (18 - 19)  SpO2: 92% (26 Dec 2021 06:00) (92% - 99%)    I&O's Summary    25 Dec 2021 07:01  -  26 Dec 2021 07:00  --------------------------------------------------------  IN: 0 mL / OUT: 600 mL / NET: -600 mL        Telemetry past 24h: sr    PHYSICAL EXAM:    Constitutional: well-nourished, well-developed, NAD   HEENT:  MMM, sclerae anicteric, conjunctivae clear, no oral cyanosis.  Pulmonary: Non-labored, breath sounds are clear bilaterally, No wheezing, rales or rhonchi  Cardiovascular: Regular, S1 and S2.  No murmur.  No rubs, gallops or clicks  Gastrointestinal: Bowel Sounds present, soft, nontender.   Lymph: No peripheral edema.   Neurological: Alert, no focal deficits  Skin: No rashes.  Psych:  Mood & affect appropriate    LABS: All Labs Reviewed:                        9.9    3.83  )-----------( 142      ( 26 Dec 2021 07:21 )             29.7                         10.3   6.45  )-----------( 141      ( 25 Dec 2021 08:46 )             31.3                         10.7   5.58  )-----------( 137      ( 24 Dec 2021 07:58 )             32.3     26 Dec 2021 07:21    144    |  110    |  23     ----------------------------<  138    3.6     |  28     |  0.67   25 Dec 2021 08:46    143    |  107    |  28     ----------------------------<  167    3.7     |  30     |  0.75   24 Dec 2021 07:58    142    |  105    |  29     ----------------------------<  108    3.6     |  30     |  0.85     Ca    7.9        26 Dec 2021 07:21  Ca    7.9        25 Dec 2021 08:46  Ca    7.8        24 Dec 2021 07:58  Phos  2.7       25 Dec 2021 08:46  Phos  2.4       24 Dec 2021 07:58  Mg     2.6       24 Dec 2021 07:58    TPro  5.1    /  Alb  1.9    /  TBili  0.2    /  DBili  <0.1   /  AST  14     /  ALT  17     /  AlkPhos  47     26 Dec 2021 07:21  TPro  5.4    /  Alb  2.1    /  TBili  0.2    /  DBili  <0.1   /  AST  17     /  ALT  19     /  AlkPhos  49     25 Dec 2021 08:46  TPro  5.6    /  Alb  2.2    /  TBili  0.2    /  DBili  <0.1   /  AST  22     /  ALT  19     /  AlkPhos  49     24 Dec 2021 07:58          Blood Culture: Organism --  Gram Stain Blood -- Gram Stain --  Specimen Source .Blood Blood-Peripheral  Culture-Blood --            RADIOLOGY:    EKG:    Echo:

## 2021-12-26 NOTE — PROGRESS NOTE ADULT - SUBJECTIVE AND OBJECTIVE BOX
Neurology Follow up note    MAJOR [id number]    HPI:  Patient is a 89 yo female with PMH hemorrhagic CVA (2015), Type 2 DM (not on insulin), HTN, HLD, seizures, hard of hearing presents to ED with left sided weakness. Son Simone Perez reports he left the house for a while to run errands. Son called pt at 4pm and he received the answering machine. About 10 minutes later, son tried again and when there was still no answer he assumed something was wrong. He rushed home to find pt was on the floor on her left side laying down. Son assumed pt has seizure or stroke. Reports left side was weakness, drooling but able to talk. Pt wasn't confused, no incontinence. Son called EMS for assistance and pt was brought to ED. Pt only complaining of back pain at this time.      in ED:  VS: /94, HR 78, RR 16, 97F, SpO2 98% on RA   Labs significant for: trops 457.9. COVID PCR pos.   CTA head: Brain CT: No acute hemorrhage, mass effect or extra-axial collections.  Neck CTA: No hemodynamically significant stenosis.  Brain CT: No large vessel occlusion or stenosis.  Perfusion maps. No evidence for core infarct or area of brain at risk.  EKG: sinus tachy, ST wave depressions laterally   (21 Dec 2021 19:43)      Interval History -stevan new event    Patient is seen, chart was reviewed and case was discussed with the treatment team.  Pt is not in any distress.   Lying on bed comfortably.         Vital Signs Last 24 Hrs  T(C): 36.9 (26 Dec 2021 05:22), Max: 37.1 (25 Dec 2021 20:16)  T(F): 98.4 (26 Dec 2021 05:22), Max: 98.7 (25 Dec 2021 20:16)  HR: 74 (26 Dec 2021 05:22) (70 - 79)  BP: 146/85 (26 Dec 2021 05:22) (142/82 - 148/72)  BP(mean): --  RR: 18 (26 Dec 2021 06:00) (18 - 19)  SpO2: 92% (26 Dec 2021 06:00) (92% - 99%)        REVIEW OF SYSTEMS:    Constitutional: No fever,   Eyes: No eye pain, visual disturbances, or discharge  ENT:  No difficulty hearing, tinnitus, vertigo; No sinus or throat pain  Neck: No pain or stiffness  Respiratory: No wheezing, chills or hemoptysis  Cardiovascular: No , palpitations, shortness of breath, dizzines  Gastrointestinal: No abdominal or epigastric pain. No nausea, vomiting or hematemesis;  Genitourinary: No dysuria, frequency, hematuria or incontinence  Neurological: No headaches,, loss of strength, numbness   Psychiatric: No d mood swings or difficulty sleepin  Skin: No itching, burning, rashes or lesions   Lymph Nodes: No enlarged glands  Endocrine: No heat or cold intolerance; No hair loss,   Allergy and Immunologic: No hives or eczema    On Neurological Examination:    Mental Status - Pt is alert, awake, oriented X3.. Follows commands well and able to answer questions appropriately.Mood and affect  normal    Speech -  Normal.     Cranial Nerves - Pupils 3 mm equal and reactive to light, extraocular eye movements intact. Pt has no visual field deficit.  Pt has no  facial asymmetry. Facial sensation is intact.Tongue - is in midline.    Muscle tone - is normal    Motor Exam - left hemiparesis old    Sensory Exam -. Pt withdraws all extremities equally on stimulation. No asymmetry seen. No complaints of tingling, numbness.      coordination:    Finger to nose: normal on right      Deep tendon Reflexes - 2 plus all over.    Neck Supple -  Yes.       MEDICATIONS  (STANDING):  acetaminophen     Tablet .. 650 milliGRAM(s) Oral every 8 hours  carBAMazepine ER Capsule 400 milliGRAM(s) Oral at bedtime  carBAMazepine ER Tablet 100 milliGRAM(s) Oral <User Schedule>  carvedilol 12.5 milliGRAM(s) Oral every 12 hours  dexAMETHasone     Tablet 6 milliGRAM(s) Oral daily  dextrose 40% Gel 15 Gram(s) Oral once  dextrose 5%. 1000 milliLiter(s) (50 mL/Hr) IV Continuous <Continuous>  dextrose 5%. 1000 milliLiter(s) (100 mL/Hr) IV Continuous <Continuous>  dextrose 50% Injectable 25 Gram(s) IV Push once  dextrose 50% Injectable 12.5 Gram(s) IV Push once  dextrose 50% Injectable 25 Gram(s) IV Push once  enoxaparin Injectable 40 milliGRAM(s) SubCutaneous daily  glucagon  Injectable 1 milliGRAM(s) IntraMuscular once  insulin lispro (ADMELOG) corrective regimen sliding scale   SubCutaneous three times a day before meals  insulin lispro (ADMELOG) corrective regimen sliding scale   SubCutaneous at bedtime  lidocaine   4% Patch 1 Patch Transdermal daily  losartan 50 milliGRAM(s) Oral daily  multivitamin/minerals 1 Tablet(s) Oral daily  nystatin Powder 1 Application(s) Topical two times a day  pantoprazole  Injectable 40 milliGRAM(s) IV Push daily    MEDICATIONS  (PRN):  aluminum hydroxide/magnesium hydroxide/simethicone Suspension 30 milliLiter(s) Oral every 4 hours PRN Dyspepsia  benzocaine 15 mG/menthol 3.6 mG (Sugar-Free) Lozenge 1 Lozenge Oral every 6 hours PRN Sore Throat  guaiFENesin Oral Liquid (Sugar-Free) 100 milliGRAM(s) Oral every 6 hours PRN Cough  melatonin 3 milliGRAM(s) Oral at bedtime PRN Insomnia  ondansetron Injectable 4 milliGRAM(s) IV Push every 8 hours PRN Nausea and/or Vomiting        Allergies    aspirin (Unknown)  sulfa drugs (Unknown)    Intolerances    Lipitor (Other; Muscle Pain)                                               9.9    3.83  )-----------( 142      ( 26 Dec 2021 07:21 )             29.7   12-26    144  |  110<H>  |  23  ----------------------------<  138<H>  3.6   |  28  |  0.67    Ca    7.9<L>      26 Dec 2021 07:21  Phos  2.7     12-25    TPro  5.1<L>  /  Alb  1.9<L>  /  TBili  0.2  /  DBili  <0.1  /  AST  14<L>  /  ALT  17  /  AlkPhos  47  12-26        Hemoglobin A1C:     Vitamin B12     RADIOLOGY    ASSESSMENT AND PLAN:      found on floor/ams/left sided weakness-  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  OOB to chair/ambulation with assistance only.  Pain is accessed and addressed.  Would continue to follow.

## 2021-12-26 NOTE — PROGRESS NOTE ADULT - PROBLEM SELECTOR PLAN 1
- likely due to seizure  - CTA head, neck, CT C-spine all negative for acute processes  - MRI head negative for acute process, possible normal pressure hydrocephalus w/ ventricular dilation  - given 48 hrs s/p symptom onset and in setting of CT, MRI head scans (-) for acute stroke, restarted home Coreg 12.5mg po bid and Losartan 50mg qd. Holding off on Norvasc as BP range is acceptable for now.  - TTE: normal LV/RVF with SWMA, and mild DD, no significant valvular disease  - Seizure and Aspiration precautions  - continue diet, soft bite size w thin liquid  - HOLD aspirin given allergy. HOLD high dose statin as pt intolerant  - Neuro checks Q4h  - carbamazepine levels within therapeutic limits  - PT/OT eval  - Neuro following Dr. Wei, recs appreciated - likely due to seizure  - CTA head, neck, CT C-spine all negative for acute processes  - MRI head negative for acute process, possible normal pressure hydrocephalus w/ ventricular dilation  - given 48 hrs s/p symptom onset and in setting of CT, MRI head scans (-) for acute stroke, restarted home Coreg 12.5mg po bid and Losartan 50mg qd. Holding off on Norvasc as BP range is acceptable for now.  - TTE: normal LV/RVF with SWMA, and mild DD, no significant valvular disease  - Seizure and Aspiration precautions  - continue diet, soft bite size w thin liquid  - HOLD aspirin given allergy. HOLD high dose statin as pt intolerant  - carbamazepine levels within therapeutic limits  - PT/OT eval - PT recommendig TAYLOR  - Neuro following Dr. Wei, recs appreciated  - DISPO - patient refusing [facility], will discuss with son who lives with patient [facility] vs home with home PT

## 2021-12-26 NOTE — PROGRESS NOTE ADULT - ASSESSMENT
89 year old female with PMH hemorrhagic CVA (2015), Type 2 DM (not on insulin), HTN, HLD, seizure disorder, hearing impairment presents to ED s/p unwitnessed episode resulting in fall onto L side and L sided weakness. Patient incidentally found to be COVID+ with no sick contacts or URI symptoms prior to admission. Cardiology consulted for elevated troponin.     Cardiac summary  TTE: (3/3/2019): normal LVSF, calcified trileaflet AV with normal systolic opening    Elevated trop   - High sensitivity Trop I: elevated, peaked and trended down.  Not consistent with true plaque rupture  - Her chest pain was more pleuritic than cardiac  - EKG shows nonspecific T-wave changes, non-diagnostic.  Her TTE showed normal LV/RVF with SWMA, and mild DD, no significant valvular disease  - Unable to start ASA in the setting of allergy.    - No meaningful evidence of volume overload  - BP remains stable and controlled.  Continue ARB and BB  - No arrhythmias on tele.  Can D/C if not needed for Sat monitoring  - Monitor and replete Lytes. Keep K > 4 and Mg > 2    - Will continue to follow.  Will need ischemic eval as outpatient if in line with her wishes.    - Remains stable from cardiac standpoint

## 2021-12-26 NOTE — PROGRESS NOTE ADULT - ASSESSMENT
Patient is a 89 yo female with PMH of hemorrhagic CVA (2015), Type 2 DM (not on insulin), HTN, HLD, seizures, hard of hearing presents to ED with left sided weakness, admitted for left-sided weakness, troponin elevation and acute hypoxic respiratory failure due to COVID-19 PNA.   Patient is a 89 yo female with PMH of hemorrhagic CVA (2015), Type 2 DM (not on insulin), HTN, HLD, seizures, hard of hearing presents to ED with left sided weakness, admitted for left-sided weakness, troponin elevation and acute hypoxic respiratory failure due to COVID-19 PNA.

## 2021-12-26 NOTE — PROGRESS NOTE ADULT - ATTENDING COMMENTS
89 yo female with PMH of hemorrhagic CVA (2015), Type 2 DM (not on insulin), HTN, HLD, seizures, hard of hearing presents to ED with left sided weakness, admitted for left-sided weakness, troponin elevation and acute hypoxic respiratory failure due to COVID-19 PNA.  COVID-19 PNA - improved, on RA. Discharge planning to [facility] as discussed by NATE with sharonda Nichols however patient is declining to go to [facility] and only wants to go home. Son Simone enquiring about home PT and would like discussion with his brother Quintin prior to making final decision. 89 yo female with PMH of hemorrhagic CVA (2015), Type 2 DM (not on insulin), HTN, HLD, seizures, hard of hearing presents to ED with left sided weakness, admitted for left-sided weakness, troponin elevation and acute hypoxic respiratory failure due to COVID-19 PNA.  COVID-19 PNA - improved, on RA. Discharge planning to [facility] as discussed by SW with son Simone however patient is declining to go to [facility] and only wants to go home. Son Simone enquiring about home PT and would like discussion with his brother Quintin prior to making final decision. Patient's son Quintin states that they cannot take their mother back home to [place] as she is COVID (+) and the rest of the family members currently living there tested negative.

## 2021-12-26 NOTE — PROGRESS NOTE ADULT - PROBLEM SELECTOR PLAN 3
Likely demand ischemia in setting of seizure / resp failure due to COVID-19  - abnormal EKG with non-specific TWI  - Trops 457 -> 2910 -> 2100, trended to peak  - TTE: normal LV/RVF with SWMA, and mild DD, no significant valvular disease  - HOLD aspirin given allergy  - s/p Lovenox 70mg SQ x 2  - given hx of hemorrhagic CVA, started lovenox 40mg qd  - Cardio following [facility], recs appreciated Likely demand ischemia in setting of seizure / resp failure due to COVID-19  - abnormal EKG with non-specific TWI  - Trops 457 -> 2910 -> 2100, trended to peak  - TTE: normal LV/RVF with SWMA, and mild DD, no significant valvular disease  - HOLD aspirin given allergy  - s/p Lovenox 70mg SQ x 2  - Cardio following [facility], recs appreciated

## 2021-12-26 NOTE — PROGRESS NOTE ADULT - PROBLEM SELECTOR PLAN 7
Chronic   - Pt has intolerance to therapeutic interchange of home rosuvastatin. Will hold statin for now  - lipid panel 12/22/21: cholesterol 220, non HDL cholesterol 144, , TG and HDL WNL

## 2021-12-26 NOTE — PROGRESS NOTE ADULT - PROBLEM SELECTOR PLAN 2
Left shoulder pain, likely 2/2 rotator cuff tendinitis following L sided trauma during presumed fall secondary to seizure  - Left shoulder x-ray with no acute findings  - Physiatry consult, recs appreciated - continue Tylenol, Lidocaine patch, warm compresses

## 2021-12-27 ENCOUNTER — TRANSCRIPTION ENCOUNTER (OUTPATIENT)
Age: 86
End: 2021-12-27

## 2021-12-27 LAB
ALBUMIN SERPL ELPH-MCNC: 2 G/DL — LOW (ref 3.3–5)
ALBUMIN SERPL ELPH-MCNC: 2 G/DL — LOW (ref 3.3–5)
ALP SERPL-CCNC: 48 U/L — SIGNIFICANT CHANGE UP (ref 40–120)
ALP SERPL-CCNC: 49 U/L — SIGNIFICANT CHANGE UP (ref 40–120)
ALT FLD-CCNC: 22 U/L — SIGNIFICANT CHANGE UP (ref 12–78)
ALT FLD-CCNC: 23 U/L — SIGNIFICANT CHANGE UP (ref 12–78)
ANION GAP SERPL CALC-SCNC: 5 MMOL/L — SIGNIFICANT CHANGE UP (ref 5–17)
AST SERPL-CCNC: 20 U/L — SIGNIFICANT CHANGE UP (ref 15–37)
AST SERPL-CCNC: 21 U/L — SIGNIFICANT CHANGE UP (ref 15–37)
BASOPHILS # BLD AUTO: 0.03 K/UL — SIGNIFICANT CHANGE UP (ref 0–0.2)
BASOPHILS NFR BLD AUTO: 0.6 % — SIGNIFICANT CHANGE UP (ref 0–2)
BILIRUB DIRECT SERPL-MCNC: <0.1 MG/DL — SIGNIFICANT CHANGE UP (ref 0–0.3)
BILIRUB INDIRECT FLD-MCNC: >0.1 MG/DL — LOW (ref 0.2–1)
BILIRUB SERPL-MCNC: 0.2 MG/DL — SIGNIFICANT CHANGE UP (ref 0.2–1.2)
BILIRUB SERPL-MCNC: 0.2 MG/DL — SIGNIFICANT CHANGE UP (ref 0.2–1.2)
BUN SERPL-MCNC: 23 MG/DL — SIGNIFICANT CHANGE UP (ref 7–23)
CALCIUM SERPL-MCNC: 7.6 MG/DL — LOW (ref 8.5–10.1)
CHLORIDE SERPL-SCNC: 108 MMOL/L — SIGNIFICANT CHANGE UP (ref 96–108)
CO2 SERPL-SCNC: 29 MMOL/L — SIGNIFICANT CHANGE UP (ref 22–31)
CREAT SERPL-MCNC: 0.76 MG/DL — SIGNIFICANT CHANGE UP (ref 0.5–1.3)
EOSINOPHIL # BLD AUTO: 0.03 K/UL — SIGNIFICANT CHANGE UP (ref 0–0.5)
EOSINOPHIL NFR BLD AUTO: 0.6 % — SIGNIFICANT CHANGE UP (ref 0–6)
GLUCOSE SERPL-MCNC: 234 MG/DL — HIGH (ref 70–99)
HCT VFR BLD CALC: 29.7 % — LOW (ref 34.5–45)
HGB BLD-MCNC: 9.9 G/DL — LOW (ref 11.5–15.5)
IMM GRANULOCYTES NFR BLD AUTO: 0.9 % — SIGNIFICANT CHANGE UP (ref 0–1.5)
LYMPHOCYTES # BLD AUTO: 0.5 K/UL — LOW (ref 1–3.3)
LYMPHOCYTES # BLD AUTO: 9.2 % — LOW (ref 13–44)
MCHC RBC-ENTMCNC: 30 PG — SIGNIFICANT CHANGE UP (ref 27–34)
MCHC RBC-ENTMCNC: 33.3 GM/DL — SIGNIFICANT CHANGE UP (ref 32–36)
MCV RBC AUTO: 90 FL — SIGNIFICANT CHANGE UP (ref 80–100)
MONOCYTES # BLD AUTO: 0.27 K/UL — SIGNIFICANT CHANGE UP (ref 0–0.9)
MONOCYTES NFR BLD AUTO: 5 % — SIGNIFICANT CHANGE UP (ref 2–14)
NEUTROPHILS # BLD AUTO: 4.53 K/UL — SIGNIFICANT CHANGE UP (ref 1.8–7.4)
NEUTROPHILS NFR BLD AUTO: 83.7 % — HIGH (ref 43–77)
NRBC # BLD: 0 /100 WBCS — SIGNIFICANT CHANGE UP (ref 0–0)
PLATELET # BLD AUTO: 181 K/UL — SIGNIFICANT CHANGE UP (ref 150–400)
POTASSIUM SERPL-MCNC: 4.1 MMOL/L — SIGNIFICANT CHANGE UP (ref 3.5–5.3)
POTASSIUM SERPL-SCNC: 4.1 MMOL/L — SIGNIFICANT CHANGE UP (ref 3.5–5.3)
PROT SERPL-MCNC: 5.1 G/DL — LOW (ref 6–8.3)
PROT SERPL-MCNC: 5.2 G/DL — LOW (ref 6–8.3)
RBC # BLD: 3.3 M/UL — LOW (ref 3.8–5.2)
RBC # FLD: 13.6 % — SIGNIFICANT CHANGE UP (ref 10.3–14.5)
SARS-COV-2 RNA SPEC QL NAA+PROBE: DETECTED
SODIUM SERPL-SCNC: 142 MMOL/L — SIGNIFICANT CHANGE UP (ref 135–145)
WBC # BLD: 5.41 K/UL — SIGNIFICANT CHANGE UP (ref 3.8–10.5)
WBC # FLD AUTO: 5.41 K/UL — SIGNIFICANT CHANGE UP (ref 3.8–10.5)

## 2021-12-27 PROCEDURE — 99232 SBSQ HOSP IP/OBS MODERATE 35: CPT

## 2021-12-27 RX ORDER — DEXAMETHASONE 0.5 MG/5ML
1 ELIXIR ORAL
Qty: 0 | Refills: 0 | DISCHARGE
Start: 2021-12-27 | End: 2021-12-30

## 2021-12-27 RX ORDER — CARBAMAZEPINE 200 MG
1 TABLET ORAL
Qty: 0 | Refills: 0 | DISCHARGE
Start: 2021-12-27

## 2021-12-27 RX ORDER — ACETAMINOPHEN 500 MG
2 TABLET ORAL
Qty: 0 | Refills: 0 | DISCHARGE
Start: 2021-12-27

## 2021-12-27 RX ORDER — ENOXAPARIN SODIUM 100 MG/ML
40 INJECTION SUBCUTANEOUS
Qty: 0 | Refills: 0 | DISCHARGE
Start: 2021-12-27

## 2021-12-27 RX ORDER — LIDOCAINE 4 G/100G
1 CREAM TOPICAL
Qty: 0 | Refills: 0 | DISCHARGE
Start: 2021-12-27

## 2021-12-27 RX ORDER — CARBAMAZEPINE 200 MG
2 TABLET ORAL
Qty: 0 | Refills: 0 | DISCHARGE

## 2021-12-27 RX ORDER — CARBAMAZEPINE 200 MG
2 TABLET ORAL
Qty: 0 | Refills: 0 | DISCHARGE
Start: 2021-12-27

## 2021-12-27 RX ADMIN — Medication 400 MILLIGRAM(S): at 22:11

## 2021-12-27 RX ADMIN — Medication 1: at 17:16

## 2021-12-27 RX ADMIN — CARVEDILOL PHOSPHATE 12.5 MILLIGRAM(S): 80 CAPSULE, EXTENDED RELEASE ORAL at 17:15

## 2021-12-27 RX ADMIN — Medication 6 MILLIGRAM(S): at 05:33

## 2021-12-27 RX ADMIN — Medication 100 MILLIGRAM(S): at 05:34

## 2021-12-27 RX ADMIN — Medication 100 MILLIGRAM(S): at 11:46

## 2021-12-27 RX ADMIN — PANTOPRAZOLE SODIUM 40 MILLIGRAM(S): 20 TABLET, DELAYED RELEASE ORAL at 12:04

## 2021-12-27 RX ADMIN — Medication 650 MILLIGRAM(S): at 22:13

## 2021-12-27 RX ADMIN — LOSARTAN POTASSIUM 50 MILLIGRAM(S): 100 TABLET, FILM COATED ORAL at 05:34

## 2021-12-27 RX ADMIN — LIDOCAINE 1 PATCH: 4 CREAM TOPICAL at 11:46

## 2021-12-27 RX ADMIN — Medication 1 TABLET(S): at 12:03

## 2021-12-27 RX ADMIN — CARVEDILOL PHOSPHATE 12.5 MILLIGRAM(S): 80 CAPSULE, EXTENDED RELEASE ORAL at 05:33

## 2021-12-27 RX ADMIN — Medication 650 MILLIGRAM(S): at 15:00

## 2021-12-27 RX ADMIN — ENOXAPARIN SODIUM 40 MILLIGRAM(S): 100 INJECTION SUBCUTANEOUS at 11:46

## 2021-12-27 RX ADMIN — NYSTATIN CREAM 1 APPLICATION(S): 100000 CREAM TOPICAL at 05:33

## 2021-12-27 RX ADMIN — Medication 1: at 08:11

## 2021-12-27 RX ADMIN — Medication 100 MILLIGRAM(S): at 17:15

## 2021-12-27 RX ADMIN — NYSTATIN CREAM 1 APPLICATION(S): 100000 CREAM TOPICAL at 18:05

## 2021-12-27 RX ADMIN — Medication 2: at 11:57

## 2021-12-27 RX ADMIN — Medication 650 MILLIGRAM(S): at 05:33

## 2021-12-27 RX ADMIN — Medication 650 MILLIGRAM(S): at 14:07

## 2021-12-27 RX ADMIN — Medication 650 MILLIGRAM(S): at 22:08

## 2021-12-27 RX ADMIN — LIDOCAINE 1 PATCH: 4 CREAM TOPICAL at 19:45

## 2021-12-27 NOTE — DISCHARGE NOTE PROVIDER - NSDCCPCAREPLAN_GEN_ALL_CORE_FT
PRINCIPAL DISCHARGE DIAGNOSIS  Diagnosis: Seizure  Assessment and Plan of Treatment: You presented to the hospital after a fall at home, thought to be attributed to a seizure. You were seen and evaluated by Neurology. Your home seizure medications were continued while inpatient and you did not display seizure-like activity while in the hospital. Physical Therapy recommended discharge to sub-acute rehabilitation for strength building. Follow up with your Neurologist (Dr. Gonsalez) within 1-3 days of hospital discharge.      SECONDARY DISCHARGE DIAGNOSES  Diagnosis: 2019 novel coronavirus disease (COVID-19)  Assessment and Plan of Treatment: You were diagnosed with Covid-19 on admission to the hospital. Infectious Disease was consulted and you were given a 5-day course of an anti-viral medication Remdesivir. You were started on a 10-day course of steroids called Decadron. You will complete your steroid course while in sub-acute rehabilitation.    Diagnosis: Non-ST elevation MI (NSTEMI)  Assessment and Plan of Treatment: You were diagnosed with an NSTEMI, or non-ST elevation myocardial infarction (MI). Cardiology was consulted. Your cardiac enzymes were trended and you remained asymptomatic from a cardiac standpoint throughout your hospital stay. If you experience new onset chest pain or pressure, visual disturbances, diaphoresis, shortness of breath, jaw pain or nausea, contact your PCP or Cardiologist immediately or return to the nearest emergency room.     PRINCIPAL DISCHARGE DIAGNOSIS  Diagnosis: Seizure  Assessment and Plan of Treatment: You presented to the hospital after a fall at home, thought to be attributed to a seizure. You were seen and evaluated by Neurology. Your home seizure medications were continued while inpatient and you did not display seizure-like activity while in the hospital. Physical Therapy recommended discharge to sub-acute rehabilitation for strength building. Follow up with your Neurologist (Dr. Gonsalez) within 1-3 days of hospital discharge.      SECONDARY DISCHARGE DIAGNOSES  Diagnosis: 2019 novel coronavirus disease (COVID-19)  Assessment and Plan of Treatment: You were diagnosed with Covid-19 on admission to the hospital. Infectious Disease was consulted and you were given a 5-day course of an anti-viral medication Remdesivir. You were started on a 10-day course of steroids called Decadron. You completed therapy inpatient.    Diagnosis: Non-ST elevation MI (NSTEMI)  Assessment and Plan of Treatment: You were diagnosed with an NSTEMI, or non-ST elevation myocardial infarction (MI). Cardiology was consulted. Your cardiac enzymes were trended and you remained asymptomatic from a cardiac standpoint throughout your hospital stay. If you experience new onset chest pain or pressure, visual disturbances, diaphoresis, shortness of breath, jaw pain or nausea, contact your PCP or Cardiologist immediately or return to the nearest emergency room.

## 2021-12-27 NOTE — PROGRESS NOTE ADULT - PROBLEM SELECTOR PLAN 4
- Acute hypoxic respiratory failure 2/2 confirmed COVID-19 infection  - Patient vaccinated with: Pfizer x2, Moderna booster  - Supp O2 prn maintain O2 sats >88%. Prone PRN  - Decadron 6mg daily for 10 days, last day 12/30/21 + Protonix 40mg qd as ppx in setting of steroid use  - Remdesivir for 5 days, completed  - Monitor volume status closely, avoid aggressive hydration  - Tylenol prn myalgias, fever  - ferritin 87, crp 102, d-dimer 1580, procal 0.18, will repeat if clinically worsening every 48-72 hours  - UA unimpressive for UTI, blood cx with NGTD, continue to monitor for fevers, leukocytosis  - given hx of hemorrhagic CVA, continue lovenox 40mg qd instead of higher dose AC  - ID Dr Barajas, recs appreciated

## 2021-12-27 NOTE — DISCHARGE NOTE PROVIDER - NSDCMRMEDTOKEN_GEN_ALL_CORE_FT
Called and spoke with patient to offer choice to use [facility]. Explained to pt that his insurance requires prior authorization and the request was submitted yesterday. Offered to come to pts home today or tomorrow to setup equipment, but pt was concerned that he has many appointment coming up. Pt said he would like to wiat to be setup until the approval from insurance comes through. Will update pt when we hear something from the insurance. Expressed some concern that pt may need suction before this and he said he is doing ok right now and is fine to wait. Provided him with our phone [phone] and if he needs suction setup sooner to please call and ask for a respiratory therapist.     Mable Metzger, RRT  [facility]   acetaminophen 325 mg oral tablet: 2 tab(s) orally every 8 hours  amLODIPine 5 mg oral tablet: 1 tab(s) orally once a day  carBAMazepine 100 mg oral tablet, extended release: 1 tab(s) orally 3 times a day  carBAMazepine 200 mg oral capsule, extended release: 2 cap(s) orally once a day (at bedtime)  carvedilol 12.5 mg oral tablet: 1 tab(s) orally every 12 hours  Centrum Silver oral tablet: 1 tab(s) orally once a day  Co Q-10 100 mg oral capsule: 1 cap(s) orally once a day  dexamethasone 6 mg oral tablet: 1 tab(s) orally once a day  enoxaparin: 40 milligram(s) subcutaneous once a day  irbesartan 150 mg oral tablet: 1 tab(s) orally 2 times a day  lidocaine 4% topical film: Apply topically to affected area once a day, as needed for pain  metFORMIN 500 mg oral tablet, extended release: 2 tab(s) orally once a day (after a meal)  rosuvastatin 10 mg oral tablet: 1 tab(s) orally once a day (at bedtime)   acetaminophen 325 mg oral tablet: 2 tab(s) orally every 8 hours  amLODIPine 5 mg oral tablet: 1 tab(s) orally once a day  carBAMazepine 100 mg oral tablet, extended release: 1 tab(s) orally 3 times a day  carBAMazepine 200 mg oral capsule, extended release: 2 cap(s) orally once a day (at bedtime)  carvedilol 12.5 mg oral tablet: 1 tab(s) orally every 12 hours  Centrum Silver oral tablet: 1 tab(s) orally once a day  Co Q-10 100 mg oral capsule: 1 cap(s) orally once a day  dexamethasone 6 mg oral tablet: 1 tab(s) orally once a day through 12/30/21  enoxaparin: 40 milligram(s) subcutaneous once a day  irbesartan 150 mg oral tablet: 1 tab(s) orally 2 times a day  lidocaine 4% topical film: Apply topically to affected area once a day, as needed for pain  metFORMIN 500 mg oral tablet, extended release: 2 tab(s) orally once a day (after a meal)  rosuvastatin 10 mg oral tablet: 1 tab(s) orally once a day (at bedtime)

## 2021-12-27 NOTE — DISCHARGE NOTE PROVIDER - PROVIDER TOKENS
PROVIDER:[TOKEN:[[id number]],FOLLOWUP:[1-3 days],ESTABLISHEDPATIENT:[T]],PROVIDER:[TOKEN:[[id number]],FOLLOWUP:[1 week],ESTABLISHEDPATIENT:[T]],PROVIDER:[TOKEN:[[id number]],FOLLOWUP:[Routine],ESTABLISHEDPATIENT:[T]]

## 2021-12-27 NOTE — PROGRESS NOTE ADULT - SUBJECTIVE AND OBJECTIVE BOX
Neurology Follow up note    MAJOR [id number]    HPI:  Patient is a 89 yo female with PMH hemorrhagic CVA (2015), Type 2 DM (not on insulin), HTN, HLD, seizures, hard of hearing presents to ED with left sided weakness. Son Simone Perez reports he left the house for a while to run errands. Son called pt at 4pm and he received the answering machine. About 10 minutes later, son tried again and when there was still no answer he assumed something was wrong. He rushed home to find pt was on the floor on her left side laying down. Son assumed pt has seizure or stroke. Reports left side was weakness, drooling but able to talk. Pt wasn't confused, no incontinence. Son called EMS for assistance and pt was brought to ED. Pt only complaining of back pain at this time.      in ED:  VS: /94, HR 78, RR 16, 97F, SpO2 98% on RA   Labs significant for: trops 457.9. COVID PCR pos.   CTA head: Brain CT: No acute hemorrhage, mass effect or extra-axial collections.  Neck CTA: No hemodynamically significant stenosis.  Brain CT: No large vessel occlusion or stenosis.  Perfusion maps. No evidence for core infarct or area of brain at risk.  EKG: sinus tachy, ST wave depressions laterally   (21 Dec 2021 19:43)      Interval History -no sz reported    Patient is seen, chart was reviewed and case was discussed with the treatment team.  Pt is not in any distress.   Lying on bed comfortably.         Vital Signs Last 24 Hrs  T(C): 37 (27 Dec 2021 05:25), Max: 37.1 (26 Dec 2021 21:23)  T(F): 98.6 (27 Dec 2021 05:25), Max: 98.8 (26 Dec 2021 21:23)  HR: 78 (27 Dec 2021 05:25) (74 - 78)  BP: 155/79 (27 Dec 2021 05:25) (155/79 - 160/88)  BP(mean): --  RR: 17 (27 Dec 2021 05:25) (17 - 18)  SpO2: 93% (27 Dec 2021 05:25) (92% - 94%)        REVIEW OF SYSTEMS:    Constitutional: No fever,   Eyes: No eye pain, visual disturbances, or discharge  ENT:  No difficulty hearing, tinnitus, vertigo; No sinus or throat pain  Neck: No pain or stiffness  Respiratory: No wheezing, chills or hemoptysis  Cardiovascular: No , palpitations, shortness of breath, dizzines  Gastrointestinal: No abdominal or epigastric pain. No nausea, vomiting or hematemesis;  Genitourinary: No dysuria, frequency, hematuria or incontinence  Neurological: No headaches,, loss of strength, numbness   Psychiatric: No d mood swings or difficulty sleepin  Skin: No itching, burning, rashes or lesions   Lymph Nodes: No enlarged glands  Endocrine: No heat or cold intolerance; No hair loss,   Allergy and Immunologic: No hives or eczema    On Neurological Examination:    Mental Status - Pt is alert, awake, oriented X3.. Follows commands well and able to answer questions appropriately.Mood and affect  normal    Speech -  Normal.     Cranial Nerves - Pupils 3 mm equal and reactive to light, extraocular eye movements intact. Pt has no visual field deficit.  Pt has no  facial asymmetry. Facial sensation is intact.Tongue - is in midline.    Muscle tone - is normal    Motor Exam - left hemiparesis old    Sensory Exam -. Pt withdraws all extremities equally on stimulation. No asymmetry seen. No complaints of tingling, numbness.      coordination:    Finger to nose: normal on right      Deep tendon Reflexes - 2 plus all over.    Neck Supple -  Yes.       MEDICATIONS  (STANDING):  acetaminophen     Tablet .. 650 milliGRAM(s) Oral every 8 hours  carBAMazepine ER Capsule 400 milliGRAM(s) Oral at bedtime  carBAMazepine ER Tablet 100 milliGRAM(s) Oral <User Schedule>  carvedilol 12.5 milliGRAM(s) Oral every 12 hours  dexAMETHasone     Tablet 6 milliGRAM(s) Oral daily  dextrose 40% Gel 15 Gram(s) Oral once  dextrose 5%. 1000 milliLiter(s) (50 mL/Hr) IV Continuous <Continuous>  dextrose 5%. 1000 milliLiter(s) (100 mL/Hr) IV Continuous <Continuous>  dextrose 50% Injectable 25 Gram(s) IV Push once  dextrose 50% Injectable 12.5 Gram(s) IV Push once  dextrose 50% Injectable 25 Gram(s) IV Push once  enoxaparin Injectable 40 milliGRAM(s) SubCutaneous daily  glucagon  Injectable 1 milliGRAM(s) IntraMuscular once  insulin lispro (ADMELOG) corrective regimen sliding scale   SubCutaneous three times a day before meals  insulin lispro (ADMELOG) corrective regimen sliding scale   SubCutaneous at bedtime  lidocaine   4% Patch 1 Patch Transdermal daily  losartan 50 milliGRAM(s) Oral daily  multivitamin/minerals 1 Tablet(s) Oral daily  nystatin Powder 1 Application(s) Topical two times a day  pantoprazole  Injectable 40 milliGRAM(s) IV Push daily    MEDICATIONS  (PRN):  aluminum hydroxide/magnesium hydroxide/simethicone Suspension 30 milliLiter(s) Oral every 4 hours PRN Dyspepsia  benzocaine 15 mG/menthol 3.6 mG (Sugar-Free) Lozenge 1 Lozenge Oral every 6 hours PRN Sore Throat  guaiFENesin Oral Liquid (Sugar-Free) 100 milliGRAM(s) Oral every 6 hours PRN Cough  melatonin 3 milliGRAM(s) Oral at bedtime PRN Insomnia  ondansetron Injectable 4 milliGRAM(s) IV Push every 8 hours PRN Nausea and/or Vomiting          Allergies    aspirin (Unknown)  sulfa drugs (Unknown)    Intolerances    Lipitor (Other; Muscle Pain)                 12-27    142  |  108  |  23  ----------------------------<  234<H>  4.1   |  29  |  0.76    Ca    7.6<L>      27 Dec 2021 10:57    TPro  5.1<L>  /  Alb  2.0<L>  /  TBili  0.2  /  DBili  <0.1  /  AST  20  /  ALT  23  /  AlkPhos  49  12-27          Hemoglobin A1C:     Vitamin B12     RADIOLOGY    ASSESSMENT AND PLAN:      found on floor/ams/left sided weakness-  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  OOB to chair/ambulation with assistance only.  Pain is accessed and addressed.  Would continue to follow.

## 2021-12-27 NOTE — DISCHARGE NOTE PROVIDER - CARE PROVIDERS DIRECT ADDRESSES
,[email or web address]

## 2021-12-27 NOTE — PROGRESS NOTE ADULT - PROBLEM SELECTOR PLAN 3
Likely demand ischemia in setting of seizure / resp failure due to COVID-19  - abnormal EKG with non-specific TWI  - Trops 457 -> 2910 -> 2100, trended to peak  - TTE: normal LV/RVF with SWMA, and mild DD, no significant valvular disease  - HOLD aspirin given allergy  - s/p Lovenox 70mg SQ x 2  - Cardio following [facility], recs appreciated

## 2021-12-27 NOTE — DISCHARGE NOTE PROVIDER - HOSPITAL COURSE
FROM ADMISSION HPI:  Patient is a 89 yo female with PMH hemorrhagic CVA (2015), Type 2 DM (not on insulin), HTN, HLD, seizures, hard of hearing presents to ED with left sided weakness. Son Simone Easley reports he left the house for a while to run errands. Son called pt at 4pm and he received the answering machine. About 10 minutes later, son tried again and when there was still no answer he assumed something was wrong. He rushed home to find pt was on the floor on her left side laying down. Son assumed pt has seizure or stroke. Reports left side was weakness, drooling but able to talk. Pt wasn't confused, no incontinence. Son called EMS for assistance and pt was brought to ED. Pt only complaining of back pain at this time.      in ED:  VS: /94, HR 78, RR 16, 97F, SpO2 98% on RA   Labs significant for: trops 457.9. COVID PCR pos.   CTA head: Brain CT: No acute hemorrhage, mass effect or extra-axial collections.  Neck CTA: No hemodynamically significant stenosis.  Brain CT: No large vessel occlusion or stenosis.  Perfusion maps. No evidence for core infarct or area of brain at risk.  EKG: sinus tachy, ST wave depressions laterally    ---  HOSPITAL COURSE: Patient presented with left sided weakness, admitted for CVA workup vs seizures, acute hypoxic respiratory failure 2/2 Covid-19 and NSTEMI. CT brain, CTA head/neck and CT perfusion results as above. Neurology was consulted, impression that event leading to hospitalization consistent with seizure recurrence w/ Mckinley's paralysis. Home Tegretol was continued. Speech and Swallow consulted, patient given soft and bite sized diet. HbA1c 6.5%. Vitamin B12 and folate not depleted. Lipid panel significant for , total cholesterol 220. No aspirin given due to allergy. Patient known to have intolerance for high-dose statin. Physical Therapy was consulted, recommends discharge to [facility]. MRI Head No Cont resulted negative for acute infarct, acute intracranial hemorrhage, or mass effect; significant for ventricles are dilated out of proportion to the degree of cortical atrophy, which can signify normal pressure hydrocephalus. For NSTEMI, EKG results as above. Patient started on full-dose Lovenox and Cardiology was consulted. Patient remained asymptomatic from cardiac standpoint, high-sensitivity troponin trended till peak. Anti-coagulation later switched to prophylactic dose given history of ICH. TTE performed, showed normal LV size with est EF 65%, distal anterior wall and apex not well seen in all vies and appeared hypokinetic in some views, mild diastolic dysfunction, estimated PA systolic pressure 34 mmHg. Bilateral LE dopplers negative for DVT. For acute hypoxic respiratory failure 2/2 Covid-19, patient required supplemental oxygen via NC. Patient given 5 day course of Remdesivir, started on 10 day course of Decadron and ID was consulted. Patient's respiratory status improved, O2 weaned, patient with good saturations on room air.  For left sided shoulder pain s/p fall at home, Physiatry was consulted. 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 Transcapular x-ray of left shoulder without acute findings. Pain was managed with Tylenol and lidocaine patches. Patient showed improvement throughout hospitalization. Patient was seen and examined on day of discharge:     VITALS:   T(C): 37 (12-27-21 @ 05:25), Max: 37.1 (12-26-21 @ 21:23)  HR: 78 (12-27-21 @ 05:25) (74 - 78)  BP: 155/79 (12-27-21 @ 05:25) (155/79 - 160/88)  RR: 17 (12-27-21 @ 05:25) (17 - 18)  SpO2: 93% (12-27-21 @ 05:25) (92% - 94%)    PHYSICAL EXAM:    Patient was medically optimized for discharge to [facility] with close outpatient follow up.    ---  CONSULTANTS:   ID- Dr. Barajas  Cardiology- Dr. Coles  Neurology- Dr. Wei   Physiatry- Dr. Reich    ---  TIME SPENT:  I, the attending physician, was physically present for the key portions of the evaluation and management (E/M) service provided. The total amount of time spent reviewing the hospital notes, laboratory values, imaging findings, assessing/counseling the patient, discussing with consultant physicians, social work, nursing staff was -- minutes    ---  Primary care provider was made aware of plan for discharge:      [  ] NO     [ x ] YES           FROM ADMISSION HPI:  Patient is a 89 yo female with PMH hemorrhagic CVA (2015), Type 2 DM (not on insulin), HTN, HLD, seizures, hard of hearing presents to ED with left sided weakness. Son Simone Easley reports he left the house for a while to run errands. Son called pt at 4pm and he received the answering machine. About 10 minutes later, son tried again and when there was still no answer he assumed something was wrong. He rushed home to find pt was on the floor on her left side laying down. Son assumed pt has seizure or stroke. Reports left side was weakness, drooling but able to talk. Pt wasn't confused, no incontinence. Son called EMS for assistance and pt was brought to ED. Pt only complaining of back pain at this time.      ---  HOSPITAL COURSE:   Patient presented with left sided weakness, admitted for CVA workup vs seizures, acute hypoxic respiratory failure 2/2 Covid-19 and NSTEMI. CT brain, CTA head/neck and CT perfusion results as above. Neurology was consulted, impression that event leading to hospitalization consistent with seizure recurrence w/ Mckinley's paralysis. Home Tegretol was continued. Speech and Swallow consulted, patient given soft and bite sized diet. HbA1c 6.5%. Vitamin B12 and folate not depleted. Lipid panel significant for , total cholesterol 220. No aspirin given due to allergy. Patient known to have intolerance for high-dose statin. Physical Therapy was consulted, recommends discharge to [facility]. MRI Head No Cont resulted negative for acute infarct, acute intracranial hemorrhage, or mass effect; significant for ventricles are dilated out of proportion to the degree of cortical atrophy, which can signify normal pressure hydrocephalus. For NSTEMI, EKG results as above. Patient started on full-dose Lovenox and Cardiology was consulted. Patient remained asymptomatic from cardiac standpoint, high-sensitivity troponin trended till peak. Anti-coagulation later switched to prophylactic dose given history of ICH. TTE performed, showed normal LV size with est EF 65%, distal anterior wall and apex not well seen in all vies and appeared hypokinetic in some views, mild diastolic dysfunction, estimated PA systolic pressure 34 mmHg. Bilateral LE dopplers negative for DVT. For acute hypoxic respiratory failure 2/2 Covid-19, patient required supplemental oxygen via NC. Patient given 5 day course of remdesivir, started on 10 day course of Decadron and ID was consulted. Patient's respiratory status improved, O2 weaned, patient with good saturations on room air.  For left sided shoulder pain s/p fall at home, Physiatry was consulted. 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 Transcapular x-ray of left shoulder without acute findings. Pain was managed with Tylenol and lidocaine patches. Patient showed improvement throughout hospitalization.     Pt seen on day of discharge:  T(C): 36.5 (12-31-21 @ 05:04), Max: 36.9 (12-30-21 @ 14:30)  HR: 72 (12-31-21 @ 05:04) (72 - 80)  BP: 157/87 (12-31-21 @ 05:04) (134/72 - 157/87)  RR: 18 (12-31-21 @ 05:04) (18 - 18)  SpO2: 92% (12-31-21 @ 05:04) (92% - 94%)    GENERAL: patient appears chronically ill but nontoxic, comfortable, readily interactive  LUNGS: clear to auscultation, no intercostal retractions  HEART: S1/S2, regular rate and rhythm, no murmurs noted, no lower extremity edema  GASTROINTESTINAL: abdomen is soft, nontender, nondistended   INTEGUMENT: good skin turgor, warm skin, appears well perfused  MUSCULOSKELETAL: no clubbing or cyanosis, no obvious deformity  NEUROLOGIC: awake, alert, oriented x3, good muscle tone in 4 extremities  PSYCHIATRIC: mood is good, affect is congruent    ---  CONSULTANTS:   ID- Dr. Barajas  Cardiology- Dr. Coles  Neurology- Dr. Wei   Physiatry- Dr. Reich    ---  TIME SPENT:  I, the attending physician, was physically present for the key portions of the evaluation and management (E/M) service provided. The total amount of time spent reviewing the hospital notes, laboratory values, imaging findings, assessing/counseling the patient, discussing with consultant physicians, social work, nursing staff was -46- minutes.     ---  Discussed w/ the pt's PCP Dr. Rosenthal on day of discharge.

## 2021-12-27 NOTE — SWALLOW BEDSIDE ASSESSMENT ADULT - COMMENTS
Initial clinical swallow assessment completed 12/22, at which time pt was recommended soft and bite sized with thin liquids. Pt seen this PM to ensure diet tolerance and potential diet advancement. RN reported pt is tolerating current diet w/o difficulty and is eager for upgrade.  Repeat CXR 12/23: "No acute finding." Pt's WBC is WFL, no fever.    Pt received upright in bed, awake and cooperative, on room air. Pt was agreeable to session. Pt followed simple commands independently. Pt's vocal quality/intensity and speech production was WFL. Pt reported back pain, RN made aware.
Consult received and chart reviewed. The patient was seen at bedside this afternoon for an initial assessment of swallow function, at which time she was alert and cooperative. Patient currently receiving supplemental O2 via 4L NC in place. The patient is denying any swallowing difficulties at this time, though reported that her throat hurt earlier today because it was dry. She further added that she does not have any difficulty, however, she "just likes to eat and drink slowly." The patient further denied pain pre/post today's evaluation.    Per charting, the patient is a "89 yo female with PMH hemorrhagic CVA (2015), Type 2 DM (not on insulin), HTN, HLD, seizures, hard of hearing presents to ED with left sided weakness. Admitted for CVA, NSTEMI, COVID, now with uptrending troponins." WBC is WFL. CXR revealed, "No acute finding or change."    Discussed results and recommendations from this evaluation with the patient, RN, and call out to MD.

## 2021-12-27 NOTE — PROGRESS NOTE ADULT - SUBJECTIVE AND OBJECTIVE BOX
Patient is a 89y old  Female who presents with a chief complaint of L sided weaknesss (27 Dec 2021 12:25)      FROM ADMISSION H+P:   HPI:  Patient is a 89 yo female with PMH hemorrhagic CVA (2015), Type 2 DM (not on insulin), HTN, HLD, seizures, hard of hearing presents to ED with left sided weakness. Son Simone Easley reports he left the house for a while to run errands. Son called pt at 4pm and he received the answering machine. About 10 minutes later, son tried again and when there was still no answer he assumed something was wrong. He rushed home to find pt was on the floor on her left side laying down. Son assumed pt has seizure or stroke. Reports left side was weakness, drooling but able to talk. Pt wasn't confused, no incontinence. Son called EMS for assistance and pt was brought to ED. Pt only complaining of back pain at this time.      in ED:  VS: /94, HR 78, RR 16, 97F, SpO2 98% on RA   Labs significant for: trops 457.9. COVID PCR pos.   CTA head: Brain CT: No acute hemorrhage, mass effect or extra-axial collections.  Neck CTA: No hemodynamically significant stenosis.  Brain CT: No large vessel occlusion or stenosis.  Perfusion maps. No evidence for core infarct or area of brain at risk.  EKG: sinus tachy, ST wave depressions laterally   (21 Dec 2021 19:43)      ----  INTERVAL HPI/OVERNIGHT EVENTS: Pt seen and evaluated at the bedside. No acute overnight events occurred. The patient denies fevers, chills, nausea, vomiting, chest pain, palpitations or shortness of breath.     ----  PAST MEDICAL & SURGICAL HISTORY:  Diabetes mellitus    Hypercholesteremia    HTN (hypertension)    CVA (cerebral infarction)    Hemorrhagic stroke    Seizure    History of arthroscopy of knee  Right    S/P cataract surgery  bilateral    History of hysterectomy    Basal cell cancer  s/p Mohs surgery    History of retinal tear  surgical repair        FAMILY HISTORY:  FHx: heart disease    Family history of basal cell carcinoma        Home Medications:  acetaminophen 325 mg oral tablet: 2 tab(s) orally every 8 hours (27 Dec 2021 10:14)  amLODIPine 5 mg oral tablet: 1 tab(s) orally once a day (21 Dec 2021 18:27)  carBAMazepine 100 mg oral tablet, extended release: 1 tab(s) orally 3 times a day (27 Dec 2021 10:19)  carBAMazepine 200 mg oral capsule, extended release: 2 cap(s) orally once a day (at bedtime) (27 Dec 2021 10:14)  carvedilol 12.5 mg oral tablet: 1 tab(s) orally every 12 hours (21 Dec 2021 18:27)  Centrum Silver oral tablet: 1 tab(s) orally once a day (21 Dec 2021 18:27)  Co Q-10 100 mg oral capsule: 1 cap(s) orally once a day (21 Dec 2021 18:27)  dexamethasone 6 mg oral tablet: 1 tab(s) orally once a day (27 Dec 2021 10:14)  enoxaparin: 40 milligram(s) subcutaneous once a day (27 Dec 2021 10:14)  irbesartan 150 mg oral tablet: 1 tab(s) orally 2 times a day (21 Dec 2021 18:27)  lidocaine 4% topical film: Apply topically to affected area once a day, as needed for pain (27 Dec 2021 10:14)  metFORMIN 500 mg oral tablet, extended release: 2 tab(s) orally once a day (after a meal) (21 Dec 2021 18:27)  rosuvastatin 10 mg oral tablet: 1 tab(s) orally once a day (at bedtime) (21 Dec 2021 18:27)      Allergies    aspirin (Unknown)  sulfa drugs (Unknown)    Intolerances    Lipitor (Other; Muscle Pain)      ----  MEDICATIONS  (STANDING):  acetaminophen     Tablet .. 650 milliGRAM(s) Oral every 8 hours  carBAMazepine ER Capsule 400 milliGRAM(s) Oral at bedtime  carBAMazepine ER Tablet 100 milliGRAM(s) Oral <User Schedule>  carvedilol 12.5 milliGRAM(s) Oral every 12 hours  dexAMETHasone     Tablet 6 milliGRAM(s) Oral daily  dextrose 40% Gel 15 Gram(s) Oral once  dextrose 5%. 1000 milliLiter(s) (50 mL/Hr) IV Continuous <Continuous>  dextrose 5%. 1000 milliLiter(s) (100 mL/Hr) IV Continuous <Continuous>  dextrose 50% Injectable 25 Gram(s) IV Push once  dextrose 50% Injectable 12.5 Gram(s) IV Push once  dextrose 50% Injectable 25 Gram(s) IV Push once  enoxaparin Injectable 40 milliGRAM(s) SubCutaneous daily  glucagon  Injectable 1 milliGRAM(s) IntraMuscular once  insulin lispro (ADMELOG) corrective regimen sliding scale   SubCutaneous three times a day before meals  insulin lispro (ADMELOG) corrective regimen sliding scale   SubCutaneous at bedtime  lidocaine   4% Patch 1 Patch Transdermal daily  losartan 50 milliGRAM(s) Oral daily  multivitamin/minerals 1 Tablet(s) Oral daily  nystatin Powder 1 Application(s) Topical two times a day  pantoprazole  Injectable 40 milliGRAM(s) IV Push daily    MEDICATIONS  (PRN):  aluminum hydroxide/magnesium hydroxide/simethicone Suspension 30 milliLiter(s) Oral every 4 hours PRN Dyspepsia  benzocaine 15 mG/menthol 3.6 mG (Sugar-Free) Lozenge 1 Lozenge Oral every 6 hours PRN Sore Throat  guaiFENesin Oral Liquid (Sugar-Free) 100 milliGRAM(s) Oral every 6 hours PRN Cough  melatonin 3 milliGRAM(s) Oral at bedtime PRN Insomnia  ondansetron Injectable 4 milliGRAM(s) IV Push every 8 hours PRN Nausea and/or Vomiting      ----  REVIEW OF SYSTEMS:  Constitutional: denies fever or chills  HEENT: denies headache, dizziness, or lightheadedness  Respiratory: denies SOB or cough   Cardiovascular: denies CP or palpitations  Gastrointestinal: denies nausea, vomiting, diarrhea, abdominal pain, or hematochezia  Genitourinary: denies hematuria, dysuria, frequency, urgency  Musculoskeletal: denies muscle aches, joint swelling, or muscle weakness    ----  PHYSICAL EXAM:  Gen: well appearing, in no acute distress  HEENT: NCAT   Cardio: regular rate and rhythm, +s1s2, no murmurs, rubs, or gallops  Pulm: CTA b/l, no wheezes, rales or rhonchi  Abdomen: soft, nontender, nondistended, +BS x4 quadrants, no guarding  Extremities: no edema   Neuro: AAOx3  Skin: warm and dry      T(C): 37.3 (12-27-21 @ 12:51), Max: 37.3 (12-27-21 @ 12:51)  HR: 78 (12-27-21 @ 12:51) (78 - 78)  BP: 131/75 (12-27-21 @ 12:51) (131/75 - 160/88)  RR: 18 (12-27-21 @ 12:51) (17 - 18)  SpO2: 91% (12-27-21 @ 12:51) (91% - 93%)  Wt(kg): --     ----  I&O's Summary    26 Dec 2021 07:01  -  27 Dec 2021 07:00  --------------------------------------------------------  IN: 0 mL / OUT: 750 mL / NET: -750 mL    27 Dec 2021 07:01  -  27 Dec 2021 14:51  --------------------------------------------------------  IN: 0 mL / OUT: 350 mL / NET: -350 mL        LABS:                        9.9    5.41  )-----------( 181      ( 27 Dec 2021 10:57 )             29.7     12-27    142  |  108  |  23  ----------------------------<  234<H>  4.1   |  29  |  0.76    Ca    7.6<L>      27 Dec 2021 10:57    TPro  5.1<L>  /  Alb  2.0<L>  /  TBili  0.2  /  DBili  <0.1  /  AST  20  /  ALT  23  /  AlkPhos  49  12-27        CAPILLARY BLOOD GLUCOSE      POCT Blood Glucose.: 222 mg/dL (27 Dec 2021 11:38)  POCT Blood Glucose.: 155 mg/dL (27 Dec 2021 07:38)  POCT Blood Glucose.: 177 mg/dL (26 Dec 2021 16:43)

## 2021-12-27 NOTE — SWALLOW BEDSIDE ASSESSMENT ADULT - SWALLOW EVAL: RECOMMENDED FEEDING/EATING TECHNIQUES
alternate food with liquid/maintain upright posture during/after eating for 30 mins/oral hygiene/position upright (90 degrees)/small sips/bites
allow for swallow between intakes/alternate food with liquid/crush medication (when feasible)/hard swallow w/ each bite or sip/maintain upright posture during/after eating for 30 mins/no straws/oral hygiene/position upright (90 degrees)/small sips/bites

## 2021-12-27 NOTE — SWALLOW BEDSIDE ASSESSMENT ADULT - DIET PRIOR TO ADMI
regular solids with thin liquids, per pt report
Regular solids with thin liquids per patient's report

## 2021-12-27 NOTE — PROGRESS NOTE ADULT - PROBLEM SELECTOR PLAN 5
- chronic, 12/22/21 hbA1c 6.5% - BG at goal at this time  - continue low dose correctional insulin scale    - Goal blood glucose in hospital setting 100-180, adjust insulin regimen PRN  - Hypoglycemia protocol in place, monitor for signs of hypoglycemia

## 2021-12-27 NOTE — PROGRESS NOTE ADULT - SUBJECTIVE AND OBJECTIVE BOX
[facility] -- Bahvik Son Grossman, [place], Ankit Snyder, Criselda Cool: Office # [phone]    Follow Up:  covid pna, htn, hx of cva    Subjective/Observations: Patient seen and examined. Patient awake, alert, resting comfortably in bed. No complaints of chest pain, dyspnea, palpitations or dizziness. Tolerating room air.     REVIEW OF SYSTEMS: All review of systems is negative for eye, ENT, GI, , allergic, dermatologic, musculoskeletal and neurologic except as described above    PAST MEDICAL & SURGICAL HISTORY:  Diabetes mellitus    Hypercholesteremia    HTN (hypertension)    CVA (cerebral infarction)    Hemorrhagic stroke    Seizure    History of arthroscopy of knee  Right    S/P cataract surgery  bilateral    History of hysterectomy    Basal cell cancer  s/p Mohs surgery    History of retinal tear  surgical repair        MEDICATIONS  (STANDING):  acetaminophen     Tablet .. 650 milliGRAM(s) Oral every 8 hours  carBAMazepine ER Capsule 400 milliGRAM(s) Oral at bedtime  carBAMazepine ER Tablet 100 milliGRAM(s) Oral <User Schedule>  carvedilol 12.5 milliGRAM(s) Oral every 12 hours  dexAMETHasone     Tablet 6 milliGRAM(s) Oral daily  dextrose 40% Gel 15 Gram(s) Oral once  dextrose 5%. 1000 milliLiter(s) (50 mL/Hr) IV Continuous <Continuous>  dextrose 5%. 1000 milliLiter(s) (100 mL/Hr) IV Continuous <Continuous>  dextrose 50% Injectable 25 Gram(s) IV Push once  dextrose 50% Injectable 12.5 Gram(s) IV Push once  dextrose 50% Injectable 25 Gram(s) IV Push once  enoxaparin Injectable 40 milliGRAM(s) SubCutaneous daily  glucagon  Injectable 1 milliGRAM(s) IntraMuscular once  insulin lispro (ADMELOG) corrective regimen sliding scale   SubCutaneous three times a day before meals  insulin lispro (ADMELOG) corrective regimen sliding scale   SubCutaneous at bedtime  lidocaine   4% Patch 1 Patch Transdermal daily  losartan 50 milliGRAM(s) Oral daily  multivitamin/minerals 1 Tablet(s) Oral daily  nystatin Powder 1 Application(s) Topical two times a day  pantoprazole  Injectable 40 milliGRAM(s) IV Push daily    MEDICATIONS  (PRN):  aluminum hydroxide/magnesium hydroxide/simethicone Suspension 30 milliLiter(s) Oral every 4 hours PRN Dyspepsia  benzocaine 15 mG/menthol 3.6 mG (Sugar-Free) Lozenge 1 Lozenge Oral every 6 hours PRN Sore Throat  guaiFENesin Oral Liquid (Sugar-Free) 100 milliGRAM(s) Oral every 6 hours PRN Cough  melatonin 3 milliGRAM(s) Oral at bedtime PRN Insomnia  ondansetron Injectable 4 milliGRAM(s) IV Push every 8 hours PRN Nausea and/or Vomiting    Allergies    aspirin (Unknown)  sulfa drugs (Unknown)    Intolerances    Lipitor (Other; Muscle Pain)    Vital Signs Last 24 Hrs  T(C): 37 (27 Dec 2021 05:25), Max: 37.1 (26 Dec 2021 21:23)  T(F): 98.6 (27 Dec 2021 05:25), Max: 98.8 (26 Dec 2021 21:23)  HR: 78 (27 Dec 2021 05:25) (74 - 78)  BP: 155/79 (27 Dec 2021 05:25) (155/79 - 160/88)  BP(mean): --  RR: 17 (27 Dec 2021 05:25) (17 - 18)  SpO2: 93% (27 Dec 2021 05:25) (92% - 94%)  I&O's Summary    26 Dec 2021 07:01  -  27 Dec 2021 07:00  --------------------------------------------------------  IN: 0 mL / OUT: 750 mL / NET: -750 mL          TELE:  70  PHYSICAL EXAM:  Appearance: NAD, no distress, alert, Well developed   HEENT: Moist Mucous Membranes, Anicteric  Cardiovascular: Regular rate and rhythm, Normal S1 S2, No JVD, No murmurs, No rubs, gallops or clicks  Respiratory: Non-labored, Clear to auscultation, No rales, No rhonchi, No wheezing.   Gastrointestinal:  Soft, Non-tender, + BS  Neurologic: Non-focal  Skin: Warm and dry, No visible rashes or ulcers, No ecchymosis, No cyanosis  Musculoskeletal: No clubbing, No cyanosis, No joint swelling/tenderness  Psychiatry: Mood & affect appropriate  Lymph: No peripheral edema.     LABS: All Labs Reviewed:                        9.9    3.83  )-----------( 142      ( 26 Dec 2021 07:21 )             29.7                         10.3   6.45  )-----------( 141      ( 25 Dec 2021 08:46 )             31.3     26 Dec 2021 07:21    144    |  110    |  23     ----------------------------<  138    3.6     |  28     |  0.67   25 Dec 2021 08:46    143    |  107    |  28     ----------------------------<  167    3.7     |  30     |  0.75     Ca    7.9        26 Dec 2021 07:21  Ca    7.9        25 Dec 2021 08:46  Phos  2.7       25 Dec 2021 08:46    TPro  5.1    /  Alb  1.9    /  TBili  0.2    /  DBili  <0.1   /  AST  14     /  ALT  17     /  AlkPhos  47     26 Dec 2021 07:21  TPro  5.4    /  Alb  2.1    /  TBili  0.2    /  DBili  <0.1   /  AST  17     /  ALT  19     /  AlkPhos  49     25 Dec 2021 08:46      Troponin I, High Sensitivity Result: 2157.8 ng/L (12-22-21 @ 09:53)  Troponin I, High Sensitivity Result: 2910.6 ng/L (12-22-21 @ 04:56)  Creatine Kinase, Serum: 205 U/L (12-22-21 @ 04:56)  Troponin I, High Sensitivity Result: 457.9 ng/L (12-21-21 @ 17:50)      D-Dimer Assay, Quantitative: 1580 ng/mL DDU (12-22-21 @ 17:03)        Cholesterol, Serum: 220 mg/dL (12-22-21 @ 09:32)  HDL Cholesterol, Serum: 76 mg/dL (12-22-21 @ 09:32)  Triglycerides, Serum: 74 mg/dL (12-22-21 @ 09:32)      12 Lead ECG:   Ventricular Rate 80 BPM    Atrial Rate 80 BPM    P-R Interval 138 ms    QRS Duration 90 ms    Q-T Interval 400 ms    QTC Calculation(Bazett) 461 ms    P Axis 60 degrees    R Axis -1 degrees    T Axis 20 degrees    Diagnosis Line Normal sinus rhythm  Inferior infarct , age undetermined  Possible Anterior infarct , age undetermined  Abnormal ECG  When compared with ECG of 21-DEC-2021 17:53, (Unconfirmed)  No significant change was found  Confirmed by Mihai Coles MD ([id number]) on 12/22/2021 11:21:17AM (12-22-21 @ 05:48)    < from: TTE Echo Complete w/o Contrast w/ Doppler (12.23.21 @ 20:16) >     EXAM:  ECHO TTE WO CON COMP W DOPP         PROCEDURE DATE:  12/23/2021        INTERPRETATION:  INDICATION: Abnormal EKG    Blood Pressure 152/84    Height 165     Weight 68       BSA 1.75    Dimensions:  LA 2.3       Normal Values: 2.0 - 4.0 cm  Ao 3.3        Normal Values: 2.0 - 3.8 cm  SEPTUM 1.1       Normal Values: 0.6 - 1.2 cm  PWT 1.1       Normal Values: 0.6 - 1.1 cm  LVIDd 3.8         Normal Values: 3.0 - 5.6 cm  LVIDs 2.2         Normal Values: 1.8 - 4.0 cm    Derived Variables:  LVMIg/m2  RWT  Fractional Short  Ejection Fraction    Doppler Peak v. AoV= (m/sec)    OBSERVATIONS:    Mitral Valve: normal, trace physiologic MR.  Aortic Valve/Aorta: Calcified trileaflet aortic valve, without stenosis.   Mild AI.  Tricuspid Valve: normal with trace TR.  Pulmonic Valve: normal  Left Atrium: normal  Right Atrium: normal  Left Ventricle: normal LV size was likely with preserved systolic   function, estimated LVEF of 65%. The distal anterior wall and apex are   not well seen in all views, and appear hypokinetic in some views.  Right Ventricle: normal size and systolic function.  Pericardium/Pleura: no significant pleural effusion, no significant   pericardial effusion.  Pulmonary/RV Pressure: estimated PA systolic pressure of 34mmHg assuming   an RA pressure of 10 mmHg.  LV Diastolic Function: Mild diastolic dysfunction.    Impression: Normal left ventricular size and systolic function, estimated   LVEF of 65%. The distal anterior wall and apex are not well seen in all   views,and appear hypokinetic in some views.Normal right ventricular size   and systolic function. Mild diastolic dysfunction.  Normal biatrial size.   The aortic root is normal in size. The mitral valve is structurally   normal, trace physiologic MR. The aortic valve is trileaflet without   stenosis. Mild AI. Trace physiologic TR. Estimated PA systolic pressure   is 34 mm Hg. No significant pericardial effusion.    --- End of Report ---    < end of copied text >

## 2021-12-27 NOTE — SWALLOW BEDSIDE ASSESSMENT ADULT - SWALLOW EVAL: DIAGNOSIS
1. The patient demonstrated functional oral management of pureed marked by adequate bolus collection, transfer, and posterior transport. 2. The patient demonstrated a mild oral dysphagia for solids marked by adequate oral acceptance with prolonged oral manipulation resulting in delayed bolus collection, transfer, and posterior transport. Trace-mild lingual residue noted subsequent to deglutition which cleared with a liquid wash. 3. The patient demonstrated a mild oral dysphagia for thin liquids marked by adequate oral acceptance with brief bolus hold resulting in delayed bolus collection, transfer, and posterior transport. 4. The patient demonstrated a mild pharyngeal dysphagia for pureed, solid, and thin liquid textures marked by a suspected delayed pharyngeal swallow trigger with hyolaryngeal elevation noted upon digital palpation w/o evidence of airway penetration.
Pt self-administered PO trials of thin liquids and regular solids. Pt p/w a functional oral phase marked by adequate retrieval and containment, timely mastication/manipulation and transfer, and adequate clearance post swallow. Pharyngeal phase marked by suspected timely swallow onset, +laryngeal elevation to palpation, and no overt s/sx of aspiration. Recommend solid upgrade to regular solids with continuation of thin liquids +aspiration precautions. Feed only when fully awake/alert, position upright 90 degrees. Small bite/sip, complete full mastication of solids, alternate bite/sip, pace meal slowly. Discussed with RN and MD.

## 2021-12-27 NOTE — PROGRESS NOTE ADULT - SUBJECTIVE AND OBJECTIVE BOX
[facility] DIVISION of INFECTIOUS DISEASE  Amanuel Barajas MD PhD, Anni Sutton MD, Ruby Nunez MD, Claudette Pham MD, Petros Martin MD  and providing coverage with Marilyn Singleton MD and Rocco Lake MD  Providing Infectious Disease Consultations at [facility], [facility]'s      Office# [phone] to schedule follow up appointments  Answering Service for urgent calls or New Consults [phone]    Infectious Diseases Progress Note:    MAJOR PEREZ is a 89y year old Female patient    COVID-19 Patient  Remains on room air, afebrile.   Notes reviewed  No concerning events overnight.   Allergies: aspirin (Unknown)  sulfa drugs (Unknown)    ANTIBIOTICS/RELEVANT:  Antimicrobials   Immunologic:   Other Meds: acetaminophen     Tablet .. 650 milliGRAM(s) Oral every 8 hours  aluminum hydroxide/magnesium hydroxide/simethicone Suspension 30 milliLiter(s) Oral every 4 hours PRN  benzocaine 15 mG/menthol 3.6 mG (Sugar-Free) Lozenge 1 Lozenge Oral every 6 hours PRN  carBAMazepine ER Capsule 400 milliGRAM(s) Oral at bedtime  carBAMazepine ER Tablet 100 milliGRAM(s) Oral <User Schedule>  carvedilol 12.5 milliGRAM(s) Oral every 12 hours  dexAMETHasone     Tablet 6 milliGRAM(s) Oral daily  dextrose 40% Gel 15 Gram(s) Oral once  dextrose 5%. 1000 milliLiter(s) IV Continuous <Continuous>  dextrose 5%. 1000 milliLiter(s) IV Continuous <Continuous>  dextrose 50% Injectable 25 Gram(s) IV Push once  dextrose 50% Injectable 12.5 Gram(s) IV Push once  dextrose 50% Injectable 25 Gram(s) IV Push once  enoxaparin Injectable 40 milliGRAM(s) SubCutaneous daily  glucagon  Injectable 1 milliGRAM(s) IntraMuscular once  guaiFENesin Oral Liquid (Sugar-Free) 100 milliGRAM(s) Oral every 6 hours PRN  insulin lispro (ADMELOG) corrective regimen sliding scale   SubCutaneous three times a day before meals  insulin lispro (ADMELOG) corrective regimen sliding scale   SubCutaneous at bedtime  lidocaine   4% Patch 1 Patch Transdermal daily  losartan 50 milliGRAM(s) Oral daily  melatonin 3 milliGRAM(s) Oral at bedtime PRN  multivitamin/minerals 1 Tablet(s) Oral daily  nystatin Powder 1 Application(s) Topical two times a day  ondansetron Injectable 4 milliGRAM(s) IV Push every 8 hours PRN  pantoprazole  Injectable 40 milliGRAM(s) IV Push daily    Objective:  Vital Signs Last 24 Hrs  T(F): 98.6 (27 Dec 2021 05:25), Max: 98.8 (26 Dec 2021 21:23)  HR: 78 (27 Dec 2021 05:25) (74 - 78)  BP: 155/79 (27 Dec 2021 05:25) (155/79 - 160/88)  RR: 17 (27 Dec 2021 05:25) (17 - 18)  SpO2: 93% (27 Dec 2021 05:25) (92% - 94%)  PHYSICAL EXAM:  HEENT: NC/AT, anicteric, supple  Respiratory: no acc muscle use, breathing comfortably  Cardiovascular: S1 S2 present, normal rate  Gastrointestinal: normal appearing, nondistended  Extremities: no edema, no cyanosis    LABS:                        9.9    3.83  )-----------( 142      ( 26 Dec 2021 07:21 )             29.7     WBC trend:  3.83 12-26 @ 07:21  6.45 12-25 @ 08:46  5.58 12-24 @ 07:58  9.15 12-23 @ 06:54  6.29 12-22 @ 04:56  8.75 12-21 @ 17:50    12-26    144  |  110<H>  |  23  ----------------------------<  138<H>  3.6   |  28  |  0.67    Ca    7.9<L>      26 Dec 2021 07:21    TPro  5.1<L>  /  Alb  1.9<L>  /  TBili  0.2  /  DBili  <0.1  /  AST  14<L>  /  ALT  17  /  AlkPhos  47  12-26    Creatinine, Serum: 0.67 mg/dL (12-26-21 @ 07:21)  Creatinine, Serum: 0.75 mg/dL (12-25-21 @ 08:46)  Creatinine, Serum: 0.85 mg/dL (12-24-21 @ 07:58)  Creatinine, Serum: 0.78 mg/dL (12-23-21 @ 06:54)  Creatinine, Serum: 0.80 mg/dL (12-22-21 @ 04:56)  Creatinine, Serum: 0.77 mg/dL (12-21-21 @ 17:50)    Auto Neutrophil #: 3.63 K/uL (12-24-21 @ 07:58)  Auto Neutrophil #: 6.75 K/uL (12-23-21 @ 06:54)  Auto Neutrophil #: 4.57 K/uL (12-22-21 @ 04:56)  Auto Neutrophil #: 7.17 K/uL (12-21-21 @ 17:50)    Auto Lymphocyte #: 1.04 K/uL (12-24-21 @ 07:58)  Auto Lymphocyte #: 1.08 K/uL (12-23-21 @ 06:54)  Auto Lymphocyte #: 0.73 K/uL (12-22-21 @ 04:56)  Auto Lymphocyte #: 0.70 K/uL (12-21-21 @ 17:50)    Procalcitonin, Serum: 0.18 ng/mL (12-22-21 @ 17:03)  Ferritin, Serum: 87 ng/mL (12-22-21 @ 22:49)  D-Dimer Assay, Quantitative: 1580 ng/mL DDU (12-22-21 @ 17:03)  C-Reactive Protein, Serum: 102 mg/L (12-22-21 @ 22:58)    MICROBIOLOGY: reviewed  Culture - Blood (collected 12-23-21 @ 11:45)  Source: .Blood Blood-Peripheral  Preliminary Report (12-24-21 @ 12:02):    No growth to date.    Culture - Blood (collected 12-23-21 @ 11:45)  Source: .Blood Blood-Peripheral  Preliminary Report (12-24-21 @ 12:02):    No growth to date.    RADIOLOGY & ADDITIONAL STUDIES: reviewed

## 2021-12-27 NOTE — PROGRESS NOTE ADULT - ATTENDING COMMENTS
89 yo female with PMH of hemorrhagic CVA (2015), Type 2 DM (not on insulin), HTN, HLD, seizures, hard of hearing presents to ED with left sided weakness, admitted for left-sided weakness, troponin elevation and acute hypoxic respiratory failure due to COVID-19 PNA.  COVID-19 PNA - improved, on RA. Discharge planning to [facility] as discussed with son Simone and Quintin.

## 2021-12-27 NOTE — DISCHARGE NOTE PROVIDER - NSDCFUSCHEDAPPT_GEN_ALL_CORE_FT
MAJOR PEREZ ; 03/16/2022 ; NPP Intmed [facility]  MAJOR PEREZ ; 03/21/2022 ; NPP Neuro [address] [facility]

## 2021-12-27 NOTE — SWALLOW BEDSIDE ASSESSMENT ADULT - SWALLOW EVAL: RECOMMENDED DIET
regular solids with thin liquids +aspiration precautions
Soft & bite-sized with thin liquids, as tolerated

## 2021-12-27 NOTE — PROGRESS NOTE ADULT - PROBLEM SELECTOR PLAN 1
- likely due to seizure  - CTA head, neck, CT C-spine all negative for acute processes  - MRI head negative for acute process, possible normal pressure hydrocephalus w/ ventricular dilation      - TTE: normal LV/RVF with SWMA, and mild DD, no significant valvular disease  - Seizure and Aspiration precautions  - continue diet, soft bite size w thin liquid  - HOLD aspirin given allergy. HOLD high dose statin as pt intolerant  - carbamazepine levels within therapeutic limits  - PT/OT eval - PT recommendig TAYLOR  - Neuro following Dr. Wei, recs appreciated  - DISPO - patient refusing T[facility], will discuss with son who lives with patient [facility] vs home with home PT

## 2021-12-27 NOTE — SWALLOW BEDSIDE ASSESSMENT ADULT - ASR SWALLOW RECOMMEND DIAG
Objective testing not warranted at this time as the patient is tolerating least restrictive PO diet and CXR is not concerning for PNA.
not warranted at this time, as pt appeared to tolerate PO and CXR is not concerning for PNA

## 2021-12-27 NOTE — SWALLOW BEDSIDE ASSESSMENT ADULT - SLP PERTINENT HISTORY OF CURRENT PROBLEM
r/o dysphagia
Per charting, "89 yo female with PMH of hemorrhagic CVA (2015), Type 2 DM (not on insulin), HTN, HLD, seizures, hard of hearing presents to ED with left sided weakness, admitted for left-sided weakness, troponin elevation and acute hypoxic respiratory failure due to COVID-19 PNA."

## 2021-12-27 NOTE — PROGRESS NOTE ADULT - PROBLEM SELECTOR PLAN 6
Chronic, stable  - continue home coreg 12.5mg po bid, losartan 50mg qd, will restart home norvasc 5mg po qd in setting of elevated BPs  - Monitor routine hemodynamics

## 2021-12-27 NOTE — DISCHARGE NOTE PROVIDER - CARE PROVIDER_API CALL
PEM ATTENDING ADDENDUM  I personally performed a history and physical examination, and discussed the management with the resident/fellow.  The past medical and surgical history, review of systems, family history, social history, current medications, allergies, and immunization status were discussed with the trainee, and I confirmed pertinent portions with the patient and/or famil.  I made modifications above as I felt appropriate; I concur with the history as documented above unless otherwise noted below. My physical exam findings are listed below, which may differ from that documented by the trainee.  I was present for and directly supervised any procedure(s) as documented above.  I personally reviewed the labwork and imaging obtained.  I reviewed the trainee's assessment and plan and made modifications as I felt appropriate.  I agree with the assessment and plan as documented above, unless noted below.    Sammy MCCRACKEN Pedrito Rosenthal)  Internal Medicine  [address] [place] [address]  Phone: [phone]  Fax: [phone]  Established Patient  Follow Up Time: 1-3 days    Bonifacio Gonsalez)  Clinical Neurophysiology; EEGEpilepsy; Neurology  [address] [facility] [address]  [place] [address]  Phone: [phone]  Fax: [phone]  Established Patient  Follow Up Time: 1 week    LYNNETTE JAY  Cardiology  [address] [place] [address]  Phone: [phone]  Fax: [phone]  Established Patient  Follow Up Time: Routine

## 2021-12-27 NOTE — PROGRESS NOTE ADULT - ASSESSMENT
89 year old female with PMH hemorrhagic CVA (2015), Type 2 DM (not on insulin), HTN, HLD, seizure disorder, hearing impairment presents to ED s/p unwitnessed episode resulting in fall onto L side and L sided weakness. Patient incidentally found to be COVID+ with no sick contacts or URI symptoms prior to admission. Cardiology consulted for elevated troponin.     Cardiac summary  TTE: (3/3/2019): normal LVSF, calcified trileaflet AV with normal systolic opening    Elevated trop   - High sensitivity Trop I: elevated, peaked and trended down.  Not consistent with true plaque rupture  - Her chest pain was more pleuritic than cardiac  - EKG shows nonspecific T-wave changes, non-diagnostic.  Her TTE showed normal LV/RVF with SWMA, and mild DD, no significant valvular disease  - Unable to start ASA in the setting of allergy.    - No meaningful evidence of volume overload  - BP: 155/79 - 160/88  Continue ARB and BB can increase losartan if needed for elevated BP  - No arrhythmias on tele.  Can D/C if not needed for Sat monitoring  - Monitor and replete Lytes. Keep K > 4 and Mg > 2    - Will continue to follow.  Will need ischemic eval as outpatient if in line with her wishes.    - Remains stable from cardiac standpoint    - Will continue to follow.    Courtney Saldivar, MS ANP, AGACNP  Nurse Practitioner- Cardiology   Spectra #[phone]

## 2021-12-27 NOTE — PROGRESS NOTE ADULT - ASSESSMENT
89 yo female with PMH hemorrhagic CVA (2015), Type 2 DM (not on insulin), HTN, HLD, seizures, hard of hearing presents to ED with left sided weakness. Pt reports she had a seizure. Reports left side weakness, drooling but able to talk. Pt wasn't confused, no incontinence.  COVID PCR pos. pt vaccinated with booster    RECOMMENDATIONS  12/21 86% on RA, req NC-4L, Remdesivir started with plan for 5 days so last day 12/25, Dexamethasone started with plan for 10 days so last day 12/30,  12/22-NC-4L, NLR 4.57/0.73=6.3, coordinate with neuro regarding VTE recommendations, continue oxygen, with triple vax optimistic regarding outcome but pt does have risk factors such as age, DM, HTN, change steroids to PO  12/23 -continues on NC, continue remdesivir, steroids, supportive care  12/24-NC,NLR<3,  Remdesivir started with plan for 5 days so last day 12/25, Dexamethasone started with plan for 10 days so last day 12/30 12/25 doing well on nc, cont remdesivir day 5 today , decadron for 10 days , titrate oxygen  12/27 - on RA, s/p remdesivir x5d. Cont dexamethasone to complete 10d, continue supportive care   D/c planning per primary team.        89 yo female with PMH hemorrhagic CVA (2015), Type 2 DM (not on insulin), HTN, HLD, seizures, hard of hearing presents to ED with left sided weakness. Pt reports she had a seizure. Reports left side weakness, drooling but able to talk. Pt wasn't confused, no incontinence.  COVID PCR pos. pt vaccinated with booster    RECOMMENDATIONS  12/21 86% on RA, req NC-4L, Remdesivir started with plan for 5 days so last day 12/25, Dexamethasone started with plan for 10 days so last day 12/30,  12/22-NC-4L, NLR 4.57/0.73=6.3, coordinate with neuro regarding VTE recommendations, continue oxygen, with triple vax optimistic regarding outcome but pt does have risk factors such as age, DM, HTN, change steroids to PO  12/23 -continues on NC, continue remdesivir, steroids, supportive care  12/24-NC,NLR<3,  Remdesivir started with plan for 5 days so last day 12/25, Dexamethasone started with plan for 10 days so last day 12/30 12/25 doing well on nc, cont remdesivir day 5 today , decadron for 10 days , titrate oxygen  12/27 - on RA, s/p remdesivir x5d. Cont dexamethasone to complete 10d, continue supportive care   D/c planning per primary team.     ID will sign off at this time but remains available for any further questions/concerns.    Claudette Pham M.D.  [facility], Division of Infectious Diseases  [phone]  After 5pm on weekdays and all day on weekends - please call [phone]

## 2021-12-28 LAB
ALBUMIN SERPL ELPH-MCNC: 2 G/DL — LOW (ref 3.3–5)
ALBUMIN SERPL ELPH-MCNC: 2.1 G/DL — LOW (ref 3.3–5)
ALP SERPL-CCNC: 51 U/L — SIGNIFICANT CHANGE UP (ref 40–120)
ALP SERPL-CCNC: 52 U/L — SIGNIFICANT CHANGE UP (ref 40–120)
ALT FLD-CCNC: 35 U/L — SIGNIFICANT CHANGE UP (ref 12–78)
ALT FLD-CCNC: 35 U/L — SIGNIFICANT CHANGE UP (ref 12–78)
ANION GAP SERPL CALC-SCNC: 6 MMOL/L — SIGNIFICANT CHANGE UP (ref 5–17)
AST SERPL-CCNC: 34 U/L — SIGNIFICANT CHANGE UP (ref 15–37)
AST SERPL-CCNC: 36 U/L — SIGNIFICANT CHANGE UP (ref 15–37)
BILIRUB DIRECT SERPL-MCNC: <0.1 MG/DL — SIGNIFICANT CHANGE UP (ref 0–0.3)
BILIRUB INDIRECT FLD-MCNC: >0.2 MG/DL — SIGNIFICANT CHANGE UP (ref 0.2–1)
BILIRUB SERPL-MCNC: 0.3 MG/DL — SIGNIFICANT CHANGE UP (ref 0.2–1.2)
BILIRUB SERPL-MCNC: 0.3 MG/DL — SIGNIFICANT CHANGE UP (ref 0.2–1.2)
BUN SERPL-MCNC: 17 MG/DL — SIGNIFICANT CHANGE UP (ref 7–23)
CALCIUM SERPL-MCNC: 8.2 MG/DL — LOW (ref 8.5–10.1)
CHLORIDE SERPL-SCNC: 108 MMOL/L — SIGNIFICANT CHANGE UP (ref 96–108)
CO2 SERPL-SCNC: 31 MMOL/L — SIGNIFICANT CHANGE UP (ref 22–31)
CREAT SERPL-MCNC: 0.72 MG/DL — SIGNIFICANT CHANGE UP (ref 0.5–1.3)
CULTURE RESULTS: SIGNIFICANT CHANGE UP
CULTURE RESULTS: SIGNIFICANT CHANGE UP
GLUCOSE SERPL-MCNC: 112 MG/DL — HIGH (ref 70–99)
HCT VFR BLD CALC: 29.7 % — LOW (ref 34.5–45)
HGB BLD-MCNC: 9.8 G/DL — LOW (ref 11.5–15.5)
MCHC RBC-ENTMCNC: 30.1 PG — SIGNIFICANT CHANGE UP (ref 27–34)
MCHC RBC-ENTMCNC: 33 GM/DL — SIGNIFICANT CHANGE UP (ref 32–36)
MCV RBC AUTO: 91.1 FL — SIGNIFICANT CHANGE UP (ref 80–100)
NRBC # BLD: 0 /100 WBCS — SIGNIFICANT CHANGE UP (ref 0–0)
PLATELET # BLD AUTO: 195 K/UL — SIGNIFICANT CHANGE UP (ref 150–400)
POTASSIUM SERPL-MCNC: 3.8 MMOL/L — SIGNIFICANT CHANGE UP (ref 3.5–5.3)
POTASSIUM SERPL-SCNC: 3.8 MMOL/L — SIGNIFICANT CHANGE UP (ref 3.5–5.3)
PROT SERPL-MCNC: 5.4 G/DL — LOW (ref 6–8.3)
PROT SERPL-MCNC: 5.5 G/DL — LOW (ref 6–8.3)
RBC # BLD: 3.26 M/UL — LOW (ref 3.8–5.2)
RBC # FLD: 13.5 % — SIGNIFICANT CHANGE UP (ref 10.3–14.5)
SODIUM SERPL-SCNC: 145 MMOL/L — SIGNIFICANT CHANGE UP (ref 135–145)
SPECIMEN SOURCE: SIGNIFICANT CHANGE UP
SPECIMEN SOURCE: SIGNIFICANT CHANGE UP
WBC # BLD: 6.06 K/UL — SIGNIFICANT CHANGE UP (ref 3.8–10.5)
WBC # FLD AUTO: 6.06 K/UL — SIGNIFICANT CHANGE UP (ref 3.8–10.5)

## 2021-12-28 PROCEDURE — 99232 SBSQ HOSP IP/OBS MODERATE 35: CPT

## 2021-12-28 RX ORDER — AMLODIPINE BESYLATE 2.5 MG/1
5 TABLET ORAL DAILY
Refills: 0 | Status: DISCONTINUED | OUTPATIENT
Start: 2021-12-28 | End: 2021-12-28

## 2021-12-28 RX ORDER — AMLODIPINE BESYLATE 2.5 MG/1
5 TABLET ORAL DAILY
Refills: 0 | Status: DISCONTINUED | OUTPATIENT
Start: 2021-12-28 | End: 2021-12-31

## 2021-12-28 RX ADMIN — Medication 650 MILLIGRAM(S): at 21:37

## 2021-12-28 RX ADMIN — Medication 650 MILLIGRAM(S): at 05:32

## 2021-12-28 RX ADMIN — NYSTATIN CREAM 1 APPLICATION(S): 100000 CREAM TOPICAL at 05:32

## 2021-12-28 RX ADMIN — NYSTATIN CREAM 1 APPLICATION(S): 100000 CREAM TOPICAL at 17:10

## 2021-12-28 RX ADMIN — AMLODIPINE BESYLATE 5 MILLIGRAM(S): 2.5 TABLET ORAL at 12:48

## 2021-12-28 RX ADMIN — Medication 1 TABLET(S): at 11:11

## 2021-12-28 RX ADMIN — Medication 100 MILLIGRAM(S): at 05:32

## 2021-12-28 RX ADMIN — Medication 100 MILLIGRAM(S): at 17:10

## 2021-12-28 RX ADMIN — LIDOCAINE 1 PATCH: 4 CREAM TOPICAL at 03:00

## 2021-12-28 RX ADMIN — Medication 400 MILLIGRAM(S): at 21:37

## 2021-12-28 RX ADMIN — PANTOPRAZOLE SODIUM 40 MILLIGRAM(S): 20 TABLET, DELAYED RELEASE ORAL at 11:11

## 2021-12-28 RX ADMIN — Medication 100 MILLIGRAM(S): at 11:11

## 2021-12-28 RX ADMIN — Medication 6 MILLIGRAM(S): at 05:32

## 2021-12-28 RX ADMIN — ENOXAPARIN SODIUM 40 MILLIGRAM(S): 100 INJECTION SUBCUTANEOUS at 11:11

## 2021-12-28 RX ADMIN — CARVEDILOL PHOSPHATE 12.5 MILLIGRAM(S): 80 CAPSULE, EXTENDED RELEASE ORAL at 17:09

## 2021-12-28 RX ADMIN — LIDOCAINE 1 PATCH: 4 CREAM TOPICAL at 20:21

## 2021-12-28 RX ADMIN — Medication 650 MILLIGRAM(S): at 15:20

## 2021-12-28 RX ADMIN — Medication 1: at 12:00

## 2021-12-28 RX ADMIN — LIDOCAINE 1 PATCH: 4 CREAM TOPICAL at 23:28

## 2021-12-28 RX ADMIN — Medication 650 MILLIGRAM(S): at 14:26

## 2021-12-28 RX ADMIN — LIDOCAINE 1 PATCH: 4 CREAM TOPICAL at 11:11

## 2021-12-28 RX ADMIN — Medication 650 MILLIGRAM(S): at 05:34

## 2021-12-28 NOTE — PROGRESS NOTE ADULT - SUBJECTIVE AND OBJECTIVE BOX
Neurology follow up note    MAJOR [id number]      Interval History:    Patient feels ok no new complaints.    Allergies    aspirin (Unknown)  sulfa drugs (Unknown)    Intolerances    Lipitor (Other; Muscle Pain)      MEDICATIONS    acetaminophen     Tablet .. 650 milliGRAM(s) Oral every 8 hours  aluminum hydroxide/magnesium hydroxide/simethicone Suspension 30 milliLiter(s) Oral every 4 hours PRN  amLODIPine   Tablet 5 milliGRAM(s) Oral daily  benzocaine 15 mG/menthol 3.6 mG (Sugar-Free) Lozenge 1 Lozenge Oral every 6 hours PRN  carBAMazepine ER Capsule 400 milliGRAM(s) Oral at bedtime  carBAMazepine ER Tablet 100 milliGRAM(s) Oral <User Schedule>  carvedilol 12.5 milliGRAM(s) Oral every 12 hours  dexAMETHasone     Tablet 6 milliGRAM(s) Oral daily  dextrose 40% Gel 15 Gram(s) Oral once  dextrose 5%. 1000 milliLiter(s) IV Continuous <Continuous>  dextrose 5%. 1000 milliLiter(s) IV Continuous <Continuous>  dextrose 50% Injectable 25 Gram(s) IV Push once  dextrose 50% Injectable 12.5 Gram(s) IV Push once  dextrose 50% Injectable 25 Gram(s) IV Push once  enoxaparin Injectable 40 milliGRAM(s) SubCutaneous daily  glucagon  Injectable 1 milliGRAM(s) IntraMuscular once  guaiFENesin Oral Liquid (Sugar-Free) 100 milliGRAM(s) Oral every 6 hours PRN  insulin lispro (ADMELOG) corrective regimen sliding scale   SubCutaneous three times a day before meals  insulin lispro (ADMELOG) corrective regimen sliding scale   SubCutaneous at bedtime  lidocaine   4% Patch 1 Patch Transdermal daily  losartan 50 milliGRAM(s) Oral daily  melatonin 3 milliGRAM(s) Oral at bedtime PRN  multivitamin/minerals 1 Tablet(s) Oral daily  nystatin Powder 1 Application(s) Topical two times a day  ondansetron Injectable 4 milliGRAM(s) IV Push every 8 hours PRN  pantoprazole  Injectable 40 milliGRAM(s) IV Push daily              Vital Signs Last 24 Hrs  T(C): 37.1 (28 Dec 2021 12:12), Max: 37.1 (28 Dec 2021 12:12)  T(F): 98.7 (28 Dec 2021 12:12), Max: 98.7 (28 Dec 2021 12:12)  HR: 82 (28 Dec 2021 12:12) (65 - 82)  BP: 161/83 (28 Dec 2021 12:12) (143/74 - 164/89)  BP(mean): --  RR: 17 (28 Dec 2021 12:12) (17 - 18)  SpO2: 94% (28 Dec 2021 12:12) (92% - 94%)    REVIEW OF SYSTEMS:     Constitutional: No fever, chills, fatigue, weakness  Eyes: no eye pain, visual disturbances, or discharge  ENT:  No difficulty hearing, tinnitus, vertigo; No sinus or throat pain  Neck: No pain or stiffness  Respiratory: No cough, dyspnea, wheezing   Cardiovascular: No chest pain, palpitations,   Gastrointestinal: No abdominal or epigastric pain. No nausea, vomiting  No diarrhea or constipation.   Genitourinary: No dysuria, frequency, hematuria or incontinence  Neurological: No headaches, lightheadedness, vertigo, numbness or tremors  Psychiatric: No depression, anxiety, mood swings or difficulty sleeping  Musculoskeletal: No joint pain or swelling; No muscle, back or  left shoulder extremity pain  Skin: No itching, burning, rashes or lesions   Lymph Nodes: No enlarged glands  Endocrine: No heat or cold intolerance; No hair loss   Allergy and Immunologic: No hives or eczema    On Neurological Examination:      The patient is awake and alert.    Extraocular movements were intact.    Pupils were equal, round, and reactive bilaterally, 3 mm to 2 mm.    The patient appeared to have poor vision out of both left and right eyes, had difficulty naming number of fingers.    The patient had subtle decrease of the left nasolabial fold.    Motor -Right upper is 4+/5, left upper distal  was 4/5.  prox decrease ROM complaints of pain  Bilateral lower extremities were 4-/5, would say age-appropriate.   Sensory:  Bilateral upper and lower intact to light touch.    Follow simple commands    GENERAL Exam: Nontoxic , No Acute Distress   	  HEENT:  normocephalic, atraumatic  		  LUNGS: Clear bilaterally    	  HEART: Normal S1S2   No murmur RRR        	  GI/ ABDOMEN:  Soft  Non tender    EXTREMITIES:   No Edema  No Clubbing  No Cyanosis     MUSCULOSKELETAL: decreased Range of Motion all 4 extremities   	   SKIN: Normal  No Ecchymosis               LABS:  CBC Full  -  ( 28 Dec 2021 07:43 )  WBC Count : 6.06 K/uL  RBC Count : 3.26 M/uL  Hemoglobin : 9.8 g/dL  Hematocrit : 29.7 %  Platelet Count - Automated : 195 K/uL  Mean Cell Volume : 91.1 fl  Mean Cell Hemoglobin : 30.1 pg  Mean Cell Hemoglobin Concentration : 33.0 gm/dL  Auto Neutrophil # : x  Auto Lymphocyte # : x  Auto Monocyte # : x  Auto Eosinophil # : x  Auto Basophil # : x  Auto Neutrophil % : x  Auto Lymphocyte % : x  Auto Monocyte % : x  Auto Eosinophil % : x  Auto Basophil % : x      12-28    145  |  108  |  17  ----------------------------<  112<H>  3.8   |  31  |  0.72    Ca    8.2<L>      28 Dec 2021 07:43    TPro  5.4<L>  /  Alb  2.1<L>  /  TBili  0.3  /  DBili  <0.1  /  AST  34  /  ALT  35  /  AlkPhos  52  12-28    Hemoglobin A1C:     LIVER FUNCTIONS - ( 28 Dec 2021 07:43 )  Alb: 2.1 g/dL / Pro: 5.4 g/dL / ALK PHOS: 52 U/L / ALT: 35 U/L / AST: 34 U/L / GGT: x           Vitamin B12         RADIOLOGY      ANALYSIS AND PLAN:  This is an 89-year-old with an episode of being found on the ground and history of intercerebral hemorrhage and seizures.  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  For history of seizures,  carbamazepine  Left shoulder pain, likely 2/2 rotator cuff issues Lidoderm patch     Neurologic standpoint only cleared for discharge planning if no new events     Physical therapy evaluation as tolerated  OOB to chair/ambulation with assistance only if possible.    Greater than 21 minutes spent in direct patient care reviewing  the notes, lab data/ imaging , discussion with multidisciplinary team.

## 2021-12-28 NOTE — DIETITIAN INITIAL EVALUATION ADULT. - ADD RECOMMEND
1) Continue DASH/TLC diet. 2) Monitor need for Consistent carbohydrate diet and supplements. 3) Monitor PO intake, lab, GI function.

## 2021-12-28 NOTE — DIETITIAN INITIAL EVALUATION ADULT. - PROBLEM SELECTOR PLAN 8
DVT ppx: stat 1mg/kg lovenox for NSTEMI   will hold further chemical dvt ppx pending cardio recs. SCDs for now     IMPROVE VTE Individual Risk Assessment  RISK                 Points  [  ] Previous VTE        3  [  ] Thrombophilia       2  [  ] Lower limb paralysis    2 (unable to hold up >15 seconds)  [  ] Current Cancer    2 (within 6 months)  [  ] Immobilization > 24 hrs     1  [  ] ICU/CCU stay > 24 hours      1  [ x ] Age > 60     1  IMPROVE VTE Score: 1

## 2021-12-28 NOTE — DIETITIAN INITIAL EVALUATION ADULT. - CHIEF COMPLAINT
89y Female with PMH of Seizure, Hemorrhagic stroke, HTN, Hypercholesteremia, DM. Pt admitted on 12/21 complaining of stroke. Pt presented COVID-19+.

## 2021-12-28 NOTE — DIETITIAN INITIAL EVALUATION ADULT. - PROBLEM SELECTOR PLAN 2
New onset, abnormal EKG with T wave depressions and sinus tachy.   - Trops 457  - No active chest pain  - No aspirin given allergy   - Will give stat 1mg/kg of lovenox. No anemia, no JENNIFER.   - Trend trops x3 total   - TTE ordered   - Cardiology consulted, [facility] f/u recs

## 2021-12-28 NOTE — PROGRESS NOTE ADULT - PROBLEM SELECTOR PLAN 6
Chronic, stable  - continue home coreg 12.5mg po bid, losartan 50mg qd, __hold norvasc 5mg po qd in setting of elevated BPs  - Monitor routine hemodynamics Chronic, stable  - continue home coreg 12.5mg po bid, losartan 50mg qd, restart norvasc 5mg po qd in setting of elevated BPs  - Monitor routine hemodynamics

## 2021-12-28 NOTE — DIETITIAN INITIAL EVALUATION ADULT. - PROBLEM SELECTOR PLAN 3
Acute hypoxic respiratory failure 2/2 confirmed COVID-19 infection  - Patient vaccinated with: pfizer x2, moderna booster  - Supp O2 prn maintain O2 sats >88%. Prone PRN  - Will start Decadron 6mg IV x10 days  - Will start remdesivir, GFR >30 and ALT not > than 390, symp <14 days  - Monitor volume status closely, avoid aggressive hydration  - Tylenol prn myalgias, fever  - CBC with diff and CMP QD. Ferritin, crp, d-dimer, procal at admission then will repeat If clinically worsening every 48-72 hours.  - Will give stat 1mg/kg of lovenox for NSTEMI   - Initiate VTE ppx tomorrow pending further cardio recs regarding NSTEMI  - ID Dr Barajas consulted, f/u recs

## 2021-12-28 NOTE — PROGRESS NOTE ADULT - PROBLEM SELECTOR PLAN 1
- likely due to seizure  - CTA head, neck, CT C-spine all negative for acute processes  - MRI head negative for acute process, possible normal pressure hydrocephalus w/ ventricular dilation  - TTE: normal LV/RVF with SWMA, and mild DD, no significant valvular disease  - seizure and aspiration precautions  - continue diet, soft bite size w thin liquid  - HOLD aspirin given allergy. HOLD high dose statin as pt intolerant  - carbamazepine levels within therapeutic limits  - PT/OT eval - PT recommending [facility]  - Neuro following Dr. Wei, recs appreciated  - DISPO - patient refusing [facility], will discuss with son who lives with patient [facility] vs home with home PT

## 2021-12-28 NOTE — DIETITIAN INITIAL EVALUATION ADULT. - PROBLEM SELECTOR PLAN 5
Chronic,  on admission  - Hold home BP meds to allow for permissive HTN first 48 hrs  - Consider restarting when cleared by neuro  - Strict NPO pending official speech and swallow. Consider restarting DASH, consistent carb diet when appropriate   - Monitor routine hemodynamics

## 2021-12-28 NOTE — DIETITIAN INITIAL EVALUATION ADULT. - PROBLEM SELECTOR PLAN 4
Chronic, insulin dependent, gluc 172 on admission  - Strict NPO pending official speech and swallow. Consider restarting DASH, consistent carb diet when appropriate   - Start low dose correctional insulin scale    - F/u HgbA1c  - Goal blood glucose in hospital setting 100-180, adjust insulin regimen PRN, can consider endo consult in uncontrolled   - Hypoglycemia protocol in place, monitor for signs of hypoglycemia

## 2021-12-28 NOTE — DIETITIAN INITIAL EVALUATION ADULT. - PROBLEM SELECTOR PLAN 1
Found on the floor of her home, ?CVA vs ?seizure. Left upper extremity weakness noted on exam  - Admit with remote tele given pt COVID positive   - CT head: Brain CT: No acute hemorrhage, mass effect or extra-axial collections.  Neck CTA: No hemodynamically significant stenosis.  Brain CT: No large vessel occlusion or stenosis.  Perfusion maps. No evidence for core infarct or area of brain at risk.  - Bedside dysphagia screen ordered, strict NPO pending official speech and swallow. Consider restarting DASH, consistent carb diet when appropriate   - Allow permissive HTN for first 48 hours, will not treat unless SBP>180 or DBP>110  - F/u TTE, Carotid dopplers  - MRI ordered   - F/u A1c, lipid panel, TSH, B12 , folate  - Aspiration precautions  - No aspirin given allergy   - NIHSS  - Neuro checks Q4  - Pt has intolerance to high dose statin  - PT/OT eval  - Neuro consulted Dr. Wei, f/u recs  - Cardio consulted [facility], f/u recs

## 2021-12-28 NOTE — DIETITIAN INITIAL EVALUATION ADULT. - PROBLEM SELECTOR PLAN 7
Chronic  - Seizure precautions   - Continue home carbamazepine  -carbamazepine Levels are wnl on admission

## 2021-12-28 NOTE — PROGRESS NOTE ADULT - PROBLEM SELECTOR PLAN 2
L shoulder pain likely 2/2 rotator cuff tendinitis following L sided trauma during presumed fall secondary to seizure  - L shoulder XR with no acute findings  - Physiatry consult, recs appreciated - continue Tylenol, Lidocaine patch, warm compresses

## 2021-12-28 NOTE — PROGRESS NOTE ADULT - SUBJECTIVE AND OBJECTIVE BOX
[facility] -- Bhavik Son Grossman, [place], Ankit Snyder Savella, Goodger: Office # [phone]    Follow Up:  COVID PNA, HTN hx of CVA    Subjective/Observations: Patient seen and examined, awake, alert, resting comfortably in bed, denies chest pain, dyspnea, palpitations or dizziness, orthopnea and PND. Tolerating O2 via NC    REVIEW OF SYSTEMS: All review of systems is negative for eye, ENT, GI, , allergic, dermatologic, musculoskeletal and neurologic except as described above    PAST MEDICAL & SURGICAL HISTORY:  Diabetes mellitus    Hypercholesteremia    HTN (hypertension)    CVA (cerebral infarction)    Hemorrhagic stroke    Seizure    History of arthroscopy of knee  Right    S/P cataract surgery  bilateral    History of hysterectomy    Basal cell cancer  s/p Mohs surgery    History of retinal tear  surgical repair        MEDICATIONS  (STANDING):  acetaminophen     Tablet .. 650 milliGRAM(s) Oral every 8 hours  amLODIPine   Tablet 5 milliGRAM(s) Oral daily  carBAMazepine ER Capsule 400 milliGRAM(s) Oral at bedtime  carBAMazepine ER Tablet 100 milliGRAM(s) Oral <User Schedule>  carvedilol 12.5 milliGRAM(s) Oral every 12 hours  dexAMETHasone     Tablet 6 milliGRAM(s) Oral daily  dextrose 40% Gel 15 Gram(s) Oral once  dextrose 5%. 1000 milliLiter(s) (50 mL/Hr) IV Continuous <Continuous>  dextrose 5%. 1000 milliLiter(s) (100 mL/Hr) IV Continuous <Continuous>  dextrose 50% Injectable 25 Gram(s) IV Push once  dextrose 50% Injectable 12.5 Gram(s) IV Push once  dextrose 50% Injectable 25 Gram(s) IV Push once  enoxaparin Injectable 40 milliGRAM(s) SubCutaneous daily  glucagon  Injectable 1 milliGRAM(s) IntraMuscular once  insulin lispro (ADMELOG) corrective regimen sliding scale   SubCutaneous three times a day before meals  insulin lispro (ADMELOG) corrective regimen sliding scale   SubCutaneous at bedtime  lidocaine   4% Patch 1 Patch Transdermal daily  losartan 50 milliGRAM(s) Oral daily  multivitamin/minerals 1 Tablet(s) Oral daily  nystatin Powder 1 Application(s) Topical two times a day  pantoprazole  Injectable 40 milliGRAM(s) IV Push daily    MEDICATIONS  (PRN):  aluminum hydroxide/magnesium hydroxide/simethicone Suspension 30 milliLiter(s) Oral every 4 hours PRN Dyspepsia  benzocaine 15 mG/menthol 3.6 mG (Sugar-Free) Lozenge 1 Lozenge Oral every 6 hours PRN Sore Throat  guaiFENesin Oral Liquid (Sugar-Free) 100 milliGRAM(s) Oral every 6 hours PRN Cough  melatonin 3 milliGRAM(s) Oral at bedtime PRN Insomnia  ondansetron Injectable 4 milliGRAM(s) IV Push every 8 hours PRN Nausea and/or Vomiting    Allergies    aspirin (Unknown)  sulfa drugs (Unknown)    Intolerances    Lipitor (Other; Muscle Pain)    Vital Signs Last 24 Hrs  T(C): 37.1 (28 Dec 2021 12:12), Max: 37.3 (27 Dec 2021 12:51)  T(F): 98.7 (28 Dec 2021 12:12), Max: 99.1 (27 Dec 2021 12:51)  HR: 82 (28 Dec 2021 12:12) (65 - 82)  BP: 161/83 (28 Dec 2021 12:12) (131/75 - 164/89)  BP(mean): --  RR: 17 (28 Dec 2021 12:12) (17 - 18)  SpO2: 94% (28 Dec 2021 12:12) (91% - 94%)  I&O's Summary    27 Dec 2021 07:01  -  28 Dec 2021 07:00  --------------------------------------------------------  IN: 300 mL / OUT: 650 mL / NET: -350 mL          TELE: SR 70's   PHYSICAL EXAM:  Appearance: NAD, no distress, alert, obese  HEENT: Moist Mucous Membranes, Anicteric  Cardiovascular: Regular rate and rhythm, Normal S1 S2, No JVD, No murmurs, No rubs, gallops or clicks  Respiratory: Non-labored, Clear to auscultation, No rales, No rhonchi, No wheezing.   Gastrointestinal:  Soft, Non-tender, + BS  Neurologic: Non-focal  Skin: Warm and dry, No visible rashes or ulcers, No ecchymosis, No cyanosis  Musculoskeletal: No clubbing, No cyanosis, No joint swelling/tenderness  Psychiatry: Mood & affect appropriate  Lymph: No peripheral edema.     LABS: All Labs Reviewed:                        9.8    6.06  )-----------( 195      ( 28 Dec 2021 07:43 )             29.7                         9.9    5.41  )-----------( 181      ( 27 Dec 2021 10:57 )             29.7                         9.9    3.83  )-----------( 142      ( 26 Dec 2021 07:21 )             29.7     28 Dec 2021 07:43    145    |  108    |  17     ----------------------------<  112    3.8     |  31     |  0.72   27 Dec 2021 10:57    142    |  108    |  23     ----------------------------<  234    4.1     |  29     |  0.76   26 Dec 2021 07:21    144    |  110    |  23     ----------------------------<  138    3.6     |  28     |  0.67     Ca    8.2        28 Dec 2021 07:43  Ca    7.6        27 Dec 2021 10:57  Ca    7.9        26 Dec 2021 07:21    TPro  5.4    /  Alb  2.1    /  TBili  0.3    /  DBili  <0.1   /  AST  34     /  ALT  35     /  AlkPhos  52     28 Dec 2021 07:43  TPro  5.1    /  Alb  2.0    /  TBili  0.2    /  DBili  <0.1   /  AST  20     /  ALT  23     /  AlkPhos  49     27 Dec 2021 10:57  TPro  5.1    /  Alb  1.9    /  TBili  0.2    /  DBili  <0.1   /  AST  14     /  ALT  17     /  AlkPhos  47     26 Dec 2021 07:21      Troponin I, High Sensitivity Result: 2157.8 ng/L (12-22-21 @ 09:53)  Troponin I, High Sensitivity Result: 2910.6 ng/L (12-22-21 @ 04:56)  Creatine Kinase, Serum: 205 U/L (12-22-21 @ 04:56)  Troponin I, High Sensitivity Result: 457.9 ng/L (12-21-21 @ 17:50)      D-Dimer Assay, Quantitative: 1580 ng/mL DDU (12-22-21 @ 17:03)        Cholesterol, Serum: 220 mg/dL (12-22-21 @ 09:32)  HDL Cholesterol, Serum: 76 mg/dL (12-22-21 @ 09:32)  Triglycerides, Serum: 74 mg/dL (12-22-21 @ 09:32)      12 Lead ECG:   Ventricular Rate 80 BPM    Atrial Rate 80 BPM    P-R Interval 138 ms    QRS Duration 90 ms    Q-T Interval 400 ms    QTC Calculation(Bazett) 461 ms    P Axis 60 degrees    R Axis -1 degrees    T Axis 20 degrees    Diagnosis Line Normal sinus rhythm  Inferior infarct , age undetermined  Possible Anterior infarct , age undetermined  Abnormal ECG  When compared with ECG of 21-DEC-2021 17:53, (Unconfirmed)  No significant change was found  Confirmed by Mihai Coles MD ([id number]) on 12/22/2021 11:21:17AM (12-22-21 @ 05:48)    ra< from: Xray Chest 1 View- PORTABLE-Urgent (12.21.21 @ 19:08) >    ACC: 1935 EXAM:  XR CHEST PORTABLE URGENT 1V                          PROCEDURE DATE:  12/21/2021          INTERPRETATION:  AP semierect chest on December 21, 2021 at 6:52 PM. This   is a stroke code.    Heart magnified by technique. Lungs remain clear.    Chest is similar to August 6, 2020.    IMPRESSION: No acute finding or change.    --- End of Report ---            HANNAH MARISCAL MD; Attending Radiologist  This document has been electronically signed. Dec 22 2021  9:48AM    < end of copied text >  < from: TTE Echo Complete w/o Contrast w/ Doppler (12.23.21 @ 20:16) >     EXAM:  ECHO TTE WO CON COMP W DOPP         PROCEDURE DATE:  12/23/2021        INTERPRETATION:  INDICATION: Abnormal EKG    Blood Pressure 152/84    Height 165     Weight 68       BSA 1.75    Dimensions:  LA 2.3       Normal Values: 2.0 - 4.0 cm  Ao 3.3        Normal Values: 2.0 - 3.8 cm  SEPTUM 1.1       Normal Values: 0.6 - 1.2 cm  PWT 1.1       Normal Values: 0.6 - 1.1 cm  LVIDd 3.8         Normal Values: 3.0 - 5.6 cm  LVIDs 2.2         Normal Values: 1.8 - 4.0 cm    Derived Variables:  LVMIg/m2  RWT  Fractional Short  Ejection Fraction    Doppler Peak v. AoV= (m/sec)    OBSERVATIONS:    Mitral Valve: normal, trace physiologic MR.  Aortic Valve/Aorta: Calcified trileaflet aortic valve, without stenosis.   Mild AI.  Tricuspid Valve: normal with trace TR.  Pulmonic Valve: normal  Left Atrium: normal  Right Atrium: normal  Left Ventricle: normal LV size was likely with preserved systolic   function, estimated LVEF of 65%. The distal anterior wall and apex are   not well seen in all views, and appear hypokinetic in some views.  Right Ventricle: normal size and systolic function.  Pericardium/Pleura: no significant pleural effusion, no significant   pericardial effusion.  Pulmonary/RV Pressure: estimated PA systolic pressure of 34mmHg assuming   an RA pressure of 10 mmHg.  LV Diastolic Function: Mild diastolic dysfunction.    Impression: Normal left ventricular size and systolic function, estimated   LVEF of 65%. The distal anterior wall and apex are not well seen in all   views,and appear hypokinetic in some views.Normal right ventricular size   and systolic function. Mild diastolic dysfunction.  Normal biatrial size.   The aortic root is normal in size. The mitral valve is structurally   normal, trace physiologic MR. The aortic valve is trileaflet without   stenosis. Mild AI. Trace physiologic TR. Estimated PA systolic pressure   is 34 mm Hg. No significant pericardial effusion.    --- End of Report ---              NATAN DELGADO MD; Attending Cardiologist  This document has been electronically signed. Dec 24 2021  8:15AM    < end of copied text >

## 2021-12-28 NOTE — PROGRESS NOTE ADULT - ATTENDING COMMENTS
89 yo female with PMH of hemorrhagic CVA (2015), Type 2 DM (not on insulin), HTN, HLD, seizures, hard of hearing presents to ED with left sided weakness, admitted for left-sided weakness, troponin elevation and acute hypoxic respiratory failure due to COVID-19 PNA.  COVID-19 PNA - improved, on RA. Discharge planning to [facility] however [facility] unable to accept given COVID + status.  Patient's son Simone unable to take patient home due to himself and family testing (-) for COVID, would like to wait until patient's quarantine period is over before she comes home.

## 2021-12-28 NOTE — PROGRESS NOTE ADULT - PROBLEM SELECTOR PLAN 3
Likely demand ischemia in setting of seizure / resp failure due to COVID-19  - abnormal EKG with non-specific TWI  - Trops 457 -> 2910 -> 2100, trended to peak  - TTE: normal LV/RVF with SWMA, and mild DD, no significant valvular disease  - HOLD aspirin given allergy  - Cardio following [facility], recs appreciated

## 2021-12-28 NOTE — PROGRESS NOTE ADULT - SUBJECTIVE AND OBJECTIVE BOX
Patient is a 89y old  Female who presents with a chief complaint of L sided weakness (27 Dec 2021 14:50)      FROM ADMISSION H+P:   HPI:  Patient is a 89 yo female with PMH hemorrhagic CVA (2015), Type 2 DM (not on insulin), HTN, HLD, seizures, hard of hearing presents to ED with left sided weakness. Son Simone Easley reports he left the house for a while to run errands. Son called pt at 4pm and he received the answering machine. About 10 minutes later, son tried again and when there was still no answer he assumed something was wrong. He rushed home to find pt was on the floor on her left side laying down. Son assumed pt has seizure or stroke. Reports left side was weakness, drooling but able to talk. Pt wasn't confused, no incontinence. Son called EMS for assistance and pt was brought to ED. Pt only complaining of back pain at this time.      in ED:  VS: /94, HR 78, RR 16, 97F, SpO2 98% on RA   Labs significant for: trops 457.9. COVID PCR pos.   CTA head: Brain CT: No acute hemorrhage, mass effect or extra-axial collections.  Neck CTA: No hemodynamically significant stenosis.  Brain CT: No large vessel occlusion or stenosis.  Perfusion maps. No evidence for core infarct or area of brain at risk.  EKG: sinus tachy, ST wave depressions laterally   (21 Dec 2021 19:43)      ----  INTERVAL HPI/OVERNIGHT EVENTS: Pt seen and evaluated at the bedside. No acute overnight events occurred. The patient denies fevers, chills, nausea, vomiting, chest pain, palpitations or shortness of breath.     ----  PAST MEDICAL & SURGICAL HISTORY:  Diabetes mellitus    Hypercholesteremia    HTN (hypertension)    CVA (cerebral infarction)    Hemorrhagic stroke    Seizure    History of arthroscopy of knee  Right    S/P cataract surgery  bilateral    History of hysterectomy    Basal cell cancer  s/p Mohs surgery    History of retinal tear  surgical repair        FAMILY HISTORY:  FHx: heart disease    Family history of basal cell carcinoma        Home Medications:  acetaminophen 325 mg oral tablet: 2 tab(s) orally every 8 hours (27 Dec 2021 10:14)  amLODIPine 5 mg oral tablet: 1 tab(s) orally once a day (21 Dec 2021 18:27)  carBAMazepine 100 mg oral tablet, extended release: 1 tab(s) orally 3 times a day (27 Dec 2021 10:19)  carBAMazepine 200 mg oral capsule, extended release: 2 cap(s) orally once a day (at bedtime) (27 Dec 2021 10:14)  carvedilol 12.5 mg oral tablet: 1 tab(s) orally every 12 hours (21 Dec 2021 18:27)  Centrum Silver oral tablet: 1 tab(s) orally once a day (21 Dec 2021 18:27)  Co Q-10 100 mg oral capsule: 1 cap(s) orally once a day (21 Dec 2021 18:27)  dexamethasone 6 mg oral tablet: 1 tab(s) orally once a day (27 Dec 2021 10:14)  enoxaparin: 40 milligram(s) subcutaneous once a day (27 Dec 2021 10:14)  irbesartan 150 mg oral tablet: 1 tab(s) orally 2 times a day (21 Dec 2021 18:27)  lidocaine 4% topical film: Apply topically to affected area once a day, as needed for pain (27 Dec 2021 10:14)  metFORMIN 500 mg oral tablet, extended release: 2 tab(s) orally once a day (after a meal) (21 Dec 2021 18:27)  rosuvastatin 10 mg oral tablet: 1 tab(s) orally once a day (at bedtime) (21 Dec 2021 18:27)      Allergies    aspirin (Unknown)  sulfa drugs (Unknown)    Intolerances    Lipitor (Other; Muscle Pain)      ----  MEDICATIONS  (STANDING):  acetaminophen     Tablet .. 650 milliGRAM(s) Oral every 8 hours  carBAMazepine ER Capsule 400 milliGRAM(s) Oral at bedtime  carBAMazepine ER Tablet 100 milliGRAM(s) Oral <User Schedule>  carvedilol 12.5 milliGRAM(s) Oral every 12 hours  dexAMETHasone     Tablet 6 milliGRAM(s) Oral daily  dextrose 40% Gel 15 Gram(s) Oral once  dextrose 5%. 1000 milliLiter(s) (50 mL/Hr) IV Continuous <Continuous>  dextrose 5%. 1000 milliLiter(s) (100 mL/Hr) IV Continuous <Continuous>  dextrose 50% Injectable 25 Gram(s) IV Push once  dextrose 50% Injectable 12.5 Gram(s) IV Push once  dextrose 50% Injectable 25 Gram(s) IV Push once  enoxaparin Injectable 40 milliGRAM(s) SubCutaneous daily  glucagon  Injectable 1 milliGRAM(s) IntraMuscular once  insulin lispro (ADMELOG) corrective regimen sliding scale   SubCutaneous three times a day before meals  insulin lispro (ADMELOG) corrective regimen sliding scale   SubCutaneous at bedtime  lidocaine   4% Patch 1 Patch Transdermal daily  losartan 50 milliGRAM(s) Oral daily  multivitamin/minerals 1 Tablet(s) Oral daily  nystatin Powder 1 Application(s) Topical two times a day  pantoprazole  Injectable 40 milliGRAM(s) IV Push daily    MEDICATIONS  (PRN):  aluminum hydroxide/magnesium hydroxide/simethicone Suspension 30 milliLiter(s) Oral every 4 hours PRN Dyspepsia  benzocaine 15 mG/menthol 3.6 mG (Sugar-Free) Lozenge 1 Lozenge Oral every 6 hours PRN Sore Throat  guaiFENesin Oral Liquid (Sugar-Free) 100 milliGRAM(s) Oral every 6 hours PRN Cough  melatonin 3 milliGRAM(s) Oral at bedtime PRN Insomnia  ondansetron Injectable 4 milliGRAM(s) IV Push every 8 hours PRN Nausea and/or Vomiting      ----  REVIEW OF SYSTEMS:  Constitutional: denies fever or chills  HEENT: denies headache, dizziness, or lightheadedness  Respiratory: denies SOB or cough   Cardiovascular: denies CP or palpitations  Gastrointestinal: denies nausea, vomiting, diarrhea, abdominal pain, or hematochezia  Genitourinary: denies hematuria, dysuria, frequency, urgency  Musculoskeletal: denies muscle aches, joint swelling, or muscle weakness    ----  PHYSICAL EXAM:  Gen: well appearing, in no acute distress  HEENT: NCAT   Cardio: regular rate and rhythm, +s1s2, no murmurs, rubs, or gallops  Pulm: CTA b/l, no wheezes, rales or rhonchi  Abdomen: soft, nontender, nondistended, +BS x4 quadrants, no guarding  Extremities: no edema   Neuro: AAOx3  Skin: warm and dry      T(C): 36.8 (12-28-21 @ 04:57), Max: 37.3 (12-27-21 @ 12:51)  HR: 65 (12-28-21 @ 04:57) (65 - 78)  BP: 164/89 (12-28-21 @ 04:57) (131/75 - 164/89)  RR: 17 (12-28-21 @ 04:57) (17 - 18)  SpO2: 92% (12-28-21 @ 04:57) (91% - 92%)  Wt(kg): --     ----  I&O's Summary    27 Dec 2021 07:01  -  28 Dec 2021 07:00  --------------------------------------------------------  IN: 300 mL / OUT: 650 mL / NET: -350 mL        LABS:                        9.8    6.06  )-----------( 195      ( 28 Dec 2021 07:43 )             29.7     12-28    145  |  108  |  17  ----------------------------<  112<H>  3.8   |  31  |  0.72    Ca    8.2<L>      28 Dec 2021 07:43    TPro  5.4<L>  /  Alb  2.1<L>  /  TBili  0.3  /  DBili  <0.1  /  AST  34  /  ALT  35  /  AlkPhos  52  12-28        CAPILLARY BLOOD GLUCOSE      POCT Blood Glucose.: 138 mg/dL (28 Dec 2021 08:05)  POCT Blood Glucose.: 138 mg/dL (27 Dec 2021 21:28)  POCT Blood Glucose.: 176 mg/dL (27 Dec 2021 16:55)  POCT Blood Glucose.: 222 mg/dL (27 Dec 2021 11:38)                     Patient is a 89y old  Female who presents with a chief complaint of L sided weakness (27 Dec 2021 14:50)      FROM ADMISSION H+P:   HPI:  Patient is a 89 yo female with PMH hemorrhagic CVA (2015), Type 2 DM (not on insulin), HTN, HLD, seizures, hard of hearing presents to ED with left sided weakness. Son Simone Easley reports he left the house for a while to run errands. Son called pt at 4pm and he received the answering machine. About 10 minutes later, son tried again and when there was still no answer he assumed something was wrong. He rushed home to find pt was on the floor on her left side laying down. Son assumed pt has seizure or stroke. Reports left side was weakness, drooling but able to talk. Pt wasn't confused, no incontinence. Son called EMS for assistance and pt was brought to ED. Pt only complaining of back pain at this time.      in ED:  VS: /94, HR 78, RR 16, 97F, SpO2 98% on RA   Labs significant for: trops 457.9. COVID PCR pos.   CTA head: Brain CT: No acute hemorrhage, mass effect or extra-axial collections.  Neck CTA: No hemodynamically significant stenosis.  Brain CT: No large vessel occlusion or stenosis.  Perfusion maps. No evidence for core infarct or area of brain at risk.  EKG: sinus tachy, ST wave depressions laterally   (21 Dec 2021 19:43)      ----  INTERVAL HPI/OVERNIGHT EVENTS: Pt seen and evaluated at the bedside. No acute overnight events occurred. The patient denies fevers, chills, nausea, vomiting, chest pain, palpitations or shortness of breath.     ----  PAST MEDICAL & SURGICAL HISTORY:  Diabetes mellitus    Hypercholesteremia    HTN (hypertension)    CVA (cerebral infarction)    Hemorrhagic stroke    Seizure    History of arthroscopy of knee  Right    S/P cataract surgery  bilateral    History of hysterectomy    Basal cell cancer  s/p Mohs surgery    History of retinal tear  surgical repair        FAMILY HISTORY:  FHx: heart disease    Family history of basal cell carcinoma        Home Medications:  acetaminophen 325 mg oral tablet: 2 tab(s) orally every 8 hours (27 Dec 2021 10:14)  amLODIPine 5 mg oral tablet: 1 tab(s) orally once a day (21 Dec 2021 18:27)  carBAMazepine 100 mg oral tablet, extended release: 1 tab(s) orally 3 times a day (27 Dec 2021 10:19)  carBAMazepine 200 mg oral capsule, extended release: 2 cap(s) orally once a day (at bedtime) (27 Dec 2021 10:14)  carvedilol 12.5 mg oral tablet: 1 tab(s) orally every 12 hours (21 Dec 2021 18:27)  Centrum Silver oral tablet: 1 tab(s) orally once a day (21 Dec 2021 18:27)  Co Q-10 100 mg oral capsule: 1 cap(s) orally once a day (21 Dec 2021 18:27)  dexamethasone 6 mg oral tablet: 1 tab(s) orally once a day (27 Dec 2021 10:14)  enoxaparin: 40 milligram(s) subcutaneous once a day (27 Dec 2021 10:14)  irbesartan 150 mg oral tablet: 1 tab(s) orally 2 times a day (21 Dec 2021 18:27)  lidocaine 4% topical film: Apply topically to affected area once a day, as needed for pain (27 Dec 2021 10:14)  metFORMIN 500 mg oral tablet, extended release: 2 tab(s) orally once a day (after a meal) (21 Dec 2021 18:27)  rosuvastatin 10 mg oral tablet: 1 tab(s) orally once a day (at bedtime) (21 Dec 2021 18:27)      Allergies    aspirin (Unknown)  sulfa drugs (Unknown)    Intolerances    Lipitor (Other; Muscle Pain)      ----  MEDICATIONS  (STANDING):  acetaminophen     Tablet .. 650 milliGRAM(s) Oral every 8 hours  carBAMazepine ER Capsule 400 milliGRAM(s) Oral at bedtime  carBAMazepine ER Tablet 100 milliGRAM(s) Oral <User Schedule>  carvedilol 12.5 milliGRAM(s) Oral every 12 hours  dexAMETHasone     Tablet 6 milliGRAM(s) Oral daily  dextrose 40% Gel 15 Gram(s) Oral once  dextrose 5%. 1000 milliLiter(s) (50 mL/Hr) IV Continuous <Continuous>  dextrose 5%. 1000 milliLiter(s) (100 mL/Hr) IV Continuous <Continuous>  dextrose 50% Injectable 25 Gram(s) IV Push once  dextrose 50% Injectable 12.5 Gram(s) IV Push once  dextrose 50% Injectable 25 Gram(s) IV Push once  enoxaparin Injectable 40 milliGRAM(s) SubCutaneous daily  glucagon  Injectable 1 milliGRAM(s) IntraMuscular once  insulin lispro (ADMELOG) corrective regimen sliding scale   SubCutaneous three times a day before meals  insulin lispro (ADMELOG) corrective regimen sliding scale   SubCutaneous at bedtime  lidocaine   4% Patch 1 Patch Transdermal daily  losartan 50 milliGRAM(s) Oral daily  multivitamin/minerals 1 Tablet(s) Oral daily  nystatin Powder 1 Application(s) Topical two times a day  pantoprazole  Injectable 40 milliGRAM(s) IV Push daily    MEDICATIONS  (PRN):  aluminum hydroxide/magnesium hydroxide/simethicone Suspension 30 milliLiter(s) Oral every 4 hours PRN Dyspepsia  benzocaine 15 mG/menthol 3.6 mG (Sugar-Free) Lozenge 1 Lozenge Oral every 6 hours PRN Sore Throat  guaiFENesin Oral Liquid (Sugar-Free) 100 milliGRAM(s) Oral every 6 hours PRN Cough  melatonin 3 milliGRAM(s) Oral at bedtime PRN Insomnia  ondansetron Injectable 4 milliGRAM(s) IV Push every 8 hours PRN Nausea and/or Vomiting      ----  REVIEW OF SYSTEMS:  Constitutional: denies fever or chills  HEENT: denies headache, dizziness, or lightheadedness  Respiratory: denies SOB or cough   Cardiovascular: denies CP or palpitations  Gastrointestinal: denies nausea, vomiting, diarrhea, abdominal pain, or hematochezia  Genitourinary: denies hematuria, dysuria, frequency, urgency  Musculoskeletal: denies muscle aches, joint swelling, or muscle weakness    ----  PHYSICAL EXAM:  Gen: well appearing, in no acute distress  HEENT: NCAT   Cardio: regular rate and rhythm, +s1s2, no murmurs, rubs, or gallops  Pulm: CTA b/l, no wheezes, rales or rhonchi  Abdomen: soft, nontender, nondistended, +BS x4 quadrants, no guarding  Extremities: no edema   Neuro: Alert and oriented, spontaneous movement of all 4 extremities  Skin: warm and dry      T(C): 36.8 (12-28-21 @ 04:57), Max: 37.3 (12-27-21 @ 12:51)  HR: 65 (12-28-21 @ 04:57) (65 - 78)  BP: 164/89 (12-28-21 @ 04:57) (131/75 - 164/89)  RR: 17 (12-28-21 @ 04:57) (17 - 18)  SpO2: 92% (12-28-21 @ 04:57) (91% - 92%)  Wt(kg): --     ----  I&O's Summary    27 Dec 2021 07:01  -  28 Dec 2021 07:00  --------------------------------------------------------  IN: 300 mL / OUT: 650 mL / NET: -350 mL        LABS:                        9.8    6.06  )-----------( 195      ( 28 Dec 2021 07:43 )             29.7     12-28    145  |  108  |  17  ----------------------------<  112<H>  3.8   |  31  |  0.72    Ca    8.2<L>      28 Dec 2021 07:43    TPro  5.4<L>  /  Alb  2.1<L>  /  TBili  0.3  /  DBili  <0.1  /  AST  34  /  ALT  35  /  AlkPhos  52  12-28        CAPILLARY BLOOD GLUCOSE      POCT Blood Glucose.: 138 mg/dL (28 Dec 2021 08:05)  POCT Blood Glucose.: 138 mg/dL (27 Dec 2021 21:28)  POCT Blood Glucose.: 176 mg/dL (27 Dec 2021 16:55)  POCT Blood Glucose.: 222 mg/dL (27 Dec 2021 11:38)

## 2021-12-29 LAB
ALBUMIN SERPL ELPH-MCNC: 2 G/DL — LOW (ref 3.3–5)
ALBUMIN SERPL ELPH-MCNC: 2 G/DL — LOW (ref 3.3–5)
ALP SERPL-CCNC: 51 U/L — SIGNIFICANT CHANGE UP (ref 40–120)
ALP SERPL-CCNC: 52 U/L — SIGNIFICANT CHANGE UP (ref 40–120)
ALT FLD-CCNC: 61 U/L — SIGNIFICANT CHANGE UP (ref 12–78)
ALT FLD-CCNC: 62 U/L — SIGNIFICANT CHANGE UP (ref 12–78)
ANION GAP SERPL CALC-SCNC: 6 MMOL/L — SIGNIFICANT CHANGE UP (ref 5–17)
AST SERPL-CCNC: 64 U/L — HIGH (ref 15–37)
AST SERPL-CCNC: 64 U/L — HIGH (ref 15–37)
BASOPHILS # BLD AUTO: 0.04 K/UL — SIGNIFICANT CHANGE UP (ref 0–0.2)
BASOPHILS NFR BLD AUTO: 0.8 % — SIGNIFICANT CHANGE UP (ref 0–2)
BILIRUB DIRECT SERPL-MCNC: <0.1 MG/DL — SIGNIFICANT CHANGE UP (ref 0–0.3)
BILIRUB INDIRECT FLD-MCNC: >0.1 MG/DL — LOW (ref 0.2–1)
BILIRUB SERPL-MCNC: 0.2 MG/DL — SIGNIFICANT CHANGE UP (ref 0.2–1.2)
BILIRUB SERPL-MCNC: 0.2 MG/DL — SIGNIFICANT CHANGE UP (ref 0.2–1.2)
BUN SERPL-MCNC: 17 MG/DL — SIGNIFICANT CHANGE UP (ref 7–23)
CALCIUM SERPL-MCNC: 8.1 MG/DL — LOW (ref 8.5–10.1)
CHLORIDE SERPL-SCNC: 108 MMOL/L — SIGNIFICANT CHANGE UP (ref 96–108)
CO2 SERPL-SCNC: 30 MMOL/L — SIGNIFICANT CHANGE UP (ref 22–31)
CREAT SERPL-MCNC: 0.75 MG/DL — SIGNIFICANT CHANGE UP (ref 0.5–1.3)
D DIMER BLD IA.RAPID-MCNC: 1593 NG/ML DDU — HIGH
EOSINOPHIL # BLD AUTO: 0.13 K/UL — SIGNIFICANT CHANGE UP (ref 0–0.5)
EOSINOPHIL NFR BLD AUTO: 2.6 % — SIGNIFICANT CHANGE UP (ref 0–6)
GLUCOSE SERPL-MCNC: 110 MG/DL — HIGH (ref 70–99)
HCT VFR BLD CALC: 27.3 % — LOW (ref 34.5–45)
HGB BLD-MCNC: 9.1 G/DL — LOW (ref 11.5–15.5)
IMM GRANULOCYTES NFR BLD AUTO: 3.9 % — HIGH (ref 0–1.5)
LYMPHOCYTES # BLD AUTO: 1.23 K/UL — SIGNIFICANT CHANGE UP (ref 1–3.3)
LYMPHOCYTES # BLD AUTO: 24.9 % — SIGNIFICANT CHANGE UP (ref 13–44)
MCHC RBC-ENTMCNC: 30.2 PG — SIGNIFICANT CHANGE UP (ref 27–34)
MCHC RBC-ENTMCNC: 33.3 GM/DL — SIGNIFICANT CHANGE UP (ref 32–36)
MCV RBC AUTO: 90.7 FL — SIGNIFICANT CHANGE UP (ref 80–100)
MONOCYTES # BLD AUTO: 0.5 K/UL — SIGNIFICANT CHANGE UP (ref 0–0.9)
MONOCYTES NFR BLD AUTO: 10.1 % — SIGNIFICANT CHANGE UP (ref 2–14)
NEUTROPHILS # BLD AUTO: 2.84 K/UL — SIGNIFICANT CHANGE UP (ref 1.8–7.4)
NEUTROPHILS NFR BLD AUTO: 57.7 % — SIGNIFICANT CHANGE UP (ref 43–77)
NRBC # BLD: 0 /100 WBCS — SIGNIFICANT CHANGE UP (ref 0–0)
PLATELET # BLD AUTO: 200 K/UL — SIGNIFICANT CHANGE UP (ref 150–400)
POTASSIUM SERPL-MCNC: 3.8 MMOL/L — SIGNIFICANT CHANGE UP (ref 3.5–5.3)
POTASSIUM SERPL-SCNC: 3.8 MMOL/L — SIGNIFICANT CHANGE UP (ref 3.5–5.3)
PROT SERPL-MCNC: 4.9 G/DL — LOW (ref 6–8.3)
PROT SERPL-MCNC: 5 G/DL — LOW (ref 6–8.3)
RBC # BLD: 3.01 M/UL — LOW (ref 3.8–5.2)
RBC # FLD: 13.7 % — SIGNIFICANT CHANGE UP (ref 10.3–14.5)
SARS-COV-2 RNA SPEC QL NAA+PROBE: DETECTED
SODIUM SERPL-SCNC: 144 MMOL/L — SIGNIFICANT CHANGE UP (ref 135–145)
WBC # BLD: 4.93 K/UL — SIGNIFICANT CHANGE UP (ref 3.8–10.5)
WBC # FLD AUTO: 4.93 K/UL — SIGNIFICANT CHANGE UP (ref 3.8–10.5)

## 2021-12-29 PROCEDURE — 99232 SBSQ HOSP IP/OBS MODERATE 35: CPT

## 2021-12-29 PROCEDURE — 99233 SBSQ HOSP IP/OBS HIGH 50: CPT

## 2021-12-29 RX ADMIN — Medication 3: at 12:54

## 2021-12-29 RX ADMIN — Medication 650 MILLIGRAM(S): at 05:00

## 2021-12-29 RX ADMIN — Medication 6 MILLIGRAM(S): at 05:57

## 2021-12-29 RX ADMIN — Medication 400 MILLIGRAM(S): at 21:47

## 2021-12-29 RX ADMIN — Medication 1 TABLET(S): at 12:50

## 2021-12-29 RX ADMIN — CARVEDILOL PHOSPHATE 12.5 MILLIGRAM(S): 80 CAPSULE, EXTENDED RELEASE ORAL at 05:57

## 2021-12-29 RX ADMIN — Medication 650 MILLIGRAM(S): at 21:47

## 2021-12-29 RX ADMIN — Medication 650 MILLIGRAM(S): at 14:08

## 2021-12-29 RX ADMIN — CARVEDILOL PHOSPHATE 12.5 MILLIGRAM(S): 80 CAPSULE, EXTENDED RELEASE ORAL at 18:22

## 2021-12-29 RX ADMIN — Medication 100 MILLIGRAM(S): at 05:57

## 2021-12-29 RX ADMIN — PANTOPRAZOLE SODIUM 40 MILLIGRAM(S): 20 TABLET, DELAYED RELEASE ORAL at 12:50

## 2021-12-29 RX ADMIN — NYSTATIN CREAM 1 APPLICATION(S): 100000 CREAM TOPICAL at 05:58

## 2021-12-29 RX ADMIN — Medication 100 MILLIGRAM(S): at 18:22

## 2021-12-29 RX ADMIN — AMLODIPINE BESYLATE 5 MILLIGRAM(S): 2.5 TABLET ORAL at 05:57

## 2021-12-29 RX ADMIN — NYSTATIN CREAM 1 APPLICATION(S): 100000 CREAM TOPICAL at 18:22

## 2021-12-29 RX ADMIN — LIDOCAINE 1 PATCH: 4 CREAM TOPICAL at 12:51

## 2021-12-29 RX ADMIN — ENOXAPARIN SODIUM 40 MILLIGRAM(S): 100 INJECTION SUBCUTANEOUS at 12:50

## 2021-12-29 RX ADMIN — Medication 100 MILLIGRAM(S): at 12:50

## 2021-12-29 RX ADMIN — LOSARTAN POTASSIUM 50 MILLIGRAM(S): 100 TABLET, FILM COATED ORAL at 05:57

## 2021-12-29 RX ADMIN — LIDOCAINE 1 PATCH: 4 CREAM TOPICAL at 20:07

## 2021-12-29 RX ADMIN — Medication 650 MILLIGRAM(S): at 05:57

## 2021-12-29 NOTE — PROGRESS NOTE ADULT - PROBLEM SELECTOR PLAN 1
-L shoulder pain likely 2/2 rotator cuff tendinitis following L sided trauma during presumed fall secondary to seizure  - L shoulder XR with no acute findings  - Physiatry consult, recs appreciated - continue Tylenol, Lidocaine patch, warm compresses  -Neuro w/u negative   -Neurology f/u appreciated  - D/c planning to [facility] in progress

## 2021-12-29 NOTE — PROGRESS NOTE ADULT - PROBLEM SELECTOR PLAN 6
Chronic, stable  - continue home coreg 12.5mg po bid, losartan 50mg qd, restart norvasc 5mg po qd in setting of elevated BPs  - Monitor routine hemodynamics

## 2021-12-29 NOTE — PROGRESS NOTE ADULT - SUBJECTIVE AND OBJECTIVE BOX
[facility] -- Bhavik Son Grossman, Wachsman, Ankit Snyder Savella, Goodger: Office # [phone]    Follow Up:  COVID PNA, HTN hx of CVA    Subjective/Observations: Patient seen and examined, awake, alert, resting comfortably in bed, denies chest pain, dyspnea, palpitations or dizziness, orthopnea and PND. Tolerating O2 via NC     REVIEW OF SYSTEMS: All review of systems is negative for eye, ENT, GI, , allergic, dermatologic, musculoskeletal and neurologic except as described above    PAST MEDICAL & SURGICAL HISTORY:  Diabetes mellitus    Hypercholesteremia    HTN (hypertension)    CVA (cerebral infarction)    Hemorrhagic stroke    Seizure    History of arthroscopy of knee  Right    S/P cataract surgery  bilateral    History of hysterectomy    Basal cell cancer  s/p Mohs surgery    History of retinal tear  surgical repair        MEDICATIONS  (STANDING):  acetaminophen     Tablet .. 650 milliGRAM(s) Oral every 8 hours  amLODIPine   Tablet 5 milliGRAM(s) Oral daily  carBAMazepine ER Capsule 400 milliGRAM(s) Oral at bedtime  carBAMazepine ER Tablet 100 milliGRAM(s) Oral <User Schedule>  carvedilol 12.5 milliGRAM(s) Oral every 12 hours  dexAMETHasone     Tablet 6 milliGRAM(s) Oral daily  dextrose 40% Gel 15 Gram(s) Oral once  dextrose 5%. 1000 milliLiter(s) (50 mL/Hr) IV Continuous <Continuous>  dextrose 5%. 1000 milliLiter(s) (100 mL/Hr) IV Continuous <Continuous>  dextrose 50% Injectable 25 Gram(s) IV Push once  dextrose 50% Injectable 12.5 Gram(s) IV Push once  dextrose 50% Injectable 25 Gram(s) IV Push once  enoxaparin Injectable 40 milliGRAM(s) SubCutaneous daily  glucagon  Injectable 1 milliGRAM(s) IntraMuscular once  insulin lispro (ADMELOG) corrective regimen sliding scale   SubCutaneous three times a day before meals  insulin lispro (ADMELOG) corrective regimen sliding scale   SubCutaneous at bedtime  lidocaine   4% Patch 1 Patch Transdermal daily  losartan 50 milliGRAM(s) Oral daily  multivitamin/minerals 1 Tablet(s) Oral daily  nystatin Powder 1 Application(s) Topical two times a day  pantoprazole  Injectable 40 milliGRAM(s) IV Push daily    MEDICATIONS  (PRN):  aluminum hydroxide/magnesium hydroxide/simethicone Suspension 30 milliLiter(s) Oral every 4 hours PRN Dyspepsia  benzocaine 15 mG/menthol 3.6 mG (Sugar-Free) Lozenge 1 Lozenge Oral every 6 hours PRN Sore Throat  guaiFENesin Oral Liquid (Sugar-Free) 100 milliGRAM(s) Oral every 6 hours PRN Cough  melatonin 3 milliGRAM(s) Oral at bedtime PRN Insomnia  ondansetron Injectable 4 milliGRAM(s) IV Push every 8 hours PRN Nausea and/or Vomiting    Allergies    aspirin (Unknown)  sulfa drugs (Unknown)    Intolerances    Lipitor (Other; Muscle Pain)    Vital Signs Last 24 Hrs  T(C): 37 (29 Dec 2021 04:35), Max: 37 (29 Dec 2021 04:35)  T(F): 98.6 (29 Dec 2021 04:35), Max: 98.6 (29 Dec 2021 04:35)  HR: 78 (29 Dec 2021 04:35) (75 - 78)  BP: 123/77 (29 Dec 2021 04:35) (123/77 - 124/73)  BP(mean): --  RR: 17 (29 Dec 2021 04:35) (17 - 17)  SpO2: 92% (29 Dec 2021 04:35) (92% - 95%)  I&O's Summary    28 Dec 2021 07:01  -  29 Dec 2021 07:00  --------------------------------------------------------  IN: 400 mL / OUT: 550 mL / NET: -150 mL          TELE: Not on telemetry   PHYSICAL EXAM:  Appearance: NAD, no distress, alert, obese  HEENT: Moist Mucous Membranes, Anicteric  Cardiovascular: Regular rate and rhythm, Normal S1 S2, No JVD, No murmurs, No rubs, gallops or clicks  Respiratory: Non-labored, Clear to auscultation, No rales, No rhonchi, No wheezing.   Gastrointestinal:  Soft, Non-tender, + BS  Neurologic: Non-focal  Skin: Warm and dry, No visible rashes or ulcers, No ecchymosis, No cyanosis  Musculoskeletal: No clubbing, No cyanosis, No joint swelling/tenderness  Psychiatry: Mood & affect appropriate  Lymph: No peripheral edema.     LABS: All Labs Reviewed:                        9.1    4.93  )-----------( 200      ( 29 Dec 2021 07:35 )             27.3                         9.8    6.06  )-----------( 195      ( 28 Dec 2021 07:43 )             29.7                         9.9    5.41  )-----------( 181      ( 27 Dec 2021 10:57 )             29.7     29 Dec 2021 07:35    144    |  108    |  17     ----------------------------<  110    3.8     |  30     |  0.75   28 Dec 2021 07:43    145    |  108    |  17     ----------------------------<  112    3.8     |  31     |  0.72   27 Dec 2021 10:57    142    |  108    |  23     ----------------------------<  234    4.1     |  29     |  0.76     Ca    8.1        29 Dec 2021 07:35  Ca    8.2        28 Dec 2021 07:43  Ca    7.6        27 Dec 2021 10:57    TPro  4.9    /  Alb  2.0    /  TBili  0.2    /  DBili  <0.1   /  AST  64     /  ALT  62     /  AlkPhos  52     29 Dec 2021 07:35  TPro  5.4    /  Alb  2.1    /  TBili  0.3    /  DBili  <0.1   /  AST  34     /  ALT  35     /  AlkPhos  52     28 Dec 2021 07:43  TPro  5.1    /  Alb  2.0    /  TBili  0.2    /  DBili  <0.1   /  AST  20     /  ALT  23     /  AlkPhos  49     27 Dec 2021 10:57      Troponin I, High Sensitivity Result: 2157.8 ng/L (12-22-21 @ 09:53)  Troponin I, High Sensitivity Result: 2910.6 ng/L (12-22-21 @ 04:56)  Creatine Kinase, Serum: 205 U/L (12-22-21 @ 04:56)  Troponin I, High Sensitivity Result: 457.9 ng/L (12-21-21 @ 17:50)      D-Dimer Assay, Quantitative: 1593 ng/mL DDU (12-29-21 @ 09:40)  D-Dimer Assay, Quantitative: 1580 ng/mL DDU (12-22-21 @ 17:03)        Cholesterol, Serum: 220 mg/dL (12-22-21 @ 09:32)  HDL Cholesterol, Serum: 76 mg/dL (12-22-21 @ 09:32)  Triglycerides, Serum: 74 mg/dL (12-22-21 @ 09:32)      12 Lead ECG:   Ventricular Rate 80 BPM    Atrial Rate 80 BPM    P-R Interval 138 ms    QRS Duration 90 ms    Q-T Interval 400 ms    QTC Calculation(Bazett) 461 ms    P Axis 60 degrees    R Axis -1 degrees    T Axis 20 degrees    Diagnosis Line Normal sinus rhythm  Inferior infarct , age undetermined  Possible Anterior infarct , age undetermined  Abnormal ECG  When compared with ECG of 21-DEC-2021 17:53, (Unconfirmed)  No significant change was found  Confirmed by Mihai Coles MD ([id number]) on 12/22/2021 11:21:17AM (12-22-21 @ 05:48)    rad< from: Xray Chest 1 View- PORTABLE-Routine (12.23.21 @ 18:56) >    ACC: [id number] EXAM:  XR SHOULDER TRNS SCAPULA 1V LT                        ACC: [id number] EXAM:  XR CHEST PORTABLE ROUTINE 1V                          PROCEDURE DATE:  12/23/2021          INTERPRETATION:  Chest and left shoulder. Patient had a fall.Patient had   a seizure and also has bilateral rib pain and possible Covid.    Heart normal for projection.    The lung fields and pleural surfaces are unremarkable.    No visible fracture.    Chest is similar to December 21.    Left shoulder. Singleview shows normal position and no fracture.    IMPRESSION: No acute finding.    --- End of Report ---            HANNAH MARISCAL MD; Attending Radiologist  This document has been electronically signed. Dec 24 2021 11:09AM    < end of copied text >  < from: TTE Echo Complete w/o Contrast w/ Doppler (12.23.21 @ 20:16) >     EXAM:  ECHO TTE WO CON COMP W DOPP         PROCEDURE DATE:  12/23/2021        INTERPRETATION:  INDICATION: Abnormal EKG    Blood Pressure 152/84    Height 165     Weight 68       BSA 1.75    Dimensions:  LA 2.3       Normal Values: 2.0 - 4.0 cm  Ao 3.3        Normal Values: 2.0 - 3.8 cm  SEPTUM 1.1       Normal Values: 0.6 - 1.2 cm  PWT 1.1       Normal Values: 0.6 - 1.1 cm  LVIDd 3.8         Normal Values: 3.0 - 5.6 cm  LVIDs 2.2         Normal Values: 1.8 - 4.0 cm    Derived Variables:  LVMIg/m2  RWT  Fractional Short  Ejection Fraction    Doppler Peak v. AoV= (m/sec)    OBSERVATIONS:    Mitral Valve: normal, trace physiologic MR.  Aortic Valve/Aorta: Calcified trileaflet aortic valve, without stenosis.   Mild AI.  Tricuspid Valve: normal with trace TR.  Pulmonic Valve: normal  Left Atrium: normal  Right Atrium: normal  Left Ventricle: normal LV size was likely with preserved systolic   function, estimated LVEF of 65%. The distal anterior wall and apex are   not well seen in all views, and appear hypokinetic in some views.  Right Ventricle: normal size and systolic function.  Pericardium/Pleura: no significant pleural effusion, no significant   pericardial effusion.  Pulmonary/RV Pressure: estimated PA systolic pressure of 34mmHg assuming   an RA pressure of 10 mmHg.  LV Diastolic Function: Mild diastolic dysfunction.    Impression: Normal left ventricular size and systolic function, estimated   LVEF of 65%. The distal anterior wall and apex are not well seen in all   views,and appear hypokinetic in some views.Normal right ventricular size   and systolic function. Mild diastolic dysfunction.  Normal biatrial size.   The aortic root is normal in size. The mitral valve is structurally   normal, trace physiologic MR. The aortic valve is trileaflet without   stenosis. Mild AI. Trace physiologic TR. Estimated PA systolic pressure   is 34 mm Hg. No significant pericardial effusion.    --- End of Report ---              NATAN DELGADO MD; Attending Cardiologist  This document has been electronically signed. Dec 24 2021  8:15AM    < end of copied text >

## 2021-12-29 NOTE — PROGRESS NOTE ADULT - SUBJECTIVE AND OBJECTIVE BOX
Patient is a 89y old  Female who presents with a chief complaint of L sided weaknesss (28 Dec 2021 14:09)       INTERVAL HPI/OVERNIGHT EVENTS: Patient seen and examined at bedside. Denies any new symptoms, complaints. No new overnight events noted     MEDICATIONS  (STANDING):  acetaminophen     Tablet .. 650 milliGRAM(s) Oral every 8 hours  amLODIPine   Tablet 5 milliGRAM(s) Oral daily  carBAMazepine ER Capsule 400 milliGRAM(s) Oral at bedtime  carBAMazepine ER Tablet 100 milliGRAM(s) Oral <User Schedule>  carvedilol 12.5 milliGRAM(s) Oral every 12 hours  dexAMETHasone     Tablet 6 milliGRAM(s) Oral daily  dextrose 40% Gel 15 Gram(s) Oral once  dextrose 5%. 1000 milliLiter(s) (50 mL/Hr) IV Continuous <Continuous>  dextrose 5%. 1000 milliLiter(s) (100 mL/Hr) IV Continuous <Continuous>  dextrose 50% Injectable 25 Gram(s) IV Push once  dextrose 50% Injectable 12.5 Gram(s) IV Push once  dextrose 50% Injectable 25 Gram(s) IV Push once  enoxaparin Injectable 40 milliGRAM(s) SubCutaneous daily  glucagon  Injectable 1 milliGRAM(s) IntraMuscular once  insulin lispro (ADMELOG) corrective regimen sliding scale   SubCutaneous three times a day before meals  insulin lispro (ADMELOG) corrective regimen sliding scale   SubCutaneous at bedtime  lidocaine   4% Patch 1 Patch Transdermal daily  losartan 50 milliGRAM(s) Oral daily  multivitamin/minerals 1 Tablet(s) Oral daily  nystatin Powder 1 Application(s) Topical two times a day  pantoprazole  Injectable 40 milliGRAM(s) IV Push daily    MEDICATIONS  (PRN):  aluminum hydroxide/magnesium hydroxide/simethicone Suspension 30 milliLiter(s) Oral every 4 hours PRN Dyspepsia  benzocaine 15 mG/menthol 3.6 mG (Sugar-Free) Lozenge 1 Lozenge Oral every 6 hours PRN Sore Throat  guaiFENesin Oral Liquid (Sugar-Free) 100 milliGRAM(s) Oral every 6 hours PRN Cough  melatonin 3 milliGRAM(s) Oral at bedtime PRN Insomnia  ondansetron Injectable 4 milliGRAM(s) IV Push every 8 hours PRN Nausea and/or Vomiting      Allergies    aspirin (Unknown)  sulfa drugs (Unknown)    Intolerances    Lipitor (Other; Muscle Pain)      REVIEW OF SYSTEMS:  Constitutional: denies fever or chills  HEENT: denies headache, dizziness, or lightheadedness  Respiratory: denies SOB or cough   Cardiovascular: denies CP or palpitations  Gastrointestinal: denies nausea, vomiting, diarrhea, abdominal pain, or hematochezia  Genitourinary: denies hematuria, dysuria, frequency, urgency  Musculoskeletal: denies muscle aches, joint swelling, or muscle weakness  Vital Signs Last 24 Hrs  T(C): 37 (29 Dec 2021 04:35), Max: 37.1 (28 Dec 2021 12:12)  T(F): 98.6 (29 Dec 2021 04:35), Max: 98.7 (28 Dec 2021 12:12)  HR: 78 (29 Dec 2021 04:35) (75 - 82)  BP: 123/77 (29 Dec 2021 04:35) (123/77 - 161/83)  BP(mean): --  RR: 17 (29 Dec 2021 04:35) (17 - 17)  SpO2: 92% (29 Dec 2021 04:35) (92% - 95%)    PHYSICAL EXAM:  Gen: well appearing, in no acute distress  HEENT: NCAT   Cardio: regular rate and rhythm, +s1s2, no murmurs, rubs, or gallops  Pulm: CTA b/l, no wheezes, rales or rhonchi  Abdomen: soft, nontender, nondistended, +BS x4 quadrants, no guarding  Extremities: no edema   Neuro: Alert and oriented, spontaneous movement of all 4 extremities  Skin: warm and dry    LABS:                        9.1    4.93  )-----------( 200      ( 29 Dec 2021 07:35 )             27.3     29 Dec 2021 07:35    144    |  108    |  17     ----------------------------<  110    3.8     |  30     |  0.75     Ca    8.1        29 Dec 2021 07:35    TPro  4.9    /  Alb  2.0    /  TBili  0.2    /  DBili  <0.1   /  AST  64     /  ALT  62     /  AlkPhos  52     29 Dec 2021 07:35      CAPILLARY BLOOD GLUCOSE      POCT Blood Glucose.: 128 mg/dL (29 Dec 2021 07:56)  POCT Blood Glucose.: 110 mg/dL (28 Dec 2021 22:08)  POCT Blood Glucose.: 141 mg/dL (28 Dec 2021 16:50)    BLOOD CULTURE    RADIOLOGY & ADDITIONAL TESTS:    Imaging Personally Reviewed:  [ ] YES     Consultant(s) Notes Reviewed:      Care Discussed with Consultants/Other Providers:

## 2021-12-29 NOTE — PROGRESS NOTE ADULT - SUBJECTIVE AND OBJECTIVE BOX
Neurology follow up note    MAJOR [id number]      Interval History:    Patient feels left shoulder pain     Allergies    aspirin (Unknown)  sulfa drugs (Unknown)    Intolerances    Lipitor (Other; Muscle Pain)      MEDICATIONS    acetaminophen     Tablet .. 650 milliGRAM(s) Oral every 8 hours  aluminum hydroxide/magnesium hydroxide/simethicone Suspension 30 milliLiter(s) Oral every 4 hours PRN  amLODIPine   Tablet 5 milliGRAM(s) Oral daily  benzocaine 15 mG/menthol 3.6 mG (Sugar-Free) Lozenge 1 Lozenge Oral every 6 hours PRN  carBAMazepine ER Capsule 400 milliGRAM(s) Oral at bedtime  carBAMazepine ER Tablet 100 milliGRAM(s) Oral <User Schedule>  carvedilol 12.5 milliGRAM(s) Oral every 12 hours  dexAMETHasone     Tablet 6 milliGRAM(s) Oral daily  dextrose 40% Gel 15 Gram(s) Oral once  dextrose 5%. 1000 milliLiter(s) IV Continuous <Continuous>  dextrose 5%. 1000 milliLiter(s) IV Continuous <Continuous>  dextrose 50% Injectable 25 Gram(s) IV Push once  dextrose 50% Injectable 12.5 Gram(s) IV Push once  dextrose 50% Injectable 25 Gram(s) IV Push once  enoxaparin Injectable 40 milliGRAM(s) SubCutaneous daily  glucagon  Injectable 1 milliGRAM(s) IntraMuscular once  guaiFENesin Oral Liquid (Sugar-Free) 100 milliGRAM(s) Oral every 6 hours PRN  insulin lispro (ADMELOG) corrective regimen sliding scale   SubCutaneous three times a day before meals  insulin lispro (ADMELOG) corrective regimen sliding scale   SubCutaneous at bedtime  lidocaine   4% Patch 1 Patch Transdermal daily  losartan 50 milliGRAM(s) Oral daily  melatonin 3 milliGRAM(s) Oral at bedtime PRN  multivitamin/minerals 1 Tablet(s) Oral daily  nystatin Powder 1 Application(s) Topical two times a day  ondansetron Injectable 4 milliGRAM(s) IV Push every 8 hours PRN  pantoprazole  Injectable 40 milliGRAM(s) IV Push daily              Vital Signs Last 24 Hrs  T(C): 37 (29 Dec 2021 04:35), Max: 37 (29 Dec 2021 04:35)  T(F): 98.6 (29 Dec 2021 04:35), Max: 98.6 (29 Dec 2021 04:35)  HR: 78 (29 Dec 2021 04:35) (75 - 78)  BP: 123/77 (29 Dec 2021 04:35) (123/77 - 124/73)  BP(mean): --  RR: 17 (29 Dec 2021 04:35) (17 - 17)  SpO2: 92% (29 Dec 2021 04:35) (92% - 95%)    REVIEW OF SYSTEMS:     Constitutional: No fever, chills, fatigue, weakness  Eyes: no eye pain, visual disturbances, or discharge  ENT:  No difficulty hearing, tinnitus, vertigo; No sinus or throat pain  Neck: No pain or stiffness  Respiratory: No cough, dyspnea, wheezing   Cardiovascular: No chest pain, palpitations,   Gastrointestinal: No abdominal or epigastric pain. No nausea, vomiting  No diarrhea or constipation.   Genitourinary: No dysuria, frequency, hematuria or incontinence  Neurological: No headaches, lightheadedness, vertigo, numbness or tremors  Psychiatric: No depression, anxiety, mood swings or difficulty sleeping  Musculoskeletal: No joint pain or swelling; No muscle, back or  left shoulder extremity pain  Skin: No itching, burning, rashes or lesions   Lymph Nodes: No enlarged glands  Endocrine: No heat or cold intolerance; No hair loss   Allergy and Immunologic: No hives or eczema    On Neurological Examination:      The patient is awake and alert.    Extraocular movements were intact.    Pupils were equal, round, and reactive bilaterally, 3 mm to 2 mm.    The patient appeared to have poor vision out of both left and right eyes, had difficulty naming number of fingers.    The patient had subtle decrease of the left nasolabial fold.    Motor -Right upper is 4+/5, left upper distal  was 4/5.  prox decrease ROM complaints of pain  Bilateral lower extremities were 4-/5, would say age-appropriate.   Sensory:  Bilateral upper and lower intact to light touch.    Follow simple commands    GENERAL Exam: Nontoxic , No Acute Distress   	  HEENT:  normocephalic, atraumatic  		  LUNGS: Clear bilaterally    	  HEART: Normal S1S2   No murmur RRR        	  GI/ ABDOMEN:  Soft  Non tender    EXTREMITIES:   No Edema  No Clubbing  No Cyanosis     MUSCULOSKELETAL: decreased Range of Motion all 4 extremities   	   SKIN: Normal  No Ecchymosis               LABS:  CBC Full  -  ( 29 Dec 2021 07:35 )  WBC Count : 4.93 K/uL  RBC Count : 3.01 M/uL  Hemoglobin : 9.1 g/dL  Hematocrit : 27.3 %  Platelet Count - Automated : 200 K/uL  Mean Cell Volume : 90.7 fl  Mean Cell Hemoglobin : 30.2 pg  Mean Cell Hemoglobin Concentration : 33.3 gm/dL  Auto Neutrophil # : 2.84 K/uL  Auto Lymphocyte # : 1.23 K/uL  Auto Monocyte # : 0.50 K/uL  Auto Eosinophil # : 0.13 K/uL  Auto Basophil # : 0.04 K/uL  Auto Neutrophil % : 57.7 %  Auto Lymphocyte % : 24.9 %  Auto Monocyte % : 10.1 %  Auto Eosinophil % : 2.6 %  Auto Basophil % : 0.8 %      12-29    144  |  108  |  17  ----------------------------<  110<H>  3.8   |  30  |  0.75    Ca    8.1<L>      29 Dec 2021 07:35    TPro  4.9<L>  /  Alb  2.0<L>  /  TBili  0.2  /  DBili  <0.1  /  AST  64<H>  /  ALT  62  /  AlkPhos  52  12-29    Hemoglobin A1C:     LIVER FUNCTIONS - ( 29 Dec 2021 07:35 )  Alb: 2.0 g/dL / Pro: 4.9 g/dL / ALK PHOS: 52 U/L / ALT: 62 U/L / AST: 64 U/L / GGT: x           Vitamin B12         RADIOLOGY  ANALYSIS AND PLAN:  This is an 89-year-old with an episode of being found on the ground and history of intercerebral hemorrhage and seizures.  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  For history of seizures,  carbamazepine  Left shoulder pain, likely 2/2 rotator cuff issues Lidoderm patch   AVOID FULL DOSE OF AC DUE TO HX OF ICH---AC CAN BE USED AS DVT PROPHYLAXIS      Physical therapy evaluation as tolerated  OOB to chair/ambulation with assistance only if possible.    Greater than 21 minutes spent in direct patient care reviewing  the notes, lab data/ imaging , discussion with multidisciplinary team.

## 2021-12-29 NOTE — PROGRESS NOTE ADULT - ASSESSMENT
89 year old female with PMH hemorrhagic CVA (2015), Type 2 DM (not on insulin), HTN, HLD, seizure disorder, hearing impairment presents to ED s/p unwitnessed episode resulting in fall onto L side and L sided weakness. Patient incidentally found to be COVID+ with no sick contacts or URI symptoms prior to admission. Cardiology consulted for elevated troponin.     Cardiac summary  TTE: (3/3/2019): normal LVSF, calcified trileaflet AV with normal systolic opening    - High sensitivity Trop I: elevated, peaked and trended down.  Not consistent with true plaque rupture  - Her chest pain was more pleuritic than cardiac, no complaints of anginal symptoms on exam  - EKG shows nonspecific T-wave changes, non-diagnostic.    - Unable to start ASA in the setting of allergy.      - TTE showed normal LV/RVF with SWMA, and mild DD, no significant valvular disease  - No meaningful evidence of volume overload    - BP labile 120-160's  - Continue ARB  - continue BB  - continue CCB ( would increase for better BP control)    - Will need ischemic eval as outpatient if in line with her wishes.    - Remains stable from cardiac standpoint    - Monitor and replete Lytes. Keep K > 4 and Mg > 2  - Will continue to follow.      Italia Tony, [facility]  Nurse Practitioner - Cardiology   Spectra #[id number]/ [phone]

## 2021-12-29 NOTE — PROGRESS NOTE ADULT - PROBLEM SELECTOR PROBLEM 1
CARDIOLOGY FOLLOW UP OFFICE VISIT    Patient ID: KETAN Duron is a 81 year old male seen for follow-up regard episode of atrial fibrillation    PCP: Peter Arteaga MD    Chief Complaint   Patient presents with   • Follow-up     6 week         HPI:  Mr. Enriquez comes in for follow-up.  Again has historically been seen related to moderate aortic stenosis.  Again, recently had an episode of diverticulitis and was found to be in atrial fibrillation at that time.  He recently had an event recorder and comes in for follow-up.    Symptomatically, he is stable.  He has no complaints of palpitations although he has not with his previous documented episodes of A. fib been symptomatic.  Tolerating medications well.  No bruising or bleeding.      Patient Active Problem List   Diagnosis   • Aortic valve stenosis, moderate   • Asthma   • Obstructive sleep apnea   • Paroxysmal atrial fibrillation (CMS/HCC)       Past Medical History:   Diagnosis Date   • Aortic stenosis, moderate    • Chronic obstructive pulmonary disease (CMS/HCC)    • Heart murmur    • History of deafness    • History of mumps    • History of vertigo    • Left ventricular diastolic dysfunction    • Non Hodgkin's lymphoma (CMS/HCC)    • Obstructive sleep apnea    • Pulmonary hypertension (CMS/HCC)    • RAD (reactive airway disease)        Past Surgical History:   Procedure Laterality Date   • Colonoscopy     • Foot surgery         Family History   Problem Relation Age of Onset   • Asthma Mother    • Cancer, Lung Mother    • Cancer, Prostate Father        Social History     Tobacco Use   • Smoking status: Former Smoker   • Smokeless tobacco: Never Used   Substance Use Topics   • Alcohol use: Yes   • Drug use: Not on file       Current Outpatient Medications   Medication Sig Dispense Refill   • acetaminophen (TYLENOL) 500 MG tablet Take 500 mg by mouth as needed for Pain. For arthritis     • Omega-3 Fatty Acids (FISH OIL PO) Take by mouth daily.       • furosemide (LASIX) 20 MG tablet Take 20 mg by mouth daily as needed.     • gabapentin (NEURONTIN) 300 MG capsule Take 300 mg by mouth 2 times daily.      • Glucosamine 500 MG Cap Take 500 mg by mouth 2 times daily.     • Multiple Vitamin (MULTI-DAY) Tab Take by mouth daily.     • Respiratory Therapy Supplies (NEBULIZER) Device      • Potassium 99 MG Tab Take 99 mg by mouth daily.     • montelukast (SINGULAIR) 10 MG tablet Take 10 mg by mouth nightly.     • albuterol 108 (90 Base) MCG/ACT inhaler Inhale 2 puffs into the lungs every 4 hours as needed for Shortness of Breath or Wheezing.     • apixaban (ELIQUIS) 2.5 MG Tab Take by mouth every 12 hours.     • Lutein 20 MG Tab TAKE 1 TABLET DAILY     • fluticasone-salmeterol (ADVAIR DISKUS) 250-50 MCG/DOSE inhaler Inhale 1 puff into the lungs two times daily. 3 each 3     No current facility-administered medications for this visit.        ALLERGIES:   Allergen Reactions   • Toradol RASH       Review of Systems   Constitutional: Negative for activity change, appetite change, diaphoresis and fatigue.   HENT: Negative for hearing loss.    Eyes: Negative for pain and visual disturbance.   Respiratory: Negative for cough, chest tightness, shortness of breath and wheezing.    Cardiovascular: Negative for chest pain, palpitations and leg swelling.   Gastrointestinal: Negative for abdominal pain, blood in stool and constipation.   Genitourinary: Negative for difficulty urinating.   Musculoskeletal: Negative for back pain and gait problem.   Skin: Negative for color change, rash and wound.   Neurological: Negative for dizziness, syncope, speech difficulty, weakness, numbness and headaches.   Hematological: Does not bruise/bleed easily.   Psychiatric/Behavioral: Negative for dysphoric mood, sleep disturbance and suicidal ideas.       Visit Vitals  /68 (BP Location: [facility], Patient Position: Sitting, Cuff Size: Regular)   Pulse 58   Ht 6' 2\" (1.88 m)   Wt 83.9 kg (185 lb)    BMI 23.75 kg/m²       Physical Exam   Constitutional: He is oriented to person, place, and time. He appears well-nourished. No distress.   HENT:   Head: Normocephalic.   Eyes: EOM are normal.   Neck: No JVD present. No thyromegaly present.   Cardiovascular: Normal rate, regular rhythm, S1 normal, S2 normal and intact distal pulses. PMI is not displaced. Exam reveals no S4 and no friction rub.   Murmur (2/6 systolic ejection murmur at the base) heard.  Pulses:       Carotid pulses are 2+ on the right side, and 2+ on the left side.       Radial pulses are 2+ on the right side, and 2+ on the left side.        Dorsalis pedis pulses are 2+ on the right side, and 2+ on the left side.   Pulmonary/Chest: Effort normal and breath sounds normal. He has no wheezes. He has no rales. He exhibits no tenderness.   Musculoskeletal: He exhibits no edema or tenderness.   Neurological: He is oriented to person, place, and time. No cranial nerve deficit. Coordination normal.   Skin: Skin is warm and dry. No rash noted. He is not diaphoretic.   Psychiatric: He has a normal mood and affect.   Nursing note and vitals reviewed.      A review of his ACT recorder results show about 2% episode of atrial fibrillation.  Most of these are very limited although he did have one 2-hour episode.  Patient does not recall any symptomatic episodes during the recording.      IMPRESSION:    1.  Intermittent atrial fibrillation, [id number] score of 2  2.  Moderate aortic stenosis    RECOMMENDATION:    At this stage, patient does have intermittent episodes of asymptomatic atrial fibrillation.  No prolonged episodes outside of the one is noted.  At this point, I do not see the need for more aggressive medical therapy for his atrial fibrillation.  Again discussed is risk of stroke as noted.  We will continue on anticoagulant therapy.  Risks of bleeding were again outlined.      Return in about 6 months (around 9/12/2019).      Praful Cuevas MD     wheelchair Acute left-sided weakness

## 2021-12-30 PROCEDURE — 99233 SBSQ HOSP IP/OBS HIGH 50: CPT

## 2021-12-30 PROCEDURE — 99232 SBSQ HOSP IP/OBS MODERATE 35: CPT

## 2021-12-30 RX ADMIN — Medication 6 MILLIGRAM(S): at 05:17

## 2021-12-30 RX ADMIN — PANTOPRAZOLE SODIUM 40 MILLIGRAM(S): 20 TABLET, DELAYED RELEASE ORAL at 12:12

## 2021-12-30 RX ADMIN — NYSTATIN CREAM 1 APPLICATION(S): 100000 CREAM TOPICAL at 17:27

## 2021-12-30 RX ADMIN — Medication 650 MILLIGRAM(S): at 21:08

## 2021-12-30 RX ADMIN — Medication 650 MILLIGRAM(S): at 14:21

## 2021-12-30 RX ADMIN — LOSARTAN POTASSIUM 50 MILLIGRAM(S): 100 TABLET, FILM COATED ORAL at 05:17

## 2021-12-30 RX ADMIN — CARVEDILOL PHOSPHATE 12.5 MILLIGRAM(S): 80 CAPSULE, EXTENDED RELEASE ORAL at 17:26

## 2021-12-30 RX ADMIN — Medication 3: at 12:05

## 2021-12-30 RX ADMIN — ENOXAPARIN SODIUM 40 MILLIGRAM(S): 100 INJECTION SUBCUTANEOUS at 12:12

## 2021-12-30 RX ADMIN — AMLODIPINE BESYLATE 5 MILLIGRAM(S): 2.5 TABLET ORAL at 05:17

## 2021-12-30 RX ADMIN — NYSTATIN CREAM 1 APPLICATION(S): 100000 CREAM TOPICAL at 05:17

## 2021-12-30 RX ADMIN — Medication 1 TABLET(S): at 12:12

## 2021-12-30 RX ADMIN — Medication 100 MILLIGRAM(S): at 17:26

## 2021-12-30 RX ADMIN — Medication 650 MILLIGRAM(S): at 05:18

## 2021-12-30 RX ADMIN — Medication 100 MILLIGRAM(S): at 05:16

## 2021-12-30 RX ADMIN — Medication 650 MILLIGRAM(S): at 05:16

## 2021-12-30 RX ADMIN — CARVEDILOL PHOSPHATE 12.5 MILLIGRAM(S): 80 CAPSULE, EXTENDED RELEASE ORAL at 05:17

## 2021-12-30 RX ADMIN — Medication 400 MILLIGRAM(S): at 21:08

## 2021-12-30 RX ADMIN — Medication 100 MILLIGRAM(S): at 12:12

## 2021-12-30 RX ADMIN — Medication 650 MILLIGRAM(S): at 17:28

## 2021-12-30 RX ADMIN — LIDOCAINE 1 PATCH: 4 CREAM TOPICAL at 05:17

## 2021-12-30 NOTE — PROGRESS NOTE ADULT - PROBLEM SELECTOR PLAN 1
-L shoulder pain likely 2/2 rotator cuff tendinitis following L sided trauma during presumed fall secondary to seizure  - L shoulder XR with no acute findings  - Physiatry consult, recs appreciated - continue Tylenol, Lidocaine patch, warm compresses  -Neuro w/u negative   -Neurology f/u appreciated  - D/c planning to [facility] in progress -L shoulder pain likely 2/2 rotator cuff tendinitis following L sided trauma during presumed fall secondary to seizure  - L shoulder XR with no acute findings  - Physiatry consult, recs appreciated - continue Tylenol, Lidocaine patch, warm compresses  -Also spoke to Ortho resident, since patient keep asking about Cortizone shot that she was supposed to get as out patient, they stated that they do not do as in patient and she can f/u with Dr. Shay as out patient. Spoke to patient and told her that. She tends to forget   -Neuro w/u negative   -Neurology f/u appreciated  - D/c planning to [facility] in progress

## 2021-12-30 NOTE — PROGRESS NOTE ADULT - PROBLEM SELECTOR PROBLEM 5
DM (diabetes mellitus)
HTN (hypertension)
HTN (hypertension)
DM (diabetes mellitus)
HTN (hypertension)

## 2021-12-30 NOTE — PROGRESS NOTE ADULT - TIME BILLING
Patient seen and examined at bedside. Meds, labs, vitals, f/u consult notes reviewed. D/w Neuro ok to give Prophylactic does of AC but not treatment dose due to h/o hemorrhagic CVA
Patient seen and examined at bedside. Meds, labs, vitals, f/u consult notes reviewed. D/w Neuro ok to give Prophylactic does of AC but not treatment dose due to h/o hemorrhagic CVA
Pt seen + examined. Case discussed with resident. Agree with assessment and plan above (edited by me in detail):  Time spent: 40min. More than 50% of the visit was spent counseling the patient and pt's sons on medical condition -- left-sided weakness seizure vs CVA, MRI to r/out CVA, acute hypoxic resp failure due to COVID-19 PNA, elevated troponin, likely demand ischemia.    Patient is a 89 yo female with PMH of hemorrhagic CVA (2015), Type 2 DM (not on insulin), HTN, HLD, seizures, hard of hearing presents to ED with left sided weakness, admitted for left-sided weakness, troponin elevation and acute hypoxic respiratory failure due to COVID-19 PNA.    - likely due to seizure  - CTA head, neck, CT c spine- all negative for acute processes  - MRI head negative for acute process, possible normal pressure hydrocephalus w/ ventricular dilation  - given 48 hrs s/p symptom onset and in setting of CT, MRI head scans (-) for acute stroke, will restart home coreg 12.5mg po bid, losartan 50mg qd. will hold off on norvasc until tomorrow  - continue diet, soft bite size w thin liquid  - F/u TTE    - Aspiration precautions  - HOLD aspirin given allergy. HOLD high dose statin as pt intolerant  - Neuro checks Q4h  - carbamazepine level within therapeutic limits, will recheck at 5a prior to AM dose  - PT/OT eval  - Neuro following Dr. Wei, recs appreciated    - abnormal EKG with non-specific TWI  - Trops 457 -> 2910 -> 2100, trended to peak  - HOLD aspirin given allergy  - s/p Lovenox 70mg SQ x 2  - not consistent with plaque-rupture MI, so will not continue full A/C at this time  - likely demand ischemia in setting of seizure / resp failure due to COVID  - F/u TTE  - Cardio following [facility], recs appreciated    - Acute hypoxic respiratory failure 2/2 confirmed COVID-19 infection  - Patient vaccinated with: pfizer x2, moderna booster  - Supp O2 prn maintain O2 sats >88%. Prone PRN  - Continue Decadron 6mg daily for 10 days, last day 12/30/21 + protonix 40mg qd as ppx in setting of steroid use  - Continue remdesivir for 5 days, last day 12/25/21  - Monitor volume status closely, avoid aggressive hydration  - Tylenol prn myalgias, fever  - ferritin 87, crp 102, d-dimer 1580, procal 0.18, will repeat if clinically worsening every 48-72 hours  - febrile overnight- UA unimpressive for UTI, will f/u blood cx, continue to monitor for fevers, leukocytosis  - ID Dr Barajas, recs appreciated    - in the afternoon, pt reported "rib pain" with cough but could not specify which rib; pt had tenderness b/l over the ribcage that she stated reproduced the pain.  - ordered rib series xrays  - D-dimer is quite elevated at 1580; ordered LE Doppler to eval for DVT ; if negative and if rib series xrays shows no fractures consider CT Angio of the Chest; pt had IV contrast with CTA brain 2 days ago, so will perform the Dopplers and xrays first.   - per neuro, pt is a poor candidate for full dose A/C due to spontaneous hemorrhagic CVA several years ago.
Pt seen + examined. Case discussed with resident. Agree with assessment and plan above (edited by me in detail):  Time spent: 40min. More than 50% of the visit was spent counseling the patient on medical condition -- left-sided weakness seizure vs CVA, MRI to r/out CVA, acute hypoxic resp failure due to COVID-19 PNA, elevated troponin, likely demand ischemia.    Patient is a 89 yo female with PMH of hemorrhagic CVA (2015), Type 2 DM (not on insulin), HTN, HLD, seizures, hard of hearing presents to ED with left sided weakness, admitted for left-sided weakness, troponin elevation and acute hypoxic respiratory failure due to COVID-19 PNA.    - left-sided weakness likely seizure versus CVA  - CTA head, neck, CT c spine- all negative for acute processes  - will allow permissive HTN for 48 hours until able to r/out stroke, treating only for BP>180/110  - S&S performed- will advance diet to soft bite size w thin liquid  - F/u TTE and MRI head  - B12 545, folate >20  - Aspiration precautions  - HOLD aspirin given allergy. HOLD high dose statin as pt intolerant  - Neuro checks Q4h  - carbamazepine level within therapeutic limits  - PT/OT eval  - Neuro following Dr. Wei, recs appreciated    - abnormal EKG with non-specific TWI  - Trops 457 -> 2910 -> 2100, trended to peak  - HOLD aspirin given allergy  - s/p Lovenox 70mg SQ x 2  - not consistent with plaque-rupture MI, so will not continue full A/C at this time  - likely demand ischemia in setting of seizure / resp failure due to COVID  - F/u TTE  - Cardio following [facility], recs appreciated    - Acute hypoxic respiratory failure 2/2 confirmed COVID-19 infection  - Patient vaccinated with: pfizer x2, moderna booster  - Supp O2 prn maintain O2 sats >88%. Prone PRN  - Continue Decadron 6mg daily for 10 days, last day 12/30/21 + protonix 40mg qd as ppx in setting of steroid use  - Continue remdesivir for 5 days, last day 12/25/21  - Monitor volume status closely, avoid aggressive hydration  - Tylenol prn myalgias, fever  - F/u ferritin, crp, d-dimer, procal STAT now then will repeat If clinically worsening every 48-72 hours.  - ID Dr Barajas, recs appreciated

## 2021-12-30 NOTE — PROGRESS NOTE ADULT - PROBLEM SELECTOR PROBLEM 6
HLD (hyperlipidemia)
HTN (hypertension)
HLD (hyperlipidemia)
HLD (hyperlipidemia)
HTN (hypertension)

## 2021-12-30 NOTE — PROGRESS NOTE ADULT - PROBLEM SELECTOR PROBLEM 7
Seizures
HLD (hyperlipidemia)
Seizures
HLD (hyperlipidemia)
Seizures

## 2021-12-30 NOTE — PROGRESS NOTE ADULT - SUBJECTIVE AND OBJECTIVE BOX
Neurology follow up note    MAJOR [id number]      Interval History:    Patient feels left shoulder pain     Allergies    aspirin (Unknown)  sulfa drugs (Unknown)    Intolerances    Lipitor (Other; Muscle Pain)      MEDICATIONS    acetaminophen     Tablet .. 650 milliGRAM(s) Oral every 8 hours  aluminum hydroxide/magnesium hydroxide/simethicone Suspension 30 milliLiter(s) Oral every 4 hours PRN  amLODIPine   Tablet 5 milliGRAM(s) Oral daily  benzocaine 15 mG/menthol 3.6 mG (Sugar-Free) Lozenge 1 Lozenge Oral every 6 hours PRN  carBAMazepine ER Capsule 400 milliGRAM(s) Oral at bedtime  carBAMazepine ER Tablet 100 milliGRAM(s) Oral <User Schedule>  carvedilol 12.5 milliGRAM(s) Oral every 12 hours  dexAMETHasone     Tablet 6 milliGRAM(s) Oral daily  dextrose 40% Gel 15 Gram(s) Oral once  dextrose 5%. 1000 milliLiter(s) IV Continuous <Continuous>  dextrose 5%. 1000 milliLiter(s) IV Continuous <Continuous>  dextrose 50% Injectable 25 Gram(s) IV Push once  dextrose 50% Injectable 12.5 Gram(s) IV Push once  dextrose 50% Injectable 25 Gram(s) IV Push once  enoxaparin Injectable 40 milliGRAM(s) SubCutaneous daily  glucagon  Injectable 1 milliGRAM(s) IntraMuscular once  guaiFENesin Oral Liquid (Sugar-Free) 100 milliGRAM(s) Oral every 6 hours PRN  insulin lispro (ADMELOG) corrective regimen sliding scale   SubCutaneous three times a day before meals  insulin lispro (ADMELOG) corrective regimen sliding scale   SubCutaneous at bedtime  lidocaine   4% Patch 1 Patch Transdermal daily  losartan 50 milliGRAM(s) Oral daily  melatonin 3 milliGRAM(s) Oral at bedtime PRN  multivitamin/minerals 1 Tablet(s) Oral daily  nystatin Powder 1 Application(s) Topical two times a day  ondansetron Injectable 4 milliGRAM(s) IV Push every 8 hours PRN  pantoprazole  Injectable 40 milliGRAM(s) IV Push daily              Vital Signs Last 24 Hrs  T(C): 36.8 (30 Dec 2021 05:13), Max: 37.2 (29 Dec 2021 21:50)  T(F): 98.3 (30 Dec 2021 05:13), Max: 99 (29 Dec 2021 21:50)  HR: 77 (30 Dec 2021 05:13) (77 - 89)  BP: 146/75 (30 Dec 2021 05:13) (119/80 - 148/84)  BP(mean): --  RR: 19 (30 Dec 2021 05:13) (16 - 19)  SpO2: 94% (30 Dec 2021 05:13) (94% - 95%)    REVIEW OF SYSTEMS:     Constitutional: No fever, chills, fatigue, weakness  Eyes: no eye pain, visual disturbances, or discharge  ENT:  No difficulty hearing, tinnitus, vertigo; No sinus or throat pain  Neck: No pain or stiffness  Respiratory: No cough, dyspnea, wheezing   Cardiovascular: No chest pain, palpitations,   Gastrointestinal: No abdominal or epigastric pain. No nausea, vomiting  No diarrhea or constipation.   Genitourinary: No dysuria, frequency, hematuria or incontinence  Neurological: No headaches, lightheadedness, vertigo, numbness or tremors  Psychiatric: No depression, anxiety, mood swings or difficulty sleeping  Musculoskeletal: No joint pain or swelling; No muscle, back or  left shoulder extremity pain  Skin: No itching, burning, rashes or lesions   Lymph Nodes: No enlarged glands  Endocrine: No heat or cold intolerance; No hair loss   Allergy and Immunologic: No hives or eczema    On Neurological Examination:      The patient is awake and alert.    Extraocular movements were intact.    Pupils were equal, round, and reactive bilaterally, 3 mm to 2 mm.    The patient appeared to have poor vision out of both left and right eyes, had difficulty naming number of fingers.    The patient had subtle decrease of the left nasolabial fold.    Motor -Right upper is 4+/5, left upper distal  was 4/5.  prox decrease ROM complaints of pain  Bilateral lower extremities were 4-/5, would say age-appropriate.   Sensory:  Bilateral upper and lower intact to light touch.    Follow simple commands    GENERAL Exam: Nontoxic , No Acute Distress   	  HEENT:  normocephalic, atraumatic  		  LUNGS: Clear bilaterally    	  HEART: Normal S1S2   No murmur RRR        	  GI/ ABDOMEN:  Soft  Non tender    EXTREMITIES:   No Edema  No Clubbing  No Cyanosis     MUSCULOSKELETAL: decreased Range of Motion all 4 extremities   	   SKIN: Normal  No Ecchymosis               LABS:  CBC Full  -  ( 29 Dec 2021 07:35 )  WBC Count : 4.93 K/uL  RBC Count : 3.01 M/uL  Hemoglobin : 9.1 g/dL  Hematocrit : 27.3 %  Platelet Count - Automated : 200 K/uL  Mean Cell Volume : 90.7 fl  Mean Cell Hemoglobin : 30.2 pg  Mean Cell Hemoglobin Concentration : 33.3 gm/dL  Auto Neutrophil # : 2.84 K/uL  Auto Lymphocyte # : 1.23 K/uL  Auto Monocyte # : 0.50 K/uL  Auto Eosinophil # : 0.13 K/uL  Auto Basophil # : 0.04 K/uL  Auto Neutrophil % : 57.7 %  Auto Lymphocyte % : 24.9 %  Auto Monocyte % : 10.1 %  Auto Eosinophil % : 2.6 %  Auto Basophil % : 0.8 %      12-29    144  |  108  |  17  ----------------------------<  110<H>  3.8   |  30  |  0.75    Ca    8.1<L>      29 Dec 2021 07:35    TPro  4.9<L>  /  Alb  2.0<L>  /  TBili  0.2  /  DBili  <0.1  /  AST  64<H>  /  ALT  62  /  AlkPhos  52  12-29    Hemoglobin A1C:     LIVER FUNCTIONS - ( 29 Dec 2021 07:35 )  Alb: 2.0 g/dL / Pro: 4.9 g/dL / ALK PHOS: 52 U/L / ALT: 62 U/L / AST: 64 U/L / GGT: x           Vitamin B12         RADIOLOGY      ANALYSIS AND PLAN:  This is an 89-year-old with an episode of being found on the ground and history of intercerebral hemorrhage and seizures.  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  For history of seizures,  carbamazepine  Left shoulder pain, likely 2/2 rotator cuff issues Lidoderm patch   AVOID FULL DOSE OF AC DUE TO HX OF ICH---AC CAN BE USED AS DVT PROPHYLAXIS      Physical therapy evaluation as tolerated  OOB to chair/ambulation with assistance only if possible.    Greater than 17 minutes spent in direct patient care reviewing  the notes, lab data/ imaging , discussion with multidisciplinary team.

## 2021-12-30 NOTE — PROGRESS NOTE ADULT - SUBJECTIVE AND OBJECTIVE BOX
[facility] -- Bhavik Son Grossman, Wachsman, Ankit Snyder Savella, Goodger: Office # [phone]    Follow Up:  COVID PNA, HTN hx of CVA    Subjective/Observations: Patient seen and examined, awake, alert, resting comfortably in bed, denies chest pain, dyspnea, palpitations or dizziness, orthopnea and PND. Tolerating room air.    REVIEW OF SYSTEMS: All review of systems is negative for eye, ENT, GI, , allergic, dermatologic, musculoskeletal and neurologic except as described above    PAST MEDICAL & SURGICAL HISTORY:  Diabetes mellitus    Hypercholesteremia    HTN (hypertension)    CVA (cerebral infarction)    Hemorrhagic stroke    Seizure    History of arthroscopy of knee  Right    S/P cataract surgery  bilateral    History of hysterectomy    Basal cell cancer  s/p Mohs surgery    History of retinal tear  surgical repair        MEDICATIONS  (STANDING):  acetaminophen     Tablet .. 650 milliGRAM(s) Oral every 8 hours  amLODIPine   Tablet 5 milliGRAM(s) Oral daily  carBAMazepine ER Capsule 400 milliGRAM(s) Oral at bedtime  carBAMazepine ER Tablet 100 milliGRAM(s) Oral <User Schedule>  carvedilol 12.5 milliGRAM(s) Oral every 12 hours  dexAMETHasone     Tablet 6 milliGRAM(s) Oral daily  dextrose 40% Gel 15 Gram(s) Oral once  dextrose 5%. 1000 milliLiter(s) (50 mL/Hr) IV Continuous <Continuous>  dextrose 5%. 1000 milliLiter(s) (100 mL/Hr) IV Continuous <Continuous>  dextrose 50% Injectable 25 Gram(s) IV Push once  dextrose 50% Injectable 12.5 Gram(s) IV Push once  dextrose 50% Injectable 25 Gram(s) IV Push once  enoxaparin Injectable 40 milliGRAM(s) SubCutaneous daily  glucagon  Injectable 1 milliGRAM(s) IntraMuscular once  insulin lispro (ADMELOG) corrective regimen sliding scale   SubCutaneous three times a day before meals  insulin lispro (ADMELOG) corrective regimen sliding scale   SubCutaneous at bedtime  lidocaine   4% Patch 1 Patch Transdermal daily  losartan 50 milliGRAM(s) Oral daily  multivitamin/minerals 1 Tablet(s) Oral daily  nystatin Powder 1 Application(s) Topical two times a day  pantoprazole  Injectable 40 milliGRAM(s) IV Push daily    MEDICATIONS  (PRN):  aluminum hydroxide/magnesium hydroxide/simethicone Suspension 30 milliLiter(s) Oral every 4 hours PRN Dyspepsia  benzocaine 15 mG/menthol 3.6 mG (Sugar-Free) Lozenge 1 Lozenge Oral every 6 hours PRN Sore Throat  guaiFENesin Oral Liquid (Sugar-Free) 100 milliGRAM(s) Oral every 6 hours PRN Cough  melatonin 3 milliGRAM(s) Oral at bedtime PRN Insomnia  ondansetron Injectable 4 milliGRAM(s) IV Push every 8 hours PRN Nausea and/or Vomiting    Allergies    aspirin (Unknown)  sulfa drugs (Unknown)    Intolerances    Lipitor (Other; Muscle Pain)    Vital Signs Last 24 Hrs  T(C): 36.8 (30 Dec 2021 05:13), Max: 37.2 (29 Dec 2021 21:50)  T(F): 98.3 (30 Dec 2021 05:13), Max: 99 (29 Dec 2021 21:50)  HR: 77 (30 Dec 2021 05:13) (77 - 89)  BP: 146/75 (30 Dec 2021 05:13) (119/80 - 148/84)  BP(mean): --  RR: 19 (30 Dec 2021 05:13) (16 - 19)  SpO2: 94% (30 Dec 2021 05:13) (94% - 95%)  I&O's Summary    29 Dec 2021 07:01  -  30 Dec 2021 07:00  --------------------------------------------------------  IN: 0 mL / OUT: 400 mL / NET: -400 mL          TELE: Not on telemetry   PHYSICAL EXAM:  Appearance: NAD, no distress, alert,  HEENT: Moist Mucous Membranes, Anicteric  Cardiovascular: Regular rate and rhythm, Normal S1 S2, No JVD, No murmurs, No rubs, gallops or clicks  Respiratory: Non-labored, diminished but clear on auscultation, No rales, No rhonchi, No wheezing.   Gastrointestinal:  Soft, Non-tender, + BS  Neurologic: Non-focal  Skin: Warm and dry, No visible rashes or ulcers, No ecchymosis, No cyanosis  Musculoskeletal: No clubbing, No cyanosis, No joint swelling/tenderness  Psychiatry: Mood & affect appropriate  Lymph: No peripheral edema.     LABS: All Labs Reviewed:                        9.1    4.93  )-----------( 200      ( 29 Dec 2021 07:35 )             27.3                         9.8    6.06  )-----------( 195      ( 28 Dec 2021 07:43 )             29.7     29 Dec 2021 07:35    144    |  108    |  17     ----------------------------<  110    3.8     |  30     |  0.75   28 Dec 2021 07:43    145    |  108    |  17     ----------------------------<  112    3.8     |  31     |  0.72     Ca    8.1        29 Dec 2021 07:35  Ca    8.2        28 Dec 2021 07:43    TPro  4.9    /  Alb  2.0    /  TBili  0.2    /  DBili  <0.1   /  AST  64     /  ALT  62     /  AlkPhos  52     29 Dec 2021 07:35  TPro  5.4    /  Alb  2.1    /  TBili  0.3    /  DBili  <0.1   /  AST  34     /  ALT  35     /  AlkPhos  52     28 Dec 2021 07:43      Troponin I, High Sensitivity Result: 2157.8 ng/L (12-22-21 @ 09:53)  Troponin I, High Sensitivity Result: 2910.6 ng/L (12-22-21 @ 04:56)  Creatine Kinase, Serum: 205 U/L (12-22-21 @ 04:56)  Troponin I, High Sensitivity Result: 457.9 ng/L (12-21-21 @ 17:50)      D-Dimer Assay, Quantitative: 1593 ng/mL DDU (12-29-21 @ 09:40)        Cholesterol, Serum: 220 mg/dL (12-22-21 @ 09:32)  HDL Cholesterol, Serum: 76 mg/dL (12-22-21 @ 09:32)  Triglycerides, Serum: 74 mg/dL (12-22-21 @ 09:32)      12 Lead ECG:   Ventricular Rate 80 BPM    Atrial Rate 80 BPM    P-R Interval 138 ms    QRS Duration 90 ms    Q-T Interval 400 ms    QTC Calculation(Bazett) 461 ms    P Axis 60 degrees    R Axis -1 degrees    T Axis 20 degrees    Diagnosis Line Normal sinus rhythm  Inferior infarct , age undetermined  Possible Anterior infarct , age undetermined  Abnormal ECG  When compared with ECG of 21-DEC-2021 17:53, (Unconfirmed)  No significant change was found  Confirmed by Mihai Coles MD ([id number]) on 12/22/2021 11:21:17AM (12-22-21 @ 05:48)    < from: Xray Chest 1 View- PORTABLE-Routine (12.23.21 @ 18:56) >    ACC: [id number] EXAM:  XR SHOULDER TRNS SCAPULA 1V LT                        ACC: [id number] EXAM:  XR CHEST PORTABLE ROUTINE 1V                          PROCEDURE DATE:  12/23/2021          INTERPRETATION:  Chest and left shoulder. Patient had a fall.Patient had   a seizure and also has bilateral rib pain and possible Covid.    Heart normal for projection.    The lung fields and pleural surfaces are unremarkable.    No visible fracture.    Chest is similar to December 21.    Left shoulder. Singleview shows normal position and no fracture.    IMPRESSION: No acute finding.    --- End of Report ---            HANNAH MARISCAL MD; Attending Radiologist  This document has been electronically signed. Dec 24 2021 11:09AM    < end of copied text >  < from: TTE Echo Complete w/o Contrast w/ Doppler (12.23.21 @ 20:16) >     EXAM:  ECHO TTE WO CON COMP W DOPP         PROCEDURE DATE:  12/23/2021        INTERPRETATION:  INDICATION: Abnormal EKG    Blood Pressure 152/84    Height 165     Weight 68       BSA 1.75    Dimensions:  LA 2.3       Normal Values: 2.0 - 4.0 cm  Ao 3.3        Normal Values: 2.0 - 3.8 cm  SEPTUM 1.1       Normal Values: 0.6 - 1.2 cm  PWT 1.1       Normal Values: 0.6 - 1.1 cm  LVIDd 3.8         Normal Values: 3.0 - 5.6 cm  LVIDs 2.2         Normal Values: 1.8 - 4.0 cm    Derived Variables:  LVMIg/m2  RWT  Fractional Short  Ejection Fraction    Doppler Peak v. AoV= (m/sec)    OBSERVATIONS:    Mitral Valve: normal, trace physiologic MR.  Aortic Valve/Aorta: Calcified trileaflet aortic valve, without stenosis.   Mild AI.  Tricuspid Valve: normal with trace TR.  Pulmonic Valve: normal  Left Atrium: normal  Right Atrium: normal  Left Ventricle: normal LV size was likely with preserved systolic   function, estimated LVEF of 65%. The distal anterior wall and apex are   not well seen in all views, and appear hypokinetic in some views.  Right Ventricle: normal size and systolic function.  Pericardium/Pleura: no significant pleural effusion, no significant   pericardial effusion.  Pulmonary/RV Pressure: estimated PA systolic pressure of 34mmHg assuming   an RA pressure of 10 mmHg.  LV Diastolic Function: Mild diastolic dysfunction.    Impression: Normal left ventricular size and systolic function, estimated   LVEF of 65%. The distal anterior wall and apex are not well seen in all   views,and appear hypokinetic in some views.Normal right ventricular size   and systolic function. Mild diastolic dysfunction.  Normal biatrial size.   The aortic root is normal in size. The mitral valve is structurally   normal, trace physiologic MR. The aortic valve is trileaflet without   stenosis. Mild AI. Trace physiologic TR. Estimated PA systolic pressure   is 34 mm Hg. No significant pericardial effusion.    --- End of Report ---              NATAN DELGADO MD; Attending Cardiologist  This document has been electronically signed. Dec 24 2021  8:15AM    < end of copied text >

## 2021-12-30 NOTE — PROGRESS NOTE ADULT - ASSESSMENT
89 year old female with PMH hemorrhagic CVA (2015), Type 2 DM (not on insulin), HTN, HLD, seizure disorder, hearing impairment presents to ED s/p unwitnessed episode resulting in fall onto L side and L sided weakness. Patient incidentally found to be COVID+ with no sick contacts or URI symptoms prior to admission. Cardiology consulted for elevated troponin.     Cardiac summary  TTE: (3/3/2019): normal LVSF, calcified trileaflet AV with normal systolic opening    - High sensitivity Trop I: elevated, peaked and trended down.  Not consistent with true plaque rupture  - Her chest pain was more pleuritic than cardiac, seems to improved, remains with no complaints of anginal symptoms on exam  - EKG shows nonspecific T-wave changes, non-diagnostic.    - Unable to start ASA in the setting of allergy.      - TTE showed normal LV/RVF with SWMA, and mild DD, no significant valvular disease  - No meaningful evidence of volume overload    - BP improved 110-140's  - Continue ARB  - continue BB  - continue CCB     - Will need ischemic eval as outpatient if in line with her wishes.    - Remains stable from cardiac standpoint    - Monitor and replete Lytes. Keep K > 4 and Mg > 2  - Will continue to follow.      Italia Tony, [facility]  Nurse Practitioner - Cardiology   Spectra #[id number]/ [phone]

## 2021-12-30 NOTE — PROGRESS NOTE ADULT - PROBLEM SELECTOR PROBLEM 3
Non-ST elevation MI (NSTEMI)
2019 novel coronavirus disease (COVID-19)
2019 novel coronavirus disease (COVID-19)
Non-ST elevation MI (NSTEMI)
2019 novel coronavirus disease (COVID-19)
Non-ST elevation MI (NSTEMI)

## 2021-12-30 NOTE — PROGRESS NOTE ADULT - ASSESSMENT
Patient is a 89 yo female with PMH of hemorrhagic CVA (2015), Type 2 DM (not on insulin), HTN, HLD, seizures, hard of hearing presents to ED with left sided weakness, admitted for left-sided weakness, troponin elevation and acute hypoxic respiratory failure due to COVID-19 PNA. Patient is a 89 yo female with PMH of hemorrhagic CVA (2015), Type 2 DM (not on insulin), HTN, HLD, seizures, hard of hearing presents to ED with left sided upper extremity chronic  weakness and pain, found to have  troponin elevation and acute hypoxic respiratory failure due to COVID-19 PNA.

## 2021-12-30 NOTE — PROGRESS NOTE ADULT - PROBLEM SELECTOR PROBLEM 8
Need for prophylactic measure
Seizures
Seizures
Need for prophylactic measure
Need for prophylactic measure
Seizures
Need for prophylactic measure
Need for prophylactic measure
Seizures

## 2021-12-30 NOTE — PROGRESS NOTE ADULT - PROBLEM SELECTOR PROBLEM 2
Left shoulder pain
Seizures
Left shoulder pain
Seizures
Non-ST elevation MI (NSTEMI)
Left shoulder pain
Non-ST elevation MI (NSTEMI)
Left shoulder pain
Non-ST elevation MI (NSTEMI)

## 2021-12-30 NOTE — PROGRESS NOTE ADULT - PROBLEM SELECTOR PROBLEM 4
DM (diabetes mellitus)
2019 novel coronavirus disease (COVID-19)
DM (diabetes mellitus)
2019 novel coronavirus disease (COVID-19)
DM (diabetes mellitus)
2019 novel coronavirus disease (COVID-19)

## 2021-12-30 NOTE — PROGRESS NOTE ADULT - SUBJECTIVE AND OBJECTIVE BOX
Patient is a 89y old  Female who presents with a chief complaint of L sided weakness (29 Dec 2021 12:58)       INTERVAL HPI/OVERNIGHT EVENTS: Patient seen and examined at bedside. Denies new symptoms, complaints. No overnight events     MEDICATIONS  (STANDING):  acetaminophen     Tablet .. 650 milliGRAM(s) Oral every 8 hours  amLODIPine   Tablet 5 milliGRAM(s) Oral daily  carBAMazepine ER Capsule 400 milliGRAM(s) Oral at bedtime  carBAMazepine ER Tablet 100 milliGRAM(s) Oral <User Schedule>  carvedilol 12.5 milliGRAM(s) Oral every 12 hours  dexAMETHasone     Tablet 6 milliGRAM(s) Oral daily  dextrose 40% Gel 15 Gram(s) Oral once  dextrose 5%. 1000 milliLiter(s) (50 mL/Hr) IV Continuous <Continuous>  dextrose 5%. 1000 milliLiter(s) (100 mL/Hr) IV Continuous <Continuous>  dextrose 50% Injectable 25 Gram(s) IV Push once  dextrose 50% Injectable 12.5 Gram(s) IV Push once  dextrose 50% Injectable 25 Gram(s) IV Push once  enoxaparin Injectable 40 milliGRAM(s) SubCutaneous daily  glucagon  Injectable 1 milliGRAM(s) IntraMuscular once  insulin lispro (ADMELOG) corrective regimen sliding scale   SubCutaneous three times a day before meals  insulin lispro (ADMELOG) corrective regimen sliding scale   SubCutaneous at bedtime  lidocaine   4% Patch 1 Patch Transdermal daily  losartan 50 milliGRAM(s) Oral daily  multivitamin/minerals 1 Tablet(s) Oral daily  nystatin Powder 1 Application(s) Topical two times a day  pantoprazole  Injectable 40 milliGRAM(s) IV Push daily    MEDICATIONS  (PRN):  aluminum hydroxide/magnesium hydroxide/simethicone Suspension 30 milliLiter(s) Oral every 4 hours PRN Dyspepsia  benzocaine 15 mG/menthol 3.6 mG (Sugar-Free) Lozenge 1 Lozenge Oral every 6 hours PRN Sore Throat  guaiFENesin Oral Liquid (Sugar-Free) 100 milliGRAM(s) Oral every 6 hours PRN Cough  melatonin 3 milliGRAM(s) Oral at bedtime PRN Insomnia  ondansetron Injectable 4 milliGRAM(s) IV Push every 8 hours PRN Nausea and/or Vomiting      Allergies    aspirin (Unknown)  sulfa drugs (Unknown)    Intolerances    Lipitor (Other; Muscle Pain)      REVIEW OF SYSTEMS:  Constitutional: denies fever or chills  HEENT: denies headache, dizziness, or lightheadedness  Respiratory: denies SOB or cough   Cardiovascular: denies CP or palpitations  Gastrointestinal: denies nausea, vomiting, diarrhea, abdominal pain, or hematochezia  Genitourinary: denies hematuria, dysuria, frequency, urgency  Musculoskeletal: denies muscle aches, joint swelling, or muscle weakness  ALLERGY AND IMMUNOLOGIC: No hives or eczema    Vital Signs Last 24 Hrs  T(C): 36.8 (30 Dec 2021 05:13), Max: 37.2 (29 Dec 2021 21:50)  T(F): 98.3 (30 Dec 2021 05:13), Max: 99 (29 Dec 2021 21:50)  HR: 77 (30 Dec 2021 05:13) (77 - 89)  BP: 146/75 (30 Dec 2021 05:13) (119/80 - 148/84)  BP(mean): --  RR: 19 (30 Dec 2021 05:13) (16 - 19)  SpO2: 94% (30 Dec 2021 05:13) (94% - 95%)    PHYSICAL EXAM:  Gen: well appearing, in no acute distress  HEENT: NCAT, moist mm  Eyes: EOMI, PERRLA   Cardio: regular rate and rhythm, +s1s2, no murmurs, rubs, or gallops  Pulm: CTA b/l, no wheezes, rales or rhonchi  Abdomen: soft, nontender, nondistended, +BS x4 quadrants, no guarding  Extremities: no edema   Neuro: Alert and oriented, spontaneous movement of all 4 extremities  Skin: warm and dry        LABS:      Ca    8.1        29 Dec 2021 07:35        CAPILLARY BLOOD GLUCOSE      POCT Blood Glucose.: 135 mg/dL (30 Dec 2021 07:48)  POCT Blood Glucose.: 127 mg/dL (29 Dec 2021 21:25)  POCT Blood Glucose.: 124 mg/dL (29 Dec 2021 16:56)  POCT Blood Glucose.: 258 mg/dL (29 Dec 2021 12:11)    BLOOD CULTURE    RADIOLOGY & ADDITIONAL TESTS:    Imaging Personally Reviewed:  [ ] YES     Consultant(s) Notes Reviewed:      Care Discussed with Consultants/Other Providers:

## 2021-12-31 ENCOUNTER — TRANSCRIPTION ENCOUNTER (OUTPATIENT)
Age: 86
End: 2021-12-31

## 2021-12-31 VITALS
DIASTOLIC BLOOD PRESSURE: 82 MMHG | OXYGEN SATURATION: 97 % | HEART RATE: 82 BPM | TEMPERATURE: 98 F | SYSTOLIC BLOOD PRESSURE: 144 MMHG | RESPIRATION RATE: 14 BRPM

## 2021-12-31 LAB
ANION GAP SERPL CALC-SCNC: 5 MMOL/L — SIGNIFICANT CHANGE UP (ref 5–17)
BUN SERPL-MCNC: 23 MG/DL — SIGNIFICANT CHANGE UP (ref 7–23)
CALCIUM SERPL-MCNC: 7.9 MG/DL — LOW (ref 8.5–10.1)
CHLORIDE SERPL-SCNC: 107 MMOL/L — SIGNIFICANT CHANGE UP (ref 96–108)
CO2 SERPL-SCNC: 29 MMOL/L — SIGNIFICANT CHANGE UP (ref 22–31)
CREAT SERPL-MCNC: 0.9 MG/DL — SIGNIFICANT CHANGE UP (ref 0.5–1.3)
GLUCOSE SERPL-MCNC: 241 MG/DL — HIGH (ref 70–99)
HCT VFR BLD CALC: 29.7 % — LOW (ref 34.5–45)
HGB BLD-MCNC: 9.8 G/DL — LOW (ref 11.5–15.5)
MCHC RBC-ENTMCNC: 30.1 PG — SIGNIFICANT CHANGE UP (ref 27–34)
MCHC RBC-ENTMCNC: 33 GM/DL — SIGNIFICANT CHANGE UP (ref 32–36)
MCV RBC AUTO: 91.1 FL — SIGNIFICANT CHANGE UP (ref 80–100)
NRBC # BLD: 0 /100 WBCS — SIGNIFICANT CHANGE UP (ref 0–0)
PLATELET # BLD AUTO: 258 K/UL — SIGNIFICANT CHANGE UP (ref 150–400)
POTASSIUM SERPL-MCNC: 4.2 MMOL/L — SIGNIFICANT CHANGE UP (ref 3.5–5.3)
POTASSIUM SERPL-SCNC: 4.2 MMOL/L — SIGNIFICANT CHANGE UP (ref 3.5–5.3)
RBC # BLD: 3.26 M/UL — LOW (ref 3.8–5.2)
RBC # FLD: 14.1 % — SIGNIFICANT CHANGE UP (ref 10.3–14.5)
SODIUM SERPL-SCNC: 141 MMOL/L — SIGNIFICANT CHANGE UP (ref 135–145)
WBC # BLD: 9.42 K/UL — SIGNIFICANT CHANGE UP (ref 3.8–10.5)
WBC # FLD AUTO: 9.42 K/UL — SIGNIFICANT CHANGE UP (ref 3.8–10.5)

## 2021-12-31 PROCEDURE — 93970 EXTREMITY STUDY: CPT

## 2021-12-31 PROCEDURE — 70450 CT HEAD/BRAIN W/O DYE: CPT | Mod: MA

## 2021-12-31 PROCEDURE — 82553 CREATINE MB FRACTION: CPT

## 2021-12-31 PROCEDURE — 0042T: CPT

## 2021-12-31 PROCEDURE — 82550 ASSAY OF CK (CPK): CPT

## 2021-12-31 PROCEDURE — 73020 X-RAY EXAM OF SHOULDER: CPT

## 2021-12-31 PROCEDURE — 99239 HOSP IP/OBS DSCHRG MGMT >30: CPT

## 2021-12-31 PROCEDURE — 85025 COMPLETE CBC W/AUTO DIFF WBC: CPT

## 2021-12-31 PROCEDURE — 97162 PT EVAL MOD COMPLEX 30 MIN: CPT

## 2021-12-31 PROCEDURE — 92610 EVALUATE SWALLOWING FUNCTION: CPT

## 2021-12-31 PROCEDURE — 85730 THROMBOPLASTIN TIME PARTIAL: CPT

## 2021-12-31 PROCEDURE — 36415 COLL VENOUS BLD VENIPUNCTURE: CPT

## 2021-12-31 PROCEDURE — 99232 SBSQ HOSP IP/OBS MODERATE 35: CPT

## 2021-12-31 PROCEDURE — 80061 LIPID PANEL: CPT

## 2021-12-31 PROCEDURE — 87635 SARS-COV-2 COVID-19 AMP PRB: CPT

## 2021-12-31 PROCEDURE — 71045 X-RAY EXAM CHEST 1 VIEW: CPT

## 2021-12-31 PROCEDURE — 97116 GAIT TRAINING THERAPY: CPT

## 2021-12-31 PROCEDURE — 82728 ASSAY OF FERRITIN: CPT

## 2021-12-31 PROCEDURE — 82962 GLUCOSE BLOOD TEST: CPT

## 2021-12-31 PROCEDURE — U0003: CPT

## 2021-12-31 PROCEDURE — 85379 FIBRIN DEGRADATION QUANT: CPT

## 2021-12-31 PROCEDURE — 82746 ASSAY OF FOLIC ACID SERUM: CPT

## 2021-12-31 PROCEDURE — 70551 MRI BRAIN STEM W/O DYE: CPT

## 2021-12-31 PROCEDURE — 97530 THERAPEUTIC ACTIVITIES: CPT

## 2021-12-31 PROCEDURE — 86140 C-REACTIVE PROTEIN: CPT

## 2021-12-31 PROCEDURE — 84145 PROCALCITONIN (PCT): CPT

## 2021-12-31 PROCEDURE — 87040 BLOOD CULTURE FOR BACTERIA: CPT

## 2021-12-31 PROCEDURE — 85610 PROTHROMBIN TIME: CPT

## 2021-12-31 PROCEDURE — 83036 HEMOGLOBIN GLYCOSYLATED A1C: CPT

## 2021-12-31 PROCEDURE — 70496 CT ANGIOGRAPHY HEAD: CPT | Mod: MA

## 2021-12-31 PROCEDURE — 81001 URINALYSIS AUTO W/SCOPE: CPT

## 2021-12-31 PROCEDURE — 72125 CT NECK SPINE W/O DYE: CPT | Mod: MA

## 2021-12-31 PROCEDURE — 80053 COMPREHEN METABOLIC PANEL: CPT

## 2021-12-31 PROCEDURE — 84100 ASSAY OF PHOSPHORUS: CPT

## 2021-12-31 PROCEDURE — 80076 HEPATIC FUNCTION PANEL: CPT

## 2021-12-31 PROCEDURE — 83735 ASSAY OF MAGNESIUM: CPT

## 2021-12-31 PROCEDURE — 70498 CT ANGIOGRAPHY NECK: CPT | Mod: MA

## 2021-12-31 PROCEDURE — 80048 BASIC METABOLIC PNL TOTAL CA: CPT

## 2021-12-31 PROCEDURE — 93005 ELECTROCARDIOGRAM TRACING: CPT

## 2021-12-31 PROCEDURE — 82607 VITAMIN B-12: CPT

## 2021-12-31 PROCEDURE — 99285 EMERGENCY DEPT VISIT HI MDM: CPT

## 2021-12-31 PROCEDURE — U0005: CPT

## 2021-12-31 PROCEDURE — 82248 BILIRUBIN DIRECT: CPT

## 2021-12-31 PROCEDURE — 84484 ASSAY OF TROPONIN QUANT: CPT

## 2021-12-31 PROCEDURE — 92526 ORAL FUNCTION THERAPY: CPT

## 2021-12-31 PROCEDURE — 93306 TTE W/DOPPLER COMPLETE: CPT

## 2021-12-31 PROCEDURE — 80156 ASSAY CARBAMAZEPINE TOTAL: CPT

## 2021-12-31 PROCEDURE — 87637 SARSCOV2&INF A&B&RSV AMP PRB: CPT

## 2021-12-31 PROCEDURE — 85027 COMPLETE CBC AUTOMATED: CPT

## 2021-12-31 RX ADMIN — Medication 650 MILLIGRAM(S): at 05:20

## 2021-12-31 RX ADMIN — AMLODIPINE BESYLATE 5 MILLIGRAM(S): 2.5 TABLET ORAL at 05:20

## 2021-12-31 RX ADMIN — CARVEDILOL PHOSPHATE 12.5 MILLIGRAM(S): 80 CAPSULE, EXTENDED RELEASE ORAL at 05:20

## 2021-12-31 RX ADMIN — NYSTATIN CREAM 1 APPLICATION(S): 100000 CREAM TOPICAL at 05:20

## 2021-12-31 RX ADMIN — Medication 1 TABLET(S): at 12:23

## 2021-12-31 RX ADMIN — LOSARTAN POTASSIUM 50 MILLIGRAM(S): 100 TABLET, FILM COATED ORAL at 05:20

## 2021-12-31 RX ADMIN — Medication 2: at 12:23

## 2021-12-31 RX ADMIN — Medication 6 MILLIGRAM(S): at 05:20

## 2021-12-31 RX ADMIN — Medication 100 MILLIGRAM(S): at 05:20

## 2021-12-31 RX ADMIN — ENOXAPARIN SODIUM 40 MILLIGRAM(S): 100 INJECTION SUBCUTANEOUS at 12:23

## 2021-12-31 RX ADMIN — Medication 100 MILLIGRAM(S): at 12:23

## 2021-12-31 NOTE — DISCHARGE NOTE NURSING/CASE MANAGEMENT/SOCIAL WORK - NSDCPEFALRISK_GEN_ALL_CORE
For information on Fall & Injury Prevention, visit: [email or web address] OR  [email or web address] OR  https://www.cdc.gov/steadi/patient.html

## 2021-12-31 NOTE — DISCHARGE NOTE NURSING/CASE MANAGEMENT/SOCIAL WORK - PATIENT PORTAL LINK FT
You can access the FollowMyHealth Patient Portal offered by [facility] by registering at the following website: [email or web address]. By joining [email or web address]’s FollowMyHealth portal, you will also be able to view your health information using other applications (apps) compatible with our system.

## 2021-12-31 NOTE — PROGRESS NOTE ADULT - SUBJECTIVE AND OBJECTIVE BOX
Neurology follow up note    MAJOR [id number]      Interval History:      Patient feels left shoulder pain     Allergies    aspirin (Unknown)  sulfa drugs (Unknown)    Intolerances    Lipitor (Other; Muscle Pain)      MEDICATIONS    acetaminophen     Tablet .. 650 milliGRAM(s) Oral every 8 hours  aluminum hydroxide/magnesium hydroxide/simethicone Suspension 30 milliLiter(s) Oral every 4 hours PRN  amLODIPine   Tablet 5 milliGRAM(s) Oral daily  benzocaine 15 mG/menthol 3.6 mG (Sugar-Free) Lozenge 1 Lozenge Oral every 6 hours PRN  carBAMazepine ER Capsule 400 milliGRAM(s) Oral at bedtime  carBAMazepine ER Tablet 100 milliGRAM(s) Oral <User Schedule>  carvedilol 12.5 milliGRAM(s) Oral every 12 hours  dextrose 40% Gel 15 Gram(s) Oral once  dextrose 5%. 1000 milliLiter(s) IV Continuous <Continuous>  dextrose 5%. 1000 milliLiter(s) IV Continuous <Continuous>  dextrose 50% Injectable 25 Gram(s) IV Push once  dextrose 50% Injectable 12.5 Gram(s) IV Push once  dextrose 50% Injectable 25 Gram(s) IV Push once  enoxaparin Injectable 40 milliGRAM(s) SubCutaneous daily  glucagon  Injectable 1 milliGRAM(s) IntraMuscular once  guaiFENesin Oral Liquid (Sugar-Free) 100 milliGRAM(s) Oral every 6 hours PRN  insulin lispro (ADMELOG) corrective regimen sliding scale   SubCutaneous three times a day before meals  insulin lispro (ADMELOG) corrective regimen sliding scale   SubCutaneous at bedtime  lidocaine   4% Patch 1 Patch Transdermal daily  losartan 50 milliGRAM(s) Oral daily  melatonin 3 milliGRAM(s) Oral at bedtime PRN  multivitamin/minerals 1 Tablet(s) Oral daily  nystatin Powder 1 Application(s) Topical two times a day  ondansetron Injectable 4 milliGRAM(s) IV Push every 8 hours PRN  pantoprazole  Injectable 40 milliGRAM(s) IV Push daily              Vital Signs Last 24 Hrs  T(C): 36.5 (31 Dec 2021 05:04), Max: 36.9 (30 Dec 2021 14:30)  T(F): 97.7 (31 Dec 2021 05:04), Max: 98.4 (30 Dec 2021 14:30)  HR: 72 (31 Dec 2021 05:04) (72 - 80)  BP: 157/87 (31 Dec 2021 05:04) (134/72 - 157/87)  BP(mean): --  RR: 18 (31 Dec 2021 05:04) (18 - 18)  SpO2: 92% (31 Dec 2021 05:04) (92% - 94%)      REVIEW OF SYSTEMS:     Constitutional: No fever, chills, fatigue, weakness  Eyes: no eye pain, visual disturbances, or discharge  ENT:  No difficulty hearing, tinnitus, vertigo; No sinus or throat pain  Neck: No pain or stiffness  Respiratory: No cough, dyspnea, wheezing   Cardiovascular: No chest pain, palpitations,   Gastrointestinal: No abdominal or epigastric pain. No nausea, vomiting  No diarrhea or constipation.   Genitourinary: No dysuria, frequency, hematuria or incontinence  Neurological: No headaches, lightheadedness, vertigo, numbness or tremors  Psychiatric: No depression, anxiety, mood swings or difficulty sleeping  Musculoskeletal: No joint pain or swelling; No muscle, back or  left shoulder extremity pain  Skin: No itching, burning, rashes or lesions   Lymph Nodes: No enlarged glands  Endocrine: No heat or cold intolerance; No hair loss   Allergy and Immunologic: No hives or eczema    On Neurological Examination:      The patient is awake and alert.    Extraocular movements were intact.    Pupils were equal, round, and reactive bilaterally, 3 mm to 2 mm.    The patient appeared to have poor vision out of both left and right eyes, had difficulty naming number of fingers.    The patient had subtle decrease of the left nasolabial fold.    Motor -Right upper is 4+/5, left upper distal  was 4/5.  prox decrease ROM complaints of pain  Bilateral lower extremities were 4-/5, would say age-appropriate.   Sensory:  Bilateral upper and lower intact to light touch.    Follow simple commands    GENERAL Exam: Nontoxic , No Acute Distress   	  HEENT:  normocephalic, atraumatic  		  LUNGS: Clear bilaterally    	  HEART: Normal S1S2   No murmur RRR        	  GI/ ABDOMEN:  Soft  Non tender    EXTREMITIES:   No Edema  No Clubbing  No Cyanosis     MUSCULOSKELETAL: decreased Range of Motion all 4 extremities   	   SKIN: Normal  No Ecchymosis             LABS:  CBC Full  -  ( 31 Dec 2021 09:30 )  WBC Count : 9.42 K/uL  RBC Count : 3.26 M/uL  Hemoglobin : 9.8 g/dL  Hematocrit : 29.7 %  Platelet Count - Automated : 258 K/uL  Mean Cell Volume : 91.1 fl  Mean Cell Hemoglobin : 30.1 pg  Mean Cell Hemoglobin Concentration : 33.0 gm/dL  Auto Neutrophil # : x  Auto Lymphocyte # : x  Auto Monocyte # : x  Auto Eosinophil # : x  Auto Basophil # : x  Auto Neutrophil % : x  Auto Lymphocyte % : x  Auto Monocyte % : x  Auto Eosinophil % : x  Auto Basophil % : x      12-31    141  |  107  |  23  ----------------------------<  241<H>  4.2   |  29  |  0.90    Ca    7.9<L>      31 Dec 2021 09:30      Hemoglobin A1C:       Vitamin B12         RADIOLOGY      ANALYSIS AND PLAN:  This is an 89-year-old with an episode of being found on the ground and history of intercerebral hemorrhage and seizures.  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  For history of seizures,  carbamazepine  Left shoulder pain, likely 2/2 rotator cuff issues Lidoderm patch   AVOID FULL DOSE OF AC DUE TO HX OF ICH---AC CAN BE USED AS DVT PROPHYLAXIS      Physical therapy evaluation as tolerated  OOB to chair/ambulation with assistance only if possible.    Greater than 14 minutes spent in direct patient care reviewing  the notes, lab data/ imaging , discussion with multidisciplinary team.

## 2021-12-31 NOTE — PROGRESS NOTE ADULT - ASSESSMENT
89 year old female with PMH hemorrhagic CVA (2015), Type 2 DM (not on insulin), HTN, HLD, seizure disorder, hearing impairment presents to ED s/p unwitnessed episode resulting in fall onto L side and L sided weakness. Patient incidentally found to be COVID+ with no sick contacts or URI symptoms prior to admission. Cardiology consulted for elevated troponin.     Cardiac summary  TTE: (3/3/2019): normal LVSF, calcified trileaflet AV with normal systolic opening    - High sensitivity Trop I: elevated, peaked and trended down.  Not consistent with true plaque rupture  - Her chest pain was more pleuritic than cardiac, seems to improved, remains with no complaints of anginal symptoms on exam  - EKG shows nonspecific T-wave changes, non-diagnostic.    - Unable to start ASA in the setting of allergy.      - TTE showed normal LV/RVF with SWMA, and mild DD, no significant valvular disease  - No meaningful evidence of volume overload    - BP improved   - Continue ARB  - continue BB  - continue CCB     - Will need ischemic eval as outpatient if in line with her wishes.    - Remains stable from cardiac standpoint    - Monitor and replete Lytes. Keep K > 4 and Mg > 2  - Will continue to follow.    No cardiac CI to d.c today

## 2021-12-31 NOTE — PROGRESS NOTE ADULT - PROVIDER SPECIALTY LIST ADULT
Cardiology
Infectious Disease
Neurology
Neurology
Cardiology
Infectious Disease
Infectious Disease
Neurology
Cardiology
Neurology
Cardiology
Hospitalist
Infectious Disease
Physiatry
Hospitalist

## 2021-12-31 NOTE — CHART NOTE - NSCHARTNOTEFT_GEN_A_CORE
RN called for elevated temperature of 101.2 F. Pt is currently COVID positive. Pt to receive prn Tylenol for fever.    Vital Signs Last 24 Hrs  T(C): 38.4 (22 Dec 2021 22:26), Max: 38.4 (22 Dec 2021 22:26)  T(F): 101.2 (22 Dec 2021 22:26), Max: 101.2 (22 Dec 2021 22:26)  HR: 94 (22 Dec 2021 22:26) (74 - 94)  BP: 149/84 (22 Dec 2021 22:26) (130/60 - 183/98)  RR: 18 (22 Dec 2021 22:26) (16 - 20)  SpO2: 94% (22 Dec 2021 22:26) (94% - 100%)    Assessment/Plan  Patient is a 89 yo female with PMH hemorrhagic CVA (2015), Type 2 DM (not on insulin), HTN, HLD, seizures, hard of hearing presents to ED with left sided weakness. Admitted for CVA, NSTEMI, COVID, now with fever of 101.2 F.  - Likely due to infectrious/inflammatory process related to COVID-19 PNA, but pt also has elevated procalcitonin of 0.18  - Ordered blood cultures, UA for now  - Continue COVID-19 treatment plan as per primary team, will monitor off abx for now as pt has no leukocytosis or other overt features of bacterial infection  Will continue to monitor, RN to call with any changes.
Received critical value notification from RN that troponin I had significantly uptrended from 457.9 --> 2910.6. Pt had initially refused repeat troponin blood draw at midnight, so second troponin had to be drawn with AM labs at 5 AM. Pt is asymptomatic and has no chest pain or palpitations. STAT EKG ordered, which shows no significant ST or T wave changes in contiguous leads, which is comparable to the findings in initial EKG obtained in the ED. CPK and CKMB were added on to 5 AM labs and are pending. Of note, pt received 1 dose of therapeutic lovenox on admission at 10 PM last night, but did not receive aspirin due to an allergy to the medication.     Vital Signs Last 24 Hrs  T(C): 37.4 (22 Dec 2021 05:40), Max: 37.4 (22 Dec 2021 05:40)  T(F): 99.3 (22 Dec 2021 05:40), Max: 99.3 (22 Dec 2021 05:40)  HR: 79 (22 Dec 2021 05:40) (78 - 106)  BP: 159/92 (22 Dec 2021 05:40) (143/95 - 188/101)  RR: 18 (22 Dec 2021 05:40) (16 - 20)  SpO2: 95% (22 Dec 2021 05:40) (86% - 100%)    Assessment/Plan  Patient is a 89 yo female with PMH hemorrhagic CVA (2015), Type 2 DM (not on insulin), HTN, HLD, seizures, hard of hearing presents to ED with left sided weakness. Admitted for CVA, NSTEMI, COVID, now with uptrending troponins.  - Possibly due to demand ischemia from COVID infection vs NSTEMI, although pt is asymptomatic and EKG with no significant changes or ST/T wave changes in contiguous leads concerning for ACS.  - Pt with uptrending troponins as above; f/u repeat troponins and CKMB/CK  - Will give 1 additional dose of therapeutic lovenox 70 mg at 10 AM this morning; further need for full dose anticoagulation to be assessed by day team  - Cardiology consulted, will see pt in the AM; f/u additional recommendations  RN to call with any changes
The patient was seen and examined on the day of discharge by the attending physician. Pt stable for discharge. Please see discharge note for additional information regarding the hospital course and the day of discharge.

## 2021-12-31 NOTE — PROGRESS NOTE ADULT - SUBJECTIVE AND OBJECTIVE BOX
[facility] - Bhavik Son, Sanket, January, Claudio, Travon Pham  Office Number:  [phone]    Patient resting comfortably in bed in NAD.  Laying flat with no respiratory distress.  No complaints of chest pain, dyspnea, palpitations, PND, or orthopnea.  For d/c home today.    F/U for:  HTN      MEDICATIONS  (STANDING):  acetaminophen     Tablet .. 650 milliGRAM(s) Oral every 8 hours  amLODIPine   Tablet 5 milliGRAM(s) Oral daily  carBAMazepine ER Capsule 400 milliGRAM(s) Oral at bedtime  carBAMazepine ER Tablet 100 milliGRAM(s) Oral <User Schedule>  carvedilol 12.5 milliGRAM(s) Oral every 12 hours  dextrose 40% Gel 15 Gram(s) Oral once  dextrose 5%. 1000 milliLiter(s) (50 mL/Hr) IV Continuous <Continuous>  dextrose 5%. 1000 milliLiter(s) (100 mL/Hr) IV Continuous <Continuous>  dextrose 50% Injectable 25 Gram(s) IV Push once  dextrose 50% Injectable 12.5 Gram(s) IV Push once  dextrose 50% Injectable 25 Gram(s) IV Push once  enoxaparin Injectable 40 milliGRAM(s) SubCutaneous daily  glucagon  Injectable 1 milliGRAM(s) IntraMuscular once  insulin lispro (ADMELOG) corrective regimen sliding scale   SubCutaneous three times a day before meals  insulin lispro (ADMELOG) corrective regimen sliding scale   SubCutaneous at bedtime  lidocaine   4% Patch 1 Patch Transdermal daily  losartan 50 milliGRAM(s) Oral daily  multivitamin/minerals 1 Tablet(s) Oral daily  nystatin Powder 1 Application(s) Topical two times a day  pantoprazole  Injectable 40 milliGRAM(s) IV Push daily    MEDICATIONS  (PRN):  aluminum hydroxide/magnesium hydroxide/simethicone Suspension 30 milliLiter(s) Oral every 4 hours PRN Dyspepsia  benzocaine 15 mG/menthol 3.6 mG (Sugar-Free) Lozenge 1 Lozenge Oral every 6 hours PRN Sore Throat  guaiFENesin Oral Liquid (Sugar-Free) 100 milliGRAM(s) Oral every 6 hours PRN Cough  melatonin 3 milliGRAM(s) Oral at bedtime PRN Insomnia  ondansetron Injectable 4 milliGRAM(s) IV Push every 8 hours PRN Nausea and/or Vomiting      Allergies    aspirin (Unknown)  sulfa drugs (Unknown)    Intolerances    Lipitor (Other; Muscle Pain)      Vital Signs Last 24 Hrs  T(C): 36.5 (31 Dec 2021 05:04), Max: 36.9 (30 Dec 2021 14:30)  T(F): 97.7 (31 Dec 2021 05:04), Max: 98.4 (30 Dec 2021 14:30)  HR: 72 (31 Dec 2021 05:04) (72 - 80)  BP: 157/87 (31 Dec 2021 05:04) (134/72 - 157/87)  BP(mean): --  RR: 18 (31 Dec 2021 05:04) (18 - 18)  SpO2: 92% (31 Dec 2021 05:04) (92% - 94%)    I&O's Summary    30 Dec 2021 07:01  -  31 Dec 2021 07:00  --------------------------------------------------------  IN: 0 mL / OUT: 600 mL / NET: -600 mL        ON EXAM:    Appearance: NAD, no distress, alert,  HEENT: Moist Mucous Membranes, Anicteric  Cardiovascular: Regular rate and rhythm, Normal S1 S2, No JVD, No murmurs, No rubs, gallops or clicks  Respiratory: Non-labored, diminished but clear on auscultation, No rales, No rhonchi, No wheezing.   Gastrointestinal:  Soft, Non-tender, + BS  Neurologic: Non-focal  Skin: Warm and dry, No visible rashes or ulcers, No ecchymosis, No cyanosis  Musculoskeletal: No clubbing, No cyanosis, No joint swelling/tenderness  Psychiatry: Mood & affect appropriate  Lymph: No peripheral edema.       LABS: All Labs Reviewed:                        9.8    9.42  )-----------( 258      ( 31 Dec 2021 09:30 )             29.7                         9.1    4.93  )-----------( 200      ( 29 Dec 2021 07:35 )             27.3     31 Dec 2021 09:30    141    |  107    |  23     ----------------------------<  241    4.2     |  29     |  0.90   29 Dec 2021 07:35    144    |  108    |  17     ----------------------------<  110    3.8     |  30     |  0.75     Ca    7.9        31 Dec 2021 09:30  Ca    8.1        29 Dec 2021 07:35    TPro  4.9    /  Alb  2.0    /  TBili  0.2    /  DBili  <0.1   /  AST  64     /  ALT  62     /  AlkPhos  52     29 Dec 2021 07:35

## 2022-01-03 ENCOUNTER — NON-APPOINTMENT (OUTPATIENT)
Age: 87
End: 2022-01-03

## 2022-01-31 ENCOUNTER — APPOINTMENT (OUTPATIENT)
Dept: NEUROLOGY | Facility: CLINIC | Age: 87
End: 2022-01-31
Payer: MEDICARE

## 2022-01-31 ENCOUNTER — NON-APPOINTMENT (OUTPATIENT)
Age: 87
End: 2022-01-31

## 2022-01-31 PROCEDURE — 99442: CPT | Mod: 95

## 2022-01-31 RX ORDER — CARBAMAZEPINE 100 MG/1
100 CAPSULE, EXTENDED RELEASE ORAL
Qty: 270 | Refills: 1 | Status: DISCONTINUED | COMMUNITY
Start: 2017-12-18 | End: 2022-01-31

## 2022-01-31 NOTE — HISTORY OF PRESENT ILLNESS
[FreeTextEntry1] : PCP: Dr. Pedrito Rosenthal;  [address] [place] [address]  [place] [address];  [phone]\par \par *** 01/31/2022  *** \par  Ms. PEREZ was found down following a seizure in mid December.  Upon arrival to hospital, she was found to be Covid positive.  She was in the hospital approximately 10 days with her major problem being worsening respiration.  However, she recovered and was discharged to rehabilitation where she was 1 month.  She returned home last week on a regimen of carbamazepine 200 mg at 6 AM, 2 PM and 10 PM.  Her son is uncertain whether she is receiving carbamazepine IR or ER. Ms. PEREZ endorses increased tiredness and imbalance.  She is getting up at 6 AM to take her morning dose, but her usual wake up time is 9 AM.  She goes to bed at approximately 10 PM.  She is using a walker.\par \par *** 09/22/2021  ***\par  Ms. PEREZ returns for scheduled follow-up.  She reports no interval seizures.  She complains chronically of poor balance that she attributes to carbamazepine.  However, she ambulates without a cane at home and outside.  Carbamazepine level at last visit was 7.6.  She takes her largest dose–400 mg–at bedtime, and then 100 mg spaced throughout the day.  She denies having worse balance in the morning.\par \par *** 04/30/2021  ***\par Ms. PEREZ returns for f/u to review results of MRI and labs.  MRI of head, MRA head and neck reports and images reviewed by me.  There is evidence of old chronic strokes on MRI but no new acute or subacute strokes. No significant areas of stenosis noted on MRA neck or head.  Ms. PEREZ has not had recurrent seizures.\par labs drawn at [facility] - results not immediately available.\par No new problems. \par \par *** 03/17/2021  ***\par Ms. Perez is doing relatively well.  She has not had a seizure in the interval since her last appointment.  She is taking carbamazepine  in the morning, 100 in the afternoon, and 200 at bedtime.  She has received her first COVID-19 vaccination, and will get the second injection in a couple of weeks.  She complains of some intermittent word finding problems that are new, and had not previously bothered her.  She cannot describe them in detail, but expresses concern that they may represent TIAs or strokes.  In the past, she had an intracerebral hemorrhage.\par \par *** 11/20/2020  ***\par Ms. PEREZ is doing relatively well. She endorses that she is compliant with carbamazepine and that she has had no seizures since last visit.  She is staying home as a precaution against COVID.  She has multiple somatic complaints, however, she remains independent and able to perform her ADLs.\par \par *** 08/21/2020 ***\par Ms. PEREZ had partial seizure on Aug 6.  She felt numbness and swelling in L hand and had twiching in L hand, event ended without generalization. Son reports that hand jerking recurred in hospital. Ms. PEREZ endorses that she had reduced evening dose of carbamazepine to 100mg at bedtime.  \par \par Carbamazepine  / 100 / 100 / 200\par \par *** 01/22/2020  ***\par Ms. MAJOR PEREZ returns for scheduled follow-up appointment. Ms. PEREZ reports that in the interval since her last visit, she is doing well. No interval seizures. Ms. PEREZ endorses that the carbamazepine has her feeling very tired and she gets a late start in the morning, awakening at 9a to take AM dose and going back to sleep, then usually getting up at 11a.  Ms. PEREZ also endorses that she feels unsteady in the morning. \par At lower doses of carbamazepine, Ms. PEREZ has had breakthrough seizures.\par \par *** 10/16/2019  ***\par Ms. MAJOR PEREZ returns for scheduled follow-up appointment. Ms. PEREZ reports that in the interval since her last visit, she is doing well. No seizure since last visit. No new problems. No GI upset or double vision. Now taking carbamazepine  am, 100 noon, 100 4p, 400 bedtime. \par \par *** 04/16/2019  ***\par Patient stated in March 2 she had and episode described as electrical current through chest ,left hand felt funny and her mouth was twitching and tongue was heavy. Her son found her on the bathroom floor with the phone in her hand. She was trying to call someone for help. Unclear how long the episode lasted.\par She was evaluated at [facility] . BP very elevated. CBZ increased to 100/100/200 and she was transferred to [facility] until 3/21 for monitoring of blood pressure and physical therapy strengthening. \par She is otherwise doing well. Her mood is good and she has no further complaints of fatigue.\par \par Current AED regimen:\par /100/200\par \par ***12/18/18***\par patients describes no reported seizures since last visit\par She complains of  mild fatigue and unsteady gait at times\par \par Carbamazepine is taken 9am  100mg , 4pm 100mg and 11pm  300mg\par \par Patient describes aura of tongue feeling heavy in past but none since last office visit\par \par *** 10/22/2018 ***\par Ms. PEREZ was found by her son in am 9/22 on floor of her bedroom. She called out to him. EMT found her to have L weakness, L visual field cut. She was taken to [facility] ED, and seen by neurology there who thought she had seizure and postictal Todds. Ms. PEREZ improved and returned to baseline.  CBZ level was therapeutic, but dose was increased slightly from 100/300 to 100/100/300, and Ms. PEREZ was discharged home. \par \par *** 06/22/2018 ***\par Ms. PEREZ is complaining of feeling very tired, sleepy.  She had one fall when she bent over and lost her balance, required 4 stiches to forehead. She had one episode of L hand tremulousness but could control the hand, no impairment of awareness, no visual distortion.\par \par *** 02/23/2018 *** \par Pt doing relatively well, still feels tired, but functioning OK.  Feels a little unsteady, but has not fallen and is better than before. \par \par *** 12/18/2017 ***\par No interval seizure or symptoms. However, pt c/o problems with balance for more than a month - does not recall if she had similar problems last summer. Otherwise not complaining of side effects. No complaint of fatigue. \par \par *** 10/27/2017 ***\par Ms Perez had an episode on 10/15/17 where her left hand became "heavy". She was evaluated in [facility] ED, and had no recurrent stroke (Prior R parietal hemorrhage). Then on 10/17/17, patient describes that the television picture was distorted.  She went her ophthalmologist who recommended an MRA (presumably for a TIA? - n.b. pt has had extensive vascular imaging in spring 2017). She continues to take carbamazepine 200 q12, and seemed unaware of plan to start lamotrigine, son unaware.  She does complain of increased sleepiness, but does not seem to be major complaint, only endorsed after I inquired. No other problems. Last sodium was normal. \par \par Patient denies missing a dose of carbamazepine, but she does not use a pill box and has a complex (self-imposed) schedule for her medications. \par \par *** 5/12/2017 ***\par Ms. 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 She is a poor historian, and most of the history is taken from the notes. Later that year, she was found to have an active ambulatory EEG with sharp waves noted in the right frontal region. She was treated with levetiracetam, but complained of extreme fatigue interfering with her quality of life.in February 2017, she had an episode of left hand shaking lasting approximately 30 seconds, and resulting in her fall backwards and a head laceration. The event was felt to be a seizure. Her anticonvulsant was changed to carbamazepine, currently 200 mg twice a day, but she continues to complain of fatigue.

## 2022-01-31 NOTE — ASSESSMENT
[FreeTextEntry1] :  Ms. PEREZ had a breakthrough seizure in the setting of Covid infection.  I believe the seizure was a result of lowered seizure threshold as a result of infection.  Nevertheless, she is taking a higher dose of carbamazepine currently, 200 mg 3 times a day.  She seems to be tolerating this dose, but I am concerned that her sleep is disrupted by the need to take a 6 AM dose.  I recommended switching to carbamazepine extended release, and changing the dosing schedule to 9 AM, 4 PM, 10 PM.  \par \par Plan:\par 1.  Carbamazepine ER–200 mg–9 AM, 4 PM, 10 PM\par 2.  Follow-up visit in 1 months\par 3.  Check labs at next visit.\par \par Total phone time: 15 minutes\par Total encounter time: 20 minutes.

## 2022-02-01 ENCOUNTER — NON-APPOINTMENT (OUTPATIENT)
Age: 87
End: 2022-02-01

## 2022-02-07 ENCOUNTER — APPOINTMENT (OUTPATIENT)
Dept: INTERNAL MEDICINE | Facility: CLINIC | Age: 87
End: 2022-02-07
Payer: MEDICARE

## 2022-02-07 VITALS — DIASTOLIC BLOOD PRESSURE: 84 MMHG | SYSTOLIC BLOOD PRESSURE: 138 MMHG

## 2022-02-07 VITALS
WEIGHT: 130 LBS | SYSTOLIC BLOOD PRESSURE: 150 MMHG | OXYGEN SATURATION: 94 % | BODY MASS INDEX: 21.63 KG/M2 | DIASTOLIC BLOOD PRESSURE: 100 MMHG | HEART RATE: 74 BPM

## 2022-02-07 PROCEDURE — 99214 OFFICE O/P EST MOD 30 MIN: CPT

## 2022-02-07 RX ORDER — METFORMIN HYDROCHLORIDE 500 MG/1
500 TABLET, COATED ORAL
Qty: 60 | Refills: 0 | Status: DISCONTINUED | COMMUNITY
Start: 2022-01-27

## 2022-02-07 NOTE — END OF VISIT
Problem: Patient Care Overview  Goal: Plan of Care Review  Outcome: Ongoing (interventions implemented as appropriate)   08/11/19 0100   Coping/Psychosocial   Plan of Care Reviewed With patient   Plan of Care Review   Progress no change   OTHER   Outcome Summary pt vs stable, no new events overnight will continue to monitor.     Goal: Individualization and Mutuality  Outcome: Ongoing (interventions implemented as appropriate)    Goal: Discharge Needs Assessment  Outcome: Ongoing (interventions implemented as appropriate)    Goal: Interprofessional Rounds/Family Conf  Outcome: Ongoing (interventions implemented as appropriate)      Problem: Fall Risk (Adult)  Goal: Identify Related Risk Factors and Signs and Symptoms  Outcome: Ongoing (interventions implemented as appropriate)      Problem: Skin Injury Risk (Adult)  Goal: Skin Health and Integrity  Outcome: Ongoing (interventions implemented as appropriate)      Problem: Pain, Chronic (Adult)  Goal: Acceptable Pain/Comfort Level and Functional Ability  Outcome: Ongoing (interventions implemented as appropriate)      Problem: Oncology Care (Adult)  Goal: Signs and Symptoms of Listed Potential Problems Will be Absent, Minimized or Managed (Oncology Care)  Outcome: Ongoing (interventions implemented as appropriate)         [FreeTextEntry3] : "I, Ayanna Castro, personally scribed the services dictated to me by Dr. Pedrito Rosenthal MD in this documentation on 02/07/2022 " \par \par "I Dr. Pedrito Rosenthal MD, personally performed the services described in this documentation on 02/07/2022 for the patient as scribed by Ayanna Castro in my presence. I have reviewed and verified that all the information is accurate and true."\par

## 2022-02-07 NOTE — HISTORY OF PRESENT ILLNESS
[Family Member] : family member [FreeTextEntry1] : follow up  [de-identified] : MAJOR PEREZ is a 89 year old F who presents today for follow up. Pt was recently hospitalized for seizures and COVID. Pt then went to rehab. Pt has a history of HTN. Pt is here for follow up for HTN, DM and thyroid.

## 2022-02-07 NOTE — PLAN
[FreeTextEntry1] : Continue medications \par Further instructions pending lab results \par follow up Neurologist

## 2022-02-07 NOTE — HEALTH RISK ASSESSMENT
[Former] : Former [No] : In the past 12 months have you used drugs other than those required for medical reasons? No [No falls in past year] : Patient reported no falls in the past year [0] : 2) Feeling down, depressed, or hopeless: Not at all (0) [PHQ-2 Negative - No further assessment needed] : PHQ-2 Negative - No further assessment needed [[id number]] : 0

## 2022-02-09 LAB
25(OH)D3 SERPL-MCNC: 34.1 NG/ML
ALBUMIN SERPL ELPH-MCNC: 3.9 G/DL
ALP BLD-CCNC: 148 U/L
ALT SERPL-CCNC: 8 U/L
ANION GAP SERPL CALC-SCNC: 11 MMOL/L
AST SERPL-CCNC: 14 U/L
BASOPHILS # BLD AUTO: 0.07 K/UL
BASOPHILS NFR BLD AUTO: 1.4 %
BILIRUB SERPL-MCNC: <0.2 MG/DL
BUN SERPL-MCNC: 15 MG/DL
CALCIUM SERPL-MCNC: 9.3 MG/DL
CHLORIDE SERPL-SCNC: 103 MMOL/L
CHOLEST SERPL-MCNC: 233 MG/DL
CK SERPL-CCNC: 42 U/L
CO2 SERPL-SCNC: 27 MMOL/L
CREAT SERPL-MCNC: 0.79 MG/DL
EOSINOPHIL # BLD AUTO: 0.14 K/UL
EOSINOPHIL NFR BLD AUTO: 2.8 %
ESTIMATED AVERAGE GLUCOSE: 126 MG/DL
GLUCOSE SERPL-MCNC: 98 MG/DL
HBA1C MFR BLD HPLC: 6 %
HCT VFR BLD CALC: 38.1 %
HDLC SERPL-MCNC: 72 MG/DL
HGB BLD-MCNC: 12 G/DL
IMM GRANULOCYTES NFR BLD AUTO: 0.8 %
LDLC SERPL CALC-MCNC: 140 MG/DL
LYMPHOCYTES # BLD AUTO: 0.89 K/UL
LYMPHOCYTES NFR BLD AUTO: 17.6 %
MAN DIFF?: NORMAL
MCHC RBC-ENTMCNC: 29.8 PG
MCHC RBC-ENTMCNC: 31.5 GM/DL
MCV RBC AUTO: 94.5 FL
MONOCYTES # BLD AUTO: 0.56 K/UL
MONOCYTES NFR BLD AUTO: 11.1 %
NEUTROPHILS # BLD AUTO: 3.35 K/UL
NEUTROPHILS NFR BLD AUTO: 66.3 %
NONHDLC SERPL-MCNC: 161 MG/DL
PLATELET # BLD AUTO: 301 K/UL
POTASSIUM SERPL-SCNC: 5 MMOL/L
PROT SERPL-MCNC: 6.5 G/DL
RBC # BLD: 4.03 M/UL
RBC # FLD: 13.8 %
SODIUM SERPL-SCNC: 141 MMOL/L
TRIGL SERPL-MCNC: 103 MG/DL
TSH SERPL-ACNC: 0.74 UIU/ML
WBC # FLD AUTO: 5.05 K/UL

## 2022-02-15 ENCOUNTER — RX RENEWAL (OUTPATIENT)
Age: 87
End: 2022-02-15

## 2022-02-21 ENCOUNTER — RX RENEWAL (OUTPATIENT)
Age: 87
End: 2022-02-21

## 2022-03-04 ENCOUNTER — APPOINTMENT (OUTPATIENT)
Dept: NEUROLOGY | Facility: CLINIC | Age: 87
End: 2022-03-04

## 2022-03-14 ENCOUNTER — APPOINTMENT (OUTPATIENT)
Dept: NEUROLOGY | Facility: CLINIC | Age: 87
End: 2022-03-14
Payer: MEDICARE

## 2022-03-14 ENCOUNTER — APPOINTMENT (OUTPATIENT)
Dept: NEUROLOGY | Facility: CLINIC | Age: 87
End: 2022-03-14

## 2022-03-14 VITALS
SYSTOLIC BLOOD PRESSURE: 166 MMHG | HEART RATE: 72 BPM | BODY MASS INDEX: 22.99 KG/M2 | HEIGHT: 65 IN | WEIGHT: 138 LBS | DIASTOLIC BLOOD PRESSURE: 83 MMHG

## 2022-03-14 PROCEDURE — 99214 OFFICE O/P EST MOD 30 MIN: CPT

## 2022-03-14 RX ORDER — CARBAMAZEPINE 200 MG/1
200 CAPSULE, EXTENDED RELEASE ORAL
Qty: 180 | Refills: 1 | Status: DISCONTINUED | COMMUNITY
Start: 2017-12-18 | End: 2022-03-14

## 2022-03-14 NOTE — HISTORY OF PRESENT ILLNESS
[FreeTextEntry1] : PCP: Dr. Pedrito Rosenthal;  [address] [place] [address]  [place] [address];  [phone]\par \par *** 03/14/2022  *** \par  Ms. PEREZ reports no interval seizures, but complains of increased tiredness.  Her son is concerned that she may be having focal seizures.  She is taking carbamazepine  mg 3 times a day.  She still wakes up at 6 AM to take Synthroid.  She takes the rest of her medicines after 8 AM.  On further review, her son reports that she has had 3 or 4 episodes where the left arm becomes limp and she has repetitive back arching movements 2 or 3 beats that are suspicious for focal seizures.  These have occurred despite taking the higher dose of carbamazepine.\par \par *** 01/31/2022  *** \par  Ms. PEREZ was found down following a seizure in mid December.  Upon arrival to hospital, she was found to be Covid positive.  She was in the hospital approximately 10 days with her major problem being worsening respiration.  However, she recovered and was discharged to rehabilitation where she was 1 month.  She returned home last week on a regimen of carbamazepine 200 mg at 6 AM, 2 PM and 10 PM.  Her son is uncertain whether she is receiving carbamazepine IR or ER. Ms. PEREZ endorses increased tiredness and imbalance.  She is getting up at 6 AM to take her morning dose, but her usual wake up time is 9 AM.  She goes to bed at approximately 10 PM.  She is using a walker.\par \par *** 09/22/2021  ***\par  Ms. PEREZ returns for scheduled follow-up.  She reports no interval seizures.  She complains chronically of poor balance that she attributes to carbamazepine.  However, she ambulates without a cane at home and outside.  Carbamazepine level at last visit was 7.6.  She takes her largest dose–400 mg–at bedtime, and then 100 mg spaced throughout the day.  She denies having worse balance in the morning.\par \par *** 04/30/2021  ***\par Ms. PEREZ returns for f/u to review results of MRI and labs.  MRI of head, MRA head and neck reports and images reviewed by me.  There is evidence of old chronic strokes on MRI but no new acute or subacute strokes. No significant areas of stenosis noted on MRA neck or head.  Ms. PEREZ has not had recurrent seizures.\par labs drawn at [facility] - results not immediately available.\par No new problems. \par \par *** 03/17/2021  ***\par Ms. Perez is doing relatively well.  She has not had a seizure in the interval since her last appointment.  She is taking carbamazepine  in the morning, 100 in the afternoon, and 200 at bedtime.  She has received her first COVID-19 vaccination, and will get the second injection in a couple of weeks.  She complains of some intermittent word finding problems that are new, and had not previously bothered her.  She cannot describe them in detail, but expresses concern that they may represent TIAs or strokes.  In the past, she had an intracerebral hemorrhage.\par \par *** 11/20/2020  ***\par Ms. PEREZ is doing relatively well. She endorses that she is compliant with carbamazepine and that she has had no seizures since last visit.  She is staying home as a precaution against COVID.  She has multiple somatic complaints, however, she remains independent and able to perform her ADLs.\par \par *** 08/21/2020 ***\par Ms. PEREZ had partial seizure on Aug 6.  She felt numbness and swelling in L hand and had twiching in L hand, event ended without generalization. Son reports that hand jerking recurred in hospital. Ms. PEREZ endorses that she had reduced evening dose of carbamazepine to 100mg at bedtime.  \par \par Carbamazepine  / 100 / 100 / 200\par \par *** 01/22/2020  ***\par Ms. MAJOR PEREZ returns for scheduled follow-up appointment. Ms. PEREZ reports that in the interval since her last visit, she is doing well. No interval seizures. Ms. PEREZ endorses that the carbamazepine has her feeling very tired and she gets a late start in the morning, awakening at 9a to take AM dose and going back to sleep, then usually getting up at 11a.  Ms. PEREZ also endorses that she feels unsteady in the morning. \par At lower doses of carbamazepine, Ms. PEREZ has had breakthrough seizures.\par \par *** 10/16/2019  ***\par Ms. MAJOR PEREZ returns for scheduled follow-up appointment. Ms. PEREZ reports that in the interval since her last visit, she is doing well. No seizure since last visit. No new problems. No GI upset or double vision. Now taking carbamazepine  am, 100 noon, 100 4p, 400 bedtime. \par \par *** 04/16/2019  ***\par Patient stated in March 2 she had and episode described as electrical current through chest ,left hand felt funny and her mouth was twitching and tongue was heavy. Her son found her on the bathroom floor with the phone in her hand. She was trying to call someone for help. Unclear how long the episode lasted.\par She was evaluated at [facility] . BP very elevated. CBZ increased to 100/100/200 and [facility] for monitoring of blood pressure and physical therapy strengthening. \par She is otherwise doing well. Her mood is good and she has no further complaints of fatigue.\par \par Current AED regimen:\par /100/200\par \par ***12/18/18***\par patients describes no reported seizures since last visit\par She complains of  mild fatigue and unsteady gait at times\par \par Carbamazepine is taken 9am  100mg , 4pm 100mg and 11pm  300mg\par \par Patient describes aura of tongue feeling heavy in past but none since last office visit\par \par *** 10/22/2018 ***\par Ms. PEREZ was found by her son in am 9/22 on floor of her bedroom. She called out to him. EMT found her to have L weakness, L visual field cut. She was taken to [facility], and seen by neurology there who thought she had seizure and postictal Todds. Ms. PEREZ improved and returned to baseline.  CBZ level was therapeutic, but dose was increased slightly from 100/300 to 100/100/300, and Ms. PEREZ was discharged home. \par \par *** 06/22/2018 ***\par Ms. PEREZ is complaining of feeling very tired, sleepy.  She had one fall when she bent over and lost her balance, required 4 stiches to forehead. She had one episode of L hand tremulousness but could control the hand, no impairment of awareness, no visual distortion.\par \par *** 02/23/2018 *** \par Pt doing relatively well, still feels tired, but functioning OK.  Feels a little unsteady, but has not fallen and is better than before. \par \par *** 12/18/2017 ***\par No interval seizure or symptoms. However, pt c/o problems with balance for more than a month - does not recall if she had similar problems last summer. Otherwise not complaining of side effects. No complaint of fatigue. \par \par *** 10/27/2017 ***\par Ms Perez had an episode on 10/15/17 where her left hand became "heavy". She was evaluated in [facility], and had no recurrent stroke (Prior R parietal hemorrhage). Then on 10/17/17, patient describes that the television picture was distorted.  She went her ophthalmologist who recommended an MRA (presumably for a TIA? - n.b. pt has had extensive vascular imaging in spring 2017). She continues to take carbamazepine 200 q12, and seemed unaware of plan to start lamotrigine, son unaware.  She does complain of increased sleepiness, but does not seem to be major complaint, only endorsed after I inquired. No other problems. Last sodium was normal. \par \par Patient denies missing a dose of carbamazepine, but she does not use a pill box and has a complex (self-imposed) schedule for her medications. \par \par *** 5/12/2017 ***\par Ms. 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 She is a poor historian, and most of the history is taken from the notes. Later that year, she was found to have an active ambulatory EEG with sharp waves noted in the right frontal region. She was treated with levetiracetam, but complained of extreme fatigue interfering with her quality of life.in February 2017, she had an episode of left hand shaking lasting approximately 30 seconds, and resulting in her fall backwards and a head laceration. The event was felt to be a seizure. Her anticonvulsant was changed to carbamazepine, currently 200 mg twice a day, but she continues to complain of fatigue.

## 2022-03-14 NOTE — ASSESSMENT
[FreeTextEntry1] : Looks well currently, but complaining of increased tiredness, and her son describes events concerning for recurrent focal seizures.  As an initial step, I recommended a trial of levetiracetam to 50 twice a day, and reducing carbamazepine ER slightly.  She took levetiracetam briefly in 2017, but complained of increased fatigue.  Fatigue was unchanged after changing medication to carbamazepine–so it is not clear if levetiracetam was responsible.  \par \par Plan:\par 1.  Start levetiracetam 250 mg twice a day\par 2.  Reduce carbamazepine 200 mg in a.m., 100 in p.m., 200 in at bedtime\par 3. Follow up in 2 month\par \par I have spent 30 minutes or longer reviewing patient data or discussing with the patient  the cause of seizures or seizure-like events and comorbid conditions, assessing the risk of recurrence, educating the patient or family to recognize seizures, and discussing possible treatment options for seizures and comorbid conditions and possible side effects of medications. I also discussed seizure safety, and ways of reducing seizure risk. Greater than 50% of the encounter time was spent on counseling and coordination of care for reviewing records in [employer], discussion with patient regarding plan.\par \par

## 2022-03-22 ENCOUNTER — APPOINTMENT (OUTPATIENT)
Dept: INTERNAL MEDICINE | Facility: CLINIC | Age: 87
End: 2022-03-22
Payer: MEDICARE

## 2022-03-22 VITALS
WEIGHT: 138 LBS | OXYGEN SATURATION: 94 % | HEIGHT: 65 IN | BODY MASS INDEX: 22.99 KG/M2 | HEART RATE: 58 BPM | SYSTOLIC BLOOD PRESSURE: 126 MMHG | TEMPERATURE: 97.4 F | RESPIRATION RATE: 16 BRPM | DIASTOLIC BLOOD PRESSURE: 74 MMHG

## 2022-03-22 DIAGNOSIS — I10 ESSENTIAL (PRIMARY) HYPERTENSION: ICD-10-CM

## 2022-03-22 PROCEDURE — 81003 URINALYSIS AUTO W/O SCOPE: CPT | Mod: QW

## 2022-03-22 PROCEDURE — 99214 OFFICE O/P EST MOD 30 MIN: CPT | Mod: 25

## 2022-03-22 NOTE — END OF VISIT
[FreeTextEntry3] : "I, Ayanna Castro, personally scribed the services dictated to me by Dr. Pedrito Rosenthal MD in this documentation on 03/22/2022 " \par \par "I Dr. Pedrito Rosenthal MD, personally performed the services described in this documentation on 03/22/2022 for the patient as scribed by Ayanna Castro in my presence. I have reviewed and verified that all the information is accurate and true."\par

## 2022-03-22 NOTE — HISTORY OF PRESENT ILLNESS
[Family Member] : family member [FreeTextEntry1] : follow up\par  [de-identified] : MAJOR PEREZ is a 89 year old F who presents today for follow up for BP, DM and cholesterol. Patient complains seizure medication is making her fatigued.

## 2022-03-22 NOTE — HEALTH RISK ASSESSMENT
[Former] : Former [No] : In the past 12 months have you used drugs other than those required for medical reasons? No [No falls in past year] : Patient reported no falls in the past year [0] : 2) Feeling down, depressed, or hopeless: Not at all (0) [PHQ-2 Negative - No further assessment needed] : PHQ-2 Negative - No further assessment needed [[id number]] : 0

## 2022-03-24 LAB
25(OH)D3 SERPL-MCNC: 22.2 NG/ML
ALBUMIN SERPL ELPH-MCNC: 3.7 G/DL
ALP BLD-CCNC: 115 U/L
ALT SERPL-CCNC: 7 U/L
ANION GAP SERPL CALC-SCNC: 10 MMOL/L
AST SERPL-CCNC: 12 U/L
BASOPHILS # BLD AUTO: 0.07 K/UL
BASOPHILS NFR BLD AUTO: 1.7 %
BILIRUB SERPL-MCNC: <0.2 MG/DL
BUN SERPL-MCNC: 15 MG/DL
CALCIUM SERPL-MCNC: 8.7 MG/DL
CHLORIDE SERPL-SCNC: 101 MMOL/L
CHOLEST SERPL-MCNC: 217 MG/DL
CK SERPL-CCNC: 42 U/L
CO2 SERPL-SCNC: 27 MMOL/L
CREAT SERPL-MCNC: 0.83 MG/DL
EGFR: 67 ML/MIN/1.73M2
EOSINOPHIL # BLD AUTO: 0.11 K/UL
EOSINOPHIL NFR BLD AUTO: 2.6 %
ESTIMATED AVERAGE GLUCOSE: 131 MG/DL
GLUCOSE SERPL-MCNC: 91 MG/DL
HBA1C MFR BLD HPLC: 6.2 %
HCT VFR BLD CALC: 37.3 %
HDLC SERPL-MCNC: 72 MG/DL
HGB BLD-MCNC: 11.8 G/DL
IMM GRANULOCYTES NFR BLD AUTO: 0.5 %
LDLC SERPL CALC-MCNC: 123 MG/DL
LYMPHOCYTES # BLD AUTO: 0.78 K/UL
LYMPHOCYTES NFR BLD AUTO: 18.4 %
MAN DIFF?: NORMAL
MCHC RBC-ENTMCNC: 29.9 PG
MCHC RBC-ENTMCNC: 31.6 GM/DL
MCV RBC AUTO: 94.4 FL
MONOCYTES # BLD AUTO: 0.54 K/UL
MONOCYTES NFR BLD AUTO: 12.8 %
NEUTROPHILS # BLD AUTO: 2.71 K/UL
NEUTROPHILS NFR BLD AUTO: 64 %
NONHDLC SERPL-MCNC: 145 MG/DL
PLATELET # BLD AUTO: 226 K/UL
POTASSIUM SERPL-SCNC: 4.4 MMOL/L
PROT SERPL-MCNC: 5.9 G/DL
RBC # BLD: 3.95 M/UL
RBC # FLD: 13 %
SODIUM SERPL-SCNC: 138 MMOL/L
TRIGL SERPL-MCNC: 111 MG/DL
TSH SERPL-ACNC: 1.89 UIU/ML
WBC # FLD AUTO: 4.23 K/UL

## 2022-04-15 NOTE — PROGRESS NOTE ADULT - PROBLEM SELECTOR PLAN 9
REVIEWED TECHNICIAN'S NOTE AND I CONCUR.  IN ADDITION, THE PATIENT REPORTS THE FOLLOWING.    CHIEF COMPLAINT:  EYE EXM    HISTORY OF PRESENT ILLNESS:  PT REPORTS VISION SEEMS A LITTLE BLURRY OS.  ALSO FBS OU OFF AND ON ALL DAY.  ALSO WAKES IN AM WITH CRUSTING OU OFF AND ON.        External Eye Exam:  Normal  Extraocular Motility:  Normal ou  Fields:  Full to confrontation ou  Awake, alert and oriented X 3.      Slit Lamp Exam:       Lids and Lashes:  Normal // normal       Conjunctivae:  Normal // normal       Cornea:  Normal // normal       Tear Film:  Normal // normal       Anterior Chamber:  Deep and quiet // deep and quiet       Iris:  Round and regular // Round and regular       Lens:  IOL OU, S/P YAG OD AND CLEAR OS      FUNDI:       Macula:  Normal ou       Vessels:  Normal ou       Midperiphery:  Normal ou       Periphery:  Normal ou    Disc:  Pink and sharp ou  Cup Disc Ratio:  0.4 OU    Assessment and Plan:    1.  INTRAOCULAR LENS OU:  WELL CENTERED WITHOUT OPACIFICATION OF POSTERIOR CAPSULE.  STABLE.    2.  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  RECOMMEND AGGRESSIVE LUBRICATION THROUGHOUT THE DAY.  MAY ALSO WANT TO USE GTTS FIRST THING IN AM, BEFORE GETTING OUT OF BED.         DVT PPx: SCDs and in setting of hx hemorrhagic CVA, using Lovenox 40mg qd    #dispo  - plan for [facility] but sons Simone and Quintin unable to agree to d/c plan, Simone wants home PT, Quintin does not want mother to return home b/c COVID+. per pt, she is agreeable to go to [facility], will discuss w SW for placement DVT PPx: SCDs and in setting of hx hemorrhagic CVA, using Lovenox 40mg qd

## 2022-04-19 ENCOUNTER — APPOINTMENT (OUTPATIENT)
Dept: NEUROLOGY | Facility: CLINIC | Age: 87
End: 2022-04-19

## 2022-05-26 ENCOUNTER — RX RENEWAL (OUTPATIENT)
Age: 87
End: 2022-05-26

## 2022-05-29 ENCOUNTER — INPATIENT (INPATIENT)
Facility: HOSPITAL | Age: 87
LOS: 3 days | Discharge: HOME | DRG: 101 | End: 2022-06-02
Attending: HOSPITALIST | Admitting: HOSPITALIST
Payer: MEDICARE

## 2022-05-29 VITALS
HEIGHT: 65 IN | RESPIRATION RATE: 16 BRPM | DIASTOLIC BLOOD PRESSURE: 107 MMHG | OXYGEN SATURATION: 95 % | HEART RATE: 84 BPM | TEMPERATURE: 98 F | SYSTOLIC BLOOD PRESSURE: 189 MMHG

## 2022-05-29 DIAGNOSIS — Z86.69 PERSONAL HISTORY OF OTHER DISEASES OF THE NERVOUS SYSTEM AND SENSE ORGANS: Chronic | ICD-10-CM

## 2022-05-29 LAB
BASOPHILS # BLD AUTO: 0.06 K/UL — SIGNIFICANT CHANGE UP (ref 0–0.2)
BASOPHILS NFR BLD AUTO: 0.9 % — SIGNIFICANT CHANGE UP (ref 0–2)
EOSINOPHIL # BLD AUTO: 0.08 K/UL — SIGNIFICANT CHANGE UP (ref 0–0.5)
EOSINOPHIL NFR BLD AUTO: 1.2 % — SIGNIFICANT CHANGE UP (ref 0–6)
HCT VFR BLD CALC: 39.1 % — SIGNIFICANT CHANGE UP (ref 34.5–45)
HGB BLD-MCNC: 12.4 G/DL — SIGNIFICANT CHANGE UP (ref 11.5–15.5)
IMM GRANULOCYTES NFR BLD AUTO: 1.1 % — SIGNIFICANT CHANGE UP (ref 0–1.5)
LYMPHOCYTES # BLD AUTO: 0.78 K/UL — LOW (ref 1–3.3)
LYMPHOCYTES # BLD AUTO: 12.1 % — LOW (ref 13–44)
MCHC RBC-ENTMCNC: 29.1 PG — SIGNIFICANT CHANGE UP (ref 27–34)
MCHC RBC-ENTMCNC: 31.7 GM/DL — LOW (ref 32–36)
MCV RBC AUTO: 91.8 FL — SIGNIFICANT CHANGE UP (ref 80–100)
MONOCYTES # BLD AUTO: 0.46 K/UL — SIGNIFICANT CHANGE UP (ref 0–0.9)
MONOCYTES NFR BLD AUTO: 7.1 % — SIGNIFICANT CHANGE UP (ref 2–14)
NEUTROPHILS # BLD AUTO: 5 K/UL — SIGNIFICANT CHANGE UP (ref 1.8–7.4)
NEUTROPHILS NFR BLD AUTO: 77.6 % — HIGH (ref 43–77)
NRBC # BLD: 0 /100 WBCS — SIGNIFICANT CHANGE UP (ref 0–0)
PLATELET # BLD AUTO: 188 K/UL — SIGNIFICANT CHANGE UP (ref 150–400)
RBC # BLD: 4.26 M/UL — SIGNIFICANT CHANGE UP (ref 3.8–5.2)
RBC # FLD: 14 % — SIGNIFICANT CHANGE UP (ref 10.3–14.5)
WBC # BLD: 6.45 K/UL — SIGNIFICANT CHANGE UP (ref 3.8–10.5)
WBC # FLD AUTO: 6.45 K/UL — SIGNIFICANT CHANGE UP (ref 3.8–10.5)

## 2022-05-29 PROCEDURE — 99285 EMERGENCY DEPT VISIT HI MDM: CPT

## 2022-05-29 PROCEDURE — 93010 ELECTROCARDIOGRAM REPORT: CPT

## 2022-05-29 RX ORDER — LABETALOL HCL 100 MG
10 TABLET ORAL ONCE
Refills: 0 | Status: COMPLETED | OUTPATIENT
Start: 2022-05-29 | End: 2022-05-29

## 2022-05-29 RX ADMIN — Medication 10 MILLIGRAM(S): at 23:59

## 2022-05-29 NOTE — ED PROVIDER NOTE - SEVERITY
DATE: 2019    NAME: Leopoldo Lederer  MRN: [id number]   : 1963    Patient not seen this date for Physical Therapy due to:  [] Blood transfusion in progress  [] Hemodialysis  []  Patient Declined  [] Spine Precautions   [] Strict Bedrest  [x] Surgery/ Procedure; pt in OR for revision of R BKA. Will check back tomorrow. [] Testing      [] Other        [] PT being discontinued at this time. Patient independent. No further needs. [] PT being discontinued at this time as the patient has been transferred to palliative care. No further needs.     Andres Zimmerman, PTA MODERATE

## 2022-05-29 NOTE — ED PROVIDER NOTE - CLINICAL SUMMARY MEDICAL DECISION MAKING FREE TEXT BOX
possible  seizure, and hypertension, neuro exam is stable ...   labs and ct scan are reported normal  Rx carbamazepine admit to medicine possible  seizure, and elevated BP , neuro exam non acute and stable  ...   labs and ct scan are reported normal  Rx carbamazepine, Keppra  admit to medicine

## 2022-05-29 NOTE — ED PROVIDER NOTE - OBJECTIVE STATEMENT
89 y/o female BIBEMS reporting  HA, intermittent left weakness and elevated blood pressure  h/o seizure , HTN , Thyroid disease  on Keppra,  carbamazepine 200mg, 100mg, 200mg.  and levothyroxine. One of her sons who is at the bed side states that her presenting  symptoms are common after a seizure. She is not having weakness at this time. no ha no cp, no sob, no fever, no chills, no stroke symptoms, The patient believes that she did not take the evening dose of carbamazepine 89 y/o female BIBEMS reporting  HA, intermittent left weakness and elevated blood pressure  h/o seizure , HTN , Thyroid disease  on Keppra,  carbamazepine 200mg, 100mg, 200mg.  and levothyroxine. One of her sons who is at the bed side states that her presenting  symptoms are common after a seizure. She is not having weakness at this time.  no cp, no sob, no fever, no chills, no stroke symptoms, The patient believes that she did not take the evening dose of carbamazepine

## 2022-05-30 DIAGNOSIS — R56.9 UNSPECIFIED CONVULSIONS: ICD-10-CM

## 2022-05-30 DIAGNOSIS — I61.9 NONTRAUMATIC INTRACEREBRAL HEMORRHAGE, UNSPECIFIED: ICD-10-CM

## 2022-05-30 DIAGNOSIS — M25.512 PAIN IN LEFT SHOULDER: ICD-10-CM

## 2022-05-30 DIAGNOSIS — I16.0 HYPERTENSIVE URGENCY: ICD-10-CM

## 2022-05-30 DIAGNOSIS — Z29.9 ENCOUNTER FOR PROPHYLACTIC MEASURES, UNSPECIFIED: ICD-10-CM

## 2022-05-30 DIAGNOSIS — E03.9 HYPOTHYROIDISM, UNSPECIFIED: ICD-10-CM

## 2022-05-30 DIAGNOSIS — E78.5 HYPERLIPIDEMIA, UNSPECIFIED: ICD-10-CM

## 2022-05-30 DIAGNOSIS — E11.9 TYPE 2 DIABETES MELLITUS WITHOUT COMPLICATIONS: ICD-10-CM

## 2022-05-30 LAB
ALBUMIN SERPL ELPH-MCNC: 2.5 G/DL — LOW (ref 3.3–5)
ALBUMIN SERPL ELPH-MCNC: 3 G/DL — LOW (ref 3.3–5)
ALP SERPL-CCNC: 74 U/L — SIGNIFICANT CHANGE UP (ref 40–120)
ALP SERPL-CCNC: 95 U/L — SIGNIFICANT CHANGE UP (ref 40–120)
ALT FLD-CCNC: 15 U/L — SIGNIFICANT CHANGE UP (ref 12–78)
ALT FLD-CCNC: 20 U/L — SIGNIFICANT CHANGE UP (ref 12–78)
ANION GAP SERPL CALC-SCNC: 6 MMOL/L — SIGNIFICANT CHANGE UP (ref 5–17)
ANION GAP SERPL CALC-SCNC: 6 MMOL/L — SIGNIFICANT CHANGE UP (ref 5–17)
APTT BLD: 24.4 SEC — LOW (ref 27.5–35.5)
AST SERPL-CCNC: 10 U/L — LOW (ref 15–37)
AST SERPL-CCNC: 18 U/L — SIGNIFICANT CHANGE UP (ref 15–37)
BASOPHILS # BLD AUTO: 0.06 K/UL — SIGNIFICANT CHANGE UP (ref 0–0.2)
BASOPHILS NFR BLD AUTO: 1.2 % — SIGNIFICANT CHANGE UP (ref 0–2)
BILIRUB SERPL-MCNC: 0.2 MG/DL — SIGNIFICANT CHANGE UP (ref 0.2–1.2)
BILIRUB SERPL-MCNC: 0.2 MG/DL — SIGNIFICANT CHANGE UP (ref 0.2–1.2)
BUN SERPL-MCNC: 12 MG/DL — SIGNIFICANT CHANGE UP (ref 7–23)
BUN SERPL-MCNC: 13 MG/DL — SIGNIFICANT CHANGE UP (ref 7–23)
CALCIUM SERPL-MCNC: 7.9 MG/DL — LOW (ref 8.5–10.1)
CALCIUM SERPL-MCNC: 8.3 MG/DL — LOW (ref 8.5–10.1)
CARBAMAZEPINE SERPL-MCNC: 11.4 UG/ML — SIGNIFICANT CHANGE UP (ref 4–12)
CHLORIDE SERPL-SCNC: 105 MMOL/L — SIGNIFICANT CHANGE UP (ref 96–108)
CHLORIDE SERPL-SCNC: 108 MMOL/L — SIGNIFICANT CHANGE UP (ref 96–108)
CHOLEST SERPL-MCNC: 186 MG/DL — SIGNIFICANT CHANGE UP
CO2 SERPL-SCNC: 28 MMOL/L — SIGNIFICANT CHANGE UP (ref 22–31)
CO2 SERPL-SCNC: 29 MMOL/L — SIGNIFICANT CHANGE UP (ref 22–31)
CREAT SERPL-MCNC: 0.84 MG/DL — SIGNIFICANT CHANGE UP (ref 0.5–1.3)
CREAT SERPL-MCNC: 0.88 MG/DL — SIGNIFICANT CHANGE UP (ref 0.5–1.3)
EGFR: 62 ML/MIN/1.73M2 — SIGNIFICANT CHANGE UP
EGFR: 66 ML/MIN/1.73M2 — SIGNIFICANT CHANGE UP
EOSINOPHIL # BLD AUTO: 0.05 K/UL — SIGNIFICANT CHANGE UP (ref 0–0.5)
EOSINOPHIL NFR BLD AUTO: 1 % — SIGNIFICANT CHANGE UP (ref 0–6)
GLUCOSE SERPL-MCNC: 155 MG/DL — HIGH (ref 70–99)
GLUCOSE SERPL-MCNC: 156 MG/DL — HIGH (ref 70–99)
HCT VFR BLD CALC: 34.3 % — LOW (ref 34.5–45)
HDLC SERPL-MCNC: 71 MG/DL — SIGNIFICANT CHANGE UP
HGB BLD-MCNC: 10.9 G/DL — LOW (ref 11.5–15.5)
IMM GRANULOCYTES NFR BLD AUTO: 0.8 % — SIGNIFICANT CHANGE UP (ref 0–1.5)
INR BLD: 1.01 RATIO — SIGNIFICANT CHANGE UP (ref 0.88–1.16)
LIPID PNL WITH DIRECT LDL SERPL: 100 MG/DL — HIGH
LYMPHOCYTES # BLD AUTO: 1.05 K/UL — SIGNIFICANT CHANGE UP (ref 1–3.3)
LYMPHOCYTES # BLD AUTO: 20.2 % — SIGNIFICANT CHANGE UP (ref 13–44)
MCHC RBC-ENTMCNC: 29 PG — SIGNIFICANT CHANGE UP (ref 27–34)
MCHC RBC-ENTMCNC: 31.8 GM/DL — LOW (ref 32–36)
MCV RBC AUTO: 91.2 FL — SIGNIFICANT CHANGE UP (ref 80–100)
MONOCYTES # BLD AUTO: 0.6 K/UL — SIGNIFICANT CHANGE UP (ref 0–0.9)
MONOCYTES NFR BLD AUTO: 11.6 % — SIGNIFICANT CHANGE UP (ref 2–14)
NEUTROPHILS # BLD AUTO: 3.39 K/UL — SIGNIFICANT CHANGE UP (ref 1.8–7.4)
NEUTROPHILS NFR BLD AUTO: 65.2 % — SIGNIFICANT CHANGE UP (ref 43–77)
NON HDL CHOLESTEROL: 115 MG/DL — SIGNIFICANT CHANGE UP
NRBC # BLD: 0 /100 WBCS — SIGNIFICANT CHANGE UP (ref 0–0)
PLATELET # BLD AUTO: 175 K/UL — SIGNIFICANT CHANGE UP (ref 150–400)
POTASSIUM SERPL-MCNC: 3.6 MMOL/L — SIGNIFICANT CHANGE UP (ref 3.5–5.3)
POTASSIUM SERPL-MCNC: 4.6 MMOL/L — SIGNIFICANT CHANGE UP (ref 3.5–5.3)
POTASSIUM SERPL-SCNC: 3.6 MMOL/L — SIGNIFICANT CHANGE UP (ref 3.5–5.3)
POTASSIUM SERPL-SCNC: 4.6 MMOL/L — SIGNIFICANT CHANGE UP (ref 3.5–5.3)
PROT SERPL-MCNC: 5.9 G/DL — LOW (ref 6–8.3)
PROT SERPL-MCNC: 7 G/DL — SIGNIFICANT CHANGE UP (ref 6–8.3)
PROTHROM AB SERPL-ACNC: 11.8 SEC — SIGNIFICANT CHANGE UP (ref 10.5–13.4)
RBC # BLD: 3.76 M/UL — LOW (ref 3.8–5.2)
RBC # FLD: 14.2 % — SIGNIFICANT CHANGE UP (ref 10.3–14.5)
SARS-COV-2 RNA SPEC QL NAA+PROBE: SIGNIFICANT CHANGE UP
SODIUM SERPL-SCNC: 140 MMOL/L — SIGNIFICANT CHANGE UP (ref 135–145)
SODIUM SERPL-SCNC: 142 MMOL/L — SIGNIFICANT CHANGE UP (ref 135–145)
TRIGL SERPL-MCNC: 77 MG/DL — SIGNIFICANT CHANGE UP
TROPONIN I, HIGH SENSITIVITY RESULT: 5.6 NG/L — SIGNIFICANT CHANGE UP
TSH SERPL-MCNC: 0.77 UIU/ML — SIGNIFICANT CHANGE UP (ref 0.36–3.74)
WBC # BLD: 5.19 K/UL — SIGNIFICANT CHANGE UP (ref 3.8–10.5)
WBC # FLD AUTO: 5.19 K/UL — SIGNIFICANT CHANGE UP (ref 3.8–10.5)

## 2022-05-30 PROCEDURE — 99222 1ST HOSP IP/OBS MODERATE 55: CPT

## 2022-05-30 PROCEDURE — 70450 CT HEAD/BRAIN W/O DYE: CPT | Mod: 26,MA

## 2022-05-30 RX ORDER — CARBAMAZEPINE 200 MG
100 TABLET ORAL
Refills: 0 | Status: DISCONTINUED | OUTPATIENT
Start: 2022-05-30 | End: 2022-05-30

## 2022-05-30 RX ORDER — AMLODIPINE BESYLATE 2.5 MG/1
5 TABLET ORAL DAILY
Refills: 0 | Status: DISCONTINUED | OUTPATIENT
Start: 2022-05-30 | End: 2022-06-02

## 2022-05-30 RX ORDER — SODIUM CHLORIDE 9 MG/ML
1000 INJECTION, SOLUTION INTRAVENOUS
Refills: 0 | Status: DISCONTINUED | OUTPATIENT
Start: 2022-05-30 | End: 2022-06-02

## 2022-05-30 RX ORDER — ATORVASTATIN CALCIUM 80 MG/1
80 TABLET, FILM COATED ORAL AT BEDTIME
Refills: 0 | Status: DISCONTINUED | OUTPATIENT
Start: 2022-05-30 | End: 2022-06-01

## 2022-05-30 RX ORDER — CARVEDILOL PHOSPHATE 80 MG/1
12.5 CAPSULE, EXTENDED RELEASE ORAL EVERY 12 HOURS
Refills: 0 | Status: DISCONTINUED | OUTPATIENT
Start: 2022-05-30 | End: 2022-06-02

## 2022-05-30 RX ORDER — LOSARTAN POTASSIUM 100 MG/1
50 TABLET, FILM COATED ORAL DAILY
Refills: 0 | Status: DISCONTINUED | OUTPATIENT
Start: 2022-05-30 | End: 2022-06-02

## 2022-05-30 RX ORDER — LEVETIRACETAM 250 MG/1
250 TABLET, FILM COATED ORAL
Refills: 0 | Status: DISCONTINUED | OUTPATIENT
Start: 2022-05-30 | End: 2022-05-31

## 2022-05-30 RX ORDER — ACETAMINOPHEN 500 MG
1000 TABLET ORAL EVERY 6 HOURS
Refills: 0 | Status: DISCONTINUED | OUTPATIENT
Start: 2022-05-30 | End: 2022-05-31

## 2022-05-30 RX ORDER — GLUCAGON INJECTION, SOLUTION 0.5 MG/.1ML
1 INJECTION, SOLUTION SUBCUTANEOUS ONCE
Refills: 0 | Status: DISCONTINUED | OUTPATIENT
Start: 2022-05-30 | End: 2022-06-02

## 2022-05-30 RX ORDER — CARBAMAZEPINE 200 MG
200 TABLET ORAL
Refills: 0 | Status: DISCONTINUED | OUTPATIENT
Start: 2022-05-30 | End: 2022-06-02

## 2022-05-30 RX ORDER — DEXTROSE 50 % IN WATER 50 %
25 SYRINGE (ML) INTRAVENOUS ONCE
Refills: 0 | Status: DISCONTINUED | OUTPATIENT
Start: 2022-05-30 | End: 2022-06-02

## 2022-05-30 RX ORDER — LISINOPRIL 2.5 MG/1
20 TABLET ORAL DAILY
Refills: 0 | Status: DISCONTINUED | OUTPATIENT
Start: 2022-05-30 | End: 2022-06-02

## 2022-05-30 RX ORDER — NYSTATIN CREAM 100000 [USP'U]/G
1 CREAM TOPICAL THREE TIMES A DAY
Refills: 0 | Status: DISCONTINUED | OUTPATIENT
Start: 2022-05-30 | End: 2022-06-02

## 2022-05-30 RX ORDER — DEXTROSE 50 % IN WATER 50 %
15 SYRINGE (ML) INTRAVENOUS ONCE
Refills: 0 | Status: DISCONTINUED | OUTPATIENT
Start: 2022-05-30 | End: 2022-06-02

## 2022-05-30 RX ORDER — CARBAMAZEPINE 200 MG
200 TABLET ORAL ONCE
Refills: 0 | Status: COMPLETED | OUTPATIENT
Start: 2022-05-30 | End: 2022-05-30

## 2022-05-30 RX ORDER — CARBAMAZEPINE 200 MG
100 TABLET ORAL
Refills: 0 | Status: DISCONTINUED | OUTPATIENT
Start: 2022-05-30 | End: 2022-06-02

## 2022-05-30 RX ORDER — ROSUVASTATIN CALCIUM 5 MG/1
1 TABLET ORAL
Qty: 0 | Refills: 0 | DISCHARGE

## 2022-05-30 RX ORDER — ACETAMINOPHEN 500 MG
650 TABLET ORAL EVERY 6 HOURS
Refills: 0 | Status: DISCONTINUED | OUTPATIENT
Start: 2022-05-30 | End: 2022-05-31

## 2022-05-30 RX ORDER — INSULIN LISPRO 100/ML
VIAL (ML) SUBCUTANEOUS AT BEDTIME
Refills: 0 | Status: DISCONTINUED | OUTPATIENT
Start: 2022-05-30 | End: 2022-06-02

## 2022-05-30 RX ORDER — CARBAMAZEPINE 200 MG
400 TABLET ORAL
Refills: 0 | Status: DISCONTINUED | OUTPATIENT
Start: 2022-05-30 | End: 2022-05-30

## 2022-05-30 RX ORDER — ACETAMINOPHEN 500 MG
1000 TABLET ORAL EVERY 6 HOURS
Refills: 0 | Status: DISCONTINUED | OUTPATIENT
Start: 2022-05-30 | End: 2022-05-30

## 2022-05-30 RX ORDER — LEVOTHYROXINE SODIUM 125 MCG
88 TABLET ORAL DAILY
Refills: 0 | Status: DISCONTINUED | OUTPATIENT
Start: 2022-05-30 | End: 2022-06-02

## 2022-05-30 RX ORDER — DEXTROSE 50 % IN WATER 50 %
12.5 SYRINGE (ML) INTRAVENOUS ONCE
Refills: 0 | Status: DISCONTINUED | OUTPATIENT
Start: 2022-05-30 | End: 2022-06-02

## 2022-05-30 RX ORDER — ATORVASTATIN CALCIUM 80 MG/1
40 TABLET, FILM COATED ORAL AT BEDTIME
Refills: 0 | Status: DISCONTINUED | OUTPATIENT
Start: 2022-05-30 | End: 2022-05-30

## 2022-05-30 RX ORDER — INSULIN LISPRO 100/ML
VIAL (ML) SUBCUTANEOUS
Refills: 0 | Status: DISCONTINUED | OUTPATIENT
Start: 2022-05-30 | End: 2022-06-02

## 2022-05-30 RX ORDER — LIDOCAINE 4 G/100G
1 CREAM TOPICAL DAILY
Refills: 0 | Status: DISCONTINUED | OUTPATIENT
Start: 2022-05-30 | End: 2022-06-02

## 2022-05-30 RX ADMIN — AMLODIPINE BESYLATE 5 MILLIGRAM(S): 2.5 TABLET ORAL at 09:28

## 2022-05-30 RX ADMIN — Medication 650 MILLIGRAM(S): at 09:28

## 2022-05-30 RX ADMIN — NYSTATIN CREAM 1 APPLICATION(S): 100000 CREAM TOPICAL at 12:56

## 2022-05-30 RX ADMIN — LOSARTAN POTASSIUM 50 MILLIGRAM(S): 100 TABLET, FILM COATED ORAL at 09:28

## 2022-05-30 RX ADMIN — Medication 200 MILLIGRAM(S): at 08:44

## 2022-05-30 RX ADMIN — Medication 650 MILLIGRAM(S): at 10:30

## 2022-05-30 RX ADMIN — NYSTATIN CREAM 1 APPLICATION(S): 100000 CREAM TOPICAL at 21:32

## 2022-05-30 RX ADMIN — LEVETIRACETAM 250 MILLIGRAM(S): 250 TABLET, FILM COATED ORAL at 21:30

## 2022-05-30 RX ADMIN — Medication 200 MILLIGRAM(S): at 02:59

## 2022-05-30 RX ADMIN — Medication 100 MILLIGRAM(S): at 15:24

## 2022-05-30 RX ADMIN — LISINOPRIL 20 MILLIGRAM(S): 2.5 TABLET ORAL at 12:56

## 2022-05-30 RX ADMIN — CARVEDILOL PHOSPHATE 12.5 MILLIGRAM(S): 80 CAPSULE, EXTENDED RELEASE ORAL at 17:33

## 2022-05-30 RX ADMIN — Medication 200 MILLIGRAM(S): at 21:31

## 2022-05-30 RX ADMIN — LEVETIRACETAM 250 MILLIGRAM(S): 250 TABLET, FILM COATED ORAL at 08:44

## 2022-05-30 NOTE — ED ADULT NURSE NOTE - ED STAT RN HANDOFF DETAILS
Bedside report given to on coming nurse Manjula. Understands pmh, medications given and plan of care for patient. Patient in stable condition, vital signs updated, has no complaints at this time and has been updated on care plan. Explained to patient that it is change of shift and new nurse is taking over, pt verbalized understanding.

## 2022-05-30 NOTE — SPEECH LANGUAGE PATHOLOGY EVALUATION - SLP GENERAL OBSERVATIONS
Pt received upright in bed, A&Ox4, on room air. Pt was agreeable to assessment. At conclusion of assessment, pt c/o headache, RN made aware.

## 2022-05-30 NOTE — H&P ADULT - HISTORY OF PRESENT ILLNESS
89 YO F PMHX seizures (on keppra, carbamazepine), hemorraghic stroke, HTN,  89 YO F PMHX hemorraghic stroke 2015, seizure disorder (keppra, carbamazepine), HTN, type 2 diabetes, hypothyroidism, HLD here for HA 9/10 and weakness since last evening Pt states she did not take her carbamazepine dose because its makes her very sleepy during the day. Spoke with son Simone who states his brother Quintin entered the home and patient was on the floor with three phones, was sliding down her bed, left sided weakness. As per son, left sided weakness was how her prior seizures presented as well. Patient gives herself her medications at home, performs all ADLs except for needs help showering, ambulates independently, lives with her son. Pt has hx of hemorraghic stroke in [address], ever since then she developed seizures. Patient had her carbamazepine increased in December, which made her very lethargic, as per son patient was falling asleep eating cereal in the morning which is not her normal self. HA is in occipital region b/l 5/10 now after receiving pain med sin ER. Pt also admits to generalized weakness since this evening. Pt denies any dizziness, lightheadedness, blurred vision, numbness, paresthesias, syncope, CP, SOB, cough, fever, chills. As per EMS reports, on EMS arrival patient was on the floor, denied fall but thought she had a unwitnessed seizure son called 911 for patient. Pt was found to be A & O x 4, no signs of trauma, CNs grossly intact. Pt noted to be hypertensive 216/110, 196/104, 186/104, in sinus rhythm. Pt noted by EMS to have LUE weakness and LUE drift, weakness in gait. When patient was moved to the ambulance she had slight AMS A & O x 2, AMS self corrected in ambulance after approximately 1 minute. Last well known 1600, patient unsure of onset of symptoms. E    ED vitals afebrile, HR 80, /107--> 205/87-labetalol 10 IVP --> 170/78, 96% on RA.   Labs significant for glucose 155, Ca 8.3, albumin 3, carbamazepine 11.4.   Pt received carbamazepine 200 mg x1, labetalol 10 m IV x1.   CT head no acute intracranial pathology. There is moderate generalized cerebral volume loss and disproportionate ventriculomegaly, unchanged from 12/21/2021. Severe chronic microvascular ischemic disease is stable.     89 YO F PMHX hemorraghic stroke 2015, seizure disorder (keppra, carbamazepine), HTN, type 2 diabetes, hypothyroidism, HLD here for HA 9/10 and weakness since last evening Pt states she did not take her carbamazepine dose because its makes her very sleepy during the day. Spoke with son Simone who states his brother Quintin entered the home and patient was on the floor with three phones, was sliding down her bed, left sided weakness. As per son, left sided weakness was how her prior seizures presented as well. Patient gives herself her medications at home, performs all ADLs except for needs help showering, ambulates independently, lives with her son. Pt has hx of hemorraghic stroke [place] [address], ever since then she developed seizures. Patient had her carbamazepine increased in December, which made her very lethargic, as per son patient was falling asleep eating cereal in the morning which is not her normal self. HA is in occipital region b/l 5/10 now after receiving pain med sin ER. Pt also admits to generalized weakness since this evening. Pt denies any dizziness, lightheadedness, blurred vision, numbness, paresthesias, syncope, CP, SOB, cough, fever, chills. As per EMS reports, on EMS arrival patient was on the floor, denied fall but thought she had a unwitnessed seizure son called 911 for patient. Pt was found to be A & O x 4, no signs of trauma, CNs grossly intact. Pt noted to be hypertensive 216/110, 196/104, 186/104, in sinus rhythm. Pt noted by EMS to have LUE weakness and LUE drift, weakness in gait. When patient was moved to the ambulance she had slight AMS A & O x 2, AMS self corrected in ambulance after approximately 1 minute. Last well known 1600, patient unsure of onset of symptoms.     ED vitals afebrile, HR 80, /107--> 205/87-labetalol 10 IVP --> 170/78, 96% on RA.   Labs significant for glucose 155, Ca 8.3, albumin 3, carbamazepine 11.4.   Pt received carbamazepine 200 mg x1, labetalol 10 m IV x1.   CT head no acute intracranial pathology. There is moderate generalized cerebral volume loss and disproportionate ventriculomegaly, unchanged from 12/21/2021. Severe chronic microvascular ischemic disease is stable.

## 2022-05-30 NOTE — SWALLOW BEDSIDE ASSESSMENT ADULT - SWALLOW EVAL: DIAGNOSIS
Pt self-administered PO trials of thin liquids, puree, and regular solids. Pt p/w a functional oral phase marked by adequate retrieval and containment, timely mastication/manipulation and transfer, and adequate clearance post swallow. Pharyngeal phase marked by suspected timely swallow onset, +laryngeal elevation to palpation, and no overt s/sx of aspiration. Recommend regular solids with thin liquids +aspiration precautions. Discussed with RN, called out to MD.

## 2022-05-30 NOTE — SPEECH LANGUAGE PATHOLOGY EVALUATION - SLP DIAGNOSIS
Informal measures were administered to assess speech/language/cognitive function. Receptively, pt accurately responded to yes/no questions, WH- questions, and open-ended questions. Pt followed simple commands independently. Pt engaged in object ID accurately, though with inconsistent L/R discrimination. Pt accurately engaged in object manipulation tasks. Expressively, pt engaged in confrontation naming, responsive naming, and automatic speech tasks. Pt engaged in discourse at the conversational level with absent anomia, paraphasias, and disfluencies. Pt's speech production was fluent and intelligible with adequate breath support and secretion management. Pt's vocal quality/intensity was WFL. Cognitively, pt was A&Ox4. Pt demonstrated adequate sustained attention and was able to recall recent events. At this time, pt's overall speech/language function appears to be grossly WFL and at baseline. No further skilled intervention is warranted at this time. Please reconsult this service if

## 2022-05-30 NOTE — ED ADULT NURSE REASSESSMENT NOTE - NS ED NURSE REASSESS COMMENT FT1
Admitted for seizures, resting comfortably. VSS, .  Plan of care updated, warm blanket provided.
Pt passed PO screening and swallowed medications whole with apple sauce. VSS will reassess.

## 2022-05-30 NOTE — SWALLOW BEDSIDE ASSESSMENT ADULT - COMMENTS
Pt received upright in bed, A&Ox4, on room air. Pt was agreeable to assessment. Pt followed simple commands independently. Pt's vocal quality/intensity and speech production was WFL. Pt c/o headache at conclusion of session, RN made aware.    Pt known to this service from a previous admission. Clinical swallow assessment completed 12/27/21, at which time pt was recommended regular solids with thin liquids.    CT head 5/30: "No CT evidence of acute intracranial pathology. There is moderate generalized cerebral volume loss and disproportionate ventriculomegaly, unchanged from 12/21/2021. These findings are nonspecific but may represent normal pressure hydrocephalus in the proper clinical scenario. Correlate with clinical symptoms. Severe chronic microvascular ischemic disease is stable."  No CXR has been administered this admission. Pt's WBC is WFL.

## 2022-05-30 NOTE — H&P ADULT - NSHPPHYSICALEXAM_GEN_ALL_CORE
Vital Signs Last 24 Hrs  T(C): 37 (30 May 2022 03:02), Max: 37 (30 May 2022 03:02)  T(F): 98.6 (30 May 2022 03:02), Max: 98.6 (30 May 2022 03:02)  HR: 80 (30 May 2022 03:02) (75 - 88)  BP: 170/78 (30 May 2022 03:02) (170/78 - 205/87)  BP(mean): --  RR: 18 (30 May 2022 03:02) (16 - 18)  SpO2: 96% (30 May 2022 03:02) (95% - 99%)    Physical Exam:  General: lying in bed with lights off trying to sleep, No Acute Distress  HEENT: Normocephalic Atraumatic, PERRLA, EOMI bl, moist mucous membranes   Neck: Supple, nontender, no cervical lymphadenopathy  Neurology: A&Ox3, 4/5 LUE strength, 5/5 LLE RLE RUE, sensation intact b/l UEs and LEs, CNII-XII intact  Respiratory: Clear To Auscultation B/L, No Wheezes, rhonchi or rales  CV: Regular Rate and Rhythm, +S1/S2, no murmurs, rubs or gallops  Abdominal: Soft, Non-Tender, Non-Distended +Bowel Soundsx4  Extremities: No Clubbing, cyanosis or edema, + peripheral pulses: cap refill <2 secs LE bilaterally  Skin: warm, dry, normal color

## 2022-05-30 NOTE — CHART NOTE - NSCHARTNOTEFT_GEN_A_CORE
Hospitalist Attending Chart Update / Progress Note    Please see H&P written early today by Dr. Castaneda, for more information.   Pt seen, interviewed, and examined by me.    Subjectively, pt states she feels better. Denies fatigue, headache, dizziness, CP, SOB, palpitations.  VS stable                        10.9   5.19  )-----------( 175      ( 30 May 2022 10:43 )             34.3     CBC Full  -  ( 30 May 2022 10:43 )  WBC Count : 5.19 K/uL  Hemoglobin : 10.9 g/dL  Hematocrit : 34.3 %  Platelet Count - Automated : 175 K/uL  Mean Cell Volume : 91.2 fl  Mean Cell Hemoglobin : 29.0 pg  Mean Cell Hemoglobin Concentration : 31.8 gm/dL  Auto Neutrophil # : 3.39 K/uL  Auto Lymphocyte # : 1.05 K/uL  Auto Monocyte # : 0.60 K/uL  Auto Eosinophil # : 0.05 K/uL  Auto Basophil # : 0.06 K/uL  Auto Neutrophil % : 65.2 %  Auto Lymphocyte % : 20.2 %  Auto Monocyte % : 11.6 %  Auto Eosinophil % : 1.0 %  Auto Basophil % : 1.2 %    05-30    140  |  105  |  12  ----------------------------<  156<H>  3.6   |  29  |  0.84    Ca    7.9<L>      30 May 2022 10:43    TPro  5.9<L>  /  Alb  2.5<L>  /  TBili  0.2  /  DBili  x   /  AST  10<L>  /  ALT  15  /  AlkPhos  74  05-30      A&P:  89yo F w/ PMH of hemorraghic stroke 2015, seizure disorder (on keppra, carbamazepine), HTN, type 2 diabetes, hypothyroidism, HLD, p/w confusion, lethargy, a/w suspected seizure, will r/out CVA.  - symptomatically improving   - pt admitted to have intentionally missed some doses of her carbamazepine that may have caused the suspected seizure. Pt feels she is too lethargic when taking the AEDs.  - CT Brain without sign of acute CVA, did note ventriculomegaly   - MRI Brain to eval for acute CVA.  - S&S eval and rec regular diet with this liquids  - consult neuro (Denaa)  - phys therapy rec home with home PT  - discussed with pt's son Hospitalist Attending Chart Update / Progress Note     Please see H&P written early today by Dr. Castaneda, for more information.   Pt seen, interviewed, and examined by me.    Subjectively, pt states she feels better. Denies fatigue, headache, dizziness, CP, SOB, palpitations.  VS stable                        10.9   5.19  )-----------( 175      ( 30 May 2022 10:43 )             34.3     CBC Full  -  ( 30 May 2022 10:43 )  WBC Count : 5.19 K/uL  Hemoglobin : 10.9 g/dL  Hematocrit : 34.3 %  Platelet Count - Automated : 175 K/uL  Mean Cell Volume : 91.2 fl  Mean Cell Hemoglobin : 29.0 pg  Mean Cell Hemoglobin Concentration : 31.8 gm/dL  Auto Neutrophil # : 3.39 K/uL  Auto Lymphocyte # : 1.05 K/uL  Auto Monocyte # : 0.60 K/uL  Auto Eosinophil # : 0.05 K/uL  Auto Basophil # : 0.06 K/uL  Auto Neutrophil % : 65.2 %  Auto Lymphocyte % : 20.2 %  Auto Monocyte % : 11.6 %  Auto Eosinophil % : 1.0 %  Auto Basophil % : 1.2 %    05-30    140  |  105  |  12  ----------------------------<  156<H>  3.6   |  29  |  0.84    Ca    7.9<L>      30 May 2022 10:43    TPro  5.9<L>  /  Alb  2.5<L>  /  TBili  0.2  /  DBili  x   /  AST  10<L>  /  ALT  15  /  AlkPhos  74  05-30      A&P:  89yo F w/ PMH of hemorraghic stroke 2015, seizure disorder (on keppra, carbamazepine), HTN, type 2 diabetes, hypothyroidism, HLD, p/w confusion, lethargy, a/w suspected seizure, will r/out CVA.  - symptomatically improving   - pt admitted to have intentionally missed some doses of her carbamazepine that may have caused the suspected seizure. Pt feels she is too lethargic when taking the AEDs.  - CT Brain without sign of acute CVA, did note ventriculomegaly   - MRI Brain to eval for acute CVA.  - S&S eval and rec regular diet with this liquids  - consult neuro (Deana)  - phys therapy rec home with home PT  - discussed with pt's son

## 2022-05-30 NOTE — H&P ADULT - PROBLEM SELECTOR PLAN 4
hx Type 2 diabetes   hold home metformin  start ISS low dose  hypoglycemia protocol, fingersticks  f/u A1c

## 2022-05-30 NOTE — PHYSICAL THERAPY INITIAL EVALUATION ADULT - PERTINENT HX OF CURRENT PROBLEM, REHAB EVAL
89 y/o female adm 5/30 with L sided weakness s/p missed carbamazepne dose. Pt w/ h/o seizure disorder s/p CVA 2015.

## 2022-05-30 NOTE — H&P ADULT - NSHPREVIEWOFSYSTEMS_GEN_ALL_CORE
CONSTITUTIONAL: admits generalized weakness, denies fever, chills  HEENT: denies blurred visions, sore throat  SKIN: denies new lesions, rash  CARDIOVASCULAR: denies chest pain, chest pressure, palpitations  RESPIRATORY: denies shortness of breath, sputum production  GASTROINTESTINAL: denies nausea, vomiting, diarrhea, abdominal pain  GENITOURINARY: denies dysuria, hematuria   NEUROLOGICAL: admits headache, denies numbness, admits chronic residual LUE and LLE weakness s/p hx of hemorraghic stroke  MUSCULOSKELETAL: denies new joint pain, muscle aches  HEMATOLOGIC: denies gross bleeding, bruising  LYMPHATICS: denies enlarged lymph nodes, extremity swelling  PSYCHIATRIC: denies recent changes in anxiety, depression CONSTITUTIONAL: admits generalized weakness, denies fever, chills  HEENT: denies blurred visions, sore throat  SKIN: denies new lesions, rash  CARDIOVASCULAR: denies chest pain, chest pressure, palpitations  RESPIRATORY: denies shortness of breath, sputum production  GASTROINTESTINAL: denies nausea, vomiting, diarrhea, abdominal pain  GENITOURINARY: denies dysuria, hematuria   NEUROLOGICAL: admits headache, admits LUE weakness, denies numbness  MUSCULOSKELETAL: denies new joint pain, muscle aches  HEMATOLOGIC: denies gross bleeding, bruising  LYMPHATICS: denies enlarged lymph nodes, extremity swelling  PSYCHIATRIC: denies recent changes in anxiety, depression

## 2022-05-30 NOTE — PHYSICAL THERAPY INITIAL EVALUATION ADULT - ADDITIONAL COMMENTS
pt lives w/ her son in a house, no steps. Pt ambulates independently without device (has RW) and son assists w/ ADls as needed. son at bedside and stating he is usually at home w/ his mother.

## 2022-05-30 NOTE — H&P ADULT - NSHPSOCIALHISTORY_GEN_ALL_CORE
etoh denies, hx if smoking socially 1-2 cigarettes x 1 year in her 20s, denies illicit drug use   lives at home with son  performs most ADLs except needs help showering  ambulates independently   COVID vaccine x3, moderna   DNR, DNI MOLST in chart

## 2022-05-30 NOTE — SWALLOW BEDSIDE ASSESSMENT ADULT - ASR SWALLOW RECOMMEND DIAG
not warranted at this time, as pt appeared to tolerate PO and no clinical indication of pna at this time

## 2022-05-30 NOTE — ED ADULT NURSE NOTE - NSIMPLEMENTINTERV_GEN_ALL_ED
Implemented All Fall with Harm Risk Interventions:  [place] to call system. Call bell, personal items and telephone within reach. Instruct patient to call for assistance. Room bathroom lighting operational. Non-slip footwear when patient is off stretcher. Physically safe environment: no spills, clutter or unnecessary equipment. Stretcher in lowest position, wheels locked, appropriate side rails in place. Provide visual cue, wrist band, yellow gown, etc. Monitor gait and stability. Monitor for mental status changes and reorient to person, place, and time. Review medications for side effects contributing to fall risk. Reinforce activity limits and safety measures with patient and family. Provide visual clues: red socks.

## 2022-05-30 NOTE — SPEECH LANGUAGE PATHOLOGY EVALUATION - COMMENTS
CT head 5/30: "No CT evidence of acute intracranial pathology. There is moderate generalized cerebral volume loss and disproportionate ventriculomegaly, unchanged from 12/21/2021. These findings are nonspecific but may represent normal pressure hydrocephalus in the proper clinical scenario. Correlate with clinical symptoms. Severe chronic microvascular ischemic disease is stable."

## 2022-05-30 NOTE — H&P ADULT - PROBLEM SELECTOR PLAN 2
HTN urgency in ambulance and on admission  /107--> 205/87-labetalol 10 IVP --> 170/78  labetalol 10 IVP PRN for SBP >180   allow permissive HTN up to 180 until stroke ruled out _______ HTN urgency in ambulance and on admission  /107--> 205/87-labetalol 10 IVP --> 170/78  labetalol 10 IVP PRN for SBP >180   -HA in setting of elevated BP. Pain regimen: tylenol 650 mg q 6 ours PRN for mild pain, 1000 mg q 6 hours for moderate pain, 1000 mg q 6 hours for severe pain   takes home amlodipine, carvedilol, irbesartan ____  allow permissive HTN up to 180 until stroke ruled out _______ HTN urgency in ambulance and on admission  -/107--> 205/87-labetalol 10 IVP --> 170/78  -HA in setting of elevated BP. Pain regimen: tylenol 650 mg q 6 ours PRN for mild pain, 1000 mg q 6 hours for moderate pain, 1000 mg q 6 hours for severe pain   -takes home amlodipine, carvedilol, irbesartan, continue

## 2022-05-30 NOTE — H&P ADULT - ASSESSMENT
89 YO F PMHX hemorraghic stroke 2015--> seizures (on Keppra, carbamazepine), HTN, type 2 diabetes, hypothyroidism, HLD here for HA 9/10 and weakness since last evening. Admit for suspected post ictal state, r/o stroke. spoke w son elliot. phones on floor, slidding down bed dara.   as per pt l side wkness,   sensation, bucking,   Pt states she did not take her carbamazepine dose because its makes her very sleepy during the day. Patient gives herself her medications at home, performs all ADLs except for needs help showering, ambulates independently, lives with her son. Pt states otherwise she rarely has any seizures, seizure disorder is prior to today well controlled by medication. HA is in occipital region b/l 5/10 now after receiving pain med sin ER. Pt also admits to generalized weakness since this evening. Pt denies any dizziness, lightheadedness, blurred vision, numbness, paresthesias, syncope, CP, SOB, cough, fever, chills. As per EMS reports, on EMS arrival patient was on the floor, denied fall but thought she had a unwitnessed seizure son called 911 for patient. Pt was found to be A & O x 4, no signs of trauma, CNs grossly intact. Pt noted to be hypertensive 216/110, 196/104, 186/104, in sinus rhythm. Pt noted by EMS to have LUE weakness and LUE drift, weakness in gait. When patient was moved to the ambulance she had slight AMS A & O x 2, AMS self corrected in ambulance after approximately 1 minute. Last well known 1600, patient unsure of onset of symptoms.     ED vitals afebrile, HR 80, /107--> 205/87-labetalol 10 IVP --> 170/78, 96% on RA.   Labs significant for glucose 155, Ca 8.3, albumin 3, carbamazepine 11.4.   Pt received carbamazepine 200 mg x1, labetalol 10 m IV x1.   CT head no acute intracranial pathology. There is moderate generalized cerebral volume loss and disproportionate ventriculomegaly, unchanged from 12/21/2021. Severe chronic microvascular ischemic disease is stable.    [facility]      89 YO F PMHX hemorraghic stroke 2015, seizure disorder (keppra, carbamazepine), HTN, type 2 diabetes, hypothyroidism, HLD. Admit for suspected post ictal state, r/o stroke.

## 2022-05-30 NOTE — PATIENT PROFILE ADULT - FALL HARM RISK - HARM RISK INTERVENTIONS

## 2022-05-30 NOTE — H&P ADULT - NSHPOUTPATIENTPROVIDERS_GEN_ALL_CORE
PCP Dr. Pedrito Rosenthal  neuro Dr. Gonsalez ([place], [place])  pharmacy: [facility] (for emergency same day pickup [facility]) PCP Dr. Pedrito Rosenthal  neuro Dr. Gonsalez ([place], [place])  pharmacy: [facility] (for emergency same day pickup sometimes uses [facility])

## 2022-05-30 NOTE — SWALLOW BEDSIDE ASSESSMENT ADULT - SLP PERTINENT HISTORY OF CURRENT PROBLEM
Per charting, "89 YO F PMHX hemorraghic stroke 2015, seizure disorder (keppra, carbamazepine), HTN, type 2 diabetes, hypothyroidism, HLD. Admit for suspected post ictal state, r/o stroke."

## 2022-05-30 NOTE — H&P ADULT - PROBLEM SELECTOR PLAN 1
-HA, left sided weakness s/p missed carbamazepine dose, carbamazepine 11.4.   -suspect post ictal, r/o stroke   -CT head no acute intracranial pathology. There is moderate generalized cerebral volume loss and disproportionate ventriculomegaly, unchanged from 12/21/2021. Severe chronic microvascular ischemic disease is stable.  -admit to tele  -carbamazepine 200 mg x1 in ER   -son is concerned that carbamazepine dose increased in December which led to severe sleepiness (patient falls asleep eating breakfast), for this reason patient skipped her carbamazepine dose which likely led to seizure resulting in tonight's admission   -passed bedside speech and swallow, NPO except meds until official s/s in AM ________  -EEG  -stroke protocol, neuro checks q 4, continue home atorvastatin 40 mg  -f/u lipid panel AM, TSH AM   -consulted neuro Dr. Leblanc -HA, left sided weakness s/p missed carbamazepine dose, carbamazepine 11.4.   -suspect post ictal, r/o stroke   -CT head no acute intracranial pathology. There is moderate generalized cerebral volume loss and disproportionate ventriculomegaly, unchanged from 12/21/2021. Severe chronic microvascular ischemic disease is stable.  -admit to tele  -carbamazepine 200 mg x1 in ER, continue home dose Keppra and carbamazepine   -son is concerned that carbamazepine dose increased in December which led to severe sleepiness (patient falls asleep eating breakfast), for this reason patient skipped her carbamazepine dose which likely led to seizure resulting in tonight's admission   -passed bedside speech and swallow, NPO except meds until official s/s in AM ________  -EEG  -stroke protocol, neuro checks q 4, continue home rosuvastatin   -f/u lipid panel AM, TSH AM   -consulted neuro Dr. Leblanc -HA, left sided weakness s/p missed carbamazepine dose, carbamazepine 11.4.   -suspect post ictal, r/o stroke   -CT head no acute intracranial pathology. There is moderate generalized cerebral volume loss and disproportionate ventriculomegaly, unchanged from 12/21/2021. Severe chronic microvascular ischemic disease is stable.  -admit to tele  -carbamazepine 200 mg x1 in ER, continue home dose Keppra and carbamazepine   -son is concerned that carbamazepine dose increased in December which led to severe sleepiness (patient falls asleep eating breakfast), for this reason patient skipped her carbamazepine dose which likely led to seizure resulting in tonight's admission   -passed bedside speech and swallow, NPO except meds until official s/s in AM   -EEG  -stroke protocol, neuro checks q 4, continue home rosuvastatin   -f/u lipid panel AM, TSH AM   -consulted neuro Dr. Leblanc

## 2022-05-30 NOTE — ED ADULT NURSE NOTE - OBJECTIVE STATEMENT
Pt son called ems because pt had a headache, nausea and was limb on the left side. As per son this happens when pt has a seizure. Pt was unwitnessed having a seizure. Pt has a hx of seizures and a hemorraghic stroke.

## 2022-05-31 ENCOUNTER — APPOINTMENT (OUTPATIENT)
Dept: NEUROLOGY | Facility: CLINIC | Age: 87
End: 2022-05-31

## 2022-05-31 LAB
A1C WITH ESTIMATED AVERAGE GLUCOSE RESULT: 6.6 % — HIGH (ref 4–5.6)
A1C WITH ESTIMATED AVERAGE GLUCOSE RESULT: 6.6 % — HIGH (ref 4–5.6)
ANION GAP SERPL CALC-SCNC: 6 MMOL/L — SIGNIFICANT CHANGE UP (ref 5–17)
APPEARANCE UR: CLEAR — SIGNIFICANT CHANGE UP
BACTERIA # UR AUTO: ABNORMAL
BILIRUB UR-MCNC: NEGATIVE — SIGNIFICANT CHANGE UP
BUN SERPL-MCNC: 15 MG/DL — SIGNIFICANT CHANGE UP (ref 7–23)
CALCIUM SERPL-MCNC: 8.2 MG/DL — LOW (ref 8.5–10.1)
CHLORIDE SERPL-SCNC: 104 MMOL/L — SIGNIFICANT CHANGE UP (ref 96–108)
CO2 SERPL-SCNC: 29 MMOL/L — SIGNIFICANT CHANGE UP (ref 22–31)
COLOR SPEC: YELLOW — SIGNIFICANT CHANGE UP
CREAT SERPL-MCNC: 0.73 MG/DL — SIGNIFICANT CHANGE UP (ref 0.5–1.3)
DIFF PNL FLD: NEGATIVE — SIGNIFICANT CHANGE UP
EGFR: 78 ML/MIN/1.73M2 — SIGNIFICANT CHANGE UP
EPI CELLS # UR: ABNORMAL
ESTIMATED AVERAGE GLUCOSE: 143 MG/DL — HIGH (ref 68–114)
ESTIMATED AVERAGE GLUCOSE: 143 MG/DL — HIGH (ref 68–114)
GLUCOSE SERPL-MCNC: 118 MG/DL — HIGH (ref 70–99)
GLUCOSE UR QL: NEGATIVE — SIGNIFICANT CHANGE UP
HCT VFR BLD CALC: 31.6 % — LOW (ref 34.5–45)
HGB BLD-MCNC: 10.4 G/DL — LOW (ref 11.5–15.5)
KETONES UR-MCNC: NEGATIVE — SIGNIFICANT CHANGE UP
LEUKOCYTE ESTERASE UR-ACNC: ABNORMAL
MAGNESIUM SERPL-MCNC: 2 MG/DL — SIGNIFICANT CHANGE UP (ref 1.6–2.6)
MCHC RBC-ENTMCNC: 29.6 PG — SIGNIFICANT CHANGE UP (ref 27–34)
MCHC RBC-ENTMCNC: 32.9 GM/DL — SIGNIFICANT CHANGE UP (ref 32–36)
MCV RBC AUTO: 90 FL — SIGNIFICANT CHANGE UP (ref 80–100)
NITRITE UR-MCNC: NEGATIVE — SIGNIFICANT CHANGE UP
NRBC # BLD: 0 /100 WBCS — SIGNIFICANT CHANGE UP (ref 0–0)
PH UR: 6 — SIGNIFICANT CHANGE UP (ref 5–8)
PLATELET # BLD AUTO: 156 K/UL — SIGNIFICANT CHANGE UP (ref 150–400)
POTASSIUM SERPL-MCNC: 4 MMOL/L — SIGNIFICANT CHANGE UP (ref 3.5–5.3)
POTASSIUM SERPL-SCNC: 4 MMOL/L — SIGNIFICANT CHANGE UP (ref 3.5–5.3)
PROCALCITONIN SERPL-MCNC: <0.05 — SIGNIFICANT CHANGE UP (ref 0–0.04)
PROT UR-MCNC: 30 MG/DL
RAPID RVP RESULT: SIGNIFICANT CHANGE UP
RBC # BLD: 3.51 M/UL — LOW (ref 3.8–5.2)
RBC # FLD: 14.1 % — SIGNIFICANT CHANGE UP (ref 10.3–14.5)
RBC CASTS # UR COMP ASSIST: SIGNIFICANT CHANGE UP /HPF (ref 0–4)
SARS-COV-2 RNA SPEC QL NAA+PROBE: SIGNIFICANT CHANGE UP
SODIUM SERPL-SCNC: 139 MMOL/L — SIGNIFICANT CHANGE UP (ref 135–145)
SP GR SPEC: 1.01 — SIGNIFICANT CHANGE UP (ref 1.01–1.02)
UROBILINOGEN FLD QL: NEGATIVE — SIGNIFICANT CHANGE UP
WBC # BLD: 5.52 K/UL — SIGNIFICANT CHANGE UP (ref 3.8–10.5)
WBC # FLD AUTO: 5.52 K/UL — SIGNIFICANT CHANGE UP (ref 3.8–10.5)
WBC UR QL: SIGNIFICANT CHANGE UP

## 2022-05-31 PROCEDURE — 70551 MRI BRAIN STEM W/O DYE: CPT | Mod: 26

## 2022-05-31 PROCEDURE — 99233 SBSQ HOSP IP/OBS HIGH 50: CPT

## 2022-05-31 PROCEDURE — 71045 X-RAY EXAM CHEST 1 VIEW: CPT | Mod: 26

## 2022-05-31 RX ORDER — ACETAMINOPHEN 500 MG
325 TABLET ORAL EVERY 6 HOURS
Refills: 0 | Status: DISCONTINUED | OUTPATIENT
Start: 2022-05-31 | End: 2022-06-02

## 2022-05-31 RX ORDER — ACETAMINOPHEN 500 MG
650 TABLET ORAL EVERY 6 HOURS
Refills: 0 | Status: DISCONTINUED | OUTPATIENT
Start: 2022-05-31 | End: 2022-06-02

## 2022-05-31 RX ORDER — LEVETIRACETAM 250 MG/1
500 TABLET, FILM COATED ORAL
Refills: 0 | Status: DISCONTINUED | OUTPATIENT
Start: 2022-05-31 | End: 2022-06-02

## 2022-05-31 RX ADMIN — Medication 200 MILLIGRAM(S): at 21:19

## 2022-05-31 RX ADMIN — NYSTATIN CREAM 1 APPLICATION(S): 100000 CREAM TOPICAL at 05:10

## 2022-05-31 RX ADMIN — LEVETIRACETAM 250 MILLIGRAM(S): 250 TABLET, FILM COATED ORAL at 09:03

## 2022-05-31 RX ADMIN — Medication 88 MICROGRAM(S): at 05:10

## 2022-05-31 RX ADMIN — Medication 100 MILLIGRAM(S): at 15:14

## 2022-05-31 RX ADMIN — Medication 200 MILLIGRAM(S): at 09:03

## 2022-05-31 RX ADMIN — Medication 650 MILLIGRAM(S): at 06:40

## 2022-05-31 RX ADMIN — CARVEDILOL PHOSPHATE 12.5 MILLIGRAM(S): 80 CAPSULE, EXTENDED RELEASE ORAL at 05:10

## 2022-05-31 RX ADMIN — Medication 650 MILLIGRAM(S): at 14:00

## 2022-05-31 RX ADMIN — LEVETIRACETAM 500 MILLIGRAM(S): 250 TABLET, FILM COATED ORAL at 21:19

## 2022-05-31 RX ADMIN — NYSTATIN CREAM 1 APPLICATION(S): 100000 CREAM TOPICAL at 14:49

## 2022-05-31 RX ADMIN — AMLODIPINE BESYLATE 5 MILLIGRAM(S): 2.5 TABLET ORAL at 05:10

## 2022-05-31 RX ADMIN — CARVEDILOL PHOSPHATE 12.5 MILLIGRAM(S): 80 CAPSULE, EXTENDED RELEASE ORAL at 17:52

## 2022-05-31 RX ADMIN — Medication 650 MILLIGRAM(S): at 12:48

## 2022-05-31 RX ADMIN — NYSTATIN CREAM 1 APPLICATION(S): 100000 CREAM TOPICAL at 21:43

## 2022-05-31 RX ADMIN — LOSARTAN POTASSIUM 50 MILLIGRAM(S): 100 TABLET, FILM COATED ORAL at 05:10

## 2022-05-31 RX ADMIN — LISINOPRIL 20 MILLIGRAM(S): 2.5 TABLET ORAL at 05:10

## 2022-05-31 NOTE — PROGRESS NOTE ADULT - SUBJECTIVE AND OBJECTIVE BOX
Patient is a 89y old  Female who presents with a chief complaint of confusion, left-sided weakness, typical of post-ictal state from prior seizures.      INTERVAL HPI/OVERNIGHT EVENTS: This afternoon, RN notified that pt had fever to 101F. Pt is generally irritable and states she feels "fine." Denies fever, chills, dysuria, abd pain, flank pain, cough, SOB, CP, headache.    MEDICATIONS  (STANDING):  amLODIPine   Tablet 5 milliGRAM(s) Oral daily  atorvastatin 80 milliGRAM(s) Oral at bedtime  carBAMazepine ER Tablet 200 milliGRAM(s) Oral <User Schedule>  carBAMazepine ER Tablet 100 milliGRAM(s) Oral <User Schedule>  carvedilol 12.5 milliGRAM(s) Oral every 12 hours  dextrose 5%. 1000 milliLiter(s) (100 mL/Hr) IV Continuous <Continuous>  dextrose 5%. 1000 milliLiter(s) (50 mL/Hr) IV Continuous <Continuous>  dextrose 50% Injectable 25 Gram(s) IV Push once  dextrose 50% Injectable 12.5 Gram(s) IV Push once  dextrose 50% Injectable 25 Gram(s) IV Push once  glucagon  Injectable 1 milliGRAM(s) IntraMuscular once  insulin lispro (ADMELOG) corrective regimen sliding scale   SubCutaneous three times a day before meals  insulin lispro (ADMELOG) corrective regimen sliding scale   SubCutaneous at bedtime  levETIRAcetam 500 milliGRAM(s) Oral <User Schedule>  levothyroxine 88 MICROGram(s) Oral daily  lidocaine   4% Patch 1 Patch Transdermal daily  lisinopril 20 milliGRAM(s) Oral daily  losartan 50 milliGRAM(s) Oral daily  nystatin Powder 1 Application(s) Topical three times a day    MEDICATIONS  (PRN):  acetaminophen     Tablet .. 650 milliGRAM(s) Oral every 6 hours PRN Temp greater or equal to 38C (100.4F), Moderate Pain (4 - 6)  acetaminophen     Tablet .. 325 milliGRAM(s) Oral every 6 hours PRN Mild Pain (1 - 3)  dextrose Oral Gel 15 Gram(s) Oral once PRN Blood Glucose LESS THAN 70 milliGRAM(s)/deciliter      Allergies    aspirin (Unknown)  sulfa drugs (Unknown)    Intolerances    Lipitor (Other; Muscle Pain)      REVIEW OF SYSTEMS:  CONSTITUTIONAL: had fever recorded; pt denies subjective fever or chills  HEENT:  No headache, no sore throat  RESPIRATORY: No cough, wheezing, or shortness of breath  CARDIOVASCULAR: No chest pain, palpitations  GASTROINTESTINAL: No abd pain, nausea, vomiting, or diarrhea  GENITOURINARY: No dysuria, frequency, or hematuria  NEUROLOGICAL: no focal weakness or dizziness  MUSCULOSKELETAL: no myalgias     Vital Signs Last 24 Hrs  T(F): 101 (31 May 2022 12:21), Max: 101 (31 May 2022 12:21)  HR: 76 (31 May 2022 12:21) (68 - 77)  BP: 152/73 (31 May 2022 12:21) (120/71 - 170/92)  RR: 18 (31 May 2022 12:21) (17 - 18)  SpO2: 91% (31 May 2022 12:21) (91% - 95%)    PHYSICAL EXAM:  GENERAL: NAD, irritable   HEENT:  anicteric, moist mucous membranes  CHEST/LUNG:  CTA b/l, no rales, wheezes, or rhonchi  HEART:  RRR, S1, S2  ABDOMEN:  BS+, soft, nontender, nondistended  EXTREMITIES: no cyanosis or calf tenderness  NERVOUS SYSTEM: answers most yes/no questions and follows simple commands appropriately, though is irritable and cut exam short    LABS:                        10.4   5.52  )-----------( 156      ( 31 May 2022 08:32 )             31.6     CBC Full  -  ( 31 May 2022 08:32 )  WBC Count : 5.52 K/uL  Hemoglobin : 10.4 g/dL  Hematocrit : 31.6 %  Platelet Count - Automated : 156 K/uL  Mean Cell Volume : 90.0 fl  Mean Cell Hemoglobin : 29.6 pg  Mean Cell Hemoglobin Concentration : 32.9 gm/dL  Auto Neutrophil # : x  Auto Lymphocyte # : x  Auto Monocyte # : x  Auto Eosinophil # : x  Auto Basophil # : x  Auto Neutrophil % : x  Auto Lymphocyte % : x  Auto Monocyte % : x  Auto Eosinophil % : x  Auto Basophil % : x    31 May 2022 08:32    139    |  104    |  15     ----------------------------<  118    4.0     |  29     |  0.73     Ca    8.2        31 May 2022 08:32  Mg     2.0       31 May 2022 08:32        Urinalysis Basic - ( 31 May 2022 14:31 )    Color: Yellow / Appearance: Clear / S.015 / pH: x  Gluc: x / Ketone: Negative  / Bili: Negative / Urobili: Negative   Blood: x / Protein: 30 mg/dL / Nitrite: Negative   Leuk Esterase: Trace / RBC: 0-2 /HPF / WBC 3-5   Sq Epi: x / Non Sq Epi: Moderate / Bacteria: Few      CAPILLARY BLOOD GLUCOSE    POCT Blood Glucose.: 119 mg/dL (31 May 2022 12:28)  POCT Blood Glucose.: 114 mg/dL (31 May 2022 07:57)          RADIOLOGY & ADDITIONAL TESTS:    Personally reviewed.     Consultant(s) Notes Reviewed:  [x] YES  [ ] NO     Patient is a 89y old  Female who presents with a chief complaint of confusion, left-sided weakness, typical of post-ictal state from prior seizures.       INTERVAL HPI/OVERNIGHT EVENTS: This afternoon, RN notified that pt had fever to 101F. Pt is generally irritable and states she feels "fine." Denies fever, chills, dysuria, abd pain, flank pain, cough, SOB, CP, headache.    MEDICATIONS  (STANDING):  amLODIPine   Tablet 5 milliGRAM(s) Oral daily  atorvastatin 80 milliGRAM(s) Oral at bedtime  carBAMazepine ER Tablet 200 milliGRAM(s) Oral <User Schedule>  carBAMazepine ER Tablet 100 milliGRAM(s) Oral <User Schedule>  carvedilol 12.5 milliGRAM(s) Oral every 12 hours  dextrose 5%. 1000 milliLiter(s) (100 mL/Hr) IV Continuous <Continuous>  dextrose 5%. 1000 milliLiter(s) (50 mL/Hr) IV Continuous <Continuous>  dextrose 50% Injectable 25 Gram(s) IV Push once  dextrose 50% Injectable 12.5 Gram(s) IV Push once  dextrose 50% Injectable 25 Gram(s) IV Push once  glucagon  Injectable 1 milliGRAM(s) IntraMuscular once  insulin lispro (ADMELOG) corrective regimen sliding scale   SubCutaneous three times a day before meals  insulin lispro (ADMELOG) corrective regimen sliding scale   SubCutaneous at bedtime  levETIRAcetam 500 milliGRAM(s) Oral <User Schedule>  levothyroxine 88 MICROGram(s) Oral daily  lidocaine   4% Patch 1 Patch Transdermal daily  lisinopril 20 milliGRAM(s) Oral daily  losartan 50 milliGRAM(s) Oral daily  nystatin Powder 1 Application(s) Topical three times a day    MEDICATIONS  (PRN):  acetaminophen     Tablet .. 650 milliGRAM(s) Oral every 6 hours PRN Temp greater or equal to 38C (100.4F), Moderate Pain (4 - 6)  acetaminophen     Tablet .. 325 milliGRAM(s) Oral every 6 hours PRN Mild Pain (1 - 3)  dextrose Oral Gel 15 Gram(s) Oral once PRN Blood Glucose LESS THAN 70 milliGRAM(s)/deciliter      Allergies    aspirin (Unknown)  sulfa drugs (Unknown)    Intolerances    Lipitor (Other; Muscle Pain)      REVIEW OF SYSTEMS:  CONSTITUTIONAL: had fever recorded; pt denies subjective fever or chills  HEENT:  No headache, no sore throat  RESPIRATORY: No cough, wheezing, or shortness of breath  CARDIOVASCULAR: No chest pain, palpitations  GASTROINTESTINAL: No abd pain, nausea, vomiting, or diarrhea  GENITOURINARY: No dysuria, frequency, or hematuria  NEUROLOGICAL: no focal weakness or dizziness  MUSCULOSKELETAL: no myalgias     Vital Signs Last 24 Hrs  T(F): 101 (31 May 2022 12:21), Max: 101 (31 May 2022 12:21)  HR: 76 (31 May 2022 12:21) (68 - 77)  BP: 152/73 (31 May 2022 12:21) (120/71 - 170/92)  RR: 18 (31 May 2022 12:21) (17 - 18)  SpO2: 91% (31 May 2022 12:21) (91% - 95%)    PHYSICAL EXAM:  GENERAL: NAD, irritable   HEENT:  anicteric, moist mucous membranes  CHEST/LUNG:  CTA b/l, no rales, wheezes, or rhonchi  HEART:  RRR, S1, S2  ABDOMEN:  BS+, soft, nontender, nondistended  EXTREMITIES: no cyanosis or calf tenderness  NERVOUS SYSTEM: answers most yes/no questions and follows simple commands appropriately, though is irritable and cut exam short    LABS:                        10.4   5.52  )-----------( 156      ( 31 May 2022 08:32 )             31.6     CBC Full  -  ( 31 May 2022 08:32 )  WBC Count : 5.52 K/uL  Hemoglobin : 10.4 g/dL  Hematocrit : 31.6 %  Platelet Count - Automated : 156 K/uL  Mean Cell Volume : 90.0 fl  Mean Cell Hemoglobin : 29.6 pg  Mean Cell Hemoglobin Concentration : 32.9 gm/dL  Auto Neutrophil # : x  Auto Lymphocyte # : x  Auto Monocyte # : x  Auto Eosinophil # : x  Auto Basophil # : x  Auto Neutrophil % : x  Auto Lymphocyte % : x  Auto Monocyte % : x  Auto Eosinophil % : x  Auto Basophil % : x    31 May 2022 08:32    139    |  104    |  15     ----------------------------<  118    4.0     |  29     |  0.73     Ca    8.2        31 May 2022 08:32  Mg     2.0       31 May 2022 08:32        Urinalysis Basic - ( 31 May 2022 14:31 )    Color: Yellow / Appearance: Clear / S.015 / pH: x  Gluc: x / Ketone: Negative  / Bili: Negative / Urobili: Negative   Blood: x / Protein: 30 mg/dL / Nitrite: Negative   Leuk Esterase: Trace / RBC: 0-2 /HPF / WBC 3-5   Sq Epi: x / Non Sq Epi: Moderate / Bacteria: Few      CAPILLARY BLOOD GLUCOSE    POCT Blood Glucose.: 119 mg/dL (31 May 2022 12:28)  POCT Blood Glucose.: 114 mg/dL (31 May 2022 07:57)          RADIOLOGY & ADDITIONAL TESTS:    Personally reviewed.     Consultant(s) Notes Reviewed:  [x] YES  [ ] NO

## 2022-05-31 NOTE — CONSULT NOTE ADULT - ASSESSMENT
Pt is a 89W w/ PMHx of hemorraghic stroke 2015, seizure disorder (keppra, carbamazepine), HTN, type 2 diabetes, hypothyroidism, HLD here for HA 9/10 and weakness since last evening, c/f unwitnessed seizure. Pt w/ hypertensive emergency and LUE weakness and LUE drift, weakness in gait. Had brief AMS--resolved.   CTH w/o acute pathology  ID c/s for further evaluation of fever to 101F on 5/31    FUO  Fever--unclear cause--can be 2/2 infectious etiology vs. noninfectious etiology  Pt denying any sx to localize infection.   Tm 101F, other VSS and no obvious lab abnormalities to suggest sepsis (WBC WNL)  Plan:   F/u CXR  F/u infectious w/u  --UA/UCx  --BCx x2  --RVP  F/u procal  Trend temps  C/w supportive care  Extremely low suspicion for bacterial etiology of infection at this time, will c/w close clinical monitoring  Continue to monitor off Abx    D/w patient and  at bedside    Infectious Diseases will continue to follow. Please call with any questions.   Ruby Nunez M.D.  [facility], Division of Infectious Diseases [phone]     Pt is a 89W w/ PMHx of hemorraghic stroke 2015, seizure disorder (keppra, carbamazepine), HTN, type 2 diabetes, hypothyroidism, HLD here for HA 9/10 and weakness since last evening, c/f unwitnessed seizure. Pt w/ hypertensive emergency and LUE weakness and LUE drift, weakness in gait. Had brief AMS--resolved.   CTH w/o acute pathology  ID c/s for further evaluation of fever to 101F on 5/31    FUO  Fever--unclear cause--can be 2/2 infectious etiology vs. noninfectious etiology  Pt denying any sx to localize infection.   Tm 101F, other VSS and no obvious lab abnormalities to suggest sepsis (WBC WNL)  Plan:   F/u CXR  F/u infectious w/u  --UA/UCx  --BCx x2  --RVP  F/u procal  Trend temps  C/w supportive care  Extremely low suspicion for bacterial etiology of infection at this time, will c/w close clinical monitoring  Continue to monitor off Abx    D/w patient and  at bedside    D/w Dr. Goldberg    Infectious Diseases will continue to follow. Please call with any questions.   Ruby Nunez M.D.  [facility], Division of Infectious Diseases [phone]

## 2022-05-31 NOTE — CONSULT NOTE ADULT - SUBJECTIVE AND OBJECTIVE BOX
[facility], Division of Infectious Diseases  MIGEL Escudero S. Shah, Y. Patel, G. [facility]  [phone]    MAJOR PEREZ  89y, Female  [id number]    HPI--  HPI:  89 YO F PMHX hemorraghic stroke 2015, seizure disorder (keppra, carbamazepine), HTN, type 2 diabetes, hypothyroidism, HLD here for HA 9/10 and weakness since last evening Pt states she did not take her carbamazepine dose because its makes her very sleepy during the day. Spoke with son Simone who states his brother Quintin entered the home and patient was on the floor with three phones, was sliding down her bed, left sided weakness. As per son, left sided weakness was how her prior seizures presented as well. Patient gives herself her medications at home, performs all ADLs except for needs help showering, ambulates independently, lives with her son. Pt has [place] [id number], ever since then she developed seizures. Patient had her carbamazepine increased in December, which made her very lethargic, as per son patient was falling asleep eating cereal in the morning which is not her normal self. HA is in occipital region b/l 5/10 now after receiving pain med sin ER. Pt also admits to generalized weakness since this evening. Pt denies any dizziness, lightheadedness, blurred vision, numbness, paresthesias, syncope, CP, SOB, cough, fever, chills. As per EMS reports, on EMS arrival patient was on the floor, denied fall but thought she had a unwitnessed seizure son called 911 for patient. Pt was found to be A & O x 4, no signs of trauma, CNs grossly intact. Pt noted to be hypertensive 216/110, 196/104, 186/104, in sinus rhythm. Pt noted by EMS to have LUE weakness and LUE drift, weakness in gait. When patient was moved to the ambulance she had slight AMS A & O x 2, AMS self corrected in ambulance after approximately 1 minute. Last well known 1600, patient unsure of onset of symptoms.     ED vitals afebrile, HR 80, /107--> 205/87-labetalol 10 IVP --> 170/78, 96% on RA.   Labs significant for glucose 155, Ca 8.3, albumin 3, carbamazepine 11.4.   Pt received carbamazepine 200 mg x1, labetalol 10 m IV x1.   CT head no acute intracranial pathology. There is moderate generalized cerebral volume loss and disproportionate ventriculomegaly, unchanged from 12/21/2021. Severe chronic microvascular ischemic disease is stable.     (30 May 2022 03:34)      ID c/s for further evaluation of fever to 101F.   Pt seen at bedside   at bedside    Pt did not want to engage in interview, but hx noted as above per .   Pt denying fevers/chills/n/v/d  Denies abdominal pain or urinary sx  Denies URI sx, cough      Since admission pt feeling much better.  reporting that pt has some L sided weakness, but mentation back to baseline        Active Medications--  acetaminophen     Tablet .. 650 milliGRAM(s) Oral every 6 hours PRN  acetaminophen     Tablet .. 325 milliGRAM(s) Oral every 6 hours PRN  amLODIPine   Tablet 5 milliGRAM(s) Oral daily  atorvastatin 80 milliGRAM(s) Oral at bedtime  carBAMazepine ER Tablet 200 milliGRAM(s) Oral <User Schedule>  carBAMazepine ER Tablet 100 milliGRAM(s) Oral <User Schedule>  carvedilol 12.5 milliGRAM(s) Oral every 12 hours  dextrose 5%. 1000 milliLiter(s) IV Continuous <Continuous>  dextrose 5%. 1000 milliLiter(s) IV Continuous <Continuous>  dextrose 50% Injectable 25 Gram(s) IV Push once  dextrose 50% Injectable 12.5 Gram(s) IV Push once  dextrose 50% Injectable 25 Gram(s) IV Push once  dextrose Oral Gel 15 Gram(s) Oral once PRN  glucagon  Injectable 1 milliGRAM(s) IntraMuscular once  insulin lispro (ADMELOG) corrective regimen sliding scale   SubCutaneous three times a day before meals  insulin lispro (ADMELOG) corrective regimen sliding scale   SubCutaneous at bedtime  levETIRAcetam 250 milliGRAM(s) Oral <User Schedule>  levothyroxine 88 MICROGram(s) Oral daily  lidocaine   4% Patch 1 Patch Transdermal daily  lisinopril 20 milliGRAM(s) Oral daily  losartan 50 milliGRAM(s) Oral daily  nystatin Powder 1 Application(s) Topical three times a day    Antimicrobials:     Immunologic:     ROS:  CONSTITUTIONAL: No fevers or chills. No weakness or headache. No weight changes.  EYES/ENT: No visual or hearing changes. No sore throat or throat pain .  NECK: No pain or stiffness  RESPIRATORY: No cough, wheezing, or hemoptysis. No shortness of breath  CARDIOVASCULAR: No chest pain or palpitations  GASTROINTESTINAL: No abdominal pain. No nausea or vomiting. No diarrhea or constipation.  GENITOURINARY: No dysuria, frequency or hematuria  NEUROLOGICAL: No numbness or weakness  SKIN: No itching or rashes  PSYCHIATRIC: Pleasant. Appropriate affect    Allergies: aspirin (Unknown)  sulfa drugs (Unknown)    PMH -- Diabetes mellitus    Hypercholesteremia    HTN (hypertension)    CVA (cerebral infarction)    Hemorrhagic stroke    Seizure    Chronic diastolic heart failure      PSH -- History of arthroscopy of knee    S/P cataract surgery    History of hysterectomy    Basal cell cancer    History of retinal tear      FH -- Family history of cerebrovascular accident (CVA)    Family history of basal cell carcinoma    Family history of heart disease    FHx: heart disease    Family history of basal cell carcinoma      Social History --  EtOH: denies   Tobacco: denies   Drug Use: denies     Travel/Environmental/Occupational History:    Physical Exam--  Vital Signs Last 24 Hrs  T(F): 101 (31 May 2022 12:21), Max: 101 (31 May 2022 12:21)  HR: 76 (31 May 2022 12:21) (68 - 77)  BP: 152/73 (31 May 2022 12:21) (120/71 - 170/92)  RR: 18 (31 May 2022 12:21) (17 - 18)  SpO2: 91% (31 May 2022 12:21) (91% - 95%)  General: nontoxic-appearing, no acute distress  HEENT: NC/AT, EOMI  Patient did not allow for examination    Laboratory & Imaging Data--  CBC:                       10.4   5.52  )-----------( 156      ( 31 May 2022 08:32 )             31.6     CMP: 05-31    139  |  104  |  15  ----------------------------<  118<H>  4.0   |  29  |  0.73    Ca    8.2<L>      31 May 2022 08:32  Mg     2.0     05-31    TPro  5.9<L>  /  Alb  2.5<L>  /  TBili  0.2  /  DBili  x   /  AST  10<L>  /  ALT  15  /  AlkPhos  74  05-30    LIVER FUNCTIONS - ( 30 May 2022 10:43 )  Alb: 2.5 g/dL / Pro: 5.9 g/dL / ALK PHOS: 74 U/L / ALT: 15 U/L / AST: 10 U/L / GGT: x               Microbiology: reviewed      Radiology: reviewed    < from: MR Head No Cont (05.31.22 @ 13:31) >    ACC: [id number] EXAM:  MR BRAIN                          PROCEDURE DATE:  05/31/2022          INTERPRETATION:  INDICATION:    Stroke  TECHNIQUE:  Multiplanar brain imaging was conducted. T1, T2 and FLAIR   imaging was performed.  In addition, diffusion imaging, diffusion   coefficient assessment (ADC) and T2* was incorporated . No contrast   administered.  COMPARISON EXAMINATION:  CT 5/30/2022. MR 12/22/2021.    FINDINGS:    HEMISPHERES: No diffusion restriction or acute ischemia is noted that.   There are extensive chronic ischemic changes throughout the white matter   of both hemispheres. There is an old right thalamic infarct with mild   chronic changes in the basal ganglia as well. There is an old hemorrhage   in the right temporal parietal region, with hemosiderin deposition along   with gliosis and encephalomalacia. Hippocampal atrophy is  prominent on   the right.  VENTRICLES:  Midline with ex vacuo enlargement, right greater than left.  POSTERIOR FOSSA:  No acute abnormality noted.  EXTRA-AXIAL:  No mass or collections are depicted.  VASCULATURE:  The major vessels and venous sinuses are grossly patent.  SINUSES AND MASTOIDS:  Mucosal thickening noted in sinuses. Mastoids are   clear.  MISCELLANEOUS:  No orbital or pituitary abnormality noted.  No skull base   lesion suggested.    IMPRESSION:    1)  Extensive chronic ischemic changes in both hemispheres inclusive of   an old right MCA territory infarct. No diffusion restriction or MR   evidence of acute ischemia.  2)  atrophy with ex vacuo enlargement of the ventricles. 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    --- End of Report ---            MARY JANE REYEZ MD; Attending Radiologist  This document has been electronically signed. May 31 2022  1:59PM    < end of copied text >  < from: CT Head No Cont (05.30.22 @ 00:27) >    ACC: [id number] EXAM:  CT BRAIN                          PROCEDURE DATE:  05/30/2022          INTERPRETATION:  CLINICAL INFORMATION: Intermittent left sided weakness.    Headache.  Hypertension.  History of seizure.    TECHNIQUE:  Axial CT images were acquired through the head.  Intravenous contrast: None  Two-dimensional reformats were generated.    COMPARISON STUDY: MRI brain from 12/22/2021.  CT head from 12/21/2021.    FINDINGS:  There is no CT evidence of acute intracranial hemorrhage, mass effect,   midline shift or acute territorial infarct.    Moderate generalized cerebral volume loss with disproportionate   enlargement of the third ventricle and bilateral lateral ventricles is   stable.  Severe confluent periventricular white matter hypoattenuation   compatible with chronic microvascular ischemic disease is stable.  A   small chronic transcortical infarct in the posterior aspect of the right   temporal lobe, within the right middle cerebral artery vascular   territory, is stable.      The visualized paranasal sinuses and the mastoids are clear.    The patient is status post cataract lens placement surgery bilaterally.    The calvarium and skull base appear within normal limits.    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    IMPRESSION:  No CT evidence of acute intracranial pathology.    There is moderate generalized cerebral volume loss and disproportionate   ventriculomegaly, unchanged from 12/21/2021.  These findings are   nonspecific but may represent normal pressure hydrocephalus in the proper   clinical scenario.  Correlate with clinical symptoms.    Severe chronic microvascular ischemic disease is stable.    --- End of Report ---            YANN OLGUIN MD; Attending Radiologist  This document has been electronically signed. May 30 2022  1:54AM    < end of copied text >

## 2022-06-01 DIAGNOSIS — R50.9 FEVER, UNSPECIFIED: ICD-10-CM

## 2022-06-01 LAB
ALBUMIN SERPL ELPH-MCNC: 2.2 G/DL — LOW (ref 3.3–5)
ALP SERPL-CCNC: 60 U/L — SIGNIFICANT CHANGE UP (ref 40–120)
ALT FLD-CCNC: 13 U/L — SIGNIFICANT CHANGE UP (ref 12–78)
ANION GAP SERPL CALC-SCNC: 7 MMOL/L — SIGNIFICANT CHANGE UP (ref 5–17)
AST SERPL-CCNC: 9 U/L — LOW (ref 15–37)
BASOPHILS # BLD AUTO: 0.03 K/UL — SIGNIFICANT CHANGE UP (ref 0–0.2)
BASOPHILS NFR BLD AUTO: 0.6 % — SIGNIFICANT CHANGE UP (ref 0–2)
BILIRUB SERPL-MCNC: 0.2 MG/DL — SIGNIFICANT CHANGE UP (ref 0.2–1.2)
BUN SERPL-MCNC: 16 MG/DL — SIGNIFICANT CHANGE UP (ref 7–23)
CALCIUM SERPL-MCNC: 7.8 MG/DL — LOW (ref 8.5–10.1)
CHLORIDE SERPL-SCNC: 106 MMOL/L — SIGNIFICANT CHANGE UP (ref 96–108)
CO2 SERPL-SCNC: 27 MMOL/L — SIGNIFICANT CHANGE UP (ref 22–31)
CREAT SERPL-MCNC: 0.77 MG/DL — SIGNIFICANT CHANGE UP (ref 0.5–1.3)
CULTURE RESULTS: SIGNIFICANT CHANGE UP
EGFR: 73 ML/MIN/1.73M2 — SIGNIFICANT CHANGE UP
EOSINOPHIL # BLD AUTO: 0.13 K/UL — SIGNIFICANT CHANGE UP (ref 0–0.5)
EOSINOPHIL NFR BLD AUTO: 2.5 % — SIGNIFICANT CHANGE UP (ref 0–6)
GLUCOSE SERPL-MCNC: 92 MG/DL — SIGNIFICANT CHANGE UP (ref 70–99)
HCT VFR BLD CALC: 30.1 % — LOW (ref 34.5–45)
HGB BLD-MCNC: 9.8 G/DL — LOW (ref 11.5–15.5)
IMM GRANULOCYTES NFR BLD AUTO: 1 % — SIGNIFICANT CHANGE UP (ref 0–1.5)
LYMPHOCYTES # BLD AUTO: 1.31 K/UL — SIGNIFICANT CHANGE UP (ref 1–3.3)
LYMPHOCYTES # BLD AUTO: 25.1 % — SIGNIFICANT CHANGE UP (ref 13–44)
MCHC RBC-ENTMCNC: 29.3 PG — SIGNIFICANT CHANGE UP (ref 27–34)
MCHC RBC-ENTMCNC: 32.6 GM/DL — SIGNIFICANT CHANGE UP (ref 32–36)
MCV RBC AUTO: 90.1 FL — SIGNIFICANT CHANGE UP (ref 80–100)
MONOCYTES # BLD AUTO: 0.76 K/UL — SIGNIFICANT CHANGE UP (ref 0–0.9)
MONOCYTES NFR BLD AUTO: 14.6 % — HIGH (ref 2–14)
NEUTROPHILS # BLD AUTO: 2.94 K/UL — SIGNIFICANT CHANGE UP (ref 1.8–7.4)
NEUTROPHILS NFR BLD AUTO: 56.2 % — SIGNIFICANT CHANGE UP (ref 43–77)
NRBC # BLD: 0 /100 WBCS — SIGNIFICANT CHANGE UP (ref 0–0)
PLATELET # BLD AUTO: 138 K/UL — LOW (ref 150–400)
POTASSIUM SERPL-MCNC: 4 MMOL/L — SIGNIFICANT CHANGE UP (ref 3.5–5.3)
POTASSIUM SERPL-SCNC: 4 MMOL/L — SIGNIFICANT CHANGE UP (ref 3.5–5.3)
PROT SERPL-MCNC: 5.2 G/DL — LOW (ref 6–8.3)
RBC # BLD: 3.34 M/UL — LOW (ref 3.8–5.2)
RBC # FLD: 14.4 % — SIGNIFICANT CHANGE UP (ref 10.3–14.5)
SODIUM SERPL-SCNC: 140 MMOL/L — SIGNIFICANT CHANGE UP (ref 135–145)
SPECIMEN SOURCE: SIGNIFICANT CHANGE UP
WBC # BLD: 5.22 K/UL — SIGNIFICANT CHANGE UP (ref 3.8–10.5)
WBC # FLD AUTO: 5.22 K/UL — SIGNIFICANT CHANGE UP (ref 3.8–10.5)

## 2022-06-01 PROCEDURE — 99233 SBSQ HOSP IP/OBS HIGH 50: CPT | Mod: GC

## 2022-06-01 RX ORDER — ROSUVASTATIN CALCIUM 5 MG/1
20 TABLET ORAL AT BEDTIME
Refills: 0 | Status: DISCONTINUED | OUTPATIENT
Start: 2022-06-01 | End: 2022-06-02

## 2022-06-01 RX ADMIN — LOSARTAN POTASSIUM 50 MILLIGRAM(S): 100 TABLET, FILM COATED ORAL at 05:12

## 2022-06-01 RX ADMIN — Medication 200 MILLIGRAM(S): at 21:06

## 2022-06-01 RX ADMIN — LISINOPRIL 20 MILLIGRAM(S): 2.5 TABLET ORAL at 05:12

## 2022-06-01 RX ADMIN — NYSTATIN CREAM 1 APPLICATION(S): 100000 CREAM TOPICAL at 05:12

## 2022-06-01 RX ADMIN — NYSTATIN CREAM 1 APPLICATION(S): 100000 CREAM TOPICAL at 21:07

## 2022-06-01 RX ADMIN — CARVEDILOL PHOSPHATE 12.5 MILLIGRAM(S): 80 CAPSULE, EXTENDED RELEASE ORAL at 05:12

## 2022-06-01 RX ADMIN — Medication 200 MILLIGRAM(S): at 08:52

## 2022-06-01 RX ADMIN — Medication 100 MILLIGRAM(S): at 16:22

## 2022-06-01 RX ADMIN — NYSTATIN CREAM 1 APPLICATION(S): 100000 CREAM TOPICAL at 17:11

## 2022-06-01 RX ADMIN — LEVETIRACETAM 500 MILLIGRAM(S): 250 TABLET, FILM COATED ORAL at 21:06

## 2022-06-01 RX ADMIN — AMLODIPINE BESYLATE 5 MILLIGRAM(S): 2.5 TABLET ORAL at 05:12

## 2022-06-01 RX ADMIN — ROSUVASTATIN CALCIUM 20 MILLIGRAM(S): 5 TABLET ORAL at 21:11

## 2022-06-01 RX ADMIN — CARVEDILOL PHOSPHATE 12.5 MILLIGRAM(S): 80 CAPSULE, EXTENDED RELEASE ORAL at 17:12

## 2022-06-01 RX ADMIN — Medication 88 MICROGRAM(S): at 05:12

## 2022-06-01 RX ADMIN — LEVETIRACETAM 500 MILLIGRAM(S): 250 TABLET, FILM COATED ORAL at 08:52

## 2022-06-01 NOTE — PROGRESS NOTE ADULT - PROBLEM SELECTOR PLAN 7
HLD  continue atorvastatin as interchange for rosuvastatin
HLD  continue atorvastatin as interchange for rosuvastatin

## 2022-06-01 NOTE — PROGRESS NOTE ADULT - SUBJECTIVE AND OBJECTIVE BOX
Neurology follow up note    MAJOR [id number]      Interval History:    Patient feels ok no new complaints.    Allergies    aspirin (Unknown)  sulfa drugs (Unknown)    Intolerances    Lipitor (Other; Muscle Pain)      MEDICATIONS    acetaminophen     Tablet .. 650 milliGRAM(s) Oral every 6 hours PRN  acetaminophen     Tablet .. 325 milliGRAM(s) Oral every 6 hours PRN  amLODIPine   Tablet 5 milliGRAM(s) Oral daily  atorvastatin 80 milliGRAM(s) Oral at bedtime  carBAMazepine ER Tablet 200 milliGRAM(s) Oral <User Schedule>  carBAMazepine ER Tablet 100 milliGRAM(s) Oral <User Schedule>  carvedilol 12.5 milliGRAM(s) Oral every 12 hours  dextrose 5%. 1000 milliLiter(s) IV Continuous <Continuous>  dextrose 5%. 1000 milliLiter(s) IV Continuous <Continuous>  dextrose 50% Injectable 25 Gram(s) IV Push once  dextrose 50% Injectable 12.5 Gram(s) IV Push once  dextrose 50% Injectable 25 Gram(s) IV Push once  dextrose Oral Gel 15 Gram(s) Oral once PRN  glucagon  Injectable 1 milliGRAM(s) IntraMuscular once  insulin lispro (ADMELOG) corrective regimen sliding scale   SubCutaneous three times a day before meals  insulin lispro (ADMELOG) corrective regimen sliding scale   SubCutaneous at bedtime  levETIRAcetam 500 milliGRAM(s) Oral <User Schedule>  levothyroxine 88 MICROGram(s) Oral daily  lidocaine   4% Patch 1 Patch Transdermal daily  lisinopril 20 milliGRAM(s) Oral daily  losartan 50 milliGRAM(s) Oral daily  nystatin Powder 1 Application(s) Topical three times a day              Vital Signs Last 24 Hrs  T(C): 36.5 (2022 05:12), Max: 38.3 (31 May 2022 12:21)  T(F): 97.7 (2022 05:12), Max: 101 (31 May 2022 12:21)  HR: 68 (2022 05:12) (68 - 76)  BP: 148/73 (2022 05:12) (110/64 - 152/73)  BP(mean): --  RR: 19 (2022 05:12) (18 - 19)  SpO2: 92% (2022 05:12) (91% - 93%)    REVIEW OF SYSTEMS:  Constitutional:  The patient denies fever, chills, or night sweats.  Head:  No headaches.  Eyes:  No double vision or blurry vision.  Ears:  No ringing in the ears.  Neck:  No neck pain.  Cardiovascular:  No chest pain.  Respiratory:  No shortness of breath.  Abdomen:  No nausea, vomiting, or abdominal pain.  Extremities/Neurological:  No numbness or tingling.  Musculoskeletal:  No joint pain.    PHYSICAL EXAMINATION:   HEENT:  Head:  Normocephalic, atraumatic.  Eyes:  No scleral icterus.  Ears:  Hearing bilaterally intact.  NECK:  Supple.  RESPIRATORY:  Good air entry bilaterally.  CARDIOVASCULAR:  S1 and S2 heard.  ABDOMEN:  Soft, nontender.  EXTREMITIES:  No clubbing or cyanosis was noted.      NEUROLOGIC:  The patient is awake and alert.  Extraocular movements were intact.  Speech was fluent.  The patient does have poor vision out of her left eye, which appears to be chronic.  Right upper 4+/5, left was 4/5.  Bilateral lower extremities 3+/5.  Sensory:  Bilateral upper and lower intact to light touch.              LABS:  CBC Full  -  ( 2022 07:21 )  WBC Count : x  RBC Count : 3.34 M/uL  Hemoglobin : 9.8 g/dL  Hematocrit : 30.1 %  Platelet Count - Automated : x  Mean Cell Volume : 90.1 fl  Mean Cell Hemoglobin : 29.3 pg  Mean Cell Hemoglobin Concentration : 32.6 gm/dL  Auto Neutrophil # : x  Auto Lymphocyte # : x  Auto Monocyte # : x  Auto Eosinophil # : x  Auto Basophil # : x  Auto Neutrophil % : x  Auto Lymphocyte % : x  Auto Monocyte % : x  Auto Eosinophil % : x  Auto Basophil % : x    Urinalysis Basic - ( 31 May 2022 14:31 )    Color: Yellow / Appearance: Clear / S.015 / pH: x  Gluc: x / Ketone: Negative  / Bili: Negative / Urobili: Negative   Blood: x / Protein: 30 mg/dL / Nitrite: Negative   Leuk Esterase: Trace / RBC: 0-2 /HPF / WBC 3-5   Sq Epi: x / Non Sq Epi: Moderate / Bacteria: Few      -    140  |  106  |  16  ----------------------------<  92  4.0   |  27  |  0.77    Ca    7.8<L>      2022 07:21  Mg     2.0     05-31    TPro  5.2<L>  /  Alb  2.2<L>  /  TBili  0.2  /  DBili  x   /  AST  9<L>  /  ALT  13  /  AlkPhos  60  06-    Hemoglobin A1C:     LIVER FUNCTIONS - ( 2022 07:21 )  Alb: 2.2 g/dL / Pro: 5.2 g/dL / ALK PHOS: 60 U/L / ALT: 13 U/L / AST: 9 U/L / GGT: x           Vitamin B12         RADIOLOGY    ANALYSIS AND PLAN:  This is a 89-year-old with left-sided weakness.  Clinical impression is a seizure event.  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  The patient's carbamazepine level appears to be therapeutic.  For now, I will continue her on 200-100-200.  I will increase the patient's Keppra to 500 mg twice a day.  For history of hypertension, monitor systolic blood pressure.  Hyperlipidemia.  Continue the patient on statin.  For diabetes, strict control of blood sugars.  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    Left a message for the patient's outside neurologist, Dr. Gonsalez, at [phone] to discuss further management of medications.    Spoke with son at the bedside.  His telephone number is [phone].    Greater than 45 minutes of time was spent with the patient, plan of care, reviewing data, with greater than 50% of the visit was spent counseling and/or coordinating care with multidisciplinary healthcare team   Neurology follow up note    MAJOR [id number]      Interval History:    Patient feels ok no new complaints.    Allergies    aspirin (Unknown)  sulfa drugs (Unknown)    Intolerances    Lipitor (Other; Muscle Pain)      MEDICATIONS    acetaminophen     Tablet .. 650 milliGRAM(s) Oral every 6 hours PRN  acetaminophen     Tablet .. 325 milliGRAM(s) Oral every 6 hours PRN  amLODIPine   Tablet 5 milliGRAM(s) Oral daily  atorvastatin 80 milliGRAM(s) Oral at bedtime  carBAMazepine ER Tablet 200 milliGRAM(s) Oral <User Schedule>  carBAMazepine ER Tablet 100 milliGRAM(s) Oral <User Schedule>  carvedilol 12.5 milliGRAM(s) Oral every 12 hours  dextrose 5%. 1000 milliLiter(s) IV Continuous <Continuous>  dextrose 5%. 1000 milliLiter(s) IV Continuous <Continuous>  dextrose 50% Injectable 25 Gram(s) IV Push once  dextrose 50% Injectable 12.5 Gram(s) IV Push once  dextrose 50% Injectable 25 Gram(s) IV Push once  dextrose Oral Gel 15 Gram(s) Oral once PRN  glucagon  Injectable 1 milliGRAM(s) IntraMuscular once  insulin lispro (ADMELOG) corrective regimen sliding scale   SubCutaneous three times a day before meals  insulin lispro (ADMELOG) corrective regimen sliding scale   SubCutaneous at bedtime  levETIRAcetam 500 milliGRAM(s) Oral <User Schedule>  levothyroxine 88 MICROGram(s) Oral daily  lidocaine   4% Patch 1 Patch Transdermal daily  lisinopril 20 milliGRAM(s) Oral daily  losartan 50 milliGRAM(s) Oral daily  nystatin Powder 1 Application(s) Topical three times a day              Vital Signs Last 24 Hrs  T(C): 36.5 (2022 05:12), Max: 38.3 (31 May 2022 12:21)  T(F): 97.7 (2022 05:12), Max: 101 (31 May 2022 12:21)  HR: 68 (2022 05:12) (68 - 76)  BP: 148/73 (2022 05:12) (110/64 - 152/73)  BP(mean): --  RR: 19 (2022 05:12) (18 - 19)  SpO2: 92% (2022 05:12) (91% - 93%)    REVIEW OF SYSTEMS:  Constitutional:  The patient denies fever, chills, or night sweats.  Head:  No headaches.  Eyes:  No double vision or blurry vision.  Ears:  No ringing in the ears.  Neck:  No neck pain.  Cardiovascular:  No chest pain.  Respiratory:  No shortness of breath.  Abdomen:  No nausea, vomiting, or abdominal pain.  Extremities/Neurological:  No numbness or tingling.  Musculoskeletal:  No joint pain.    PHYSICAL EXAMINATION:   HEENT:  Head:  Normocephalic, atraumatic.  Eyes:  No scleral icterus.  Ears:  Hearing bilaterally intact.  NECK:  Supple.  RESPIRATORY:  Good air entry bilaterally.  CARDIOVASCULAR:  S1 and S2 heard.  ABDOMEN:  Soft, nontender.  EXTREMITIES:  No clubbing or cyanosis was noted.      NEUROLOGIC:  The patient is awake and alert.  Extraocular movements were intact.  Speech was fluent.  The patient does have poor vision out of her left eye, which appears to be chronic.  Right upper 4+/5, left was 4/5.  Bilateral lower extremities 3+/5.  Sensory:  Bilateral upper and lower intact to light touch.              LABS:  CBC Full  -  ( 2022 07:21 )  WBC Count : x  RBC Count : 3.34 M/uL  Hemoglobin : 9.8 g/dL  Hematocrit : 30.1 %  Platelet Count - Automated : x  Mean Cell Volume : 90.1 fl  Mean Cell Hemoglobin : 29.3 pg  Mean Cell Hemoglobin Concentration : 32.6 gm/dL  Auto Neutrophil # : x  Auto Lymphocyte # : x  Auto Monocyte # : x  Auto Eosinophil # : x  Auto Basophil # : x  Auto Neutrophil % : x  Auto Lymphocyte % : x  Auto Monocyte % : x  Auto Eosinophil % : x  Auto Basophil % : x    Urinalysis Basic - ( 31 May 2022 14:31 )    Color: Yellow / Appearance: Clear / S.015 / pH: x  Gluc: x / Ketone: Negative  / Bili: Negative / Urobili: Negative   Blood: x / Protein: 30 mg/dL / Nitrite: Negative   Leuk Esterase: Trace / RBC: 0-2 /HPF / WBC 3-5   Sq Epi: x / Non Sq Epi: Moderate / Bacteria: Few      -    140  |  106  |  16  ----------------------------<  92  4.0   |  27  |  0.77    Ca    7.8<L>      2022 07:21  Mg     2.0         TPro  5.2<L>  /  Alb  2.2<L>  /  TBili  0.2  /  DBili  x   /  AST  9<L>  /  ALT  13  /  AlkPhos  60  -    Hemoglobin A1C:     LIVER FUNCTIONS - ( 2022 07:21 )  Alb: 2.2 g/dL / Pro: 5.2 g/dL / ALK PHOS: 60 U/L / ALT: 13 U/L / AST: 9 U/L / GGT: x           Vitamin B12         RADIOLOGY    ANALYSIS AND PLAN:  This is a 89-year-old with left-sided weakness.  Clinical impression is a seizure event.  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  The patient's carbamazepine level appears to be therapeutic.  For now, I will continue her on 200-100-200.  I will increase the patient's Keppra to 500 mg twice a day.  For history of hypertension, monitor systolic blood pressure.  Hyperlipidemia.  Continue the patient on statin.  For diabetes, strict control of blood sugars.  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  neurologic  wise [facility] planning     Left a message for the patient's outside neurologist, Dr. Gonsalez, at [phone] to discuss further management of medications     Spoke with son at the bedside.  His telephone number is [phone]     Greater than 45 minutes of time was spent with the patient, plan of care, reviewing data, with greater than 50% of the visit was spent counseling and/or coordinating care with multidisciplinary healthcare team

## 2022-06-01 NOTE — PROGRESS NOTE ADULT - PROBLEM SELECTOR PLAN 8
DVT ppx SCDs: discussed lovenox or HSQ with pt and she refused any injection       # left shoulder pain, rotator cuff tendinitis   continue home lidocaine patch qD
DVT ppx SCDs: discussed lovenox or HSQ with pt and she refused any injection       # left shoulder pain, rotator cuff tendinitis   continue home lidocaine patch qD

## 2022-06-01 NOTE — PROGRESS NOTE ADULT - PROBLEM SELECTOR PLAN 5
hx Type 2 diabetes   hold home metformin  start ISS low dose  hypoglycemia protocol, fingersticks  f/u A1c
- Chronic, controlled.  Hb A1c 6.3  hold home metformin  start ISS low dose  hypoglycemia protocol, fingersticks

## 2022-06-01 NOTE — PROGRESS NOTE ADULT - PROBLEM SELECTOR PLAN 1
- HA, left sided weakness s/p missed carbamazepine dose, carbamazepine 11.4 therapeutic   - CT head no acute intracranial pathology. Moderate cerebral volume loss and disproportionate ventriculomegaly, unchanged from 12/21/2021. Severe chronic microvascular ischemic disease.  -MR Brain: 1) Extensive chronic ischemic changes, old right MCA territory infarct. No diffusion restriction or MR evidence of acute ischemia. 2) atrophy with ex vacuo enlargement of the ventricles. 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  No acute CVA.  - Continue home Keppra at higher dose 500mg po BID per neuro, and carbamazepine 200-100-200  - son concerned that carbamazepine dose increased in December led to severe sleepiness, for this reason patient skipped her dose which likely led to seizure resulting in current admission   - S&S eval and rec regular diet with this liquids  - phys therapy rec home with home PT  - consulted neuro Dr. Leblanc: clinical impression is seizure event
-HA, left sided weakness s/p missed carbamazepine dose, carbamazepine 11.4.   -suspect post ictal, r/o stroke   -CT head no acute intracranial pathology. There is moderate generalized cerebral volume loss and disproportionate ventriculomegaly, unchanged from 12/21/2021. Severe chronic microvascular ischemic disease is stable.  -MR Brain performed today and showed: 1)  Extensive chronic ischemic changes in both hemispheres inclusive of   an old right MCA territory infarct. No diffusion restriction or MR evidence of acute ischemia. 2)  atrophy with ex vacuo enlargement of the ventricles. 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  ; No acute CVA.  -carbamazepine 200 mg x1 in ER, continue home Keppra (dose increased to 500mg po BID per neuro recs) and carbamazepine   -son is concerned that carbamazepine dose increased in December which led to severe sleepiness (patient falls asleep eating breakfast), for this reason patient skipped her carbamazepine dose which likely led to seizure resulting in current admission   -S&S eval and rec regular diet with this liquids  -phys therapy rec home with home PT  -EEG  -continue home rosuvastatin   -consulted neuro Dr. Leblanc, recs appreciated

## 2022-06-01 NOTE — PROGRESS NOTE ADULT - ASSESSMENT
Pt is a 89W w/ PMHx of hemorraghic stroke 2015, seizure disorder (keppra, carbamazepine), HTN, type 2 diabetes, hypothyroidism, HLD here for HA 9/10 and weakness since last evening, c/f unwitnessed seizure. Pt w/ hypertensive emergency and LUE weakness and LUE drift, weakness in gait. Had brief AMS--resolved.   CTH w/o acute pathology  ID c/s for further evaluation of fever to 101F on 5/31    FUO  Fever--unclear cause--can be 2/2 infectious etiology vs. noninfectious etiology  Pt denying any sx to localize infection.     Extremely low suspicion for bacterial etiology of infection at this time, Continue to monitor off Abx    Thank you for consulting us and involving us in the management of this most interesting and challenging case.  Please call us at [phone] or text me directly on my cell#[phone] with any concerns or further questions.        
89yo F w/ PMH of hemorraghic stroke 2015, seizure disorder (on keppra, carbamazepine), HTN, type 2 diabetes, hypothyroidism, HLD, p/w confusion, lethargy, a/w suspected seizure, r/out CVA, course now c/b fever.          
89yo F w/ PMH of hemorraghic stroke 2015, seizure disorder (on keppra, carbamazepine), HTN, type 2 diabetes, hypothyroidism, HLD, p/w confusion, lethargy, a/w suspected seizure, r/out CVA, course now c/b fever.

## 2022-06-01 NOTE — PROGRESS NOTE ADULT - SUBJECTIVE AND OBJECTIVE BOX
Patient is a 89y old  Female who presents with a chief complaint of post ictal state, r/o stroke (2022 13:06)    Subjective:  INTERVAL HPI/OVERNIGHT EVENTS: Patient seen and examined at bedside. No overnight events occurred. Patient has no complaints at this time. Feeling good, tolerating PO. Denies headache, lightheadedness, chest pain, palpitations, dyspnea, abdominal pain, nausea, vomiting, diarrhea/constipation.    MEDICATIONS  (STANDING):  amLODIPine   Tablet 5 milliGRAM(s) Oral daily  carBAMazepine ER Tablet 200 milliGRAM(s) Oral <User Schedule>  carBAMazepine ER Tablet 100 milliGRAM(s) Oral <User Schedule>  carvedilol 12.5 milliGRAM(s) Oral every 12 hours  dextrose 5%. 1000 milliLiter(s) (100 mL/Hr) IV Continuous <Continuous>  dextrose 5%. 1000 milliLiter(s) (50 mL/Hr) IV Continuous <Continuous>  dextrose 50% Injectable 25 Gram(s) IV Push once  dextrose 50% Injectable 12.5 Gram(s) IV Push once  dextrose 50% Injectable 25 Gram(s) IV Push once  glucagon  Injectable 1 milliGRAM(s) IntraMuscular once  insulin lispro (ADMELOG) corrective regimen sliding scale   SubCutaneous three times a day before meals  insulin lispro (ADMELOG) corrective regimen sliding scale   SubCutaneous at bedtime  levETIRAcetam 500 milliGRAM(s) Oral <User Schedule>  levothyroxine 88 MICROGram(s) Oral daily  lidocaine   4% Patch 1 Patch Transdermal daily  lisinopril 20 milliGRAM(s) Oral daily  losartan 50 milliGRAM(s) Oral daily  nystatin Powder 1 Application(s) Topical three times a day  rosuvastatin 20 milliGRAM(s) Oral at bedtime    MEDICATIONS  (PRN):  acetaminophen     Tablet .. 650 milliGRAM(s) Oral every 6 hours PRN Temp greater or equal to 38C (100.4F), Moderate Pain (4 - 6)  acetaminophen     Tablet .. 325 milliGRAM(s) Oral every 6 hours PRN Mild Pain (1 - 3)  dextrose Oral Gel 15 Gram(s) Oral once PRN Blood Glucose LESS THAN 70 milliGRAM(s)/deciliter      Allergies  aspirin (Unknown)  sulfa drugs (Unknown)    Intolerances  Lipitor (Other; Muscle Pain)      REVIEW OF SYSTEMS:  CONSTITUTIONAL: No fever or chills  HEENT:  No headache, no sore throat  RESPIRATORY: No cough, wheezing, or shortness of breath  CARDIOVASCULAR: No chest pain, palpitations  GASTROINTESTINAL: No abd pain, nausea, vomiting, or diarrhea  GENITOURINARY: No dysuria, frequency, or hematuria  NEUROLOGICAL: no focal weakness or dizziness  MUSCULOSKELETAL: no myalgias     Objective:  Vital Signs Last 24 Hrs  T(C): 36.4 (2022 12:48), Max: 37.3 (31 May 2022 17:09)  T(F): 97.5 (2022 12:48), Max: 99.2 (31 May 2022 17:09)  HR: 64 (2022 12:48) (64 - 72)  BP: 114/69 (2022 12:48) (110/64 - 148/73)  RR: 18 (2022 12:48) (18 - 19)  SpO2: 93% (2022 12:48) (92% - 93%)    GENERAL: NAD, lying in bed comfortably, no signs of acute respiratory distress   HEAD:  Atraumatic, Normocephalic  EYES: EOMI, PERRLA, conjunctiva and sclera clear  ENT: Moist oral mucous   NECK: Supple, No JVD  CHEST/LUNG: Clear to auscultation bilaterally; No rales, rhonchi, or wheezing.  HEART: S1,S2. Regular rate and rhythm.   ABDOMEN: Bowel sounds present. Soft, nontender, nondistended. EXTREMITIES: + Peripheral Pulses. No clubbing, cyanosis, or :LE edema  NERVOUS SYSTEM: Alert & Oriented X3, speech clear. No acute deficit. Non focal   SKIN: Warm.       LABS:                        9.8    5.22  )-----------( 138      ( 2022 07:21 )             30.1     CBC Full  -  ( 2022 07:21 )  WBC Count : 5.22 K/uL  Hemoglobin : 9.8 g/dL  Hematocrit : 30.1 %  Platelet Count - Automated : 138 K/uL  Mean Cell Volume : 90.1 fl  Mean Cell Hemoglobin : 29.3 pg  Mean Cell Hemoglobin Concentration : 32.6 gm/dL  Auto Neutrophil # : 2.94 K/uL  Auto Lymphocyte # : 1.31 K/uL  Auto Monocyte # : 0.76 K/uL  Auto Eosinophil # : 0.13 K/uL  Auto Basophil # : 0.03 K/uL  Auto Neutrophil % : 56.2 %  Auto Lymphocyte % : 25.1 %  Auto Monocyte % : 14.6 %  Auto Eosinophil % : 2.5 %  Auto Basophil % : 0.6 %    2022 07:21    140    |  106    |  16     ----------------------------<  92     4.0     |  27     |  0.77     Ca    7.8        2022 07:21    TPro  5.2    /  Alb  2.2    /  TBili  0.2    /  DBili  x      /  AST  9      /  ALT  13     /  AlkPhos  60     2022 07:21      Urinalysis Basic - ( 31 May 2022 14:31 )    Color: Yellow / Appearance: Clear / S.015 / pH: x  Gluc: x / Ketone: Negative  / Bili: Negative / Urobili: Negative   Blood: x / Protein: 30 mg/dL / Nitrite: Negative   Leuk Esterase: Trace / RBC: 0-2 /HPF / WBC 3-5   Sq Epi: x / Non Sq Epi: Moderate / Bacteria: Few      CAPILLARY BLOOD GLUCOSE  POCT Blood Glucose.: 138 mg/dL (2022 12:13)  POCT Blood Glucose.: 98 mg/dL (2022 08:00)  POCT Blood Glucose.: 149 mg/dL (31 May 2022 20:44)  POCT Blood Glucose.: 148 mg/dL (31 May 2022 17:26)      Culture - Urine (collected 22 @ 17:18)  Source: Clean Catch Clean Catch (Midstream)  Final Report (22 @ 13:25):    >=3 organisms. Probable collection contamination.        RADIOLOGY & ADDITIONAL TESTS:    Personally reviewed.     Consultant(s) Notes Reviewed:  [x] YES  [ ] NO

## 2022-06-01 NOTE — PROGRESS NOTE ADULT - ATTENDING COMMENTS
89yo F w/ PMH of hemorraghic stroke 2015, seizure disorder (on keppra, carbamazepine), HTN, type 2 diabetes, hypothyroidism, HLD, p/w confusion, lethargy, a/w suspected seizure, r/out CVA, course now c/b fever. Afebrile this morning. Blood cultures pending. Will monitor off antibiotics, per ID. Plan as above, d/w PGY1

## 2022-06-01 NOTE — PROGRESS NOTE ADULT - TIME BILLING
Note written by attending, see above.  Time spent: 40min. More than 50% of the visit was spent counseling the patient on medical condition - likely seizure, AED dosing, fever.
Patient seen and examined at bedside. Meds, labs, vitals, chart reviewed. Plan d/w PGY1, residents, SW?CM

## 2022-06-01 NOTE — PROGRESS NOTE ADULT - PROBLEM SELECTOR PLAN 4
-hx of hemorrhagic stroke 2015, after stroke patient then developed seizures  -no NSAIDS
- hx of hemorrhagic stroke 2015, after stroke patient then developed seizures  - no NSAIDS

## 2022-06-01 NOTE — PROGRESS NOTE ADULT - PROBLEM SELECTOR PLAN 3
HTN urgency in ambulance and on admission  -/107--> 205/87-labetalol 10 IVP --> 170/78  -HA in setting of elevated BP. Pain regimen: tylenol 650 mg q 6 ours PRN for mild pain, 1000 mg q 6 hours for moderate pain, 1000 mg q 6 hours for severe pain   -takes home amlodipine, carvedilol, irbesartan, continue  -BP improved today
Resolved   - HTN urgency in ambulance and on admission  -/107--> 205/87-labetalol 10 IVP --> 170/78  -HA in setting of elevated BP. Pain regimen: tylenol 650 mg q 6 ours PRN for mild pain, 1000 mg q 6 hours for moderate pain, 1000 mg q 6 hours for severe pain   -takes home amlodipine, carvedilol, irbesartan, continue  -BP normal and stable

## 2022-06-01 NOTE — PROGRESS NOTE ADULT - SUBJECTIVE AND OBJECTIVE BOX
[facility] DIVISION of INFECTIOUS DISEASE  Amanuel Barajas MD PhD, Anni Sutton MD, Ruby Nunez MD, Claudette Pham MD, Petros Martin MD  and providing coverage with Rocco Lake MD  Providing Infectious Disease Consultations at [facility], [facility], [facility]'s    Office# [phone] to schedule follow up appointments  Answering Service for urgent calls or New Consults [phone]  Cell# to text for urgent issues Amanuel Barajas [phone]     infectious diseases progress note:    MAJOR PEREZ is a 89y y. o. Female patient    No concerning overnight events    Allergies    aspirin (Unknown)  sulfa drugs (Unknown)    Intolerances    Lipitor (Other; Muscle Pain)      ANTIBIOTICS/RELEVANT:  antimicrobials    immunologic:    OTHER:  acetaminophen     Tablet .. 650 milliGRAM(s) Oral every 6 hours PRN  acetaminophen     Tablet .. 325 milliGRAM(s) Oral every 6 hours PRN  amLODIPine   Tablet 5 milliGRAM(s) Oral daily  atorvastatin 80 milliGRAM(s) Oral at bedtime  carBAMazepine ER Tablet 200 milliGRAM(s) Oral <User Schedule>  carBAMazepine ER Tablet 100 milliGRAM(s) Oral <User Schedule>  carvedilol 12.5 milliGRAM(s) Oral every 12 hours  dextrose 5%. 1000 milliLiter(s) IV Continuous <Continuous>  dextrose 5%. 1000 milliLiter(s) IV Continuous <Continuous>  dextrose 50% Injectable 25 Gram(s) IV Push once  dextrose 50% Injectable 12.5 Gram(s) IV Push once  dextrose 50% Injectable 25 Gram(s) IV Push once  dextrose Oral Gel 15 Gram(s) Oral once PRN  glucagon  Injectable 1 milliGRAM(s) IntraMuscular once  insulin lispro (ADMELOG) corrective regimen sliding scale   SubCutaneous three times a day before meals  insulin lispro (ADMELOG) corrective regimen sliding scale   SubCutaneous at bedtime  levETIRAcetam 500 milliGRAM(s) Oral <User Schedule>  levothyroxine 88 MICROGram(s) Oral daily  lidocaine   4% Patch 1 Patch Transdermal daily  lisinopril 20 milliGRAM(s) Oral daily  losartan 50 milliGRAM(s) Oral daily  nystatin Powder 1 Application(s) Topical three times a day      Objective:  Vital Signs Last 24 Hrs  T(C): 36.4 (2022 12:48), Max: 37.3 (31 May 2022 17:09)  T(F): 97.5 (2022 12:48), Max: 99.2 (31 May 2022 17:09)  HR: 64 (2022 12:48) (64 - 72)  BP: 114/69 (2022 12:48) (110/64 - 148/73)  BP(mean): --  RR: 18 (2022 12:48) (18 - 19)  SpO2: 93% (:48) (92% - 93%)    T(C): 36.4 (22 @ 12:48), Max: 38.3 (22 @ 12:21)  T(C): 36.4 (22 @ 12:48), Max: 38.3 (22 @ 12:21)  T(C): 36.4 (22 @ 12:48), Max: 38.3 (22 @ 12:21)    PHYSICAL EXAM:  HEENT: NC atraumatic  Neck: supple  Respiratory: no accessory muscle use, breathing comfortably  Cardiovascular: distant  Gastrointestinal: normal appearing, nondistended  Extremities: no clubbing, no cyanosis,        LABS:                          9.8    5.22  )-----------( 138      ( 2022 07:21 )             30.1       WBC  5.22  @ 07:21  5.52  @ 08:32  5.19  @ 10:43  6.45  @ 23:53          140  |  106  |  16  ----------------------------<  92  4.0   |  27  |  0.77    Ca    7.8<L>      2022 07:21  Mg     2.0         TPro  5.2<L>  /  Alb  2.2<L>  /  TBili  0.2  /  DBili  x   /  AST  9<L>  /  ALT  13  /  AlkPhos  60        Creatinine, Serum: 0.77 mg/dL (22 @ 07:21)  Creatinine, Serum: 0.73 mg/dL (22 @ 08:32)  Creatinine, Serum: 0.84 mg/dL (22 @ 10:43)  Creatinine, Serum: 0.88 mg/dL (22 @ 23:53)        Urinalysis Basic - ( 31 May 2022 14:31 )    Color: Yellow / Appearance: Clear / S.015 / pH: x  Gluc: x / Ketone: Negative  / Bili: Negative / Urobili: Negative   Blood: x / Protein: 30 mg/dL / Nitrite: Negative   Leuk Esterase: Trace / RBC: 0-2 /HPF / WBC 3-5   Sq Epi: x / Non Sq Epi: Moderate / Bacteria: Few            INFLAMMATORY MARKERS  Auto Neutrophil #: 2.94 K/uL (22 @ 07:21)  Auto Lymphocyte #: 1.31 K/uL (22 @ 07:21)  Auto Neutrophil #: 3.39 K/uL (22 @ 10:43)  Auto Lymphocyte #: 1.05 K/uL (22 @ 10:43)  Auto Neutrophil #: 5.00 K/uL (22 @ 23:53)  Auto Lymphocyte #: 0.78 K/uL (22 @ 23:53)      Auto Eosinophil #: 0.13 K/uL (22 @ 07:21)  Auto Eosinophil #: 0.05 K/uL (22 @ 10:43)  Auto Eosinophil #: 0.08 K/uL (22 @ 23:53)        Procalcitonin, Serum: <0.05 (22 @ 15:27)                  Activated Partial Thromboplastin Time: 24.4 sec (22 @ 23:53)  INR: 1.01 ratio (22 @ 23:53)          MICROBIOLOGY:              RADIOLOGY & ADDITIONAL STUDIES:

## 2022-06-01 NOTE — PROGRESS NOTE ADULT - PROBLEM SELECTOR PLAN 2
- now with acute fever to 101F of unclear etiology  - pt denies any symptoms and no obvious physical exam finding to localize infection  - sent UCx, BCx  - check CXR, RVP  - ID (S Enrique) recs appreciated  - monitor off antibiotics for now per ID  - procalcitonin normal
- One episode of 101F of unclear etiology  - Normal and stable WBC   - pt denies any symptoms and no obvious physical exam finding to localize infection  - Negative urine culture  - f/u blood Cx.   - Negative RVP, negative procal.   - ID (NERIS Nunez) recs appreciated  - monitor off antibiotics for now per ID

## 2022-06-02 ENCOUNTER — TRANSCRIPTION ENCOUNTER (OUTPATIENT)
Age: 87
End: 2022-06-02

## 2022-06-02 VITALS
HEART RATE: 65 BPM | OXYGEN SATURATION: 94 % | SYSTOLIC BLOOD PRESSURE: 116 MMHG | RESPIRATION RATE: 16 BRPM | TEMPERATURE: 98 F | DIASTOLIC BLOOD PRESSURE: 66 MMHG

## 2022-06-02 LAB
ANION GAP SERPL CALC-SCNC: 5 MMOL/L — SIGNIFICANT CHANGE UP (ref 5–17)
BASOPHILS # BLD AUTO: 0.05 K/UL — SIGNIFICANT CHANGE UP (ref 0–0.2)
BASOPHILS NFR BLD AUTO: 0.9 % — SIGNIFICANT CHANGE UP (ref 0–2)
BUN SERPL-MCNC: 19 MG/DL — SIGNIFICANT CHANGE UP (ref 7–23)
CALCIUM SERPL-MCNC: 8.5 MG/DL — SIGNIFICANT CHANGE UP (ref 8.5–10.1)
CHLORIDE SERPL-SCNC: 105 MMOL/L — SIGNIFICANT CHANGE UP (ref 96–108)
CO2 SERPL-SCNC: 30 MMOL/L — SIGNIFICANT CHANGE UP (ref 22–31)
CREAT SERPL-MCNC: 0.72 MG/DL — SIGNIFICANT CHANGE UP (ref 0.5–1.3)
EGFR: 79 ML/MIN/1.73M2 — SIGNIFICANT CHANGE UP
EOSINOPHIL # BLD AUTO: 0.16 K/UL — SIGNIFICANT CHANGE UP (ref 0–0.5)
EOSINOPHIL NFR BLD AUTO: 2.8 % — SIGNIFICANT CHANGE UP (ref 0–6)
GLUCOSE SERPL-MCNC: 100 MG/DL — HIGH (ref 70–99)
HCT VFR BLD CALC: 30.3 % — LOW (ref 34.5–45)
HGB BLD-MCNC: 9.9 G/DL — LOW (ref 11.5–15.5)
IMM GRANULOCYTES NFR BLD AUTO: 1.2 % — SIGNIFICANT CHANGE UP (ref 0–1.5)
LEVETIRACETAM SERPL-MCNC: 5.2 UG/ML — LOW (ref 10–40)
LYMPHOCYTES # BLD AUTO: 0.95 K/UL — LOW (ref 1–3.3)
LYMPHOCYTES # BLD AUTO: 16.9 % — SIGNIFICANT CHANGE UP (ref 13–44)
MCHC RBC-ENTMCNC: 29.4 PG — SIGNIFICANT CHANGE UP (ref 27–34)
MCHC RBC-ENTMCNC: 32.7 GM/DL — SIGNIFICANT CHANGE UP (ref 32–36)
MCV RBC AUTO: 89.9 FL — SIGNIFICANT CHANGE UP (ref 80–100)
MONOCYTES # BLD AUTO: 0.68 K/UL — SIGNIFICANT CHANGE UP (ref 0–0.9)
MONOCYTES NFR BLD AUTO: 12.1 % — SIGNIFICANT CHANGE UP (ref 2–14)
NEUTROPHILS # BLD AUTO: 3.72 K/UL — SIGNIFICANT CHANGE UP (ref 1.8–7.4)
NEUTROPHILS NFR BLD AUTO: 66.1 % — SIGNIFICANT CHANGE UP (ref 43–77)
NRBC # BLD: 0 /100 WBCS — SIGNIFICANT CHANGE UP (ref 0–0)
PLATELET # BLD AUTO: 152 K/UL — SIGNIFICANT CHANGE UP (ref 150–400)
POTASSIUM SERPL-MCNC: 4 MMOL/L — SIGNIFICANT CHANGE UP (ref 3.5–5.3)
POTASSIUM SERPL-SCNC: 4 MMOL/L — SIGNIFICANT CHANGE UP (ref 3.5–5.3)
RBC # BLD: 3.37 M/UL — LOW (ref 3.8–5.2)
RBC # FLD: 14.4 % — SIGNIFICANT CHANGE UP (ref 10.3–14.5)
SODIUM SERPL-SCNC: 140 MMOL/L — SIGNIFICANT CHANGE UP (ref 135–145)
WBC # BLD: 5.63 K/UL — SIGNIFICANT CHANGE UP (ref 3.8–10.5)
WBC # FLD AUTO: 5.63 K/UL — SIGNIFICANT CHANGE UP (ref 3.8–10.5)

## 2022-06-02 PROCEDURE — 97116 GAIT TRAINING THERAPY: CPT

## 2022-06-02 PROCEDURE — 97161 PT EVAL LOW COMPLEX 20 MIN: CPT

## 2022-06-02 PROCEDURE — 99238 HOSP IP/OBS DSCHRG MGMT 30/<: CPT

## 2022-06-02 PROCEDURE — 71045 X-RAY EXAM CHEST 1 VIEW: CPT

## 2022-06-02 PROCEDURE — 92526 ORAL FUNCTION THERAPY: CPT

## 2022-06-02 PROCEDURE — 92610 EVALUATE SWALLOWING FUNCTION: CPT

## 2022-06-02 PROCEDURE — U0005: CPT

## 2022-06-02 PROCEDURE — 87086 URINE CULTURE/COLONY COUNT: CPT

## 2022-06-02 PROCEDURE — 83036 HEMOGLOBIN GLYCOSYLATED A1C: CPT

## 2022-06-02 PROCEDURE — 85730 THROMBOPLASTIN TIME PARTIAL: CPT

## 2022-06-02 PROCEDURE — 99285 EMERGENCY DEPT VISIT HI MDM: CPT | Mod: 25

## 2022-06-02 PROCEDURE — 36415 COLL VENOUS BLD VENIPUNCTURE: CPT

## 2022-06-02 PROCEDURE — 82962 GLUCOSE BLOOD TEST: CPT

## 2022-06-02 PROCEDURE — 81001 URINALYSIS AUTO W/SCOPE: CPT

## 2022-06-02 PROCEDURE — 70551 MRI BRAIN STEM W/O DYE: CPT

## 2022-06-02 PROCEDURE — 84443 ASSAY THYROID STIM HORMONE: CPT

## 2022-06-02 PROCEDURE — 93005 ELECTROCARDIOGRAM TRACING: CPT

## 2022-06-02 PROCEDURE — U0003: CPT

## 2022-06-02 PROCEDURE — 85025 COMPLETE CBC W/AUTO DIFF WBC: CPT

## 2022-06-02 PROCEDURE — 85027 COMPLETE CBC AUTOMATED: CPT

## 2022-06-02 PROCEDURE — 85610 PROTHROMBIN TIME: CPT

## 2022-06-02 PROCEDURE — 84145 PROCALCITONIN (PCT): CPT

## 2022-06-02 PROCEDURE — 84484 ASSAY OF TROPONIN QUANT: CPT

## 2022-06-02 PROCEDURE — 80048 BASIC METABOLIC PNL TOTAL CA: CPT

## 2022-06-02 PROCEDURE — 80177 DRUG SCRN QUAN LEVETIRACETAM: CPT

## 2022-06-02 PROCEDURE — 80156 ASSAY CARBAMAZEPINE TOTAL: CPT

## 2022-06-02 PROCEDURE — 87040 BLOOD CULTURE FOR BACTERIA: CPT

## 2022-06-02 PROCEDURE — 83735 ASSAY OF MAGNESIUM: CPT

## 2022-06-02 PROCEDURE — 80061 LIPID PANEL: CPT

## 2022-06-02 PROCEDURE — 96374 THER/PROPH/DIAG INJ IV PUSH: CPT

## 2022-06-02 PROCEDURE — 97530 THERAPEUTIC ACTIVITIES: CPT

## 2022-06-02 PROCEDURE — 70450 CT HEAD/BRAIN W/O DYE: CPT | Mod: MA

## 2022-06-02 PROCEDURE — 80053 COMPREHEN METABOLIC PANEL: CPT

## 2022-06-02 PROCEDURE — 92522 EVALUATE SPEECH PRODUCTION: CPT

## 2022-06-02 PROCEDURE — 0225U NFCT DS DNA&RNA 21 SARSCOV2: CPT

## 2022-06-02 RX ORDER — LEVETIRACETAM 250 MG/1
1 TABLET, FILM COATED ORAL
Qty: 0 | Refills: 0 | DISCHARGE

## 2022-06-02 RX ORDER — LEVETIRACETAM 250 MG/1
1 TABLET, FILM COATED ORAL
Qty: 0 | Refills: 0 | DISCHARGE
Start: 2022-06-02

## 2022-06-02 RX ADMIN — NYSTATIN CREAM 1 APPLICATION(S): 100000 CREAM TOPICAL at 17:04

## 2022-06-02 RX ADMIN — CARVEDILOL PHOSPHATE 12.5 MILLIGRAM(S): 80 CAPSULE, EXTENDED RELEASE ORAL at 05:25

## 2022-06-02 RX ADMIN — Medication 88 MICROGRAM(S): at 05:25

## 2022-06-02 RX ADMIN — NYSTATIN CREAM 1 APPLICATION(S): 100000 CREAM TOPICAL at 05:24

## 2022-06-02 RX ADMIN — LOSARTAN POTASSIUM 50 MILLIGRAM(S): 100 TABLET, FILM COATED ORAL at 05:25

## 2022-06-02 RX ADMIN — Medication 100 MILLIGRAM(S): at 15:27

## 2022-06-02 RX ADMIN — Medication 200 MILLIGRAM(S): at 08:56

## 2022-06-02 RX ADMIN — AMLODIPINE BESYLATE 5 MILLIGRAM(S): 2.5 TABLET ORAL at 05:25

## 2022-06-02 RX ADMIN — LEVETIRACETAM 500 MILLIGRAM(S): 250 TABLET, FILM COATED ORAL at 08:58

## 2022-06-02 RX ADMIN — LISINOPRIL 20 MILLIGRAM(S): 2.5 TABLET ORAL at 05:24

## 2022-06-02 RX ADMIN — CARVEDILOL PHOSPHATE 12.5 MILLIGRAM(S): 80 CAPSULE, EXTENDED RELEASE ORAL at 17:03

## 2022-06-02 NOTE — PROGRESS NOTE ADULT - SUBJECTIVE AND OBJECTIVE BOX
Neurology follow up note    MAJOR [id number]      Interval History:    Patient feels ok no new complaints.    Allergies    aspirin (Unknown)  sulfa drugs (Unknown)    Intolerances    Lipitor (Other; Muscle Pain)      MEDICATIONS    acetaminophen     Tablet .. 325 milliGRAM(s) Oral every 6 hours PRN  acetaminophen     Tablet .. 650 milliGRAM(s) Oral every 6 hours PRN  amLODIPine   Tablet 5 milliGRAM(s) Oral daily  carBAMazepine ER Tablet 200 milliGRAM(s) Oral <User Schedule>  carBAMazepine ER Tablet 100 milliGRAM(s) Oral <User Schedule>  carvedilol 12.5 milliGRAM(s) Oral every 12 hours  dextrose 5%. 1000 milliLiter(s) IV Continuous <Continuous>  dextrose 5%. 1000 milliLiter(s) IV Continuous <Continuous>  dextrose 50% Injectable 25 Gram(s) IV Push once  dextrose 50% Injectable 12.5 Gram(s) IV Push once  dextrose 50% Injectable 25 Gram(s) IV Push once  dextrose Oral Gel 15 Gram(s) Oral once PRN  glucagon  Injectable 1 milliGRAM(s) IntraMuscular once  insulin lispro (ADMELOG) corrective regimen sliding scale   SubCutaneous three times a day before meals  insulin lispro (ADMELOG) corrective regimen sliding scale   SubCutaneous at bedtime  levETIRAcetam 500 milliGRAM(s) Oral <User Schedule>  levothyroxine 88 MICROGram(s) Oral daily  lidocaine   4% Patch 1 Patch Transdermal daily  lisinopril 20 milliGRAM(s) Oral daily  losartan 50 milliGRAM(s) Oral daily  nystatin Powder 1 Application(s) Topical three times a day  rosuvastatin 20 milliGRAM(s) Oral at bedtime              Vital Signs Last 24 Hrs  T(C): 36.4 (2022 04:53), Max: 36.6 (2022 17:04)  T(F): 97.6 (2022 04:53), Max: 97.9 (2022 17:04)  HR: 66 (2022 04:53) (63 - 66)  BP: 152/80 (2022 04:53) (114/69 - 152/80)  BP(mean): --  RR: 18 (2022 04:53) (18 - 18)  SpO2: 93% (2022 04:53) (93% - 94%)      REVIEW OF SYSTEMS:  Constitutional:  The patient denies fever, chills, or night sweats.  Head:  No headaches.  Eyes:  No double vision or blurry vision.  Ears:  No ringing in the ears.  Neck:  No neck pain.  Cardiovascular:  No chest pain.  Respiratory:  No shortness of breath.  Abdomen:  No nausea, vomiting, or abdominal pain.  Extremities/Neurological:  No numbness or tingling.  Musculoskeletal:  No joint pain.    PHYSICAL EXAMINATION:   HEENT:  Head:  Normocephalic, atraumatic.  Eyes:  No scleral icterus.  Ears:  Hearing bilaterally intact.  NECK:  Supple.  RESPIRATORY:  Good air entry bilaterally.  CARDIOVASCULAR:  S1 and S2 heard.  ABDOMEN:  Soft, nontender.  EXTREMITIES:  No clubbing or cyanosis was noted.      NEUROLOGIC:  The patient is awake and alert.  Extraocular movements were intact.  Speech was fluent.  The patient does have poor vision out of her left eye, which appears to be chronic.  Right upper 4+/5, left was 4/5.  Bilateral lower extremities 3+/5.  Sensory:  Bilateral upper and lower intact to light touch.            LABS:  CBC Full  -  ( 2022 07:39 )  WBC Count : 5.63 K/uL  RBC Count : 3.37 M/uL  Hemoglobin : 9.9 g/dL  Hematocrit : 30.3 %  Platelet Count - Automated : 152 K/uL  Mean Cell Volume : 89.9 fl  Mean Cell Hemoglobin : 29.4 pg  Mean Cell Hemoglobin Concentration : 32.7 gm/dL  Auto Neutrophil # : 3.72 K/uL  Auto Lymphocyte # : 0.95 K/uL  Auto Monocyte # : 0.68 K/uL  Auto Eosinophil # : 0.16 K/uL  Auto Basophil # : 0.05 K/uL  Auto Neutrophil % : 66.1 %  Auto Lymphocyte % : 16.9 %  Auto Monocyte % : 12.1 %  Auto Eosinophil % : 2.8 %  Auto Basophil % : 0.9 %    Urinalysis Basic - ( 31 May 2022 14:31 )    Color: Yellow / Appearance: Clear / S.015 / pH: x  Gluc: x / Ketone: Negative  / Bili: Negative / Urobili: Negative   Blood: x / Protein: 30 mg/dL / Nitrite: Negative   Leuk Esterase: Trace / RBC: 0-2 /HPF / WBC 3-5   Sq Epi: x / Non Sq Epi: Moderate / Bacteria: Few      06-01    140  |  106  |  16  ----------------------------<  92  4.0   |  27  |  0.77    Ca    7.8<L>      2022 07:21  Mg     2.0         TPro  5.2<L>  /  Alb  2.2<L>  /  TBili  0.2  /  DBili  x   /  AST  9<L>  /  ALT  13  /  AlkPhos  60      Hemoglobin A1C:     LIVER FUNCTIONS - ( 2022 07:21 )  Alb: 2.2 g/dL / Pro: 5.2 g/dL / ALK PHOS: 60 U/L / ALT: 13 U/L / AST: 9 U/L / GGT: x           Vitamin B12         RADIOLOGY  ANALYSIS AND PLAN:  This is a 89-year-old with left-sided weakness.  Clinical impression is a seizure event.  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  The patient's carbamazepine level appears to be therapeutic.  For now, I will continue her on 200-100-200.  I will increase the patient's Keppra to 500 mg twice a day.  For history of hypertension, monitor systolic blood pressure.  Hyperlipidemia.  Continue the patient on statin.  For diabetes, strict control of blood sugars.  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  neurologic  wise ok dc planning     Left a message for the patient's outside neurologist, Dr. Gonsalez, at [phone] to discuss further management of medications     Spoke with son at the bedside.  His telephone number is [phone]     Greater than 45 minutes of time was spent with the patient, plan of care, reviewing data, with greater than 50% of the visit was spent counseling and/or coordinating care with multidisciplinary healthcare team       Neurology follow up note    MAJOR [id number]      Interval History:    Patient feels ok no new complaints.    Allergies    aspirin (Unknown)  sulfa drugs (Unknown)    Intolerances    Lipitor (Other; Muscle Pain)      MEDICATIONS    acetaminophen     Tablet .. 325 milliGRAM(s) Oral every 6 hours PRN  acetaminophen     Tablet .. 650 milliGRAM(s) Oral every 6 hours PRN  amLODIPine   Tablet 5 milliGRAM(s) Oral daily  carBAMazepine ER Tablet 200 milliGRAM(s) Oral <User Schedule>  carBAMazepine ER Tablet 100 milliGRAM(s) Oral <User Schedule>  carvedilol 12.5 milliGRAM(s) Oral every 12 hours  dextrose 5%. 1000 milliLiter(s) IV Continuous <Continuous>  dextrose 5%. 1000 milliLiter(s) IV Continuous <Continuous>  dextrose 50% Injectable 25 Gram(s) IV Push once  dextrose 50% Injectable 12.5 Gram(s) IV Push once  dextrose 50% Injectable 25 Gram(s) IV Push once  dextrose Oral Gel 15 Gram(s) Oral once PRN  glucagon  Injectable 1 milliGRAM(s) IntraMuscular once  insulin lispro (ADMELOG) corrective regimen sliding scale   SubCutaneous three times a day before meals  insulin lispro (ADMELOG) corrective regimen sliding scale   SubCutaneous at bedtime  levETIRAcetam 500 milliGRAM(s) Oral <User Schedule>  levothyroxine 88 MICROGram(s) Oral daily  lidocaine   4% Patch 1 Patch Transdermal daily  lisinopril 20 milliGRAM(s) Oral daily  losartan 50 milliGRAM(s) Oral daily  nystatin Powder 1 Application(s) Topical three times a day  rosuvastatin 20 milliGRAM(s) Oral at bedtime              Vital Signs Last 24 Hrs  T(C): 36.4 (2022 04:53), Max: 36.6 (2022 17:04)  T(F): 97.6 (2022 04:53), Max: 97.9 (2022 17:04)  HR: 66 (2022 04:53) (63 - 66)  BP: 152/80 (2022 04:53) (114/69 - 152/80)  BP(mean): --  RR: 18 (2022 04:53) (18 - 18)  SpO2: 93% (2022 04:53) (93% - 94%)      REVIEW OF SYSTEMS:  Constitutional:  The patient denies fever, chills, or night sweats.  Head:  No headaches.  Eyes:  No double vision or blurry vision.  Ears:  No ringing in the ears.  Neck:  No neck pain.  Cardiovascular:  No chest pain.  Respiratory:  No shortness of breath.  Abdomen:  No nausea, vomiting, or abdominal pain.  Extremities/Neurological:  No numbness or tingling.  Musculoskeletal:  No joint pain.    PHYSICAL EXAMINATION:   HEENT:  Head:  Normocephalic, atraumatic.  Eyes:  No scleral icterus.  Ears:  Hearing bilaterally intact.  NECK:  Supple.  RESPIRATORY:  Good air entry bilaterally.  CARDIOVASCULAR:  S1 and S2 heard.  ABDOMEN:  Soft, nontender.  EXTREMITIES:  No clubbing or cyanosis was noted.      NEUROLOGIC:  The patient is awake and alert.  Extraocular movements were intact.  Speech was fluent.  The patient does have poor vision out of her left eye, which appears to be chronic.  Right upper 4+/5, left was 4/5.  Bilateral lower extremities 3+/5.  Sensory:  Bilateral upper and lower intact to light touch.            LABS:  CBC Full  -  ( 2022 07:39 )  WBC Count : 5.63 K/uL  RBC Count : 3.37 M/uL  Hemoglobin : 9.9 g/dL  Hematocrit : 30.3 %  Platelet Count - Automated : 152 K/uL  Mean Cell Volume : 89.9 fl  Mean Cell Hemoglobin : 29.4 pg  Mean Cell Hemoglobin Concentration : 32.7 gm/dL  Auto Neutrophil # : 3.72 K/uL  Auto Lymphocyte # : 0.95 K/uL  Auto Monocyte # : 0.68 K/uL  Auto Eosinophil # : 0.16 K/uL  Auto Basophil # : 0.05 K/uL  Auto Neutrophil % : 66.1 %  Auto Lymphocyte % : 16.9 %  Auto Monocyte % : 12.1 %  Auto Eosinophil % : 2.8 %  Auto Basophil % : 0.9 %    Urinalysis Basic - ( 31 May 2022 14:31 )    Color: Yellow / Appearance: Clear / S.015 / pH: x  Gluc: x / Ketone: Negative  / Bili: Negative / Urobili: Negative   Blood: x / Protein: 30 mg/dL / Nitrite: Negative   Leuk Esterase: Trace / RBC: 0-2 /HPF / WBC 3-5   Sq Epi: x / Non Sq Epi: Moderate / Bacteria: Few      06-01    140  |  106  |  16  ----------------------------<  92  4.0   |  27  |  0.77    Ca    7.8<L>      2022 07:21  Mg     2.0         TPro  5.2<L>  /  Alb  2.2<L>  /  TBili  0.2  /  DBili  x   /  AST  9<L>  /  ALT  13  /  AlkPhos  60      Hemoglobin A1C:     LIVER FUNCTIONS - ( 2022 07:21 )  Alb: 2.2 g/dL / Pro: 5.2 g/dL / ALK PHOS: 60 U/L / ALT: 13 U/L / AST: 9 U/L / GGT: x           Vitamin B12         RADIOLOGY  ANALYSIS AND PLAN:  This is a 89-year-old with left-sided weakness.  Clinical impression is a seizure event.  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  The patient's carbamazepine level appears to be therapeutic.  For now, I will continue her on 200-100-200.  I will increase the patient's Keppra to 500 mg twice a day.  For history of hypertension, monitor systolic blood pressure.  Hyperlipidemia.  Continue the patient on statin.  For diabetes, strict control of blood sugars.  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  neurologic  wise ok dc planning     Left a message for the patient's outside neurologist, Dr. Gonsalez, at [phone] to discuss further management of medications     Spoke with son at the bedside.  His telephone number is [phone]     Greater than 45 minutes of time was spent with the patient, plan of care, reviewing data, with greater than 50% of the visit was spent counseling and/or coordinating care with multidisciplinary healthcare team

## 2022-06-02 NOTE — DISCHARGE NOTE PROVIDER - PROVIDER TOKENS
PROVIDER:[TOKEN:[[id number]],FOLLOWUP:[1-3 days]],PROVIDER:[TOKEN:[[id number]],FOLLOWUP:[1 week]]

## 2022-06-02 NOTE — PROGRESS NOTE ADULT - PROVIDER SPECIALTY LIST ADULT
Neurology
Neurology
Pt is ambulatory w/steady gait. No distress. Pain moderately improved with meds in the ED. Family is at the bedside to transport to [facility] for admit. Written and verbal instruction given for admit to [facility]. Understanding verbalized.  IV converted
Infectious Disease
Hospitalist
Hospitalist

## 2022-06-02 NOTE — DISCHARGE NOTE NURSING/CASE MANAGEMENT/SOCIAL WORK - NSDCPEFALRISK_GEN_ALL_CORE
For information on Fall & Injury Prevention, visit: [email or web address] OR  [email or web address] OR  https://www.cdc.gov/steadi/patient.html

## 2022-06-02 NOTE — DISCHARGE NOTE PROVIDER - HOSPITAL COURSE
FROM ADMISSION H+P:   HPI:  89 YO F PMHX hemorraghic stroke 2015, seizure disorder (keppra, carbamazepine), HTN, type 2 diabetes, hypothyroidism, HLD here for HA 9/10 and weakness since last evening Pt states she did not take her carbamazepine dose because its makes her very sleepy during the day. Spoke with son Simone who states his brother Quintin entered the home and patient was on the floor with three phones, was sliding down her bed, left sided weakness. As per son, left sided weakness was how her prior seizures presented as well. Patient gives herself her medications at home, performs all ADLs except for needs help showering, ambulates independently, lives with her son. Pt has hx of hemorraghic stroke in [address], ever since then she developed seizures. Patient had her carbamazepine increased in December, which made her very lethargic, as per son patient was falling asleep eating cereal in the morning which is not her normal self. HA is in occipital region b/l 5/10 now after receiving pain med sin ER. Pt also admits to generalized weakness since this evening. Pt denies any dizziness, lightheadedness, blurred vision, numbness, paresthesias, syncope, CP, SOB, cough, fever, chills. As per EMS reports, on EMS arrival patient was on the floor, denied fall but thought she had a unwitnessed seizure son called 911 for patient. Pt was found to be A & O x 4, no signs of trauma, CNs grossly intact. Pt noted to be hypertensive 216/110, 196/104, 186/104, in sinus rhythm. Pt noted by EMS to have LUE weakness and LUE drift, weakness in gait. When patient was moved to the ambulance she had slight AMS A & O x 2, AMS self corrected in ambulance after approximately 1 minute. Last well known 1600, patient unsure of onset of symptoms.     ED vitals afebrile, HR 80, /107--> 205/87-labetalol 10 IVP --> 170/78, 96% on RA.   Labs significant for glucose 155, Ca 8.3, albumin 3, carbamazepine 11.4.   Pt received carbamazepine 200 mg x1, labetalol 10 m IV x1.   CT head no acute intracranial pathology. There is moderate generalized cerebral volume loss and disproportionate ventriculomegaly, unchanged from 12/21/2021. Severe chronic microvascular ischemic disease is stable.     (30 May 2022 03:34)      ---  HOSPITAL COURSE:     Patient transferred to general medicine floor for further workup and management. MR Brain: 1) Extensive chronic ischemic changes, old right MCA territory infarct. No diffusion restriction or MR evidence of acute ischemia. 2) atrophy with ex vacuo enlargement of the ventricles. 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  No acute CVA. Patient was seen by neurology. Event was likely a seizure 2/2 to missed medication dose. Patient was continued on her home carbamazepine 200-100-200 and her Keppra dose was increased to 500mg BID. Patient had a one time fever of 101F. Fever was found to be from an unclear cause, no infectious etiology identified. BCx2 were negative and urine cultures showed >3 organisms likely contaminate. HgbA1C of 6.6 and the tsh and lipid panel was wnl. Patient hemodynamically stable and medically optimized for DC with outpatient follow up.    T(C): 37.1 (06-02-22 @ 11:50), Max: 37.1 (06-02-22 @ 11:50)  HR: 68 (06-02-22 @ 11:50) (63 - 68)  BP: 111/60 (06-02-22 @ 11:50) (111/60 - 152/80)  RR: 17 (06-02-22 @ 11:50) (17 - 18)  SpO2: 92% (06-02-22 @ 11:50) (92% - 94%)    Physical Exam:  Gen: well appearing, NAD  HEENT: NCAT, PEERLA b/l, EOMI b/l, no conjunctival erythema  Cardio: regular rate and rhythm, +s1s2, no murmurs, rubs, or gallops  Pulm: CTA b/l, no wheezes, rales or rhonchi  Abdomen: soft, nontender, nondistended, +BS x4 quadrants, no guarding  Extremities: no clubbing, cyanosis or edema, +2 pedal pulses  Neuro: AAOx3  Skin: warm and dry        ---  CONSULTANTS:     Neuro: Dr. Leblanc  Infectious Disease: Dr. Barajas     ---  TIME SPENT:  I, the attending physician, was physically present for the key portions of the evaluation and management (E/M) service provided. The total amount of time spent reviewing the hospital notes, laboratory values, imaging findings, assessing/counseling the patient, discussing with consultant physicians, social work, nursing staff was -- minutes    ---  Primary care provider was made aware of plan for discharge:      [  ] NO     [  ] YES   FROM ADMISSION H+P:   HPI:  89 YO F PMHX hemorraghic stroke 2015, seizure disorder (keppra, carbamazepine), HTN, type 2 diabetes, hypothyroidism, HLD here for HA 9/10 and weakness since last evening Pt states she did not take her carbamazepine dose because its makes her very sleepy during the day. Spoke with son Simone who states his brother Quintin entered the home and patient was on the floor with three phones, was sliding down her bed, left sided weakness. As per son, left sided weakness was how her prior seizures presented as well. Patient gives herself her medications at home, performs all ADLs except for needs help showering, ambulates independently, lives with her son. Pt has hx of hemorraghic stroke in [address], ever since then she developed seizures. Patient had her carbamazepine increased in December, which made her very lethargic, as per son patient was falling asleep eating cereal in the morning which is not her normal self. HA is in occipital region b/l 5/10 now after receiving pain med sin ER. Pt also admits to generalized weakness since this evening. Pt denies any dizziness, lightheadedness, blurred vision, numbness, paresthesias, syncope, CP, SOB, cough, fever, chills. As per EMS reports, on EMS arrival patient was on the floor, denied fall but thought she had a unwitnessed seizure son called 911 for patient. Pt was found to be A & O x 4, no signs of trauma, CNs grossly intact. Pt noted to be hypertensive 216/110, 196/104, 186/104, in sinus rhythm. Pt noted by EMS to have LUE weakness and LUE drift, weakness in gait. When patient was moved to the ambulance she had slight AMS A & O x 2, AMS self corrected in ambulance after approximately 1 minute. Last well known 1600, patient unsure of onset of symptoms.     ED vitals afebrile, HR 80, /107--> 205/87-labetalol 10 IVP --> 170/78, 96% on RA.   Labs significant for glucose 155, Ca 8.3, albumin 3, carbamazepine 11.4.   Pt received carbamazepine 200 mg x1, labetalol 10 m IV x1.   CT head no acute intracranial pathology. There is moderate generalized cerebral volume loss and disproportionate ventriculomegaly, unchanged from 12/21/2021. Severe chronic microvascular ischemic disease is stable.     (30 May 2022 03:34)    HOSPITAL COURSE:   Patient transferred to general medicine floor for further workup and management. MR Brain: 1) Extensive chronic ischemic changes, old right MCA territory infarct. No diffusion restriction or MR evidence of acute ischemia. 2) atrophy with ex vacuo enlargement of the ventricles. 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  No acute CVA. Patient was seen by neurology. Event was likely a seizure 2/2 to missed medication dose. Patient was continued on her home carbamazepine 200-100-200 and her Keppra dose was increased to 500mg BID. Patient had a one time fever of 101F. Fever was found to be from an unclear cause, no infectious etiology identified. BCx2 were negative and urine cultures showed >3 organisms likely contaminate. HgbA1C of 6.6 and the tsh and lipid panel was wnl. Patient hemodynamically stable and medically optimized for DC with outpatient follow up.    T(C): 37.1 (06-02-22 @ 11:50), Max: 37.1 (06-02-22 @ 11:50)  HR: 68 (06-02-22 @ 11:50) (63 - 68)  BP: 111/60 (06-02-22 @ 11:50) (111/60 - 152/80)  RR: 17 (06-02-22 @ 11:50) (17 - 18)  SpO2: 92% (06-02-22 @ 11:50) (92% - 94%)    Physical Exam:  Gen: well appearing, NAD  HEENT: NCAT, PEERLA b/l, EOMI b/l, no conjunctival erythema  Cardio: regular rate and rhythm, +s1s2, no murmurs, rubs, or gallops  Pulm: CTA b/l, no wheezes, rales or rhonchi  Abdomen: soft, nontender, nondistended, +BS x4 quadrants, no guarding  Extremities: no clubbing, cyanosis or edema, +2 pedal pulses  Neuro: AAOx3, non focal   Skin: warm and dry    CONSULTANTS:   Neuro: Dr. Leblanc  Infectious Disease: Dr. Barajas     TIME SPENT:  I, the attending physician, was physically present for the key portions of the evaluation and management (E/M) service provided. The total amount of time spent reviewing the hospital notes, laboratory values, imaging findings, assessing/counseling the patient, discussing with consultant physicians, social work, nursing staff was -- minutes    ---  Primary care provider was made aware of plan for discharge:      [  ] NO     [  ] YES   FROM ADMISSION H+P:   HPI:  89 YO F PMHX hemorraghic stroke 2015, seizure disorder (keppra, carbamazepine), HTN, type 2 diabetes, hypothyroidism, HLD here for HA 9/10 and weakness since last evening Pt states she did not take her carbamazepine dose because its makes her very sleepy during the day. Spoke with son Simone who states his brother Quintin entered the home and patient was on the floor with three phones, was sliding down her bed, left sided weakness. As per son, left sided weakness was how her prior seizures presented as well. Patient gives herself her medications at home, performs all ADLs except for needs help showering, ambulates independently, lives with her son. Pt has hx of hemorraghic stroke in [address], ever since then she developed seizures. Patient had her carbamazepine increased in December, which made her very lethargic, as per son patient was falling asleep eating cereal in the morning which is not her normal self. HA is in occipital region b/l 5/10 now after receiving pain med sin ER. Pt also admits to generalized weakness since this evening. Pt denies any dizziness, lightheadedness, blurred vision, numbness, paresthesias, syncope, CP, SOB, cough, fever, chills. As per EMS reports, on EMS arrival patient was on the floor, denied fall but thought she had a unwitnessed seizure son called 911 for patient. Pt was found to be A & O x 4, no signs of trauma, CNs grossly intact. Pt noted to be hypertensive 216/110, 196/104, 186/104, in sinus rhythm. Pt noted by EMS to have LUE weakness and LUE drift, weakness in gait. When patient was moved to the ambulance she had slight AMS A & O x 2, AMS self corrected in ambulance after approximately 1 minute. Last well known 1600, patient unsure of onset of symptoms.     ED vitals afebrile, HR 80, /107--> 205/87-labetalol 10 IVP --> 170/78, 96% on RA.   Labs significant for glucose 155, Ca 8.3, albumin 3, carbamazepine 11.4.   Pt received carbamazepine 200 mg x1, labetalol 10 m IV x1.   CT head no acute intracranial pathology. There is moderate generalized cerebral volume loss and disproportionate ventriculomegaly, unchanged from 12/21/2021. Severe chronic microvascular ischemic disease is stable.     (30 May 2022 03:34)    HOSPITAL COURSE:   Patient transferred to general medicine floor for further workup and management. MR Brain: 1) Extensive chronic ischemic changes, old right MCA territory infarct. No diffusion restriction or MR evidence of acute ischemia. 2) atrophy with ex vacuo enlargement of the ventricles. 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  No acute CVA. Patient was seen by neurology. Event was likely a seizure 2/2 to missed medication dose. Patient was continued on her home carbamazepine 200-100-200 and her Keppra dose was increased to 500mg BID. Patient had a one time fever of 101F. Fever was found to be from an unclear cause, no infectious etiology identified. BCx2 were negative and urine cultures showed >3 organisms likely contaminate. HgbA1C of 6.6 and the tsh and lipid panel was wnl. Patient hemodynamically stable and medically optimized for DC with outpatient follow up.    T(C): 37.1 (06-02-22 @ 11:50), Max: 37.1 (06-02-22 @ 11:50)  HR: 68 (06-02-22 @ 11:50) (63 - 68)  BP: 111/60 (06-02-22 @ 11:50) (111/60 - 152/80)  RR: 17 (06-02-22 @ 11:50) (17 - 18)  SpO2: 92% (06-02-22 @ 11:50) (92% - 94%)    Physical Exam:  Gen: well appearing, NAD  HEENT: NCAT, PEERLA b/l, EOMI b/l, no conjunctival erythema  Cardio: regular rate and rhythm, +s1s2, no murmurs, rubs, or gallops  Pulm: CTA b/l, no wheezes, rales or rhonchi  Abdomen: soft, nontender, nondistended, +BS x4 quadrants, no guarding  Extremities: no clubbing, cyanosis or edema, +2 pedal pulses  Neuro: AAOx3, non focal   Skin: warm and dry    CONSULTANTS:   Neuro: Dr. Leblanc  Infectious Disease: Dr. Barajas     TIME SPENT:  I, the attending physician, was physically present for the key portions of the evaluation and management (E/M) service provided. The total amount of time spent reviewing the hospital notes, laboratory values, imaging findings, assessing/counseling the patient, discussing with consultant physicians, social work, nursing staff was -- minutes

## 2022-06-02 NOTE — DISCHARGE NOTE NURSING/CASE MANAGEMENT/SOCIAL WORK - PATIENT PORTAL LINK FT
You can access the FollowMyHealth Patient Portal offered by [facility] by registering at the following website: [email or web address]. By joining [facility]’s FollowMyHealth portal, you will also be able to view your health information using other applications (apps) compatible with our system.

## 2022-06-02 NOTE — DISCHARGE NOTE PROVIDER - NSDCMRMEDTOKEN_GEN_ALL_CORE_FT
acetaminophen 325 mg oral tablet: 2 tab(s) orally every 8 hours  amLODIPine 5 mg oral tablet: 1 tab(s) orally once a day  carBAMazepine 100 mg oral tablet, extended release: 1 tab(s) orally 200 mg at 9 am, 100 mg at 4pm, 200 mg at 10 pm   carBAMazepine 200 mg oral capsule, extended release: 2 cap(s) orally once a day (at bedtime)  carvedilol 12.5 mg oral tablet: 1 tab(s) orally every 12 hours  Centrum Silver oral tablet: 1 tab(s) orally once a day  Co Q-10 100 mg oral capsule: 1 cap(s) orally once a day  irbesartan 150 mg oral tablet: 1 tab(s) orally 2 times a day  Keppra 250 mg oral tablet: 1 tab(s) orally 2 times a day  levothyroxine 88 mcg (0.088 mg) oral tablet: 1 tab(s) orally once a day  lidocaine 4% topical film: Apply topically to affected area once a day, as needed for pain  metFORMIN 500 mg oral tablet, extended release: 2 tab(s) orally once a day (after a meal)  rosuvastatin 20 mg oral tablet: 1 tab(s) orally once a day   acetaminophen 325 mg oral tablet: 2 tab(s) orally every 8 hours  amLODIPine 5 mg oral tablet: 1 tab(s) orally once a day  carBAMazepine 100 mg oral tablet, extended release: 1 tab(s) orally 200 mg at 9 am, 100 mg at 4pm, 200 mg at 10 pm   carBAMazepine 200 mg oral capsule, extended release: 2 cap(s) orally once a day (at bedtime)  carvedilol 12.5 mg oral tablet: 1 tab(s) orally every 12 hours  Centrum Silver oral tablet: 1 tab(s) orally once a day  Co Q-10 100 mg oral capsule: 1 cap(s) orally once a day  irbesartan 150 mg oral tablet: 1 tab(s) orally 2 times a day  levETIRAcetam 500 mg oral tablet: 1 tab(s) orally 2 times a day  levothyroxine 88 mcg (0.088 mg) oral tablet: 1 tab(s) orally once a day  lidocaine 4% topical film: Apply topically to affected area once a day, as needed for pain  metFORMIN 500 mg oral tablet, extended release: 2 tab(s) orally once a day (after a meal)  rosuvastatin 20 mg oral tablet: 1 tab(s) orally once a day

## 2022-06-02 NOTE — DISCHARGE NOTE PROVIDER - CARE PROVIDER_API CALL
Pedrito Rosenthal)  Internal Medicine  [address] [place] [address]  Phone: [phone]  Fax: [phone]  Follow Up Time: 1-3 days    Bonifacio Gonsalez)  Clinical Neurophysiology; EEGEpilepsy; Neurology  [address] [place] [address] [address]  [place] [address]  Phone: [phone]  Fax: [phone]  Follow Up Time: 1 week

## 2022-06-02 NOTE — PROGRESS NOTE ADULT - REASON FOR ADMISSION
post ictal state, r/o stroke
suspected recurrent seizure, r/o stroke

## 2022-06-02 NOTE — DISCHARGE NOTE PROVIDER - NSDCFUSCHEDAPPT_GEN_ALL_CORE_FT
Pedrito Rosenthal  [facility] Physician [facility]  Intmed [address] [facility] [address]  Scheduled Appointment: 06/03/2022    Pedrito Rosenthal  [facility]  Intmed [address] [facility] [address]  Scheduled Appointment: 06/20/2022    Bonifacio Gonsalez  [facility] Physician [facility]  NEUROLOGY [address] [facility]  Scheduled Appointment: 07/26/2022

## 2022-06-02 NOTE — DISCHARGE NOTE PROVIDER - NSDCCPCAREPLAN_GEN_ALL_CORE_FT
PRINCIPAL DISCHARGE DIAGNOSIS  Diagnosis: Seizure  Assessment and Plan of Treatment: You came to the hospital for a suspected seizure.  Neurology was consulted, with the clinical impression of a seizure event. You had a CT and MRI of the head and they did not show any acute condition, stroke or suspicious for an infectious disease.  Neurology ecommended to adjust your medication regimen since the levels of the carbamazepine were normal, in a therapeutic range. Please, continue to take carbamazepine  200mg am, 100 at noon and 200mg at night.  Increase the dose of the Keppra to 500 mg twice a day.  Follow up with your Neurologist and PCP.      SECONDARY DISCHARGE DIAGNOSES  Diagnosis: Fever  Assessment and Plan of Treatment: You presented a low grade fever after your admission. Infectious disease was consulted and we performed additional workup. CT and MRI head wit no signs of intracraneal infection.  Urine culture negative for UTI, blood cultures negative today, negative viral respiratory panel including covid infection negative. The white blood cells in your blood were normal and stable, and you did not have additional episodes of fever. ID doctor considers extremely low suspicion for bacterial infection and you do not need additional medications or antibiotics.   Please, follow with your PCP.  Come back to the hospital if you present episodes of fever or new symptoms.        Diagnosis: Hypertensive urgency  Assessment and Plan of Treatment: You arrived to the hospital with elevated blood pressure and received IV medication in the ED (Labetalol) with improvement.  The CT and MRI of your head were negative for stroke. You were receiving your blood pressure medications while in the hospital and your blood pressure level remained stable and controlled.  Please, continue to take your blood pressure medications at home.  Follow with your PCP.    Diagnosis: Hypothyroidism  Assessment and Plan of Treatment: Your TSH was found normal.  Please continue to take the same dose of the levothyroxin.    Diagnosis: Type 2 diabetes mellitus  Assessment and Plan of Treatment: You were found with HbA1c 6.3 which means well control of your diabetes.  Please, continue to take your medications and follow up with your PCP.

## 2022-06-03 ENCOUNTER — APPOINTMENT (OUTPATIENT)
Dept: INTERNAL MEDICINE | Facility: CLINIC | Age: 87
End: 2022-06-03
Payer: MEDICARE

## 2022-06-03 ENCOUNTER — NON-APPOINTMENT (OUTPATIENT)
Age: 87
End: 2022-06-03

## 2022-06-03 VITALS
WEIGHT: 135 LBS | BODY MASS INDEX: 22.49 KG/M2 | SYSTOLIC BLOOD PRESSURE: 130 MMHG | TEMPERATURE: 95.8 F | HEIGHT: 65 IN | DIASTOLIC BLOOD PRESSURE: 76 MMHG | RESPIRATION RATE: 16 BRPM

## 2022-06-03 DIAGNOSIS — E03.9 HYPOTHYROIDISM, UNSPECIFIED: ICD-10-CM

## 2022-06-03 PROCEDURE — 99496 TRANSJ CARE MGMT HIGH F2F 7D: CPT

## 2022-06-03 NOTE — HISTORY OF PRESENT ILLNESS
[Post-hospitalization from ___ Hospital] : Post-hospitalization from [unfilled] Hospital [Admitted on: ___] : The patient was admitted on [unfilled] [Discharged on ___] : discharged on [unfilled] [Discharge Summary] : discharge summary [Discharge Med List] : discharge medication list [Patient Contacted By: ____] : and contacted by [unfilled] [FreeTextEntry2] : MAJOR PEREZ is a 89 year old F who presents today for hospital follow up. Pt went to the hospital because of post ictal state, r/o stroke and was diagnosed with a seizure. PT has a Hx of DM, cholesterol and HTN.

## 2022-06-03 NOTE — PHYSICAL EXAM
[No Acute Distress] : no acute distress [Well Nourished] : well nourished [Well Developed] : well developed [Well-Appearing] : well-appearing [Normal Voice/Communication] : normal voice/communication [PERRL] : pupils equal round and reactive to light [Normal Sclera/Conjunctiva] : normal sclera/conjunctiva [EOMI] : extraocular movements intact [Normal Outer Ear/Nose] : the outer ears and nose were normal in appearance [No JVD] : no jugular venous distention [No Lymphadenopathy] : no lymphadenopathy [Supple] : supple [Thyroid Normal, No Nodules] : the thyroid was normal and there were no nodules present [No Respiratory Distress] : no respiratory distress  [No Accessory Muscle Use] : no accessory muscle use [Clear to Auscultation] : lungs were clear to auscultation bilaterally [Normal Rate] : normal rate  [Regular Rhythm] : with a regular rhythm [Normal S1, S2] : normal S1 and S2 [No Murmur] : no murmur heard [No Carotid Bruits] : no carotid bruits [No Abdominal Bruit] : a ~M bruit was not heard ~T in the abdomen [No Varicosities] : no varicosities [Pedal Pulses Present] : the pedal pulses are present [No Edema] : there was no peripheral edema [No Palpable Aorta] : no palpable aorta [No Extremity Clubbing/Cyanosis] : no extremity clubbing/cyanosis [Soft] : abdomen soft [Non Tender] : non-tender [Non-distended] : non-distended [No Masses] : no abdominal mass palpated [No HSM] : no HSM [Normal Bowel Sounds] : normal bowel sounds [Normal Supraclavicular Nodes] : no supraclavicular lymphadenopathy [Normal Posterior Cervical Nodes] : no posterior cervical lymphadenopathy [Normal Anterior Cervical Nodes] : no anterior cervical lymphadenopathy [No CVA Tenderness] : no CVA  tenderness [No Spinal Tenderness] : no spinal tenderness [No Joint Swelling] : no joint swelling [Grossly Normal Strength/Tone] : grossly normal strength/tone [No Rash] : no rash [Coordination Grossly Intact] : coordination grossly intact [No Focal Deficits] : no focal deficits [Normal Gait] : normal gait [Deep Tendon Reflexes (DTR)] : deep tendon reflexes were 2+ and symmetric [Speech Grossly Normal] : speech grossly normal [Memory Grossly Normal] : memory grossly normal [Normal Affect] : the affect was normal [Alert and Oriented x3] : oriented to person, place, and time [Normal Mood] : the mood was normal [Normal Insight/Judgement] : insight and judgment were intact

## 2022-06-03 NOTE — HEALTH RISK ASSESSMENT
[Former] : Former [No] : In the past 12 months have you used drugs other than those required for medical reasons? No [No falls in past year] : Patient reported no falls in the past year [0] : 2) Feeling down, depressed, or hopeless: Not at all (0) [PHQ-2 Negative - No further assessment needed] : PHQ-2 Negative - No further assessment needed [[id number]] : 0

## 2022-06-03 NOTE — END OF VISIT
[FreeTextEntry3] : "I, Ayanna Castro, personally scribed the services dictated to me by Dr. Pedrito Rosenthal MD in this documentation on 06/03/2022 " \par \par "I Dr. Pedrito Rosenthal MD, personally performed the services described in this documentation on 06/03/2022 for the patient as scribed by Ayanna Castro in my presence. I have reviewed and verified that all the information is accurate and true."\par

## 2022-06-04 ENCOUNTER — RX RENEWAL (OUTPATIENT)
Age: 87
End: 2022-06-04

## 2022-06-06 ENCOUNTER — APPOINTMENT (OUTPATIENT)
Dept: NEUROLOGY | Facility: CLINIC | Age: 87
End: 2022-06-06

## 2022-06-07 RX ORDER — LEVETIRACETAM 500 MG/1
500 TABLET, FILM COATED ORAL
Qty: 180 | Refills: 0 | Status: DISCONTINUED | COMMUNITY
Start: 2022-03-14 | End: 2022-06-07

## 2022-06-10 ENCOUNTER — NON-APPOINTMENT (OUTPATIENT)
Age: 87
End: 2022-06-10

## 2022-06-14 ENCOUNTER — NON-APPOINTMENT (OUTPATIENT)
Age: 87
End: 2022-06-14

## 2022-06-20 ENCOUNTER — APPOINTMENT (OUTPATIENT)
Dept: INTERNAL MEDICINE | Facility: CLINIC | Age: 87
End: 2022-06-20

## 2022-08-01 ENCOUNTER — APPOINTMENT (OUTPATIENT)
Dept: NEUROLOGY | Facility: CLINIC | Age: 87
End: 2022-08-01

## 2022-08-01 ENCOUNTER — NON-APPOINTMENT (OUTPATIENT)
Age: 87
End: 2022-08-01

## 2022-08-01 RX ORDER — LEVETIRACETAM 500 MG/1
500 TABLET, FILM COATED ORAL TWICE DAILY
Qty: 60 | Refills: 5 | Status: DISCONTINUED | COMMUNITY
Start: 2022-06-10 | End: 2022-08-01

## 2022-08-05 ENCOUNTER — APPOINTMENT (OUTPATIENT)
Dept: INTERNAL MEDICINE | Facility: CLINIC | Age: 87
End: 2022-08-05

## 2022-08-05 VITALS
BODY MASS INDEX: 22.49 KG/M2 | HEART RATE: 76 BPM | RESPIRATION RATE: 16 BRPM | HEIGHT: 65 IN | WEIGHT: 135 LBS | SYSTOLIC BLOOD PRESSURE: 122 MMHG | DIASTOLIC BLOOD PRESSURE: 78 MMHG | TEMPERATURE: 97.8 F | OXYGEN SATURATION: 95 %

## 2022-08-05 DIAGNOSIS — R39.9 UNSPECIFIED SYMPTOMS AND SIGNS INVOLVING THE GENITOURINARY SYSTEM: ICD-10-CM

## 2022-08-05 PROCEDURE — 99214 OFFICE O/P EST MOD 30 MIN: CPT

## 2022-08-05 NOTE — HEALTH RISK ASSESSMENT
[Never] : Never [No] : In the past 12 months have you used drugs other than those required for medical reasons? No [No falls in past year] : Patient reported no falls in the past year [0] : 2) Feeling down, depressed, or hopeless: Not at all (0) [PHQ-2 Negative - No further assessment needed] : PHQ-2 Negative - No further assessment needed [[id number]] : 0

## 2022-08-05 NOTE — HISTORY OF PRESENT ILLNESS
[FreeTextEntry1] : follow up [de-identified] : MAJOR PEREZ is a 89 year old F who presents today for DM cholesterol\par rash under breast\par sometimes feet swollen

## 2022-08-07 LAB
25(OH)D3 SERPL-MCNC: 32.8 NG/ML
ALBUMIN SERPL ELPH-MCNC: 3.5 G/DL
ALP BLD-CCNC: 74 U/L
ALT SERPL-CCNC: 9 U/L
ANION GAP SERPL CALC-SCNC: 9 MMOL/L
APPEARANCE: CLEAR
AST SERPL-CCNC: 12 U/L
BASOPHILS # BLD AUTO: 0.05 K/UL
BASOPHILS NFR BLD AUTO: 1.2 %
BILIRUB SERPL-MCNC: <0.2 MG/DL
BILIRUBIN URINE: NEGATIVE
BLOOD URINE: NEGATIVE
BUN SERPL-MCNC: 15 MG/DL
CALCIUM SERPL-MCNC: 8.9 MG/DL
CHLORIDE SERPL-SCNC: 102 MMOL/L
CHOLEST SERPL-MCNC: 214 MG/DL
CK SERPL-CCNC: 73 U/L
CO2 SERPL-SCNC: 28 MMOL/L
COLOR: YELLOW
CREAT SERPL-MCNC: 0.74 MG/DL
EGFR: 76 ML/MIN/1.73M2
EOSINOPHIL # BLD AUTO: 0.09 K/UL
EOSINOPHIL NFR BLD AUTO: 2.2 %
ESTIMATED AVERAGE GLUCOSE: 137 MG/DL
GLUCOSE QUALITATIVE U: ABNORMAL
GLUCOSE SERPL-MCNC: 189 MG/DL
HBA1C MFR BLD HPLC: 6.4 %
HCT VFR BLD CALC: 35.6 %
HDLC SERPL-MCNC: 67 MG/DL
HGB BLD-MCNC: 11.2 G/DL
IMM GRANULOCYTES NFR BLD AUTO: 1.2 %
KETONES URINE: NEGATIVE
LDLC SERPL CALC-MCNC: 119 MG/DL
LEUKOCYTE ESTERASE URINE: NEGATIVE
LYMPHOCYTES # BLD AUTO: 0.98 K/UL
LYMPHOCYTES NFR BLD AUTO: 23.8 %
MAN DIFF?: NORMAL
MCHC RBC-ENTMCNC: 29.2 PG
MCHC RBC-ENTMCNC: 31.5 GM/DL
MCV RBC AUTO: 92.7 FL
MONOCYTES # BLD AUTO: 0.58 K/UL
MONOCYTES NFR BLD AUTO: 14.1 %
NEUTROPHILS # BLD AUTO: 2.36 K/UL
NEUTROPHILS NFR BLD AUTO: 57.5 %
NITRITE URINE: NEGATIVE
NONHDLC SERPL-MCNC: 147 MG/DL
PH URINE: 5.5
PLATELET # BLD AUTO: 233 K/UL
POTASSIUM SERPL-SCNC: 4.5 MMOL/L
PROT SERPL-MCNC: 5.9 G/DL
PROTEIN URINE: NEGATIVE
RBC # BLD: 3.84 M/UL
RBC # FLD: 13.7 %
SODIUM SERPL-SCNC: 139 MMOL/L
SPECIFIC GRAVITY URINE: 1.02
TRIGL SERPL-MCNC: 138 MG/DL
TSH SERPL-ACNC: 0.61 UIU/ML
UROBILINOGEN URINE: NORMAL
WBC # FLD AUTO: 4.11 K/UL

## 2022-10-03 ENCOUNTER — APPOINTMENT (OUTPATIENT)
Dept: INTERNAL MEDICINE | Facility: CLINIC | Age: 87
End: 2022-10-03

## 2022-10-03 DIAGNOSIS — Z23 ENCOUNTER FOR IMMUNIZATION: ICD-10-CM

## 2022-10-03 PROCEDURE — G0008: CPT

## 2022-10-03 PROCEDURE — 90662 IIV NO PRSV INCREASED AG IM: CPT

## 2022-10-11 ENCOUNTER — RX RENEWAL (OUTPATIENT)
Age: 87
End: 2022-10-11

## 2022-10-12 ENCOUNTER — APPOINTMENT (OUTPATIENT)
Dept: NEUROLOGY | Facility: CLINIC | Age: 87
End: 2022-10-12

## 2022-10-12 VITALS
DIASTOLIC BLOOD PRESSURE: 68 MMHG | SYSTOLIC BLOOD PRESSURE: 140 MMHG | WEIGHT: 135 LBS | HEART RATE: 68 BPM | BODY MASS INDEX: 22.49 KG/M2 | HEIGHT: 65 IN

## 2022-10-12 PROCEDURE — 99213 OFFICE O/P EST LOW 20 MIN: CPT

## 2022-10-12 RX ORDER — LEVETIRACETAM 500 MG/1
500 TABLET, FILM COATED, EXTENDED RELEASE ORAL
Qty: 60 | Refills: 5 | Status: DISCONTINUED | COMMUNITY
Start: 2022-09-20 | End: 2022-10-12

## 2022-11-28 DIAGNOSIS — M54.2 CERVICALGIA: ICD-10-CM

## 2022-11-28 RX ORDER — CYCLOBENZAPRINE HYDROCHLORIDE 5 MG/1
5 TABLET, FILM COATED ORAL AT BEDTIME
Qty: 10 | Refills: 0 | Status: ACTIVE | COMMUNITY
Start: 2022-11-28 | End: 1900-01-01

## 2022-11-29 ENCOUNTER — APPOINTMENT (OUTPATIENT)
Dept: INTERNAL MEDICINE | Facility: CLINIC | Age: 87
End: 2022-11-29

## 2022-11-29 VITALS
WEIGHT: 140 LBS | DIASTOLIC BLOOD PRESSURE: 86 MMHG | TEMPERATURE: 97.2 F | OXYGEN SATURATION: 94 % | SYSTOLIC BLOOD PRESSURE: 120 MMHG | HEIGHT: 65 IN | RESPIRATION RATE: 16 BRPM | HEART RATE: 76 BPM | BODY MASS INDEX: 23.32 KG/M2

## 2022-11-29 DIAGNOSIS — G40.109 LOCALIZATION-RELATED (FOCAL) (PARTIAL) SYMPTOMATIC EPILEPSY AND EPILEPTIC SYNDROMES WITH SIMPLE PARTIAL SEIZURES, NOT INTRACTABLE, W/OUT STATUS EPILEPTICUS: ICD-10-CM

## 2022-11-29 PROCEDURE — 99213 OFFICE O/P EST LOW 20 MIN: CPT

## 2022-11-30 PROBLEM — G40.109 EPILEPSY, LOCALIZATION-RELATED: Status: ACTIVE | Noted: 2020-08-23

## 2022-12-01 ENCOUNTER — NON-APPOINTMENT (OUTPATIENT)
Age: 87
End: 2022-12-01

## 2022-12-01 NOTE — HISTORY OF PRESENT ILLNESS
[FreeTextEntry1] : PCP: Dr. Pedrito Rosenthal;  [address] [place] [address] [place] [address];  [phone]\par Levetiracetam reduced back to 250 mg BID yesterday\par Carbamazepine 200/100/200\par \par ***UPDATE:10/12/2022***\par Ms Susana Perez is here today for a scheduled follow up office visit and is accompanied by her son. Since last office visit in March she was admitted to [facility] in June for headache and weakness .The evening before. She admitted to not taking her Carbamazepine dose because it caused her daytime fatigue.She was found on the floor with left sided weakness by her son. 911 called and when ambulance arrived she was hypertensive  /110 , LUE weakness, weakness in gait and slight AMS initially which corrected itself\par Keppra increased to 500 mg BID\par \par son called office on 6/10 and spoke with Dr Ca since Keppra was increased to 500 mg BID patient experienced increased confusion and hallucinations (voices) Keppra was decreased to 250/500\par On 6/14 I checked in with son and patient had no further hallucinations and no reported seizures\par \par In August son called office requesting an alternative of Levetiracetam as his mother sleeps most of the day\par I decreased Levetiracetam to 250 mg BID. He did not want to change Carbamazepine dose\par \par 9/20/22 Patient called the office c/o sleeping all the time,, trouble walking and lack of energy, at that time I switched \par Levetiracetam to ER version to help with fatigue. She agreed to start Levetiracetam  mg BID. \par Spoke to patient on 9/21 and she had picked up new script and started that evening (advised to not use 250 mg tabs)\par \par Her son reports an episode yesterday described as left hand spams accompanied by a  funny scent  for a few seconds 4-m yesterday\par \par \par *** 03/14/2022  *** \par  Ms. PEREZ reports no interval seizures, but complains of increased tiredness.  Her son is concerned that she may be having focal seizures.  She is taking carbamazepine  mg 3 times a day.  She still wakes up at 6 AM to take Synthroid.  She takes the rest of her medicines after 8 AM.  On further review, her son reports that she has had 3 or 4 episodes where the left arm becomes limp and she has repetitive back arching movements 2 or 3 beats that are suspicious for focal seizures.  These have occurred despite taking the higher dose of carbamazepine.\par \par *** 01/31/2022  *** \par  Ms. PEREZ was found down following a seizure in mid December.  Upon arrival to hospital, she was found to be Covid positive.  She was in the hospital approximately 10 days with her major problem being worsening respiration.  However, she recovered and was discharged to rehabilitation where she was 1 month.  She returned home last week on a regimen of carbamazepine 200 mg at 6 AM, 2 PM and 10 PM.  Her son is uncertain whether she is receiving carbamazepine IR or ER. Ms. PEREZ endorses increased tiredness and imbalance.  She is getting up at 6 AM to take her morning dose, but her usual wake up time is 9 AM.  She goes to bed at approximately 10 PM.  She is using a walker.\par \par *** 09/22/2021  ***\par  Ms. PEREZ returns for scheduled follow-up.  She reports no interval seizures.  She complains chronically of poor balance that she attributes to carbamazepine.  However, she ambulates without a cane at home and outside.  Carbamazepine level at last visit was 7.6.  She takes her largest dose–400 mg–at bedtime, and then 100 mg spaced throughout the day.  She denies having worse balance in the morning.\par \par *** 04/30/2021  ***\par Ms. PEREZ returns for f/u to review results of MRI and labs.  MRI of head, MRA head and neck reports and images reviewed by me.  There is evidence of old chronic strokes on MRI but no new acute or subacute strokes. No significant areas of stenosis noted on MRA neck or head.  Ms. PEREZ has not had recurrent seizures.\par labs drawn at [facility] - results not immediately available.\par No new problems. \par \par *** 03/17/2021  ***\par Ms. Perez is doing relatively well.  She has not had a seizure in the interval since her last appointment.  She is taking carbamazepine  in the morning, 100 in the afternoon, and 200 at bedtime.  She has received her first COVID-19 vaccination, and will get the second injection in a couple of weeks.  She complains of some intermittent word finding problems that are new, and had not previously bothered her.  She cannot describe them in detail, but expresses concern that they may represent TIAs or strokes.  In the past, she had an intracerebral hemorrhage.\par \par *** 11/20/2020  ***\par Ms. PEREZ is doing relatively well. She endorses that she is compliant with carbamazepine and that she has had no seizures since last visit.  She is staying home as a precaution against COVID.  She has multiple somatic complaints, however, she remains independent and able to perform her ADLs.\par \par *** 08/21/2020 ***\par Ms. PEREZ had partial seizure on Aug 6.  She felt numbness and swelling in L hand and had twiching in L hand, event ended without generalization. Son reports that hand jerking recurred in hospital. Ms. PEREZ endorses that she had reduced evening dose of carbamazepine to 100mg at bedtime.  \par \par Carbamazepine  / 100 / 100 / 200\par \par *** 01/22/2020  ***\par Ms. SUSANA PEREZ returns for scheduled follow-up appointment. Ms. PEREZ reports that in the interval since her last visit, she is doing well. No interval seizures. Ms. PEREZ endorses that the carbamazepine has her feeling very tired and she gets a late start in the morning, awakening at 9a to take AM dose and going back to sleep, then usually getting up at 11a.  Ms. PEREZ also endorses that she feels unsteady in the morning. \par At lower doses of carbamazepine, Ms. PEREZ has had breakthrough seizures.\par \par *** 10/16/2019  ***\par Ms. SUSANA PEREZ returns for scheduled follow-up appointment. Ms. PEREZ reports that in the interval since her last visit, she is doing well. No seizure since last visit. No new problems. No GI upset or double vision. Now taking carbamazepine  am, 100 noon, 100 4p, 400 bedtime. \par \par *** 04/16/2019  ***\par Patient stated in March 2 she had and episode described as electrical current through chest ,left hand felt funny and her mouth was twitching and tongue was heavy. Her son found her on the bathroom floor with the phone in her hand. She was trying to call someone for help. Unclear how long the episode lasted.\par She was evaluated at [facility] . BP very elevated. CBZ increased to 100/100/200 and she was transferred to [facility] until 3/21 for monitoring of blood pressure and physical therapy strengthening. \par She is otherwise doing well. Her mood is good and she has no further complaints of fatigue.\par \par Current AED regimen:\par /100/200\par \par ***12/18/18***\par patients describes no reported seizures since last visit\par She complains of  mild fatigue and unsteady gait at times\par \par Carbamazepine is taken 9am  100mg , 4pm 100mg and 11pm  300mg\par \par Patient describes aura of tongue feeling heavy in past but none since last office visit\par \par *** 10/22/2018 ***\par Ms. PEREZ was found by her son in am 9/22 on floor of her bedroom. She called out to him. EMT found her to have L weakness, L visual field cut. She was taken to [place] [facility], and seen by neurology there who thought she had seizure and postictal Todds. Ms. PEREZ improved and returned to baseline.  CBZ level was therapeutic, but dose was increased slightly from 100/300 to 100/100/300, and Ms. PEREZ was discharged home. \par \par *** 06/22/2018 ***\par Ms. PEREZ is complaining of feeling very tired, sleepy.  She had one fall when she bent over and lost her balance, required 4 stiches to forehead. She had one episode of L hand tremulousness but could control the hand, no impairment of awareness, no visual distortion.\par \par *** 02/23/2018 *** \par Pt doing relatively well, still feels tired, but functioning OK.  Feels a little unsteady, but has not fallen and is better than before. \par \par *** 12/18/2017 ***\par No interval seizure or symptoms. However, pt c/o problems with balance for more than a month - does not recall if she had similar problems last summer. Otherwise not complaining of side effects. No complaint of fatigue. \par \par *** 10/27/2017 ***\par Ms Perez had an episode on 10/15/17 where her left hand became "heavy". She was evaluated in [place] [facility], and had no recurrent stroke (Prior R parietal hemorrhage). Then on 10/17/17, patient describes that the television picture was distorted.  She went her ophthalmologist who recommended an MRA (presumably for a TIA? - n.b. pt has had extensive vascular imaging in spring 2017). She continues to take carbamazepine 200 q12, and seemed unaware of plan to start lamotrigine, son unaware.  She does complain of increased sleepiness, but does not seem to be major complaint, only endorsed after I inquired. No other problems. Last sodium was normal. \par \par Patient denies missing a dose of carbamazepine, but she does not use a pill box and has a complex (self-imposed) schedule for her medications. \par \par *** 5/12/2017 ***\par Ms. 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 She is a poor historian, and most of the history is taken from the notes. Later that year, she was found to have an active ambulatory EEG with sharp waves noted in the right frontal region. She was treated with levetiracetam, but complained of extreme fatigue interfering with her quality of life.in February 2017, she had an episode of left hand shaking lasting approximately 30 seconds, and resulting in her fall backwards and a head laceration. The event was felt to be a seizure. Her anticonvulsant was changed to carbamazepine, currently 200 mg twice a day, but she continues to complain of fatigue.

## 2022-12-01 NOTE — ASSESSMENT
[FreeTextEntry1] : Ms Easley reports minimal seizures since last office visit in June . Of more concern is her quality of life according to her son. She struggles with daytime fatigue on current AED regimen\par \par \par \par Plan:\par 1.  continue Levetiracetam 250 mg twice a day/ carbamazepine 200 mg in a.m., 100 in p.m., 200 in at bedtime\par 3. Follow up in 2 month with Dr Gonsalez\par \par

## 2022-12-09 ENCOUNTER — APPOINTMENT (OUTPATIENT)
Dept: NEUROLOGY | Facility: CLINIC | Age: 87
End: 2022-12-09

## 2022-12-28 ENCOUNTER — RX RENEWAL (OUTPATIENT)
Age: 87
End: 2022-12-28

## 2023-01-09 NOTE — PATIENT PROFILE ADULT - NSPROMEDSADMININFO_GEN_A_NUR
Last appointment: 12/20/2022  Next appointment: 3/23/2023  Last refill: Lantus on 12/13/2022, Ointment on 12/20/2022
no concerns

## 2023-01-11 NOTE — H&P ADULT - ATTENDING SUPERVISION STATEMENT
[FreeTextEntry1] : Leonardo Banegas (Deanna) is a 41 year old woman with a history of pituitary adenoma removal, PCOS, presenting for a consultation for pins and needles in both thighs. \par \par She endorses one year ago in both thighs right>left, she started having sporadic pins and needles now she has constant sensation of pins and needles with burning sensation to her thighs. She feels the pain is constant. Advil and Tylenol have provided no relief. Denies bowel or bladder difficulties. Has chronic low back pain and unclear if there is any pain radiating down the leg. Denies bowel or bladder difficulties. She endorses tingling in her lower calves both sides as well that started recently. Denies weakness. \par \par Endorses weight gain. Sees endocrinology and currently taking Metformin. \par  \par Social history: \par no smoking or alcohol use\par \par Family history:\par Mother- colon cancer with resection, hypertension.\par Father- Diabetes, hypertension  \par \par \par 
Resident

## 2023-01-23 ENCOUNTER — RX RENEWAL (OUTPATIENT)
Age: 88
End: 2023-01-23

## 2023-02-25 ENCOUNTER — TRANSCRIPTION ENCOUNTER (OUTPATIENT)
Age: 88
End: 2023-02-25

## 2023-02-25 NOTE — ED PROVIDER NOTE - PSH
Basal cell cancer  s/p Mohs surgery  History of arthroscopy of knee  Right  History of hysterectomy    History of retinal tear  surgical repair  S/P cataract surgery  bilateral
No

## 2023-02-28 NOTE — PROGRESS NOTE ADULT - PROBLEM SELECTOR PLAN 1
[facility] Call Center    Phone Message    May a detailed message be left on voicemail: yes     Reason for Call: Medication Refill Request    Has the patient contacted the pharmacy for the refill? Yes   Name of medication being requested: amphetamine-dextroamphetamine (ADDERALL XR) 10 MG 24 hr capsule  Provider who prescribed the medication: Dalia Benz MD  Pharmacy: [facility] [address] [place] [address]  Date medication is needed: 3/2/2023        Action Taken: Message routed to:  Other: [facility]    Travel Screening: Not Applicable                                                                                                                          - likely seizure versus CVA  - CTA head, neck, CT c spine- all negative for acute processes  - MRI head negative for acute process, possible normal pressure hydrocephalus w/ ventricular dilation  - given 48 hrs s/p symptom onset and in setting of CT, MRI head scans (-) for acute stroke, will restart HTN medications, losartan stat and coreg 6p today. will hold off on norvasc until tomorrow  - continue diet, soft bite size w thin liquid  - F/u TTE    - Aspiration precautions  - HOLD aspirin given allergy. HOLD high dose statin as pt intolerant  - Neuro checks Q4h  - carbamazepine level within therapeutic limits, will recheck at 5a prior to AM dose  - PT/OT eval  - Neuro following Dr. Wei, recs appreciated - likely seizure versus CVA  - CTA head, neck, CT c spine- all negative for acute processes  - MRI head negative for acute process, possible normal pressure hydrocephalus w/ ventricular dilation  - given 48 hrs s/p symptom onset and in setting of CT, MRI head scans (-) for acute stroke, will restart home coreg 12.5mg po bid, losartan 50mg qd. will hold off on norvasc until tomorrow  - continue diet, soft bite size w thin liquid  - F/u TTE    - Aspiration precautions  - HOLD aspirin given allergy. HOLD high dose statin as pt intolerant  - Neuro checks Q4h  - carbamazepine level within therapeutic limits, will recheck at 5a prior to AM dose  - PT/OT eval  - Neuro following Dr. Wei, recs appreciated - likely due to seizure  - CTA head, neck, CT c spine- all negative for acute processes  - MRI head negative for acute process, possible normal pressure hydrocephalus w/ ventricular dilation  - given 48 hrs s/p symptom onset and in setting of CT, MRI head scans (-) for acute stroke, will restart home coreg 12.5mg po bid, losartan 50mg qd. will hold off on norvasc until tomorrow  - continue diet, soft bite size w thin liquid  - F/u TTE    - Aspiration precautions  - HOLD aspirin given allergy. HOLD high dose statin as pt intolerant  - Neuro checks Q4h  - carbamazepine level within therapeutic limits, will recheck at 5a prior to AM dose  - PT/OT eval  - Neuro following Dr. Wei, recs appreciated

## 2023-03-07 ENCOUNTER — APPOINTMENT (OUTPATIENT)
Dept: NEUROLOGY | Facility: CLINIC | Age: 88
End: 2023-03-07

## 2023-04-10 ENCOUNTER — RX RENEWAL (OUTPATIENT)
Age: 88
End: 2023-04-10

## 2023-04-11 ENCOUNTER — NON-APPOINTMENT (OUTPATIENT)
Age: 88
End: 2023-04-11

## 2023-05-16 NOTE — ED PROVIDER NOTE - MUSCULOSKELETAL, MLM
Spine appears normal, range of motion is not limited, no muscle or joint tenderness Olumiant Counseling: I discussed with the patient the risks of Olumiant therapy including but not limited to upper respiratory tract infections, shingles, cold sores, and nausea. Live vaccines should be avoided.  This medication has been linked to serious infections; higher rate of mortality; malignancy and lymphoproliferative disorders; major adverse cardiovascular events; thrombosis; gastrointestinal perforations; neutropenia; lymphopenia; anemia; liver enzyme elevations; and lipid elevations.

## 2023-06-05 NOTE — PHYSICAL THERAPY INITIAL EVALUATION ADULT - TRANSFER SKILLS, REHAB EVAL
independent/needs device Ear Star Wedge Flap Text: The defect edges were debeveled with a #15c blade scalpel.  Given the location of the defect and the proximity to free margins (helical rim) an ear star wedge flap was deemed most appropriate.  Using a sterile surgical marker, the appropriate flap was drawn incorporating the defect and placing the expected incisions between the helical rim and antihelix where possible.  The area thus outlined was incised through and through with a #15 scalpel blade.

## 2023-06-20 ENCOUNTER — APPOINTMENT (OUTPATIENT)
Dept: NEUROLOGY | Facility: CLINIC | Age: 88
End: 2023-06-20

## 2023-07-23 NOTE — PROGRESS NOTE ADULT - SUBJECTIVE AND OBJECTIVE BOX
Physical Medicine and Rehabilitation Subsequent Evaluation    Patient seen in followup for L sided shoulder pain    Patient seen and examined at bedside, and collateral history obtained from nursing staff no reports during current shift of left shoulder pain. Pain is reportedly better per patient as well. We will continue with the recommendations of lidocaine patch, tylenol TID, and warm compresses as needed.     Medical studies/laboratory studies reviewed, including:                          10.3   6.45  )-----------( 141      ( 25 Dec 2021 08:46 )             31.3   12-25    143  |  107  |  28<H>  ----------------------------<  167<H>  3.7   |  30  |  0.75    Ca    7.9<L>      25 Dec 2021 08:46  Phos  2.7     12-25  Mg     2.6     12-24    TPro  5.4<L>  /  Alb  2.1<L>  /  TBili  0.2  /  DBili  <0.1  /  AST  17  /  ALT  19  /  AlkPhos  49  12-25    ROS:  Constitutional: Denies fevers or chills  MSK: L shoulder pain slightly improved    PM, Social, Family Hx: unchanged from initial eval from yesterday    Physical Exam:   Vital Signs Last 24 Hrs  T(C): 37.1 (25 Dec 2021 20:16), Max: 37.1 (25 Dec 2021 20:16)  T(F): 98.7 (25 Dec 2021 20:16), Max: 98.7 (25 Dec 2021 20:16)  HR: 79 (25 Dec 2021 20:16) (69 - 79)  BP: 148/72 (25 Dec 2021 20:16) (106/61 - 148/72)  BP(mean): --  RR: 19 (25 Dec 2021 20:16) (18 - 19)  SpO2: 99% (25 Dec 2021 20:16) (98% - 99%)    Constitutional: Gen: In no acute distress, cooperative with exam and questioning   CV: Regular rate and rhythm, S1 S2, bilateral lower extremity pitting peripheral edema, pedal pulses intact  Resp: Good respiratory effort, supplemental NC O2 in place, coarse breath sounds diffusely particularly bilateral upper airways, positive rhonchi bilateral upper airways  Neuro: Cranial Nerves II-XII intact, sensation intact to light touch in peripheral upper and reduced in peripheral lower extremities  MSK: No cyanosis or clubbing of nails, strength exam 4- to 4/5 in RUE and RLE, 3 to 4-/5 in left upper and lower extremities with exception of L shoulder limited due to pain, ROM full in all extremities passively with exception of L shoulder above about 60 degrees there is sharp pain. no point tenderness of scapula, acromion process, coracoid process.   Psychiatric: Awake alert fully oriented 0 = swallows foods/liquids without difficulty

## 2023-08-11 NOTE — ED ADULT NURSE NOTE - CHIEF COMPLAINT QUOTE
NANCY Brooke RN 12 hours ago (8:53 PM)       Order is in     Brito reports intermittent confusion, headache, elevated BP left upper extremity weakness.  FS 173mg/dl

## 2023-08-25 ENCOUNTER — APPOINTMENT (OUTPATIENT)
Dept: INTERNAL MEDICINE | Facility: CLINIC | Age: 88
End: 2023-08-25

## 2023-09-02 ENCOUNTER — INPATIENT (INPATIENT)
Facility: HOSPITAL | Age: 88
LOS: 2 days | Discharge: NURSING, REHAB OR LONG-TERM CARE | DRG: 563 | End: 2023-09-05
Attending: HOSPITALIST | Admitting: INTERNAL MEDICINE
Payer: MEDICARE

## 2023-09-02 VITALS
TEMPERATURE: 98 F | HEIGHT: 63 IN | OXYGEN SATURATION: 95 % | DIASTOLIC BLOOD PRESSURE: 64 MMHG | SYSTOLIC BLOOD PRESSURE: 103 MMHG | RESPIRATION RATE: 20 BRPM | WEIGHT: 139.99 LBS | HEART RATE: 66 BPM

## 2023-09-02 DIAGNOSIS — E78.5 HYPERLIPIDEMIA, UNSPECIFIED: ICD-10-CM
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
## 2023-09-02 DIAGNOSIS — Z86.69 PERSONAL HISTORY OF OTHER DISEASES OF THE NERVOUS SYSTEM AND SENSE ORGANS: Chronic | ICD-10-CM

## 2023-09-02 DIAGNOSIS — Z86.73 PERSONAL HISTORY OF TRANSIENT ISCHEMIC ATTACK (TIA), AND CEREBRAL INFARCTION WITHOUT RESIDUAL DEFICITS: ICD-10-CM

## 2023-09-02 DIAGNOSIS — E11.9 TYPE 2 DIABETES MELLITUS WITHOUT COMPLICATIONS: ICD-10-CM

## 2023-09-02 DIAGNOSIS — I10 ESSENTIAL (PRIMARY) HYPERTENSION: ICD-10-CM

## 2023-09-02 DIAGNOSIS — S42.301A UNSPECIFIED FRACTURE OF SHAFT OF HUMERUS, RIGHT ARM, INITIAL ENCOUNTER FOR CLOSED FRACTURE: ICD-10-CM

## 2023-09-02 DIAGNOSIS — Z29.9 ENCOUNTER FOR PROPHYLACTIC MEASURES, UNSPECIFIED: ICD-10-CM

## 2023-09-02 DIAGNOSIS — S42.291A OTHER DISPLACED FRACTURE OF UPPER END OF RIGHT HUMERUS, INITIAL ENCOUNTER FOR CLOSED FRACTURE: ICD-10-CM

## 2023-09-02 DIAGNOSIS — Z86.69 PERSONAL HISTORY OF OTHER DISEASES OF THE NERVOUS SYSTEM AND SENSE ORGANS: ICD-10-CM

## 2023-09-02 LAB
ANION GAP SERPL CALC-SCNC: 1 MMOL/L — LOW (ref 5–17)
BUN SERPL-MCNC: 13 MG/DL — SIGNIFICANT CHANGE UP (ref 7–23)
CALCIUM SERPL-MCNC: 8.3 MG/DL — LOW (ref 8.5–10.1)
CHLORIDE SERPL-SCNC: 105 MMOL/L — SIGNIFICANT CHANGE UP (ref 96–108)
CO2 SERPL-SCNC: 32 MMOL/L — HIGH (ref 22–31)
CREAT SERPL-MCNC: 0.79 MG/DL — SIGNIFICANT CHANGE UP (ref 0.5–1.3)
EGFR: 70 ML/MIN/1.73M2 — SIGNIFICANT CHANGE UP
GLUCOSE SERPL-MCNC: 149 MG/DL — HIGH (ref 70–99)
HCT VFR BLD CALC: 35.1 % — SIGNIFICANT CHANGE UP (ref 34.5–45)
HGB BLD-MCNC: 11.3 G/DL — LOW (ref 11.5–15.5)
MCHC RBC-ENTMCNC: 29.2 PG — SIGNIFICANT CHANGE UP (ref 27–34)
MCHC RBC-ENTMCNC: 32.2 GM/DL — SIGNIFICANT CHANGE UP (ref 32–36)
MCV RBC AUTO: 90.7 FL — SIGNIFICANT CHANGE UP (ref 80–100)
NRBC # BLD: 0 /100 WBCS — SIGNIFICANT CHANGE UP (ref 0–0)
PLATELET # BLD AUTO: 197 K/UL — SIGNIFICANT CHANGE UP (ref 150–400)
POTASSIUM SERPL-MCNC: 4.1 MMOL/L — SIGNIFICANT CHANGE UP (ref 3.5–5.3)
POTASSIUM SERPL-SCNC: 4.1 MMOL/L — SIGNIFICANT CHANGE UP (ref 3.5–5.3)
RBC # BLD: 3.87 M/UL — SIGNIFICANT CHANGE UP (ref 3.8–5.2)
RBC # FLD: 13.8 % — SIGNIFICANT CHANGE UP (ref 10.3–14.5)
SARS-COV-2 RNA SPEC QL NAA+PROBE: SIGNIFICANT CHANGE UP
SODIUM SERPL-SCNC: 138 MMOL/L — SIGNIFICANT CHANGE UP (ref 135–145)
WBC # BLD: 6.33 K/UL — SIGNIFICANT CHANGE UP (ref 3.8–10.5)
WBC # FLD AUTO: 6.33 K/UL — SIGNIFICANT CHANGE UP (ref 3.8–10.5)

## 2023-09-02 PROCEDURE — 99285 EMERGENCY DEPT VISIT HI MDM: CPT

## 2023-09-02 PROCEDURE — 93970 EXTREMITY STUDY: CPT | Mod: 26

## 2023-09-02 PROCEDURE — 99221 1ST HOSP IP/OBS SF/LOW 40: CPT | Mod: GC

## 2023-09-02 PROCEDURE — 73080 X-RAY EXAM OF ELBOW: CPT | Mod: 26,RT

## 2023-09-02 PROCEDURE — 73030 X-RAY EXAM OF SHOULDER: CPT | Mod: 26,RT

## 2023-09-02 PROCEDURE — 99222 1ST HOSP IP/OBS MODERATE 55: CPT | Mod: GC

## 2023-09-02 PROCEDURE — 73020 X-RAY EXAM OF SHOULDER: CPT | Mod: 26,RT,59

## 2023-09-02 PROCEDURE — 70450 CT HEAD/BRAIN W/O DYE: CPT | Mod: 26,MA

## 2023-09-02 PROCEDURE — 93010 ELECTROCARDIOGRAM REPORT: CPT

## 2023-09-02 PROCEDURE — 72125 CT NECK SPINE W/O DYE: CPT | Mod: 26,MA

## 2023-09-02 PROCEDURE — 73060 X-RAY EXAM OF HUMERUS: CPT | Mod: 26,RT

## 2023-09-02 RX ORDER — ACETAMINOPHEN 500 MG
975 TABLET ORAL ONCE
Refills: 0 | Status: COMPLETED | OUTPATIENT
Start: 2023-09-02 | End: 2023-09-02

## 2023-09-02 RX ORDER — CARBAMAZEPINE 200 MG
100 TABLET ORAL
Refills: 0 | Status: DISCONTINUED | OUTPATIENT
Start: 2023-09-02 | End: 2023-09-05

## 2023-09-02 RX ORDER — SODIUM CHLORIDE 9 MG/ML
1000 INJECTION, SOLUTION INTRAVENOUS
Refills: 0 | Status: DISCONTINUED | OUTPATIENT
Start: 2023-09-02 | End: 2023-09-05

## 2023-09-02 RX ORDER — LEVOTHYROXINE SODIUM 125 MCG
1 TABLET ORAL
Qty: 0 | Refills: 0 | DISCHARGE

## 2023-09-02 RX ORDER — GLUCAGON INJECTION, SOLUTION 0.5 MG/.1ML
1 INJECTION, SOLUTION SUBCUTANEOUS ONCE
Refills: 0 | Status: DISCONTINUED | OUTPATIENT
Start: 2023-09-02 | End: 2023-09-05

## 2023-09-02 RX ORDER — DEXTROSE 50 % IN WATER 50 %
25 SYRINGE (ML) INTRAVENOUS ONCE
Refills: 0 | Status: DISCONTINUED | OUTPATIENT
Start: 2023-09-02 | End: 2023-09-05

## 2023-09-02 RX ORDER — CARBAMAZEPINE 200 MG
200 TABLET ORAL
Refills: 0 | Status: DISCONTINUED | OUTPATIENT
Start: 2023-09-02 | End: 2023-09-05

## 2023-09-02 RX ORDER — LEVETIRACETAM 250 MG/1
250 TABLET, FILM COATED ORAL DAILY
Refills: 0 | Status: DISCONTINUED | OUTPATIENT
Start: 2023-09-02 | End: 2023-09-05

## 2023-09-02 RX ORDER — LEVOTHYROXINE SODIUM 125 MCG
1 TABLET ORAL
Refills: 0 | DISCHARGE

## 2023-09-02 RX ORDER — ATORVASTATIN CALCIUM 80 MG/1
40 TABLET, FILM COATED ORAL AT BEDTIME
Refills: 0 | Status: DISCONTINUED | OUTPATIENT
Start: 2023-09-02 | End: 2023-09-05

## 2023-09-02 RX ORDER — ROSUVASTATIN CALCIUM 5 MG/1
1 TABLET ORAL
Refills: 0 | DISCHARGE

## 2023-09-02 RX ORDER — LEVETIRACETAM 250 MG/1
1 TABLET, FILM COATED ORAL
Refills: 0 | DISCHARGE

## 2023-09-02 RX ORDER — SENNA PLUS 8.6 MG/1
2 TABLET ORAL ONCE
Refills: 0 | Status: COMPLETED | OUTPATIENT
Start: 2023-09-02 | End: 2023-09-02

## 2023-09-02 RX ORDER — MULTIVIT-MIN/FERROUS GLUCONATE 9 MG/15 ML
1 LIQUID (ML) ORAL
Qty: 0 | Refills: 0 | DISCHARGE

## 2023-09-02 RX ORDER — ACETAMINOPHEN 500 MG
650 TABLET ORAL EVERY 6 HOURS
Refills: 0 | Status: DISCONTINUED | OUTPATIENT
Start: 2023-09-02 | End: 2023-09-05

## 2023-09-02 RX ORDER — CARBAMAZEPINE 200 MG
3 TABLET ORAL
Qty: 0 | Refills: 0 | DISCHARGE

## 2023-09-02 RX ORDER — METFORMIN HYDROCHLORIDE 850 MG/1
2 TABLET ORAL
Qty: 0 | Refills: 0 | DISCHARGE

## 2023-09-02 RX ORDER — AMLODIPINE BESYLATE 2.5 MG/1
1 TABLET ORAL
Qty: 0 | Refills: 0 | DISCHARGE

## 2023-09-02 RX ORDER — METFORMIN HYDROCHLORIDE 850 MG/1
2 TABLET ORAL
Refills: 0 | DISCHARGE

## 2023-09-02 RX ORDER — LEVOTHYROXINE SODIUM 125 MCG
88 TABLET ORAL DAILY
Refills: 0 | Status: DISCONTINUED | OUTPATIENT
Start: 2023-09-02 | End: 2023-09-05

## 2023-09-02 RX ORDER — LOSARTAN POTASSIUM 100 MG/1
50 TABLET, FILM COATED ORAL
Refills: 0 | Status: DISCONTINUED | OUTPATIENT
Start: 2023-09-02 | End: 2023-09-05

## 2023-09-02 RX ORDER — DEXTROSE 50 % IN WATER 50 %
12.5 SYRINGE (ML) INTRAVENOUS ONCE
Refills: 0 | Status: DISCONTINUED | OUTPATIENT
Start: 2023-09-02 | End: 2023-09-05

## 2023-09-02 RX ORDER — CARVEDILOL PHOSPHATE 80 MG/1
1 CAPSULE, EXTENDED RELEASE ORAL
Refills: 0 | DISCHARGE

## 2023-09-02 RX ORDER — ROSUVASTATIN CALCIUM 5 MG/1
1 TABLET ORAL
Qty: 0 | Refills: 0 | DISCHARGE

## 2023-09-02 RX ORDER — LIDOCAINE 4 G/100G
1 CREAM TOPICAL EVERY 24 HOURS
Refills: 0 | Status: DISCONTINUED | OUTPATIENT
Start: 2023-09-02 | End: 2023-09-05

## 2023-09-02 RX ORDER — DEXTROSE 50 % IN WATER 50 %
15 SYRINGE (ML) INTRAVENOUS ONCE
Refills: 0 | Status: DISCONTINUED | OUTPATIENT
Start: 2023-09-02 | End: 2023-09-05

## 2023-09-02 RX ORDER — IRBESARTAN 75 MG/1
1 TABLET ORAL
Refills: 0 | DISCHARGE

## 2023-09-02 RX ORDER — TRAMADOL HYDROCHLORIDE 50 MG/1
25 TABLET ORAL EVERY 6 HOURS
Refills: 0 | Status: DISCONTINUED | OUTPATIENT
Start: 2023-09-02 | End: 2023-09-05

## 2023-09-02 RX ORDER — CARBAMAZEPINE 200 MG
100 TABLET ORAL
Refills: 0 | Status: DISCONTINUED | OUTPATIENT
Start: 2023-09-02 | End: 2023-09-02

## 2023-09-02 RX ORDER — POLYETHYLENE GLYCOL 3350 17 G/17G
17 POWDER, FOR SOLUTION ORAL ONCE
Refills: 0 | Status: COMPLETED | OUTPATIENT
Start: 2023-09-02 | End: 2023-09-02

## 2023-09-02 RX ORDER — INSULIN LISPRO 100/ML
VIAL (ML) SUBCUTANEOUS
Refills: 0 | Status: DISCONTINUED | OUTPATIENT
Start: 2023-09-02 | End: 2023-09-05

## 2023-09-02 RX ORDER — CARBAMAZEPINE 200 MG
1 TABLET ORAL
Refills: 0 | DISCHARGE

## 2023-09-02 RX ORDER — INSULIN LISPRO 100/ML
VIAL (ML) SUBCUTANEOUS AT BEDTIME
Refills: 0 | Status: DISCONTINUED | OUTPATIENT
Start: 2023-09-02 | End: 2023-09-05

## 2023-09-02 RX ORDER — CARVEDILOL PHOSPHATE 80 MG/1
12.5 CAPSULE, EXTENDED RELEASE ORAL EVERY 12 HOURS
Refills: 0 | Status: DISCONTINUED | OUTPATIENT
Start: 2023-09-02 | End: 2023-09-05

## 2023-09-02 RX ORDER — CARBAMAZEPINE 200 MG
200 TABLET ORAL
Refills: 0 | Status: DISCONTINUED | OUTPATIENT
Start: 2023-09-02 | End: 2023-09-02

## 2023-09-02 RX ORDER — AMLODIPINE BESYLATE 2.5 MG/1
5 TABLET ORAL DAILY
Refills: 0 | Status: DISCONTINUED | OUTPATIENT
Start: 2023-09-02 | End: 2023-09-03

## 2023-09-02 RX ORDER — ENOXAPARIN SODIUM 100 MG/ML
40 INJECTION SUBCUTANEOUS EVERY 24 HOURS
Refills: 0 | Status: DISCONTINUED | OUTPATIENT
Start: 2023-09-02 | End: 2023-09-05

## 2023-09-02 RX ADMIN — Medication 200 MILLIGRAM(S): at 21:49

## 2023-09-02 RX ADMIN — Medication 975 MILLIGRAM(S): at 09:48

## 2023-09-02 RX ADMIN — LIDOCAINE 1 PATCH: 4 CREAM TOPICAL at 17:19

## 2023-09-02 RX ADMIN — Medication 1: at 17:23

## 2023-09-02 RX ADMIN — SENNA PLUS 2 TABLET(S): 8.6 TABLET ORAL at 21:34

## 2023-09-02 RX ADMIN — LOSARTAN POTASSIUM 50 MILLIGRAM(S): 100 TABLET, FILM COATED ORAL at 17:17

## 2023-09-02 RX ADMIN — TRAMADOL HYDROCHLORIDE 25 MILLIGRAM(S): 50 TABLET ORAL at 21:34

## 2023-09-02 RX ADMIN — POLYETHYLENE GLYCOL 3350 17 GRAM(S): 17 POWDER, FOR SOLUTION ORAL at 21:33

## 2023-09-02 RX ADMIN — Medication 650 MILLIGRAM(S): at 13:02

## 2023-09-02 RX ADMIN — CARVEDILOL PHOSPHATE 12.5 MILLIGRAM(S): 80 CAPSULE, EXTENDED RELEASE ORAL at 17:17

## 2023-09-02 RX ADMIN — LEVETIRACETAM 250 MILLIGRAM(S): 250 TABLET, FILM COATED ORAL at 21:15

## 2023-09-02 NOTE — H&P ADULT - NSHPPHYSICALEXAM_GEN_ALL_CORE
T(C): 36.7 (09-02-23 @ 08:57), Max: 36.7 (09-02-23 @ 08:57)  HR: 66 (09-02-23 @ 08:57) (66 - 66)  BP: 103/64 (09-02-23 @ 08:57) (103/64 - 103/64)  RR: 20 (09-02-23 @ 08:57) (20 - 20)  SpO2: 95% (09-02-23 @ 08:57) (95% - 95%)    gen: appears stated age, NAD  heent: nc/at, eomi, perrla, mmm  neck: supple  resp: cta b/l, no wheezes, rales or rhonchi  cardiac: +s1, s2, RRR, no murmurs appreciated  abd: soft, nt, nd, no rebound/guarding  mskt: no clubbing, cyanosis or edema of LE extremities  vasc: 2+ radial pulses  skin: warm and dry  neuro: a&ox3, no appreciable focal deficits   psych: normal mood, affect. normal behavior T(C): 36.7 (09-02-23 @ 08:57), Max: 36.7 (09-02-23 @ 08:57)  HR: 66 (09-02-23 @ 08:57) (66 - 66)  BP: 103/64 (09-02-23 @ 08:57) (103/64 - 103/64)  RR: 20 (09-02-23 @ 08:57) (20 - 20)  SpO2: 95% (09-02-23 @ 08:57) (95% - 95%)    gen: appears stated age, NAD, laying in bed  heent: nc/at, poor dentition  neck: supple  resp: coarse breath sounds, decreased basilar bs  cardiac: +s1, s2, RRR, no murmurs appreciated  abd: soft, nt, nd, no rebound/guarding  mskt: RUE in sling, exam limited secondary to patient with pain upon light touch  vasc: 2+ radial pulses. L calf > R calf in size, not erythematous, calves nontender to palpation bilaterally   skin: warm and dry  neuro: a&ox3, no appreciable focal deficits   psych: normal mood, affect. normal behavior

## 2023-09-02 NOTE — ED ADULT NURSE NOTE - RESPIRATORY ASSESSMENT
----- Message from Saadia Scott MD sent at 9/10/2019  2:51 PM CDT -----  Call patient. Normal ultrasound, no blood clot.    
Patient aware of normal ultrasound- no blood clot.  No further questions.  
- - -

## 2023-09-02 NOTE — PHYSICAL THERAPY INITIAL EVALUATION ADULT - ADDITIONAL COMMENTS
Patient lives with her son in a private house, no steps to negotiate. Patient reports being independent in ADLs and ambulation prior to admission. Pt reports she has rolling walker at home however typically does not use device.

## 2023-09-02 NOTE — H&P ADULT - PROBLEM SELECTOR PLAN 4
History of T2DM, on home metformin, hold while inpatient  - start low dose insulin sliding scale  - hypoglycemia protocol in place  - fingersticks qac, qhs while on diet  - follow up hbA1c History of T2DM, on home metformin, hold while inpatient  - start low dose insulin sliding scale  - hypoglycemia protocol in place  - fingersticks qac, qhs while on diet  - follow up hbA1c in AM

## 2023-09-02 NOTE — CONSULT NOTE ADULT - SUBJECTIVE AND OBJECTIVE BOX
89yFemale RHD c/o R shoulder pain s/p mechanical fall. Patient endorses head strike but no LOC. Patient denies numbness or tingling. Patient denies any other injuries.    ROS: 10 point ROS otherwise negative    PMH:  Diabetes mellitus    Hypercholesteremia    HTN (hypertension)    CVA (cerebral infarction)    Hemorrhagic stroke    Seizure    Chronic diastolic heart failure      PSH:  History of arthroscopy of knee    S/P cataract surgery    History of hysterectomy    Basal cell cancer    History of retinal tear      AH:  aspirin (Unknown)    Meds: See med rec    T(C): 36.7 (09-02-23 @ 08:57)  HR: 66 (09-02-23 @ 08:57)  BP: 103/64 (09-02-23 @ 08:57)  RR: 20 (09-02-23 @ 08:57)  SpO2: 95% (09-02-23 @ 08:57)  Wt(kg): --    Gen: NAD  Resp: Unlabored breathing  PE RUE:  Skin intact,    SILT axillary/med/rad/ulnar/LABC  +Motor AIN/PIN/Ulnar/rad/musc  +painless elbow/wrist ROM,   shoulder ROM limited 2/2 pain,   2+radial pulse, soft compartments.    Secondary Assessment:  NC/AT, NTTP of clavicles, NTTP of C-spine,T-spine, or L-spine in the midline and paraspinal areas; NTTP of pelvis  LUE: NTTP of Shoulder, Elbow, Wrist, Hand; NT with AROM/PROM of Shoulder, Elbow, Wrist, Hand; AIN/PIN/Med/Uln/Msc/Rad/Ax intact  LLE: Able to SLR, NT with Log Roll, NT with Heel Strike, NTTP of Hip, Knee, Ankle, Foot; NT with AROM/PROM of Hip, Knee, Ankle, Foot; Q/H/GSC/TA/EHL/FHL intact  RLE: Able to SLR, NT with Log Roll, NT with Heel Strike, NTTP of Hip, Knee, Ankle, Foot; NT with AROM/PROM of Hip, Knee, Ankle, Foot; Q/H/GSC/TA/EHL/FHL intact      Imaging:  XR demonstrating R proximal humerus fracture, no e/o GH dislocation        89yFemale with R proximal humerus fracture  -pain control  -NWB RUE in sling  -PT/OT  -ice application  -no orthopedic contraindication to chemical DVT ppx  -No acute orthopaedic surgical intervention indicated at this time. This patient is orthopaedically stable for discharge.   -Patient to follow up with Dr. Paulson as an outpatient for further evaluation and management in 2 weeks.  -All of the patient's and family's questions and concerns were answered and addressed.

## 2023-09-02 NOTE — ED PROVIDER NOTE - CARE PROVIDER_API CALL
Can Paulson.  Orthopaedic Surgery  [address], [address]  [place] [address]  Phone: [phone]  Fax: [phone]  Follow Up Time: 4-6 Days

## 2023-09-02 NOTE — H&P ADULT - PROBLEM SELECTOR PLAN 2
Pt presented s/p mechanical fall and found to have nondisplaced fracture of R humerus head and neck  - pain control with tylenol for mild, tramadol for moderate, will avoid NSAIDs given history hemorrhagic cva  - NWB RUE in sling  - orthopedic surgery consult: no acute intervention  - physical therapy consult for potential TAYLOR placement  - patient to follow up with orthopedic surgery dr dimas outpatient in 2 weeks Pt presented s/p mechanical fall and found to have nondisplaced fracture of R humerus head and neck  - imaging findings as above  - pain control with tylenol for mild, tramadol for moderate, will avoid NSAIDs given history hemorrhagic cva  - NWB RUE in sling  - orthopedic surgery consult: no acute intervention  - physical therapy consult for potential TAYLOR placement  - patient to follow up with orthopedic surgery dr dimas outpatient in 2 weeks

## 2023-09-02 NOTE — H&P ADULT - CONVERSATION DETAILS
I discussed with the patient Susana and her son Simone (at bedside during encounter) regarding patient's wishes regarding CPR and mechanical ventilation/intubation. The patient states she does not wish to be resuscitated and she does not wish to be placed on mechanical ventilation or NIPPV. The MOLST form was completed with the patient and patient's son (Simone) at bedside. Patient's other son was previously HCP but has since . Simone serves as HCP for patient now. MOLST indicating DNR and DNI and no use of NIPPV completed and placed in patient chart. MOLST signed by Simone as patient with right arm injury and unable to write at this time.

## 2023-09-02 NOTE — PHYSICAL THERAPY INITIAL EVALUATION ADULT - TRANSFER TRAINING, PT EVAL
Patient will perform sit<->stand transfer with CG with SAC in left UE by 2 weeks to allow patient to get on/off toilet safely.

## 2023-09-02 NOTE — ED PROVIDER NOTE - IV ALTEPLASE INCLUSION HIDDEN
Transition of Care Plan:      1. Await medical clearance and d/c order  2. PT following - recommending outpatient PT; pt will arrange with Christine Soulier PT, has script    3. CM following - patient has RW  4. Outpatient follow-up  5.  Family will transport at discharge    Bert Welch show

## 2023-09-02 NOTE — H&P ADULT - ATTENDING COMMENTS
89 y/o F with pmhx hypertension, hyperlipidemia, hemorrhagic stroke, seizure disorder, type 2 diabetes mellitus, who presents with right shoulder pain after sustaining a fall. Admit for right shoulder pain management and physical therapy evaluation/possible rehab placement.     PT eval, likely role of Rehab.  Pain control with lidocaine patch, tylenol, and tramadol prn.  Ortho recommending outpt f/u for right humeral fracture, PT/OT, ice application, RUE ice application.    Raymon Han, Attending Physician

## 2023-09-02 NOTE — H&P ADULT - PROBLEM SELECTOR PLAN 3
Hx of seizures, last seizure ***  - cont home keppra and carbamazepine Hx of seizures, cont home keppra 250mg po qd at 22:00   - cont carbamazepine 100mg po at 08:00, 16:00 and 200mg po at 22:00  - seizure precautions  - follow up am keppra/carbamazepine levels

## 2023-09-02 NOTE — H&P ADULT - PROBLEM SELECTOR PLAN 8
SCDs in setting of hemorrhagic CVA history - CT head negative for bleed on this admission. Given patient with acute fracture and without evidence of hemorrhage on CT scan, patient bedbound and at risk of blood clot in setting of fx - start lovenox 40mg SQ qd. Monitor for change in mental status, and monitor daily complete blood count's for hg level.

## 2023-09-02 NOTE — ED ADULT NURSE NOTE - EXTENSIONS OF SELF_ADULT
Impression: S/P Cataract Extraction by phacoemulsification with IOL placement OS - 29 Days. Encounter for surgical aftercare following surgery on a sense organ  Z48.810.  Excellent post op course   Condition is improving - Plan: None

## 2023-09-02 NOTE — H&P ADULT - ASSESSMENT
Patient is a 89 y/o F with pmhx hypertension, hyperlipidemia, hemorrhagic stroke, seizure disorder, type 2 diabetes mellitus, who presents with right shoulder pain after sustaining a fall. The patient states she tripped over her slipper this morning when she went to take her morning medications. Patient admits to hitting her right shoulder and her head. Reports right shoulder is still painful***.    Not on blood thinners. Denies LOC.    ED Course  T 98F, HR 66, /64, RR 20, SpO2 95% on r/a  S/P acetaminophen 975mg x1  Labs significant for glu 114  EKG  Imaging:   CT head/C spine noncontrast: Multilevel cervical spondylosis with degenerative disc disease and facet arthropathy.  XR R shoulder/humerus: Comminuted nondisplaced humeral head and neck fractures.    Patient is a 89 y/o F with pmhx hypertension, hyperlipidemia, hemorrhagic stroke, seizure disorder, type 2 diabetes mellitus, who presents with right shoulder pain after sustaining a fall. Admit for right shoulder pain management and physical therapy evaluation/possible rehab placement.

## 2023-09-02 NOTE — H&P ADULT - PROBLEM SELECTOR PLAN 1
Pt presented s/p mechanical fall and found to have fractures as below  Likely mechanical in nature  - EKG on admission:  - physical therapy consult Pt presented s/p mechanical fall and found to have fractures as below  Likely mechanical in nature given patient tripped on slipper and without preceeding symptoms   - EKG NSR 62bpm with nonspecific ST changes  - CT head/C spine noncontrast: Multilevel cervical spondylosis with degenerative disc disease and facet arthropathy.  - XR R shoulder/humerus: Comminuted nondisplaced humeral head and neck fractures.   - XR R elbow: Lateral view is suboptimal. No definite fracture. No dislocation. Joint spaces preserved. No appreciable joint effusion.  - obtain US duplex LE for calf size L > R x 2 weeks per patient  - physical therapy consult -> possible TAYLOR

## 2023-09-02 NOTE — PHYSICAL THERAPY INITIAL EVALUATION ADULT - LEVEL OF INDEPENDENCE, REHAB EVAL
initiated transfer however pt c/o increased pain and refused further participation. Pt reports she recently tried to ambulate with nursing staff however was unable to ambulate and felt unsteady. RN Ed confirmed pt required at least 1 person assistance for mobilization./unable to perform initiated transfer however pt c/o increased pain and refused further participation./unable to perform

## 2023-09-02 NOTE — PHYSICAL THERAPY INITIAL EVALUATION ADULT - PATIENT PROFILE REVIEW, REHAB EVAL
PT orders received: ambulate with assistance. Consult with RN Ed, pt may participate in PT evaluation./yes

## 2023-09-02 NOTE — ED PROVIDER NOTE - OBJECTIVE STATEMENT
Pt presents to L&D for scheduled NST. Pt ambulated to [facility] 2 for assessment.     0812 TOCO and EFM applied, VSS. Pt reports +FM, denies LOF or VB. POC discussed and all questions answered.     0832 Dr. Oconnell reviewed tracing, orders for discharge received.     0834 RN at bedside, labor precautions given and all questions answered.     0836 Pt discharged home in stable condition.   
Patient with a past medical history of prior hemorrhagic stroke, seizure disorder on Keppra, hypertension, diabetes, hyperlipidemia not on any anticoagulation is presenting with right shoulder pain status post fall.  Patient states when she got up this morning to take her morning medicine, her slipper got caught on its side and she fell down.  She hit her right shoulder and her head.  Did not have loss of consciousness.  No nausea or vomiting.  Only endorsing right shoulder pain since the event.  Denies headache at this time.  No chest pain or shortness of breath.  No fevers, palpitations.

## 2023-09-02 NOTE — PATIENT PROFILE ADULT - FALL HARM RISK - HARM RISK INTERVENTIONS
Assistance with ambulation/Assistance OOB with selected safe patient handling equipment/Communicate Risk of Fall with Harm to all staff/Discuss with provider need for PT consult/Monitor gait and stability/Reinforce activity limits and safety measures with patient and family/Tailored Fall Risk Interventions/Visual Cue: Yellow wristband and red socks/Bed in lowest position, wheels locked, appropriate side rails in place/Call bell, personal items and telephone in reach/Instruct patient to call for assistance before getting out of bed or chair/Non-slip footwear when patient is out of bed/[place] to call system/Physically safe environment - no spills, clutter or unnecessary equipment/Purposeful Proactive Rounding/Room/bathroom lighting operational, light cord in reach

## 2023-09-02 NOTE — H&P ADULT - HISTORY OF PRESENT ILLNESS
Patient is a 89 y/o F with pmhx hypertension, hyperlipidemia, hemorrhagic stroke, seizure disorder, type 2 diabetes mellitus, who presents with right shoulder pain after sustaining a fall. The patient states she tripped over her slipper this morning when she went to take her morning medications. Patient admits to hitting her right shoulder and her head. Reports right shoulder is still painful***.    Not on blood thinners. Denies LOC.    ED Course  T 98F, HR 66, /64, RR 20, SpO2 95% on r/a  S/P  Labs significant for glu 114  EKG  Imaging:   CT head/C spine noncontrast: Multilevel cervical spondylosis with degenerative disc disease and facet arthropathy.  XR R shoulder/humerus: Comminuted nondisplaced humeral head and neck fractures.  Patient is a 89 y/o F with pmhx hypertension, hyperlipidemia, hemorrhagic stroke, seizure disorder, type 2 diabetes mellitus, who presents with right shoulder pain after sustaining a fall. The patient states she tripped over her slipper this morning when she went to the bathroom. Patient admits to hitting her right shoulder and her head. Reports right shoulder is still painful 10/10 sharp pain. The patient denies pain elsewhere. The patient reports having taken her seizure and anti hypertensive medications this morning (son at bedside confirms this history as patient initially unsure). Patient's son states patient's slipper was unstable and bothering patient prior to this fall. Not on blood thinners. Denies LOC. Denies preceding symptoms including headache, dizziness, chest pain, palpitations, shortness of breath.     ED Course  T 98F, HR 66, /64, RR 20, SpO2 95% on r/a  S/P acetaminophen 975mg x1  Labs significant for hg 11.3, HCO3 32, glu 149, Ca 8.3  EKG NSR 62bpm with ST changes  Imaging:   CT head/C spine noncontrast: Multilevel cervical spondylosis with degenerative disc disease and facet arthropathy.  XR R shoulder/humerus: Comminuted nondisplaced humeral head and neck fractures.   XR R elbow: Lateral view is suboptimal. No definite fracture. No dislocation. Joint spaces preserved. No appreciable joint effusion.

## 2023-09-02 NOTE — PHYSICAL THERAPY INITIAL EVALUATION ADULT - GAIT TRAINING, PT EVAL
Patient will ambulate 15 feet with minAx1 with SAC in left UE by 2 weeks to allow for increased independence in the community.

## 2023-09-02 NOTE — ED PROVIDER NOTE - CARE PLAN
Principal Discharge DX:	Fracture of humeral head, right, closed  Secondary Diagnosis:	Fall from standing   1

## 2023-09-02 NOTE — ED PROVIDER NOTE - PROGRESS NOTE DETAILS
Patient and family member informed of fracture.  Attempted to ambulate here but felt unsteady with walking due to limitations of her right arm.  Given this, will admit to hospital.  Spoke with Dr. Han for admission.  Armen Huang MD. Ortho and physical therapy are aware of patient.

## 2023-09-02 NOTE — PHYSICAL THERAPY INITIAL EVALUATION ADULT - PERTINENT HX OF CURRENT PROBLEM, REHAB EVAL
Patient is a 89 y/o F with PMH hypertension, hyperlipidemia, hemorrhagic stroke, seizure disorder, type 2 diabetes mellitus, who presents with right shoulder pain after sustaining a fall. The patient states she tripped over her slipper this morning when she went to take her morning medications. Patient admits to hitting her right shoulder and her head.   CT head/C spine noncontrast: Multilevel cervical spondylosis with degenerative disc disease and facet arthropathy.  XR R shoulder/humerus: Comminuted nondisplaced humeral head and neck fractures.

## 2023-09-02 NOTE — PHYSICAL THERAPY INITIAL EVALUATION ADULT - GENERAL OBSERVATIONS, REHAB EVAL
Patient was received semi-supine on stretcher, c/o pain in right shoulder, +right UE sling, son at bedside.

## 2023-09-02 NOTE — H&P ADULT - NSHPREVIEWOFSYSTEMS_GEN_ALL_CORE
REVIEW OF SYSTEMS:  Constitutional: denies fever, chills, diaphoresis  HEENT: denies sore throat, runny nose, blurry vision, double vision    Respiratory: denies SOB, QUINONES, cough, wheezing, hemoptysis  Cardiovascular: denies CP, palpitations, LE edema  Gastrointestinal:  denies nausea, vomiting, diarrhea, constipation, abdominal pain, melena, hematochezia   Genitourinary: denies dysuria, hematuria  Skin/Breast: denies rash, hives, itching   Musculoskeletal: admits right shoulder pain. denies myalgias, muscle weakness  Neurologic: denies syncope, LOC, headache, weakness, dizziness

## 2023-09-02 NOTE — H&P ADULT - PROBLEM SELECTOR PLAN 5
Chronic, /64 on admission  - cont home amlodipine, carvedilol, irbesartan with hold parameters Chronic, /64 on admission  - cont home amlodipine 5mg po qd, carvedilol 12.5mg po q12hr, irbesartan 150mg po q12hrs with hold parameters

## 2023-09-02 NOTE — H&P ADULT - NSHPSOCIALHISTORY_GEN_ALL_CORE
Lives:  ADLs:  Diet:  Vaccination:  Occupation:  Alcohol Use:  Tobacco Use:  Recreational Drug Use: Lives: with son  ADLs: independent, utilizes walker outside of home  Diet: regular w/o restriction (dash/ consistent carb)  Vaccination: COVID vaccinated  Occupation: --  Alcohol Use: social  Tobacco Use: former smoker "I smoked 100 years ago"  Recreational Drug Use: denies

## 2023-09-02 NOTE — ED ADULT NURSE NOTE - OBJECTIVE STATEMENT
Patient is a 89yo female BIBA from home  complaining of rt shoulder injury after unwitnessed fall Patient denies blood thinning medication Patient denies loss of consciousness. Patient is guarding right shoulder and LROM due to pain

## 2023-09-02 NOTE — ED PROVIDER NOTE - PHYSICAL EXAMINATION
Constitutional: Awake, Alert, non-toxic. No acute distress.  HEAD: Normocephalic, atraumatic. No hematomas, contusions, lacerations, or signs of trauma seen on head/face/scalp.  EYES: PERRL, EOM intact, conjunctiva and sclera are clear bilaterally.  ENT: External ears normal. No rhinorrhea, no tracheal deviation   NECK: Supple, non-tender, no midline c spine TTP  CARDIOVASCULAR: regular rate and rhythm.  RESPIRATORY: Normal respiratory effort; breath sounds CTAB, no wheezes, rhonchi, or rales. Speaking in full sentences. No accessory muscle use.   ABDOMEN: Soft; non-tender, non-distended. No rebound or guarding.   MSK:  no lower extremity edema, TTP right proximal humerus near shoulder, no clavicular TTP, no hip, knee or ankle TTP. able to lift both legs without difficulty. intact right radial pulse. no elbow or wrist TTP.  SKIN: Warm, dry  NEURO: A&O x3. Sensory and motor functions are grossly intact other than limitations in right shoulder secondary to pain. Speech is normal. No facial droop.  PSYCH: Appearance and judgement seem appropriate for gender and age.

## 2023-09-02 NOTE — ED ADULT NURSE NOTE - NSFALLHARMRISKINTERV_ED_ALL_ED
Assistance OOB with selected safe patient handling equipment if applicable/Assistance with ambulation/Communicate risk of Fall with Harm to all staff, patient, and family/Monitor gait and stability/Provide visual cue: red socks, yellow wristband, yellow gown, etc/Reinforce activity limits and safety measures with patient and family/Bed in lowest position, wheels locked, appropriate side rails in place/Call bell, personal items and telephone in reach/Instruct patient to call for assistance before getting out of bed/chair/stretcher/Non-slip footwear applied when patient is off stretcher/[place] to call system/Physically safe environment - no spills, clutter or unnecessary equipment/Purposeful Proactive Rounding/Room/bathroom lighting operational, light cord in reach

## 2023-09-02 NOTE — PHYSICAL THERAPY INITIAL EVALUATION ADULT - LEVEL OF INDEPENDENCE: SIT/STAND, REHAB EVAL
Patient reportedly attempted mobilization with Nursing staff just prior to PT evaluation and required at least 1 person assistance for sit<>stand  transfer and unable to ambulate due to LE weakness.

## 2023-09-02 NOTE — PATIENT PROFILE ADULT - FUNCTIONAL ASSESSMENT - BASIC MOBILITY 6.
1-calculated by average/Not able to assess (calculate score using [facility] averaging method)

## 2023-09-02 NOTE — H&P ADULT - PROBLEM SELECTOR PLAN 6
Chronic, continue rosuvastatin w/ therapeutic interchange atorvastatin Chronic, on home rosuvastatin   - cont therapeutic interchange atorvastatin (pt intolerance myalgia noted); can stop medication if symptoms but given patient's hx hemorrhagic cva in past would cont statin for now

## 2023-09-02 NOTE — H&P ADULT - PROBLEM SELECTOR PLAN 7
Hx of hemorrhagic CVA in 2015  - CT head imaging reviewed & as above  - history of seizures following stroke  - avoid NSAIDs given history

## 2023-09-02 NOTE — ED PROVIDER NOTE - CLINICAL SUMMARY MEDICAL DECISION MAKING FREE TEXT BOX
Patient with a nonsyncopal fall based on history and exam. Given this, do not suspect arrythmia, hypoglycemia. Concern for proximal humerus fracture versus shoulder dislocation.  Patient without tenderness over the clavicle to suggest clavicular fracture.  Given her age, will check CT head and C-spine for trauma.  No other evidence of trauma warranting additional work-up based on history and exam.  She has no hip pain, also has full range of motion of her bilateral lower extremities.  Will check x-ray, CAT scan, medicate for pain and reassess.  Patient also instructed to take her morning antiepileptic medication. History obtained from patient and son. Son lives with her.

## 2023-09-02 NOTE — ED PROVIDER NOTE - NSFOLLOWUPINSTRUCTIONS_ED_ALL_ED_FT
Proximal Humerus Fracture    WHAT YOU NEED TO KNOW:    What is a proximal humerus fracture? A proximal humerus fracture is a crack or break in the top of your upper arm bone. The proximal humerus is one of the bones in your shoulder joint.  Shoulder Anatomy    What are the signs and symptoms of a proximal humerus fracture?    Pain when you move your arm    Swelling and bruising    Trouble moving your shoulder    Abnormal arm position or shape    Weakness or numbness in your arm, hand, or fingers  How is a proximal humerus fracture diagnosed? Your healthcare provider will ask about your injury and examine you. An x-ray may show the type of fracture you have. You may need more than 1 x-ray, or another x-ray after several days have passed.    How is an arm fracture treated? Treatment depends on the type of fracture you have. You may need any of the following:    A sling may be needed to hold your broken bones in place. It will decrease your arm movement and allow the bones to heal.  Shoulder Sling      Prescription pain medicine may be given. Ask your healthcare provider how to take this medicine safely. Some prescription pain medicines contain acetaminophen. Do not take other medicines that contain acetaminophen without talking to your healthcare provider. Too much acetaminophen may cause liver damage. Prescription pain medicine may cause constipation. Ask your healthcare provider how to prevent or treat constipation.    NSAIDs, such as ibuprofen, help decrease swelling, pain, and fever. This medicine is available with or without a doctor's order. NSAIDs can cause stomach bleeding or kidney problems in certain people. If you take blood thinner medicine, always ask your healthcare provider if NSAIDs are safe for you. Always read the medicine label and follow directions.    Acetaminophen decreases pain and fever. It is available without a doctor's order. Ask how much to take and how often to take it. Follow directions. Read the labels of all other medicines you are using to see if they also contain acetaminophen, or ask your doctor or pharmacist. Acetaminophen can cause liver damage if not taken correctly.    Closed reduction may be done to put your bones back into the correct position without surgery.    Surgery may be needed to put your bones back into the correct position. This is called open reduction. An incision is made and the bones are put back in the correct position. Wires, pins, plates, or screws may be used to hold the bones in place. For severe fractures, surgery to replace part of the shoulder joint with an artificial implant may be needed.  How can I manage my symptoms?    Rest your arm as much as possible. Ask your healthcare provider when you can move your arm. Also ask when you can return to sports or vigorous exercises.    Apply ice on your arm for 15 to 20 minutes every hour or as directed. Use an ice pack, or put crushed ice in a plastic bag. Cover it with a towel before you put it on your arm. Ice helps prevent tissue damage and decreases swelling and pain.    Go to physical therapy as directed. A physical therapist teaches you exercises to help improve movement and strength, and to decrease pain.  When should I seek immediate care?    Your pain does not get better or gets worse, even after you rest and take medicine.    Your arm, hand, or fingers feel numb.    The skin over your fracture is swollen, cold, or pale.    You cannot move your arm, hand, or fingers.  When should I call my doctor?    You have a fever.    Your sling gets wet, damaged, or falls off.    You have questions or concerns about your condition or care.  CARE AGREEMENT:    You have the right to help plan your care. Learn about your health condition and how it may be treated. Discuss treatment options with your healthcare providers to decide what care you want to receive. You always have the right to refuse treatment.

## 2023-09-02 NOTE — PHYSICAL THERAPY INITIAL EVALUATION ADULT - RANGE OF MOTION EXAMINATION, REHAB EVAL
pt refused assessment of R UE/Left UE ROM was WFL (within functional limits)/bilateral lower extremity ROM was WFL (within functional limits)

## 2023-09-02 NOTE — ED ADULT TRIAGE NOTE - CHIEF COMPLAINT QUOTE
BIBA - UNWITNESSED FALL, SUSTAINED INJURY TO RIGHT SHOULDER.  UNKNOWN HEAD INJURY , NO LOC, NO THINNERS.  RIGHT SHOULDER IS PLACED FORWARD, INABILITY TO MOVE

## 2023-09-03 LAB
A1C WITH ESTIMATED AVERAGE GLUCOSE RESULT: 6.5 % — HIGH (ref 4–5.6)
ALBUMIN SERPL ELPH-MCNC: 2.4 G/DL — LOW (ref 3.3–5)
ALP SERPL-CCNC: 75 U/L — SIGNIFICANT CHANGE UP (ref 40–120)
ALT FLD-CCNC: 15 U/L — SIGNIFICANT CHANGE UP (ref 12–78)
ANION GAP SERPL CALC-SCNC: 5 MMOL/L — SIGNIFICANT CHANGE UP (ref 5–17)
AST SERPL-CCNC: 14 U/L — LOW (ref 15–37)
BASOPHILS # BLD AUTO: 0.03 K/UL — SIGNIFICANT CHANGE UP (ref 0–0.2)
BASOPHILS NFR BLD AUTO: 0.5 % — SIGNIFICANT CHANGE UP (ref 0–2)
BILIRUB SERPL-MCNC: 0.3 MG/DL — SIGNIFICANT CHANGE UP (ref 0.2–1.2)
BUN SERPL-MCNC: 14 MG/DL — SIGNIFICANT CHANGE UP (ref 7–23)
CALCIUM SERPL-MCNC: 8.4 MG/DL — LOW (ref 8.5–10.1)
CARBAMAZEPINE SERPL-MCNC: 8.1 UG/ML — SIGNIFICANT CHANGE UP (ref 4–12)
CHLORIDE SERPL-SCNC: 104 MMOL/L — SIGNIFICANT CHANGE UP (ref 96–108)
CO2 SERPL-SCNC: 29 MMOL/L — SIGNIFICANT CHANGE UP (ref 22–31)
CREAT SERPL-MCNC: 0.72 MG/DL — SIGNIFICANT CHANGE UP (ref 0.5–1.3)
EGFR: 78 ML/MIN/1.73M2 — SIGNIFICANT CHANGE UP
EOSINOPHIL # BLD AUTO: 0.04 K/UL — SIGNIFICANT CHANGE UP (ref 0–0.5)
EOSINOPHIL NFR BLD AUTO: 0.7 % — SIGNIFICANT CHANGE UP (ref 0–6)
ESTIMATED AVERAGE GLUCOSE: 140 MG/DL — HIGH (ref 68–114)
GLUCOSE SERPL-MCNC: 141 MG/DL — HIGH (ref 70–99)
HCT VFR BLD CALC: 31.8 % — LOW (ref 34.5–45)
HGB BLD-MCNC: 10.7 G/DL — LOW (ref 11.5–15.5)
IMM GRANULOCYTES NFR BLD AUTO: 0.7 % — SIGNIFICANT CHANGE UP (ref 0–0.9)
LYMPHOCYTES # BLD AUTO: 0.71 K/UL — LOW (ref 1–3.3)
LYMPHOCYTES # BLD AUTO: 12.7 % — LOW (ref 13–44)
MCHC RBC-ENTMCNC: 29.6 PG — SIGNIFICANT CHANGE UP (ref 27–34)
MCHC RBC-ENTMCNC: 33.6 GM/DL — SIGNIFICANT CHANGE UP (ref 32–36)
MCV RBC AUTO: 88.1 FL — SIGNIFICANT CHANGE UP (ref 80–100)
MONOCYTES # BLD AUTO: 0.66 K/UL — SIGNIFICANT CHANGE UP (ref 0–0.9)
MONOCYTES NFR BLD AUTO: 11.8 % — SIGNIFICANT CHANGE UP (ref 2–14)
NEUTROPHILS # BLD AUTO: 4.12 K/UL — SIGNIFICANT CHANGE UP (ref 1.8–7.4)
NEUTROPHILS NFR BLD AUTO: 73.6 % — SIGNIFICANT CHANGE UP (ref 43–77)
NRBC # BLD: 0 /100 WBCS — SIGNIFICANT CHANGE UP (ref 0–0)
PLATELET # BLD AUTO: 194 K/UL — SIGNIFICANT CHANGE UP (ref 150–400)
POTASSIUM SERPL-MCNC: 3.8 MMOL/L — SIGNIFICANT CHANGE UP (ref 3.5–5.3)
POTASSIUM SERPL-SCNC: 3.8 MMOL/L — SIGNIFICANT CHANGE UP (ref 3.5–5.3)
PROT SERPL-MCNC: 6 G/DL — SIGNIFICANT CHANGE UP (ref 6–8.3)
RBC # BLD: 3.61 M/UL — LOW (ref 3.8–5.2)
RBC # FLD: 13.7 % — SIGNIFICANT CHANGE UP (ref 10.3–14.5)
SODIUM SERPL-SCNC: 138 MMOL/L — SIGNIFICANT CHANGE UP (ref 135–145)
WBC # BLD: 5.6 K/UL — SIGNIFICANT CHANGE UP (ref 3.8–10.5)
WBC # FLD AUTO: 5.6 K/UL — SIGNIFICANT CHANGE UP (ref 3.8–10.5)

## 2023-09-03 PROCEDURE — 99233 SBSQ HOSP IP/OBS HIGH 50: CPT

## 2023-09-03 RX ORDER — SENNA PLUS 8.6 MG/1
2 TABLET ORAL AT BEDTIME
Refills: 0 | Status: DISCONTINUED | OUTPATIENT
Start: 2023-09-03 | End: 2023-09-05

## 2023-09-03 RX ORDER — POLYETHYLENE GLYCOL 3350 17 G/17G
17 POWDER, FOR SOLUTION ORAL DAILY
Refills: 0 | Status: DISCONTINUED | OUTPATIENT
Start: 2023-09-03 | End: 2023-09-05

## 2023-09-03 RX ORDER — AMLODIPINE BESYLATE 2.5 MG/1
5 TABLET ORAL ONCE
Refills: 0 | Status: COMPLETED | OUTPATIENT
Start: 2023-09-03 | End: 2023-09-03

## 2023-09-03 RX ORDER — NYSTATIN CREAM 100000 [USP'U]/G
1 CREAM TOPICAL
Refills: 0 | Status: DISCONTINUED | OUTPATIENT
Start: 2023-09-03 | End: 2023-09-05

## 2023-09-03 RX ORDER — AMLODIPINE BESYLATE 2.5 MG/1
10 TABLET ORAL DAILY
Refills: 0 | Status: DISCONTINUED | OUTPATIENT
Start: 2023-09-04 | End: 2023-09-05

## 2023-09-03 RX ADMIN — SENNA PLUS 2 TABLET(S): 8.6 TABLET ORAL at 21:56

## 2023-09-03 RX ADMIN — Medication 88 MICROGRAM(S): at 05:17

## 2023-09-03 RX ADMIN — Medication 100 MILLIGRAM(S): at 16:55

## 2023-09-03 RX ADMIN — Medication 100 MILLIGRAM(S): at 11:13

## 2023-09-03 RX ADMIN — ENOXAPARIN SODIUM 40 MILLIGRAM(S): 100 INJECTION SUBCUTANEOUS at 05:18

## 2023-09-03 RX ADMIN — AMLODIPINE BESYLATE 5 MILLIGRAM(S): 2.5 TABLET ORAL at 05:18

## 2023-09-03 RX ADMIN — NYSTATIN CREAM 1 APPLICATION(S): 100000 CREAM TOPICAL at 05:18

## 2023-09-03 RX ADMIN — Medication 200 MILLIGRAM(S): at 21:56

## 2023-09-03 RX ADMIN — LOSARTAN POTASSIUM 50 MILLIGRAM(S): 100 TABLET, FILM COATED ORAL at 05:18

## 2023-09-03 RX ADMIN — LEVETIRACETAM 250 MILLIGRAM(S): 250 TABLET, FILM COATED ORAL at 21:56

## 2023-09-03 RX ADMIN — CARVEDILOL PHOSPHATE 12.5 MILLIGRAM(S): 80 CAPSULE, EXTENDED RELEASE ORAL at 05:17

## 2023-09-03 RX ADMIN — Medication 650 MILLIGRAM(S): at 21:56

## 2023-09-03 RX ADMIN — NYSTATIN CREAM 1 APPLICATION(S): 100000 CREAM TOPICAL at 21:57

## 2023-09-03 RX ADMIN — AMLODIPINE BESYLATE 5 MILLIGRAM(S): 2.5 TABLET ORAL at 11:13

## 2023-09-03 RX ADMIN — CARVEDILOL PHOSPHATE 12.5 MILLIGRAM(S): 80 CAPSULE, EXTENDED RELEASE ORAL at 19:33

## 2023-09-03 RX ADMIN — Medication 650 MILLIGRAM(S): at 22:30

## 2023-09-03 RX ADMIN — ATORVASTATIN CALCIUM 40 MILLIGRAM(S): 80 TABLET, FILM COATED ORAL at 21:56

## 2023-09-03 RX ADMIN — POLYETHYLENE GLYCOL 3350 17 GRAM(S): 17 POWDER, FOR SOLUTION ORAL at 21:57

## 2023-09-03 RX ADMIN — LOSARTAN POTASSIUM 50 MILLIGRAM(S): 100 TABLET, FILM COATED ORAL at 19:26

## 2023-09-03 RX ADMIN — Medication 10 MILLIGRAM(S): at 15:58

## 2023-09-03 RX ADMIN — LIDOCAINE 1 PATCH: 4 CREAM TOPICAL at 21:57

## 2023-09-03 NOTE — OCCUPATIONAL THERAPY INITIAL EVALUATION ADULT - ADDITIONAL COMMENTS
Pt lives with son in a private home, no steps to negotiate, independent with ADLs and ambulation. Pt reports she owns a RW but does not use it. Pt has a stall shower without a seat and owns a raised toilet seat.

## 2023-09-03 NOTE — OCCUPATIONAL THERAPY INITIAL EVALUATION ADULT - RANGE OF MOTION, PT EVAL
Patient will perform AROM of RUE elbow to hand 3x a day to increase muscle strength and decrease RUE swelling for functional use.

## 2023-09-03 NOTE — OCCUPATIONAL THERAPY INITIAL EVALUATION ADULT - ADL RETRAINING, OT EVAL
Patient will dress upper body with supervision, AE as needed, in 2-5 OT sessions to prepare for ADLs and IADLs.

## 2023-09-03 NOTE — PATIENT CHOICE NOTE. - NSPTCHOICESTATE_GEN_ALL_CORE

## 2023-09-03 NOTE — OCCUPATIONAL THERAPY INITIAL EVALUATION ADULT - RANGE OF MOTION EXAMINATION, UPPER EXTREMITY
RUE N/T 2/2 fractures and pt refusing to move RUE elbow to hand/Left UE Active ROM was WFL (within functional limits)

## 2023-09-03 NOTE — CARE COORDINATION ASSESSMENT. - NSPASTMEDSURGHISTORY_GEN_ALL_CORE_FT
PAST MEDICAL & SURGICAL HISTORY:  Hypercholesteremia      Diabetes mellitus      Basal cell cancer  s/p Mohs surgery      History of hysterectomy      S/P cataract surgery  bilateral      History of arthroscopy of knee  Right      CVA (cerebral infarction)      HTN (hypertension)      Hemorrhagic stroke      History of retinal tear  surgical repair      Seizure

## 2023-09-03 NOTE — CARE COORDINATION ASSESSMENT. - NSCAREPROVIDERS_GEN_ALL_CORE_FT
CARE PROVIDERS:  Accepting Physician: Raymon Han  Administration: Erin Gandhi  Administration: Andrew Coello  Admitting: Raymon Han  Attending: Yang Phan  Consultant: Amanuel López  Covering Team: Arturo Erwin  ED Attending: Armen Huang  ED Nurse: Brittany Ross  Nurse: Betzy Sebastian  Occupational Therapy: Sylvia Castaneda  Ordered: Doctor, Unknown  Ordered: ServiceAccount, SCMMLM  Ordered: ServiceAccount, SCMMLM  Ordered: ADM, User  Outpatient Provider: Pedrito Rosenthal  Outpatient Provider: Danial Leblanc  Override: Swetha Valdez  PCA/Nursing Assistant: Rachel Platt  PCA/Nursing Assistant: Chloe Tubbs  Primary Team: Chaka Goldberg  Primary Team: Yang Phan  Primary Team: Oriana Moncada  Primary Team: Jess Mitchell  Registered Dietitian: Emmy Emery  : Sharon Conteh// Supp. Assoc.: Hany Michelle

## 2023-09-03 NOTE — OCCUPATIONAL THERAPY INITIAL EVALUATION ADULT - PERTINENT HX OF CURRENT PROBLEM, REHAB EVAL
Patient is a 89 y/o F with PMH hypertension, hyperlipidemia, hemorrhagic stroke, seizure disorder, type 2 diabetes mellitus, who presents with right shoulder pain after sustaining a fall. The patient states she tripped over her slipper this morning when she went to the bathroom. Patient admits to hitting her right shoulder and her head. Reports right shoulder is still painful 10/10 sharp pain. The patient denies pain elsewhere. The patient reports having taken her seizure and anti hypertensive medications this morning (son at bedside confirms this history as patient initially unsure). Patient's son states patient's slipper was unstable and bothering patient prior to this fall. Not on blood thinners. Denies LOC. Denies preceding symptoms including headache, dizziness, chest pain, palpitations, shortness of breath.

## 2023-09-03 NOTE — CARE COORDINATION ASSESSMENT. - OTHER PERTINENT DISCHARGE PLANNING INFORMATION:
SW consult received. Pt admitted s/p fall . Pt caught and she fell sideways in her bedroom while walking to the bathroom. She lives with her son Jim, who provides assistance and is supportive. [facility]x team is [facility]. List given to son. [facility] to [facility] past ([facility], [facility] and [facility] (facility unknown). Son requested [facility] as his first choice, and [facility] as his second choice. SW encouraged him to provide more choices. [facility] requested. Pt will need a 3 night stay.

## 2023-09-04 PROCEDURE — 99233 SBSQ HOSP IP/OBS HIGH 50: CPT

## 2023-09-04 RX ORDER — HYDRALAZINE HCL 50 MG
50 TABLET ORAL EVERY 8 HOURS
Refills: 0 | Status: DISCONTINUED | OUTPATIENT
Start: 2023-09-04 | End: 2023-09-05

## 2023-09-04 RX ADMIN — LEVETIRACETAM 250 MILLIGRAM(S): 250 TABLET, FILM COATED ORAL at 22:21

## 2023-09-04 RX ADMIN — ENOXAPARIN SODIUM 40 MILLIGRAM(S): 100 INJECTION SUBCUTANEOUS at 05:17

## 2023-09-04 RX ADMIN — LOSARTAN POTASSIUM 50 MILLIGRAM(S): 100 TABLET, FILM COATED ORAL at 05:14

## 2023-09-04 RX ADMIN — Medication 88 MICROGRAM(S): at 05:14

## 2023-09-04 RX ADMIN — POLYETHYLENE GLYCOL 3350 17 GRAM(S): 17 POWDER, FOR SOLUTION ORAL at 14:20

## 2023-09-04 RX ADMIN — TRAMADOL HYDROCHLORIDE 25 MILLIGRAM(S): 50 TABLET ORAL at 22:21

## 2023-09-04 RX ADMIN — TRAMADOL HYDROCHLORIDE 25 MILLIGRAM(S): 50 TABLET ORAL at 07:02

## 2023-09-04 RX ADMIN — LIDOCAINE 1 PATCH: 4 CREAM TOPICAL at 22:20

## 2023-09-04 RX ADMIN — Medication 1: at 17:28

## 2023-09-04 RX ADMIN — NYSTATIN CREAM 1 APPLICATION(S): 100000 CREAM TOPICAL at 05:17

## 2023-09-04 RX ADMIN — LIDOCAINE 1 PATCH: 4 CREAM TOPICAL at 10:00

## 2023-09-04 RX ADMIN — NYSTATIN CREAM 1 APPLICATION(S): 100000 CREAM TOPICAL at 17:32

## 2023-09-04 RX ADMIN — CARVEDILOL PHOSPHATE 12.5 MILLIGRAM(S): 80 CAPSULE, EXTENDED RELEASE ORAL at 17:28

## 2023-09-04 RX ADMIN — ATORVASTATIN CALCIUM 40 MILLIGRAM(S): 80 TABLET, FILM COATED ORAL at 22:21

## 2023-09-04 RX ADMIN — Medication 50 MILLIGRAM(S): at 14:20

## 2023-09-04 RX ADMIN — CARVEDILOL PHOSPHATE 12.5 MILLIGRAM(S): 80 CAPSULE, EXTENDED RELEASE ORAL at 05:19

## 2023-09-04 RX ADMIN — Medication 2: at 11:36

## 2023-09-04 RX ADMIN — LIDOCAINE 1 PATCH: 4 CREAM TOPICAL at 06:29

## 2023-09-04 RX ADMIN — Medication 50 MILLIGRAM(S): at 22:21

## 2023-09-04 RX ADMIN — Medication 100 MILLIGRAM(S): at 08:56

## 2023-09-04 RX ADMIN — TRAMADOL HYDROCHLORIDE 25 MILLIGRAM(S): 50 TABLET ORAL at 23:21

## 2023-09-04 RX ADMIN — Medication 100 MILLIGRAM(S): at 16:26

## 2023-09-04 RX ADMIN — TRAMADOL HYDROCHLORIDE 25 MILLIGRAM(S): 50 TABLET ORAL at 06:57

## 2023-09-04 RX ADMIN — AMLODIPINE BESYLATE 10 MILLIGRAM(S): 2.5 TABLET ORAL at 05:16

## 2023-09-04 RX ADMIN — LOSARTAN POTASSIUM 50 MILLIGRAM(S): 100 TABLET, FILM COATED ORAL at 17:29

## 2023-09-04 RX ADMIN — Medication 200 MILLIGRAM(S): at 22:26

## 2023-09-05 ENCOUNTER — TRANSCRIPTION ENCOUNTER (OUTPATIENT)
Age: 88
End: 2023-09-05

## 2023-09-05 VITALS
HEART RATE: 79 BPM | DIASTOLIC BLOOD PRESSURE: 66 MMHG | TEMPERATURE: 99 F | OXYGEN SATURATION: 92 % | SYSTOLIC BLOOD PRESSURE: 106 MMHG | RESPIRATION RATE: 18 BRPM

## 2023-09-05 PROCEDURE — 80177 DRUG SCRN QUAN LEVETIRACETAM: CPT

## 2023-09-05 PROCEDURE — 82962 GLUCOSE BLOOD TEST: CPT

## 2023-09-05 PROCEDURE — 97166 OT EVAL MOD COMPLEX 45 MIN: CPT

## 2023-09-05 PROCEDURE — 80053 COMPREHEN METABOLIC PANEL: CPT

## 2023-09-05 PROCEDURE — 80048 BASIC METABOLIC PNL TOTAL CA: CPT

## 2023-09-05 PROCEDURE — 80156 ASSAY CARBAMAZEPINE TOTAL: CPT

## 2023-09-05 PROCEDURE — 99285 EMERGENCY DEPT VISIT HI MDM: CPT

## 2023-09-05 PROCEDURE — 93005 ELECTROCARDIOGRAM TRACING: CPT

## 2023-09-05 PROCEDURE — 99239 HOSP IP/OBS DSCHRG MGMT >30: CPT

## 2023-09-05 PROCEDURE — 72125 CT NECK SPINE W/O DYE: CPT | Mod: MA

## 2023-09-05 PROCEDURE — 85027 COMPLETE CBC AUTOMATED: CPT

## 2023-09-05 PROCEDURE — 70450 CT HEAD/BRAIN W/O DYE: CPT | Mod: MA

## 2023-09-05 PROCEDURE — 97530 THERAPEUTIC ACTIVITIES: CPT

## 2023-09-05 PROCEDURE — 87635 SARS-COV-2 COVID-19 AMP PRB: CPT

## 2023-09-05 PROCEDURE — 36415 COLL VENOUS BLD VENIPUNCTURE: CPT

## 2023-09-05 PROCEDURE — 97162 PT EVAL MOD COMPLEX 30 MIN: CPT

## 2023-09-05 PROCEDURE — 97535 SELF CARE MNGMENT TRAINING: CPT

## 2023-09-05 PROCEDURE — 83036 HEMOGLOBIN GLYCOSYLATED A1C: CPT

## 2023-09-05 PROCEDURE — 73060 X-RAY EXAM OF HUMERUS: CPT

## 2023-09-05 PROCEDURE — 73030 X-RAY EXAM OF SHOULDER: CPT

## 2023-09-05 PROCEDURE — 93970 EXTREMITY STUDY: CPT

## 2023-09-05 PROCEDURE — 73080 X-RAY EXAM OF ELBOW: CPT

## 2023-09-05 PROCEDURE — 97116 GAIT TRAINING THERAPY: CPT

## 2023-09-05 PROCEDURE — 85025 COMPLETE CBC W/AUTO DIFF WBC: CPT

## 2023-09-05 PROCEDURE — 73020 X-RAY EXAM OF SHOULDER: CPT

## 2023-09-05 RX ORDER — LIDOCAINE 4 G/100G
1 CREAM TOPICAL
Qty: 0 | Refills: 0 | DISCHARGE
Start: 2023-09-05

## 2023-09-05 RX ORDER — TRAMADOL HYDROCHLORIDE 50 MG/1
0.5 TABLET ORAL
Qty: 0 | Refills: 0 | DISCHARGE
Start: 2023-09-05

## 2023-09-05 RX ORDER — AMLODIPINE BESYLATE 2.5 MG/1
1 TABLET ORAL
Qty: 0 | Refills: 0 | DISCHARGE
Start: 2023-09-05

## 2023-09-05 RX ORDER — POLYETHYLENE GLYCOL 3350 17 G/17G
17 POWDER, FOR SOLUTION ORAL
Qty: 0 | Refills: 0 | DISCHARGE
Start: 2023-09-05

## 2023-09-05 RX ORDER — AMLODIPINE BESYLATE 2.5 MG/1
1 TABLET ORAL
Refills: 0 | DISCHARGE

## 2023-09-05 RX ORDER — ACETAMINOPHEN 500 MG
2 TABLET ORAL
Qty: 0 | Refills: 0 | DISCHARGE
Start: 2023-09-05

## 2023-09-05 RX ORDER — ENOXAPARIN SODIUM 100 MG/ML
40 INJECTION SUBCUTANEOUS
Qty: 0 | Refills: 0 | DISCHARGE
Start: 2023-09-05

## 2023-09-05 RX ORDER — UBIDECARENONE 100 MG
1 CAPSULE ORAL
Qty: 0 | Refills: 0 | DISCHARGE

## 2023-09-05 RX ORDER — SENNA PLUS 8.6 MG/1
2 TABLET ORAL
Qty: 0 | Refills: 0 | DISCHARGE
Start: 2023-09-05

## 2023-09-05 RX ADMIN — SENNA PLUS 2 TABLET(S): 8.6 TABLET ORAL at 21:11

## 2023-09-05 RX ADMIN — LIDOCAINE 1 PATCH: 4 CREAM TOPICAL at 12:25

## 2023-09-05 RX ADMIN — AMLODIPINE BESYLATE 10 MILLIGRAM(S): 2.5 TABLET ORAL at 06:22

## 2023-09-05 RX ADMIN — Medication 100 MILLIGRAM(S): at 08:21

## 2023-09-05 RX ADMIN — Medication 50 MILLIGRAM(S): at 06:23

## 2023-09-05 RX ADMIN — LEVETIRACETAM 250 MILLIGRAM(S): 250 TABLET, FILM COATED ORAL at 21:11

## 2023-09-05 RX ADMIN — LOSARTAN POTASSIUM 50 MILLIGRAM(S): 100 TABLET, FILM COATED ORAL at 17:42

## 2023-09-05 RX ADMIN — Medication 88 MICROGRAM(S): at 06:22

## 2023-09-05 RX ADMIN — CARVEDILOL PHOSPHATE 12.5 MILLIGRAM(S): 80 CAPSULE, EXTENDED RELEASE ORAL at 17:42

## 2023-09-05 RX ADMIN — ENOXAPARIN SODIUM 40 MILLIGRAM(S): 100 INJECTION SUBCUTANEOUS at 06:23

## 2023-09-05 RX ADMIN — Medication 650 MILLIGRAM(S): at 18:08

## 2023-09-05 RX ADMIN — CARVEDILOL PHOSPHATE 12.5 MILLIGRAM(S): 80 CAPSULE, EXTENDED RELEASE ORAL at 06:25

## 2023-09-05 RX ADMIN — POLYETHYLENE GLYCOL 3350 17 GRAM(S): 17 POWDER, FOR SOLUTION ORAL at 12:11

## 2023-09-05 RX ADMIN — Medication 650 MILLIGRAM(S): at 17:49

## 2023-09-05 RX ADMIN — NYSTATIN CREAM 1 APPLICATION(S): 100000 CREAM TOPICAL at 17:50

## 2023-09-05 RX ADMIN — LIDOCAINE 1 PATCH: 4 CREAM TOPICAL at 08:22

## 2023-09-05 RX ADMIN — Medication 100 MILLIGRAM(S): at 16:26

## 2023-09-05 RX ADMIN — NYSTATIN CREAM 1 APPLICATION(S): 100000 CREAM TOPICAL at 06:21

## 2023-09-05 RX ADMIN — Medication 1: at 17:41

## 2023-09-05 RX ADMIN — Medication 200 MILLIGRAM(S): at 21:19

## 2023-09-05 RX ADMIN — LOSARTAN POTASSIUM 50 MILLIGRAM(S): 100 TABLET, FILM COATED ORAL at 06:22

## 2023-09-05 NOTE — PROGRESS NOTE ADULT - PROBLEM SELECTOR PLAN 4
History of T2DM, on home metformin, hold while inpatient  - start low dose insulin sliding scale  - hypoglycemia protocol in place  - fingersticks qac, qhs while on diet  - HbA1c 6.5
History of T2DM, on home metformin, hold while inpatient  - start low dose insulin sliding scale  - hypoglycemia protocol in place  - fingersticks qac, qhs while on diet  - follow up hbA1c in AM
History of T2DM, on home metformin, hold while inpatient  - start low dose insulin sliding scale  - hypoglycemia protocol in place  - fingersticks qac, qhs while on diet  - HbA1c 6.5

## 2023-09-05 NOTE — PROGRESS NOTE ADULT - ASSESSMENT
Patient is a 89 y/o F with pmhx hypertension, hyperlipidemia, hemorrhagic stroke, seizure disorder, type 2 diabetes mellitus, who presents with right shoulder pain after sustaining a fall. Admit for right shoulder pain management and physical therapy evaluation/possible rehab placement. 
Patient is a 89 y/o F with pmhx hypertension, hyperlipidemia, hemorrhagic stroke, seizure disorder, type 2 diabetes mellitus, who presents with right shoulder pain after sustaining a fall. Admit for right shoulder pain management and physical therapy evaluation/possible rehab placement. 
Patient is a 89 y/o F with pmhx hypertension, hyperlipidemia, hemorrhagic stroke, seizure disorder, type 2 diabetes mellitus, who presents with right shoulder pain after sustaining a fall. Admit for right shoulder pain management and physical therapy evaluation/possible rehab placement.

## 2023-09-05 NOTE — DISCHARGE NOTE PROVIDER - CARE PROVIDER_API CALL
Can Paulson.  Orthopaedic Surgery  [address], [address]  [place] [address]  Phone: [phone]  Fax: [phone]  Follow Up Time:

## 2023-09-05 NOTE — DISCHARGE NOTE PROVIDER - NSDCMRMEDTOKEN_GEN_ALL_CORE_FT
acetaminophen 325 mg oral tablet: 2 tab(s) orally every 6 hours As needed Mild Pain (1 - 3)  amLODIPine 10 mg oral tablet: 1 tab(s) orally once a day  carBAMazepine 100 mg oral capsule, extended release: 1 cap(s) orally 3 times a day takes 1 tab at 8am, 1 tab at 4pm, 2 tabs at 10pm  carvedilol 12.5 mg oral tablet: 1 tab(s) orally every 12 hours  Centrum Silver oral tablet: 1 tab(s) orally once a day  enoxaparin: 40 milligram(s) subcutaneous once a day  irbesartan 150 mg oral tablet: 1 tab(s) orally every 12 hours  levETIRAcetam 250 mg oral tablet: 1 tab(s) orally once a day  levothyroxine 88 mcg (0.088 mg) oral tablet: 1 tab(s) orally once a day  lidocaine 4% topical film: Apply topically to affected area once a day  metFORMIN 500 mg oral tablet: 2 tab(s) orally once a day  polyethylene glycol 3350 oral powder for reconstitution: 17 gram(s) orally once a day  rosuvastatin 10 mg oral tablet: 1 tab(s) orally once a day  senna leaf extract oral tablet: 2 tab(s) orally once a day (at bedtime)  traMADol 50 mg oral tablet: 0.5 tab(s) orally every 6 hours As needed Moderate Pain (4 - 6)

## 2023-09-05 NOTE — PROGRESS NOTE ADULT - SUBJECTIVE AND OBJECTIVE BOX
Patient is a 89y old  Female who presents with a chief complaint of fall, arm pain.     INTERVAL HPI/OVERNIGHT EVENTS: Patient seen and examined at bedside. Denies any new complaints. Right UE pain is better with meds. Denies chest pain, palpitation, sob     MEDICATIONS  (STANDING):  amLODIPine   Tablet 10 milliGRAM(s) Oral daily  atorvastatin 40 milliGRAM(s) Oral at bedtime  carBAMazepine ER Tablet 100 milliGRAM(s) Oral <User Schedule>  carBAMazepine ER Tablet 200 milliGRAM(s) Oral <User Schedule>  carvedilol 12.5 milliGRAM(s) Oral every 12 hours  dextrose 5%. 1000 milliLiter(s) (50 mL/Hr) IV Continuous <Continuous>  dextrose 5%. 1000 milliLiter(s) (100 mL/Hr) IV Continuous <Continuous>  dextrose 50% Injectable 25 Gram(s) IV Push once  dextrose 50% Injectable 12.5 Gram(s) IV Push once  dextrose 50% Injectable 25 Gram(s) IV Push once  enoxaparin Injectable 40 milliGRAM(s) SubCutaneous every 24 hours  glucagon  Injectable 1 milliGRAM(s) IntraMuscular once  hydrALAZINE 50 milliGRAM(s) Oral every 8 hours  insulin lispro (ADMELOG) corrective regimen sliding scale   SubCutaneous three times a day before meals  insulin lispro (ADMELOG) corrective regimen sliding scale   SubCutaneous at bedtime  levETIRAcetam 250 milliGRAM(s) Oral daily  levothyroxine 88 MICROGram(s) Oral daily  lidocaine   4% Patch 1 Patch Transdermal every 24 hours  losartan 50 milliGRAM(s) Oral two times a day  nystatin Powder 1 Application(s) Topical two times a day  polyethylene glycol 3350 17 Gram(s) Oral daily  senna 2 Tablet(s) Oral at bedtime    MEDICATIONS  (PRN):  acetaminophen     Tablet .. 650 milliGRAM(s) Oral every 6 hours PRN Mild Pain (1 - 3)  bisacodyl Suppository 10 milliGRAM(s) Rectal daily PRN Constipation  dextrose Oral Gel 15 Gram(s) Oral once PRN Blood Glucose LESS THAN 70 milliGRAM(s)/deciliter  traMADol 25 milliGRAM(s) Oral every 6 hours PRN Moderate Pain (4 - 6)      Allergies    aspirin (Unknown)  sulfa drugs (Unknown)    Intolerances    Lipitor (Other; Muscle Pain)      REVIEW OF SYSTEMS:  CONSTITUTIONAL: No fever, weight loss, or fatigue  EYES: No eye pain, visual disturbances, or discharge  ENMT:  No difficulty hearing, tinnitus, vertigo; No sinus or throat pain  NECK: No pain or stiffness  RESPIRATORY: No cough, wheezing, chills or hemoptysis; No shortness of breath  CARDIOVASCULAR: No chest pain, palpitations, dizziness, or leg swelling  GASTROINTESTINAL: No abdominal or epigastric pain. No nausea, vomiting, or hematemesis; No diarrhea or constipation. No melena or hematochezia.  GENITOURINARY: No dysuria, frequency, hematuria, or incontinence  NEUROLOGICAL: No headaches, memory loss, loss of strength, numbness, or tremors  SKIN: No itching, burning, rashes, or lesions   LYMPH NODES: No enlarged glands  MUSCULOSKELETAL: + RUE pain  PSYCHIATRIC: No depression, anxiety, mood swings, or difficulty sleeping  ALLERGY AND IMMUNOLOGIC: No hives or eczema    Vital Signs Last 24 Hrs  T(C): 37.7 (04 Sep 2023 04:36), Max: 37.7 (04 Sep 2023 04:36)  T(F): 99.8 (04 Sep 2023 04:36), Max: 99.8 (04 Sep 2023 04:36)  HR: 79 (04 Sep 2023 04:36) (69 - 91)  BP: 176/96 (04 Sep 2023 04:36) (158/85 - 176/96)  BP(mean): --  RR: 16 (04 Sep 2023 04:36) (16 - 17)  SpO2: 93% (04 Sep 2023 04:36) (92% - 96%)    Parameters below as of 04 Sep 2023 04:36  Patient On (Oxygen Delivery Method): room air        PHYSICAL EXAM:  gen:  NAD, laying in bed  heent: nc/at, poor dentition  neck: supple  resp: coarse breath sounds, decreased basilar bs  cardiac: +s1, s2, RRR, no murmurs appreciated  abd: soft, nt, nd, no rebound/guarding  mskt: RUE in sling, exam limited secondary to patient with pain upon light touch  vasc: 2+ radial pulses. L calf > R calf in size, not erythematous, calves nontender to palpation bilaterally   skin: warm and dry  neuro: a&ox3, no appreciable focal deficits   psych: normal mood, affect. normal behavior  LABS:      Ca    8.4        03 Sep 2023 07:50        CAPILLARY BLOOD GLUCOSE      POCT Blood Glucose.: 135 mg/dL (04 Sep 2023 07:27)  POCT Blood Glucose.: 158 mg/dL (03 Sep 2023 21:38)  POCT Blood Glucose.: 116 mg/dL (03 Sep 2023 16:34)  POCT Blood Glucose.: 185 mg/dL (03 Sep 2023 11:12)    BLOOD CULTURE    RADIOLOGY & ADDITIONAL TESTS:    Imaging Personally Reviewed:  [ ] YES     Consultant(s) Notes Reviewed:      Care Discussed with Consultants/Other Providers:
Patient is a 89y old  Female who presents with a chief complaint of Fall (04 Sep 2023 09:01)       INTERVAL HPI/OVERNIGHT EVENTS: Patient seen and examined at bedside. Denies any new symptoms/ complaints. No chest pain, palpitation, sob. Right arm pain is better with meds     MEDICATIONS  (STANDING):  amLODIPine   Tablet 10 milliGRAM(s) Oral daily  atorvastatin 40 milliGRAM(s) Oral at bedtime  carBAMazepine ER Tablet 200 milliGRAM(s) Oral <User Schedule>  carBAMazepine ER Tablet 100 milliGRAM(s) Oral <User Schedule>  carvedilol 12.5 milliGRAM(s) Oral every 12 hours  dextrose 5%. 1000 milliLiter(s) (50 mL/Hr) IV Continuous <Continuous>  dextrose 5%. 1000 milliLiter(s) (100 mL/Hr) IV Continuous <Continuous>  dextrose 50% Injectable 25 Gram(s) IV Push once  dextrose 50% Injectable 12.5 Gram(s) IV Push once  dextrose 50% Injectable 25 Gram(s) IV Push once  enoxaparin Injectable 40 milliGRAM(s) SubCutaneous every 24 hours  glucagon  Injectable 1 milliGRAM(s) IntraMuscular once  hydrALAZINE 50 milliGRAM(s) Oral every 8 hours  insulin lispro (ADMELOG) corrective regimen sliding scale   SubCutaneous three times a day before meals  insulin lispro (ADMELOG) corrective regimen sliding scale   SubCutaneous at bedtime  levETIRAcetam 250 milliGRAM(s) Oral daily  levothyroxine 88 MICROGram(s) Oral daily  lidocaine   4% Patch 1 Patch Transdermal every 24 hours  losartan 50 milliGRAM(s) Oral two times a day  nystatin Powder 1 Application(s) Topical two times a day  polyethylene glycol 3350 17 Gram(s) Oral daily  senna 2 Tablet(s) Oral at bedtime    MEDICATIONS  (PRN):  acetaminophen     Tablet .. 650 milliGRAM(s) Oral every 6 hours PRN Mild Pain (1 - 3)  bisacodyl Suppository 10 milliGRAM(s) Rectal daily PRN Constipation  dextrose Oral Gel 15 Gram(s) Oral once PRN Blood Glucose LESS THAN 70 milliGRAM(s)/deciliter  traMADol 25 milliGRAM(s) Oral every 6 hours PRN Moderate Pain (4 - 6)      Allergies    aspirin (Unknown)  sulfa drugs (Unknown)    Intolerances    Lipitor (Other; Muscle Pain)      REVIEW OF SYSTEMS:  Per HPI. All other ROS noted Negative   Vital Signs Last 24 Hrs  T(C): 36.8 (05 Sep 2023 08:32), Max: 37.6 (04 Sep 2023 11:41)  T(F): 98.3 (05 Sep 2023 08:32), Max: 99.6 (04 Sep 2023 11:41)  HR: 87 (05 Sep 2023 08:32) (74 - 87)  BP: 165/73 (05 Sep 2023 08:32) (121/76 - 165/73)  BP(mean): --  RR: 18 (05 Sep 2023 08:32) (17 - 18)  SpO2: 87% (05 Sep 2023 08:32) (87% - 95%)    Parameters below as of 05 Sep 2023 08:32  Patient On (Oxygen Delivery Method): room air        PHYSICAL EXAM:  gen:  NAD, AAOx3   heent: nc/at, poor dentition  neck: supple  resp: coarse breath sounds, decreased basilar bs  cardiac: +s1, s2, RRR, no murmurs appreciated  abd: soft, nt, nd, no rebound/guarding  mskt: RUE in sling, exam limited secondary to patient with pain upon light touch  vasc: 2+ radial pulses. L calf > R calf in size, not erythematous, calves nontender to palpation bilaterally   skin: warm and dry  neuro: a&ox3, no appreciable focal deficits   psych: normal mood, affect. normal behavior    LABS:            CAPILLARY BLOOD GLUCOSE      POCT Blood Glucose.: 144 mg/dL (05 Sep 2023 08:14)  POCT Blood Glucose.: 151 mg/dL (05 Sep 2023 07:24)  POCT Blood Glucose.: 172 mg/dL (04 Sep 2023 21:10)  POCT Blood Glucose.: 163 mg/dL (04 Sep 2023 17:21)  POCT Blood Glucose.: 201 mg/dL (04 Sep 2023 11:20)    BLOOD CULTURE    RADIOLOGY & ADDITIONAL TESTS:    Imaging Personally Reviewed:  [ ] YES     Consultant(s) Notes Reviewed:      Care Discussed with Consultants/Other Providers:
Not applicable
Patient is a 89y old  Female who presents with a chief complaint of  SOB    INTERVAL HPI/OVERNIGHT EVENTS: Patient seen and examined at bedside. awake, alert. States that does not like blood draws. Denies chest pain, palpitation, sob     MEDICATIONS  (STANDING):  amLODIPine   Tablet 5 milliGRAM(s) Oral once  atorvastatin 40 milliGRAM(s) Oral at bedtime  carBAMazepine ER Tablet 200 milliGRAM(s) Oral <User Schedule>  carBAMazepine ER Tablet 100 milliGRAM(s) Oral <User Schedule>  carvedilol 12.5 milliGRAM(s) Oral every 12 hours  dextrose 5%. 1000 milliLiter(s) (50 mL/Hr) IV Continuous <Continuous>  dextrose 5%. 1000 milliLiter(s) (100 mL/Hr) IV Continuous <Continuous>  dextrose 50% Injectable 12.5 Gram(s) IV Push once  dextrose 50% Injectable 25 Gram(s) IV Push once  dextrose 50% Injectable 25 Gram(s) IV Push once  enoxaparin Injectable 40 milliGRAM(s) SubCutaneous every 24 hours  glucagon  Injectable 1 milliGRAM(s) IntraMuscular once  insulin lispro (ADMELOG) corrective regimen sliding scale   SubCutaneous three times a day before meals  insulin lispro (ADMELOG) corrective regimen sliding scale   SubCutaneous at bedtime  levETIRAcetam 250 milliGRAM(s) Oral daily  levothyroxine 88 MICROGram(s) Oral daily  lidocaine   4% Patch 1 Patch Transdermal every 24 hours  losartan 50 milliGRAM(s) Oral two times a day  nystatin Powder 1 Application(s) Topical two times a day    MEDICATIONS  (PRN):  acetaminophen     Tablet .. 650 milliGRAM(s) Oral every 6 hours PRN Mild Pain (1 - 3)  dextrose Oral Gel 15 Gram(s) Oral once PRN Blood Glucose LESS THAN 70 milliGRAM(s)/deciliter  traMADol 25 milliGRAM(s) Oral every 6 hours PRN Moderate Pain (4 - 6)      Allergies    aspirin (Unknown)  sulfa drugs (Unknown)    Intolerances    Lipitor (Other; Muscle Pain)      REVIEW OF SYSTEMS:  Per HPI. All other ROS noted Negative   Vital Signs Last 24 Hrs  T(C): 36.8 (03 Sep 2023 05:20), Max: 36.9 (02 Sep 2023 21:30)  T(F): 98.3 (03 Sep 2023 05:20), Max: 98.4 (02 Sep 2023 21:30)  HR: 77 (03 Sep 2023 05:20) (66 - 83)  BP: 171/92 (03 Sep 2023 05:20) (103/64 - 171/92)  BP(mean): --  RR: 18 (03 Sep 2023 05:20) (18 - 20)  SpO2: 94% (03 Sep 2023 05:20) (94% - 100%)    Parameters below as of 03 Sep 2023 05:20  Patient On (Oxygen Delivery Method): room air        PHYSICAL EXAM:  gen:  NAD, laying in bed  heent: nc/at, poor dentition  neck: supple  resp: coarse breath sounds, decreased basilar bs  cardiac: +s1, s2, RRR, no murmurs appreciated  abd: soft, nt, nd, no rebound/guarding  mskt: RUE in sling, exam limited secondary to patient with pain upon light touch  vasc: 2+ radial pulses. L calf > R calf in size, not erythematous, calves nontender to palpation bilaterally   skin: warm and dry  neuro: a&ox3, no appreciable focal deficits   psych: normal mood, affect. normal behavior  LABS:                        10.7   5.60  )-----------( 194      ( 03 Sep 2023 07:50 )             31.8     02 Sep 2023 13:30    138    |  105    |  13     ----------------------------<  149    4.1     |  32     |  0.79     Ca    8.3        02 Sep 2023 13:30        CAPILLARY BLOOD GLUCOSE      POCT Blood Glucose.: 151 mg/dL (03 Sep 2023 07:50)  POCT Blood Glucose.: 165 mg/dL (02 Sep 2023 21:39)  POCT Blood Glucose.: 153 mg/dL (02 Sep 2023 17:15)  POCT Blood Glucose.: 114 mg/dL (02 Sep 2023 09:46)    BLOOD CULTURE    RADIOLOGY & ADDITIONAL TESTS:    Imaging Personally Reviewed:  [ ] YES     Consultant(s) Notes Reviewed:      Care Discussed with Consultants/Other Providers:

## 2023-09-05 NOTE — PROGRESS NOTE ADULT - TIME BILLING
Note written by attending. Meds, labs, vitals, chart reviewed.
Note written by attending. Meds, labs, vitals, chart reviewed.
Note written by attending. Meds, labs, vitals, chart reviewed

## 2023-09-05 NOTE — SOCIAL WORK PROGRESS NOTE - NSSWPROGRESSNOTE_GEN_ALL_CORE
Per medical team, pt cleared for dc. [facility] and clinical documents sent to [facility] choices: [facility], and [facility]. Awaiting acceptance.

## 2023-09-05 NOTE — PROGRESS NOTE ADULT - PROBLEM SELECTOR PLAN 5
-Elevated, likely due to pain and in hospital setting   - cont home amlodipine po qd, dose increased to 10mg, with hold parameters. carvedilol 12.5mg po q12hr, irbesartan 150mg po q12hrs with hold parameters, therapeutic exchange losartan ordered.  BP still elevated. Added hydralazine today   Monitor and adjust meds as needed
Chronic, /64 on admission  - cont home amlodipine 5mg po qd, carvedilol 12.5mg po q12hr, irbesartan 150mg po q12hrs with hold parameters
-Elevated, likely due to pain and in hospital setting   - cont home amlodipine po qd, dose increased to 10mg, with hold parameters. carvedilol 12.5mg po q12hr, irbesartan 150mg po q12hrs with hold parameters, therapeutic exchange losartan ordered.  BP still elevated. Added hydralazine today   Monitor and adjust meds as needed

## 2023-09-05 NOTE — PROGRESS NOTE ADULT - PROBLEM SELECTOR PROBLEM 2
Nondisplaced fracture of right humerus

## 2023-09-05 NOTE — DISCHARGE NOTE PROVIDER - NSDCCPCAREPLAN_GEN_ALL_CORE_FT
PRINCIPAL DISCHARGE DIAGNOSIS  Diagnosis: Fracture of humeral head, right, closed  Assessment and Plan of Treatment: Continue Meds per the list  F/u with Orthopedics Dr. Paulson in 2 weeks as recommended by orthopedics team here  f/u with PCP and all your doctors  HbA1c is 6.5. Continue home meds      SECONDARY DISCHARGE DIAGNOSES  Diagnosis: Fall from standing  Assessment and Plan of Treatment:

## 2023-09-05 NOTE — SOCIAL WORK PROGRESS NOTE - NSSWPROGRESSNOTE_GEN_ALL_CORE
Per medical team, pt medically cleared for dc. Bed available at [facility]. JANES courtney scheduled for 5pm.

## 2023-09-05 NOTE — PROGRESS NOTE ADULT - PROBLEM SELECTOR PLAN 1
Pt presented s/p mechanical fall and found to have fractures as below  Likely mechanical in nature given patient tripped on slipper and without preceeding symptoms   - EKG NSR 62bpm with nonspecific ST changes  - CT head/C spine noncontrast: Multilevel cervical spondylosis with degenerative disc disease and facet arthropathy.  - XR R shoulder/humerus: Comminuted nondisplaced humeral head and neck fractures.   - XR R elbow: Lateral view is suboptimal. No definite fracture. No dislocation. Joint spaces preserved. No appreciable joint effusion.  -Denies pain at present, meds helping   - obtain US duplex LE for calf size L > R x 2 weeks per patient  - physical therapy consult -> possible [facility]
Pt presented s/p mechanical fall and found to have fractures as below  Likely mechanical in nature given patient tripped on slipper and without preceeding symptoms   - EKG NSR 62bpm with nonspecific ST changes  - CT head/C spine noncontrast: Multilevel cervical spondylosis with degenerative disc disease and facet arthropathy.  - XR R shoulder/humerus: Comminuted nondisplaced humeral head and neck fractures.   - XR R elbow: Lateral view is suboptimal. No definite fracture. No dislocation. Joint spaces preserved. No appreciable joint effusion.  -Denies pain at present, meds helping   - LW Doppler Negative for DVT   - PT/OT as tolerated  -[facility] placement
Pt presented s/p mechanical fall and found to have fractures as below  Likely mechanical in nature given patient tripped on slipper and without preceeding symptoms   - CT head/C spine noncontrast: Multilevel cervical spondylosis with degenerative disc disease and facet arthropathy.  - XR R shoulder/humerus: Comminuted nondisplaced humeral head and neck fractures.   - XR R elbow: Lateral view is suboptimal. No definite fracture. No dislocation. Joint spaces preserved. No appreciable joint effusion.  -Denies pain at present, meds helping   - LW Doppler Negative for DVT   - PT/OT as tolerated  -[facility] placement. SW consulted

## 2023-09-05 NOTE — PROGRESS NOTE ADULT - PROBLEM SELECTOR PROBLEM 7
History of CVA (cerebrovascular accident)

## 2023-09-05 NOTE — DISCHARGE NOTE NURSING/CASE MANAGEMENT/SOCIAL WORK - PATIENT PORTAL LINK FT
You can access the FollowMyHealth Patient Portal offered by [facility] by registering at the following website: [email or web address]. By joining [facility]’s FollowMyHealth portal, you will also be able to view your health information using other applications (apps) compatible with our system.

## 2023-09-05 NOTE — PROGRESS NOTE ADULT - PROBLEM SELECTOR PLAN 3
Hx of seizures, cont home keppra 250mg po qd at 22:00   - cont carbamazepine 100mg po at 08:00, 16:00 and 200mg po at 22:00  - seizure precautions  - follow up am keppra/carbamazepine levels
Hx of seizures, cont home keppra 250mg po qd at 22:00   - cont carbamazepine 100mg po at 08:00, 16:00 and 200mg po at 22:00  - seizure precautions
Hx of seizures, cont home keppra 250mg po qd at 22:00   - cont carbamazepine 100mg po at 08:00, 16:00 and 200mg po at 22:00  - seizure precautions

## 2023-09-05 NOTE — PROGRESS NOTE ADULT - PROBLEM SELECTOR PLAN 8
- CT head negative for bleed on this admission. Given patient with acute fracture and without evidence of hemorrhage on CT scan, patient bedbound and at risk of blood clot in setting of fx - start lovenox 40mg SQ qd. Monitor for change in mental status, and monitor daily complete blood count's for hg level.

## 2023-09-05 NOTE — DISCHARGE NOTE NURSING/CASE MANAGEMENT/SOCIAL WORK - NSDCPEFALRISK_GEN_ALL_CORE
For information on Fall & Injury Prevention, visit: [email or web address] OR  [email or web address] OR  https://www.cdc.gov/steadi/patient.html
1 = Total assistance

## 2023-09-05 NOTE — PROGRESS NOTE ADULT - PROBLEM SELECTOR PLAN 6
Chronic, on home rosuvastatin   - cont therapeutic interchange atorvastatin (pt intolerance myalgia noted); can stop medication if symptoms but given patient's hx hemorrhagic cva in past would cont statin for now

## 2023-09-05 NOTE — DISCHARGE NOTE PROVIDER - HOSPITAL COURSE
89 y/o F with pmhx hypertension, hyperlipidemia, hemorrhagic stroke, seizure disorder, type 2 diabetes mellitus, who presents with right shoulder pain after sustaining a fall. Admit for right shoulder pain management and physical therapy evaluation/possible rehab placement.     Pt presented s/p mechanical fall and found to have fractures as below  Likely mechanical in nature given patient tripped on slipper and without preceeding symptoms   - CT head/C spine noncontrast: Multilevel cervical spondylosis with degenerative disc disease and facet arthropathy.  - XR R shoulder/humerus: Comminuted nondisplaced humeral head and neck fractures.   - XR R elbow: Lateral view is suboptimal. No definite fracture. No dislocation. Joint spaces preserved. No appreciable joint effusion.  -Denies pain at present, meds helping   - LW Doppler Negative for DVT   - PT suggested TAYLOR   ·  Problem: Nondisplaced fracture of right humerus.   ·  Plan: Pt presented s/p mechanical fall and found to have nondisplaced fracture of R humerus head and neck  - imaging findings as above  - pain control with tylenol for mild, tramadol for moderate, will avoid NSAIDs given history hemorrhagic cva  - NWB RUE in sling  - orthopedic surgery consult appreciated  no acute intervention  - physical therapy consult for potential TAYLOR placement  - patient to follow up with orthopedic surgery dr dimas outpatient in 2 weeks.

## 2023-09-05 NOTE — PROGRESS NOTE ADULT - PROBLEM SELECTOR PLAN 2
Pt presented s/p mechanical fall and found to have nondisplaced fracture of R humerus head and neck  - imaging findings as above  - pain control with tylenol for mild, tramadol for moderate, will avoid NSAIDs given history hemorrhagic cva  - NWB RUE in sling  - orthopedic surgery consult appreciated  no acute intervention  - physical therapy consult for potential TAYLOR placement  - patient to follow up with orthopedic surgery dr dimas outpatient in 2 weeks

## 2023-09-06 ENCOUNTER — NON-APPOINTMENT (OUTPATIENT)
Age: 88
End: 2023-09-06

## 2023-09-06 LAB — LEVETIRACETAM SERPL-MCNC: 4.3 UG/ML — LOW (ref 10–40)

## 2023-09-07 ENCOUNTER — TRANSCRIPTION ENCOUNTER (OUTPATIENT)
Age: 88
End: 2023-09-07

## 2023-12-20 ENCOUNTER — APPOINTMENT (OUTPATIENT)
Dept: INTERNAL MEDICINE | Facility: CLINIC | Age: 88
End: 2023-12-20

## 2023-12-21 RX ORDER — LEVETIRACETAM 250 MG/1
250 TABLET, FILM COATED ORAL
Qty: 60 | Refills: 5 | Status: ACTIVE | COMMUNITY
Start: 2022-08-01 | End: 1900-01-01

## 2023-12-29 ENCOUNTER — APPOINTMENT (OUTPATIENT)
Dept: INTERNAL MEDICINE | Facility: CLINIC | Age: 88
End: 2023-12-29

## 2023-12-30 RX ORDER — ROSUVASTATIN CALCIUM 10 MG/1
10 TABLET, FILM COATED ORAL
Qty: 90 | Refills: 0 | Status: ACTIVE | COMMUNITY
Start: 2019-05-30 | End: 1900-01-01

## 2024-01-09 ENCOUNTER — RX RENEWAL (OUTPATIENT)
Age: 89
End: 2024-01-09

## 2024-01-09 RX ORDER — CARBAMAZEPINE 100 MG/1
100 CAPSULE, EXTENDED RELEASE ORAL
Qty: 150 | Refills: 5 | Status: ACTIVE | COMMUNITY
Start: 2022-03-14 | End: 1900-01-01

## 2024-02-21 NOTE — PROGRESS NOTE ADULT - ASSESSMENT
89 year old female with PMH hemorrhagic CVA (2015), Type 2 DM (not on insulin), HTN, HLD, seizure disorder, hearing impairment presents to ED s/p unwitnessed episode resulting in fall onto L side and L sided weakness. Patient incidentally found to be COVID+ with no sick contacts or URI symptoms prior to admission. Cardiology consulted for elevated troponin.     Cardiac summary  TTE: (3/3/2019): normal LVSF, calcified trileaflet AV with normal systolic opening    - High sensitivity Trop I: elevated, peaked and trended down.  Not consistent with true plaque rupture  - Her chest pain was more pleuritic than cardiac, however she is not endorsing any anginal symptoms on exam  - EKG shows nonspecific T-wave changes, non-diagnostic.    - Unable to start ASA in the setting of allergy.    - No arrhythmias on tele.  Can D/C if not needed for Sat monitoring    - TTE showed normal LV/RVF with SWMA, and mild DD, no significant valvular disease  - No meaningful evidence of volume overload    - BP remains 130-160's  - Continue ARB, can increase losartan if needed for elevated BP  - continue BB, CCB    - Will need ischemic eval as outpatient if in line with her wishes.    - Remains stable from cardiac standpoint    - Monitor and replete Lytes. Keep K > 4 and Mg > 2  - Will continue to follow.      Italia Tony [facility]  Nurse Practitioner - Cardiology   Spectra #[id number]/ [phone] No

## 2024-03-14 NOTE — ED PROVIDER NOTE - PMH
CVA (cerebral infarction)    Diabetes mellitus    Hemorrhagic stroke    HTN (hypertension)    Hypercholesteremia    Seizure PAST MEDICAL HISTORY:  GAMAL (acute kidney injury)     Ascending aortic aneurysm     BPH without obstruction/lower urinary tract symptoms     COVID-19 12/2020 loss of smell no hosp    Hearing loss     History of iron deficiency     History of low back pain     HTN (hypertension)     Hydronephrosis     Hypothyroid     Lumbar vertebral fracture     Lung cancer     Malignant neoplasm of bladder, unspecified     Malignant neoplasm of unspecified kidney, except renal pelvis     Malignant neoplasm of urinary organ     Obesity     Pulmonary nodule removed 8/4/2021 early stage no chemo VAT    Thoracic aortic aneurysm

## 2024-03-29 ENCOUNTER — RX RENEWAL (OUTPATIENT)
Age: 89
End: 2024-03-29

## 2024-03-29 RX ORDER — IRBESARTAN 150 MG/1
150 TABLET ORAL
Qty: 180 | Refills: 0 | Status: ACTIVE | COMMUNITY
Start: 2018-09-13 | End: 1900-01-01

## 2024-04-08 ENCOUNTER — APPOINTMENT (OUTPATIENT)
Dept: NEUROLOGY | Facility: CLINIC | Age: 89
End: 2024-04-08

## 2024-05-06 ENCOUNTER — RX RENEWAL (OUTPATIENT)
Age: 89
End: 2024-05-06

## 2024-05-07 ENCOUNTER — APPOINTMENT (OUTPATIENT)
Dept: INTERNAL MEDICINE | Facility: CLINIC | Age: 89
End: 2024-05-07
Payer: MEDICARE

## 2024-05-07 VITALS
HEIGHT: 65 IN | HEART RATE: 90 BPM | TEMPERATURE: 98.7 F | SYSTOLIC BLOOD PRESSURE: 110 MMHG | RESPIRATION RATE: 16 BRPM | DIASTOLIC BLOOD PRESSURE: 74 MMHG | OXYGEN SATURATION: 93 %

## 2024-05-07 DIAGNOSIS — E78.00 PURE HYPERCHOLESTEROLEMIA, UNSPECIFIED: ICD-10-CM

## 2024-05-07 DIAGNOSIS — E11.9 TYPE 2 DIABETES MELLITUS W/OUT COMPLICATIONS: ICD-10-CM

## 2024-05-07 DIAGNOSIS — D50.8 OTHER IRON DEFICIENCY ANEMIAS: ICD-10-CM

## 2024-05-07 DIAGNOSIS — R60.0 LOCALIZED EDEMA: ICD-10-CM

## 2024-05-07 DIAGNOSIS — I82.492 ACUTE EMBOLISM AND THROMBOSIS OF OTHER SPECIFIED DEEP VEIN OF LEFT LOWER EXTREMITY: ICD-10-CM

## 2024-05-07 DIAGNOSIS — I10 ESSENTIAL (PRIMARY) HYPERTENSION: ICD-10-CM

## 2024-05-07 PROCEDURE — G2211 COMPLEX E/M VISIT ADD ON: CPT

## 2024-05-07 PROCEDURE — 99214 OFFICE O/P EST MOD 30 MIN: CPT

## 2024-05-07 NOTE — HEALTH RISK ASSESSMENT
[Never (0 pts)] : Never (0 points) [No] : In the past 12 months have you used drugs other than those required for medical reasons? No [No falls in past year] : Patient reported no falls in the past year [Assistive Device] : Patient uses an assistive device [0] : 2) Feeling down, depressed, or hopeless: Not at all (0) [PHQ-2 Negative - No further assessment needed] : PHQ-2 Negative - No further assessment needed [Never] : Never [de-identified] : juli [[id number]] : 0

## 2024-05-07 NOTE — PHYSICAL EXAM
[No Acute Distress] : no acute distress [Well Nourished] : well nourished [Well Developed] : well developed [Well-Appearing] : well-appearing [Normal Voice/Communication] : normal voice/communication [Normal Sclera/Conjunctiva] : normal sclera/conjunctiva [PERRL] : pupils equal round and reactive to light [EOMI] : extraocular movements intact [Normal Outer Ear/Nose] : the outer ears and nose were normal in appearance [Normal Oropharynx] : the oropharynx was normal [Normal TMs] : both tympanic membranes were normal [No JVD] : no jugular venous distention [No Lymphadenopathy] : no lymphadenopathy [Supple] : supple [Thyroid Normal, No Nodules] : the thyroid was normal and there were no nodules present [No Respiratory Distress] : no respiratory distress  [No Accessory Muscle Use] : no accessory muscle use [Clear to Auscultation] : lungs were clear to auscultation bilaterally [Normal Rate] : normal rate  [Regular Rhythm] : with a regular rhythm [Normal S1, S2] : normal S1 and S2 [No Murmur] : no murmur heard [No Carotid Bruits] : no carotid bruits [No Abdominal Bruit] : a ~M bruit was not heard ~T in the abdomen [No Varicosities] : no varicosities [Pedal Pulses Present] : the pedal pulses are present [No Palpable Aorta] : no palpable aorta [No Extremity Clubbing/Cyanosis] : no extremity clubbing/cyanosis [Soft] : abdomen soft [Non Tender] : non-tender [Non-distended] : non-distended [No Masses] : no abdominal mass palpated [No HSM] : no HSM [Normal Bowel Sounds] : normal bowel sounds [Normal Supraclavicular Nodes] : no supraclavicular lymphadenopathy [Normal Posterior Cervical Nodes] : no posterior cervical lymphadenopathy [Normal Anterior Cervical Nodes] : no anterior cervical lymphadenopathy [No CVA Tenderness] : no CVA  tenderness [No Spinal Tenderness] : no spinal tenderness [No Joint Swelling] : no joint swelling [Grossly Normal Strength/Tone] : grossly normal strength/tone [No Rash] : no rash [Coordination Grossly Intact] : coordination grossly intact [No Focal Deficits] : no focal deficits [Normal Gait] : normal gait [Deep Tendon Reflexes (DTR)] : deep tendon reflexes were 2+ and symmetric [Speech Grossly Normal] : speech grossly normal [Memory Grossly Normal] : memory grossly normal [Normal Affect] : the affect was normal [Alert and Oriented x3] : oriented to person, place, and time [Normal Mood] : the mood was normal [Normal Insight/Judgement] : insight and judgment were intact [de-identified] : right leg edema and discoloration.

## 2024-05-07 NOTE — PLAN
[FreeTextEntry1] : Chronic Medical Conditions: DM, HTN, HLD and Hypothyroidism.  continue medications including Amlodipine, Irbesartan, Metformin, and Levetiracetam.  Sent for Doppler.

## 2024-05-07 NOTE — HISTORY OF PRESENT ILLNESS
[Family Member] : family member [FreeTextEntry8] : MAJOR PEREZ is a 89-year-old F who presents today for an acute visit for right leg swelling and discoloration that started a couple of days ago. Pt has hx of HTN, HLD, Hypothyroidism and DM.  Pt recently had a doppler of her legs 2 weeks ago that was negative for any clots, however, Pt reports that legs have become more swollen after her scan.

## 2024-05-07 NOTE — END OF VISIT
[FreeTextEntry3] : "I, Devang Gilbert, personally scribed the services dictated to me by Dr. Pedrito Rosenthal MD in this documentation on 05/07/2024 "   "I Dr. Pedrito Rosenthal MD, personally performed the services described in this documentation on 05/07/2024 for the patient as scribed by Devang Gilbert in my presence. I have reviewed and verified that all the information is accurate and true."

## 2024-05-08 ENCOUNTER — NON-APPOINTMENT (OUTPATIENT)
Age: 89
End: 2024-05-08

## 2024-05-08 DIAGNOSIS — I82.401 ACUTE EMBOLISM AND THROMBOSIS OF UNSPECIFIED DEEP VEINS OF RIGHT LOWER EXTREMITY: ICD-10-CM

## 2024-05-08 RX ORDER — APIXABAN 5 MG/1
5 TABLET, FILM COATED ORAL
Qty: 74 | Refills: 0 | Status: ACTIVE | COMMUNITY
Start: 2024-05-08 | End: 1900-01-01

## 2024-05-09 PROBLEM — D50.8 OTHER IRON DEFICIENCY ANEMIA: Status: ACTIVE | Noted: 2024-05-09

## 2024-05-09 LAB
25(OH)D3 SERPL-MCNC: 23.9 NG/ML
ALBUMIN SERPL ELPH-MCNC: 3.8 G/DL
ALP BLD-CCNC: 78 U/L
ALT SERPL-CCNC: 10 U/L
ANION GAP SERPL CALC-SCNC: 9 MMOL/L
AST SERPL-CCNC: 13 U/L
BASOPHILS # BLD AUTO: 0.08 K/UL
BASOPHILS NFR BLD AUTO: 1.5 %
BILIRUB SERPL-MCNC: <0.2 MG/DL
BUN SERPL-MCNC: 22 MG/DL
CALCIUM SERPL-MCNC: 8.8 MG/DL
CHLORIDE SERPL-SCNC: 103 MMOL/L
CHOLEST SERPL-MCNC: 215 MG/DL
CK SERPL-CCNC: 54 U/L
CO2 SERPL-SCNC: 29 MMOL/L
CREAT SERPL-MCNC: 0.88 MG/DL
EGFR: 62 ML/MIN/1.73M2
EOSINOPHIL # BLD AUTO: 0.08 K/UL
EOSINOPHIL NFR BLD AUTO: 1.5 %
ESTIMATED AVERAGE GLUCOSE: 143 MG/DL
GLUCOSE SERPL-MCNC: 121 MG/DL
HBA1C MFR BLD HPLC: 6.6 %
HCT VFR BLD CALC: 34.2 %
HDLC SERPL-MCNC: 81 MG/DL
HGB BLD-MCNC: 10.5 G/DL
IMM GRANULOCYTES NFR BLD AUTO: 0.5 %
LDLC SERPL CALC-MCNC: 117 MG/DL
LYMPHOCYTES # BLD AUTO: 1.1 K/UL
LYMPHOCYTES NFR BLD AUTO: 20.1 %
MAN DIFF?: NORMAL
MCHC RBC-ENTMCNC: 26.9 PG
MCHC RBC-ENTMCNC: 30.7 GM/DL
MCV RBC AUTO: 87.5 FL
MONOCYTES # BLD AUTO: 0.63 K/UL
MONOCYTES NFR BLD AUTO: 11.5 %
NEUTROPHILS # BLD AUTO: 3.56 K/UL
NEUTROPHILS NFR BLD AUTO: 64.9 %
NONHDLC SERPL-MCNC: 134 MG/DL
PLATELET # BLD AUTO: 244 K/UL
POTASSIUM SERPL-SCNC: 4.4 MMOL/L
PROT SERPL-MCNC: 6.2 G/DL
RBC # BLD: 3.91 M/UL
RBC # FLD: 15.6 %
SODIUM SERPL-SCNC: 141 MMOL/L
TRIGL SERPL-MCNC: 95 MG/DL
TSH SERPL-ACNC: 1.48 UIU/ML
WBC # FLD AUTO: 5.48 K/UL

## 2024-05-10 ENCOUNTER — EMERGENCY (EMERGENCY)
Facility: HOSPITAL | Age: 89
LOS: 1 days | Discharge: HOME | End: 2024-05-10
Attending: EMERGENCY MEDICINE | Admitting: EMERGENCY MEDICINE
Payer: MEDICARE

## 2024-05-10 ENCOUNTER — APPOINTMENT (OUTPATIENT)
Dept: CT IMAGING | Facility: CLINIC | Age: 89
End: 2024-05-10

## 2024-05-10 VITALS
WEIGHT: 149.91 LBS | HEIGHT: 63 IN | DIASTOLIC BLOOD PRESSURE: 88 MMHG | SYSTOLIC BLOOD PRESSURE: 173 MMHG | RESPIRATION RATE: 18 BRPM | TEMPERATURE: 98 F | HEART RATE: 75 BPM | OXYGEN SATURATION: 98 %

## 2024-05-10 DIAGNOSIS — Z86.69 PERSONAL HISTORY OF OTHER DISEASES OF THE NERVOUS SYSTEM AND SENSE ORGANS: Chronic | ICD-10-CM

## 2024-05-10 LAB
ALBUMIN SERPL ELPH-MCNC: 2.9 G/DL — LOW (ref 3.3–5)
ALP SERPL-CCNC: 72 U/L — SIGNIFICANT CHANGE UP (ref 40–120)
ALT FLD-CCNC: 15 U/L — SIGNIFICANT CHANGE UP (ref 12–78)
ANION GAP SERPL CALC-SCNC: 4 MMOL/L — LOW (ref 5–17)
APTT BLD: 31.2 SEC — SIGNIFICANT CHANGE UP (ref 24.5–35.6)
AST SERPL-CCNC: 15 U/L — SIGNIFICANT CHANGE UP (ref 15–37)
BASOPHILS # BLD AUTO: 0.06 K/UL — SIGNIFICANT CHANGE UP (ref 0–0.2)
BASOPHILS NFR BLD AUTO: 1.3 % — SIGNIFICANT CHANGE UP (ref 0–2)
BILIRUB SERPL-MCNC: 0.3 MG/DL — SIGNIFICANT CHANGE UP (ref 0.2–1.2)
BUN SERPL-MCNC: 14 MG/DL — SIGNIFICANT CHANGE UP (ref 7–23)
CALCIUM SERPL-MCNC: 8.9 MG/DL — SIGNIFICANT CHANGE UP (ref 8.5–10.1)
CHLORIDE SERPL-SCNC: 105 MMOL/L — SIGNIFICANT CHANGE UP (ref 96–108)
CK MB CFR SERPL CALC: 2.3 NG/ML — SIGNIFICANT CHANGE UP (ref 0–3.6)
CO2 SERPL-SCNC: 29 MMOL/L — SIGNIFICANT CHANGE UP (ref 22–31)
CREAT SERPL-MCNC: 0.78 MG/DL — SIGNIFICANT CHANGE UP (ref 0.5–1.3)
D DIMER BLD IA.RAPID-MCNC: 241 NG/ML DDU — HIGH
EGFR: 71 ML/MIN/1.73M2 — SIGNIFICANT CHANGE UP
EOSINOPHIL # BLD AUTO: 0.1 K/UL — SIGNIFICANT CHANGE UP (ref 0–0.5)
EOSINOPHIL NFR BLD AUTO: 2.2 % — SIGNIFICANT CHANGE UP (ref 0–6)
GLUCOSE SERPL-MCNC: 93 MG/DL — SIGNIFICANT CHANGE UP (ref 70–99)
HCT VFR BLD CALC: 34.2 % — LOW (ref 34.5–45)
HGB BLD-MCNC: 10.9 G/DL — LOW (ref 11.5–15.5)
IMM GRANULOCYTES NFR BLD AUTO: 0.4 % — SIGNIFICANT CHANGE UP (ref 0–0.9)
INR BLD: 0.99 RATIO — SIGNIFICANT CHANGE UP (ref 0.85–1.18)
LYMPHOCYTES # BLD AUTO: 1.22 K/UL — SIGNIFICANT CHANGE UP (ref 1–3.3)
LYMPHOCYTES # BLD AUTO: 26.9 % — SIGNIFICANT CHANGE UP (ref 13–44)
MAGNESIUM SERPL-MCNC: 1.7 MG/DL — SIGNIFICANT CHANGE UP (ref 1.6–2.6)
MCHC RBC-ENTMCNC: 27.2 PG — SIGNIFICANT CHANGE UP (ref 27–34)
MCHC RBC-ENTMCNC: 31.9 GM/DL — LOW (ref 32–36)
MCV RBC AUTO: 85.3 FL — SIGNIFICANT CHANGE UP (ref 80–100)
MONOCYTES # BLD AUTO: 0.54 K/UL — SIGNIFICANT CHANGE UP (ref 0–0.9)
MONOCYTES NFR BLD AUTO: 11.9 % — SIGNIFICANT CHANGE UP (ref 2–14)
NEUTROPHILS # BLD AUTO: 2.59 K/UL — SIGNIFICANT CHANGE UP (ref 1.8–7.4)
NEUTROPHILS NFR BLD AUTO: 57.3 % — SIGNIFICANT CHANGE UP (ref 43–77)
NRBC # BLD: 0 /100 WBCS — SIGNIFICANT CHANGE UP (ref 0–0)
NT-PROBNP SERPL-SCNC: 180 PG/ML — SIGNIFICANT CHANGE UP (ref 0–450)
PLATELET # BLD AUTO: 224 K/UL — SIGNIFICANT CHANGE UP (ref 150–400)
POTASSIUM SERPL-MCNC: 4 MMOL/L — SIGNIFICANT CHANGE UP (ref 3.5–5.3)
POTASSIUM SERPL-SCNC: 4 MMOL/L — SIGNIFICANT CHANGE UP (ref 3.5–5.3)
PROT SERPL-MCNC: 6.9 G/DL — SIGNIFICANT CHANGE UP (ref 6–8.3)
PROTHROM AB SERPL-ACNC: 11.6 SEC — SIGNIFICANT CHANGE UP (ref 9.5–13)
RBC # BLD: 4.01 M/UL — SIGNIFICANT CHANGE UP (ref 3.8–5.2)
RBC # FLD: 14.8 % — HIGH (ref 10.3–14.5)
SODIUM SERPL-SCNC: 138 MMOL/L — SIGNIFICANT CHANGE UP (ref 135–145)
TROPONIN I, HIGH SENSITIVITY RESULT: 5.4 NG/L — SIGNIFICANT CHANGE UP
WBC # BLD: 4.53 K/UL — SIGNIFICANT CHANGE UP (ref 3.8–10.5)
WBC # FLD AUTO: 4.53 K/UL — SIGNIFICANT CHANGE UP (ref 3.8–10.5)

## 2024-05-10 PROCEDURE — 84484 ASSAY OF TROPONIN QUANT: CPT

## 2024-05-10 PROCEDURE — 85610 PROTHROMBIN TIME: CPT

## 2024-05-10 PROCEDURE — 85379 FIBRIN DEGRADATION QUANT: CPT

## 2024-05-10 PROCEDURE — 93970 EXTREMITY STUDY: CPT | Mod: 26

## 2024-05-10 PROCEDURE — 99284 EMERGENCY DEPT VISIT MOD MDM: CPT | Mod: 25

## 2024-05-10 PROCEDURE — 36415 COLL VENOUS BLD VENIPUNCTURE: CPT

## 2024-05-10 PROCEDURE — 85730 THROMBOPLASTIN TIME PARTIAL: CPT

## 2024-05-10 PROCEDURE — 82553 CREATINE MB FRACTION: CPT

## 2024-05-10 PROCEDURE — 83880 ASSAY OF NATRIURETIC PEPTIDE: CPT

## 2024-05-10 PROCEDURE — 93970 EXTREMITY STUDY: CPT

## 2024-05-10 PROCEDURE — 85025 COMPLETE CBC W/AUTO DIFF WBC: CPT

## 2024-05-10 PROCEDURE — 99284 EMERGENCY DEPT VISIT MOD MDM: CPT | Mod: FS

## 2024-05-10 PROCEDURE — 80053 COMPREHEN METABOLIC PANEL: CPT

## 2024-05-10 PROCEDURE — 83735 ASSAY OF MAGNESIUM: CPT

## 2024-05-10 RX ORDER — SODIUM CHLORIDE 9 MG/ML
1000 INJECTION INTRAMUSCULAR; INTRAVENOUS; SUBCUTANEOUS ONCE
Refills: 0 | Status: COMPLETED | OUTPATIENT
Start: 2024-05-10 | End: 2024-05-10

## 2024-05-10 RX ADMIN — SODIUM CHLORIDE 1000 MILLILITER(S): 9 INJECTION INTRAMUSCULAR; INTRAVENOUS; SUBCUTANEOUS at 16:29

## 2024-05-10 NOTE — ED PROVIDER NOTE - OBJECTIVE STATEMENT
Patient is a 89-year-old female past medical history hypertension hemorrhagic stroke 2015 seizures after stroke diabetes sent in to rule out PE after testing positive for DVT in right lower leg.  Patient has a previous history of DVT and was on Eliquis patient was started on Eliquis 2 days ago.  Patient denies any chest pain shortness of breath PCP wanted to check PE but outpatient imaging center unable to obtain IV for contrast.

## 2024-05-10 NOTE — ED PROVIDER NOTE - NS_EDPROVIDERDISPOUSERTYPE_ED_A_ED
"  Preventive Care at the 6 Month Visit  Growth Measurements & Percentiles  Head Circumference: 17.52\" (44.5 cm) (82 %, Source: WHO (Boys, 0-2 years)) 82 %ile based on WHO (Boys, 0-2 years) head circumference-for-age data using vitals from 5/5/2017.   Weight: 17 lbs 14.5 oz / 8.12 kg (actual weight) 57 %ile based on WHO (Boys, 0-2 years) weight-for-age data using vitals from 5/5/2017.   Length: 2' 4.5\" / 72.4 cm 98 %ile based on WHO (Boys, 0-2 years) length-for-age data using vitals from 5/5/2017.   Weight for length: 11 %ile based on WHO (Boys, 0-2 years) weight-for-recumbent length data using vitals from 5/5/2017.    Your baby s next Preventive Check-up will be at 9 months of age    Development  At this age, your baby may:    roll over    sit with support or lean forward on his hands in a sitting position    put some weight on his legs when held up    play with his feet    laugh, squeal, blow bubbles, imitate sounds like a cough or a  raspberry  and try to make sounds    show signs of anxiety around strangers or if a parent leaves    be upset if a toy is taken away or lost.    Feeding Tips    Give your baby breast milk or formula until his first birthday.    If you have not already, you may introduce solid baby foods: cereal, fruits, vegetables and meats.  Avoid added sugar and salt.  Infants do not need juice, however, if you provide juice, offer no more than 4 oz per day using a cup.    Avoid cow milk and honey until 12 months of age.    You may need to give your baby a fluoride supplement if you have well water or a water softener.    To reduce your child's chance of developing peanut allergy, you can start introducing peanut-containing foods in small amounts around 6 months of age.  If your child has severe eczema, egg allergy or both, consult with your doctor first about possible allergy-testing and introduction of small amounts of peanut-containing foods at 4-6 months old.  Teething    While getting teeth, " your baby may drool and chew a lot. A teething ring can give comfort.    Gently clean your baby s gums and teeth after meals. Use a soft toothbrush or cloth with water or small amount of fluoridated tooth and gum cleanser.    Stools    Your baby s bowel movements may change.  They may occur less often, have a strong odor or become a different color if he is eating solid foods.    Sleep    Your baby may sleep about 10-14 hours a day.    Put your baby to bed while awake. Give your baby the same safe toy or blanket. This is called a  transition object.  Do not play with or have a lot of contact with your baby at nighttime.    Continue to put your baby to sleep on his back, even if he is able to roll over on his own.    At this age, some, but not all, babies are sleeping for longer stretches at night (6-8 hours), awakening 0-2 times at night.    If you put your baby to sleep with a pacifier, take the pacifier out after your baby falls asleep.    Your goal is to help your child learn to fall asleep without your aid--both at the beginning of the night and if he wakes during the night.  Try to decrease and eliminate any sleep-associations your child might have (breast feeding for comfort when not hungry, rocking the child to sleep in your arms).  Put your child down drowsy, but awake, and work to leave him in the crib when he wakes during the night.  All children wake during night sleep.  He will eventually be able to fall back to sleep alone.    Safety    Keep your baby out of the sun. If your baby is outside, use sunscreen with a SPF of more than 15. Try to put your baby under shade or an umbrella and put a hat on his or her head.    Do not use infant walkers. They can cause serious accidents and serve no useful purpose.    Childproof your house now, since your baby will soon scoot and crawl.  Put plugs in the outlets; cover any sharp furniture corners; take care of dangling cords (including window blinds), tablecloths  and hot liquids; and put rahman on all stairways.    Do not let your baby get small objects such as toys, nuts, coins, etc. These items may cause choking.    Never leave your baby alone, not even for a few seconds.    Use a playpen or crib to keep your baby safe.    Do not hold your child while you are drinking or cooking with hot liquids.    Turn your hot water heater to less than 120 degrees Fahrenheit.    Keep all medicines, cleaning supplies, and poisons out of your baby s reach.    Call the poison control center (1-945.104.4325) if your baby swallows poison.    What to Know About Television    The first two years of life are critical during the growth and development of your child s brain. Your child needs positive contact with other children and adults. Too much television can have a negative effect on your child s brain development. This is especially true when your child is learning to talk and play with others. The American Academy of Pediatrics recommends no television for children age 2 or younger.    What Your Baby Needs    Play games such as  peek-a-horowitz  and  so big  with your baby.    Talk to your baby and respond to his sounds. This will help stimulate speech.    Give your baby age-appropriate toys.    Read to your baby every night.    Your baby may have separation anxiety. This means he may get upset when a parent leaves. This is normal. Take some time to get out of the house occasionally.    Your baby does not understand the meaning of  no.  You will have to remove him from unsafe situations.    Babies fuss or cry because of a need or frustration. He is not crying to upset you or to be naughty.    Dental Care    Your pediatric provider will speak with you regarding the need for regular dental appointments for cleanings and check-ups after your child s first tooth appears.    Starting with the first tooth, you can brush with a small amount of fluoridated toothpaste (no more than pea size) once  daily.    (Your child may need a fluoride supplement if you have well water.)        lso   Attending Attestation (For Attendings USE Only)...

## 2024-05-10 NOTE — ED PROVIDER NOTE - CLINICAL SUMMARY MEDICAL DECISION MAKING FREE TEXT BOX
89-year-old female PMH of HTN, HLD, hypothyroid, DM2, hemorrhagic stroke in 2015, seizure disorder presents to the emergency department with son, states that patient had lower extremity swelling, ultrasound did not show DVT, showed bilateral Baker's cyst, had increased right lower extremity swelling repeat ultrasound showed DVT, patient started on Eliquis, son states that she had 3 doses, patient had gone today to have a CT angio of the chest done to rule out PE, unable to get exam done at the facility and came to the emergency department.  Patient denies any chest pain, no shortness of breath, normally uses walker to ambulate, noted right lower extremity Swelling, will follow-up CT head, CT angio chest, will repeat ultrasound bilaterally of lower extremities, send CBC, CMP, cardiac enzymes, coagulation studies, EKG, IV fluids and reevaluate.

## 2024-05-10 NOTE — ED PROVIDER NOTE - MUSCULOSKELETAL, MLM
Spine appears normal, range of motion is not limited, + right calf ttp soft nvi no redness + swelling

## 2024-05-10 NOTE — ED PROVIDER NOTE - PROGRESS NOTE DETAILS
no dvt on us so cta and ct head cancelled adbised to stop mary jane gonzales attending called no answer and no emergency call back   advised pt to f/u

## 2024-05-10 NOTE — ED ADULT NURSE NOTE - OBJECTIVE STATEMENT
Received pt in room 6B, 89 yr/o female A+OX2 to self and face. past medical history of HTN and Hemorrhagic stroke in 2015. pt was sent to ED by cardiologist for to rule out PE after testing positive for DVT in right lower leg.  PERRLA and facial symmetry noted. pt denies numbness, tingling, and weakness in peripheral extremities. VSS, denies chest pain and SOB, RR even and unlabored. abdomen is rounded, soft, nontender; denies nausea, vomiting, diarrhea, and constipation.  right AC 20g placed, labs drawn and sent. meds given as ordered. pending CT results.

## 2024-05-10 NOTE — ED PROVIDER NOTE - NSFOLLOWUPINSTRUCTIONS_ED_ALL_ED_FT
follow up with Dr. Rosenthal and orthopedics  return to er for any worsening symptoms     Baker Cyst  The back of the knee, showing the location of Baker cyst.  A Baker cyst, also called a popliteal cyst, is a growth that forms at the back of the knee. The cyst forms when the fluid-filled sac (bursa) behind the knee joint fills up with more fluid and becomes enlarged.    What are the causes?  In most cases, a Baker cyst results from another knee problem that causes swelling in the knee. This makes the fluid inside the knee joint (synovial fluid) flow into the bursa behind the knee, causing the bursa to enlarge. The fluid flows in one direction and is unable to move back into the joint.    What increases the risk?  You may be more likely to develop a Baker cyst if you already have a knee problem, such as:  A tear in cartilage that cushions the knee joint (meniscal tear).  A tear in the tissues that connect the bones of the knee joint (ligament tear).  Knee swelling from osteoarthritis, rheumatoid arthritis, or gout.  What are the signs or symptoms?  The main symptom of this condition is a lump behind the knee. This may be the only symptom of the condition. The lump may be painful, especially when the knee is straightened. If the lump is painful, the pain may come and go. The knee may also be stiff.    Symptoms may quickly get more severe if the cyst breaks open. If the cyst breaks open, you may feel the following in your knee and calf:  Sudden or worsening pain.  Swelling.  Bruising.  Redness in the calf.  A Baker cyst does not always cause symptoms.    How is this diagnosed?  This condition may be diagnosed based on your symptoms and medical history. Your health care provider will also do a physical exam. This may include:  Feeling the cyst to check whether it is tender.  Checking your knee for signs of another knee condition that causes swelling.  You may have imaging tests, such as X-ray, ultrasound, or MRI.    How is this treated?  A Baker cyst that is not painful may go away without treatment. If the cyst gets large or painful, it will likely get better if the underlying knee problem is treated.    If needed, treatment for a Baker cyst may include:  Resting.  Keeping weight off the knee. This means not leaning on the knee to support your body weight.  Taking NSAIDs, such as ibuprofen, to reduce pain and swelling.  Draining the fluid from the cyst with a needle (aspiration).  Getting a steroid injection into the knee. A steroid reduces swelling.  Surgery. This may be needed if other treatments do not work. This usually involves correcting knee damage and removing the cyst.  In some cases, you may also need to do certain exercises to improve movement in the knee (physical therapy).    Follow these instructions at home:  Activity    Rest as told by your provider.  Avoid activities that make pain or swelling worse.  Do not use the injured limb to support your body weight until your provider says that you can. Use crutches as told by your provider.  Raise (elevate) your knee above the level of your heart while you are sitting or lying down.  Do physical therapy exercises as told by your provider.  Return to your normal activities as told by your provider. Ask your provider what activities are safe for you.  General instructions    Take over-the-counter and prescription medicines only as told by your provider.  Keep all follow-up visits. Your provider will monitor your healing and adjust your treatment as needed.  Contact a health care provider if:  You have knee pain, stiffness, or swelling that does not get better.  Get help right away if:  You have sudden or worsening pain and swelling in your calf area.  This information is not intended to replace advice given to you by your health care provider. Make sure you discuss any questions you have with your health care provider.

## 2024-05-10 NOTE — ED PROVIDER NOTE - IN ACCORDANCE WITH [place] STATE LAW, WE OFFER EVERY PATIENT A HEPATITIS C TEST. WOULD YOU LIKE TO BE TESTED TODAY?
Discussed condition and exacerbating conditions/situations (e.g., dry/arid environments, overhead fans, air conditioners, side effect of medications). Opt out

## 2024-05-10 NOTE — ED ADULT NURSE NOTE - NSFALLHARMRISKINTERV_ED_ALL_ED

## 2024-05-10 NOTE — ED ADULT NURSE NOTE - NS ED NURSE IV DC DT
· Constitutional	Well-developed, well nourished  · Eyes	EOMI; PERRL; no drainage or redness  · ENMT	No oral lesions; no gross abnormalities  · Neck	No bruits; no thyromegaly or nodules   · Back	No deformity or limitation of movement   · Respiratory	Breath Sounds equal & clear to percussion & auscultation, no accessory muscle use  · Cardiovascular	Regular rate & rhythm, normal S1, S2; grade III/VI systolic murmur noted over aortic area   · Gastrointestinal	Soft, non-tender, no hepatosplenomegaly, normal bowel sounds  · Extremities	No cyanosis, clubbing or edema   · Neurological	Alert & oriented; no sensory, motor or coordination deficits, normal reflexes  · Musculoskeletal	No joint pain, swelling or deformity; no limitation of movement
10-May-2024 19:52

## 2024-05-10 NOTE — ED ADULT TRIAGE NOTE - CHIEF COMPLAINT QUOTE
Pt reports she was told to come to ER by her PCP for a blood clot in her right leg, hx hemorrhagic stroke. Denies pain.

## 2024-05-12 LAB
BASOPHILS # BLD AUTO: 0.06 K/UL
BASOPHILS NFR BLD AUTO: 1.3 %
DEPRECATED D DIMER PPP IA-ACNC: 182 NG/ML DDU
EOSINOPHIL # BLD AUTO: 0.09 K/UL
EOSINOPHIL NFR BLD AUTO: 2 %
FOLATE SERPL-MCNC: 15 NG/ML
HCT VFR BLD CALC: 34.1 %
HGB BLD-MCNC: 10.9 G/DL
IMM GRANULOCYTES NFR BLD AUTO: 0.4 %
IRON SATN MFR SERPL: 26 %
IRON SERPL-MCNC: 114 UG/DL
LYMPHOCYTES # BLD AUTO: 0.85 K/UL
LYMPHOCYTES NFR BLD AUTO: 19 %
MAN DIFF?: NORMAL
MCHC RBC-ENTMCNC: 27.2 PG
MCHC RBC-ENTMCNC: 32 GM/DL
MCV RBC AUTO: 85 FL
MONOCYTES # BLD AUTO: 0.47 K/UL
MONOCYTES NFR BLD AUTO: 10.5 %
NEUTROPHILS # BLD AUTO: 2.99 K/UL
NEUTROPHILS NFR BLD AUTO: 66.8 %
PLATELET # BLD AUTO: 236 K/UL
RBC # BLD: 4.01 M/UL
RBC # FLD: 15.1 %
TIBC SERPL-MCNC: 444 UG/DL
UIBC SERPL-MCNC: 330 UG/DL
VIT B12 SERPL-MCNC: 362 PG/ML
WBC # FLD AUTO: 4.48 K/UL

## 2024-06-20 NOTE — ED PROVIDER NOTE - CPE EDP GASTRO NORM
6/20 Patient scheduled.     [facility]   [facility]ermatology, Surgery, Urology  [facility]       normal...

## 2024-06-27 ENCOUNTER — RX RENEWAL (OUTPATIENT)
Age: 89
End: 2024-06-27

## 2024-07-01 ENCOUNTER — RX RENEWAL (OUTPATIENT)
Age: 89
End: 2024-07-01

## 2024-07-08 ENCOUNTER — RX RENEWAL (OUTPATIENT)
Age: 89
End: 2024-07-08

## 2024-07-29 ENCOUNTER — APPOINTMENT (OUTPATIENT)
Dept: NEUROLOGY | Facility: CLINIC | Age: 89
End: 2024-07-29

## 2024-08-06 ENCOUNTER — RX RENEWAL (OUTPATIENT)
Age: 89
End: 2024-08-06

## 2024-08-12 NOTE — PROGRESS NOTE ADULT - PROBLEM SELECTOR PLAN 6
[FreeTextEntry1] : Patient is here for headache since 2017 described as buzzing sound puts her to sleep has photophobia and nausea mild to severe pain lasts for a day takes Tylenol migraine for it with mild relieve, pt take sit when the headache is severe headache was daily improved to 1-2 headaches per month with Riboflavin.  Has chronic back pain present for years, has previously had an MRI of the lumbar spine and has received physical therapy. Additionally, the patient has history of thyroid disease.
hypothyroidism  continue synthroid
- Normal TSH  - continue synthroid

## 2024-08-26 ENCOUNTER — RX RENEWAL (OUTPATIENT)
Age: 89
End: 2024-08-26

## 2024-08-30 NOTE — PROGRESS NOTE ADULT - PROBLEM SELECTOR PLAN 7
-- DO NOT REPLY / DO NOT REPLY ALL --  -- This inbox is not monitored. If this was sent to the wrong provider or department, reroute message to P E[facility] Reroute pool. --  -- Message is from [facility]agement [facility] Operations ([facility]) --    General Patient Message: Patient forgot to mention  about a NT US  to due pregnancy and needs to discuss further , worried about abnormalities.  Call back to discuss.   Caller Information       Contact Date/Time Type Contact Phone/Fax    08/30/2024 03:46 PM CDT Phone (Incoming) Kristen Omalley (Self) [phone] (M)            Alternative phone number: n    Can a detailed message be left? Yes - Voicemail  but speak to her , or camille .   Patient has been advised the message will be addressed within 2-3 business days.                 Chronic   - Pt has intolerance to therapeutic interchange of home rosuvastatin. Will hold statin for now  - lipid panel 12/22/21: cholesterol 220, non HDL cholesterol 144, , TG and HDL WNL

## 2024-09-03 NOTE — H&P ADULT - PROBLEM SELECTOR PROBLEM 4
Update; Physical therapy recommended Home with C for PT/OT. Spoke with pt & his wife in room after seen by therapy, they prefer to go to out pt therapy , decline HHC. Pt & wife said do not plan to be homebound. Pt wife can drive pt to out pt therapy. Updated hospitalist, Dr Ames.   430pm Provided pt with list of  outpt rehab service locations.    Type 2 diabetes mellitus

## 2024-09-04 NOTE — PHYSICAL THERAPY INITIAL EVALUATION ADULT - NAME OF CLINICIAN
Can do a video visit to review medications/ensure no interactions - adjust as appropriate at 340 pm - OK to overbook -       Leann Gordon MBA, MS, PA-C  [facility] [place]     medical student

## 2024-10-02 ENCOUNTER — RX RENEWAL (OUTPATIENT)
Age: 89
End: 2024-10-02

## 2024-10-07 NOTE — PROGRESS NOTE ADULT - ASSESSMENT
89 yo female with PMH hemorrhagic CVA (2015), Type 2 DM (not on insulin), HTN, HLD, seizures, hard of hearing presents to ED with left sided weakness. Pt reports she had a seizure. Reports left side weakness, drooling but able to talk. Pt wasn't confused, no incontinence.  COVID PCR pos. pt vaccinated with booster    RECOMMENDATIONS  12/21 86% on RA, req NC-4L, Remdesivir started with plan for 5 days so last day 12/25, Dexamethasone started with plan for 10 days so last day 12/30,  12/22-NC-4L, NLR 4.57/0.73=6.3, coordinate with neuro regarding VTE recommendations, continue oxygen, with triple vax optimistic regarding outcome but pt does have risk factors such as age, DM, HTN, change steroids to PO  12/23 -continues on NC, continue remdesivir, steroids, supportive care  12/24-NC,NLR<3,  Remdesivir started with plan for 5 days so last day 12/25, Dexamethasone started with plan for 10 days so last day 12/30    Over the weekend Dr. Anni Sutton will be covering for our group. If you have any questions, concerns or new micro data please reach out to them at [phone].     [email or web address]

## 2024-10-10 ENCOUNTER — RX RENEWAL (OUTPATIENT)
Age: 89
End: 2024-10-10

## 2024-11-19 ENCOUNTER — RX RENEWAL (OUTPATIENT)
Age: 89
End: 2024-11-19

## 2024-11-22 ENCOUNTER — RX RENEWAL (OUTPATIENT)
Age: 89
End: 2024-11-22

## 2025-01-04 ENCOUNTER — RX RENEWAL (OUTPATIENT)
Age: 89
End: 2025-01-04

## 2025-01-13 NOTE — PATIENT PROFILE ADULT - NS PRO AD PATIENT TYPE ON CHART
Health Care Proxy (HCP)/Do Not Resuscitate (DNR)/Medical Orders for Life-Sustaining Treatment (MOLST)
Family

## 2025-01-20 NOTE — PHYSICAL THERAPY INITIAL EVALUATION ADULT - LEVEL OF INDEPENDENCE: SIT/STAND, REHAB EVAL
Patient arrived to ED via EMS from Home with complaints of chest pain an shortness of breath. Chest pain started 2 hours ago pain initially 10/10, and down to an 8 after medication. EMS gave 325 of asa, and 3 nitros en route.  Per EMS 12 lead NSR, pt diaphoretic, lung sounds clear bilaterally.  Pt states she was diagnosed with pneumonia this January.  Endorses taking albuterol with no relief today.   Hx CHF, has had 4 Myocardial infarctions        minimum assist (75% patients effort) supervision/close supervision

## 2025-01-27 NOTE — PHYSICAL THERAPY INITIAL EVALUATION ADULT - ORIENTATION, REHAB EVAL
You can access the FollowMyHealth Patient Portal offered by [facility] by registering at the following website: [email or web address]. By joining [employer]’s FollowMyHealth portal, you will also be able to view your health information using other applications (apps) compatible with our system.
oriented to person, place, time and situation

## 2025-02-07 ENCOUNTER — RX RENEWAL (OUTPATIENT)
Age: 89
End: 2025-02-07

## 2025-02-20 ENCOUNTER — RX RENEWAL (OUTPATIENT)
Age: 89
End: 2025-02-20

## 2025-03-04 ENCOUNTER — RX RENEWAL (OUTPATIENT)
Age: 89
End: 2025-03-04

## 2025-04-08 ENCOUNTER — RX RENEWAL (OUTPATIENT)
Age: 89
End: 2025-04-08

## 2025-04-14 NOTE — ED ADULT TRIAGE NOTE - PATIENT ON (OXYGEN DELIVERY METHOD)
Chief Complaint   Patient presents with    Hypertension    Diabetes    Discuss Medications    Follow-up    Anxiety    ADHD     \"Have you been to the ER, urgent care clinic since your last visit?  Hospitalized since your last visit?\"    NO    “Have you seen or consulted any other health care providers outside our system since your last visit?”    NO      “Have you had a diabetic eye exam?”    NO     No diabetic eye exam on file             without eating. He should also monitor his blood sugar levels regularly and consume a snack if they drop.  - A letter will be provided today, requesting permission for him to have a covered water bottle and a drink available at his workplace. Additionally, another letter will be issued, requesting that he be allowed to check his blood sugar in a private area and potentially have a snack within 15 minutes if he does not feel well. Blood work and kidney function tests will be ordered due to his history of diabetes, high blood pressure, and cholesterol. Refills for lisinopril, metformin, and atorvastatin will be sent to his [facility] pharmacy on [address].    2. Hypertension.  - He is currently on lisinopril 10 mg daily   - Blood pressure management is ongoing with lisinopril. DASH diet   - Regular monitoring of blood pressure is advised.  - A refill for lisinopril will be sent to his [facility] pharmacy on [address].    3. Hyperlipidemia.  - He is currently on atorvastatin 10 mg daily   - Cholesterol management is ongoing with atorvastatin.  - Regular monitoring of lipid levels is advised.  - A refill for atorvastatin will be sent to his [facility] pharmacy on [address].    Hayden was seen today for hypertension, diabetes, discuss medications, follow-up, anxiety and adhd.    Diagnoses and all orders for this visit:    Uncontrolled type 2 diabetes mellitus with hyperglycemia (HCC)  -     Hemoglobin A1C; Future  -     Albumin/Creatinine Ratio, Urine; Future  -     Comprehensive Metabolic Panel; Future  -     metFORMIN (GLUCOPHAGE) 500 MG tablet; Take 1 tablet by mouth 2 times daily (with meals)    Essential (primary) hypertension  -     Albumin/Creatinine Ratio, Urine; Future  -     Comprehensive Metabolic Panel; Future  -     lisinopril (PRINIVIL;ZESTRIL) 10 MG tablet; Take 1 tablet by mouth daily    Mixed hyperlipidemia  -     atorvastatin (LIPITOR) 10 MG tablet; Take 1 tablet by mouth daily    Obesity (BMI 30-39.9)    Reviewed with patient  room air

## 2025-04-23 NOTE — PATIENT PROFILE ADULT. - ARE SIGNIFICANT INDICATORS COMPLETE.
"CPM/PAT Evaluation       Name: Minh Harrington Jr. (Minh Harrington Jr.)  /Age: 1938/87 y.o.     In-Person       Chief Complaint: pre op appointment for upcoming     HPI  FL is a 86 yo male who has history of BPH and has been following with urology. He has been having issues with hematuria recently and was hospitalized requiring blood transfusions and continuous bladder irrigation.  While hospitalized he underwent a cystoscopy with thrombus removal and TURP with bladder tumor excision. Since then he is feeling \"better\" and no need for barbosa catheter. Follow up evaluation with urology went well and pathology results discussed. Subsequently urology would like to proceed for repeat TURBT for further resection and staging which will be followed with BCG or chemotherapy. Presents to [facility] today for perioperative risk stratification and optimization-he is accompanied today by his wife. Overall he is feeling well and denies any current hematuria, dysuria, urgency/frequency, and flank pains. Also denies any recent illness, fever/chills, chest pains or shortness of breath.     Medical History[1]    Surgical History[2]    Patient  reports that he is not currently sexually active and has had partner(s) who are female. He reports using the following method of birth control/protection: Abstinence.    Family History[3]    Allergies[4]    Prior to Admission medications    Medication Sig Start Date End Date Taking? Authorizing Provider   ALPRAZolam (Xanax) 0.5 mg tablet Take 1 tablet (0.5 mg) by mouth 3 times a day as needed for anxiety. 25  Isabel Ritter, DO   apixaban (Eliquis) 5 mg tablet ON HOLD PER CDO 3/20/25  Patient taking differently: Take 1 tablet (5 mg) by mouth 2 times a day. 3/20/25   Heriberto Valle MD   atorvastatin (Lipitor) 20 mg tablet TAKE 1 TABLET BY MOUTH ONCE DAILY AS DIRECTED 24   Isabel Ritter, DO   b complex vitamins capsule Take 1 capsule by mouth once daily. " After lunch    Historical Provider, MD   cholecalciferol (Vitamin D-3) 125 MCG (5000 UT) capsule Take 1 tablet by mouth once daily. After lunch 9/12/19   Historical Provider, MD   dutasteride (Avodart) 0.5 mg capsule TAKE 1 CAPSULE BY MOUTH ONCE DAILY 2/12/25   Isabel Ritter DO   empagliflozin (Jardiance) 10 mg tablet Take 1 tablet (10 mg) by mouth once daily. 4/14/25   Isabel Ritter DO   furosemide (Lasix) 20 mg tablet Take one daily ,if weight gain of 3 lbs in one day or 5 lbs in one week take another tablet that day until weight is back to normal  Patient taking differently: Take 1 tablet (20 mg) by mouth if needed. Take one daily ,if weight gain of 3 lbs in one day or 5 lbs in one week take another tablet that day until weight is back to normal 1/30/25   Heriberto Valle MD   levETIRAcetam (Keppra) 500 mg tablet TAKE 1/2 (ONE-HALF) OF A TABLET BY MOUTH DAILY 10/23/23   Isabel Ritter DO   metoprolol succinate XL (Toprol-XL) 25 mg 24 hr tablet TAKE 1 TABLET BY MOUTH TWICE DAILY 2/24/25   Liam Childs DO   mirtazapine (Remeron) 15 mg tablet TAKE 1 TABLET BY MOUTH AT BEDTIME 3/13/25   Isabel Ritter DO   sacubitriL-valsartan (Entresto) 24-26 mg tablet Take 1 tablet by mouth 2 times a day. 4/21/25 4/21/26  Heriberto Valle MD   sertraline (Zoloft) 100 mg tablet Take 1 tablet (100 mg) by mouth once daily. 9/16/24 9/16/25  Isabel Ritter DO   ALPRAZolam (Xanax) 0.5 mg tablet Take 1 tablet (0.5 mg) by mouth 3 times a day as needed for anxiety. 3/18/25 4/21/25  Isabel Ritter DO   sacubitriL-valsartan (Entresto) 24-26 mg tablet Take 1 tablet by mouth 2 times a day. 3/20/25 4/21/25  Heriberto MD KENTON Garcia   Constitutional: Negative for fever, chills, or sweats   ENMT: Negative for nasal discharge, congestion, ear pain, mouth pain, throat pain + eyeglasses   Respiratory: Negative for cough, wheezing, shortness of breath   Cardiac: Negative for chest pain,  dyspnea on exertion, palpitations   Gastrointestinal: Negative for nausea, vomiting, diarrhea, constipation, abdominal pain    Genitourinary: Negative for dysuria, flank pain, frequency, hematuria  + feeling comfortable and no indwelling catheter in place     Musculoskeletal: Negative for decreased ROM, pain, swelling, weakness   Neurological: Negative for dizziness, confusion, headache  Psychiatric: Negative for mood changes   Skin: Negative for itching, rash, ulcer    Hematologic/Lymph: Negative for bruising, easy bleeding  Allergic/Immunologic: Negative itching, sneezing, swelling      Physical Exam  Constitutional:       Appearance: Normal appearance.   HENT:      Head: Normocephalic.      Mouth/Throat:      Mouth: Mucous membranes are moist.   Eyes:      Extraocular Movements: Extraocular movements intact.   Cardiovascular:      Rate and Rhythm: Normal rate. Rhythm irregular.      Heart sounds: Murmur (Left ICS 3/6) heard.   Pulmonary:      Effort: Pulmonary effort is normal.      Breath sounds: Normal breath sounds.   Abdominal:      General: Abdomen is flat.      Palpations: Abdomen is soft.   Musculoskeletal:         General: Normal range of motion.      Cervical back: Normal range of motion.   Skin:     General: Skin is warm and dry.   Neurological:      General: No focal deficit present.      Mental Status: He is alert.   Psychiatric:         Mood and Affect: Mood normal.          PAT AIRWAY:   Airway:     Mallampati::  II    Neck ROM::  Full   implants      Testing/Diagnostic:   Lab Results   Component Value Date    WBC 8.8 03/25/2025    HGB 10.3 (L) 03/25/2025    HCT 33.7 (L) 03/25/2025    MCV 90.6 03/25/2025     (L) 03/25/2025     Lab Results   Component Value Date    GLUCOSE 131 03/25/2025    CALCIUM 8.7 03/25/2025     03/25/2025    K 4.9 03/25/2025    CO2 27 03/25/2025     03/25/2025    BUN 23 03/25/2025    CREATININE 1.22 03/25/2025     Hemoglobin A1C   Date Value Ref Range Status  "  10/11/2023 5.2 see below % Final      Echo 2025  CONCLUSIONS:   1. The left ventricular systolic function is moderately decreased, with a visually estimated ejection fraction of 30-35%.   2. There is global hypokinesis of the left ventricle with minor regional variations.   3. There is normal right ventricular global systolic function.   4. The left atrium is moderate to severely dilated.   5. The right atrium is moderately dilated.   6. The mitral valve is moderately to severely thickened.   7. There is no evidence of mitral valve stenosis.   8. Moderate to severe mitral valve regurgitation.   9. Moderate to severe tricuspid regurgitation.  10. Mild aortic valve stenosis.  11. Mild aortic valve regurgitation.  12. Pulmonary hypertension is present.  13. Severely elevated pulmonary artery pressure.  14. The inferior vena cava appears moderately dilated.  15. As compared to the July 2024 study current study is relatively similar in terms of LV function and general chamber dimensions. However the aorta does appear to be enlarging substantially since that time. Clinical correlation advised.  16. There is moderate dilatation of the aortic root.  17. There is moderately increased septal and mildly increased posterior left ventricular wall thickness.    Patient Specialist/PCP: Dr. Ritter  Cardiology: Dr. Valle    Visit Vitals  /57   Pulse 77   Temp 36.2 °C (97.2 °F) (Tympanic)   Resp 16   Ht 1.778 m (5' 10\")   Wt 77.1 kg (169 lb 15.6 oz)   SpO2 99%   BMI 24.39 kg/m²   Smoking Status Former   BSA 1.95 m²       DASI Risk Score      Flowsheet Row Pre-Admission Testing from 4/23/2025 in [facility] [place]   Can you take care of yourself (eat, dress, bathe, or use toilet)?  2.75 filed at 04/23/2025 1056   Can you walk indoors, such as around your house? 1.75 filed at 04/23/2025 1056   Can you walk a block or two on level ground?  2.75 filed at 04/23/2025 1056   Can you climb a flight of stairs or walk up " a hill? 5.5 filed at 04/23/2025 1056   Can you run a short distance? 8 filed at 04/23/2025 1056   Can you do light work around the house like dusting or washing dishes? 0 filed at 04/23/2025 1056   Can you do moderate work around the house like vacuuming, sweeping floors or carrying groceries? 3.5 filed at 04/23/2025 1056   Can you do heavy work around the house like scrubbing floors or lifting and moving heavy furniture?  0 filed at 04/23/2025 1056   Can you do yard work like raking leaves, weeding or pushing a mower? 0 filed at 04/23/2025 1056   Can you have sexual relations? 0 filed at 04/23/2025 1056   Can you participate in moderate recreational activities like golf, bowling, dancing, doubles tennis or throwing a baseball or football? 0 filed at 04/23/2025 1056   Can you participate in strenous sports like swimming, singles tennis, football, basketball, or skiing? 0 filed at 04/23/2025 1056   DASI SCORE 24.25 filed at 04/23/2025 1056   METS Score (Will be calculated only when all the questions are answered) 5.7 filed at 04/23/2025 1056          Caprini DVT Assessment      Flowsheet Row Pre-Admission Testing from 4/23/2025 in [facility] ED to Hosp-Admission (Discharged) from 2/28/2025 in [facility] with Gumaro Garvin MD, Marin Araya DO and Ruiz Lechuga MD   DVT Score (IF A SCORE IS NOT CALCULATING, MUST SELECT A BMI TO COMPLETE) 7 filed at 04/23/2025 1055 4 filed at 02/28/2025 1556   Medical Factors Present cancer, chemotherapy, or previous malignancy filed at 04/23/2025 1055 --   Surgical Factors Minor surgery planned filed at 04/23/2025 1055 --   BMI (BMI MUST BE CHOSEN) 30 or less filed at 04/23/2025 1055 30 or less filed at 02/28/2025 1556          Modified Frailty Index      Flowsheet Row Pre-Admission Testing from 4/23/2025 in [facility]   Non-independent functional status (problems with dressing, bathing, personal grooming, or cooking) 0  filed at 04/23/2025 1057   History of COPD 0 filed at 04/23/2025 1057   History of CHF 0.0909 filed at 04/23/2025 1057   History of MI 0.0909 filed at 04/23/2025 1057   History of Percutaneous Coronary Intervention, Cardiac Surgery, or Angina 0.0909 filed at 04/23/2025 1057   Hypertension requiring the use of medication  0.0909 filed at 04/23/2025 1057   Peripheral vascular disease 0 filed at 04/23/2025 1057   Impaired sensorium (cognitive impairement or loss, clouding, or delirium) 0 filed at 04/23/2025 1057   TIA or CVA withouy residual deficit 0.0909 filed at 04/23/2025 1057   Cerebrovascular accident with deficit 0 filed at 04/23/2025 1057          [id number] Stroke Risk Points  Current as of just now        5 0 to 9 Points:      Last Change:           The [id number] risk score (Lip LEANNA, et al. 2009. © 2010 American [employer]) quantifies the risk of stroke for a patient with atrial fibrillation. For patients without atrial fibrillation or under the age of 18 this score appears as N/A. Higher score values generally indicate higher risk of stroke.          Points Metrics   1 Has Congestive Heart Failure:  Yes     Patients with congestive heart failure get 1 point.    Current as of just now   1 Has Hypertension:  Yes     Patients with hypertension get 1 point.    Current as of just now   2 Age:  87     Patients 65 to 74 years old get 1 point, or patients 75 years and older get 2 points.    Current as of just now   0 Has Diabetes:  No     Patients with diabetes get 1 point.    Current as of just now   0 Had Stroke:  No  Had TIA:  No  Had Thromboembolism:  No     Patients who have had a stroke, TIA, or thromboembolism get 2 points.    Current as of just now   1 Has Vascular Disease:  Yes     Patients with vascular disease get 1 point.    Current as of just now   0 Clinically Relevant Sex:  Male     Patients with a clinically relevant sex of Female get 1 point.    Current as of just now              Revised Cardiac Risk Index      Flowsheet Row Pre-Admission Testing from 4/23/2025 in [facility]   High-Risk Surgery (Intraperitoneal, Intrathoracic,Suprainguinal vascular) 0 filed at 04/23/2025 1056   History of ischemic heart disease (History of MI, History of positive exercuse test, Current chest paint considered due to myocardial ischemia, Use of nitrate therapy, ECG with pathological Q Waves) 1 filed at 04/23/2025 1056   History of congestive heart failure (pulmonary edemia, bilateral rales or S3 gallop, Paroxysmal nocturnal dyspnea, CXR showing pulmonary vascular redistribution) 1 filed at 04/23/2025 1056   History of cerebrovascular disease (Prior TIA or stroke) 1 filed at 04/23/2025 1056   Pre-operative insulin treatment 0 filed at 04/23/2025 1056   Pre-operative creatinine>2 mg/dl 0 filed at 04/23/2025 1056   Revised Cardiac Risk Calculator 3 filed at 04/23/2025 1056          Apfel Simplified Score      Flowsheet Row Pre-Admission Testing from 4/23/2025 in [facility]   Smoking status 1 filed at 04/23/2025 1057   History of motion sickness or PONV  0 filed at 04/23/2025 1057   Use of postoperative opioids 0 filed at 04/23/2025 1057   Gender - Female 0=No filed at 04/23/2025 1057   Apfel Simplified Score Calculator 1 filed at 04/23/2025 1057          Risk Analysis Index Results This Encounter         4/23/2025  1057             Do you live in a place other than your own home?: 0    When did you begin living in the place you are currently residing?: Greater than one year ago    Any kidney failure, kidney not working well, or seeing a kidney doctor (nephrologist)? If yes, was this for kidney stones or another problem?: 0 No    Any history of chronic (long-term) congestive heart failure (CHF)?: 5 Yes    Any shortness of breath when resting?: 0 No    In the past five years, have you been diagnosed with or treated for cancer?: Yes    During the last 3 months has it become  difficult for you to remember things or organize your thoughts?: 1 Yes    Have you lost weight of 10 pounds or more in the past 3 months without trying?: 4 Yes    Do you have any loss of appetitie?: 0 No    Getting Around (Mobility): 0 Can get around without help    Eatin Can plan and prepare own meals    Toiletin Can use toilet without any help    Personal Hygiene (Bathing, Hand Washing, Changing Clothes): 0 Can shower or bathe without any help    VALDEZ Cancer History: Patient indicates history of cancer    Total Risk Analysis Index Score Without Cancer: 45    Total Risk Analysis Index Score: 53          Stop Bang Score      Flowsheet Row Pre-Admission Testing from 2025 in [facility]   Do you snore loudly? 0 filed at 2025 1055   Do you often feel tired or fatigued after your sleep? 0 filed at 2025 1055   Has anyone ever observed you stop breathing in your sleep? 1 filed at 2025 1055   Do you have or are you being treated for high blood pressure? 1 filed at 2025 1055   Recent BMI (Calculated) 24.4 filed at 2025 1055   Is BMI greater than 35 kg/m2? 0=No filed at 2025 1055   Age older than 50 years old? 1=Yes filed at 2025 1055   Is your neck circumference greater than 17 inches (Male) or 16 inches (Female)? 0 filed at 2025 1055   Gender - Male 1=Yes filed at 2025 1055   STOP-BANG Total Score 4 filed at 2025 1055          Prodigy: High Risk  Total Score: 31              Prodigy Age Score      Prodigy Gender Score     Prodigy CHF score          ARISCAT Score for Postoperative Pulmonary Complications      Flowsheet Row Pre-Admission Testing from 2025 in [facility]   Age Calculated Score 16 filed at 2025 1058   Preoperative SpO2 0 filed at 2025 1058   Respiratory infection in the last month Either upper or lower (i.e., URI, bronchitis, pneumonia), with fever and antibiotic treatment 0 filed at  04/23/2025 1058   Preoperative anemia (Hgb less than 10 g/dl) 0 filed at 04/23/2025 1058   Surgical incision  0 filed at 04/23/2025 1058   Duration of surgery  0 filed at 04/23/2025 1058   Emergency Procedure  0 filed at 04/23/2025 1058   ARISCAT Total Score  16 filed at 04/23/2025 1058          Jovana Perioperative Risk for Myocardial Infarction or Cardiac Arrest (CAL)      Flowsheet Row Pre-Admission Testing from 4/23/2025 in [facility]   Calculated Age Score 1.74 filed at 04/23/2025 1059   Functional Status  0 filed at 04/23/2025 1059   ASA Class  -1.92 filed at 04/23/2025 1059   Creatinine 0.61 filed at 04/23/2025 1059   Type of Procedure  -0.26 filed at 04/23/2025 1059   CAL Total Score  -5.08 filed at 04/23/2025 1059   CAL % 0.62 filed at 04/23/2025 1059            Assessment and Plan:     Pre-Op  87-year-old male scheduled for transurethral resection of the bladder tumor on 5/7/2025 with Dr. Washington.  Blood work and urine culture ordered.  Previous EKG on file from March 2025, shows atrial fibrillation. Otherwise no further orders indicated.     Cardiac  -Hypertension  -Hyperlipidemia, taking statin  -Atrial fibrillation, taking metoprolol I am Eliquis  -Congestive heart failure, taking Jardiance, Entresto and Lasix  -Coronary artery status post MI, with history of CABG (Oct 2000) and previous angioplasty  -Ischemic cardiomyopathy, LVEF 30 to 35%  -Pulmonary hypertension  -Non rheumatic tricuspid valve regurgitation  -History of aneurysm of ascending thoracic aorta   -Follows with cardiology, last seen in March 2025  DASI Score: 24.25   MET Score: 5.7  RCRI  >/=3 which is 15% 30 day risk of MACE (risk for cardiac death, nonfatal myocardial infarction, and nonfactal cardiac arrest)  CAL score which indicates a  0.62 % risk of intraoperative or 30-day postoperative MACE  CHADS2 Score for Atrial Fibrillation Stroke Risk: 3    Pulmonary  Former smoker  Obstructive sleep apnea, compliant with  CPAP  Preoperative deep breathing educational handout provided to patient.  STOP BAN   points which is a intermediate risk for moderate to severe WINNIE  ARISCAT:    16  points which is a low (1.6%) risk of in-hospital post-op pulmonary complications     Neuro  Epilepsy, last and only seizure was over 5 years ago; taking     Endocrine  BMI 24.39    GI  Apfel: 1 points 21% risk for post operative N/V    /Renal  Bladder cancer, with intermittent gross hematuria, underwent TURBT last month  BPH, underwent TURP in ; taking Avodart  Chronic kidney disease, stage III  Counseled on avoiding NSAIDs, adequate hydration    Musculoskeletal   Osteoarthritis    Hematologic  -Anemia, secondary to hematuria related to bladder cancer, recent blood transfusions x 4 last month while hospitalized  -Long-term anticoagulation, taking Eliquis (currently on hold)    due to high Caprini score of 7 patient is at HIGH risk for perioperative DVT, informative education handout provided    Psychiatric  Depression, taking Zoloft  Anxiety, take Xanax as needed    Skin check: Patient was instructed to make surgeon aware of any skin changes/concerns prior to surgery.     Anesthesia:  Patient denies any anesthesia complications.   -underwent CYSTOSCOPY, WITH THROMBUS REMOVALand transurethral resection of bladder tumor on 3/5/2025, no acute anesthesia events documented other than HIGH EMOTIONAL RESPONSE  ASA 4    See risk scores as previously documented.                [1]   Past Medical History:  Diagnosis Date    Abnormal ECG     Arrhythmia     Atrial fibrillation (Multi)     Bladder cancer (Multi)     BPH (benign prostatic hyperplasia)     CHF (congestive heart failure)     CKD (chronic kidney disease)     stage 3    Coronary artery disease     COVID-19 2022    COVID-19    Epilepsy, unspecified, not intractable, without status epilepticus 2020    Epilepsy    Hyperlipidemia     Hypertension     Ischemic cardiomyopathy      Myocardial infarction (Multi)     Unspecified convulsions (Multi) 02/24/2021    Seizures   [2]   Past Surgical History:  Procedure Laterality Date    ARTERIAL BYPASS SURGERY      CARDIAC CATHETERIZATION      CORONARY ANGIOPLASTY      OTHER SURGICAL HISTORY  02/12/2019    Angioplasty    OTHER SURGICAL HISTORY  02/12/2019    Appendectomy    OTHER SURGICAL HISTORY  02/12/2019    Bypass   [3]   Family History  Problem Relation Name Age of Onset    Heart failure Mother Nana1     Emphysema Father Saroj Sr.     Lung disease Father Saroj Sr.     Bipolar disorder Brother     [4] No Known Allergies     No Yes

## 2025-04-26 ENCOUNTER — RX RENEWAL (OUTPATIENT)
Age: 89
End: 2025-04-26

## 2025-05-15 ENCOUNTER — APPOINTMENT (OUTPATIENT)
Dept: NEUROLOGY | Facility: CLINIC | Age: 89
End: 2025-05-15

## 2025-05-26 ENCOUNTER — INPATIENT (INPATIENT)
Facility: HOSPITAL | Age: 89
LOS: 3 days | Discharge: NURSING, REHAB OR LONG-TERM CARE | DRG: 195 | End: 2025-05-30
Attending: GENERAL PRACTICE | Admitting: STUDENT IN AN ORGANIZED HEALTH CARE EDUCATION/TRAINING PROGRAM
Payer: MEDICARE

## 2025-05-26 VITALS
RESPIRATION RATE: 16 BRPM | HEIGHT: 62 IN | HEART RATE: 124 BPM | TEMPERATURE: 100 F | SYSTOLIC BLOOD PRESSURE: 139 MMHG | WEIGHT: 149.91 LBS | DIASTOLIC BLOOD PRESSURE: 86 MMHG | OXYGEN SATURATION: 98 %

## 2025-05-26 DIAGNOSIS — E78.5 HYPERLIPIDEMIA, UNSPECIFIED: ICD-10-CM

## 2025-05-26 DIAGNOSIS — R56.9 UNSPECIFIED CONVULSIONS: ICD-10-CM

## 2025-05-26 DIAGNOSIS — I10 ESSENTIAL (PRIMARY) HYPERTENSION: ICD-10-CM

## 2025-05-26 DIAGNOSIS — J96.01 ACUTE RESPIRATORY FAILURE WITH HYPOXIA: ICD-10-CM

## 2025-05-26 DIAGNOSIS — Z29.9 ENCOUNTER FOR PROPHYLACTIC MEASURES, UNSPECIFIED: ICD-10-CM

## 2025-05-26 DIAGNOSIS — Z86.69 PERSONAL HISTORY OF OTHER DISEASES OF THE NERVOUS SYSTEM AND SENSE ORGANS: Chronic | ICD-10-CM

## 2025-05-26 DIAGNOSIS — E03.9 HYPOTHYROIDISM, UNSPECIFIED: ICD-10-CM

## 2025-05-26 LAB
ALBUMIN SERPL ELPH-MCNC: 2.1 G/DL — LOW (ref 3.3–5)
ALP SERPL-CCNC: 67 U/L — SIGNIFICANT CHANGE UP (ref 40–120)
ALT FLD-CCNC: 13 U/L — SIGNIFICANT CHANGE UP (ref 12–78)
ANION GAP SERPL CALC-SCNC: 13 MMOL/L — SIGNIFICANT CHANGE UP (ref 5–17)
AST SERPL-CCNC: 20 U/L — SIGNIFICANT CHANGE UP (ref 15–37)
BASOPHILS # BLD AUTO: 0.07 K/UL — SIGNIFICANT CHANGE UP (ref 0–0.2)
BASOPHILS NFR BLD AUTO: 1 % — SIGNIFICANT CHANGE UP (ref 0–2)
BILIRUB SERPL-MCNC: 0.4 MG/DL — SIGNIFICANT CHANGE UP (ref 0.2–1.2)
BUN SERPL-MCNC: 24 MG/DL — HIGH (ref 7–23)
CALCIUM SERPL-MCNC: 8.7 MG/DL — SIGNIFICANT CHANGE UP (ref 8.5–10.1)
CHLORIDE SERPL-SCNC: 102 MMOL/L — SIGNIFICANT CHANGE UP (ref 96–108)
CO2 SERPL-SCNC: 23 MMOL/L — SIGNIFICANT CHANGE UP (ref 22–31)
CREAT SERPL-MCNC: 1 MG/DL — SIGNIFICANT CHANGE UP (ref 0.5–1.3)
EGFR: 52 ML/MIN/1.73M2 — LOW
EGFR: 52 ML/MIN/1.73M2 — LOW
EOSINOPHIL # BLD AUTO: 0.07 K/UL — SIGNIFICANT CHANGE UP (ref 0–0.5)
EOSINOPHIL NFR BLD AUTO: 1 % — SIGNIFICANT CHANGE UP (ref 0–6)
GLUCOSE SERPL-MCNC: 212 MG/DL — HIGH (ref 70–99)
HCT VFR BLD CALC: 32 % — LOW (ref 34.5–45)
HGB BLD-MCNC: 10.2 G/DL — LOW (ref 11.5–15.5)
LACTATE SERPL-SCNC: 2.4 MMOL/L — HIGH (ref 0.7–2)
LYMPHOCYTES # BLD AUTO: 0.65 K/UL — LOW (ref 1–3.3)
LYMPHOCYTES # BLD AUTO: 9 % — LOW (ref 13–44)
MCHC RBC-ENTMCNC: 28.3 PG — SIGNIFICANT CHANGE UP (ref 27–34)
MCHC RBC-ENTMCNC: 31.9 G/DL — LOW (ref 32–36)
MCV RBC AUTO: 88.9 FL — SIGNIFICANT CHANGE UP (ref 80–100)
MONOCYTES # BLD AUTO: 1 K/UL — HIGH (ref 0–0.9)
MONOCYTES NFR BLD AUTO: 14 % — SIGNIFICANT CHANGE UP (ref 2–14)
NEUTROPHILS # BLD AUTO: 5.12 K/UL — SIGNIFICANT CHANGE UP (ref 1.8–7.4)
NEUTROPHILS NFR BLD AUTO: 74 % — SIGNIFICANT CHANGE UP (ref 43–77)
NRBC BLD AUTO-RTO: SIGNIFICANT CHANGE UP /100 WBCS (ref 0–0)
PLATELET # BLD AUTO: 205 K/UL — SIGNIFICANT CHANGE UP (ref 150–400)
POTASSIUM SERPL-MCNC: 4 MMOL/L — SIGNIFICANT CHANGE UP (ref 3.5–5.3)
POTASSIUM SERPL-SCNC: 4 MMOL/L — SIGNIFICANT CHANGE UP (ref 3.5–5.3)
PROT SERPL-MCNC: 7.1 G/DL — SIGNIFICANT CHANGE UP (ref 6–8.3)
RAPID RVP RESULT: SIGNIFICANT CHANGE UP
RBC # BLD: 3.6 M/UL — LOW (ref 3.8–5.2)
RBC # FLD: 14.5 % — SIGNIFICANT CHANGE UP (ref 10.3–14.5)
SARS-COV-2 RNA SPEC QL NAA+PROBE: SIGNIFICANT CHANGE UP
SODIUM SERPL-SCNC: 138 MMOL/L — SIGNIFICANT CHANGE UP (ref 135–145)
WBC # BLD: 7.17 K/UL — SIGNIFICANT CHANGE UP (ref 3.8–10.5)
WBC # FLD AUTO: 7.17 K/UL — SIGNIFICANT CHANGE UP (ref 3.8–10.5)

## 2025-05-26 PROCEDURE — 99291 CRITICAL CARE FIRST HOUR: CPT

## 2025-05-26 PROCEDURE — 71045 X-RAY EXAM CHEST 1 VIEW: CPT | Mod: 26

## 2025-05-26 PROCEDURE — 93010 ELECTROCARDIOGRAM REPORT: CPT

## 2025-05-26 PROCEDURE — 99223 1ST HOSP IP/OBS HIGH 75: CPT | Mod: GC

## 2025-05-26 RX ORDER — MIDAZOLAM IN 0.9 % SOD.CHLORID 1 MG/ML
2 PLASTIC BAG, INJECTION (ML) INTRAVENOUS ONCE
Refills: 0 | Status: DISCONTINUED | OUTPATIENT
Start: 2025-05-26 | End: 2025-05-26

## 2025-05-26 RX ORDER — AZITHROMYCIN 250 MG
500 CAPSULE ORAL ONCE
Refills: 0 | Status: COMPLETED | OUTPATIENT
Start: 2025-05-26 | End: 2025-05-26

## 2025-05-26 RX ORDER — CEFTRIAXONE 500 MG/1
1000 INJECTION, POWDER, FOR SOLUTION INTRAMUSCULAR; INTRAVENOUS ONCE
Refills: 0 | Status: COMPLETED | OUTPATIENT
Start: 2025-05-26 | End: 2025-05-26

## 2025-05-26 RX ORDER — ACETAMINOPHEN 500 MG/5ML
1000 LIQUID (ML) ORAL ONCE
Refills: 0 | Status: COMPLETED | OUTPATIENT
Start: 2025-05-26 | End: 2025-05-26

## 2025-05-26 RX ADMIN — Medication 250 MILLIGRAM(S): at 22:12

## 2025-05-26 RX ADMIN — Medication 2100 MILLILITER(S): at 21:51

## 2025-05-26 RX ADMIN — Medication 1000 MILLIGRAM(S): at 22:50

## 2025-05-26 RX ADMIN — Medication 500 MILLIGRAM(S): at 23:10

## 2025-05-26 RX ADMIN — CEFTRIAXONE 1000 MILLIGRAM(S): 500 INJECTION, POWDER, FOR SOLUTION INTRAMUSCULAR; INTRAVENOUS at 22:21

## 2025-05-26 RX ADMIN — Medication 2 MILLIGRAM(S): at 22:33

## 2025-05-26 RX ADMIN — Medication 2100 MILLILITER(S): at 22:51

## 2025-05-26 RX ADMIN — Medication 400 MILLIGRAM(S): at 22:33

## 2025-05-26 RX ADMIN — CEFTRIAXONE 100 MILLIGRAM(S): 500 INJECTION, POWDER, FOR SOLUTION INTRAMUSCULAR; INTRAVENOUS at 21:51

## 2025-05-26 NOTE — H&P ADULT - HISTORY OF PRESENT ILLNESS
89 y/o F with PMHx seizures, CVA, HTN, HLD, T2DM, hypothyroid presenting to the ED c/o cough, generalized weakness and decreased po intake.    IN THE ED:  Temp   99.5F ,  ,  /86  ,RR 16 , SpO2   S/P ofirmev 1g IV, ceftriaxone 1g IV, azithro 500mg IV, versed 2g IV, 2.1L NS bolus  EKG: pending  Labs significant for: WBC 7.17, Hgb 10.2, Albumin 2.1, Lactate 2.4  Imaging:  CXR - pending official read 89 y/o F with PMHx seizures, hemorrhagic CVA in 2015, HTN, HLD, T2DM, hypothyroid presenting to the ED c/o worsening cough x 1 week with associated generalized weakness and decreased po intake. Patients son Miguel present at the bedside providing collateral history. Son states he was giving her Mucinex DM with no relief. He states her breathing became worse today, more labored. He states the past 2 days she wasn't eating or drinking much so he was concerned she may be dehydrated. He states he was assisting to the bathroom today and she was very lightheaded and weak, she did not pass out but he was worried about a possible presyncopal episode. No LOC. Denies fevers, chills, cp, abdominal pain.    IN THE ED:  Temp   99.5F ,  ,  /86  ,RR 16 , SpO2   S/P ofirmev 1g IV, ceftriaxone 1g IV, azithro 500mg IV, versed 2g IV, 2.1L NS bolus  EKG: pending  Labs significant for: WBC 7.17, Hgb 10.2, Albumin 2.1, Lactate 2.4, RVP negative  Imaging:  CXR - pending official read

## 2025-05-26 NOTE — ED ADULT TRIAGE NOTE - CHIEF COMPLAINT QUOTE
Patient brought by ambulance from home as reported fainted while inside the bathroom; not eating for the past 2 days; with history of stroke; seizures , DM, HTN

## 2025-05-26 NOTE — H&P ADULT - ATTENDING COMMENTS
89 y/o F with PMHx seizures, hemorrhagic CVA in 2015, HTN, HLD, T2DM, hypothyroid presenting to the ED c/o worsening cough x 1 week with associated generalized weakness and decreased po intake. Admitted for AHRF 2/2 PNA.    #AHRF likely in setting of PNA  Likely 2/2 PNA  - S/P ofirmev 1g IV, ceftriaxone 1g IV, azithro 500mg IV, versed 2g IV, 2.1L NS bolus in the ED  - Does not meet sepsis criteria on admission: no leukocytosis, afebrile, although she is tachycardic   - Lactate 2.4-> 1.0  - CXR increased interstitial markings and haziness in RLL on personal read  - Wean O2 as tolerated, currently satting 99% on 4L NC  - F/u BCx2 and UA/UC  - F/u urine strep and legionella  - Continue ceftriaxone and azithromycin for PNA coverage  - Continue IVF @ 75cc/hr x 12 hrs  - Tylenol prn for fever, albuterol prn for SOB/wheezing, tessalon perles for cough   - RVP negative  - Trend fever curve and WBC  - CT Chest ordered  - Infectious disease Dr. Jeter consulted, f/u recs.    #Lethargy/ Generalized Weakness/Presyncope  Suspect 2/2 dehydration and underlying infection  Presyncopal episode earlier today when ambulating to the bathroom  Check orthostatics  Obtain ECHO  Remote tele  PT eval.    Agree with Resident's Note. Refer to resident's note for chronic comorbidities  76 minutes spent on total encounter excluding teaching and separately reported services. The necessity of the time spent during the encounter on this date of service was due to:   activities including direct patient care, counseling and/or coordinating care, and reviewing notes/lab data/imaging

## 2025-05-26 NOTE — ED ADULT NURSE NOTE - OBJECTIVE STATEMENT
Pt is aaox2 (person and place) and follows command. Pt presents to the ED c/o generalized body weakness and decreased po intake. Pt's son is present at the bedside providing hx. Pt O2 sat in RA noted to be in low 80's. Pt placed on 4L of O2 via nasal cannula and the O2 sat improved to 95%. IV line placed, labs drawn and fluid and antibiotic administration started as ordered. Pt placed on continuous cardiac and O2 sat monitoring. Plan of care ongoing.

## 2025-05-26 NOTE — H&P ADULT - NSICDXPASTMEDICALHX_GEN_ALL_CORE_FT
PAST MEDICAL HISTORY:  History of CVA (cerebrovascular accident)     HLD (hyperlipidemia)     HTN (hypertension)     Hypothyroid     Seizures     T2DM (type 2 diabetes mellitus)

## 2025-05-26 NOTE — H&P ADULT - CONVERSATION DETAILS
[facility] discussion had with patient and patients son/HCP Miguel Easley. As per pt and son -- she is a DNR/DNI. However, son would like to wait until tomorrow to bring in a completed MOLST from home. They do not wish to sign new MOLST at this time. Day team to follow up.

## 2025-05-26 NOTE — ED PROVIDER NOTE - CARE PLAN
Principal Discharge DX:	Sepsis due to pneumonia  Secondary Diagnosis:	Sepsis with acute hypoxic respiratory failure   1

## 2025-05-26 NOTE — H&P ADULT - NSHPSOCIALHISTORY_GEN_ALL_CORE
Lives:  ADLs:  Diet:  Vaccination:  Occupation:  Alcohol Use:  Tobacco Use:  Recreational Drug Use: Lives: with son  ADLs: ambulates with a walker  Alcohol Use: denies   Tobacco Use: denies  Recreational Drug Use: denies

## 2025-05-26 NOTE — H&P ADULT - NSHPPHYSICALEXAM_GEN_ALL_CORE
T(C): 37.5 (05-26-25 @ 20:53), Max: 37.5 (05-26-25 @ 20:53)  HR: 124 (05-26-25 @ 20:53) (124 - 124)  BP: 139/86 (05-26-25 @ 20:53) (139/86 - 139/86)  RR: 16 (05-26-25 @ 20:53) (16 - 16)  SpO2: 98% (05-26-25 @ 20:53) (98% - 98%)    gen: appears stated age, NAD  heent: nc/at, eomi, perrla, mmm  neck: supple  resp: cta b/l, no wheezes, rales or rhonchi  cardiac: +s1, s2, RRR, no murmurs appreciated  abd: soft, nt, nd, no rebound/guarding  mskt: no clubbing, cyanosis or edema of LE extremities  vasc: 2+ radial pulses  skin: warm and dry  neuro: a&ox3, no appreciable focal deficits   psych: normal mood, affect. normal behavior T(C): 37.5 (05-26-25 @ 20:53), Max: 37.5 (05-26-25 @ 20:53)  HR: 124 (05-26-25 @ 20:53) (124 - 124)  BP: 139/86 (05-26-25 @ 20:53) (139/86 - 139/86)  RR: 16 (05-26-25 @ 20:53) (16 - 16)  SpO2: 98% (05-26-25 @ 20:53) (98% - 98%)    gen: appears stated age, NAD  heent: nc/at, eomi, perrla, mmm  neck: supple  resp: coarse breath sounds b/l  cardiac: +s1, s2, RRR, no murmurs appreciated  abd: soft, nt, nd, no rebound/guarding  mskt: no clubbing, cyanosis or edema of LE extremities  vasc: 2+ radial pulses  skin: warm and dry  neuro: a&ox3, no appreciable focal deficits   psych: normal mood, affect. normal behavior

## 2025-05-26 NOTE — H&P ADULT - PROBLEM SELECTOR PLAN 1
- S/P ofirmev 1g IV, ceftriaxone 1g IV, azithro 500mg IV, versed 2g IV, 2.1L NS bolus in the ED  - Does not meet sepsis criteria on admission: no leukocytosis, afebrile  - Lactate 2.4, f/u repeat lactate s/p IVF bolus  - F/u BCx2 and UA/UC  - F/u urine strep and legionella  - Continue ceftriaxone and azithromycin for PNA coverage  - Continue IVF @ 75cc/hr x 12 hrs  - Tylenol prn for fever, albuterol prn for SOB/wheezing, tessalon perles for cough   - RVP negative?  - Trend fever curve and WBC  - Wean O2 as tolerated  - Infectious disease Dr. Jeter consulted, f/u recs  - Pulmonology  _____ consulted, f/u recs - S/P ofirmev 1g IV, ceftriaxone 1g IV, azithro 500mg IV, versed 2g IV, 2.1L NS bolus in the ED  - Does not meet sepsis criteria on admission: no leukocytosis, afebrile  - Lactate 2.4, f/u repeat lactate s/p IVF bolus  - F/u BCx2 and UA/UC  - F/u urine strep and legionella  - Continue ceftriaxone and azithromycin for PNA coverage  - Continue IVF @ 75cc/hr x 12 hrs  - Tylenol prn for fever, albuterol prn for SOB/wheezing, tessalon perles for cough   - RVP negative?  - Trend fever curve and WBC  - Wean O2 as tolerated  - CT Chest ordered  - Infectious disease Dr. Jeter consulted, f/u recs  - Pulmonology  _____ consulted, f/u recs - S/P ofirmev 1g IV, ceftriaxone 1g IV, azithro 500mg IV, versed 2g IV, 2.1L NS bolus in the ED  - Does not meet sepsis criteria on admission: no leukocytosis, afebrile, although she is tachycardic   - Lactate 2.4, f/u repeat lactate s/p IVF bolus  - F/u BCx2 and UA/UC  - F/u urine strep and legionella  - Continue ceftriaxone and azithromycin for PNA coverage  - Continue IVF @ 75cc/hr x 12 hrs  - Tylenol prn for fever, albuterol prn for SOB/wheezing, tessalon perles for cough   - RVP negative  - Trend fever curve and WBC  - Wean O2 as tolerated, currently satting 99% on 4L NC  - CT Chest ordered  - Infectious disease Dr. Jeter consulted, f/u recs Likely 2/2 PNA  - S/P ofirmev 1g IV, ceftriaxone 1g IV, azithro 500mg IV, versed 2g IV, 2.1L NS bolus in the ED  - Does not meet sepsis criteria on admission: no leukocytosis, afebrile, although she is tachycardic   - Lactate 2.4, f/u repeat lactate s/p IVF bolus  - CXR increased interstitial markings and haziness in RLL on personal read  - Wean O2 as tolerated, currently satting 99% on 4L NC  - F/u BCx2 and UA/UC  - F/u urine strep and legionella  - Continue ceftriaxone and azithromycin for PNA coverage  - Continue IVF @ 75cc/hr x 12 hrs  - Tylenol prn for fever, albuterol prn for SOB/wheezing, tessalon perles for cough   - RVP negative  - Trend fever curve and WBC  - CT Chest ordered  - Infectious disease Dr. Jeter consulted, f/u recs

## 2025-05-26 NOTE — ED ADULT NURSE REASSESSMENT NOTE - NS ED NURSE REASSESS COMMENT FT1
Confirmed lactic acid value of 2.4 with the lab. Provider made aware. Pt is receiving IV fluids at this time. Order received for repeat lactate level. Will be drawn after the fluid bolus is finished.

## 2025-05-26 NOTE — ED PROVIDER NOTE - NS ED ATTENDING STATEMENT MOD
Attending Only , I have personally provided the amount of critical care time documented below excluding time spent on separate procedures.

## 2025-05-26 NOTE — H&P ADULT - PROBLEM SELECTOR PLAN 6
Continue home synthroid /86 on admission  Continue home Amlodipine and Irbesartan interchange w/ hold parameters

## 2025-05-26 NOTE — H&P ADULT - PROBLEM SELECTOR PLAN 4
/86 on admission  Continue home Amlodipine and Irbesartan interchange w/ hold parameters Continue home Keppra and Carbamazepine  Seizure protocol

## 2025-05-26 NOTE — ED PROVIDER NOTE - CRITICAL CARE ATTENDING CONTRIBUTION TO CARE
repeat
repeat
Upon my evaluation, this patient had a high probability of imminent or life-threatening deterioration due to Sepsis with acute hypoxic respiratory failure which required my direct attention, intervention, and personal management.  The patient has a  medical condition that impairs one or more vital organ systems.  Frequent personal assessment and adjustment of medical interventions was performed.      I have personally provided 45 minutes of critical care time exclusive of time spent on separately billable procedures. Time includes review of laboratory data, radiology results, discussion with consultants, patient and family; monitoring for potential decompensation, as well as time spent retrieving data and reviewing the chart and documenting the visit. Interventions were performed as documented above.

## 2025-05-26 NOTE — ED PROVIDER NOTE - CLINICAL SUMMARY MEDICAL DECISION MAKING FREE TEXT BOX
Sepsis -  likely pneumonia  1) IV Access/IVF (30cc/kg bolus)/LABS/Lactate (rpt after IVF if elevated)/UA/Cultures  2) CXR  3) IV Abx  4) Admit

## 2025-05-26 NOTE — H&P ADULT - ASSESSMENT
89 y/o F with PMHx seizures, hemorrhagic CVA in 2015, HTN, HLD, T2DM, hypothyroid presenting to the ED c/o worsening cough x 1 week with associated generalized weakness and decreased po intake. Admitted for AHRF 2/2 PNA.

## 2025-05-26 NOTE — ED PROVIDER NOTE - PHYSICAL EXAMINATION
Gen: weak appearing elderly female in no acute distress.  Head: NC/AT  Neck: trachea midline  Resp:    Diffuse rhonchi, left>right,  saturating 82% on room air, improved to 98% on 4 L nasal cannula  CV:  tachycardic, regular rhythm  GI: soft, NTND  Ext: no deformities, no calf ttp, no LE edema  Neuro:  A&O appears non focal  Skin:  Warm and dry as visualized  Psych:  Normal affect and mood

## 2025-05-26 NOTE — H&P ADULT - PROBLEM SELECTOR PLAN 3
Continue home Keppra and Carbamazepine  Seizure protocol Suspect 2/2 dehydration and infectious etiology  Presyncopal episode earlier today when ambulating to the bathroom  Check orthostatics  Obtain ECHO  Remote tele Suspect 2/2 dehydration and underlying infection  Presyncopal episode earlier today when ambulating to the bathroom  Check orthostatics  Obtain ECHO  Remote tele Suspect 2/2 dehydration and underlying infection  Presyncopal episode earlier today when ambulating to the bathroom  Check orthostatics  Obtain ECHO  Remote tele  PT eval

## 2025-05-26 NOTE — ED PROVIDER NOTE - OBJECTIVE STATEMENT
DC instructions
89-year-old female history of CVA, seizures (on Keppra and carbamazepine), hypertension, hyperlipidemia, diabetes presenting with worsening cough x 1 week associated with decreased p.o. intake and weakness.  Increasingly weak over the last 2 days, has not eaten anything the last 2 days.  Went to the bathroom this afternoon but was extremely weak, did not pass out or fall.  Was given some orange juice by her son with some improvement.  Son noticed that she was breathing quickly and sent her to the emergency department.

## 2025-05-26 NOTE — H&P ADULT - PROBLEM SELECTOR PLAN 5
Continue home statin Hold home metoformin  Low dose ISS  Hypoglycemia protocol  f/u AM A1c  consistent carb diet Hold home metformin  Low dose ISS  Hypoglycemia protocol  f/u AM A1c  consistent carb diet

## 2025-05-26 NOTE — ED ADULT NURSE NOTE - NSFALLLASTSIX_ED_ALL_ED
What Type Of Note Output Would You Prefer (Optional)?: Standard Output Hpi Title: Evaluation of Skin Lesions How Severe Are Your Spot(S)?: mild Have Your Spot(S) Been Treated In The Past?: has not been treated No.

## 2025-05-26 NOTE — ED ADULT NURSE NOTE - NSFALLHARMRISKINTERV_ED_ALL_ED
Assistance OOB with selected safe patient handling equipment if applicable/Assistance with ambulation/Communicate risk of Fall with Harm to all staff, patient, and family/Encourage patient to sit up slowly, dangle for a short time, stand at bedside before walking/Monitor gait and stability/Monitor for mental status changes and reorient to person, place, and time, as needed/Orthostatic vital signs/Provide visual cue: red socks, yellow wristband, yellow gown, etc/Reinforce activity limits and safety measures with patient and family/Toileting schedule using arm’s reach rule for commode and bathroom/Use of alarms - bed, stretcher, chair and/or video monitoring/Bed in lowest position, wheels locked, appropriate side rails in place/Call bell, personal items and telephone in reach/Instruct patient to call for assistance before getting out of bed/chair/stretcher/Non-slip footwear applied when patient is off stretcher/[place] to call system/Physically safe environment - no spills, clutter or unnecessary equipment/Purposeful Proactive Rounding/Room/bathroom lighting operational, light cord in reach

## 2025-05-26 NOTE — H&P ADULT - NSHPREVIEWOFSYSTEMS_GEN_ALL_CORE
REVIEW OF SYSTEMS:  Constitutional: denies fever, chills, diaphoresis  HEENT: denies sore throat, runny nose, blurry vision, double vision    Respiratory: +cough denies wheezing, hemoptysis  Cardiovascular: denies CP, palpitations, LE edema  Gastrointestinal:  denies nausea, vomiting, diarrhea, constipation, abdominal pain, melena, hematochezia   Genitourinary: denies dysuria, hematuria  Skin/Breast: denies rash, hives, itching   Musculoskeletal: denies myalgias, arthritis, joint swelling, muscle weakness  Neurologic: denies LOC, headache, dizziness REVIEW OF SYSTEMS:  Constitutional: denies fever, chills, diaphoresis  HEENT: denies sore throat, runny nose, blurry vision, double vision    Respiratory: +cough, +SOB, denies wheezing, hemoptysis  Cardiovascular: denies CP, palpitations, LE edema  Gastrointestinal:  denies nausea, vomiting, diarrhea, constipation, abdominal pain, melena, hematochezia   Genitourinary: denies dysuria, hematuria  Skin/Breast: denies rash, hives, itching   Musculoskeletal: denies myalgias, arthritis, joint swelling  Neurologic: denies LOC, headache, dizziness

## 2025-05-27 DIAGNOSIS — E11.9 TYPE 2 DIABETES MELLITUS WITHOUT COMPLICATIONS: ICD-10-CM

## 2025-05-27 DIAGNOSIS — R53.1 WEAKNESS: ICD-10-CM

## 2025-05-27 DIAGNOSIS — R42 DIZZINESS AND GIDDINESS: ICD-10-CM

## 2025-05-27 LAB
A1C WITH ESTIMATED AVERAGE GLUCOSE RESULT: 6.5 % — HIGH (ref 4–5.6)
ALBUMIN SERPL ELPH-MCNC: 1.8 G/DL — LOW (ref 3.3–5)
ALP SERPL-CCNC: 57 U/L — SIGNIFICANT CHANGE UP (ref 40–120)
ALT FLD-CCNC: 15 U/L — SIGNIFICANT CHANGE UP (ref 12–78)
ANION GAP SERPL CALC-SCNC: 7 MMOL/L — SIGNIFICANT CHANGE UP (ref 5–17)
APPEARANCE UR: ABNORMAL
APTT BLD: 24.7 SEC — LOW (ref 26.1–36.8)
AST SERPL-CCNC: 21 U/L — SIGNIFICANT CHANGE UP (ref 15–37)
BASOPHILS # BLD AUTO: 0.06 K/UL — SIGNIFICANT CHANGE UP (ref 0–0.2)
BASOPHILS NFR BLD AUTO: 1 % — SIGNIFICANT CHANGE UP (ref 0–2)
BILIRUB SERPL-MCNC: 0.2 MG/DL — SIGNIFICANT CHANGE UP (ref 0.2–1.2)
BILIRUB UR-MCNC: NEGATIVE — SIGNIFICANT CHANGE UP
BUN SERPL-MCNC: 24 MG/DL — HIGH (ref 7–23)
CALCIUM SERPL-MCNC: 7.4 MG/DL — LOW (ref 8.5–10.1)
CHLORIDE SERPL-SCNC: 108 MMOL/L — SIGNIFICANT CHANGE UP (ref 96–108)
CK MB CFR SERPL CALC: 2.5 NG/ML — SIGNIFICANT CHANGE UP (ref 0–3.6)
CO2 SERPL-SCNC: 26 MMOL/L — SIGNIFICANT CHANGE UP (ref 22–31)
COLOR SPEC: YELLOW — SIGNIFICANT CHANGE UP
CREAT SERPL-MCNC: 0.91 MG/DL — SIGNIFICANT CHANGE UP (ref 0.5–1.3)
DIFF PNL FLD: ABNORMAL
EGFR: 58 ML/MIN/1.73M2 — LOW
EGFR: 58 ML/MIN/1.73M2 — LOW
EOSINOPHIL # BLD AUTO: 0.02 K/UL — SIGNIFICANT CHANGE UP (ref 0–0.5)
EOSINOPHIL NFR BLD AUTO: 0.3 % — SIGNIFICANT CHANGE UP (ref 0–6)
ESTIMATED AVERAGE GLUCOSE: 140 MG/DL — HIGH (ref 68–114)
GLUCOSE SERPL-MCNC: 175 MG/DL — HIGH (ref 70–99)
GLUCOSE UR QL: 500 MG/DL
HCT VFR BLD CALC: 26.2 % — LOW (ref 34.5–45)
HGB BLD-MCNC: 8.5 G/DL — LOW (ref 11.5–15.5)
IMM GRANULOCYTES NFR BLD AUTO: 4.3 % — HIGH (ref 0–0.9)
INR BLD: 1.21 RATIO — HIGH (ref 0.85–1.16)
KETONES UR QL: 80 MG/DL
LACTATE SERPL-SCNC: 1 MMOL/L — SIGNIFICANT CHANGE UP (ref 0.7–2)
LEUKOCYTE ESTERASE UR-ACNC: ABNORMAL
LYMPHOCYTES # BLD AUTO: 0.59 K/UL — LOW (ref 1–3.3)
LYMPHOCYTES # BLD AUTO: 9.7 % — LOW (ref 13–44)
MCHC RBC-ENTMCNC: 28.1 PG — SIGNIFICANT CHANGE UP (ref 27–34)
MCHC RBC-ENTMCNC: 32.4 G/DL — SIGNIFICANT CHANGE UP (ref 32–36)
MCV RBC AUTO: 86.8 FL — SIGNIFICANT CHANGE UP (ref 80–100)
MONOCYTES # BLD AUTO: 1.22 K/UL — HIGH (ref 0–0.9)
MONOCYTES NFR BLD AUTO: 20 % — HIGH (ref 2–14)
NEUTROPHILS # BLD AUTO: 3.94 K/UL — SIGNIFICANT CHANGE UP (ref 1.8–7.4)
NEUTROPHILS NFR BLD AUTO: 64.7 % — SIGNIFICANT CHANGE UP (ref 43–77)
NITRITE UR-MCNC: NEGATIVE — SIGNIFICANT CHANGE UP
NRBC BLD AUTO-RTO: 0 /100 WBCS — SIGNIFICANT CHANGE UP (ref 0–0)
NT-PROBNP SERPL-SCNC: 776 PG/ML — HIGH (ref 0–450)
OB PNL STL: NEGATIVE — SIGNIFICANT CHANGE UP
PH UR: 6 — SIGNIFICANT CHANGE UP (ref 5–8)
PLATELET # BLD AUTO: 184 K/UL — SIGNIFICANT CHANGE UP (ref 150–400)
POTASSIUM SERPL-MCNC: 3.9 MMOL/L — SIGNIFICANT CHANGE UP (ref 3.5–5.3)
POTASSIUM SERPL-SCNC: 3.9 MMOL/L — SIGNIFICANT CHANGE UP (ref 3.5–5.3)
PROCALCITONIN SERPL-MCNC: 1.34 NG/ML — HIGH
PROT SERPL-MCNC: 6.1 G/DL — SIGNIFICANT CHANGE UP (ref 6–8.3)
PROT UR-MCNC: 100 MG/DL
PROTHROM AB SERPL-ACNC: 14.3 SEC — HIGH (ref 9.9–13.4)
RBC # BLD: 3.02 M/UL — LOW (ref 3.8–5.2)
RBC # FLD: 14.5 % — SIGNIFICANT CHANGE UP (ref 10.3–14.5)
SODIUM SERPL-SCNC: 141 MMOL/L — SIGNIFICANT CHANGE UP (ref 135–145)
SP GR SPEC: 1.02 — SIGNIFICANT CHANGE UP (ref 1–1.03)
TROPONIN I, HIGH SENSITIVITY RESULT: 8.6 NG/L — SIGNIFICANT CHANGE UP
UROBILINOGEN FLD QL: 1 MG/DL — SIGNIFICANT CHANGE UP (ref 0.2–1)
WBC # BLD: 6.09 K/UL — SIGNIFICANT CHANGE UP (ref 3.8–10.5)
WBC # FLD AUTO: 6.09 K/UL — SIGNIFICANT CHANGE UP (ref 3.8–10.5)

## 2025-05-27 PROCEDURE — 99233 SBSQ HOSP IP/OBS HIGH 50: CPT

## 2025-05-27 PROCEDURE — 99497 ADVNCD CARE PLAN 30 MIN: CPT

## 2025-05-27 PROCEDURE — 71250 CT THORAX DX C-: CPT | Mod: 26

## 2025-05-27 PROCEDURE — 99223 1ST HOSP IP/OBS HIGH 75: CPT

## 2025-05-27 RX ORDER — IPRATROPIUM BROMIDE AND ALBUTEROL SULFATE .5; 2.5 MG/3ML; MG/3ML
3 SOLUTION RESPIRATORY (INHALATION) EVERY 6 HOURS
Refills: 0 | Status: DISCONTINUED | OUTPATIENT
Start: 2025-05-27 | End: 2025-05-30

## 2025-05-27 RX ORDER — INSULIN LISPRO 100 U/ML
INJECTION, SOLUTION INTRAVENOUS; SUBCUTANEOUS
Refills: 0 | Status: DISCONTINUED | OUTPATIENT
Start: 2025-05-27 | End: 2025-05-30

## 2025-05-27 RX ORDER — LEVETIRACETAM 10 MG/ML
250 INJECTION, SOLUTION INTRAVENOUS
Refills: 0 | Status: DISCONTINUED | OUTPATIENT
Start: 2025-05-27 | End: 2025-05-30

## 2025-05-27 RX ORDER — GLUCAGON 3 MG/1
1 POWDER NASAL ONCE
Refills: 0 | Status: DISCONTINUED | OUTPATIENT
Start: 2025-05-27 | End: 2025-05-30

## 2025-05-27 RX ORDER — LEVOTHYROXINE SODIUM 300 MCG
88 TABLET ORAL DAILY
Refills: 0 | Status: DISCONTINUED | OUTPATIENT
Start: 2025-05-27 | End: 2025-05-30

## 2025-05-27 RX ORDER — DEXTROSE 50 % IN WATER 50 %
12.5 SYRINGE (ML) INTRAVENOUS ONCE
Refills: 0 | Status: DISCONTINUED | OUTPATIENT
Start: 2025-05-27 | End: 2025-05-30

## 2025-05-27 RX ORDER — DEXTROSE 50 % IN WATER 50 %
25 SYRINGE (ML) INTRAVENOUS ONCE
Refills: 0 | Status: DISCONTINUED | OUTPATIENT
Start: 2025-05-27 | End: 2025-05-30

## 2025-05-27 RX ORDER — ENOXAPARIN SODIUM 100 MG/ML
40 INJECTION SUBCUTANEOUS EVERY 24 HOURS
Refills: 0 | Status: DISCONTINUED | OUTPATIENT
Start: 2025-05-27 | End: 2025-05-30

## 2025-05-27 RX ORDER — CARBAMAZEPINE 200 MG/1
200 TABLET ORAL
Refills: 0 | Status: DISCONTINUED | OUTPATIENT
Start: 2025-05-27 | End: 2025-05-30

## 2025-05-27 RX ORDER — AZITHROMYCIN 250 MG
500 CAPSULE ORAL EVERY 24 HOURS
Refills: 0 | Status: DISCONTINUED | OUTPATIENT
Start: 2025-05-27 | End: 2025-05-28

## 2025-05-27 RX ORDER — ACETAMINOPHEN 500 MG/5ML
650 LIQUID (ML) ORAL ONCE
Refills: 0 | Status: COMPLETED | OUTPATIENT
Start: 2025-05-27 | End: 2025-05-27

## 2025-05-27 RX ORDER — CARBAMAZEPINE 200 MG/1
100 TABLET ORAL
Refills: 0 | Status: DISCONTINUED | OUTPATIENT
Start: 2025-05-27 | End: 2025-05-30

## 2025-05-27 RX ORDER — LEVETIRACETAM 10 MG/ML
250 INJECTION, SOLUTION INTRAVENOUS ONCE
Refills: 0 | Status: COMPLETED | OUTPATIENT
Start: 2025-05-27 | End: 2025-05-27

## 2025-05-27 RX ORDER — BENZONATATE 100 MG
100 CAPSULE ORAL EVERY 8 HOURS
Refills: 0 | Status: DISCONTINUED | OUTPATIENT
Start: 2025-05-27 | End: 2025-05-30

## 2025-05-27 RX ORDER — AMLODIPINE BESYLATE 10 MG/1
5 TABLET ORAL DAILY
Refills: 0 | Status: DISCONTINUED | OUTPATIENT
Start: 2025-05-27 | End: 2025-05-30

## 2025-05-27 RX ORDER — LOSARTAN POTASSIUM 100 MG/1
50 TABLET, FILM COATED ORAL
Refills: 0 | Status: DISCONTINUED | OUTPATIENT
Start: 2025-05-27 | End: 2025-05-30

## 2025-05-27 RX ORDER — IPRATROPIUM BROMIDE AND ALBUTEROL SULFATE .5; 2.5 MG/3ML; MG/3ML
3 SOLUTION RESPIRATORY (INHALATION) EVERY 6 HOURS
Refills: 0 | Status: DISCONTINUED | OUTPATIENT
Start: 2025-05-27 | End: 2025-05-27

## 2025-05-27 RX ORDER — CARBAMAZEPINE 200 MG/1
100 TABLET ORAL
Refills: 0 | Status: DISCONTINUED | OUTPATIENT
Start: 2025-05-27 | End: 2025-05-27

## 2025-05-27 RX ORDER — ACETAMINOPHEN 500 MG/5ML
650 LIQUID (ML) ORAL EVERY 6 HOURS
Refills: 0 | Status: DISCONTINUED | OUTPATIENT
Start: 2025-05-27 | End: 2025-05-30

## 2025-05-27 RX ORDER — DEXTROSE 50 % IN WATER 50 %
15 SYRINGE (ML) INTRAVENOUS ONCE
Refills: 0 | Status: DISCONTINUED | OUTPATIENT
Start: 2025-05-27 | End: 2025-05-30

## 2025-05-27 RX ORDER — ROSUVASTATIN CALCIUM 20 MG/1
10 TABLET, FILM COATED ORAL AT BEDTIME
Refills: 0 | Status: DISCONTINUED | OUTPATIENT
Start: 2025-05-27 | End: 2025-05-30

## 2025-05-27 RX ORDER — CEFTRIAXONE 500 MG/1
1000 INJECTION, POWDER, FOR SOLUTION INTRAMUSCULAR; INTRAVENOUS EVERY 24 HOURS
Refills: 0 | Status: DISCONTINUED | OUTPATIENT
Start: 2025-05-27 | End: 2025-05-30

## 2025-05-27 RX ORDER — IPRATROPIUM BROMIDE AND ALBUTEROL SULFATE .5; 2.5 MG/3ML; MG/3ML
3 SOLUTION RESPIRATORY (INHALATION) ONCE
Refills: 0 | Status: COMPLETED | OUTPATIENT
Start: 2025-05-27 | End: 2025-05-27

## 2025-05-27 RX ORDER — SODIUM CHLORIDE 9 G/1000ML
1000 INJECTION, SOLUTION INTRAVENOUS
Refills: 0 | Status: DISCONTINUED | OUTPATIENT
Start: 2025-05-27 | End: 2025-05-30

## 2025-05-27 RX ORDER — METFORMIN HYDROCHLORIDE 850 MG/1
1000 TABLET ORAL AT BEDTIME
Refills: 0 | Status: DISCONTINUED | OUTPATIENT
Start: 2025-05-27 | End: 2025-05-27

## 2025-05-27 RX ORDER — INSULIN LISPRO 100 U/ML
INJECTION, SOLUTION INTRAVENOUS; SUBCUTANEOUS AT BEDTIME
Refills: 0 | Status: DISCONTINUED | OUTPATIENT
Start: 2025-05-27 | End: 2025-05-30

## 2025-05-27 RX ORDER — CARBAMAZEPINE 200 MG/1
200 TABLET ORAL ONCE
Refills: 0 | Status: COMPLETED | OUTPATIENT
Start: 2025-05-27 | End: 2025-05-27

## 2025-05-27 RX ORDER — CARBAMAZEPINE 200 MG/1
200 TABLET ORAL ONCE
Refills: 0 | Status: DISCONTINUED | OUTPATIENT
Start: 2025-05-27 | End: 2025-05-27

## 2025-05-27 RX ADMIN — Medication 88 MICROGRAM(S): at 05:59

## 2025-05-27 RX ADMIN — CARBAMAZEPINE 100 MILLIGRAM(S): 200 TABLET ORAL at 11:13

## 2025-05-27 RX ADMIN — CARBAMAZEPINE 200 MILLIGRAM(S): 200 TABLET ORAL at 02:29

## 2025-05-27 RX ADMIN — Medication 100 MILLIGRAM(S): at 16:17

## 2025-05-27 RX ADMIN — ROSUVASTATIN CALCIUM 10 MILLIGRAM(S): 20 TABLET, FILM COATED ORAL at 21:24

## 2025-05-27 RX ADMIN — LOSARTAN POTASSIUM 50 MILLIGRAM(S): 100 TABLET, FILM COATED ORAL at 18:13

## 2025-05-27 RX ADMIN — IPRATROPIUM BROMIDE AND ALBUTEROL SULFATE 3 MILLILITER(S): .5; 2.5 SOLUTION RESPIRATORY (INHALATION) at 19:09

## 2025-05-27 RX ADMIN — CARBAMAZEPINE 100 MILLIGRAM(S): 200 TABLET ORAL at 18:21

## 2025-05-27 RX ADMIN — SODIUM CHLORIDE 75 MILLILITER(S): 9 INJECTION, SOLUTION INTRAVENOUS at 06:00

## 2025-05-27 RX ADMIN — Medication 250 MILLIGRAM(S): at 22:23

## 2025-05-27 RX ADMIN — Medication 650 MILLIGRAM(S): at 20:19

## 2025-05-27 RX ADMIN — Medication 650 MILLIGRAM(S): at 21:10

## 2025-05-27 RX ADMIN — LOSARTAN POTASSIUM 50 MILLIGRAM(S): 100 TABLET, FILM COATED ORAL at 05:59

## 2025-05-27 RX ADMIN — Medication 100 MILLIGRAM(S): at 06:00

## 2025-05-27 RX ADMIN — CARBAMAZEPINE 200 MILLIGRAM(S): 200 TABLET ORAL at 22:24

## 2025-05-27 RX ADMIN — Medication 100 MILLIGRAM(S): at 21:24

## 2025-05-27 RX ADMIN — Medication 4 MILLILITER(S): at 19:08

## 2025-05-27 RX ADMIN — AMLODIPINE BESYLATE 5 MILLIGRAM(S): 10 TABLET ORAL at 06:00

## 2025-05-27 RX ADMIN — LEVETIRACETAM 250 MILLIGRAM(S): 10 INJECTION, SOLUTION INTRAVENOUS at 01:55

## 2025-05-27 RX ADMIN — LEVETIRACETAM 250 MILLIGRAM(S): 10 INJECTION, SOLUTION INTRAVENOUS at 21:33

## 2025-05-27 RX ADMIN — IPRATROPIUM BROMIDE AND ALBUTEROL SULFATE 3 MILLILITER(S): .5; 2.5 SOLUTION RESPIRATORY (INHALATION) at 15:32

## 2025-05-27 RX ADMIN — IPRATROPIUM BROMIDE AND ALBUTEROL SULFATE 3 MILLILITER(S): .5; 2.5 SOLUTION RESPIRATORY (INHALATION) at 11:24

## 2025-05-27 RX ADMIN — CEFTRIAXONE 100 MILLIGRAM(S): 500 INJECTION, POWDER, FOR SOLUTION INTRAMUSCULAR; INTRAVENOUS at 21:12

## 2025-05-27 RX ADMIN — SODIUM CHLORIDE 75 MILLILITER(S): 9 INJECTION, SOLUTION INTRAVENOUS at 02:29

## 2025-05-27 RX ADMIN — LEVETIRACETAM 250 MILLIGRAM(S): 10 INJECTION, SOLUTION INTRAVENOUS at 11:14

## 2025-05-27 NOTE — PROGRESS NOTE ADULT - PROBLEM SELECTOR PLAN 2
The patient is a 49y Female complaining of abdominal pain. As above  IV abx   Duoneb with mucolytics  ordered

## 2025-05-27 NOTE — PROGRESS NOTE ADULT - PROBLEM SELECTOR PLAN 1
Likely 2/2 PNA  - S/P ofirmev 1g IV, ceftriaxone 1g IV, azithro 500mg IV, versed 2g IV, 2.1L NS bolus in the ED  - Does not meet sepsis criteria on admission: no leukocytosis, afebrile, although she is tachycardic   - Lactate 2.4, f/u repeat lactate s/p IVF bolus  - CXR increased interstitial markings and haziness in RLL on personal read  - Wean O2 as tolerated, currently satting 99% on 4L NC->2L   - F/u BCx2 and UA/UC  - F/u urine strep and legionella  - Continue ceftriaxone and azithromycin for PNA coverage  - Tylenol prn for fever, albuterol prn for SOB/wheezing, tessalon perles for cough   - RVP negative  - Trend fever curve and WBC  - CT Chest: Findings represent multifocal impaction with associated infectious/inflammatory process. Question areas of bronchiectasis and peripheral cysts. Short-term follow-up chest CT recommended  - Infectious disease eval noted

## 2025-05-27 NOTE — CARE COORDINATION ASSESSMENT. - NSPASTMEDSURGHISTORY_GEN_ALL_CORE_FT
PAST MEDICAL & SURGICAL HISTORY:  T2DM (type 2 diabetes mellitus)      Hypothyroid      HLD (hyperlipidemia)      HTN (hypertension)      History of CVA (cerebrovascular accident)      Seizures

## 2025-05-27 NOTE — PROGRESS NOTE ADULT - ASSESSMENT
89 y/o F with PMHx seizures, hemorrhagic CVA in 2015, HTN, HLD, T2DM, hypothyroid presenting to the ED c/o worsening cough x 1 week with associated generalized weakness and decreased po intake. Admitted for AHRF 2/2 PNA.

## 2025-05-27 NOTE — CARE COORDINATION ASSESSMENT. - NSCAREPROVIDERS_GEN_ALL_CORE_FT
CARE PROVIDERS:  Accepting Physician: Ricky Braun  Administration: Celso Minor  Administration: John Maldonado  Admitting: Ricky Braun  Attending: Ambrosio Ralph  Cardiology Technician: Betzy Tijerina  Clinical Doc. Improvement: Laura Alcocer  Consultant: Armen Patricio  Consultant: Red Nunez  Covering Team: Oriana Moncada  ED Attending: Nehemiah Ramirez  ED Nurse: Shanique Hill  Emergency Medicine: Nehemiah Ramirez  Nurse: Astrid Block  Nurse: Tony Delgado  Nurse: Shanique Hill  Ordered: Doctor, Unknown  Ordered: Rubén Mix  PCA/Nursing Assistant: Perla Hanson  Primary Team: Ambrosio Ralph  : Liseth Andrade  : Haritha Yang  Team: [facility], Team  UR// Supp. Assoc.: Kellen Zelaya

## 2025-05-27 NOTE — CARE COORDINATION ASSESSMENT. - OTHER PERTINENT REFERRAL INFORMATION
Sw met with Pt and son at bedside. Pt is A & O x4 and is able to make her needs known. Sw introduced self and role and pt expressed verbal understanding. Pt lives in a pvt home with son w 1 KASEY and none inside. Pt is indep with her ADLS and uses a walker at baseline. Pt has no hx of HC, or TAYLOR that she can remember. Pts goal is to return home upon DC.

## 2025-05-27 NOTE — PATIENT PROFILE ADULT - FALL HARM RISK - HARM RISK INTERVENTIONS

## 2025-05-27 NOTE — PATIENT PROFILE ADULT - NSTRANSFERBELONGINGSDISPO_GEN_A_NUR
Pt states he was working with Rayshawn Devlin about the issue  Forwarding to Rayshawn Devlin  with patient

## 2025-05-27 NOTE — CONSULT NOTE ADULT - TIME BILLING
Thank you for your consult.   Please call us with any concerns or questions.   We will continue to follow.   Further recommendations to follow based on clinical  progression as well as availability of further lab data    Red Nunez MD   Division of Infectious Diseases  [facility]   Cell [phone] between 8am and 6pm   After 6pm and weekends please call ID service at [phone].

## 2025-05-27 NOTE — PATIENT PROFILE ADULT - ARE SIGNIFICANT INDICATORS COMPLETE.
-- DO NOT REPLY / DO NOT REPLY ALL --  -- Message is from the [facility]--    General Patient Message      Reason for Call: Patient would like to speak with Dr. Walker regarding an issue he's having and spoke about previously.    Caller Information       Type Contact Phone    11/29/2021 08:18 AM CST Phone (Incoming) Babar Michael (Self) [phone] (H)          Alternative phone number: none    Turnaround time given to caller:   \"This message will be sent to [state Provider's name]. The clinical team will fulfill your request as soon as they review your message.\"     Yes

## 2025-05-27 NOTE — CHART NOTE - NSCHARTNOTEFT_GEN_A_CORE
[facility] discussion had with patient and patients son/HCP Miguel Easley. As per pt and son -- she is a DNR/DNI. Will place DNR/DNI w/ trial of NIV order in EMR and son to bring in MOLST form in the AM.   Son would like to wait until tomorrow to bring in a completed MOLST from home. They do not wish to sign new MOLST at this time. Day team to follow up.

## 2025-05-27 NOTE — CARE COORDINATION ASSESSMENT. - NSDCPLANREVIEW_GEN_ALL_CORE
Home phone call received on March 10, 2023 at 4:30 PM.  Sebastian Vidales reported a positive urine analysis. Last time she had a urinary tract infection she ended up hospitalized. Her allergy was to penicillin. I called in Bactrim DS 1 by mouth twice a day for 5 days.   This morning I see the culture is still pending, therefore sensitivities are not available on March 11, 2023 at 7:59 AM. Caregiver/Family/Patient

## 2025-05-27 NOTE — PROGRESS NOTE ADULT - PROBLEM SELECTOR PLAN 3
Suspect 2/2 dehydration and underlying infection, with SOB   Presyncopal episode earlier  when ambulating to the bathroom  EKG: sinus tachy, small Q III, AVF.   Obtain ECHO  Remote tele  check troponin , CK, Pro BNP   PT eval

## 2025-05-28 ENCOUNTER — RESULT REVIEW (OUTPATIENT)
Age: 89
End: 2025-05-28

## 2025-05-28 LAB
ALBUMIN SERPL ELPH-MCNC: 1.6 G/DL — LOW (ref 3.3–5)
ALP SERPL-CCNC: 60 U/L — SIGNIFICANT CHANGE UP (ref 40–120)
ALT FLD-CCNC: 14 U/L — SIGNIFICANT CHANGE UP (ref 12–78)
ANION GAP SERPL CALC-SCNC: 7 MMOL/L — SIGNIFICANT CHANGE UP (ref 5–17)
AST SERPL-CCNC: 20 U/L — SIGNIFICANT CHANGE UP (ref 15–37)
BASOPHILS # BLD AUTO: 0.05 K/UL — SIGNIFICANT CHANGE UP (ref 0–0.2)
BASOPHILS NFR BLD AUTO: 0.6 % — SIGNIFICANT CHANGE UP (ref 0–2)
BILIRUB SERPL-MCNC: 0.3 MG/DL — SIGNIFICANT CHANGE UP (ref 0.2–1.2)
BUN SERPL-MCNC: 18 MG/DL — SIGNIFICANT CHANGE UP (ref 7–23)
CALCIUM SERPL-MCNC: 7.8 MG/DL — LOW (ref 8.5–10.1)
CHLORIDE SERPL-SCNC: 108 MMOL/L — SIGNIFICANT CHANGE UP (ref 96–108)
CO2 SERPL-SCNC: 27 MMOL/L — SIGNIFICANT CHANGE UP (ref 22–31)
CREAT SERPL-MCNC: 0.71 MG/DL — SIGNIFICANT CHANGE UP (ref 0.5–1.3)
EGFR: 79 ML/MIN/1.73M2 — SIGNIFICANT CHANGE UP
EGFR: 79 ML/MIN/1.73M2 — SIGNIFICANT CHANGE UP
EOSINOPHIL # BLD AUTO: 0.14 K/UL — SIGNIFICANT CHANGE UP (ref 0–0.5)
EOSINOPHIL NFR BLD AUTO: 1.6 % — SIGNIFICANT CHANGE UP (ref 0–6)
FERRITIN SERPL-MCNC: 158 NG/ML — SIGNIFICANT CHANGE UP (ref 13–330)
GLUCOSE SERPL-MCNC: 127 MG/DL — HIGH (ref 70–99)
HCT VFR BLD CALC: 25.7 % — LOW (ref 34.5–45)
HGB BLD-MCNC: 8.3 G/DL — LOW (ref 11.5–15.5)
IMM GRANULOCYTES NFR BLD AUTO: 7.3 % — HIGH (ref 0–0.9)
IRON SATN MFR SERPL: 13 UG/DL — LOW (ref 30–160)
IRON SATN MFR SERPL: 6 % — LOW (ref 14–50)
LEGIONELLA AG UR QL: NEGATIVE — SIGNIFICANT CHANGE UP
LYMPHOCYTES # BLD AUTO: 0.55 K/UL — LOW (ref 1–3.3)
LYMPHOCYTES # BLD AUTO: 6.4 % — LOW (ref 13–44)
MCHC RBC-ENTMCNC: 28.2 PG — SIGNIFICANT CHANGE UP (ref 27–34)
MCHC RBC-ENTMCNC: 32.3 G/DL — SIGNIFICANT CHANGE UP (ref 32–36)
MCV RBC AUTO: 87.4 FL — SIGNIFICANT CHANGE UP (ref 80–100)
MONOCYTES # BLD AUTO: 1.1 K/UL — HIGH (ref 0–0.9)
MONOCYTES NFR BLD AUTO: 12.9 % — SIGNIFICANT CHANGE UP (ref 2–14)
NEUTROPHILS # BLD AUTO: 6.07 K/UL — SIGNIFICANT CHANGE UP (ref 1.8–7.4)
NEUTROPHILS NFR BLD AUTO: 71.2 % — SIGNIFICANT CHANGE UP (ref 43–77)
NRBC BLD AUTO-RTO: 0 /100 WBCS — SIGNIFICANT CHANGE UP (ref 0–0)
PLATELET # BLD AUTO: 197 K/UL — SIGNIFICANT CHANGE UP (ref 150–400)
POTASSIUM SERPL-MCNC: 3.6 MMOL/L — SIGNIFICANT CHANGE UP (ref 3.5–5.3)
POTASSIUM SERPL-SCNC: 3.6 MMOL/L — SIGNIFICANT CHANGE UP (ref 3.5–5.3)
PROT SERPL-MCNC: 5.9 G/DL — LOW (ref 6–8.3)
RBC # BLD: 2.94 M/UL — LOW (ref 3.8–5.2)
RBC # FLD: 14.5 % — SIGNIFICANT CHANGE UP (ref 10.3–14.5)
S PNEUM AG UR QL: NEGATIVE — SIGNIFICANT CHANGE UP
SODIUM SERPL-SCNC: 142 MMOL/L — SIGNIFICANT CHANGE UP (ref 135–145)
TIBC SERPL-MCNC: 231 UG/DL — SIGNIFICANT CHANGE UP (ref 220–430)
UIBC SERPL-MCNC: 218 UG/DL — SIGNIFICANT CHANGE UP (ref 110–370)
WBC # BLD: 8.65 K/UL — SIGNIFICANT CHANGE UP (ref 3.8–10.5)
WBC # FLD AUTO: 8.65 K/UL — SIGNIFICANT CHANGE UP (ref 3.8–10.5)

## 2025-05-28 PROCEDURE — 99233 SBSQ HOSP IP/OBS HIGH 50: CPT

## 2025-05-28 PROCEDURE — 93306 TTE W/DOPPLER COMPLETE: CPT | Mod: 26

## 2025-05-28 RX ADMIN — Medication 100 MILLIGRAM(S): at 21:41

## 2025-05-28 RX ADMIN — Medication 88 MICROGRAM(S): at 06:09

## 2025-05-28 RX ADMIN — Medication 4 MILLILITER(S): at 19:06

## 2025-05-28 RX ADMIN — CARBAMAZEPINE 200 MILLIGRAM(S): 200 TABLET ORAL at 21:41

## 2025-05-28 RX ADMIN — AMLODIPINE BESYLATE 5 MILLIGRAM(S): 10 TABLET ORAL at 06:09

## 2025-05-28 RX ADMIN — INSULIN LISPRO 2: 100 INJECTION, SOLUTION INTRAVENOUS; SUBCUTANEOUS at 12:30

## 2025-05-28 RX ADMIN — CEFTRIAXONE 100 MILLIGRAM(S): 500 INJECTION, POWDER, FOR SOLUTION INTRAMUSCULAR; INTRAVENOUS at 21:40

## 2025-05-28 RX ADMIN — Medication 4 MILLILITER(S): at 07:26

## 2025-05-28 RX ADMIN — Medication 650 MILLIGRAM(S): at 18:10

## 2025-05-28 RX ADMIN — INSULIN LISPRO 1: 100 INJECTION, SOLUTION INTRAVENOUS; SUBCUTANEOUS at 17:15

## 2025-05-28 RX ADMIN — Medication 650 MILLIGRAM(S): at 06:59

## 2025-05-28 RX ADMIN — LOSARTAN POTASSIUM 50 MILLIGRAM(S): 100 TABLET, FILM COATED ORAL at 06:09

## 2025-05-28 RX ADMIN — IPRATROPIUM BROMIDE AND ALBUTEROL SULFATE 3 MILLILITER(S): .5; 2.5 SOLUTION RESPIRATORY (INHALATION) at 19:07

## 2025-05-28 RX ADMIN — Medication 100 MILLIGRAM(S): at 06:09

## 2025-05-28 RX ADMIN — IPRATROPIUM BROMIDE AND ALBUTEROL SULFATE 3 MILLILITER(S): .5; 2.5 SOLUTION RESPIRATORY (INHALATION) at 13:57

## 2025-05-28 RX ADMIN — LOSARTAN POTASSIUM 50 MILLIGRAM(S): 100 TABLET, FILM COATED ORAL at 17:15

## 2025-05-28 RX ADMIN — CARBAMAZEPINE 100 MILLIGRAM(S): 200 TABLET ORAL at 08:51

## 2025-05-28 RX ADMIN — Medication 100 MILLIGRAM(S): at 14:52

## 2025-05-28 RX ADMIN — IPRATROPIUM BROMIDE AND ALBUTEROL SULFATE 3 MILLILITER(S): .5; 2.5 SOLUTION RESPIRATORY (INHALATION) at 07:26

## 2025-05-28 RX ADMIN — IPRATROPIUM BROMIDE AND ALBUTEROL SULFATE 3 MILLILITER(S): .5; 2.5 SOLUTION RESPIRATORY (INHALATION) at 00:37

## 2025-05-28 RX ADMIN — ROSUVASTATIN CALCIUM 10 MILLIGRAM(S): 20 TABLET, FILM COATED ORAL at 21:41

## 2025-05-28 RX ADMIN — CARBAMAZEPINE 100 MILLIGRAM(S): 200 TABLET ORAL at 17:15

## 2025-05-28 RX ADMIN — LEVETIRACETAM 250 MILLIGRAM(S): 10 INJECTION, SOLUTION INTRAVENOUS at 21:41

## 2025-05-28 RX ADMIN — LEVETIRACETAM 250 MILLIGRAM(S): 10 INJECTION, SOLUTION INTRAVENOUS at 08:51

## 2025-05-28 RX ADMIN — Medication 650 MILLIGRAM(S): at 17:14

## 2025-05-28 RX ADMIN — Medication 650 MILLIGRAM(S): at 06:08

## 2025-05-28 NOTE — DIETITIAN INITIAL EVALUATION ADULT - NS FNS DIET ORDER
Diet, Consistent Carbohydrate w/Evening Snack:   DASH/TLC {Sodium & Cholesterol Restricted} (05-27-25 @ 00:17) [Active]

## 2025-05-28 NOTE — DIETITIAN INITIAL EVALUATION ADULT - PERTINENT MEDS FT
MEDICATIONS  (STANDING):  albuterol/ipratropium for Nebulization 3 milliLiter(s) Nebulizer every 6 hours  amLODIPine   Tablet 5 milliGRAM(s) Oral daily  azithromycin  IVPB 500 milliGRAM(s) IV Intermittent every 24 hours  benzonatate 100 milliGRAM(s) Oral every 8 hours  carBAMazepine ER Capsule 200 milliGRAM(s) Oral <User Schedule>  carBAMazepine ER Tablet 100 milliGRAM(s) Oral <User Schedule>  cefTRIAXone   IVPB 1000 milliGRAM(s) IV Intermittent every 24 hours  dextrose 5%. 1000 milliLiter(s) (100 mL/Hr) IV Continuous <Continuous>  dextrose 5%. 1000 milliLiter(s) (50 mL/Hr) IV Continuous <Continuous>  dextrose 50% Injectable 25 Gram(s) IV Push once  dextrose 50% Injectable 12.5 Gram(s) IV Push once  dextrose 50% Injectable 25 Gram(s) IV Push once  enoxaparin Injectable 40 milliGRAM(s) SubCutaneous every 24 hours  glucagon  Injectable 1 milliGRAM(s) IntraMuscular once  insulin lispro (ADMELOG) corrective regimen sliding scale   SubCutaneous three times a day before meals  insulin lispro (ADMELOG) corrective regimen sliding scale   SubCutaneous at bedtime  lactated ringers. 1000 milliLiter(s) (75 mL/Hr) IV Continuous <Continuous>  levETIRAcetam 250 milliGRAM(s) Oral <User Schedule>  levothyroxine 88 MICROGram(s) Oral daily  losartan 50 milliGRAM(s) Oral two times a day  rosuvastatin 10 milliGRAM(s) Oral at bedtime  sodium chloride 3%  Inhalation 4 milliLiter(s) Inhalation every 12 hours    MEDICATIONS  (PRN):  acetaminophen     Tablet .. 650 milliGRAM(s) Oral every 6 hours PRN Temp greater or equal to 38C (100.4F), Mild Pain (1 - 3)  dextrose Oral Gel 15 Gram(s) Oral once PRN Blood Glucose LESS THAN 70 milliGRAM(s)/deciliter

## 2025-05-28 NOTE — DIETITIAN INITIAL EVALUATION ADULT - PROBLEM SELECTOR PLAN 1
Likely 2/2 PNA  - S/P ofirmev 1g IV, ceftriaxone 1g IV, azithro 500mg IV, versed 2g IV, 2.1L NS bolus in the ED  - Does not meet sepsis criteria on admission: no leukocytosis, afebrile, although she is tachycardic   - Lactate 2.4, f/u repeat lactate s/p IVF bolus  - CXR increased interstitial markings and haziness in RLL on personal read  - Wean O2 as tolerated, currently satting 99% on 4L NC  - F/u BCx2 and UA/UC  - F/u urine strep and legionella  - Continue ceftriaxone and azithromycin for PNA coverage  - Continue IVF @ 75cc/hr x 12 hrs  - Tylenol prn for fever, albuterol prn for SOB/wheezing, tessalon perles for cough   - RVP negative  - Trend fever curve and WBC  - CT Chest ordered  - Infectious disease Dr. Jeter consulted, f/u recs

## 2025-05-28 NOTE — PROGRESS NOTE ADULT - ASSESSMENT
89 y/o F with PMHx seizures, hemorrhagic CVA in 2015, HTN, HLD, T2DM, hypothyroid presenting to the ED c/o worsening cough x 1 week with associated generalized weakness and decreased po intake. Admitted for AHRF 2/2 PNA.    AHRF secondary to PNA    89 y/o F with PMHx seizures, hemorrhagic CVA in 2015, HTN, HLD, T2DM, hypothyroid presenting to the ED c/o worsening cough x 1 week with associated generalized weakness and decreased po intake. Admitted for AHRF 2/2 PNA.    AHRF secondary to PNA     - CT Chest:  multifocal impaction with associated infectious/inflammatory process. Question areas of bronchiectasis and peripheral cysts. pulm consulted.     - cont mucolytics. on nebs ATC    - wean NC as tolerated     - BC NG 24h     - cont rocephin/azithromycin. ID following. urine legionella and S. pneumoniae     HTN    - cont norvasc 5mg daily     T2DM    - LDISS     h/o CVA     - on carbamazepine and keppra    hypothyroidism    - cont levothyroxine 88mcg daily    - TSH in AM      DVT proph: lovenox 40mg daily   spoke with son Jim over the phone  OOB to chair when ready    case discussed with ID

## 2025-05-28 NOTE — DIETITIAN INITIAL EVALUATION ADULT - OTHER INFO
Pt is a "89 y/o F with PMHx seizures, hemorrhagic CVA in 2015, HTN, HLD, T2DM, hypothyroid presenting to the ED c/o worsening cough x 1 week with associated generalized weakness and decreased po intake. Admitted for AHRF 2/2 PNA."    Visited pt at bedside this afternoon. Pt alert, forgetful at times during visit. Per chart review; pt not eating/drinking x 2 days PTA. Pt reports good intake of breakfast meal today. One po intake of % per nursing documentation. No food allergies. No chewing/swallowing difficulties. Denies N/V. +BM 5/27. CBW on admission 157#. Pt unaware of UBW, believes her wt has been stable. 1+ BL leg edema noted. Skin: ecchymotic. Hx of DM, A1c of 6.5%. On metformin at home. Pt currently on consistent carbohydrate, DASH/TLC diet. Discussed current diet order. Diet education not appropriate. Food preferences obtained to optimize po intake/tolerance. RD remains available and will continue to follow-up.

## 2025-05-28 NOTE — PHYSICAL THERAPY INITIAL EVALUATION ADULT - LEVEL OF INDEPENDENCE, REHAB EVAL
Pt here with with regular/timable contractions. Assesment obtained. Monitors on. SVE 3/100/0.    moderate assist (50% patients effort)

## 2025-05-28 NOTE — DIETITIAN INITIAL EVALUATION ADULT - PERTINENT LABORATORY DATA
05-28    142  |  108  |  18  ----------------------------<  127[H]  3.6   |  27  |  0.71    Ca    7.8[L]      28 May 2025 07:20    TPro  5.9[L]  /  Alb  1.6[L]  /  TBili  0.3  /  DBili  x   /  AST  20  /  ALT  14  /  AlkPhos  60  05-28  POCT Blood Glucose.: 213 mg/dL (05-28-25 @ 12:06)  A1C with Estimated Average Glucose Result: 6.5 % (05-27-25 @ 04:40)

## 2025-05-28 NOTE — DIETITIAN INITIAL EVALUATION ADULT - ADD RECOMMEND
1) Continue current diet as tolerated; assistance/encouragement at meal times as needed  2) Recommend MVI daily  3) Monitor po intake, diet tolerance, weight trends, labs, GI function, skin integrity

## 2025-05-28 NOTE — DIETITIAN INITIAL EVALUATION ADULT - PROBLEM SELECTOR PLAN 3
Suspect 2/2 dehydration and underlying infection  Presyncopal episode earlier today when ambulating to the bathroom  Check orthostatics  Obtain ECHO  Remote tele  PT eval

## 2025-05-28 NOTE — CASE MANAGEMENT PROGRESS NOTE - NSCMPROGRESSNOTE_GEN_ALL_CORE
Patient is identified as a CMS STAR patient; admitted for PNA. On IV zithromax/rocephin, 2LNC. Pending PT eval. Anticipated DC plan pending clinical course. CM will continue to follow.

## 2025-05-28 NOTE — SOCIAL WORK PROGRESS NOTE - NSSWPROGRESSNOTE_GEN_ALL_CORE
zayra met w pt/ son and dtr at bedside. Plan now remains for [facility]/ Pt and family in agreement w plan for [facility]. MADYSON requested. First choice is [facility] as pt has been there before. son [phone]. sw to send out madyson and supporting documentation to [facility] for review.

## 2025-05-28 NOTE — CONSULT NOTE ADULT - SUBJECTIVE AND OBJECTIVE BOX
Date/Time Patient Seen:  		  Referring MD:   Data Reviewed	       Patient is a 89y old  Female who presents with a chief complaint of AHRF 2/2 PNA (28 May 2025 13:13)      Subjective/HPI   History and Physical:   Source of Information	Patient       History of Present Illness:  Reason for Admission: AHRF 2/2 PNA  History of Present Illness:   89 y/o F with PMHx seizures, hemorrhagic CVA in 2015, HTN, HLD, T2DM, hypothyroid presenting to the ED c/o worsening cough x 1 week with associated generalized weakness and decreased po intake. Patients son Miguel present at the bedside providing collateral history. Son states he was giving her Mucinex DM with no relief. He states her breathing became worse today, more labored. He states the past 2 days she wasn't eating or drinking much so he was concerned she may be dehydrated. He states he was assisting to the bathroom today and she was very lightheaded and weak, she did not pass out but he was worried about a possible presyncopal episode. No LOC. Denies fevers, chills, cp, abdominal pain.    IN THE ED:  Temp   99.5F ,  ,  /86  ,RR 16 , SpO2   S/P ofirmev 1g IV, ceftriaxone 1g IV, azithro 500mg IV, versed 2g IV, 2.1L NS bolus  EKG: pending  Labs significant for: WBC 7.17, Hgb 10.2, Albumin 2.1, Lactate 2.4, RVP negative  Imaging:  CXR - pending official read       Review of Systems:  Review of Systems: REVIEW OF SYSTEMS:  Constitutional: denies fever, chills, diaphoresis  HEENT: denies sore throat, runny nose, blurry vision, double vision    Respiratory: +cough, +SOB, denies wheezing, hemoptysis  Cardiovascular: denies CP, palpitations, LE edema  Gastrointestinal:  denies nausea, vomiting, diarrhea, constipation, abdominal pain, melena, hematochezia   Genitourinary: denies dysuria, hematuria  Skin/Breast: denies rash, hives, itching   Musculoskeletal: denies myalgias, arthritis, joint swelling  Neurologic: denies LOC, headache, dizziness    Goals of Care:    GOALS OF CARE:  · Participants	Patient; Family  · Child(chi Rivera - son and HCP     Conversation Discussion:  · Conversation	MOLST Discussed  · Conversation Details	GOC discussion had with patient and patients son/HCP Miguel E[facility]ley. As per pt and son -- she is a DNR/DNI. However, son would like to wait until tomorrow to bring in a completed MOLST from home. They do not wish to sign new MOLST at this time. Day team to follow up.      Allergies and Intolerances:        Allergies:  	No Known Allergies:        Intolerances:  	aspirin: Drug, Unknown    Home Medications:   * Outpatient Medication Status not yet specified    Patient History:    Past Medical, Past Surgical, and Family History:  PAST MEDICAL HISTORY:  History of CVA (cerebrovascular accident)     HLD (hyperlipidemia)     HTN (hypertension)     Hypothyroid     Seizures     T2DM (type 2 diabetes mellitus).     Social History:  · Substance use	No  · Social History (marital status, living situation, occupation, and sexual history)	Lives: with son  ADLs: ambulates with a walker  Alcohol Use: denies   Tobacco Use: denies  Recreational Drug Use: denies     Tobacco Screening:  · Core Measure Site	Yes  · Has the patient used tobacco in the past 30 days?	No    Risk Assessment:    Present on Admission:  Deep Venous Thrombosis	no  Pulmonary Embolus	no     HIV Screening:  · In accordance with [place] State law, we offer every patient who comes to our ED an HIV test. Would you like to be tested today?	Opt out     Hepatitis C Test Questions:  · In accordance with [place] State Law, we offer every patient a Hepatitis C test. Would you like to be tested today?	Opt out    PAST MEDICAL & SURGICAL HISTORY:  Seizures    History of CVA (cerebrovascular accident)    HTN (hypertension)    HLD (hyperlipidemia)    Hypothyroid    T2DM (type 2 diabetes mellitus)          Medication list         MEDICATIONS  (STANDING):  albuterol/ipratropium for Nebulization 3 milliLiter(s) Nebulizer every 6 hours  amLODIPine   Tablet 5 milliGRAM(s) Oral daily  azithromycin  IVPB 500 milliGRAM(s) IV Intermittent every 24 hours  benzonatate 100 milliGRAM(s) Oral every 8 hours  carBAMazepine ER Capsule 200 milliGRAM(s) Oral <User Schedule>  carBAMazepine ER Tablet 100 milliGRAM(s) Oral <User Schedule>  cefTRIAXone   IVPB 1000 milliGRAM(s) IV Intermittent every 24 hours  dextrose 5%. 1000 milliLiter(s) (100 mL/Hr) IV Continuous <Continuous>  dextrose 5%. 1000 milliLiter(s) (50 mL/Hr) IV Continuous <Continuous>  dextrose 50% Injectable 25 Gram(s) IV Push once  dextrose 50% Injectable 12.5 Gram(s) IV Push once  dextrose 50% Injectable 25 Gram(s) IV Push once  enoxaparin Injectable 40 milliGRAM(s) SubCutaneous every 24 hours  glucagon  Injectable 1 milliGRAM(s) IntraMuscular once  insulin lispro (ADMELOG) corrective regimen sliding scale   SubCutaneous three times a day before meals  insulin lispro (ADMELOG) corrective regimen sliding scale   SubCutaneous at bedtime  lactated ringers. 1000 milliLiter(s) (75 mL/Hr) IV Continuous <Continuous>  levETIRAcetam 250 milliGRAM(s) Oral <User Schedule>  levothyroxine 88 MICROGram(s) Oral daily  losartan 50 milliGRAM(s) Oral two times a day  rosuvastatin 10 milliGRAM(s) Oral at bedtime  sodium chloride 3%  Inhalation 4 milliLiter(s) Inhalation every 12 hours    MEDICATIONS  (PRN):  acetaminophen     Tablet .. 650 milliGRAM(s) Oral every 6 hours PRN Temp greater or equal to 38C (100.4F), Mild Pain (1 - 3)  dextrose Oral Gel 15 Gram(s) Oral once PRN Blood Glucose LESS THAN 70 milliGRAM(s)/deciliter         Vitals log        ICU Vital Signs Last 24 Hrs  T(C): 38.3 (28 May 2025 05:30), Max: 38.3 (28 May 2025 05:30)  T(F): 101 (28 May 2025 05:30), Max: 101 (28 May 2025 05:30)  HR: 95 (28 May 2025 07:26) (94 - 102)  BP: 164/74 (28 May 2025 05:30) (138/74 - 164/74)  BP(mean): --  ABP: --  ABP(mean): --  RR: 18 (28 May 2025 05:30) (18 - 18)  SpO2: 98% (28 May 2025 07:26) (91% - 98%)    O2 Parameters below as of 28 May 2025 07:26  Patient On (Oxygen Delivery Method): nasal cannula, 2L                 Input and Output:  I&O's Detail    27 May 2025 07:01  -  28 May 2025 07:00  --------------------------------------------------------  IN:    Oral Fluid: 360 mL  Total IN: 360 mL    OUT:  Total OUT: 0 mL    Total NET: 360 mL          Lab Data                        8.3    8.65  )-----------( 197      ( 28 May 2025 07:20 )             25.7     05-28    142  |  108  |  18  ----------------------------<  127[H]  3.6   |  27  |  0.71    Ca    7.8[L]      28 May 2025 07:20    TPro  5.9[L]  /  Alb  1.6[L]  /  TBili  0.3  /  DBili  x   /  AST  20  /  ALT  14  /  AlkPhos  60  05-28      CARDIAC MARKERS ( 27 May 2025 18:00 )  x     / x     / x     / x     / 2.5 ng/mL        Review of Systems	  cough  weakness  sob  colmenares  alert      Objective     Physical Examination      obese  head nc  head at  abd soft  cn grossly int  verbal    Pertinent Lab findings & Imaging      Enrrique:  NO   Adequate UO     I&O's Detail    27 May 2025 07:01  -  28 May 2025 07:00  --------------------------------------------------------  IN:    Oral Fluid: 360 mL  Total IN: 360 mL    OUT:  Total OUT: 0 mL    Total NET: 360 mL               Discussed with:     Cultures:	        Radiology    ACC: [id number] EXAM:  CT CHEST   ORDERED BY: NORMA GOINS     PROCEDURE DATE:  05/27/2025          INTERPRETATION:  EXAMINATION: CT CHEST    CLINICAL INDICATION: Dyspnea.    TECHNIQUE: Noncontrast CT of the chest was obtained.    COMPARISON: None.    FINDINGS:    AIRWAYS AND LUNGS: Scattered bronchial secretions.  Motion limited exam   and lungs. Clustered and patchy bilateral lung opacities was prominent in   the right apex. Question areas of bronchiectasis and peripheral cysts.    MEDIASTINUM AND PLEURA: Mildly enlarged mediastinal lymph nodes. The   visualized portion of the thyroid gland is unremarkable. There is no   pleural effusion. There is no pneumothorax.    HEART AND VESSELS: There is cardiomegaly.  There are atherosclerotic   calcifications of the aorta and coronary arteries.  There is no   pericardial effusion.    UPPER ABDOMEN: Images of the upper abdomen demonstrate hiatal hernia.    BONES AND SOFT TISSUES: Degenerative changes of the shoulder and spine.    The soft tissues are unremarkable.    IMPRESSION:  Findings represent multifocal impaction with associated   infectious/inflammatory process. Question areas of bronchiectasis and   peripheral cysts. Short-term follow-up chest CT recommended    --- End of Report ---            LARS RUTHERFORD MD; Attending Radiologist  This document has been electronically signed. May 27 2025  6:32AM                          
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 presented with generalized weakness nonproductive cough questionable syncope admitted with  Acute hypoxic respiratory failure  Multifocal  community-acquired pneumonia/questionable aspiration  Reactive airway disease  Lactic acidosis: Resolved    Patient Seen and examined   WBC improved  Afebrile  Hemodynamically stable  Trend WBC and temperature curve through hospital course    Cultures:  Blood culture: 2 sets: May 26: Pending  Procalcitonin: Ordered  Urine for Streptococcus  antigen: Ordered, pending   urine for Legionella  antigen: Ordered  RVP: Negative on admission    Imaging:    CT Chest No Cont (05.27.25 @ 01:48) Findings represent multifocal impaction with associated   infectious/inflammatory process.    Antibiotics:   Can continue with Rocephin 1 g IV daily, azithromycin 500 mg IV once daily up until urine for Legionella antigen test as performed and resulted     recommend chest PT and mucolytic to facilitate secretion mobilization  Incentive spirometry   management of reactive airway disease as per primary team    Daily CBC, CMP, Mg, PO4 while on Antibiotics     Records, reports from primary team, nursing, consultant reviewed  Plan explained to patient   Case discussed with Primary team   _________________________________________________-  Patient is a 89y old  Female who presents with a chief complaint of AHRF 2/2 PNA (26 May 2025 23:21)    HPI:  89 y/o F with PMHx seizures, hemorrhagic CVA in 2015, HTN, HLD, T2DM, hypothyroid presenting to the ED c/o worsening cough x 1 week with associated generalized weakness and decreased po intake. Patients son Miguel present at the bedside providing collateral history. Son states he was giving her Mucinex DM with no relief. He states her breathing became worse today, more labored. He states the past 2 days she wasn't eating or drinking much so he was concerned she may be dehydrated. He states he was assisting to the bathroom today and she was very lightheaded and weak, she did not pass out but he was worried about a possible presyncopal episode. No LOC. Denies fevers, chills, cp, abdominal pain.    IN THE ED:  Temp   99.5F ,  ,  /86  ,RR 16 , SpO2   S/P ofirmev 1g IV, ceftriaxone 1g IV, azithro 500mg IV, versed 2g IV, 2.1L NS bolus  EKG: pending  Labs significant for: WBC 7.17, Hgb 10.2, Albumin 2.1, Lactate 2.4, RVP negative  Imaging:  CXR - pending official read (26 May 2025 23:21)     infectious disease consulted for evaluation management of pneumonia.  Besides nonproductive cough patient denies any other acute complaints for now.  Most of the story obtained as above from chart review.  Patient denies any fever but has had chills, denies nausea vomiting diarrhea abdominal pain dysuria frequency rash recent travel or sick contacts.    PAST MEDICAL & SURGICAL HISTORY:  Seizures  History of CVA (cerebrovascular accident)  HTN (hypertension)  HLD (hyperlipidemia)  Hypothyroid  T2DM (type 2 diabetes mellitus)    SOCIAL HISTORY: denied tobacco alcohol or recreational substance abuse.  Ambulates with walker.  Lives with her son    FAMILY HISTORY: negative pertinent to presenting illness      Recent Ill Contacts:	 Denied  Recent Travel History:	 denied  Recent Animal/Insect Exposure/Tick Bites: denied    REVIEW OF SYSTEMS  11 systems reviewed, no other symptoms except as above      Allergies  No Known Allergies  Intolerances  aspirin (Unknown)      Medications:  azithromycin  IVPB 500 milliGRAM(s) IV Intermittent every 24 hours  cefTRIAXone   IVPB 1000 milliGRAM(s) IV Intermittent every 24 hours  acetaminophen     Tablet .. 650 milliGRAM(s) Oral every 6 hours PRN  albuterol/ipratropium for Nebulization 3 milliLiter(s) Nebulizer every 6 hours PRN  amLODIPine   Tablet 5 milliGRAM(s) Oral daily  benzonatate 100 milliGRAM(s) Oral every 8 hours  carBAMazepine ER Capsule 200 milliGRAM(s) Oral <User Schedule>  carBAMazepine ER Tablet 100 milliGRAM(s) Oral <User Schedule>  dextrose 5%. 1000 milliLiter(s) IV Continuous <Continuous>  dextrose 5%. 1000 milliLiter(s) IV Continuous <Continuous>  dextrose 50% Injectable 25 Gram(s) IV Push once  dextrose 50% Injectable 12.5 Gram(s) IV Push once  dextrose 50% Injectable 25 Gram(s) IV Push once  dextrose Oral Gel 15 Gram(s) Oral once PRN  enoxaparin Injectable 40 milliGRAM(s) SubCutaneous every 24 hours  glucagon  Injectable 1 milliGRAM(s) IntraMuscular once  insulin lispro (ADMELOG) corrective regimen sliding scale   SubCutaneous three times a day before meals  insulin lispro (ADMELOG) corrective regimen sliding scale   SubCutaneous at bedtime  lactated ringers. 1000 milliLiter(s) IV Continuous <Continuous>  levETIRAcetam 250 milliGRAM(s) Oral <User Schedule>  levothyroxine 88 MICROGram(s) Oral daily  losartan 50 milliGRAM(s) Oral two times a day  rosuvastatin 10 milliGRAM(s) Oral at bedtime        ____________________________________________  Physical Examination:    Vital Signs Last 24 Hrs  T(C): 36.8 (27 May 2025 08:03), Max: 37.5 (26 May 2025 20:53)  T(F): 98.3 (27 May 2025 08:03), Max: 99.5 (26 May 2025 20:53)  HR: 70 (27 May 2025 08:03) (70 - 124)  BP: 126/76 (27 May 2025 08:03) (126/76 - 142/86)  RR: 18 (27 May 2025 08:03) (16 - 18)  SpO2: 100% (27 May 2025 08:03) (98% - 100%)    O2 Parameters below as of 27 May 2025 08:03  Patient On (Oxygen Delivery Method): nasal cannula  O2 Flow (L/min): 2    General: No acute distress while lying in bed , NC+  Neuro: AAO*3-4, No obvious acute motor deficit  HEENT: Pupils equal, reactive to light, Oral mucosa moist  PULM:Air entry equal bilaterally, bilateral expiratory wheezing with occasional rhonchi more on the right compared to left  CVS: Regular rhythm and controlled rate  ABD: Soft, nondistended, nontender, normoactive bowel sounds, no CVA tenderness  EXT: No b/l LE edema, nontender with pedal pulse palpable   SKIN: Warm and well perfused, no acute rashes   NO obvious palpable lymphadenopathy     _______________________________________________________    Lab Results:                        8.5    6.09  )-----------( 184      ( 27 May 2025 04:40 )             26.2     05-27    141  |  108  |  24[H]  ----------------------------<  175[H]  3.9   |  26  |  0.91    Ca    7.4[L]      27 May 2025 04:40    TPro  6.1  /  Alb  1.8[L]  /  TBili  0.2  /  DBili  x   /  AST  21  /  ALT  15  /  AlkPhos  57  05-27    LIVER FUNCTIONS - ( 27 May 2025 04:40 )  Alb: 1.8 g/dL / Pro: 6.1 g/dL / ALK PHOS: 57 U/L / ALT: 15 U/L / AST: 21 U/L / GGT: x           PT/INR - ( 27 May 2025 00:26 )   PT: 14.3 sec;   INR: 1.21 ratio         PTT - ( 27 May 2025 00:26 )  PTT:24.7 sec  Urinalysis Basic - ( 27 May 2025 04:40 )    Color: x / Appearance: x / SG: x / pH: x  Gluc: 175 mg/dL / Ketone: x  / Bili: x / Urobili: x   Blood: x / Protein: x / Nitrite: x   Leuk Esterase: x / RBC: x / WBC x   Sq Epi: x / Non Sq Epi: x / Bacteria: x        MICROBIOLOGY/PATHOLOGY/ RADIOLOGY: Reviewed

## 2025-05-28 NOTE — PHYSICAL THERAPY INITIAL EVALUATION ADULT - ADDITIONAL COMMENTS
Patient reports that she lives in a private house with son, 2 steps between kitchen and den, ambulates with RW at baseline, has HHA 1x/week.

## 2025-05-28 NOTE — DIETITIAN INITIAL EVALUATION ADULT - ORAL INTAKE PTA/DIET HISTORY
Pt lives at home with son. Pt states that she consumes takeout often. Eats when she is hungry. Denies changes to appetite/intake lately. Does not consume oral nutrition supplements.

## 2025-05-28 NOTE — DIETITIAN INITIAL EVALUATION ADULT - PROBLEM SELECTOR PROBLEM 5
"[place] ambulatory encounter  FAMILY PRACTICE OFFICE VISIT    SUBJECTIVE:     Gloria Alicea presents for repeat evaluation of diabetes. Present treatment includes lifestyle modifications, basal insulin and short acting insulin. She followed with diabetic education last year. She is currently compliant with her treatment plan, her daughter and  are on keto diet, and they are giving her a modified keto diet. There are no problems with the medication or any associated side effects. Blood sugar monitoring at home is before every meal and at bedtime. The patient brought in all blood sugar readings to follow up for review. She has not had any hypoglycemia. Associated symptoms include:  sensory polyneuropathy and paresthesias, intermittent dizziness (without hypoglycemia). Review of systems:   Constitutional: +intermittent dizziness. Negative for fever and chills. HEENT: +worsening hearing-despite hearing aides. Negative for sore throat, cerumen impaction, runny nose. Respiratory: Negative for cough or shortness of breath. Cardiovascular: Negative for chest pain or chest pressure. Gastrointestinal: Negative for nausea, vomiting, diarrhea or abdominal pain. Genitourinary: +some stress incontinence and urgency. Negative for dysuria or frequency. Extremities:+anterior left thigh pain-chronic. OBJECTIVE:   PAST HISTORIES:   I have reviewed the past medical history, family history, social history, medications and allergies listed in the medical record as obtained by my nursing staff and support staff and agree with their documentation. PHYSICAL EXAM:   Visit Vitals  /56   Pulse 54   Temp 98.4 Â°F (36.9 Â°C) (Temporal Artery)   Resp 24   Ht 5' 1"" (1.549 m)   Wt 74.2 kg   SpO2 94%   BMI 30.91 kg/mÂ²     Pulse Ox Interpretation:  Within normal limits. General:   Alert, cooperative, conversive in no acute distress. Skin:  Warm and dry without rash. Head:  Normocephalic, atraumatic.    Neck:  " Trachea is midline. Eyes: Normal conjunctivae and sclerae. ENT:  Mucous membranes are moist.  Cardiovascular:  Regular rate and rhythm, no crackles, rubs, clicks, or murmurs. No peripheral edema. Respiratory:  Normal respiratory effort. Normal breath sounds bilaterally  Neurologic:  Oriented x4. No focal deficits. LAB RESULTS:    Glucose Log: Am (from 9am-11am) ; before dinner ; bedtime . Sodium (mmol/L)   Date Value   10/10/2018 140     Potassium (mmol/L)   Date Value   10/10/2018 5.0     Chloride (mmol/L)   Date Value   10/10/2018 105     Carbon Dioxide (mmol/L)   Date Value   10/10/2018 29     BUN (mg/dL)   Date Value   10/10/2018 27 (H)     Creatinine (mg/dL)   Date Value   10/10/2018 0.85     Glucose (mg/dL)   Date Value   10/10/2018 131 (H)     MICROALBUMIN,UA (TTL) (MG/L)   Date Value   07/31/2001 2.0     MICROALBUMIN, UA (TTL) (mg/dL)   Date Value   10/10/2018 0.80     CREATININE, URINE (TOTAL) (mg/dL)   Date Value   10/10/2018 25.20     MICROALBUMIN/CREAT (MG/G CREATININE)   Date Value   07/31/2001 3.8     MICROALBUMIN/CREATININE (mg/g)   Date Value   10/10/2018 31.7 (H)     CHOLESTEROL (mg/dL)   Date Value   03/27/2018 139     HDL (mg/dL)   Date Value   03/27/2018 67     CALCULATED LDL (mg/dL)   Date Value   03/27/2018 57     TRIGLYCERIDE (mg/dL)   Date Value   03/27/2018 77       SUGAR MANAGEMENT:    Hemoglobin A1C (%)   Date Value   01/15/2019 7.3 (H)   10/10/2018 8.2 (H)     Overall course of diabetes has improved, there have been dietary changes in how the household is eating with less carbs. Goal of treatment is HbA1c <7.0  Close to goal. No changes at this time. Continue with current lifestyle modifications. Ordered HbA1c to be drawn, 6 months. Reviewed with the patient things she can do to improve her blood sugar. It is necessary for the patient to continue monitoring her blood sugar before every meal and at bedtime.   The patient will bring in all blood sugar readings to follow up for review. Reviewed with her lifestyle modifications to reduce her risk for complications from diabetes and arteriosclerotic cardiovascular disease. Discussed the role of diet, specifically limiting carbohydrate intake. She should maintain a healthy weight and engage in 150 minutes of exercise/activity week. Additionally, she should avoid tobacco, alcohol, caffeine, and stress. CHOLESTEROL MANAGEMENT:    CALCULATED LDL (mg/dL)   Date Value   03/27/2018 57     Overall course of cholesterol management is unchanged and controlled. Evidence supports the need for a moderate intensity statin. The patient is presently on statin therapy and will continue as prescribed. BLOOD PRESSURE MANAGEMENT:    BP Readings from Last 3 Encounters:   01/17/19 112/56   10/17/18 140/62   04/03/18 136/42     Blood pressure goal in this diabetic is <140/90. Discussed slowly increasing blood pressure, will continue to monitor. The patient is presently on ARB therapy and will continue as prescribed. Blood pressure slightly low side today, without dizziness. Per patient is experiencing intermittent dizziness, may need to consider decreasing losartan. TOBACCO STATUS:    History   Smoking Status   â¢ Former Smoker   â¢ Quit date: 1960   Smokeless Tobacco   â¢ Never Used         WEIGHT MANAGEMENT:    Body mass index is 30.91 kg/mÂ². INTERVAL DIABETIC SCREENINGS:    MICROALBUMIN,UA (TTL) (MG/L)   Date Value   07/31/2001 2.0   07/28/2000 4.0     MICROALBUMIN, UA (TTL) (mg/dL)   Date Value   10/10/2018 0.80   03/27/2018 3.23     Urine microalbumin done within the past year and is up to date. Due in the fall.     Diabetic foot exam:  Done 10/2018, next in the fall  Diabetic eye exam: 10/23/2019    HEALTH MAINTENANCE:    Health Maintenance Due   Topic Date Due   â¢ DTaP/Tdap/Td Vaccine (1 - Tdap) [birth year]/1950   â¢ Shingles Vaccine (1 of 2) 02/03/1981   â¢ Medicare Wellness 65+  01/04/2019   â¢ Depression Screening 01/04/2019     Patient is up to date, no discussion needed. ASSESSMENT/PLAN:  1. Type 2 diabetes mellitus with both eyes affected by mild nonproliferative retinopathy without macular edema, with long-term current use of insulin (CMS/[facility])  2. Type 2 diabetes mellitus with diabetic mononeuropathy, with long-term current use of insulin (CMS/[facility])  - insulin lispro (HUMALOG KWIKPEN) 100 UNIT/ML pen-injector; INJECT UNDER THE SKIN THREE TIMES DAILY AS DIRECTED ON DOSING CHART. MAX OF 40 UNITS PER DAY  Dispense: 36 mL; Refill: 3  - Insulin Pen Needle (B-D U/F PEN NEEDLE) 31G X 5 MM Misc; Use with Humalog as directed. Dispense: 300 each; Refill: 0  - GLYCOHEMOGLOBIN; Future  - LIPID PANEL WITH REFLEX; Future  - COMPREHENSIVE METABOLIC PANEL; Future      3. Pure hypercholesterolemia-controlled on medication, will recheck with next labs. Medication refilled. - simvastatin (ZOCOR) 40 MG tablet; Take 1 tablet by mouth nightly. Dispense: 90 tablet; Refill: 3    Instructions provided as documented in the after visit summary. The patient and daughter indicated understanding of the diagnosis and agreed with the plan of care.        Jeanine Bolaños MD  Board Certified Family Physician  [facility], [address]  Office:  [phone] T2DM (type 2 diabetes mellitus)

## 2025-05-28 NOTE — CONSULT NOTE ADULT - ASSESSMENT
89 y/o F with PMHx seizures, hemorrhagic CVA in 2015, HTN, HLD, T2DM, hypothyroid presenting to the ED c/o worsening cough x 1 week with associated generalized weakness and decreased po intake.     pna  hld  htn  op  oa  thyroid disease  seizure disorder  bronchiectasis  DM  CVA in 2015  Anemia    cvs rx regimen  BP control  proBNP noted  TTE -   HD monitoring  I nisha  NEBS and Mucolytics for mucoid impaction and expectoration assist  fio2 support as needed - goal sat > 88 pct  oral hygiene - skin care  emp ABX - cx - Biomarkers   chest imaging - c/w poss LRTI - PNA - Bronchiectasis  DVT p  AED - Seizure precs  Nutritional assessment

## 2025-05-29 LAB
ALBUMIN SERPL ELPH-MCNC: 1.8 G/DL — LOW (ref 3.3–5)
ALP SERPL-CCNC: 70 U/L — SIGNIFICANT CHANGE UP (ref 40–120)
ALT FLD-CCNC: 20 U/L — SIGNIFICANT CHANGE UP (ref 12–78)
ANION GAP SERPL CALC-SCNC: 6 MMOL/L — SIGNIFICANT CHANGE UP (ref 5–17)
AST SERPL-CCNC: 22 U/L — SIGNIFICANT CHANGE UP (ref 15–37)
BILIRUB SERPL-MCNC: 0.2 MG/DL — SIGNIFICANT CHANGE UP (ref 0.2–1.2)
BUN SERPL-MCNC: 19 MG/DL — SIGNIFICANT CHANGE UP (ref 7–23)
CALCIUM SERPL-MCNC: 8.3 MG/DL — LOW (ref 8.5–10.1)
CHLORIDE SERPL-SCNC: 108 MMOL/L — SIGNIFICANT CHANGE UP (ref 96–108)
CO2 SERPL-SCNC: 28 MMOL/L — SIGNIFICANT CHANGE UP (ref 22–31)
CREAT SERPL-MCNC: 0.72 MG/DL — SIGNIFICANT CHANGE UP (ref 0.5–1.3)
EGFR: 77 ML/MIN/1.73M2 — SIGNIFICANT CHANGE UP
EGFR: 77 ML/MIN/1.73M2 — SIGNIFICANT CHANGE UP
GLUCOSE SERPL-MCNC: 142 MG/DL — HIGH (ref 70–99)
HCT VFR BLD CALC: 27.7 % — LOW (ref 34.5–45)
HGB BLD-MCNC: 9.1 G/DL — LOW (ref 11.5–15.5)
MAGNESIUM SERPL-MCNC: 2.1 MG/DL — SIGNIFICANT CHANGE UP (ref 1.6–2.6)
MCHC RBC-ENTMCNC: 28.9 PG — SIGNIFICANT CHANGE UP (ref 27–34)
MCHC RBC-ENTMCNC: 32.9 G/DL — SIGNIFICANT CHANGE UP (ref 32–36)
MCV RBC AUTO: 87.9 FL — SIGNIFICANT CHANGE UP (ref 80–100)
NRBC BLD AUTO-RTO: 0 /100 WBCS — SIGNIFICANT CHANGE UP (ref 0–0)
PLATELET # BLD AUTO: 236 K/UL — SIGNIFICANT CHANGE UP (ref 150–400)
POTASSIUM SERPL-MCNC: 3.6 MMOL/L — SIGNIFICANT CHANGE UP (ref 3.5–5.3)
POTASSIUM SERPL-SCNC: 3.6 MMOL/L — SIGNIFICANT CHANGE UP (ref 3.5–5.3)
PROCALCITONIN SERPL-MCNC: 0.72 NG/ML — HIGH
PROT SERPL-MCNC: 6.4 G/DL — SIGNIFICANT CHANGE UP (ref 6–8.3)
RBC # BLD: 3.15 M/UL — LOW (ref 3.8–5.2)
RBC # FLD: 14.7 % — HIGH (ref 10.3–14.5)
SODIUM SERPL-SCNC: 142 MMOL/L — SIGNIFICANT CHANGE UP (ref 135–145)
TSH SERPL-MCNC: 1.16 UIU/ML — SIGNIFICANT CHANGE UP (ref 0.36–3.74)
WBC # BLD: 9.89 K/UL — SIGNIFICANT CHANGE UP (ref 3.8–10.5)
WBC # FLD AUTO: 9.89 K/UL — SIGNIFICANT CHANGE UP (ref 3.8–10.5)

## 2025-05-29 PROCEDURE — 99233 SBSQ HOSP IP/OBS HIGH 50: CPT

## 2025-05-29 RX ADMIN — Medication 4 MILLILITER(S): at 20:18

## 2025-05-29 RX ADMIN — Medication 100 MILLIGRAM(S): at 13:51

## 2025-05-29 RX ADMIN — CARBAMAZEPINE 200 MILLIGRAM(S): 200 TABLET ORAL at 22:23

## 2025-05-29 RX ADMIN — IPRATROPIUM BROMIDE AND ALBUTEROL SULFATE 3 MILLILITER(S): .5; 2.5 SOLUTION RESPIRATORY (INHALATION) at 13:33

## 2025-05-29 RX ADMIN — Medication 100 MILLIGRAM(S): at 21:58

## 2025-05-29 RX ADMIN — LOSARTAN POTASSIUM 50 MILLIGRAM(S): 100 TABLET, FILM COATED ORAL at 06:27

## 2025-05-29 RX ADMIN — IPRATROPIUM BROMIDE AND ALBUTEROL SULFATE 3 MILLILITER(S): .5; 2.5 SOLUTION RESPIRATORY (INHALATION) at 07:21

## 2025-05-29 RX ADMIN — Medication 4 MILLILITER(S): at 07:21

## 2025-05-29 RX ADMIN — INSULIN LISPRO 1: 100 INJECTION, SOLUTION INTRAVENOUS; SUBCUTANEOUS at 11:56

## 2025-05-29 RX ADMIN — CARBAMAZEPINE 100 MILLIGRAM(S): 200 TABLET ORAL at 18:15

## 2025-05-29 RX ADMIN — LEVETIRACETAM 250 MILLIGRAM(S): 10 INJECTION, SOLUTION INTRAVENOUS at 22:23

## 2025-05-29 RX ADMIN — Medication 100 MILLIGRAM(S): at 06:27

## 2025-05-29 RX ADMIN — LOSARTAN POTASSIUM 50 MILLIGRAM(S): 100 TABLET, FILM COATED ORAL at 18:18

## 2025-05-29 RX ADMIN — INSULIN LISPRO 1: 100 INJECTION, SOLUTION INTRAVENOUS; SUBCUTANEOUS at 08:11

## 2025-05-29 RX ADMIN — CARBAMAZEPINE 100 MILLIGRAM(S): 200 TABLET ORAL at 08:10

## 2025-05-29 RX ADMIN — IPRATROPIUM BROMIDE AND ALBUTEROL SULFATE 3 MILLILITER(S): .5; 2.5 SOLUTION RESPIRATORY (INHALATION) at 20:18

## 2025-05-29 RX ADMIN — Medication 88 MICROGRAM(S): at 06:27

## 2025-05-29 RX ADMIN — LEVETIRACETAM 250 MILLIGRAM(S): 10 INJECTION, SOLUTION INTRAVENOUS at 08:10

## 2025-05-29 RX ADMIN — AMLODIPINE BESYLATE 5 MILLIGRAM(S): 10 TABLET ORAL at 06:27

## 2025-05-29 RX ADMIN — ROSUVASTATIN CALCIUM 10 MILLIGRAM(S): 20 TABLET, FILM COATED ORAL at 21:58

## 2025-05-29 RX ADMIN — CEFTRIAXONE 100 MILLIGRAM(S): 500 INJECTION, POWDER, FOR SOLUTION INTRAMUSCULAR; INTRAVENOUS at 21:58

## 2025-05-29 NOTE — PROGRESS NOTE ADULT - ASSESSMENT
89 y/o F with PMHx seizures, hemorrhagic CVA in 2015, HTN, HLD, T2DM, hypothyroid presenting to the ED c/o worsening cough x 1 week with associated generalized weakness and decreased po intake. Admitted for AHRF 2/2 PNA.    AHRF secondary to PNA     - CT Chest:  multifocal impaction with associated infectious/inflammatory process. Question areas of bronchiectasis and peripheral cysts. pulm following.     - cont mucolytics. on nebs ATC. Added chest PT     - wean NC as tolerated     - BC NG 48h. procal downtrending.     - cont rocephin. stopped azithromycin. ID following. urine legionella and S. pneumoniae neg     HTN    - cont norvasc 5mg daily     - ECHO LVEF 60% Moderate tricuspid regurgitation. PASP 41 mmHg    T2DM    - LDISS     h/o CVA/seizure disorder     - on carbamazepine and keppra    hypothyroidism    - cont levothyroxine 88mcg daily    - TSH 1.16    DVT proph: lovenox 40mg daily   OOB to chair when ready  discharge to [facility] when medically stable     case discussed with ID. will order SLP alissa.

## 2025-05-29 NOTE — SWALLOW BEDSIDE ASSESSMENT ADULT - SWALLOW EVAL: DIAGNOSIS
With PO trials administered during this evaluation, no overt signs/symptoms of oropharyngeal dysphagia or aspiration observed. Given pt's advanced age, however, pt is judged to be at an elevated risk for aspiration given potential physiological changes in oropharyngeal swallow mechanism with respect to age. Offered pt MBS to objectively assess swallow and implement strategies to optimize safety, if needed, given concerns for potential aspiration-related complication on chest imaging per attending MD and ID. Pt declined completion at this time. Rx continuation of current diet with strict adherence to aspiration precautions. This service to s/o. Re-consult if pt is agreeable to completion of MBS.

## 2025-05-29 NOTE — SWALLOW BEDSIDE ASSESSMENT ADULT - COMMENTS
Imaging:  CT Chest 5/27/25: "Findings represent multifocal impaction with associated   infectious/inflammatory process. Question areas of bronchiectasis and   peripheral cysts. Short-term follow-up chest CT recommended."  XR Chest 5/27/25: "Heart magnified by technique. There is slight hyperexpansion of the lungs. There is some scattered increased markings."    Clinical swallow evaluation order received and appreciated. Chart reviewed, events noted. Pt received upright in chair consuming lunch tray, awake and alert. Oriented x 3, forgetful at times. 2L/min O2 via NC. Vocal quality clear and dry. No complaints of pain. Clinical swallow evaluation completed. See report for details. Pt left as received in NAD. ERINN Ayala & Dr. Solorio notified. This service to s/o. Please re-consult, as appropriate.

## 2025-05-29 NOTE — PROGRESS NOTE ADULT - ASSESSMENT
89 y/o F with PMHx seizures, hemorrhagic CVA in 2015, HTN, HLD, T2DM, hypothyroid presenting to the ED c/o worsening cough x 1 week with associated generalized weakness and decreased po intake.     pna  hld  htn  op  oa  thyroid disease  seizure disorder  bronchiectasis  DM  CVA in 2015  Anemia    fever noted  vs noted  on ABX  meds reviewed  SW f/u noted - plan for [facility]    cvs rx regimen  BP control  proBNP noted  TTE -   HD monitoring  I nisha  NEBS and Mucolytics for mucoid impaction and expectoration assist  fio2 support as needed - goal sat > 88 pct  oral hygiene - skin care  emp ABX - cx - Biomarkers   chest imaging - c/w poss LRTI - PNA - Bronchiectasis  DVT p  AED - Seizure precs  Nutritional assessment

## 2025-05-29 NOTE — SWALLOW BEDSIDE ASSESSMENT ADULT - SLP GENERAL OBSERVATIONS
A&O, cooperative with assessment. Vocal quality clear and dry. Graft Donor Site Bandage (Optional-Leave Blank If You Don't Want In Note): Steri-strips and a pressure bandage were applied to the donor site.

## 2025-05-29 NOTE — SWALLOW BEDSIDE ASSESSMENT ADULT - H & P REVIEW
Per charting, "89 y/o F with PMHx seizures, hemorrhagic CVA in 2015, HTN, HLD, T2DM, hypothyroid presenting to the ED c/o worsening cough x 1 week with associated generalized weakness and decreased po intake. Admitted for AHRF 2/2 PNA."/yes

## 2025-05-29 NOTE — SOCIAL WORK PROGRESS NOTE - NSSWPROGRESSNOTE_GEN_ALL_CORE
zayra has forwarded madyson and supporting documentation to [facility]. zayra to follow for anticipated plan of wesley / pt and family in agreement.

## 2025-05-29 NOTE — SWALLOW BEDSIDE ASSESSMENT ADULT - SWALLOW EVAL: RECOMMENDED FEEDING/EATING TECHNIQUES
slow rate/maintain upright posture during/after eating for 30 mins/oral hygiene/position upright (90 degrees)/small sips/bites

## 2025-05-30 ENCOUNTER — TRANSCRIPTION ENCOUNTER (OUTPATIENT)
Age: 89
End: 2025-05-30

## 2025-05-30 ENCOUNTER — NON-APPOINTMENT (OUTPATIENT)
Age: 89
End: 2025-05-30

## 2025-05-30 VITALS
RESPIRATION RATE: 18 BRPM | TEMPERATURE: 99 F | DIASTOLIC BLOOD PRESSURE: 96 MMHG | HEART RATE: 97 BPM | SYSTOLIC BLOOD PRESSURE: 154 MMHG | OXYGEN SATURATION: 97 %

## 2025-05-30 LAB
ALBUMIN SERPL ELPH-MCNC: 1.6 G/DL — LOW (ref 3.3–5)
ALP SERPL-CCNC: 68 U/L — SIGNIFICANT CHANGE UP (ref 40–120)
ALT FLD-CCNC: 19 U/L — SIGNIFICANT CHANGE UP (ref 12–78)
ANION GAP SERPL CALC-SCNC: 6 MMOL/L — SIGNIFICANT CHANGE UP (ref 5–17)
AST SERPL-CCNC: 19 U/L — SIGNIFICANT CHANGE UP (ref 15–37)
BILIRUB SERPL-MCNC: 0.2 MG/DL — SIGNIFICANT CHANGE UP (ref 0.2–1.2)
BUN SERPL-MCNC: 17 MG/DL — SIGNIFICANT CHANGE UP (ref 7–23)
CALCIUM SERPL-MCNC: 8.3 MG/DL — LOW (ref 8.5–10.1)
CHLORIDE SERPL-SCNC: 107 MMOL/L — SIGNIFICANT CHANGE UP (ref 96–108)
CO2 SERPL-SCNC: 29 MMOL/L — SIGNIFICANT CHANGE UP (ref 22–31)
CREAT SERPL-MCNC: 0.64 MG/DL — SIGNIFICANT CHANGE UP (ref 0.5–1.3)
EGFR: 82 ML/MIN/1.73M2 — SIGNIFICANT CHANGE UP
EGFR: 82 ML/MIN/1.73M2 — SIGNIFICANT CHANGE UP
GLUCOSE SERPL-MCNC: 145 MG/DL — HIGH (ref 70–99)
HCT VFR BLD CALC: 27.1 % — LOW (ref 34.5–45)
HGB BLD-MCNC: 8.8 G/DL — LOW (ref 11.5–15.5)
MCHC RBC-ENTMCNC: 28.2 PG — SIGNIFICANT CHANGE UP (ref 27–34)
MCHC RBC-ENTMCNC: 32.5 G/DL — SIGNIFICANT CHANGE UP (ref 32–36)
MCV RBC AUTO: 86.9 FL — SIGNIFICANT CHANGE UP (ref 80–100)
NRBC BLD AUTO-RTO: 0 /100 WBCS — SIGNIFICANT CHANGE UP (ref 0–0)
PLATELET # BLD AUTO: 237 K/UL — SIGNIFICANT CHANGE UP (ref 150–400)
POTASSIUM SERPL-MCNC: 3.8 MMOL/L — SIGNIFICANT CHANGE UP (ref 3.5–5.3)
POTASSIUM SERPL-SCNC: 3.8 MMOL/L — SIGNIFICANT CHANGE UP (ref 3.5–5.3)
PROT SERPL-MCNC: 6.2 G/DL — SIGNIFICANT CHANGE UP (ref 6–8.3)
RBC # BLD: 3.12 M/UL — LOW (ref 3.8–5.2)
RBC # FLD: 14.6 % — HIGH (ref 10.3–14.5)
SODIUM SERPL-SCNC: 142 MMOL/L — SIGNIFICANT CHANGE UP (ref 135–145)
WBC # BLD: 9.83 K/UL — SIGNIFICANT CHANGE UP (ref 3.8–10.5)
WBC # FLD AUTO: 9.83 K/UL — SIGNIFICANT CHANGE UP (ref 3.8–10.5)

## 2025-05-30 PROCEDURE — 36415 COLL VENOUS BLD VENIPUNCTURE: CPT

## 2025-05-30 PROCEDURE — 82553 CREATINE MB FRACTION: CPT

## 2025-05-30 PROCEDURE — 97530 THERAPEUTIC ACTIVITIES: CPT

## 2025-05-30 PROCEDURE — 83540 ASSAY OF IRON: CPT

## 2025-05-30 PROCEDURE — 82272 OCCULT BLD FECES 1-3 TESTS: CPT

## 2025-05-30 PROCEDURE — 83605 ASSAY OF LACTIC ACID: CPT

## 2025-05-30 PROCEDURE — 97162 PT EVAL MOD COMPLEX 30 MIN: CPT

## 2025-05-30 PROCEDURE — 84484 ASSAY OF TROPONIN QUANT: CPT

## 2025-05-30 PROCEDURE — 82728 ASSAY OF FERRITIN: CPT

## 2025-05-30 PROCEDURE — 0225U NFCT DS DNA&RNA 21 SARSCOV2: CPT

## 2025-05-30 PROCEDURE — 96365 THER/PROPH/DIAG IV INF INIT: CPT

## 2025-05-30 PROCEDURE — 85730 THROMBOPLASTIN TIME PARTIAL: CPT

## 2025-05-30 PROCEDURE — 87899 AGENT NOS ASSAY W/OPTIC: CPT

## 2025-05-30 PROCEDURE — 81001 URINALYSIS AUTO W/SCOPE: CPT

## 2025-05-30 PROCEDURE — 85027 COMPLETE CBC AUTOMATED: CPT

## 2025-05-30 PROCEDURE — 83735 ASSAY OF MAGNESIUM: CPT

## 2025-05-30 PROCEDURE — 99291 CRITICAL CARE FIRST HOUR: CPT | Mod: 25

## 2025-05-30 PROCEDURE — 84443 ASSAY THYROID STIM HORMONE: CPT

## 2025-05-30 PROCEDURE — 96367 TX/PROPH/DG ADDL SEQ IV INF: CPT

## 2025-05-30 PROCEDURE — 94640 AIRWAY INHALATION TREATMENT: CPT

## 2025-05-30 PROCEDURE — 99239 HOSP IP/OBS DSCHRG MGMT >30: CPT

## 2025-05-30 PROCEDURE — 82962 GLUCOSE BLOOD TEST: CPT

## 2025-05-30 PROCEDURE — 83550 IRON BINDING TEST: CPT

## 2025-05-30 PROCEDURE — 85025 COMPLETE CBC W/AUTO DIFF WBC: CPT

## 2025-05-30 PROCEDURE — 80053 COMPREHEN METABOLIC PANEL: CPT

## 2025-05-30 PROCEDURE — 92610 EVALUATE SWALLOWING FUNCTION: CPT

## 2025-05-30 PROCEDURE — 97116 GAIT TRAINING THERAPY: CPT

## 2025-05-30 PROCEDURE — 93005 ELECTROCARDIOGRAM TRACING: CPT

## 2025-05-30 PROCEDURE — 83880 ASSAY OF NATRIURETIC PEPTIDE: CPT

## 2025-05-30 PROCEDURE — 85610 PROTHROMBIN TIME: CPT

## 2025-05-30 PROCEDURE — 93306 TTE W/DOPPLER COMPLETE: CPT

## 2025-05-30 PROCEDURE — 71250 CT THORAX DX C-: CPT

## 2025-05-30 PROCEDURE — 83036 HEMOGLOBIN GLYCOSYLATED A1C: CPT

## 2025-05-30 PROCEDURE — 84145 PROCALCITONIN (PCT): CPT

## 2025-05-30 PROCEDURE — 87040 BLOOD CULTURE FOR BACTERIA: CPT

## 2025-05-30 PROCEDURE — 96368 THER/DIAG CONCURRENT INF: CPT

## 2025-05-30 PROCEDURE — 71045 X-RAY EXAM CHEST 1 VIEW: CPT

## 2025-05-30 PROCEDURE — 99233 SBSQ HOSP IP/OBS HIGH 50: CPT

## 2025-05-30 RX ORDER — IPRATROPIUM BROMIDE AND ALBUTEROL SULFATE .5; 2.5 MG/3ML; MG/3ML
3 SOLUTION RESPIRATORY (INHALATION)
Qty: 0 | Refills: 0 | DISCHARGE
Start: 2025-05-30

## 2025-05-30 RX ORDER — BENZONATATE 100 MG
1 CAPSULE ORAL
Qty: 0 | Refills: 0 | DISCHARGE
Start: 2025-05-30

## 2025-05-30 RX ORDER — AMOXICILLIN AND CLAVULANATE POTASSIUM 500; 125 MG/1; MG/1
1 TABLET, FILM COATED ORAL
Qty: 9 | Refills: 0
Start: 2025-05-30 | End: 2025-06-01

## 2025-05-30 RX ADMIN — LOSARTAN POTASSIUM 50 MILLIGRAM(S): 100 TABLET, FILM COATED ORAL at 05:53

## 2025-05-30 RX ADMIN — Medication 4 MILLILITER(S): at 07:15

## 2025-05-30 RX ADMIN — AMLODIPINE BESYLATE 5 MILLIGRAM(S): 10 TABLET ORAL at 05:53

## 2025-05-30 RX ADMIN — IPRATROPIUM BROMIDE AND ALBUTEROL SULFATE 3 MILLILITER(S): .5; 2.5 SOLUTION RESPIRATORY (INHALATION) at 07:15

## 2025-05-30 RX ADMIN — Medication 88 MICROGRAM(S): at 05:53

## 2025-05-30 RX ADMIN — INSULIN LISPRO 1: 100 INJECTION, SOLUTION INTRAVENOUS; SUBCUTANEOUS at 12:13

## 2025-05-30 RX ADMIN — LEVETIRACETAM 250 MILLIGRAM(S): 10 INJECTION, SOLUTION INTRAVENOUS at 08:36

## 2025-05-30 RX ADMIN — CARBAMAZEPINE 100 MILLIGRAM(S): 200 TABLET ORAL at 08:36

## 2025-05-30 RX ADMIN — Medication 100 MILLIGRAM(S): at 05:53

## 2025-05-30 NOTE — DISCHARGE NOTE PROVIDER - NSDCFUADDAPPT_GEN_ALL_CORE_FT
Follow up with your primary care physician in 1-2 weeks.   Follow up with pulmonary for outpatient sleep study

## 2025-05-30 NOTE — DISCHARGE NOTE PROVIDER - NSDCMRMEDTOKEN_GEN_ALL_CORE_FT
amLODIPine 5 mg oral tablet: 1 tab(s) orally once a day  Augmentin 500 mg-125 mg oral tablet: 1 tab(s) orally 3 times a day last date 6/1  benzonatate 100 mg oral capsule: 1 cap(s) orally every 8 hours as needed for  cough  carBAMazepine 100 mg oral capsule, extended release: 1 cap(s) orally 2 times a day 9am and 4pm  carBAMazepine 100 mg oral capsule, extended release: 2 cap(s) orally once a day (at bedtime) 10pm  ipratropium-albuterol 0.5 mg-2.5 mg/3 mL inhalation solution: 3 milliliter(s) inhaled every 6 hours for 2 days  irbesartan 150 mg oral tablet: 1 tab(s) orally 2 times a day  Keppra 250 mg oral tablet: 1 tab(s) orally 2 times a day  levothyroxine 88 mcg (0.088 mg) oral tablet: 1 tab(s) orally once a day (in the morning) 30 mins before breakfast  MetFORMIN (Eqv-Fortamet) 1000 mg oral tablet, extended release: 1 tab(s) orally once a day (at bedtime)  rosuvastatin 10 mg oral tablet: 1 tab(s) orally once a day (at bedtime)

## 2025-05-30 NOTE — DISCHARGE NOTE NURSING/CASE MANAGEMENT/SOCIAL WORK - PATIENT PORTAL LINK FT
You can access the FollowMyHealth Patient Portal offered by [facility] by registering at the following website: [email or web address]. By joining [employer]’s FollowMyHealth portal, you will also be able to view your health information using other applications (apps) compatible with our system.

## 2025-05-30 NOTE — PROGRESS NOTE ADULT - SUBJECTIVE AND OBJECTIVE BOX
Date/Time Patient Seen:  		  Referring MD:   Data Reviewed	       Patient is a 89y old  Female who presents with a chief complaint of AHRF 2/2 PNA (28 May 2025 13:44)      Subjective/HPI     PAST MEDICAL & SURGICAL HISTORY:  Seizures    History of CVA (cerebrovascular accident)    HTN (hypertension)    HLD (hyperlipidemia)    Hypothyroid    T2DM (type 2 diabetes mellitus)          Medication list         MEDICATIONS  (STANDING):  albuterol/ipratropium for Nebulization 3 milliLiter(s) Nebulizer every 6 hours  amLODIPine   Tablet 5 milliGRAM(s) Oral daily  benzonatate 100 milliGRAM(s) Oral every 8 hours  carBAMazepine ER Capsule 200 milliGRAM(s) Oral <User Schedule>  carBAMazepine ER Tablet 100 milliGRAM(s) Oral <User Schedule>  cefTRIAXone   IVPB 1000 milliGRAM(s) IV Intermittent every 24 hours  dextrose 5%. 1000 milliLiter(s) (100 mL/Hr) IV Continuous <Continuous>  dextrose 5%. 1000 milliLiter(s) (50 mL/Hr) IV Continuous <Continuous>  dextrose 50% Injectable 25 Gram(s) IV Push once  dextrose 50% Injectable 12.5 Gram(s) IV Push once  dextrose 50% Injectable 25 Gram(s) IV Push once  enoxaparin Injectable 40 milliGRAM(s) SubCutaneous every 24 hours  glucagon  Injectable 1 milliGRAM(s) IntraMuscular once  insulin lispro (ADMELOG) corrective regimen sliding scale   SubCutaneous three times a day before meals  insulin lispro (ADMELOG) corrective regimen sliding scale   SubCutaneous at bedtime  lactated ringers. 1000 milliLiter(s) (75 mL/Hr) IV Continuous <Continuous>  levETIRAcetam 250 milliGRAM(s) Oral <User Schedule>  levothyroxine 88 MICROGram(s) Oral daily  losartan 50 milliGRAM(s) Oral two times a day  rosuvastatin 10 milliGRAM(s) Oral at bedtime  sodium chloride 3%  Inhalation 4 milliLiter(s) Inhalation every 12 hours    MEDICATIONS  (PRN):  acetaminophen     Tablet .. 650 milliGRAM(s) Oral every 6 hours PRN Temp greater or equal to 38C (100.4F), Mild Pain (1 - 3)  dextrose Oral Gel 15 Gram(s) Oral once PRN Blood Glucose LESS THAN 70 milliGRAM(s)/deciliter         Vitals log        ICU Vital Signs Last 24 Hrs  T(C): 37.6 (28 May 2025 20:06), Max: 38.3 (28 May 2025 05:30)  T(F): 99.7 (28 May 2025 20:06), Max: 101 (28 May 2025 05:30)  HR: 95 (28 May 2025 20:06) (84 - 102)  BP: 127/71 (28 May 2025 20:06) (127/71 - 164/74)  BP(mean): --  ABP: --  ABP(mean): --  RR: 18 (28 May 2025 20:06) (18 - 18)  SpO2: 97% (28 May 2025 20:06) (91% - 98%)    O2 Parameters below as of 28 May 2025 20:06  Patient On (Oxygen Delivery Method): nasal cannula  O2 Flow (L/min): 2               Input and Output:  I&O's Detail    27 May 2025 07:01  -  28 May 2025 07:00  --------------------------------------------------------  IN:    Oral Fluid: 360 mL  Total IN: 360 mL    OUT:  Total OUT: 0 mL    Total NET: 360 mL      28 May 2025 07:01  -  29 May 2025 05:13  --------------------------------------------------------  IN:    Oral Fluid: 960 mL  Total IN: 960 mL    OUT:    Incontinent per Collection Bag (mL): 700 mL  Total OUT: 700 mL    Total NET: 260 mL          Lab Data                        8.3    8.65  )-----------( 197      ( 28 May 2025 07:20 )             25.7     05-28    142  |  108  |  18  ----------------------------<  127[H]  3.6   |  27  |  0.71    Ca    7.8[L]      28 May 2025 07:20    TPro  5.9[L]  /  Alb  1.6[L]  /  TBili  0.3  /  DBili  x   /  AST  20  /  ALT  14  /  AlkPhos  60  05-28      CARDIAC MARKERS ( 27 May 2025 18:00 )  x     / x     / x     / x     / 2.5 ng/mL        Review of Systems	      Objective     Physical Examination    heart s1s2  lung dc bs  head nc  head at  abd soft      Pertinent Lab findings & Imaging      Enrrique:  NO   Adequate UO     I&O's Detail    27 May 2025 07:01  -  28 May 2025 07:00  --------------------------------------------------------  IN:    Oral Fluid: 360 mL  Total IN: 360 mL    OUT:  Total OUT: 0 mL    Total NET: 360 mL      28 May 2025 07:01  -  29 May 2025 05:13  --------------------------------------------------------  IN:    Oral Fluid: 960 mL  Total IN: 960 mL    OUT:    Incontinent per Collection Bag (mL): 700 mL  Total OUT: 700 mL    Total NET: 260 mL               Discussed with:     Cultures:	        Radiology                            
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 presented with generalized weakness nonproductive cough questionable syncope admitted with  Acute hypoxic respiratory failure  Multifocal  community-acquired pneumonia/questionable aspiration  Reactive airway disease: Improved  Lactic acidosis: Resolved  Fever     Patient Seen and examined   WBC  normalized   couple of episodes of fever in last 24 hours,  to measure rectal temperature for next 24 hours  Hemodynamically stable  Trend WBC and temperature curve through hospital course   remains on 2 L nasal cannula    Cultures:  Blood culture: 2 sets: May 26:  preliminary results negative, follow until completion  Procalcitonin: 1.34 from May 27;  0.72 on May 29  Urine for Streptococcus  antigen:  negative   urine for Legionella  antigen:  negative  RVP: Negative on admission    Imaging:    CT Chest No Cont (05.27.25 @ 01:48) Findings represent multifocal impaction with associated   infectious/inflammatory process.    Antibiotics:    Can continue with Rocephin 1 g IV daily,    discontinued azithromycin on May 28     recommend chest PT and mucolytic to facilitate secretion mobilization  Incentive spirometry   management of reactive airway disease as per primary team   recommended speech evaluation  Appreciate pulmonary input    Daily CBC, CMP, Mg, PO4 while on Antibiotics     Plan explained to patient   D/w Primary team     ________________________________    Last 24 hours:    Intermittent nonproductive cough now turning into a productive cough   not able to sleep at night while in hospital  Denied any chills nausea vomiting diarrhea or new rash, also denies any dysuria frequency flank pain sore throat  dizziness at rest    Further ROS:  All systems reviewed. No other complaints besides mentioned above     ______________________________  Allergies    No Known Allergies    Intolerances    aspirin (Unknown)     MEDICATIONS  (STANDING):  albuterol/ipratropium for Nebulization 3 milliLiter(s) Nebulizer every 6 hours  amLODIPine   Tablet 5 milliGRAM(s) Oral daily  benzonatate 100 milliGRAM(s) Oral every 8 hours  carBAMazepine ER Capsule 200 milliGRAM(s) Oral <User Schedule>  carBAMazepine ER Tablet 100 milliGRAM(s) Oral <User Schedule>  cefTRIAXone   IVPB 1000 milliGRAM(s) IV Intermittent every 24 hours  dextrose 5%. 1000 milliLiter(s) (100 mL/Hr) IV Continuous <Continuous>  dextrose 5%. 1000 milliLiter(s) (50 mL/Hr) IV Continuous <Continuous>  dextrose 50% Injectable 25 Gram(s) IV Push once  dextrose 50% Injectable 12.5 Gram(s) IV Push once  dextrose 50% Injectable 25 Gram(s) IV Push once  enoxaparin Injectable 40 milliGRAM(s) SubCutaneous every 24 hours  glucagon  Injectable 1 milliGRAM(s) IntraMuscular once  insulin lispro (ADMELOG) corrective regimen sliding scale   SubCutaneous three times a day before meals  insulin lispro (ADMELOG) corrective regimen sliding scale   SubCutaneous at bedtime  lactated ringers. 1000 milliLiter(s) (75 mL/Hr) IV Continuous <Continuous>  levETIRAcetam 250 milliGRAM(s) Oral <User Schedule>  levothyroxine 88 MICROGram(s) Oral daily  losartan 50 milliGRAM(s) Oral two times a day  rosuvastatin 10 milliGRAM(s) Oral at bedtime  sodium chloride 3%  Inhalation 4 milliLiter(s) Inhalation every 12 hours    MEDICATIONS  (PRN):  acetaminophen     Tablet .. 650 milliGRAM(s) Oral every 6 hours PRN Temp greater or equal to 38C (100.4F), Mild Pain (1 - 3)  dextrose Oral Gel 15 Gram(s) Oral once PRN Blood Glucose LESS THAN 70 milliGRAM(s)/deciliter    _________________    PHYSICAL EXAM:    Objective:  Vital Signs Last 24 Hrs  T(C): 37.3 (29 May 2025 05:41), Max: 38.3 (28 May 2025 17:08)  T(F): 99.1 (29 May 2025 05:41), Max: 101 (28 May 2025 17:08)  HR: 98 (29 May 2025 05:41) (84 - 98)  BP: 163/84 (29 May 2025 05:41) (127/71 - 163/84)  RR: 18 (29 May 2025 05:41) (18 - 18)  SpO2: 98% (29 May 2025 05:41) (95% - 98%)  Parameters below as of 29 May 2025 05:41  Patient On (Oxygen Delivery Method): nasal cannula  O2 Flow (L/min): 2      General: No acute distress   unless coughing.  Lying in bed comfortably   Neuro: AAO*3-4, No motor, sensory, or cranial nerve deficit  HEENT: Pupils equal, reactive to light, Oral mucosa moist  PULM: Clear to auscultation bilaterally, no significant adventitious breath sounds   CVS: Regular rhythm and controlled rate, no murmurs, rubs, or gallops  ABD: Soft, nondistended, nontender, normoactive bowel sounds, no CVA tenderness  EXT: No b/l LE edema, nontender with pedal pulse palpable   SKIN: Warm and well perfused, no acute rashes   ______________  LABS, MICROBIOLOGY, RADIOLOGY & ADDITIONAL STUDIES: Reviewed 
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 presented with generalized weakness nonproductive cough questionable syncope admitted with  Acute hypoxic respiratory failure  Multifocal  community-acquired pneumonia/questionable aspiration  Reactive airway disease: Improved  Lactic acidosis: Resolved  Fever : Resolved    Patient Seen and examined   WBC  normalized   afebrile for over 36 hours  Hemodynamically stable  Trend WBC and temperature curve through hospital course   remains on 2 L nasal cannula    Cultures:  Blood culture: 2 sets: May 26:  preliminary results negative, follow until completion  Procalcitonin: 1.34 from May 27;  0.72 on May 29  Urine for Streptococcus  antigen:  negative   urine for Legionella  antigen:  negative  RVP: Negative on admission    Imaging:    CT Chest No Cont (05.27.25 @ 01:48) Findings represent multifocal impaction with associated   infectious/inflammatory process.    Antibiotics:    Can continue with Rocephin 1 g IV daily  while inpatient and can transition to Augmentin 500/125 mg p.o. 3 times daily to finish 7-day course:  last day: June 1   discontinued azithromycin on May 28     recommend chest PT and mucolytic to facilitate secretion mobilization  Incentive spirometry   management of reactive airway disease as per primary team   recommended speech evaluation  Appreciate pulmonary input    Daily CBC, CMP, Mg, PO4 while on Antibiotics     Plan explained to patient   D/w Primary team     ________________________________    Last 24 hours:    Intermittent nonproductive cough now turning into a productive cough   not able to sleep at night while in hospital and otherwise of feels sleepy and  overall weak as well as constipated  Denied any chills nausea vomiting diarrhea or new rash, also denies any dysuria frequency flank pain sore throat  dizziness at rest    Further ROS:  All systems reviewed. No other complaints besides mentioned above     ______________________________  Allergies    No Known Allergies    Intolerances    aspirin (Unknown)    MEDICATIONS  (STANDING):  albuterol/ipratropium for Nebulization 3 milliLiter(s) Nebulizer every 6 hours  amLODIPine   Tablet 5 milliGRAM(s) Oral daily  benzonatate 100 milliGRAM(s) Oral every 8 hours  carBAMazepine ER Capsule 200 milliGRAM(s) Oral <User Schedule>  carBAMazepine ER Tablet 100 milliGRAM(s) Oral <User Schedule>  cefTRIAXone   IVPB 1000 milliGRAM(s) IV Intermittent every 24 hours  dextrose 5%. 1000 milliLiter(s) (100 mL/Hr) IV Continuous <Continuous>  dextrose 5%. 1000 milliLiter(s) (50 mL/Hr) IV Continuous <Continuous>  dextrose 50% Injectable 25 Gram(s) IV Push once  dextrose 50% Injectable 12.5 Gram(s) IV Push once  dextrose 50% Injectable 25 Gram(s) IV Push once  enoxaparin Injectable 40 milliGRAM(s) SubCutaneous every 24 hours  glucagon  Injectable 1 milliGRAM(s) IntraMuscular once  insulin lispro (ADMELOG) corrective regimen sliding scale   SubCutaneous three times a day before meals  insulin lispro (ADMELOG) corrective regimen sliding scale   SubCutaneous at bedtime  lactated ringers. 1000 milliLiter(s) (75 mL/Hr) IV Continuous <Continuous>  levETIRAcetam 250 milliGRAM(s) Oral <User Schedule>  levothyroxine 88 MICROGram(s) Oral daily  losartan 50 milliGRAM(s) Oral two times a day  rosuvastatin 10 milliGRAM(s) Oral at bedtime  sodium chloride 3%  Inhalation 4 milliLiter(s) Inhalation every 12 hours      _________________    PHYSICAL EXAM:      Vital Signs Last 24 Hrs  T(C): 37.3 (30 May 2025 04:39), Max: 37.3 (30 May 2025 04:39)  T(F): 99.2 (30 May 2025 04:39), Max: 99.2 (30 May 2025 04:39)  HR: 88 (30 May 2025 04:39) (84 - 90)  BP: 159/84 (30 May 2025 04:39) (142/77 - 159/84)  RR: 18 (30 May 2025 04:39) (18 - 18)  SpO2: 96% (30 May 2025 04:39) (96% - 97%)  Parameters below as of 30 May 2025 04:39  Patient On (Oxygen Delivery Method): nasal cannula  O2 Flow (L/min): 2  acetaminophen     Tablet .. 650 milliGRAM(s) Oral every 6 hours PRN Temp greater or equal to 38C (100.4F), Mild Pain (1 - 3)  dextrose Oral Gel 15 Gram(s) Oral once PRN Blood Glucose LESS THAN 70 milliGRAM(s)/deciliter      General: No acute distress   unless coughing.  Lying in bed comfortably   Neuro: AAO*3-4, No motor, sensory, or cranial nerve deficit  HEENT: Pupils equal, reactive to light, Oral mucosa moist  PULM: Clear to auscultation bilaterally, no significant adventitious breath sounds   CVS: Regular rhythm and controlled rate, no murmurs, rubs, or gallops  ABD: Soft, nondistended, nontender, normoactive bowel sounds, no CVA tenderness  EXT: No b/l LE edema, nontender with pedal pulse palpable   SKIN: Warm and well perfused, no acute rashes   ______________  LABS, MICROBIOLOGY, RADIOLOGY & ADDITIONAL STUDIES: Reviewed 
Patient is a 89y old  Female who presents with a chief complaint of AHRF 2/2 PNA (29 May 2025 09:02)    INTERVAL HPI/OVERNIGHT EVENTS: Patient was seen and examined. 84% on RA. Reports feeling tired but wakes up to command. + fever last night. attempting to cough but has not been coughing up phlegm     I&O's Summary    28 May 2025 07:01  -  29 May 2025 07:00  --------------------------------------------------------  IN: 960 mL / OUT: 700 mL / NET: 260 mL        LABS:                        9.1    9.89  )-----------( 236      ( 29 May 2025 05:48 )             27.7     05-29    142  |  108  |  19  ----------------------------<  142[H]  3.6   |  28  |  0.72    Ca    8.3[L]      29 May 2025 05:48  Mg     2.1     05-29    TPro  6.4  /  Alb  1.8[L]  /  TBili  0.2  /  DBili  x   /  AST  22  /  ALT  20  /  AlkPhos  70  05-29      Urinalysis Basic - ( 29 May 2025 05:48 )    Color: x / Appearance: x / SG: x / pH: x  Gluc: 142 mg/dL / Ketone: x  / Bili: x / Urobili: x   Blood: x / Protein: x / Nitrite: x   Leuk Esterase: x / RBC: x / WBC x   Sq Epi: x / Non Sq Epi: x / Bacteria: x      CAPILLARY BLOOD GLUCOSE      POCT Blood Glucose.: 157 mg/dL (29 May 2025 07:50)  POCT Blood Glucose.: 173 mg/dL (28 May 2025 21:05)  POCT Blood Glucose.: 172 mg/dL (28 May 2025 16:50)  POCT Blood Glucose.: 213 mg/dL (28 May 2025 12:06)        Urinalysis Basic - ( 29 May 2025 05:48 )    Color: x / Appearance: x / SG: x / pH: x  Gluc: 142 mg/dL / Ketone: x  / Bili: x / Urobili: x   Blood: x / Protein: x / Nitrite: x   Leuk Esterase: x / RBC: x / WBC x   Sq Epi: x / Non Sq Epi: x / Bacteria: x        MEDICATIONS  (STANDING):  albuterol/ipratropium for Nebulization 3 milliLiter(s) Nebulizer every 6 hours  amLODIPine   Tablet 5 milliGRAM(s) Oral daily  benzonatate 100 milliGRAM(s) Oral every 8 hours  carBAMazepine ER Capsule 200 milliGRAM(s) Oral <User Schedule>  carBAMazepine ER Tablet 100 milliGRAM(s) Oral <User Schedule>  cefTRIAXone   IVPB 1000 milliGRAM(s) IV Intermittent every 24 hours  dextrose 5%. 1000 milliLiter(s) (100 mL/Hr) IV Continuous <Continuous>  dextrose 5%. 1000 milliLiter(s) (50 mL/Hr) IV Continuous <Continuous>  dextrose 50% Injectable 25 Gram(s) IV Push once  dextrose 50% Injectable 12.5 Gram(s) IV Push once  dextrose 50% Injectable 25 Gram(s) IV Push once  enoxaparin Injectable 40 milliGRAM(s) SubCutaneous every 24 hours  glucagon  Injectable 1 milliGRAM(s) IntraMuscular once  insulin lispro (ADMELOG) corrective regimen sliding scale   SubCutaneous three times a day before meals  insulin lispro (ADMELOG) corrective regimen sliding scale   SubCutaneous at bedtime  lactated ringers. 1000 milliLiter(s) (75 mL/Hr) IV Continuous <Continuous>  levETIRAcetam 250 milliGRAM(s) Oral <User Schedule>  levothyroxine 88 MICROGram(s) Oral daily  losartan 50 milliGRAM(s) Oral two times a day  rosuvastatin 10 milliGRAM(s) Oral at bedtime  sodium chloride 3%  Inhalation 4 milliLiter(s) Inhalation every 12 hours    MEDICATIONS  (PRN):  acetaminophen     Tablet .. 650 milliGRAM(s) Oral every 6 hours PRN Temp greater or equal to 38C (100.4F), Mild Pain (1 - 3)  dextrose Oral Gel 15 Gram(s) Oral once PRN Blood Glucose LESS THAN 70 milliGRAM(s)/deciliter      REVIEW OF SYSTEMS:  CONSTITUTIONAL: No fever or chills  HEENT:  No headache, no sore throat  RESPIRATORY: + cough, No wheezing, or shortness of breath  CARDIOVASCULAR: No chest pain, palpitations  GASTROINTESTINAL: No abdominal pain, nausea, vomiting, or diarrhea  GENITOURINARY: No dysuria  NEUROLOGICAL: no focal weakness  MUSCULOSKELETAL: no myalgias  PSYCH: no recent changes in mood    RADIOLOGY & ADDITIONAL TESTS:    Imaging Personally Reviewed:  [x] YES  [ ] NO    Consultant(s) Notes Reviewed:  [x] YES  [ ] NO    PHYSICAL EXAM:  T(C): 37.3 (05-29-25 @ 05:41), Max: 38.3 (05-28-25 @ 17:08)  HR: 94 (05-29-25 @ 07:35) (84 - 98)  BP: 163/84 (05-29-25 @ 05:41) (127/71 - 163/84)  RR: 18 (05-29-25 @ 05:41) (18 - 18)  SpO2: 98% (05-29-25 @ 05:41) (95% - 98%)    GENERAL: sleepy but wakes up to command. Oriented on exam   HEENT:  anicteric, moist mucous membranes  CHEST/LUNG:  CTA b/l, no rales, wheezes, + rhonchi  HEART:  RRR, S1, S2  ABDOMEN:  BS+, soft, nontender, nondistended  EXTREMITIES: no edema  NERVOUS SYSTEM: answers questions and follows commands appropriately  PSYCH: normal affect    Care Discussed with Consultants/Other Providers [x] YES  [ ] NO
Patient is a 89y old  Female who presents with a chief complaint of AHRF 2/2 PNA (28 May 2025 09:17)    INTERVAL HPI/OVERNIGHT EVENTS: Patient was seen and examined. No acute events occurred overnight. + fever 101. on 2LNC.     I&O's Summary    27 May 2025 07:01  -  28 May 2025 07:00  --------------------------------------------------------  IN: 360 mL / OUT: 0 mL / NET: 360 mL    LABS:                        8.3    8.65  )-----------( 197      ( 28 May 2025 07:20 )             25.7     05-28    142  |  108  |  18  ----------------------------<  127[H]  3.6   |  27  |  0.71    Ca    7.8[L]      28 May 2025 07:20    TPro  5.9[L]  /  Alb  1.6[L]  /  TBili  0.3  /  DBili  x   /  AST  20  /  ALT  14  /  AlkPhos  60  05-28    PT/INR - ( 27 May 2025 00:26 )   PT: 14.3 sec;   INR: 1.21 ratio         PTT - ( 27 May 2025 00:26 )  PTT:24.7 sec  Urinalysis Basic - ( 28 May 2025 07:20 )    Color: x / Appearance: x / SG: x / pH: x  Gluc: 127 mg/dL / Ketone: x  / Bili: x / Urobili: x   Blood: x / Protein: x / Nitrite: x   Leuk Esterase: x / RBC: x / WBC x   Sq Epi: x / Non Sq Epi: x / Bacteria: x      CAPILLARY BLOOD GLUCOSE      POCT Blood Glucose.: 150 mg/dL (28 May 2025 07:49)  POCT Blood Glucose.: 165 mg/dL (27 May 2025 22:25)  POCT Blood Glucose.: 145 mg/dL (27 May 2025 16:55)  POCT Blood Glucose.: 142 mg/dL (27 May 2025 12:34)        Urinalysis Basic - ( 28 May 2025 07:20 )    Color: x / Appearance: x / SG: x / pH: x  Gluc: 127 mg/dL / Ketone: x  / Bili: x / Urobili: x   Blood: x / Protein: x / Nitrite: x   Leuk Esterase: x / RBC: x / WBC x   Sq Epi: x / Non Sq Epi: x / Bacteria: x        MEDICATIONS  (STANDING):  albuterol/ipratropium for Nebulization 3 milliLiter(s) Nebulizer every 6 hours  amLODIPine   Tablet 5 milliGRAM(s) Oral daily  azithromycin  IVPB 500 milliGRAM(s) IV Intermittent every 24 hours  benzonatate 100 milliGRAM(s) Oral every 8 hours  carBAMazepine ER Capsule 200 milliGRAM(s) Oral <User Schedule>  carBAMazepine ER Tablet 100 milliGRAM(s) Oral <User Schedule>  cefTRIAXone   IVPB 1000 milliGRAM(s) IV Intermittent every 24 hours  dextrose 5%. 1000 milliLiter(s) (100 mL/Hr) IV Continuous <Continuous>  dextrose 5%. 1000 milliLiter(s) (50 mL/Hr) IV Continuous <Continuous>  dextrose 50% Injectable 25 Gram(s) IV Push once  dextrose 50% Injectable 12.5 Gram(s) IV Push once  dextrose 50% Injectable 25 Gram(s) IV Push once  enoxaparin Injectable 40 milliGRAM(s) SubCutaneous every 24 hours  glucagon  Injectable 1 milliGRAM(s) IntraMuscular once  insulin lispro (ADMELOG) corrective regimen sliding scale   SubCutaneous three times a day before meals  insulin lispro (ADMELOG) corrective regimen sliding scale   SubCutaneous at bedtime  lactated ringers. 1000 milliLiter(s) (75 mL/Hr) IV Continuous <Continuous>  levETIRAcetam 250 milliGRAM(s) Oral <User Schedule>  levothyroxine 88 MICROGram(s) Oral daily  losartan 50 milliGRAM(s) Oral two times a day  rosuvastatin 10 milliGRAM(s) Oral at bedtime  sodium chloride 3%  Inhalation 4 milliLiter(s) Inhalation every 12 hours    MEDICATIONS  (PRN):  acetaminophen     Tablet .. 650 milliGRAM(s) Oral every 6 hours PRN Temp greater or equal to 38C (100.4F), Mild Pain (1 - 3)  dextrose Oral Gel 15 Gram(s) Oral once PRN Blood Glucose LESS THAN 70 milliGRAM(s)/deciliter      REVIEW OF SYSTEMS:  CONSTITUTIONAL: + fever  HEENT:  No headache  RESPIRATORY: + cough, No wheezing, or shortness of breath  CARDIOVASCULAR: No chest pain  GASTROINTESTINAL: No abdominal pain, nausea, vomiting, or diarrhea  GENITOURINARY: No dysuria  NEUROLOGICAL: no focal weakness or dizziness  MUSCULOSKELETAL: no myalgias  PSYCH: no recent changes in mood    RADIOLOGY & ADDITIONAL TESTS:    Imaging Personally Reviewed:  [x] YES  [ ] NO    Consultant(s) Notes Reviewed:  [x] YES  [ ] NO    PHYSICAL EXAM:  T(C): 38.3 (05-28-25 @ 05:30), Max: 38.3 (05-28-25 @ 05:30)  HR: 95 (05-28-25 @ 07:26) (94 - 102)  BP: 164/74 (05-28-25 @ 05:30) (137/82 - 164/74)  RR: 18 (05-28-25 @ 05:30) (18 - 18)  SpO2: 98% (05-28-25 @ 07:26) (91% - 100%)    GENERAL: sleepy but wakes up to command. Oriented on exam   HEENT:  anicteric, moist mucous membranes  CHEST/LUNG:  CTA b/l, no rales, wheezes, + rhonchi  HEART:  RRR, S1, S2  ABDOMEN:  BS+, soft, nontender, nondistended  EXTREMITIES: no edema  NERVOUS SYSTEM: answers questions and follows commands appropriately  PSYCH: normal affect    Care Discussed with Consultants/Other Providers [x] YES  [ ] NO
Date/Time Patient Seen:  		  Referring MD:   Data Reviewed	       Patient is a 89y old  Female who presents with a chief complaint of AHRF 2/2 PNA (29 May 2025 11:01)      Subjective/HPI     PAST MEDICAL & SURGICAL HISTORY:  Seizures    History of CVA (cerebrovascular accident)    HTN (hypertension)    HLD (hyperlipidemia)    Hypothyroid    T2DM (type 2 diabetes mellitus)          Medication list         MEDICATIONS  (STANDING):  albuterol/ipratropium for Nebulization 3 milliLiter(s) Nebulizer every 6 hours  amLODIPine   Tablet 5 milliGRAM(s) Oral daily  benzonatate 100 milliGRAM(s) Oral every 8 hours  carBAMazepine ER Capsule 200 milliGRAM(s) Oral <User Schedule>  carBAMazepine ER Tablet 100 milliGRAM(s) Oral <User Schedule>  cefTRIAXone   IVPB 1000 milliGRAM(s) IV Intermittent every 24 hours  dextrose 5%. 1000 milliLiter(s) (50 mL/Hr) IV Continuous <Continuous>  dextrose 5%. 1000 milliLiter(s) (100 mL/Hr) IV Continuous <Continuous>  dextrose 50% Injectable 25 Gram(s) IV Push once  dextrose 50% Injectable 25 Gram(s) IV Push once  dextrose 50% Injectable 12.5 Gram(s) IV Push once  enoxaparin Injectable 40 milliGRAM(s) SubCutaneous every 24 hours  glucagon  Injectable 1 milliGRAM(s) IntraMuscular once  insulin lispro (ADMELOG) corrective regimen sliding scale   SubCutaneous three times a day before meals  insulin lispro (ADMELOG) corrective regimen sliding scale   SubCutaneous at bedtime  lactated ringers. 1000 milliLiter(s) (75 mL/Hr) IV Continuous <Continuous>  levETIRAcetam 250 milliGRAM(s) Oral <User Schedule>  levothyroxine 88 MICROGram(s) Oral daily  losartan 50 milliGRAM(s) Oral two times a day  rosuvastatin 10 milliGRAM(s) Oral at bedtime  sodium chloride 3%  Inhalation 4 milliLiter(s) Inhalation every 12 hours    MEDICATIONS  (PRN):  acetaminophen     Tablet .. 650 milliGRAM(s) Oral every 6 hours PRN Temp greater or equal to 38C (100.4F), Mild Pain (1 - 3)  dextrose Oral Gel 15 Gram(s) Oral once PRN Blood Glucose LESS THAN 70 milliGRAM(s)/deciliter         Vitals log        ICU Vital Signs Last 24 Hrs  T(C): 37.3 (30 May 2025 04:39), Max: 37.3 (29 May 2025 05:41)  T(F): 99.2 (30 May 2025 04:39), Max: 99.2 (30 May 2025 04:39)  HR: 88 (30 May 2025 04:39) (88 - 98)  BP: 159/84 (30 May 2025 04:39) (142/77 - 163/84)  BP(mean): --  ABP: --  ABP(mean): --  RR: 18 (30 May 2025 04:39) (18 - 18)  SpO2: 96% (30 May 2025 04:39) (96% - 98%)    O2 Parameters below as of 30 May 2025 04:39  Patient On (Oxygen Delivery Method): nasal cannula  O2 Flow (L/min): 2               Input and Output:  I&O's Detail    28 May 2025 07:01  -  29 May 2025 07:00  --------------------------------------------------------  IN:    Oral Fluid: 960 mL  Total IN: 960 mL    OUT:    Incontinent per Collection Bag (mL): 700 mL  Total OUT: 700 mL    Total NET: 260 mL      29 May 2025 07:01  -  30 May 2025 05:35  --------------------------------------------------------  IN:  Total IN: 0 mL    OUT:    Incontinent per Collection Bag (mL): 700 mL  Total OUT: 700 mL    Total NET: -700 mL          Lab Data                        9.1    9.89  )-----------( 236      ( 29 May 2025 05:48 )             27.7     05-29    142  |  108  |  19  ----------------------------<  142[H]  3.6   |  28  |  0.72    Ca    8.3[L]      29 May 2025 05:48  Mg     2.1     05-29    TPro  6.4  /  Alb  1.8[L]  /  TBili  0.2  /  DBili  x   /  AST  22  /  ALT  20  /  AlkPhos  70  05-29            Review of Systems	      Objective     Physical Examination    heart s1s2  lung dc bs  head nc  head at      Pertinent Lab findings & Imaging      Enrrique:  NO   Adequate UO     I&O's Detail    28 May 2025 07:01  -  29 May 2025 07:00  --------------------------------------------------------  IN:    Oral Fluid: 960 mL  Total IN: 960 mL    OUT:    Incontinent per Collection Bag (mL): 700 mL  Total OUT: 700 mL    Total NET: 260 mL      29 May 2025 07:01  -  30 May 2025 05:35  --------------------------------------------------------  IN:  Total IN: 0 mL    OUT:    Incontinent per Collection Bag (mL): 700 mL  Total OUT: 700 mL    Total NET: -700 mL               Discussed with:     Cultures:	        Radiology                            
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 presented with generalized weakness nonproductive cough questionable syncope admitted with  Acute hypoxic respiratory failure  Multifocal  community-acquired pneumonia/questionable aspiration  Reactive airway disease: Improved  Lactic acidosis: Resolved    Patient Seen and examined   WBC inormal  Afebrile  Hemodynamically stable  Trend WBC and temperature curve through hospital course   remains on 2 L nasal cannula    Cultures:  Blood culture: 2 sets: May 26:  preliminary results negative, follow until completion  Procalcitonin: 1.34 from May 27  Urine for Streptococcus  antigen: Ordered, pending   urine for Legionella  antigen: Ordered  RVP: Negative on admission    Imaging:    CT Chest No Cont (05.27.25 @ 01:48) Findings represent multifocal impaction with associated   infectious/inflammatory process.    Antibiotics:   Can continue with Rocephin 1 g IV daily, azithromycin 500 mg IV once daily up until urine for Legionella antigen test as performed and resulted, will order for procalcitonin for tomorrow morning     recommend chest PT and mucolytic to facilitate secretion mobilization  Incentive spirometry   management of reactive airway disease as per primary team      Daily CBC, CMP, Mg, PO4 while on Antibiotics     Plan explained to patient   D/w Primary team     ________________________________    Last 24 hours:    Intermittent nonproductive cough  Denied any fever chills nausea vomiting diarrhea or new rash, also denies any dysuria frequency flank pain sore throat  dizziness at rest    Further ROS:  All systems reviewed. No other complaints besides mentioned above     ______________________________  Allergies    No Known Allergies    Intolerances    aspirin (Unknown)     medications:  azithromycin  IVPB 500 milliGRAM(s) IV Intermittent every 24 hours  cefTRIAXone   IVPB 1000 milliGRAM(s) IV Intermittent every 24 hours  acetaminophen     Tablet .. 650 milliGRAM(s) Oral every 6 hours PRN  albuterol/ipratropium for Nebulization 3 milliLiter(s) Nebulizer every 6 hours  amLODIPine   Tablet 5 milliGRAM(s) Oral daily  benzonatate 100 milliGRAM(s) Oral every 8 hours  carBAMazepine ER Capsule 200 milliGRAM(s) Oral <User Schedule>  carBAMazepine ER Tablet 100 milliGRAM(s) Oral <User Schedule>  dextrose 5%. 1000 milliLiter(s) IV Continuous <Continuous>  dextrose 5%. 1000 milliLiter(s) IV Continuous <Continuous>  dextrose 50% Injectable 25 Gram(s) IV Push once  dextrose 50% Injectable 12.5 Gram(s) IV Push once  dextrose 50% Injectable 25 Gram(s) IV Push once  dextrose Oral Gel 15 Gram(s) Oral once PRN  enoxaparin Injectable 40 milliGRAM(s) SubCutaneous every 24 hours  glucagon  Injectable 1 milliGRAM(s) IntraMuscular once  insulin lispro (ADMELOG) corrective regimen sliding scale   SubCutaneous three times a day before meals  insulin lispro (ADMELOG) corrective regimen sliding scale   SubCutaneous at bedtime  lactated ringers. 1000 milliLiter(s) IV Continuous <Continuous>  levETIRAcetam 250 milliGRAM(s) Oral <User Schedule>  levothyroxine 88 MICROGram(s) Oral daily  losartan 50 milliGRAM(s) Oral two times a day  rosuvastatin 10 milliGRAM(s) Oral at bedtime  sodium chloride 3%  Inhalation 4 milliLiter(s) Inhalation every 12 hours      _________________    PHYSICAL EXAM:    Objective:  Vital Signs Last 24 Hrs  T(C): 38.3 (28 May 2025 05:30), Max: 38.3 (28 May 2025 05:30)  T(F): 101 (28 May 2025 05:30), Max: 101 (28 May 2025 05:30)  HR: 102 (28 May 2025 05:30) (94 - 102)  BP: 164/74 (28 May 2025 05:30) (137/82 - 164/74)    RR: 18 (28 May 2025 05:30) (18 - 18)  SpO2: 91% (28 May 2025 05:30) (91% - 100%)    Parameters below as of 28 May 2025 05:30  Patient On (Oxygen Delivery Method): nasal cannula  O2 Flow (L/min): 2      General: No acute distress   unless coughing.  Lying in bed comfortably   Neuro: AAO*3-4, No motor, sensory, or cranial nerve deficit  HEENT: Pupils equal, reactive to light, Oral mucosa moist  PULM: Clear to auscultation bilaterally, no significant adventitious breath sounds   CVS: Regular rhythm and controlled rate, no murmurs, rubs, or gallops  ABD: Soft, nondistended, nontender, normoactive bowel sounds, no CVA tenderness  EXT: No b/l LE edema, nontender with pedal pulse palpable   SKIN: Warm and well perfused, no acute rashes   ______________  LABS, MICROBIOLOGY, RADIOLOGY & ADDITIONAL STUDIES: Reviewed 
Patient is a 89y old  Female who presents with a chief complaint of AHRF 2/2 PNA (27 May 2025 09:10)      Subjective:  INTERVAL HPI/OVERNIGHT EVENTS: Patient seen and examined at bedside.  Patient is sob   MEDICATIONS  (STANDING):  albuterol/ipratropium for Nebulization 3 milliLiter(s) Nebulizer every 6 hours  amLODIPine   Tablet 5 milliGRAM(s) Oral daily  azithromycin  IVPB 500 milliGRAM(s) IV Intermittent every 24 hours  benzonatate 100 milliGRAM(s) Oral every 8 hours  carBAMazepine ER Capsule 200 milliGRAM(s) Oral <User Schedule>  carBAMazepine ER Tablet 100 milliGRAM(s) Oral <User Schedule>  cefTRIAXone   IVPB 1000 milliGRAM(s) IV Intermittent every 24 hours  dextrose 5%. 1000 milliLiter(s) (100 mL/Hr) IV Continuous <Continuous>  dextrose 5%. 1000 milliLiter(s) (50 mL/Hr) IV Continuous <Continuous>  dextrose 50% Injectable 25 Gram(s) IV Push once  dextrose 50% Injectable 12.5 Gram(s) IV Push once  dextrose 50% Injectable 25 Gram(s) IV Push once  enoxaparin Injectable 40 milliGRAM(s) SubCutaneous every 24 hours  glucagon  Injectable 1 milliGRAM(s) IntraMuscular once  insulin lispro (ADMELOG) corrective regimen sliding scale   SubCutaneous three times a day before meals  insulin lispro (ADMELOG) corrective regimen sliding scale   SubCutaneous at bedtime  lactated ringers. 1000 milliLiter(s) (75 mL/Hr) IV Continuous <Continuous>  levETIRAcetam 250 milliGRAM(s) Oral <User Schedule>  levothyroxine 88 MICROGram(s) Oral daily  losartan 50 milliGRAM(s) Oral two times a day  rosuvastatin 10 milliGRAM(s) Oral at bedtime  sodium chloride 3%  Inhalation 4 milliLiter(s) Inhalation every 12 hours    MEDICATIONS  (PRN):  acetaminophen     Tablet .. 650 milliGRAM(s) Oral every 6 hours PRN Temp greater or equal to 38C (100.4F), Mild Pain (1 - 3)  dextrose Oral Gel 15 Gram(s) Oral once PRN Blood Glucose LESS THAN 70 milliGRAM(s)/deciliter      Allergies    No Known Allergies    Intolerances    aspirin (Unknown)      REVIEW OF SYSTEMS:  CONSTITUTIONAL: No fever or chills  HEENT:  No headache, no sore throat  RESPIRATORY:  +  cough or shortness of breath  CARDIOVASCULAR: No chest pain or palpitations  GASTROINTESTINAL: No abd pain, nausea, vomiting, or diarrhea      Objective:  Vital Signs Last 24 Hrs  T(C): 36.9 (27 May 2025 16:40), Max: 37.5 (26 May 2025 20:53)  T(F): 98.4 (27 May 2025 16:40), Max: 99.5 (26 May 2025 20:53)  HR: 100 (27 May 2025 16:40) (70 - 124)  BP: 148/75 (27 May 2025 16:40) (126/76 - 148/75)  BP(mean): --  RR: 18 (27 May 2025 16:40) (16 - 18)  SpO2: 96% (27 May 2025 16:40) (96% - 100%)    Parameters below as of 27 May 2025 16:40  Patient On (Oxygen Delivery Method): nasal cannula  O2 Flow (L/min): 2      GENERAL: NAD, lying in bed   HEAD:  Normocephalic  EYES:  conjunctiva and sclera clear  ENT: Moist mucous membranes  NECK: Supple  CHEST/LUNG: b/l wheezing. Unlabored respirations  HEART: Regular rate and rhythm; S1S2+  ABDOMEN: Bowel sounds present; Soft, Nontender, Nondistended.   EXTREMITIES:  + distal Peripheral Pulses;  No cyanosis, or edema  NERVOUS SYSTEM:  Alert & Oriented X3;  No gross focal deficits   MSK: moves all extremities  SKIN: No rashes     LABS:                        8.5    6.09  )-----------( 184      ( 27 May 2025 04:40 )             26.2     27 May 2025 04:40    141    |  108    |  24     ----------------------------<  175    3.9     |  26     |  0.91     Ca    7.4        27 May 2025 04:40    TPro  6.1    /  Alb  1.8    /  TBili  0.2    /  DBili  x      /  AST  21     /  ALT  15     /  AlkPhos  57     27 May 2025 04:40    PT/INR - ( 27 May 2025 00:26 )   PT: 14.3 sec;   INR: 1.21 ratio         PTT - ( 27 May 2025 00:26 )  PTT:24.7 sec  Urinalysis Basic - ( 27 May 2025 04:40 )    Color: x / Appearance: x / SG: x / pH: x  Gluc: 175 mg/dL / Ketone: x  / Bili: x / Urobili: x   Blood: x / Protein: x / Nitrite: x   Leuk Esterase: x / RBC: x / WBC x   Sq Epi: x / Non Sq Epi: x / Bacteria: x      CAPILLARY BLOOD GLUCOSE      POCT Blood Glucose.: 145 mg/dL (27 May 2025 16:55)  POCT Blood Glucose.: 142 mg/dL (27 May 2025 12:34)  POCT Blood Glucose.: 144 mg/dL (27 May 2025 08:08)  POCT Blood Glucose.: 220 mg/dL (26 May 2025 21:10)        Urinalysis with Rflx Culture (collected 05-27-25 @ 02:37)        RADIOLOGY & ADDITIONAL TESTS:    Personally reviewed.     Consultant(s) Notes Reviewed:  [x] YES  [ ] NO    Plan of care discussed with patient /family Son Jim at bedside; all questions answered

## 2025-05-30 NOTE — PROGRESS NOTE ADULT - PROVIDER SPECIALTY LIST ADULT
Pulmonology
Pulmonology
Hospitalist
Infectious Disease
Hospitalist
Hospitalist
fall precautions
Transposition Flap Text: The defect edges were debeveled with a #15 scalpel blade.  Given the location of the defect and the proximity to free margins a transposition flap was deemed most appropriate.  Using a sterile surgical marker, an appropriate transposition flap was drawn incorporating the defect.    The area thus outlined was incised deep to adipose tissue with a #15 scalpel blade.  The skin margins were undermined to an appropriate distance in all directions utilizing iris scissors.

## 2025-05-30 NOTE — SOCIAL WORK PROGRESS NOTE - NSSWPROGRESSNOTE_GEN_ALL_CORE
pt for dc today to [facility]/ 2pm pick via nwems/ pt and son (at bedside) in agreement. all paperwork to accompany pt/ no further sw services indicated at this time. plan: DC to [facility] / via [facility]  at 2pm.

## 2025-05-30 NOTE — DISCHARGE NOTE NURSING/CASE MANAGEMENT/SOCIAL WORK - FINANCIAL ASSISTANCE
[facility] provides services at a reduced cost to those who are determined to be eligible through [facility]’s financial assistance program. Information regarding [facility]’s financial assistance program can be found by going to [email or web address] or by calling [phone].

## 2025-05-30 NOTE — DISCHARGE NOTE PROVIDER - HOSPITAL COURSE
89 y/o F PMH seizures, hemorrhagic CVA 2015, HTN, HLD, hypothyroidism who presents to the ED for evaluation of worsening cough for one week with generalized weakness. She was not eating well for 2 days prior to admission.     Patient was found to have acute hypoxic respiratory failure secondary to multifocal PNA. CT chest showed mucous impaction with associated infectious/inflammatory process. Patient was started on mucolytics and standing nebulizers. Patient was weaned to 1LNC. will need to continue with ambulation/weaning at [facility]. Patient was recommended for outpatient sleep study.     Patient was started on Rocephin/azithromycin. azithromycin was discontinued as urine legionella/strep pneumo was negative. Patient will be on Augmentin 500mg TID until 6/1.     PT eval noted. patient recommended for TAYLOR.   Patient's son was updated. Both patient and son in agreement for TAYLOR.     She was evaluated by speech and was recommended regular diet.     consults:   ID: Dr Red Nunez  Pulm: Dr Daily     ---  TIME SPENT:  I, the attending physician, was physically present for the key portions of the evaluation and management (E/M) service provided. The total amount of time spent reviewing the hospital notes, laboratory values, imaging findings, assessing/counseling the patient, discussing with consultant physicians, social work, nursing staff was 45 minutes  This time spent excludes time spent teaching and/or separately reported services.   ---

## 2025-05-30 NOTE — PROGRESS NOTE ADULT - ASSESSMENT
89 y/o F with PMHx seizures, hemorrhagic CVA in 2015, HTN, HLD, T2DM, hypothyroid presenting to the ED c/o worsening cough x 1 week with associated generalized weakness and decreased po intake.     pna  hld  htn  op  oa  thyroid disease  seizure disorder  bronchiectasis  DM  CVA in 2015  Anemia    Swallow Studies noted  vs noted  on ABX  meds reviewed  SW f/u noted - plan for TAYLOR    cvs rx regimen  BP control  proBNP noted  TTE -   HD monitoring  I nisha  NEBS and Mucolytics for mucoid impaction and expectoration assist  fio2 support as needed - goal sat > 88 pct  oral hygiene - skin care  emp ABX - cx - Biomarkers   chest imaging - c/w poss LRTI - PNA - Bronchiectasis  DVT p  AED - Seizure precs  Nutritional assessment

## 2025-05-30 NOTE — DISCHARGE NOTE PROVIDER - NSCORESITESY/N_GEN_A_CORE_RD
Show Aperture Variable?: Yes Duration Of Freeze Thaw-Cycle (Seconds): 0 Detail Level: Detailed Yes Post-Care Instructions: I reviewed with the patient in detail post-care instructions. Patient is to wear sunprotection, and avoid picking at any of the treated lesions. Pt may apply Vaseline to crusted or scabbing areas. Consent: The patient's consent was obtained including but not limited to risks of crusting, scabbing, blistering, scarring, darker or lighter pigmentary change, recurrence, incomplete removal and infection. Render Note In Bullet Format When Appropriate: No Medical Necessity Information: It is in your best interest to select a reason for this procedure from the list below. All of these items fulfill various CMS LCD requirements except the new and changing color options. Spray Paint Text: The liquid nitrogen was applied to the skin utilizing a spray paint frosting technique. Medical Necessity Clause: This procedure was medically necessary because the lesions that were treated were:

## 2025-05-30 NOTE — DISCHARGE NOTE PROVIDER - CARE PROVIDER_API CALL
Kimberly Mg  Pulmonary Disease  [address], [address]  [place] [address]  Phone: [phone]  Fax: [phone]  Follow Up Time: 1 month

## 2025-05-30 NOTE — PROGRESS NOTE ADULT - TIME BILLING
Plan d/w patient and primary team   We will sign off for now.   Please call us with any concerns or questions.     Red Nunez MD   Division of Infectious Diseases  [facility]   Cell [phone] between 8am and 6pm   After 6pm and weekends please call ID service at [phone].
Please call us with any concerns or questions.   We will continue to follow.   Further recommendations to follow based on clinical  progression as well as availability of lab data    Red Nunez MD   Division of Infectious Diseases  [facility]   Cell [phone] between 8am and 6pm   After 6pm and weekends please call ID service at [phone].
time spent reviewing the hospital notes, laboratory values, imaging findings, assessing/counseling the patient, discussing with consultant physicians, social work, nursing staff.  This time spent excludes time spent teaching and/or separately reported services.   ---
Please call us with any concerns or questions.   We will continue to follow.   Further recommendations to follow based on clinical  progression as well as availability of lab data    Red Nunez MD   Division of Infectious Diseases  [facility]   Cell [phone] between 8am and 6pm   After 6pm and weekends please call ID service at [phone].
time spent reviewing the hospital notes, laboratory values, imaging findings, assessing/counseling the patient, discussing with consultant physicians, social work, nursing staff. This time spent excludes time spent teaching and/or separately reported services.   ---
direct patient care including but not limited to reviewing chart, medications ,laboratory data, imaging reports, discussion of plan of care with consultants on the case, coordination of care with multidisciplinary team involved in the case and discussion of plan with patient.  Patient and family agreeable to plan of care and verbalized understanding the anticipated hospital course and treatment plan.

## 2025-05-30 NOTE — PROGRESS NOTE ADULT - REASON FOR ADMISSION
AHRF 2/2 PNA

## 2025-05-30 NOTE — DISCHARGE NOTE PROVIDER - NSDCCPTREATMENT_GEN_ALL_CORE_FT
PRINCIPAL PROCEDURE  Procedure: CT chest wo con  Findings and Treatment: Findings represent multifocal impaction with associated   infectious/inflammatory process. Question areas of bronchiectasis and   peripheral cysts.      SECONDARY PROCEDURE  Procedure: 2D echo  Findings and Treatment: 1. Left ventricular systolic function is normal with an ejection fraction of 60 % by Haywood's method of disks.   2. Normal right ventricular cavity size and normal right ventricular systolic function.   3. Left atrium is normal in size.   4. The right atrium is normal in size.   5. Tricuspid aortic valve with normal leaflet excursion. There is mild calcification of the aortic valve leaflets.   6. Mild mitral valve leaflet calcification.   7. Mild mitral regurgitation.   8. Moderate tricuspid regurgitation.   9. The inferior vena cava is normal in size measuring 1.56 cm in diameter, (normal <2.1cm) with normal inspiratory collapse (normal >50%) consistent with normal right atrial pressure (~3, range 0-5mmHg).  10. Estimated pulmonary artery systolic pressure is 41 mmHg, consistent with mild pulmonary hypertension.

## 2025-05-30 NOTE — DISCHARGE NOTE PROVIDER - NSDCCPCAREPLAN_GEN_ALL_CORE_FT
PRINCIPAL DISCHARGE DIAGNOSIS  Diagnosis: Sepsis due to pneumonia  Assessment and Plan of Treatment: You were treated for multifocal pneumonia.   You were started on intravenous antibiotics and will be switched to Augmentin 500mg TID until 6/1.   Continue to wean your NC at [facility] as you become more mobile.   Follow up with pulmonology in 1-2 weeks      SECONDARY DISCHARGE DIAGNOSES  Diagnosis: Acute hypoxic respiratory failure  Assessment and Plan of Treatment: You had acute hypoxia secondary to mucous impactment and pneumonia.   You were weaned to 1L NC. continue to wean as tolerated at [facility].   Continue nebs for 2 more days.    Diagnosis: Seizures  Assessment and Plan of Treatment: Continue your home keppra and carbamezapine doses.    Diagnosis: T2DM (type 2 diabetes mellitus)  Assessment and Plan of Treatment: Resume home metformin.  hgba1c 6.5%    Diagnosis: Hypothyroid  Assessment and Plan of Treatment: Continue home levothyroxine.    Diagnosis: HTN (hypertension)  Assessment and Plan of Treatment: Continue norvasc 5mg daily and irbesartan.

## 2025-06-03 RX ORDER — METFORMIN HYDROCHLORIDE 850 MG/1
1 TABLET ORAL
Refills: 0 | DISCHARGE

## 2025-06-03 RX ORDER — LEVOTHYROXINE SODIUM 300 MCG
1 TABLET ORAL
Refills: 0 | DISCHARGE

## 2025-06-03 RX ORDER — ROSUVASTATIN CALCIUM 20 MG/1
1 TABLET, FILM COATED ORAL
Refills: 0 | DISCHARGE

## 2025-06-03 RX ORDER — AMLODIPINE BESYLATE 10 MG/1
1 TABLET ORAL
Refills: 0 | DISCHARGE

## 2025-06-03 RX ORDER — LEVETIRACETAM 10 MG/ML
1 INJECTION, SOLUTION INTRAVENOUS
Refills: 0 | DISCHARGE

## 2025-06-03 RX ORDER — CARBAMAZEPINE 200 MG/1
1 TABLET ORAL
Refills: 0 | DISCHARGE

## 2025-06-03 RX ORDER — CARBAMAZEPINE 200 MG/1
2 TABLET ORAL
Refills: 0 | DISCHARGE

## 2025-06-03 RX ORDER — IRBESARTAN 75 MG/1
1 TABLET ORAL
Refills: 0 | DISCHARGE

## 2025-06-16 ENCOUNTER — TRANSCRIPTION ENCOUNTER (OUTPATIENT)
Age: 89
End: 2025-06-16

## 2025-06-29 NOTE — DISCHARGE NOTE PROVIDER - ATTENDING ATTESTATION STATEMENT
Chief Complaint  Diabetes    Referred By: MAXINE Valentine    Subjective          Patient or patient representative verbalized consent for the use of Ambient Listening during the visit with  MAXINE Karimi for chart documentation. 6/30/2025  08:10 EDT    Paras Cano presents to Arkansas State Psychiatric Hospital Dragon Dictation System.   I have personally seen and examined the patient. I have collaborated with and supervised the

## 2025-06-30 PROBLEM — I10 ESSENTIAL (PRIMARY) HYPERTENSION: Chronic | Status: ACTIVE | Noted: 2025-05-26

## 2025-06-30 PROBLEM — E11.9 TYPE 2 DIABETES MELLITUS WITHOUT COMPLICATIONS: Chronic | Status: ACTIVE | Noted: 2025-05-26

## 2025-06-30 PROBLEM — R56.9 UNSPECIFIED CONVULSIONS: Chronic | Status: ACTIVE | Noted: 2025-05-26

## 2025-06-30 PROBLEM — Z86.73 PERSONAL HISTORY OF TRANSIENT ISCHEMIC ATTACK (TIA), AND CEREBRAL INFARCTION WITHOUT RESIDUAL DEFICITS: Chronic | Status: ACTIVE | Noted: 2025-05-26

## 2025-06-30 PROBLEM — E78.5 HYPERLIPIDEMIA, UNSPECIFIED: Chronic | Status: ACTIVE | Noted: 2025-05-26

## 2025-06-30 PROBLEM — E03.9 HYPOTHYROIDISM, UNSPECIFIED: Chronic | Status: ACTIVE | Noted: 2025-05-26

## 2025-07-08 ENCOUNTER — APPOINTMENT (OUTPATIENT)
Dept: NEUROLOGY | Facility: CLINIC | Age: 89
End: 2025-07-08

## 2025-07-16 NOTE — ED POST DISCHARGE NOTE - NS ED POST DC CALL 1
Received Choice Form at: 1219  Agency/Facility Name: [facility]  Sent Referral per Choice Form at: Verbal pr ALEJO RN     Patient contacted

## 2025-07-18 ENCOUNTER — INPATIENT (INPATIENT)
Facility: HOSPITAL | Age: 89
LOS: 5 days | Discharge: NURSING, REHAB OR LONG-TERM CARE | DRG: 871 | End: 2025-07-24
Attending: HOSPITALIST | Admitting: INTERNAL MEDICINE
Payer: MEDICARE

## 2025-07-18 VITALS
RESPIRATION RATE: 19 BRPM | HEART RATE: 105 BPM | HEIGHT: 65 IN | WEIGHT: 154.98 LBS | DIASTOLIC BLOOD PRESSURE: 59 MMHG | TEMPERATURE: 102 F | SYSTOLIC BLOOD PRESSURE: 101 MMHG | OXYGEN SATURATION: 96 %

## 2025-07-18 DIAGNOSIS — Z86.69 PERSONAL HISTORY OF OTHER DISEASES OF THE NERVOUS SYSTEM AND SENSE ORGANS: Chronic | ICD-10-CM

## 2025-07-18 DIAGNOSIS — J69.0 PNEUMONITIS DUE TO INHALATION OF FOOD AND VOMIT: ICD-10-CM

## 2025-07-18 LAB
ALBUMIN SERPL ELPH-MCNC: 2.3 G/DL — LOW (ref 3.3–5)
ALP SERPL-CCNC: 61 U/L — SIGNIFICANT CHANGE UP (ref 40–120)
ALT FLD-CCNC: 9 U/L — LOW (ref 12–78)
ANION GAP SERPL CALC-SCNC: 8 MMOL/L — SIGNIFICANT CHANGE UP (ref 5–17)
APTT BLD: 25 SEC — LOW (ref 26.1–36.8)
AST SERPL-CCNC: 14 U/L — LOW (ref 15–37)
BASOPHILS # BLD AUTO: 0.02 K/UL — SIGNIFICANT CHANGE UP (ref 0–0.2)
BASOPHILS # BLD MANUAL: 0 K/UL — SIGNIFICANT CHANGE UP (ref 0–0.2)
BASOPHILS NFR BLD AUTO: 0.4 % — SIGNIFICANT CHANGE UP (ref 0–2)
BASOPHILS NFR BLD MANUAL: 0 % — SIGNIFICANT CHANGE UP (ref 0–2)
BILIRUB SERPL-MCNC: 0.2 MG/DL — SIGNIFICANT CHANGE UP (ref 0.2–1.2)
BUN SERPL-MCNC: 24 MG/DL — HIGH (ref 7–23)
CALCIUM SERPL-MCNC: 7.5 MG/DL — LOW (ref 8.5–10.1)
CHLORIDE SERPL-SCNC: 94 MMOL/L — LOW (ref 96–108)
CO2 SERPL-SCNC: 27 MMOL/L — SIGNIFICANT CHANGE UP (ref 22–31)
CREAT SERPL-MCNC: 0.91 MG/DL — SIGNIFICANT CHANGE UP (ref 0.5–1.3)
EGFR: 58 ML/MIN/1.73M2 — LOW
EGFR: 58 ML/MIN/1.73M2 — LOW
EOSINOPHIL # BLD AUTO: 0 K/UL — SIGNIFICANT CHANGE UP (ref 0–0.5)
EOSINOPHIL # BLD MANUAL: 0 K/UL — SIGNIFICANT CHANGE UP (ref 0–0.5)
EOSINOPHIL NFR BLD AUTO: 0 % — SIGNIFICANT CHANGE UP (ref 0–6)
EOSINOPHIL NFR BLD MANUAL: 0 % — SIGNIFICANT CHANGE UP (ref 0–6)
GLUCOSE BLDC GLUCOMTR-MCNC: 189 MG/DL — HIGH (ref 70–99)
GLUCOSE SERPL-MCNC: 240 MG/DL — HIGH (ref 70–99)
HCT VFR BLD CALC: 29.2 % — LOW (ref 34.5–45)
HGB BLD-MCNC: 9.6 G/DL — LOW (ref 11.5–15.5)
IMM GRANULOCYTES # BLD AUTO: 0.04 K/UL — SIGNIFICANT CHANGE UP (ref 0–0.07)
IMM GRANULOCYTES NFR BLD AUTO: 0.9 % — SIGNIFICANT CHANGE UP (ref 0–0.9)
INR BLD: 1.09 RATIO — SIGNIFICANT CHANGE UP (ref 0.85–1.16)
LACTATE SERPL-SCNC: 1.8 MMOL/L — SIGNIFICANT CHANGE UP (ref 0.7–2)
LYMPHOCYTES # BLD AUTO: 0.3 K/UL — LOW (ref 1–3.3)
LYMPHOCYTES # BLD MANUAL: 0.28 K/UL — LOW (ref 1–3.3)
LYMPHOCYTES NFR BLD AUTO: 6.5 % — LOW (ref 13–44)
LYMPHOCYTES NFR BLD MANUAL: 6 % — LOW (ref 13–44)
MANUAL NEUTROPHIL BANDS #: 0.05 K/UL — SIGNIFICANT CHANGE UP (ref 0–0.84)
MANUAL SMEAR VERIFICATION: SIGNIFICANT CHANGE UP
MCHC RBC-ENTMCNC: 27 PG — SIGNIFICANT CHANGE UP (ref 27–34)
MCHC RBC-ENTMCNC: 32.9 G/DL — SIGNIFICANT CHANGE UP (ref 32–36)
MCV RBC AUTO: 82.3 FL — SIGNIFICANT CHANGE UP (ref 80–100)
MONOCYTES # BLD AUTO: 0.1 K/UL — SIGNIFICANT CHANGE UP (ref 0–0.9)
MONOCYTES # BLD MANUAL: 0.05 K/UL — SIGNIFICANT CHANGE UP (ref 0–0.9)
MONOCYTES NFR BLD AUTO: 2.2 % — SIGNIFICANT CHANGE UP (ref 2–14)
MONOCYTES NFR BLD MANUAL: 1 % — LOW (ref 2–14)
NEUTROPHILS # BLD AUTO: 4.19 K/UL — SIGNIFICANT CHANGE UP (ref 1.8–7.4)
NEUTROPHILS # BLD MANUAL: 4.28 K/UL — SIGNIFICANT CHANGE UP (ref 1.8–7.4)
NEUTROPHILS NFR BLD AUTO: 90 % — HIGH (ref 43–77)
NEUTROPHILS NFR BLD MANUAL: 92 % — HIGH (ref 43–77)
NEUTS BAND # BLD: 1 % — SIGNIFICANT CHANGE UP (ref 0–8)
NEUTS BAND NFR BLD: 1 % — SIGNIFICANT CHANGE UP (ref 0–8)
NRBC # BLD AUTO: 0 K/UL — SIGNIFICANT CHANGE UP (ref 0–0)
NRBC # FLD: 0 K/UL — SIGNIFICANT CHANGE UP (ref 0–0)
NRBC BLD AUTO-RTO: 0 /100 WBCS — SIGNIFICANT CHANGE UP (ref 0–0)
PLAT MORPH BLD: NORMAL — SIGNIFICANT CHANGE UP
PLATELET # BLD AUTO: 180 K/UL — SIGNIFICANT CHANGE UP (ref 150–400)
PMV BLD: 9.6 FL — SIGNIFICANT CHANGE UP (ref 7–13)
POTASSIUM SERPL-MCNC: 4.3 MMOL/L — SIGNIFICANT CHANGE UP (ref 3.5–5.3)
POTASSIUM SERPL-SCNC: 4.3 MMOL/L — SIGNIFICANT CHANGE UP (ref 3.5–5.3)
PROCALCITONIN SERPL-MCNC: 0.11 NG/ML — HIGH
PROT SERPL-MCNC: 6.4 G/DL — SIGNIFICANT CHANGE UP (ref 6–8.3)
PROTHROM AB SERPL-ACNC: 12.9 SEC — SIGNIFICANT CHANGE UP (ref 9.9–13.4)
RBC # BLD: 3.55 M/UL — LOW (ref 3.8–5.2)
RBC # FLD: 14.4 % — SIGNIFICANT CHANGE UP (ref 10.3–14.5)
RBC BLD AUTO: SIGNIFICANT CHANGE UP
SODIUM SERPL-SCNC: 129 MMOL/L — LOW (ref 135–145)
WBC # BLD: 4.65 K/UL — SIGNIFICANT CHANGE UP (ref 3.8–10.5)
WBC # FLD AUTO: 4.65 K/UL — SIGNIFICANT CHANGE UP (ref 3.8–10.5)

## 2025-07-18 PROCEDURE — 85610 PROTHROMBIN TIME: CPT

## 2025-07-18 PROCEDURE — 99285 EMERGENCY DEPT VISIT HI MDM: CPT

## 2025-07-18 PROCEDURE — 82962 GLUCOSE BLOOD TEST: CPT

## 2025-07-18 PROCEDURE — 36415 COLL VENOUS BLD VENIPUNCTURE: CPT

## 2025-07-18 PROCEDURE — 71250 CT THORAX DX C-: CPT

## 2025-07-18 PROCEDURE — 71045 X-RAY EXAM CHEST 1 VIEW: CPT | Mod: 26

## 2025-07-18 PROCEDURE — 84145 PROCALCITONIN (PCT): CPT

## 2025-07-18 PROCEDURE — 93010 ELECTROCARDIOGRAM REPORT: CPT

## 2025-07-18 PROCEDURE — 93005 ELECTROCARDIOGRAM TRACING: CPT

## 2025-07-18 PROCEDURE — 99497 ADVNCD CARE PLAN 30 MIN: CPT | Mod: 25

## 2025-07-18 PROCEDURE — 85025 COMPLETE CBC W/AUTO DIFF WBC: CPT

## 2025-07-18 PROCEDURE — 71250 CT THORAX DX C-: CPT | Mod: 26

## 2025-07-18 PROCEDURE — 99223 1ST HOSP IP/OBS HIGH 75: CPT

## 2025-07-18 PROCEDURE — 83605 ASSAY OF LACTIC ACID: CPT

## 2025-07-18 PROCEDURE — 87040 BLOOD CULTURE FOR BACTERIA: CPT

## 2025-07-18 PROCEDURE — 94640 AIRWAY INHALATION TREATMENT: CPT

## 2025-07-18 PROCEDURE — 71045 X-RAY EXAM CHEST 1 VIEW: CPT

## 2025-07-18 PROCEDURE — 85730 THROMBOPLASTIN TIME PARTIAL: CPT

## 2025-07-18 PROCEDURE — 80053 COMPREHEN METABOLIC PANEL: CPT

## 2025-07-18 RX ORDER — SODIUM CHLORIDE 9 G/1000ML
1000 INJECTION, SOLUTION INTRAVENOUS
Refills: 0 | Status: DISCONTINUED | OUTPATIENT
Start: 2025-07-18 | End: 2025-07-24

## 2025-07-18 RX ORDER — PIPERACILLIN-TAZO-DEXTROSE,ISO 3.375G/5
3.38 IV SOLUTION, PIGGYBACK PREMIX FROZEN(ML) INTRAVENOUS EVERY 8 HOURS
Refills: 0 | Status: DISCONTINUED | OUTPATIENT
Start: 2025-07-18 | End: 2025-07-21

## 2025-07-18 RX ORDER — BENZONATATE 100 MG
100 CAPSULE ORAL EVERY 8 HOURS
Refills: 0 | Status: DISCONTINUED | OUTPATIENT
Start: 2025-07-18 | End: 2025-07-24

## 2025-07-18 RX ORDER — ENOXAPARIN SODIUM 100 MG/ML
40 INJECTION SUBCUTANEOUS EVERY 24 HOURS
Refills: 0 | Status: DISCONTINUED | OUTPATIENT
Start: 2025-07-18 | End: 2025-07-24

## 2025-07-18 RX ORDER — ALBUTEROL SULFATE 2.5 MG/3ML
2.5 VIAL, NEBULIZER (ML) INHALATION ONCE
Refills: 0 | Status: COMPLETED | OUTPATIENT
Start: 2025-07-18 | End: 2025-07-18

## 2025-07-18 RX ORDER — PIPERACILLIN-TAZO-DEXTROSE,ISO 3.375G/5
3.38 IV SOLUTION, PIGGYBACK PREMIX FROZEN(ML) INTRAVENOUS ONCE
Refills: 0 | Status: COMPLETED | OUTPATIENT
Start: 2025-07-18 | End: 2025-07-18

## 2025-07-18 RX ORDER — DEXTROSE 50 % IN WATER 50 %
12.5 SYRINGE (ML) INTRAVENOUS ONCE
Refills: 0 | Status: DISCONTINUED | OUTPATIENT
Start: 2025-07-18 | End: 2025-07-24

## 2025-07-18 RX ORDER — DEXTROMETHORPHAN HBR, GUAIFENESIN 200 MG/10ML
600 LIQUID ORAL EVERY 12 HOURS
Refills: 0 | Status: COMPLETED | OUTPATIENT
Start: 2025-07-18 | End: 2025-07-23

## 2025-07-18 RX ORDER — MELATONIN 5 MG
3 TABLET ORAL AT BEDTIME
Refills: 0 | Status: DISCONTINUED | OUTPATIENT
Start: 2025-07-18 | End: 2025-07-24

## 2025-07-18 RX ORDER — METHYLPREDNISOLONE ACETATE 80 MG/ML
125 INJECTION, SUSPENSION INTRA-ARTICULAR; INTRALESIONAL; INTRAMUSCULAR; SOFT TISSUE ONCE
Refills: 0 | Status: COMPLETED | OUTPATIENT
Start: 2025-07-18 | End: 2025-07-18

## 2025-07-18 RX ORDER — ACETAMINOPHEN 500 MG/5ML
1000 LIQUID (ML) ORAL ONCE
Refills: 0 | Status: COMPLETED | OUTPATIENT
Start: 2025-07-18 | End: 2025-07-18

## 2025-07-18 RX ORDER — VANCOMYCIN HCL IN 5 % DEXTROSE 1.5G/250ML
1000 PLASTIC BAG, INJECTION (ML) INTRAVENOUS ONCE
Refills: 0 | Status: COMPLETED | OUTPATIENT
Start: 2025-07-18 | End: 2025-07-18

## 2025-07-18 RX ORDER — LEVETIRACETAM 10 MG/ML
250 INJECTION, SOLUTION INTRAVENOUS
Refills: 0 | Status: DISCONTINUED | OUTPATIENT
Start: 2025-07-18 | End: 2025-07-24

## 2025-07-18 RX ORDER — LEVOTHYROXINE SODIUM 300 MCG
88 TABLET ORAL DAILY
Refills: 0 | Status: DISCONTINUED | OUTPATIENT
Start: 2025-07-19 | End: 2025-07-24

## 2025-07-18 RX ORDER — GLUCAGON 3 MG/1
1 POWDER NASAL ONCE
Refills: 0 | Status: DISCONTINUED | OUTPATIENT
Start: 2025-07-18 | End: 2025-07-24

## 2025-07-18 RX ORDER — ALBUTEROL SULFATE 2.5 MG/3ML
2.5 VIAL, NEBULIZER (ML) INHALATION EVERY 6 HOURS
Refills: 0 | Status: DISCONTINUED | OUTPATIENT
Start: 2025-07-18 | End: 2025-07-20

## 2025-07-18 RX ORDER — INSULIN LISPRO 100 U/ML
INJECTION, SOLUTION INTRAVENOUS; SUBCUTANEOUS
Refills: 0 | Status: DISCONTINUED | OUTPATIENT
Start: 2025-07-18 | End: 2025-07-24

## 2025-07-18 RX ORDER — SENNA 187 MG
2 TABLET ORAL AT BEDTIME
Refills: 0 | Status: DISCONTINUED | OUTPATIENT
Start: 2025-07-18 | End: 2025-07-24

## 2025-07-18 RX ORDER — ACETAMINOPHEN 500 MG/5ML
650 LIQUID (ML) ORAL EVERY 6 HOURS
Refills: 0 | Status: DISCONTINUED | OUTPATIENT
Start: 2025-07-18 | End: 2025-07-24

## 2025-07-18 RX ORDER — CARBAMAZEPINE 200 MG/1
100 TABLET ORAL
Refills: 0 | Status: DISCONTINUED | OUTPATIENT
Start: 2025-07-18 | End: 2025-07-19

## 2025-07-18 RX ORDER — MAGNESIUM, ALUMINUM HYDROXIDE 200-200 MG
30 TABLET,CHEWABLE ORAL EVERY 4 HOURS
Refills: 0 | Status: DISCONTINUED | OUTPATIENT
Start: 2025-07-18 | End: 2025-07-24

## 2025-07-18 RX ORDER — DEXTROSE 50 % IN WATER 50 %
25 SYRINGE (ML) INTRAVENOUS ONCE
Refills: 0 | Status: DISCONTINUED | OUTPATIENT
Start: 2025-07-18 | End: 2025-07-24

## 2025-07-18 RX ORDER — LOSARTAN POTASSIUM 100 MG/1
50 TABLET, FILM COATED ORAL DAILY
Refills: 0 | Status: DISCONTINUED | OUTPATIENT
Start: 2025-07-19 | End: 2025-07-24

## 2025-07-18 RX ORDER — ALBUTEROL SULFATE 2.5 MG/3ML
2.5 VIAL, NEBULIZER (ML) INHALATION EVERY 6 HOURS
Refills: 0 | Status: DISCONTINUED | OUTPATIENT
Start: 2025-07-18 | End: 2025-07-18

## 2025-07-18 RX ORDER — POLYETHYLENE GLYCOL 3350 17 G/17G
17 POWDER, FOR SOLUTION ORAL DAILY
Refills: 0 | Status: DISCONTINUED | OUTPATIENT
Start: 2025-07-19 | End: 2025-07-24

## 2025-07-18 RX ORDER — SODIUM CHLORIDE 9 G/1000ML
2200 INJECTION, SOLUTION INTRAVENOUS ONCE
Refills: 0 | Status: COMPLETED | OUTPATIENT
Start: 2025-07-18 | End: 2025-07-18

## 2025-07-18 RX ORDER — CARVEDILOL 3.12 MG/1
12.5 TABLET, FILM COATED ORAL EVERY 12 HOURS
Refills: 0 | Status: DISCONTINUED | OUTPATIENT
Start: 2025-07-19 | End: 2025-07-24

## 2025-07-18 RX ORDER — CARBAMAZEPINE 200 MG/1
200 TABLET ORAL AT BEDTIME
Refills: 0 | Status: DISCONTINUED | OUTPATIENT
Start: 2025-07-18 | End: 2025-07-24

## 2025-07-18 RX ORDER — DEXTROSE 50 % IN WATER 50 %
15 SYRINGE (ML) INTRAVENOUS ONCE
Refills: 0 | Status: DISCONTINUED | OUTPATIENT
Start: 2025-07-18 | End: 2025-07-24

## 2025-07-18 RX ORDER — AMLODIPINE BESYLATE 10 MG/1
5 TABLET ORAL DAILY
Refills: 0 | Status: DISCONTINUED | OUTPATIENT
Start: 2025-07-19 | End: 2025-07-24

## 2025-07-18 RX ORDER — CARBAMAZEPINE 200 MG/1
200 TABLET ORAL
Refills: 0 | Status: DISCONTINUED | OUTPATIENT
Start: 2025-07-18 | End: 2025-07-18

## 2025-07-18 RX ORDER — ONDANSETRON HCL/PF 4 MG/2 ML
4 VIAL (ML) INJECTION EVERY 8 HOURS
Refills: 0 | Status: DISCONTINUED | OUTPATIENT
Start: 2025-07-18 | End: 2025-07-24

## 2025-07-18 RX ADMIN — Medication 250 MILLIGRAM(S): at 15:00

## 2025-07-18 RX ADMIN — METHYLPREDNISOLONE ACETATE 125 MILLIGRAM(S): 80 INJECTION, SUSPENSION INTRA-ARTICULAR; INTRALESIONAL; INTRAMUSCULAR; SOFT TISSUE at 14:56

## 2025-07-18 RX ADMIN — Medication 200 GRAM(S): at 14:28

## 2025-07-18 RX ADMIN — CARBAMAZEPINE 200 MILLIGRAM(S): 200 TABLET ORAL at 23:02

## 2025-07-18 RX ADMIN — ENOXAPARIN SODIUM 40 MILLIGRAM(S): 100 INJECTION SUBCUTANEOUS at 22:34

## 2025-07-18 RX ADMIN — Medication 2 TABLET(S): at 22:26

## 2025-07-18 RX ADMIN — SODIUM CHLORIDE 2200 MILLILITER(S): 9 INJECTION, SOLUTION INTRAVENOUS at 14:29

## 2025-07-18 RX ADMIN — Medication 25 GRAM(S): at 22:26

## 2025-07-18 RX ADMIN — Medication 2.5 MILLIGRAM(S): at 14:57

## 2025-07-19 LAB
ANION GAP SERPL CALC-SCNC: 4 MMOL/L — LOW (ref 5–17)
BASOPHILS # BLD AUTO: 0.02 K/UL — SIGNIFICANT CHANGE UP (ref 0–0.2)
BASOPHILS NFR BLD AUTO: 0.3 % — SIGNIFICANT CHANGE UP (ref 0–2)
BUN SERPL-MCNC: 16 MG/DL — SIGNIFICANT CHANGE UP (ref 7–23)
CALCIUM SERPL-MCNC: 8.1 MG/DL — LOW (ref 8.5–10.1)
CHLORIDE SERPL-SCNC: 96 MMOL/L — SIGNIFICANT CHANGE UP (ref 96–108)
CO2 SERPL-SCNC: 31 MMOL/L — SIGNIFICANT CHANGE UP (ref 22–31)
CREAT SERPL-MCNC: 0.73 MG/DL — SIGNIFICANT CHANGE UP (ref 0.5–1.3)
EGFR: 76 ML/MIN/1.73M2 — SIGNIFICANT CHANGE UP
EGFR: 76 ML/MIN/1.73M2 — SIGNIFICANT CHANGE UP
EOSINOPHIL # BLD AUTO: 0 K/UL — SIGNIFICANT CHANGE UP (ref 0–0.5)
EOSINOPHIL NFR BLD AUTO: 0 % — SIGNIFICANT CHANGE UP (ref 0–6)
GLUCOSE BLDC GLUCOMTR-MCNC: 105 MG/DL — HIGH (ref 70–99)
GLUCOSE BLDC GLUCOMTR-MCNC: 126 MG/DL — HIGH (ref 70–99)
GLUCOSE BLDC GLUCOMTR-MCNC: 131 MG/DL — HIGH (ref 70–99)
GLUCOSE BLDC GLUCOMTR-MCNC: 141 MG/DL — HIGH (ref 70–99)
GLUCOSE SERPL-MCNC: 112 MG/DL — HIGH (ref 70–99)
HCT VFR BLD CALC: 29.4 % — LOW (ref 34.5–45)
HGB BLD-MCNC: 9.3 G/DL — LOW (ref 11.5–15.5)
IMM GRANULOCYTES # BLD AUTO: 0.04 K/UL — SIGNIFICANT CHANGE UP (ref 0–0.07)
IMM GRANULOCYTES NFR BLD AUTO: 0.6 % — SIGNIFICANT CHANGE UP (ref 0–0.9)
LYMPHOCYTES # BLD AUTO: 0.42 K/UL — LOW (ref 1–3.3)
LYMPHOCYTES NFR BLD AUTO: 6.6 % — LOW (ref 13–44)
MAGNESIUM SERPL-MCNC: 1.7 MG/DL — SIGNIFICANT CHANGE UP (ref 1.6–2.6)
MCHC RBC-ENTMCNC: 26.7 PG — LOW (ref 27–34)
MCHC RBC-ENTMCNC: 31.6 G/DL — LOW (ref 32–36)
MCV RBC AUTO: 84.5 FL — SIGNIFICANT CHANGE UP (ref 80–100)
MONOCYTES # BLD AUTO: 0.52 K/UL — SIGNIFICANT CHANGE UP (ref 0–0.9)
MONOCYTES NFR BLD AUTO: 8.2 % — SIGNIFICANT CHANGE UP (ref 2–14)
NEUTROPHILS # BLD AUTO: 5.36 K/UL — SIGNIFICANT CHANGE UP (ref 1.8–7.4)
NEUTROPHILS NFR BLD AUTO: 84.3 % — HIGH (ref 43–77)
NRBC # BLD AUTO: 0 K/UL — SIGNIFICANT CHANGE UP (ref 0–0)
NRBC # FLD: 0 K/UL — SIGNIFICANT CHANGE UP (ref 0–0)
NRBC BLD AUTO-RTO: 0 /100 WBCS — SIGNIFICANT CHANGE UP (ref 0–0)
PLATELET # BLD AUTO: 163 K/UL — SIGNIFICANT CHANGE UP (ref 150–400)
PMV BLD: 9.3 FL — SIGNIFICANT CHANGE UP (ref 7–13)
POTASSIUM SERPL-MCNC: 3.7 MMOL/L — SIGNIFICANT CHANGE UP (ref 3.5–5.3)
POTASSIUM SERPL-SCNC: 3.7 MMOL/L — SIGNIFICANT CHANGE UP (ref 3.5–5.3)
RBC # BLD: 3.48 M/UL — LOW (ref 3.8–5.2)
RBC # FLD: 14 % — SIGNIFICANT CHANGE UP (ref 10.3–14.5)
SODIUM SERPL-SCNC: 131 MMOL/L — LOW (ref 135–145)
WBC # BLD: 6.36 K/UL — SIGNIFICANT CHANGE UP (ref 3.8–10.5)
WBC # FLD AUTO: 6.36 K/UL — SIGNIFICANT CHANGE UP (ref 3.8–10.5)

## 2025-07-19 PROCEDURE — 83735 ASSAY OF MAGNESIUM: CPT

## 2025-07-19 PROCEDURE — 82962 GLUCOSE BLOOD TEST: CPT

## 2025-07-19 PROCEDURE — 80048 BASIC METABOLIC PNL TOTAL CA: CPT

## 2025-07-19 PROCEDURE — 83605 ASSAY OF LACTIC ACID: CPT

## 2025-07-19 PROCEDURE — 71045 X-RAY EXAM CHEST 1 VIEW: CPT

## 2025-07-19 PROCEDURE — 80053 COMPREHEN METABOLIC PANEL: CPT

## 2025-07-19 PROCEDURE — 71250 CT THORAX DX C-: CPT

## 2025-07-19 PROCEDURE — 84145 PROCALCITONIN (PCT): CPT

## 2025-07-19 PROCEDURE — 36415 COLL VENOUS BLD VENIPUNCTURE: CPT

## 2025-07-19 PROCEDURE — 99232 SBSQ HOSP IP/OBS MODERATE 35: CPT

## 2025-07-19 PROCEDURE — 85730 THROMBOPLASTIN TIME PARTIAL: CPT

## 2025-07-19 PROCEDURE — 97162 PT EVAL MOD COMPLEX 30 MIN: CPT

## 2025-07-19 PROCEDURE — 87040 BLOOD CULTURE FOR BACTERIA: CPT

## 2025-07-19 PROCEDURE — 85610 PROTHROMBIN TIME: CPT

## 2025-07-19 PROCEDURE — 94640 AIRWAY INHALATION TREATMENT: CPT

## 2025-07-19 PROCEDURE — 85025 COMPLETE CBC W/AUTO DIFF WBC: CPT

## 2025-07-19 PROCEDURE — 93005 ELECTROCARDIOGRAM TRACING: CPT

## 2025-07-19 RX ORDER — CARBAMAZEPINE 200 MG/1
100 TABLET ORAL
Refills: 0 | Status: DISCONTINUED | OUTPATIENT
Start: 2025-07-19 | End: 2025-07-24

## 2025-07-19 RX ADMIN — SODIUM CHLORIDE 100 MILLILITER(S): 9 INJECTION, SOLUTION INTRAVENOUS at 03:20

## 2025-07-19 RX ADMIN — CARBAMAZEPINE 100 MILLIGRAM(S): 200 TABLET ORAL at 09:42

## 2025-07-19 RX ADMIN — Medication 25 GRAM(S): at 13:38

## 2025-07-19 RX ADMIN — LEVETIRACETAM 250 MILLIGRAM(S): 10 INJECTION, SOLUTION INTRAVENOUS at 05:11

## 2025-07-19 RX ADMIN — Medication 10 MILLIGRAM(S): at 13:48

## 2025-07-19 RX ADMIN — CARVEDILOL 12.5 MILLIGRAM(S): 3.12 TABLET, FILM COATED ORAL at 05:11

## 2025-07-19 RX ADMIN — Medication 650 MILLIGRAM(S): at 10:27

## 2025-07-19 RX ADMIN — AMLODIPINE BESYLATE 5 MILLIGRAM(S): 10 TABLET ORAL at 05:11

## 2025-07-19 RX ADMIN — Medication 2 TABLET(S): at 22:09

## 2025-07-19 RX ADMIN — SODIUM CHLORIDE 100 MILLILITER(S): 9 INJECTION, SOLUTION INTRAVENOUS at 05:12

## 2025-07-19 RX ADMIN — CARVEDILOL 12.5 MILLIGRAM(S): 3.12 TABLET, FILM COATED ORAL at 18:14

## 2025-07-19 RX ADMIN — CARBAMAZEPINE 100 MILLIGRAM(S): 200 TABLET ORAL at 16:37

## 2025-07-19 RX ADMIN — Medication 25 GRAM(S): at 22:10

## 2025-07-19 RX ADMIN — Medication 25 GRAM(S): at 05:12

## 2025-07-19 RX ADMIN — POLYETHYLENE GLYCOL 3350 17 GRAM(S): 17 POWDER, FOR SOLUTION ORAL at 12:19

## 2025-07-19 RX ADMIN — Medication 100 MILLIGRAM(S): at 10:28

## 2025-07-19 RX ADMIN — LOSARTAN POTASSIUM 50 MILLIGRAM(S): 100 TABLET, FILM COATED ORAL at 05:11

## 2025-07-19 RX ADMIN — DEXTROMETHORPHAN HBR, GUAIFENESIN 600 MILLIGRAM(S): 200 LIQUID ORAL at 18:13

## 2025-07-19 RX ADMIN — CARBAMAZEPINE 200 MILLIGRAM(S): 200 TABLET ORAL at 22:10

## 2025-07-19 RX ADMIN — Medication 88 MICROGRAM(S): at 05:11

## 2025-07-19 RX ADMIN — DEXTROMETHORPHAN HBR, GUAIFENESIN 600 MILLIGRAM(S): 200 LIQUID ORAL at 05:10

## 2025-07-19 RX ADMIN — ENOXAPARIN SODIUM 40 MILLIGRAM(S): 100 INJECTION SUBCUTANEOUS at 22:10

## 2025-07-19 RX ADMIN — LEVETIRACETAM 250 MILLIGRAM(S): 10 INJECTION, SOLUTION INTRAVENOUS at 18:14

## 2025-07-20 LAB
GLUCOSE BLDC GLUCOMTR-MCNC: 121 MG/DL — HIGH (ref 70–99)
GLUCOSE BLDC GLUCOMTR-MCNC: 182 MG/DL — HIGH (ref 70–99)
GLUCOSE BLDC GLUCOMTR-MCNC: 95 MG/DL — SIGNIFICANT CHANGE UP (ref 70–99)

## 2025-07-20 PROCEDURE — 99233 SBSQ HOSP IP/OBS HIGH 50: CPT

## 2025-07-20 RX ORDER — ALBUTEROL SULFATE 2.5 MG/3ML
2.5 VIAL, NEBULIZER (ML) INHALATION EVERY 6 HOURS
Refills: 0 | Status: DISCONTINUED | OUTPATIENT
Start: 2025-07-20 | End: 2025-07-24

## 2025-07-20 RX ADMIN — Medication 25 GRAM(S): at 14:40

## 2025-07-20 RX ADMIN — AMLODIPINE BESYLATE 5 MILLIGRAM(S): 10 TABLET ORAL at 05:18

## 2025-07-20 RX ADMIN — CARBAMAZEPINE 100 MILLIGRAM(S): 200 TABLET ORAL at 09:01

## 2025-07-20 RX ADMIN — Medication 88 MICROGRAM(S): at 05:17

## 2025-07-20 RX ADMIN — LOSARTAN POTASSIUM 50 MILLIGRAM(S): 100 TABLET, FILM COATED ORAL at 05:17

## 2025-07-20 RX ADMIN — LEVETIRACETAM 250 MILLIGRAM(S): 10 INJECTION, SOLUTION INTRAVENOUS at 18:52

## 2025-07-20 RX ADMIN — CARBAMAZEPINE 100 MILLIGRAM(S): 200 TABLET ORAL at 15:53

## 2025-07-20 RX ADMIN — CARVEDILOL 12.5 MILLIGRAM(S): 3.12 TABLET, FILM COATED ORAL at 18:52

## 2025-07-20 RX ADMIN — DEXTROMETHORPHAN HBR, GUAIFENESIN 600 MILLIGRAM(S): 200 LIQUID ORAL at 05:17

## 2025-07-20 RX ADMIN — INSULIN LISPRO 1: 100 INJECTION, SOLUTION INTRAVENOUS; SUBCUTANEOUS at 17:38

## 2025-07-20 RX ADMIN — CARVEDILOL 12.5 MILLIGRAM(S): 3.12 TABLET, FILM COATED ORAL at 05:17

## 2025-07-20 RX ADMIN — LEVETIRACETAM 250 MILLIGRAM(S): 10 INJECTION, SOLUTION INTRAVENOUS at 05:17

## 2025-07-20 RX ADMIN — Medication 650 MILLIGRAM(S): at 21:47

## 2025-07-20 RX ADMIN — Medication 25 GRAM(S): at 21:32

## 2025-07-20 RX ADMIN — Medication 2.5 MILLIGRAM(S): at 20:09

## 2025-07-20 RX ADMIN — Medication 25 GRAM(S): at 05:30

## 2025-07-20 RX ADMIN — Medication 4 MILLIGRAM(S): at 21:47

## 2025-07-20 RX ADMIN — Medication 2 TABLET(S): at 21:32

## 2025-07-20 RX ADMIN — DEXTROMETHORPHAN HBR, GUAIFENESIN 600 MILLIGRAM(S): 200 LIQUID ORAL at 18:52

## 2025-07-20 RX ADMIN — CARBAMAZEPINE 200 MILLIGRAM(S): 200 TABLET ORAL at 21:32

## 2025-07-21 LAB
ALBUMIN SERPL ELPH-MCNC: 1.9 G/DL — LOW (ref 3.3–5)
ALP SERPL-CCNC: 55 U/L — SIGNIFICANT CHANGE UP (ref 40–120)
ALT FLD-CCNC: 12 U/L — SIGNIFICANT CHANGE UP (ref 12–78)
ANION GAP SERPL CALC-SCNC: 5 MMOL/L — SIGNIFICANT CHANGE UP (ref 5–17)
AST SERPL-CCNC: 16 U/L — SIGNIFICANT CHANGE UP (ref 15–37)
BILIRUB SERPL-MCNC: 0.1 MG/DL — LOW (ref 0.2–1.2)
BUN SERPL-MCNC: 12 MG/DL — SIGNIFICANT CHANGE UP (ref 7–23)
CALCIUM SERPL-MCNC: 8 MG/DL — LOW (ref 8.5–10.1)
CHLORIDE SERPL-SCNC: 100 MMOL/L — SIGNIFICANT CHANGE UP (ref 96–108)
CO2 SERPL-SCNC: 34 MMOL/L — HIGH (ref 22–31)
CREAT SERPL-MCNC: 0.63 MG/DL — SIGNIFICANT CHANGE UP (ref 0.5–1.3)
EGFR: 82 ML/MIN/1.73M2 — SIGNIFICANT CHANGE UP
EGFR: 82 ML/MIN/1.73M2 — SIGNIFICANT CHANGE UP
GLUCOSE BLDC GLUCOMTR-MCNC: 103 MG/DL — HIGH (ref 70–99)
GLUCOSE BLDC GLUCOMTR-MCNC: 115 MG/DL — HIGH (ref 70–99)
GLUCOSE BLDC GLUCOMTR-MCNC: 198 MG/DL — HIGH (ref 70–99)
GLUCOSE BLDC GLUCOMTR-MCNC: 90 MG/DL — SIGNIFICANT CHANGE UP (ref 70–99)
GLUCOSE SERPL-MCNC: 102 MG/DL — HIGH (ref 70–99)
HCT VFR BLD CALC: 29.3 % — LOW (ref 34.5–45)
HGB BLD-MCNC: 9.4 G/DL — LOW (ref 11.5–15.5)
MAGNESIUM SERPL-MCNC: 1.8 MG/DL — SIGNIFICANT CHANGE UP (ref 1.6–2.6)
MCHC RBC-ENTMCNC: 27.3 PG — SIGNIFICANT CHANGE UP (ref 27–34)
MCHC RBC-ENTMCNC: 32.1 G/DL — SIGNIFICANT CHANGE UP (ref 32–36)
MCV RBC AUTO: 85.2 FL — SIGNIFICANT CHANGE UP (ref 80–100)
NRBC # BLD AUTO: 0 K/UL — SIGNIFICANT CHANGE UP (ref 0–0)
NRBC # FLD: 0 K/UL — SIGNIFICANT CHANGE UP (ref 0–0)
NRBC BLD AUTO-RTO: 0 /100 WBCS — SIGNIFICANT CHANGE UP (ref 0–0)
PHOSPHATE SERPL-MCNC: 3.1 MG/DL — SIGNIFICANT CHANGE UP (ref 2.5–4.5)
PLATELET # BLD AUTO: 161 K/UL — SIGNIFICANT CHANGE UP (ref 150–400)
PMV BLD: 9.2 FL — SIGNIFICANT CHANGE UP (ref 7–13)
POTASSIUM SERPL-MCNC: 3.2 MMOL/L — LOW (ref 3.5–5.3)
POTASSIUM SERPL-SCNC: 3.2 MMOL/L — LOW (ref 3.5–5.3)
PROT SERPL-MCNC: 5.5 G/DL — LOW (ref 6–8.3)
RBC # BLD: 3.44 M/UL — LOW (ref 3.8–5.2)
RBC # FLD: 14.2 % — SIGNIFICANT CHANGE UP (ref 10.3–14.5)
SODIUM SERPL-SCNC: 139 MMOL/L — SIGNIFICANT CHANGE UP (ref 135–145)
WBC # BLD: 3.77 K/UL — LOW (ref 3.8–10.5)
WBC # FLD AUTO: 3.77 K/UL — LOW (ref 3.8–10.5)

## 2025-07-21 PROCEDURE — 85025 COMPLETE CBC W/AUTO DIFF WBC: CPT

## 2025-07-21 PROCEDURE — 97110 THERAPEUTIC EXERCISES: CPT

## 2025-07-21 PROCEDURE — 85730 THROMBOPLASTIN TIME PARTIAL: CPT

## 2025-07-21 PROCEDURE — 85027 COMPLETE CBC AUTOMATED: CPT

## 2025-07-21 PROCEDURE — 82962 GLUCOSE BLOOD TEST: CPT

## 2025-07-21 PROCEDURE — 94010 BREATHING CAPACITY TEST: CPT

## 2025-07-21 PROCEDURE — 71250 CT THORAX DX C-: CPT

## 2025-07-21 PROCEDURE — 93005 ELECTROCARDIOGRAM TRACING: CPT

## 2025-07-21 PROCEDURE — 85610 PROTHROMBIN TIME: CPT

## 2025-07-21 PROCEDURE — 80048 BASIC METABOLIC PNL TOTAL CA: CPT

## 2025-07-21 PROCEDURE — 94640 AIRWAY INHALATION TREATMENT: CPT

## 2025-07-21 PROCEDURE — 97116 GAIT TRAINING THERAPY: CPT

## 2025-07-21 PROCEDURE — 83605 ASSAY OF LACTIC ACID: CPT

## 2025-07-21 PROCEDURE — 99233 SBSQ HOSP IP/OBS HIGH 50: CPT

## 2025-07-21 PROCEDURE — 80053 COMPREHEN METABOLIC PANEL: CPT

## 2025-07-21 PROCEDURE — 83735 ASSAY OF MAGNESIUM: CPT

## 2025-07-21 PROCEDURE — 97162 PT EVAL MOD COMPLEX 30 MIN: CPT

## 2025-07-21 PROCEDURE — 84145 PROCALCITONIN (PCT): CPT

## 2025-07-21 PROCEDURE — 71045 X-RAY EXAM CHEST 1 VIEW: CPT

## 2025-07-21 PROCEDURE — 87040 BLOOD CULTURE FOR BACTERIA: CPT

## 2025-07-21 PROCEDURE — 84100 ASSAY OF PHOSPHORUS: CPT

## 2025-07-21 PROCEDURE — 36415 COLL VENOUS BLD VENIPUNCTURE: CPT

## 2025-07-21 RX ORDER — CEFTRIAXONE 500 MG/1
1000 INJECTION, POWDER, FOR SOLUTION INTRAMUSCULAR; INTRAVENOUS EVERY 24 HOURS
Refills: 0 | Status: COMPLETED | OUTPATIENT
Start: 2025-07-21 | End: 2025-07-23

## 2025-07-21 RX ORDER — MAGNESIUM SULFATE 500 MG/ML
2 SYRINGE (ML) INJECTION ONCE
Refills: 0 | Status: COMPLETED | OUTPATIENT
Start: 2025-07-21 | End: 2025-07-21

## 2025-07-21 RX ADMIN — CARBAMAZEPINE 100 MILLIGRAM(S): 200 TABLET ORAL at 14:54

## 2025-07-21 RX ADMIN — CARVEDILOL 12.5 MILLIGRAM(S): 3.12 TABLET, FILM COATED ORAL at 18:26

## 2025-07-21 RX ADMIN — Medication 2 TABLET(S): at 22:06

## 2025-07-21 RX ADMIN — LOSARTAN POTASSIUM 50 MILLIGRAM(S): 100 TABLET, FILM COATED ORAL at 05:51

## 2025-07-21 RX ADMIN — Medication 25 GRAM(S): at 05:54

## 2025-07-21 RX ADMIN — AMLODIPINE BESYLATE 5 MILLIGRAM(S): 10 TABLET ORAL at 05:51

## 2025-07-21 RX ADMIN — CEFTRIAXONE 100 MILLIGRAM(S): 500 INJECTION, POWDER, FOR SOLUTION INTRAMUSCULAR; INTRAVENOUS at 13:11

## 2025-07-21 RX ADMIN — Medication 2.5 MILLIGRAM(S): at 00:42

## 2025-07-21 RX ADMIN — Medication 650 MILLIGRAM(S): at 09:23

## 2025-07-21 RX ADMIN — CARBAMAZEPINE 200 MILLIGRAM(S): 200 TABLET ORAL at 22:06

## 2025-07-21 RX ADMIN — DEXTROMETHORPHAN HBR, GUAIFENESIN 600 MILLIGRAM(S): 200 LIQUID ORAL at 18:26

## 2025-07-21 RX ADMIN — Medication 2.5 MILLIGRAM(S): at 19:22

## 2025-07-21 RX ADMIN — LEVETIRACETAM 250 MILLIGRAM(S): 10 INJECTION, SOLUTION INTRAVENOUS at 05:51

## 2025-07-21 RX ADMIN — POLYETHYLENE GLYCOL 3350 17 GRAM(S): 17 POWDER, FOR SOLUTION ORAL at 13:12

## 2025-07-21 RX ADMIN — CARVEDILOL 12.5 MILLIGRAM(S): 3.12 TABLET, FILM COATED ORAL at 05:51

## 2025-07-21 RX ADMIN — Medication 40 MILLIEQUIVALENT(S): at 18:27

## 2025-07-21 RX ADMIN — Medication 25 GRAM(S): at 18:27

## 2025-07-21 RX ADMIN — Medication 88 MICROGRAM(S): at 05:51

## 2025-07-21 RX ADMIN — Medication 650 MILLIGRAM(S): at 10:23

## 2025-07-21 RX ADMIN — CARBAMAZEPINE 100 MILLIGRAM(S): 200 TABLET ORAL at 09:22

## 2025-07-21 RX ADMIN — DEXTROMETHORPHAN HBR, GUAIFENESIN 600 MILLIGRAM(S): 200 LIQUID ORAL at 05:51

## 2025-07-21 RX ADMIN — INSULIN LISPRO 1: 100 INJECTION, SOLUTION INTRAVENOUS; SUBCUTANEOUS at 13:11

## 2025-07-21 RX ADMIN — LEVETIRACETAM 250 MILLIGRAM(S): 10 INJECTION, SOLUTION INTRAVENOUS at 18:26

## 2025-07-22 LAB
ANION GAP SERPL CALC-SCNC: 2 MMOL/L — LOW (ref 5–17)
BUN SERPL-MCNC: 10 MG/DL — SIGNIFICANT CHANGE UP (ref 7–23)
CALCIUM SERPL-MCNC: 8.5 MG/DL — SIGNIFICANT CHANGE UP (ref 8.5–10.1)
CHLORIDE SERPL-SCNC: 101 MMOL/L — SIGNIFICANT CHANGE UP (ref 96–108)
CO2 SERPL-SCNC: 35 MMOL/L — HIGH (ref 22–31)
CREAT SERPL-MCNC: 0.69 MG/DL — SIGNIFICANT CHANGE UP (ref 0.5–1.3)
EGFR: 80 ML/MIN/1.73M2 — SIGNIFICANT CHANGE UP
EGFR: 80 ML/MIN/1.73M2 — SIGNIFICANT CHANGE UP
FERRITIN SERPL-MCNC: 33 NG/ML — SIGNIFICANT CHANGE UP (ref 13–330)
FOLATE SERPL-MCNC: 5 NG/ML — SIGNIFICANT CHANGE UP
GLUCOSE BLDC GLUCOMTR-MCNC: 116 MG/DL — HIGH (ref 70–99)
GLUCOSE BLDC GLUCOMTR-MCNC: 137 MG/DL — HIGH (ref 70–99)
GLUCOSE BLDC GLUCOMTR-MCNC: 153 MG/DL — HIGH (ref 70–99)
GLUCOSE BLDC GLUCOMTR-MCNC: 95 MG/DL — SIGNIFICANT CHANGE UP (ref 70–99)
GLUCOSE SERPL-MCNC: 96 MG/DL — SIGNIFICANT CHANGE UP (ref 70–99)
HCT VFR BLD CALC: 31.9 % — LOW (ref 34.5–45)
HGB BLD-MCNC: 9.8 G/DL — LOW (ref 11.5–15.5)
IRON SATN MFR SERPL: 23 UG/DL — LOW (ref 30–160)
MAGNESIUM SERPL-MCNC: 1.9 MG/DL — SIGNIFICANT CHANGE UP (ref 1.6–2.6)
MCHC RBC-ENTMCNC: 26.3 PG — LOW (ref 27–34)
MCHC RBC-ENTMCNC: 30.7 G/DL — LOW (ref 32–36)
MCV RBC AUTO: 85.8 FL — SIGNIFICANT CHANGE UP (ref 80–100)
NRBC # BLD AUTO: 0 K/UL — SIGNIFICANT CHANGE UP (ref 0–0)
NRBC # FLD: 0 K/UL — SIGNIFICANT CHANGE UP (ref 0–0)
NRBC BLD AUTO-RTO: 0 /100 WBCS — SIGNIFICANT CHANGE UP (ref 0–0)
PHOSPHATE SERPL-MCNC: 2.6 MG/DL — SIGNIFICANT CHANGE UP (ref 2.5–4.5)
PLATELET # BLD AUTO: 198 K/UL — SIGNIFICANT CHANGE UP (ref 150–400)
PMV BLD: 9.3 FL — SIGNIFICANT CHANGE UP (ref 7–13)
POTASSIUM SERPL-MCNC: 3.7 MMOL/L — SIGNIFICANT CHANGE UP (ref 3.5–5.3)
POTASSIUM SERPL-SCNC: 3.7 MMOL/L — SIGNIFICANT CHANGE UP (ref 3.5–5.3)
RBC # BLD: 3.72 M/UL — LOW (ref 3.8–5.2)
RBC # FLD: 14 % — SIGNIFICANT CHANGE UP (ref 10.3–14.5)
SODIUM SERPL-SCNC: 138 MMOL/L — SIGNIFICANT CHANGE UP (ref 135–145)
VIT B12 SERPL-MCNC: 368 PG/ML — SIGNIFICANT CHANGE UP (ref 232–1245)
WBC # BLD: 4.43 K/UL — SIGNIFICANT CHANGE UP (ref 3.8–10.5)
WBC # FLD AUTO: 4.43 K/UL — SIGNIFICANT CHANGE UP (ref 3.8–10.5)

## 2025-07-22 PROCEDURE — 99233 SBSQ HOSP IP/OBS HIGH 50: CPT

## 2025-07-22 RX ORDER — ROSUVASTATIN CALCIUM 20 MG/1
10 TABLET, FILM COATED ORAL AT BEDTIME
Refills: 0 | Status: DISCONTINUED | OUTPATIENT
Start: 2025-07-22 | End: 2025-07-24

## 2025-07-22 RX ADMIN — Medication 88 MICROGRAM(S): at 05:52

## 2025-07-22 RX ADMIN — ROSUVASTATIN CALCIUM 10 MILLIGRAM(S): 20 TABLET, FILM COATED ORAL at 21:37

## 2025-07-22 RX ADMIN — INSULIN LISPRO 1: 100 INJECTION, SOLUTION INTRAVENOUS; SUBCUTANEOUS at 12:25

## 2025-07-22 RX ADMIN — CARBAMAZEPINE 100 MILLIGRAM(S): 200 TABLET ORAL at 17:01

## 2025-07-22 RX ADMIN — Medication 3 MILLIGRAM(S): at 21:37

## 2025-07-22 RX ADMIN — CARBAMAZEPINE 100 MILLIGRAM(S): 200 TABLET ORAL at 09:14

## 2025-07-22 RX ADMIN — LEVETIRACETAM 250 MILLIGRAM(S): 10 INJECTION, SOLUTION INTRAVENOUS at 17:01

## 2025-07-22 RX ADMIN — ENOXAPARIN SODIUM 40 MILLIGRAM(S): 100 INJECTION SUBCUTANEOUS at 21:37

## 2025-07-22 RX ADMIN — Medication 2.5 MILLIGRAM(S): at 08:03

## 2025-07-22 RX ADMIN — CARVEDILOL 12.5 MILLIGRAM(S): 3.12 TABLET, FILM COATED ORAL at 17:00

## 2025-07-22 RX ADMIN — DEXTROMETHORPHAN HBR, GUAIFENESIN 600 MILLIGRAM(S): 200 LIQUID ORAL at 05:51

## 2025-07-22 RX ADMIN — LEVETIRACETAM 250 MILLIGRAM(S): 10 INJECTION, SOLUTION INTRAVENOUS at 05:51

## 2025-07-22 RX ADMIN — LOSARTAN POTASSIUM 50 MILLIGRAM(S): 100 TABLET, FILM COATED ORAL at 05:52

## 2025-07-22 RX ADMIN — CARVEDILOL 12.5 MILLIGRAM(S): 3.12 TABLET, FILM COATED ORAL at 05:51

## 2025-07-22 RX ADMIN — Medication 2.5 MILLIGRAM(S): at 20:09

## 2025-07-22 RX ADMIN — DEXTROMETHORPHAN HBR, GUAIFENESIN 600 MILLIGRAM(S): 200 LIQUID ORAL at 17:01

## 2025-07-22 RX ADMIN — Medication 2 TABLET(S): at 21:37

## 2025-07-22 RX ADMIN — AMLODIPINE BESYLATE 5 MILLIGRAM(S): 10 TABLET ORAL at 05:52

## 2025-07-22 RX ADMIN — Medication 2.5 MILLIGRAM(S): at 12:38

## 2025-07-22 RX ADMIN — CEFTRIAXONE 100 MILLIGRAM(S): 500 INJECTION, POWDER, FOR SOLUTION INTRAMUSCULAR; INTRAVENOUS at 10:56

## 2025-07-22 RX ADMIN — CARBAMAZEPINE 200 MILLIGRAM(S): 200 TABLET ORAL at 21:38

## 2025-07-23 LAB
ANION GAP SERPL CALC-SCNC: 6 MMOL/L — SIGNIFICANT CHANGE UP (ref 5–17)
BUN SERPL-MCNC: 8 MG/DL — SIGNIFICANT CHANGE UP (ref 7–23)
CALCIUM SERPL-MCNC: 8.3 MG/DL — LOW (ref 8.5–10.1)
CHLORIDE SERPL-SCNC: 99 MMOL/L — SIGNIFICANT CHANGE UP (ref 96–108)
CO2 SERPL-SCNC: 34 MMOL/L — HIGH (ref 22–31)
CREAT SERPL-MCNC: 0.46 MG/DL — LOW (ref 0.5–1.3)
CULTURE RESULTS: SIGNIFICANT CHANGE UP
CULTURE RESULTS: SIGNIFICANT CHANGE UP
EGFR: 89 ML/MIN/1.73M2 — SIGNIFICANT CHANGE UP
EGFR: 89 ML/MIN/1.73M2 — SIGNIFICANT CHANGE UP
GLUCOSE BLDC GLUCOMTR-MCNC: 112 MG/DL — HIGH (ref 70–99)
GLUCOSE BLDC GLUCOMTR-MCNC: 114 MG/DL — HIGH (ref 70–99)
GLUCOSE BLDC GLUCOMTR-MCNC: 134 MG/DL — HIGH (ref 70–99)
GLUCOSE BLDC GLUCOMTR-MCNC: 154 MG/DL — HIGH (ref 70–99)
GLUCOSE SERPL-MCNC: 101 MG/DL — HIGH (ref 70–99)
GRAM STN FLD: ABNORMAL
HCT VFR BLD CALC: 30.3 % — LOW (ref 34.5–45)
HGB BLD-MCNC: 9.5 G/DL — LOW (ref 11.5–15.5)
MCHC RBC-ENTMCNC: 26.4 PG — LOW (ref 27–34)
MCHC RBC-ENTMCNC: 31.4 G/DL — LOW (ref 32–36)
MCV RBC AUTO: 84.2 FL — SIGNIFICANT CHANGE UP (ref 80–100)
NRBC # BLD AUTO: 0 K/UL — SIGNIFICANT CHANGE UP (ref 0–0)
NRBC # FLD: 0 K/UL — SIGNIFICANT CHANGE UP (ref 0–0)
NRBC BLD AUTO-RTO: 0 /100 WBCS — SIGNIFICANT CHANGE UP (ref 0–0)
PLATELET # BLD AUTO: 210 K/UL — SIGNIFICANT CHANGE UP (ref 150–400)
PMV BLD: 9.6 FL — SIGNIFICANT CHANGE UP (ref 7–13)
POTASSIUM SERPL-MCNC: 3.6 MMOL/L — SIGNIFICANT CHANGE UP (ref 3.5–5.3)
POTASSIUM SERPL-SCNC: 3.6 MMOL/L — SIGNIFICANT CHANGE UP (ref 3.5–5.3)
RBC # BLD: 3.6 M/UL — LOW (ref 3.8–5.2)
RBC # FLD: 14.1 % — SIGNIFICANT CHANGE UP (ref 10.3–14.5)
SODIUM SERPL-SCNC: 139 MMOL/L — SIGNIFICANT CHANGE UP (ref 135–145)
SPECIMEN SOURCE: SIGNIFICANT CHANGE UP
WBC # BLD: 4.77 K/UL — SIGNIFICANT CHANGE UP (ref 3.8–10.5)
WBC # FLD AUTO: 4.77 K/UL — SIGNIFICANT CHANGE UP (ref 3.8–10.5)

## 2025-07-23 PROCEDURE — 87070 CULTURE OTHR SPECIMN AEROBIC: CPT

## 2025-07-23 PROCEDURE — 80053 COMPREHEN METABOLIC PANEL: CPT

## 2025-07-23 PROCEDURE — 82728 ASSAY OF FERRITIN: CPT

## 2025-07-23 PROCEDURE — 85025 COMPLETE CBC W/AUTO DIFF WBC: CPT

## 2025-07-23 PROCEDURE — 71250 CT THORAX DX C-: CPT

## 2025-07-23 PROCEDURE — 87040 BLOOD CULTURE FOR BACTERIA: CPT

## 2025-07-23 PROCEDURE — 94760 N-INVAS EAR/PLS OXIMETRY 1: CPT

## 2025-07-23 PROCEDURE — 92610 EVALUATE SWALLOWING FUNCTION: CPT

## 2025-07-23 PROCEDURE — 97110 THERAPEUTIC EXERCISES: CPT

## 2025-07-23 PROCEDURE — 82607 VITAMIN B-12: CPT

## 2025-07-23 PROCEDURE — 71045 X-RAY EXAM CHEST 1 VIEW: CPT

## 2025-07-23 PROCEDURE — 80048 BASIC METABOLIC PNL TOTAL CA: CPT

## 2025-07-23 PROCEDURE — 82962 GLUCOSE BLOOD TEST: CPT

## 2025-07-23 PROCEDURE — 83540 ASSAY OF IRON: CPT

## 2025-07-23 PROCEDURE — 36415 COLL VENOUS BLD VENIPUNCTURE: CPT

## 2025-07-23 PROCEDURE — 94640 AIRWAY INHALATION TREATMENT: CPT

## 2025-07-23 PROCEDURE — 82746 ASSAY OF FOLIC ACID SERUM: CPT

## 2025-07-23 PROCEDURE — 93005 ELECTROCARDIOGRAM TRACING: CPT

## 2025-07-23 PROCEDURE — 99233 SBSQ HOSP IP/OBS HIGH 50: CPT

## 2025-07-23 PROCEDURE — 85027 COMPLETE CBC AUTOMATED: CPT

## 2025-07-23 PROCEDURE — 85610 PROTHROMBIN TIME: CPT

## 2025-07-23 PROCEDURE — 85730 THROMBOPLASTIN TIME PARTIAL: CPT

## 2025-07-23 PROCEDURE — 97116 GAIT TRAINING THERAPY: CPT

## 2025-07-23 PROCEDURE — 97112 NEUROMUSCULAR REEDUCATION: CPT

## 2025-07-23 PROCEDURE — 84100 ASSAY OF PHOSPHORUS: CPT

## 2025-07-23 PROCEDURE — 83605 ASSAY OF LACTIC ACID: CPT

## 2025-07-23 PROCEDURE — 94010 BREATHING CAPACITY TEST: CPT

## 2025-07-23 PROCEDURE — 83735 ASSAY OF MAGNESIUM: CPT

## 2025-07-23 PROCEDURE — 97162 PT EVAL MOD COMPLEX 30 MIN: CPT

## 2025-07-23 PROCEDURE — 84145 PROCALCITONIN (PCT): CPT

## 2025-07-23 RX ORDER — CYANOCOBALAMIN 1000 UG/ML
1000 INJECTION INTRAMUSCULAR; SUBCUTANEOUS DAILY
Refills: 0 | Status: DISCONTINUED | OUTPATIENT
Start: 2025-07-23 | End: 2025-07-24

## 2025-07-23 RX ORDER — FERROUS SULFATE 137(45) MG
325 TABLET, EXTENDED RELEASE ORAL DAILY
Refills: 0 | Status: DISCONTINUED | OUTPATIENT
Start: 2025-07-23 | End: 2025-07-24

## 2025-07-23 RX ORDER — FOLIC ACID 1 MG/1
1 TABLET ORAL DAILY
Refills: 0 | Status: DISCONTINUED | OUTPATIENT
Start: 2025-07-23 | End: 2025-07-24

## 2025-07-23 RX ADMIN — FOLIC ACID 1 MILLIGRAM(S): 1 TABLET ORAL at 13:46

## 2025-07-23 RX ADMIN — CARVEDILOL 12.5 MILLIGRAM(S): 3.12 TABLET, FILM COATED ORAL at 17:35

## 2025-07-23 RX ADMIN — DEXTROMETHORPHAN HBR, GUAIFENESIN 600 MILLIGRAM(S): 200 LIQUID ORAL at 17:35

## 2025-07-23 RX ADMIN — CARVEDILOL 12.5 MILLIGRAM(S): 3.12 TABLET, FILM COATED ORAL at 05:54

## 2025-07-23 RX ADMIN — Medication 88 MICROGRAM(S): at 05:54

## 2025-07-23 RX ADMIN — CARBAMAZEPINE 100 MILLIGRAM(S): 200 TABLET ORAL at 08:40

## 2025-07-23 RX ADMIN — Medication 4 MILLIGRAM(S): at 19:00

## 2025-07-23 RX ADMIN — LEVETIRACETAM 250 MILLIGRAM(S): 10 INJECTION, SOLUTION INTRAVENOUS at 17:36

## 2025-07-23 RX ADMIN — Medication 2 TABLET(S): at 21:47

## 2025-07-23 RX ADMIN — LOSARTAN POTASSIUM 50 MILLIGRAM(S): 100 TABLET, FILM COATED ORAL at 05:54

## 2025-07-23 RX ADMIN — DEXTROMETHORPHAN HBR, GUAIFENESIN 600 MILLIGRAM(S): 200 LIQUID ORAL at 05:54

## 2025-07-23 RX ADMIN — Medication 2.5 MILLIGRAM(S): at 13:39

## 2025-07-23 RX ADMIN — CYANOCOBALAMIN 1000 MICROGRAM(S): 1000 INJECTION INTRAMUSCULAR; SUBCUTANEOUS at 13:45

## 2025-07-23 RX ADMIN — AMLODIPINE BESYLATE 5 MILLIGRAM(S): 10 TABLET ORAL at 05:54

## 2025-07-23 RX ADMIN — Medication 2.5 MILLIGRAM(S): at 20:33

## 2025-07-23 RX ADMIN — CARBAMAZEPINE 200 MILLIGRAM(S): 200 TABLET ORAL at 21:46

## 2025-07-23 RX ADMIN — Medication 325 MILLIGRAM(S): at 13:46

## 2025-07-23 RX ADMIN — CEFTRIAXONE 100 MILLIGRAM(S): 500 INJECTION, POWDER, FOR SOLUTION INTRAMUSCULAR; INTRAVENOUS at 13:45

## 2025-07-23 RX ADMIN — CARBAMAZEPINE 100 MILLIGRAM(S): 200 TABLET ORAL at 17:38

## 2025-07-23 RX ADMIN — LEVETIRACETAM 250 MILLIGRAM(S): 10 INJECTION, SOLUTION INTRAVENOUS at 05:54

## 2025-07-23 RX ADMIN — POLYETHYLENE GLYCOL 3350 17 GRAM(S): 17 POWDER, FOR SOLUTION ORAL at 13:52

## 2025-07-23 RX ADMIN — ROSUVASTATIN CALCIUM 10 MILLIGRAM(S): 20 TABLET, FILM COATED ORAL at 21:46

## 2025-07-23 RX ADMIN — Medication 2.5 MILLIGRAM(S): at 07:24

## 2025-07-24 ENCOUNTER — TRANSCRIPTION ENCOUNTER (OUTPATIENT)
Age: 89
End: 2025-07-24

## 2025-07-24 VITALS
SYSTOLIC BLOOD PRESSURE: 149 MMHG | DIASTOLIC BLOOD PRESSURE: 65 MMHG | RESPIRATION RATE: 18 BRPM | TEMPERATURE: 98 F | HEART RATE: 75 BPM | OXYGEN SATURATION: 94 %

## 2025-07-24 LAB
ANION GAP SERPL CALC-SCNC: 2 MMOL/L — LOW (ref 5–17)
BUN SERPL-MCNC: 8 MG/DL — SIGNIFICANT CHANGE UP (ref 7–23)
CALCIUM SERPL-MCNC: 8.5 MG/DL — SIGNIFICANT CHANGE UP (ref 8.5–10.1)
CHLORIDE SERPL-SCNC: 102 MMOL/L — SIGNIFICANT CHANGE UP (ref 96–108)
CO2 SERPL-SCNC: 33 MMOL/L — HIGH (ref 22–31)
CREAT SERPL-MCNC: 0.56 MG/DL — SIGNIFICANT CHANGE UP (ref 0.5–1.3)
CULTURE RESULTS: ABNORMAL
EGFR: 85 ML/MIN/1.73M2 — SIGNIFICANT CHANGE UP
EGFR: 85 ML/MIN/1.73M2 — SIGNIFICANT CHANGE UP
GLUCOSE BLDC GLUCOMTR-MCNC: 110 MG/DL — HIGH (ref 70–99)
GLUCOSE BLDC GLUCOMTR-MCNC: 156 MG/DL — HIGH (ref 70–99)
GLUCOSE SERPL-MCNC: 101 MG/DL — HIGH (ref 70–99)
HCT VFR BLD CALC: 30.9 % — LOW (ref 34.5–45)
HGB BLD-MCNC: 9.8 G/DL — LOW (ref 11.5–15.5)
MCHC RBC-ENTMCNC: 26.6 PG — LOW (ref 27–34)
MCHC RBC-ENTMCNC: 31.7 G/DL — LOW (ref 32–36)
MCV RBC AUTO: 83.7 FL — SIGNIFICANT CHANGE UP (ref 80–100)
NRBC # BLD AUTO: 0 K/UL — SIGNIFICANT CHANGE UP (ref 0–0)
NRBC # FLD: 0 K/UL — SIGNIFICANT CHANGE UP (ref 0–0)
NRBC BLD AUTO-RTO: 0 /100 WBCS — SIGNIFICANT CHANGE UP (ref 0–0)
PLATELET # BLD AUTO: 246 K/UL — SIGNIFICANT CHANGE UP (ref 150–400)
PMV BLD: 9.7 FL — SIGNIFICANT CHANGE UP (ref 7–13)
POTASSIUM SERPL-MCNC: 3.8 MMOL/L — SIGNIFICANT CHANGE UP (ref 3.5–5.3)
POTASSIUM SERPL-SCNC: 3.8 MMOL/L — SIGNIFICANT CHANGE UP (ref 3.5–5.3)
RBC # BLD: 3.69 M/UL — LOW (ref 3.8–5.2)
RBC # FLD: 14 % — SIGNIFICANT CHANGE UP (ref 10.3–14.5)
SODIUM SERPL-SCNC: 137 MMOL/L — SIGNIFICANT CHANGE UP (ref 135–145)
SPECIMEN SOURCE: SIGNIFICANT CHANGE UP
WBC # BLD: 4.63 K/UL — SIGNIFICANT CHANGE UP (ref 3.8–10.5)
WBC # FLD AUTO: 4.63 K/UL — SIGNIFICANT CHANGE UP (ref 3.8–10.5)

## 2025-07-24 PROCEDURE — 83735 ASSAY OF MAGNESIUM: CPT

## 2025-07-24 PROCEDURE — 82607 VITAMIN B-12: CPT

## 2025-07-24 PROCEDURE — 80048 BASIC METABOLIC PNL TOTAL CA: CPT

## 2025-07-24 PROCEDURE — 97116 GAIT TRAINING THERAPY: CPT

## 2025-07-24 PROCEDURE — 82728 ASSAY OF FERRITIN: CPT

## 2025-07-24 PROCEDURE — 99285 EMERGENCY DEPT VISIT HI MDM: CPT | Mod: 25

## 2025-07-24 PROCEDURE — 36415 COLL VENOUS BLD VENIPUNCTURE: CPT

## 2025-07-24 PROCEDURE — 85027 COMPLETE CBC AUTOMATED: CPT

## 2025-07-24 PROCEDURE — 87205 SMEAR GRAM STAIN: CPT

## 2025-07-24 PROCEDURE — 71045 X-RAY EXAM CHEST 1 VIEW: CPT

## 2025-07-24 PROCEDURE — 99239 HOSP IP/OBS DSCHRG MGMT >30: CPT

## 2025-07-24 PROCEDURE — 82962 GLUCOSE BLOOD TEST: CPT

## 2025-07-24 PROCEDURE — 97110 THERAPEUTIC EXERCISES: CPT

## 2025-07-24 PROCEDURE — 71250 CT THORAX DX C-: CPT

## 2025-07-24 PROCEDURE — 82746 ASSAY OF FOLIC ACID SERUM: CPT

## 2025-07-24 PROCEDURE — 80053 COMPREHEN METABOLIC PANEL: CPT

## 2025-07-24 PROCEDURE — 87070 CULTURE OTHR SPECIMN AEROBIC: CPT

## 2025-07-24 PROCEDURE — 92610 EVALUATE SWALLOWING FUNCTION: CPT

## 2025-07-24 PROCEDURE — 93005 ELECTROCARDIOGRAM TRACING: CPT

## 2025-07-24 PROCEDURE — 85610 PROTHROMBIN TIME: CPT

## 2025-07-24 PROCEDURE — 87040 BLOOD CULTURE FOR BACTERIA: CPT

## 2025-07-24 PROCEDURE — 96374 THER/PROPH/DIAG INJ IV PUSH: CPT

## 2025-07-24 PROCEDURE — 85730 THROMBOPLASTIN TIME PARTIAL: CPT

## 2025-07-24 PROCEDURE — 83605 ASSAY OF LACTIC ACID: CPT

## 2025-07-24 PROCEDURE — 84145 PROCALCITONIN (PCT): CPT

## 2025-07-24 PROCEDURE — 97162 PT EVAL MOD COMPLEX 30 MIN: CPT

## 2025-07-24 PROCEDURE — 84100 ASSAY OF PHOSPHORUS: CPT

## 2025-07-24 PROCEDURE — 94760 N-INVAS EAR/PLS OXIMETRY 1: CPT

## 2025-07-24 PROCEDURE — 96376 TX/PRO/DX INJ SAME DRUG ADON: CPT

## 2025-07-24 PROCEDURE — 83540 ASSAY OF IRON: CPT

## 2025-07-24 PROCEDURE — 94640 AIRWAY INHALATION TREATMENT: CPT

## 2025-07-24 PROCEDURE — 85025 COMPLETE CBC W/AUTO DIFF WBC: CPT

## 2025-07-24 PROCEDURE — 94010 BREATHING CAPACITY TEST: CPT

## 2025-07-24 PROCEDURE — 97112 NEUROMUSCULAR REEDUCATION: CPT

## 2025-07-24 PROCEDURE — 96375 TX/PRO/DX INJ NEW DRUG ADDON: CPT

## 2025-07-24 RX ORDER — CYANOCOBALAMIN 1000 UG/ML
1 INJECTION INTRAMUSCULAR; SUBCUTANEOUS
Qty: 0 | Refills: 0 | DISCHARGE
Start: 2025-07-24

## 2025-07-24 RX ORDER — FERROUS SULFATE 137(45) MG
1 TABLET, EXTENDED RELEASE ORAL
Qty: 0 | Refills: 0 | DISCHARGE
Start: 2025-07-24

## 2025-07-24 RX ORDER — FOLIC ACID 1 MG/1
1 TABLET ORAL
Qty: 0 | Refills: 0 | DISCHARGE
Start: 2025-07-24

## 2025-07-24 RX ORDER — CARBAMAZEPINE 200 MG/1
1 TABLET ORAL
Qty: 0 | Refills: 0 | DISCHARGE
Start: 2025-07-24

## 2025-07-24 RX ORDER — ENOXAPARIN SODIUM 100 MG/ML
40 INJECTION SUBCUTANEOUS
Qty: 0 | Refills: 0 | DISCHARGE
Start: 2025-07-24

## 2025-07-24 RX ADMIN — FOLIC ACID 1 MILLIGRAM(S): 1 TABLET ORAL at 12:43

## 2025-07-24 RX ADMIN — CYANOCOBALAMIN 1000 MICROGRAM(S): 1000 INJECTION INTRAMUSCULAR; SUBCUTANEOUS at 12:42

## 2025-07-24 RX ADMIN — LOSARTAN POTASSIUM 50 MILLIGRAM(S): 100 TABLET, FILM COATED ORAL at 05:24

## 2025-07-24 RX ADMIN — LEVETIRACETAM 250 MILLIGRAM(S): 10 INJECTION, SOLUTION INTRAVENOUS at 05:24

## 2025-07-24 RX ADMIN — Medication 2.5 MILLIGRAM(S): at 07:30

## 2025-07-24 RX ADMIN — Medication 2.5 MILLIGRAM(S): at 13:48

## 2025-07-24 RX ADMIN — AMLODIPINE BESYLATE 5 MILLIGRAM(S): 10 TABLET ORAL at 05:24

## 2025-07-24 RX ADMIN — CARVEDILOL 12.5 MILLIGRAM(S): 3.12 TABLET, FILM COATED ORAL at 05:23

## 2025-07-24 RX ADMIN — Medication 88 MICROGRAM(S): at 05:24

## 2025-07-24 RX ADMIN — Medication 325 MILLIGRAM(S): at 12:42

## 2025-07-24 RX ADMIN — CARBAMAZEPINE 100 MILLIGRAM(S): 200 TABLET ORAL at 08:48

## 2025-07-24 RX ADMIN — CARBAMAZEPINE 100 MILLIGRAM(S): 200 TABLET ORAL at 15:45

## 2025-07-24 RX ADMIN — INSULIN LISPRO 1: 100 INJECTION, SOLUTION INTRAVENOUS; SUBCUTANEOUS at 12:43

## 2025-08-21 ENCOUNTER — TRANSCRIPTION ENCOUNTER (OUTPATIENT)
Age: 89
End: 2025-08-21

## 2025-08-21 ENCOUNTER — EMERGENCY (EMERGENCY)
Facility: HOSPITAL | Age: 89
LOS: 1 days | End: 2025-08-21
Attending: EMERGENCY MEDICINE | Admitting: EMERGENCY MEDICINE
Payer: MEDICARE

## 2025-08-21 VITALS
DIASTOLIC BLOOD PRESSURE: 79 MMHG | TEMPERATURE: 98 F | SYSTOLIC BLOOD PRESSURE: 117 MMHG | WEIGHT: 145.06 LBS | OXYGEN SATURATION: 94 % | HEIGHT: 65 IN | HEART RATE: 78 BPM | RESPIRATION RATE: 19 BRPM

## 2025-08-21 DIAGNOSIS — Z86.69 PERSONAL HISTORY OF OTHER DISEASES OF THE NERVOUS SYSTEM AND SENSE ORGANS: Chronic | ICD-10-CM

## 2025-08-21 LAB
ALBUMIN SERPL ELPH-MCNC: 2.5 G/DL — LOW (ref 3.3–5)
ALP SERPL-CCNC: 62 U/L — SIGNIFICANT CHANGE UP (ref 40–120)
ALT FLD-CCNC: 12 U/L — SIGNIFICANT CHANGE UP (ref 12–78)
ANION GAP SERPL CALC-SCNC: 5 MMOL/L — SIGNIFICANT CHANGE UP (ref 5–17)
APTT BLD: 23 SEC — LOW (ref 26.1–36.8)
AST SERPL-CCNC: 14 U/L — LOW (ref 15–37)
BASOPHILS # BLD AUTO: 0.07 K/UL — SIGNIFICANT CHANGE UP (ref 0–0.2)
BASOPHILS NFR BLD AUTO: 1.2 % — SIGNIFICANT CHANGE UP (ref 0–2)
BILIRUB SERPL-MCNC: 0.1 MG/DL — LOW (ref 0.2–1.2)
BUN SERPL-MCNC: 25 MG/DL — HIGH (ref 7–23)
CALCIUM SERPL-MCNC: 9 MG/DL — SIGNIFICANT CHANGE UP (ref 8.5–10.1)
CHLORIDE SERPL-SCNC: 106 MMOL/L — SIGNIFICANT CHANGE UP (ref 96–108)
CK SERPL-CCNC: 58 U/L — SIGNIFICANT CHANGE UP (ref 26–192)
CO2 SERPL-SCNC: 31 MMOL/L — SIGNIFICANT CHANGE UP (ref 22–31)
CREAT SERPL-MCNC: 1 MG/DL — SIGNIFICANT CHANGE UP (ref 0.5–1.3)
EGFR: 52 ML/MIN/1.73M2 — LOW
EGFR: 52 ML/MIN/1.73M2 — LOW
EOSINOPHIL # BLD AUTO: 0.3 K/UL — SIGNIFICANT CHANGE UP (ref 0–0.5)
EOSINOPHIL NFR BLD AUTO: 5.3 % — SIGNIFICANT CHANGE UP (ref 0–6)
GLUCOSE SERPL-MCNC: 111 MG/DL — HIGH (ref 70–99)
HCT VFR BLD CALC: 30.3 % — LOW (ref 34.5–45)
HGB BLD-MCNC: 9.5 G/DL — LOW (ref 11.5–15.5)
IMM GRANULOCYTES # BLD AUTO: 0.03 K/UL — SIGNIFICANT CHANGE UP (ref 0–0.07)
IMM GRANULOCYTES NFR BLD AUTO: 0.5 % — SIGNIFICANT CHANGE UP (ref 0–0.9)
INR BLD: 1.04 RATIO — SIGNIFICANT CHANGE UP (ref 0.85–1.16)
LYMPHOCYTES # BLD AUTO: 1.14 K/UL — SIGNIFICANT CHANGE UP (ref 1–3.3)
LYMPHOCYTES NFR BLD AUTO: 20 % — SIGNIFICANT CHANGE UP (ref 13–44)
MCHC RBC-ENTMCNC: 26.3 PG — LOW (ref 27–34)
MCHC RBC-ENTMCNC: 31.4 G/DL — LOW (ref 32–36)
MCV RBC AUTO: 83.9 FL — SIGNIFICANT CHANGE UP (ref 80–100)
MONOCYTES # BLD AUTO: 0.64 K/UL — SIGNIFICANT CHANGE UP (ref 0–0.9)
MONOCYTES NFR BLD AUTO: 11.2 % — SIGNIFICANT CHANGE UP (ref 2–14)
NEUTROPHILS # BLD AUTO: 3.52 K/UL — SIGNIFICANT CHANGE UP (ref 1.8–7.4)
NEUTROPHILS NFR BLD AUTO: 61.8 % — SIGNIFICANT CHANGE UP (ref 43–77)
NRBC # BLD AUTO: 0 K/UL — SIGNIFICANT CHANGE UP (ref 0–0)
NRBC # FLD: 0 K/UL — SIGNIFICANT CHANGE UP (ref 0–0)
NRBC BLD AUTO-RTO: 0 /100 WBCS — SIGNIFICANT CHANGE UP (ref 0–0)
NT-PROBNP SERPL-SCNC: 377 PG/ML — SIGNIFICANT CHANGE UP (ref 0–450)
PLATELET # BLD AUTO: 244 K/UL — SIGNIFICANT CHANGE UP (ref 150–400)
PMV BLD: 10.3 FL — SIGNIFICANT CHANGE UP (ref 7–13)
POTASSIUM SERPL-MCNC: 3.8 MMOL/L — SIGNIFICANT CHANGE UP (ref 3.5–5.3)
POTASSIUM SERPL-SCNC: 3.8 MMOL/L — SIGNIFICANT CHANGE UP (ref 3.5–5.3)
PROT SERPL-MCNC: 6.7 G/DL — SIGNIFICANT CHANGE UP (ref 6–8.3)
PROTHROM AB SERPL-ACNC: 12.1 SEC — SIGNIFICANT CHANGE UP (ref 9.9–13.4)
RBC # BLD: 3.61 M/UL — LOW (ref 3.8–5.2)
RBC # FLD: 15.6 % — HIGH (ref 10.3–14.5)
SODIUM SERPL-SCNC: 142 MMOL/L — SIGNIFICANT CHANGE UP (ref 135–145)
TROPONIN I, HIGH SENSITIVITY RESULT: 7.1 NG/L — SIGNIFICANT CHANGE UP
WBC # BLD: 5.7 K/UL — SIGNIFICANT CHANGE UP (ref 3.8–10.5)
WBC # FLD AUTO: 5.7 K/UL — SIGNIFICANT CHANGE UP (ref 3.8–10.5)

## 2025-08-21 PROCEDURE — 99285 EMERGENCY DEPT VISIT HI MDM: CPT

## 2025-08-21 PROCEDURE — 71045 X-RAY EXAM CHEST 1 VIEW: CPT | Mod: 26

## 2025-08-21 PROCEDURE — 84484 ASSAY OF TROPONIN QUANT: CPT

## 2025-08-21 PROCEDURE — 85730 THROMBOPLASTIN TIME PARTIAL: CPT

## 2025-08-21 PROCEDURE — 83880 ASSAY OF NATRIURETIC PEPTIDE: CPT

## 2025-08-21 PROCEDURE — 71045 X-RAY EXAM CHEST 1 VIEW: CPT

## 2025-08-21 PROCEDURE — 85025 COMPLETE CBC W/AUTO DIFF WBC: CPT

## 2025-08-21 PROCEDURE — 80053 COMPREHEN METABOLIC PANEL: CPT

## 2025-08-21 PROCEDURE — 36415 COLL VENOUS BLD VENIPUNCTURE: CPT

## 2025-08-21 PROCEDURE — 82550 ASSAY OF CK (CPK): CPT

## 2025-08-21 PROCEDURE — 85610 PROTHROMBIN TIME: CPT

## 2025-08-21 PROCEDURE — 93010 ELECTROCARDIOGRAM REPORT: CPT | Mod: 76

## 2025-08-21 RX ORDER — CARBAMAZEPINE 200 MG/1
200 TABLET ORAL ONCE
Refills: 0 | Status: DISCONTINUED | OUTPATIENT
Start: 2025-08-21 | End: 2025-08-21

## 2025-08-21 RX ORDER — LEVETIRACETAM 10 MG/ML
250 INJECTION, SOLUTION INTRAVENOUS ONCE
Refills: 0 | Status: COMPLETED | OUTPATIENT
Start: 2025-08-21 | End: 2025-08-21

## 2025-08-21 RX ORDER — CARBAMAZEPINE 200 MG/1
200 TABLET ORAL ONCE
Refills: 0 | Status: COMPLETED | OUTPATIENT
Start: 2025-08-21 | End: 2025-08-21

## 2025-08-21 RX ADMIN — LEVETIRACETAM 250 MILLIGRAM(S): 10 INJECTION, SOLUTION INTRAVENOUS at 23:52

## 2025-08-21 RX ADMIN — CARBAMAZEPINE 200 MILLIGRAM(S): 200 TABLET ORAL at 23:54

## 2025-08-22 VITALS — SYSTOLIC BLOOD PRESSURE: 115 MMHG | HEART RATE: 77 BPM | DIASTOLIC BLOOD PRESSURE: 87 MMHG

## 2025-08-22 LAB
CK SERPL-CCNC: 41 U/L — SIGNIFICANT CHANGE UP (ref 26–192)
TROPONIN I, HIGH SENSITIVITY RESULT: 5.9 NG/L — SIGNIFICANT CHANGE UP

## 2025-08-22 PROCEDURE — 93010 ELECTROCARDIOGRAM REPORT: CPT

## 2025-08-22 PROCEDURE — 93005 ELECTROCARDIOGRAM TRACING: CPT

## 2025-08-22 PROCEDURE — 99285 EMERGENCY DEPT VISIT HI MDM: CPT

## 2025-08-22 PROCEDURE — 97162 PT EVAL MOD COMPLEX 30 MIN: CPT

## 2025-08-22 PROCEDURE — 82550 ASSAY OF CK (CPK): CPT

## 2025-08-22 PROCEDURE — 71045 X-RAY EXAM CHEST 1 VIEW: CPT

## 2025-08-22 PROCEDURE — 84484 ASSAY OF TROPONIN QUANT: CPT

## 2025-08-22 PROCEDURE — 83880 ASSAY OF NATRIURETIC PEPTIDE: CPT

## 2025-08-22 PROCEDURE — 96374 THER/PROPH/DIAG INJ IV PUSH: CPT

## 2025-08-22 PROCEDURE — 36415 COLL VENOUS BLD VENIPUNCTURE: CPT

## 2025-08-22 PROCEDURE — 80053 COMPREHEN METABOLIC PANEL: CPT

## 2025-08-22 PROCEDURE — 85730 THROMBOPLASTIN TIME PARTIAL: CPT

## 2025-08-22 PROCEDURE — 85610 PROTHROMBIN TIME: CPT

## 2025-08-22 PROCEDURE — 85025 COMPLETE CBC W/AUTO DIFF WBC: CPT

## 2025-08-22 PROCEDURE — 99285 EMERGENCY DEPT VISIT HI MDM: CPT | Mod: 25

## 2025-08-22 RX ORDER — LEVETIRACETAM 10 MG/ML
250 INJECTION, SOLUTION INTRAVENOUS ONCE
Refills: 0 | Status: COMPLETED | OUTPATIENT
Start: 2025-08-22 | End: 2025-08-22

## 2025-08-22 RX ORDER — LEVOTHYROXINE SODIUM 300 MCG
88 TABLET ORAL ONCE
Refills: 0 | Status: COMPLETED | OUTPATIENT
Start: 2025-08-22 | End: 2025-08-22

## 2025-08-22 RX ORDER — AMLODIPINE BESYLATE 10 MG/1
5 TABLET ORAL ONCE
Refills: 0 | Status: COMPLETED | OUTPATIENT
Start: 2025-08-22 | End: 2025-08-22

## 2025-08-22 RX ORDER — LOSARTAN POTASSIUM 100 MG/1
50 TABLET, FILM COATED ORAL ONCE
Refills: 0 | Status: COMPLETED | OUTPATIENT
Start: 2025-08-22 | End: 2025-08-22

## 2025-08-22 RX ORDER — CARVEDILOL 3.12 MG/1
12.5 TABLET, FILM COATED ORAL ONCE
Refills: 0 | Status: COMPLETED | OUTPATIENT
Start: 2025-08-22 | End: 2025-08-22

## 2025-08-22 RX ORDER — FERROUS FUMARATE 324(106)MG
1 TABLET ORAL
Refills: 0 | DISCHARGE

## 2025-08-22 RX ORDER — FOLIC ACID 1 MG/1
1 TABLET ORAL ONCE
Refills: 0 | Status: COMPLETED | OUTPATIENT
Start: 2025-08-22 | End: 2025-08-22

## 2025-08-22 RX ORDER — HYDROCHLOROTHIAZIDE 50 MG/1
1 TABLET ORAL
Refills: 0 | DISCHARGE

## 2025-08-22 RX ORDER — CARBAMAZEPINE 200 MG/1
100 TABLET ORAL ONCE
Refills: 0 | Status: DISCONTINUED | OUTPATIENT
Start: 2025-08-22 | End: 2025-08-25

## 2025-08-22 RX ORDER — CARBAMAZEPINE 200 MG/1
100 TABLET ORAL ONCE
Refills: 0 | Status: COMPLETED | OUTPATIENT
Start: 2025-08-22 | End: 2025-08-22

## 2025-08-22 RX ORDER — FERROUS SULFATE 137(45) MG
1 TABLET, EXTENDED RELEASE ORAL
Refills: 0 | DISCHARGE

## 2025-08-22 RX ADMIN — CARBAMAZEPINE 100 MILLIGRAM(S): 200 TABLET ORAL at 12:46

## 2025-08-22 RX ADMIN — AMLODIPINE BESYLATE 5 MILLIGRAM(S): 10 TABLET ORAL at 13:16

## 2025-08-22 RX ADMIN — FOLIC ACID 1 MILLIGRAM(S): 1 TABLET ORAL at 13:17

## 2025-08-22 RX ADMIN — Medication 88 MICROGRAM(S): at 13:16

## 2025-08-22 RX ADMIN — CARVEDILOL 12.5 MILLIGRAM(S): 3.12 TABLET, FILM COATED ORAL at 13:17

## 2025-08-22 RX ADMIN — LEVETIRACETAM 250 MILLIGRAM(S): 10 INJECTION, SOLUTION INTRAVENOUS at 12:46

## 2025-08-26 ENCOUNTER — APPOINTMENT (OUTPATIENT)
Dept: INTERNAL MEDICINE | Facility: CLINIC | Age: 89
End: 2025-08-26

## 2025-08-27 ENCOUNTER — APPOINTMENT (OUTPATIENT)
Dept: INTERNAL MEDICINE | Facility: CLINIC | Age: 89
End: 2025-08-27

## 2025-08-27 ENCOUNTER — APPOINTMENT (OUTPATIENT)
Dept: INTERNAL MEDICINE | Facility: CLINIC | Age: 89
End: 2025-08-27
Payer: MEDICARE

## 2025-08-27 VITALS
OXYGEN SATURATION: 98 % | HEIGHT: 65 IN | HEART RATE: 86 BPM | TEMPERATURE: 97.9 F | DIASTOLIC BLOOD PRESSURE: 88 MMHG | RESPIRATION RATE: 16 BRPM | SYSTOLIC BLOOD PRESSURE: 133 MMHG

## 2025-08-27 DIAGNOSIS — I10 ESSENTIAL (PRIMARY) HYPERTENSION: ICD-10-CM

## 2025-08-27 DIAGNOSIS — E11.9 TYPE 2 DIABETES MELLITUS W/OUT COMPLICATIONS: ICD-10-CM

## 2025-08-27 DIAGNOSIS — E78.00 PURE HYPERCHOLESTEROLEMIA, UNSPECIFIED: ICD-10-CM

## 2025-08-27 DIAGNOSIS — E03.9 HYPOTHYROIDISM, UNSPECIFIED: ICD-10-CM

## 2025-08-27 PROCEDURE — 36415 COLL VENOUS BLD VENIPUNCTURE: CPT

## 2025-08-27 PROCEDURE — G2211 COMPLEX E/M VISIT ADD ON: CPT

## 2025-08-27 PROCEDURE — 99214 OFFICE O/P EST MOD 30 MIN: CPT

## 2025-08-27 RX ORDER — IRBESARTAN 150 MG/1
150 TABLET ORAL
Qty: 90 | Refills: 3 | Status: ACTIVE | COMMUNITY

## 2025-08-28 ENCOUNTER — APPOINTMENT (OUTPATIENT)
Dept: NEUROLOGY | Facility: CLINIC | Age: 89
End: 2025-08-28
Payer: MEDICARE

## 2025-08-28 ENCOUNTER — APPOINTMENT (OUTPATIENT)
Dept: INTERNAL MEDICINE | Facility: CLINIC | Age: 89
End: 2025-08-28

## 2025-08-28 VITALS
HEIGHT: 65.5 IN | DIASTOLIC BLOOD PRESSURE: 81 MMHG | BODY MASS INDEX: 23.04 KG/M2 | SYSTOLIC BLOOD PRESSURE: 132 MMHG | WEIGHT: 140 LBS | HEART RATE: 94 BPM

## 2025-08-28 DIAGNOSIS — D64.9 ANEMIA, UNSPECIFIED: ICD-10-CM

## 2025-08-28 DIAGNOSIS — G40.109 LOCALIZATION-RELATED (FOCAL) (PARTIAL) SYMPTOMATIC EPILEPSY AND EPILEPTIC SYNDROMES WITH SIMPLE PARTIAL SEIZURES, NOT INTRACTABLE, W/OUT STATUS EPILEPTICUS: ICD-10-CM

## 2025-08-28 LAB
25(OH)D3 SERPL-MCNC: 14.3 NG/ML
ALBUMIN SERPL ELPH-MCNC: 3.7 G/DL
ALP BLD-CCNC: 64 U/L
ALT SERPL-CCNC: 8 U/L
ANION GAP SERPL CALC-SCNC: 15 MMOL/L
AST SERPL-CCNC: 16 U/L
BASOPHILS # BLD AUTO: 0.07 K/UL
BASOPHILS NFR BLD AUTO: 1.4 %
BILIRUB SERPL-MCNC: <0.2 MG/DL
BUN SERPL-MCNC: 37 MG/DL
CALCIUM SERPL-MCNC: 8.8 MG/DL
CHLORIDE SERPL-SCNC: 103 MMOL/L
CHOLEST SERPL-MCNC: 235 MG/DL
CK SERPL-CCNC: 35 U/L
CO2 SERPL-SCNC: 24 MMOL/L
CREAT SERPL-MCNC: 1.11 MG/DL
EGFRCR SERPLBLD CKD-EPI 2021: 46 ML/MIN/1.73M2
EOSINOPHIL # BLD AUTO: 0.3 K/UL
EOSINOPHIL NFR BLD AUTO: 5.9 %
ESTIMATED AVERAGE GLUCOSE: 151 MG/DL
GLUCOSE SERPL-MCNC: 99 MG/DL
HBA1C MFR BLD HPLC: 6.9 %
HCT VFR BLD CALC: 32 %
HDLC SERPL-MCNC: 53 MG/DL
HGB BLD-MCNC: 9.7 G/DL
IMM GRANULOCYTES NFR BLD AUTO: 0.8 %
LDLC SERPL-MCNC: 150 MG/DL
LYMPHOCYTES # BLD AUTO: 1.15 K/UL
LYMPHOCYTES NFR BLD AUTO: 22.8 %
MAN DIFF?: NORMAL
MCHC RBC-ENTMCNC: 27 PG
MCHC RBC-ENTMCNC: 30.3 G/DL
MCV RBC AUTO: 89.1 FL
MONOCYTES # BLD AUTO: 0.57 K/UL
MONOCYTES NFR BLD AUTO: 11.3 %
NEUTROPHILS # BLD AUTO: 2.92 K/UL
NEUTROPHILS NFR BLD AUTO: 57.8 %
NONHDLC SERPL-MCNC: 182 MG/DL
PLATELET # BLD AUTO: 290 K/UL
POTASSIUM SERPL-SCNC: 4 MMOL/L
PROT SERPL-MCNC: 6.5 G/DL
RBC # BLD: 3.59 M/UL
RBC # FLD: 16.7 %
SODIUM SERPL-SCNC: 142 MMOL/L
TRIGL SERPL-MCNC: 179 MG/DL
TSH SERPL-ACNC: 0.72 UIU/ML
WBC # FLD AUTO: 5.05 K/UL

## 2025-08-28 PROCEDURE — 99213 OFFICE O/P EST LOW 20 MIN: CPT

## 2025-08-28 PROCEDURE — G2211 COMPLEX E/M VISIT ADD ON: CPT

## 2025-08-28 RX ORDER — HYDROCHLOROTHIAZIDE 12.5 MG/1
12.5 TABLET ORAL
Qty: 90 | Refills: 1 | Status: ACTIVE | COMMUNITY
Start: 1900-01-01 | End: 1900-01-01

## 2025-08-28 RX ORDER — METFORMIN HYDROCHLORIDE 500 MG/1
500 TABLET, COATED ORAL
Qty: 180 | Refills: 1 | Status: ACTIVE | COMMUNITY
Start: 1900-01-01 | End: 1900-01-01

## 2025-08-29 LAB
FERRITIN SERPL-MCNC: 35 NG/ML
FOLATE SERPL-MCNC: 16.4 NG/ML
IRON SATN MFR SERPL: 14 %
IRON SERPL-MCNC: 54 UG/DL
TIBC SERPL-MCNC: 378 UG/DL
UIBC SERPL-MCNC: 325 UG/DL
VIT B12 SERPL-MCNC: 333 PG/ML

## 2025-09-12 ENCOUNTER — APPOINTMENT (OUTPATIENT)
Dept: PULMONOLOGY | Facility: CLINIC | Age: 89
End: 2025-09-12
Payer: MEDICARE

## 2025-09-12 VITALS
DIASTOLIC BLOOD PRESSURE: 65 MMHG | BODY MASS INDEX: 23.04 KG/M2 | SYSTOLIC BLOOD PRESSURE: 105 MMHG | OXYGEN SATURATION: 99 % | HEART RATE: 80 BPM | WEIGHT: 140 LBS | HEIGHT: 65.5 IN

## 2025-09-12 DIAGNOSIS — R93.89 ABNORMAL FINDINGS ON DIAGNOSTIC IMAGING OF OTHER SPECIFIED BODY STRUCTURES: ICD-10-CM

## 2025-09-12 PROCEDURE — 99203 OFFICE O/P NEW LOW 30 MIN: CPT | Mod: 25

## 2025-09-12 PROCEDURE — 94618 PULMONARY STRESS TESTING: CPT
